# Patient Record
Sex: FEMALE | Race: WHITE | NOT HISPANIC OR LATINO | Employment: OTHER | URBAN - METROPOLITAN AREA
[De-identification: names, ages, dates, MRNs, and addresses within clinical notes are randomized per-mention and may not be internally consistent; named-entity substitution may affect disease eponyms.]

---

## 2017-07-16 ENCOUNTER — APPOINTMENT (EMERGENCY)
Dept: RADIOLOGY | Facility: HOSPITAL | Age: 77
DRG: 640 | End: 2017-07-16
Payer: MEDICARE

## 2017-07-16 ENCOUNTER — HOSPITAL ENCOUNTER (INPATIENT)
Facility: HOSPITAL | Age: 77
LOS: 4 days | Discharge: HOME | DRG: 640 | End: 2017-07-20
Attending: EMERGENCY MEDICINE | Admitting: INTERNAL MEDICINE
Payer: MEDICARE

## 2017-07-16 DIAGNOSIS — E87.1 HYPONATREMIA: Primary | ICD-10-CM

## 2017-07-16 DIAGNOSIS — R11.2 NAUSEA AND VOMITING: ICD-10-CM

## 2017-07-16 DIAGNOSIS — R41.0 DELIRIUM: ICD-10-CM

## 2017-07-16 DIAGNOSIS — N39.0 URINARY TRACT INFECTION: ICD-10-CM

## 2017-07-16 PROBLEM — E78.5 HLD (HYPERLIPIDEMIA): Chronic | Status: ACTIVE | Noted: 2017-07-16

## 2017-07-16 PROBLEM — I10 ESSENTIAL HYPERTENSION: Chronic | Status: ACTIVE | Noted: 2017-07-16

## 2017-07-16 PROBLEM — R82.90 ABNORMAL URINALYSIS: Status: ACTIVE | Noted: 2017-07-16

## 2017-07-16 PROBLEM — E87.6 HYPOKALEMIA: Status: ACTIVE | Noted: 2017-07-16

## 2017-07-16 PROBLEM — E11.9 DM (DIABETES MELLITUS) (HCC): Chronic | Status: ACTIVE | Noted: 2017-07-16

## 2017-07-16 PROBLEM — E11.10: Chronic | Status: ACTIVE | Noted: 2017-07-16

## 2017-07-16 LAB
ALBUMIN SERPL BCP-MCNC: 3.6 G/DL (ref 3.5–5)
ALP SERPL-CCNC: 73 U/L (ref 46–116)
ALT SERPL W P-5'-P-CCNC: 12 U/L (ref 12–78)
ANION GAP SERPL CALCULATED.3IONS-SCNC: 10 MMOL/L (ref 4–13)
AST SERPL W P-5'-P-CCNC: 14 U/L (ref 5–45)
BACTERIA UR QL AUTO: ABNORMAL /HPF
BASOPHILS # BLD AUTO: 0.02 THOUSANDS/ΜL (ref 0–0.1)
BASOPHILS NFR BLD AUTO: 0 % (ref 0–1)
BILIRUB SERPL-MCNC: 0.43 MG/DL (ref 0.2–1)
BILIRUB UR QL STRIP: NEGATIVE
BUN SERPL-MCNC: 13 MG/DL (ref 5–25)
CALCIUM SERPL-MCNC: 9 MG/DL (ref 8.3–10.1)
CHLORIDE SERPL-SCNC: 83 MMOL/L (ref 100–108)
CLARITY UR: CLEAR
CLARITY, POC: NORMAL
CO2 SERPL-SCNC: 26 MMOL/L (ref 21–32)
COLOR UR: YELLOW
COLOR, POC: YELLOW
CREAT SERPL-MCNC: 0.84 MG/DL (ref 0.6–1.3)
CREAT UR-MCNC: 93.9 MG/DL
EOSINOPHIL # BLD AUTO: 0.01 THOUSAND/ΜL (ref 0–0.61)
EOSINOPHIL NFR BLD AUTO: 0 % (ref 0–6)
ERYTHROCYTE [DISTWIDTH] IN BLOOD BY AUTOMATED COUNT: 14.5 % (ref 11.6–15.1)
GFR SERPL CREATININE-BSD FRML MDRD: >60 ML/MIN/1.73SQ M
GLUCOSE SERPL-MCNC: 143 MG/DL (ref 65–140)
GLUCOSE SERPL-MCNC: 146 MG/DL (ref 65–140)
GLUCOSE SERPL-MCNC: 158 MG/DL (ref 65–140)
GLUCOSE UR STRIP-MCNC: NEGATIVE MG/DL
HCT VFR BLD AUTO: 37.3 % (ref 34.8–46.1)
HGB BLD-MCNC: 13.2 G/DL (ref 11.5–15.4)
HGB UR QL STRIP.AUTO: ABNORMAL
HYALINE CASTS #/AREA URNS LPF: ABNORMAL /LPF
KETONES UR STRIP-MCNC: NEGATIVE MG/DL
LEUKOCYTE ESTERASE UR QL STRIP: ABNORMAL
LIPASE SERPL-CCNC: 101 U/L (ref 73–393)
LYMPHOCYTES # BLD AUTO: 1.98 THOUSANDS/ΜL (ref 0.6–4.47)
LYMPHOCYTES NFR BLD AUTO: 23 % (ref 14–44)
MAGNESIUM SERPL-MCNC: 1.8 MG/DL (ref 1.6–2.6)
MCH RBC QN AUTO: 26.3 PG (ref 26.8–34.3)
MCHC RBC AUTO-ENTMCNC: 35.4 G/DL (ref 31.4–37.4)
MCV RBC AUTO: 75 FL (ref 82–98)
MONOCYTES # BLD AUTO: 1.02 THOUSAND/ΜL (ref 0.17–1.22)
MONOCYTES NFR BLD AUTO: 12 % (ref 4–12)
NEUTROPHILS # BLD AUTO: 5.52 THOUSANDS/ΜL (ref 1.85–7.62)
NEUTS SEG NFR BLD AUTO: 65 % (ref 43–75)
NITRITE UR QL STRIP: NEGATIVE
NON-SQ EPI CELLS URNS QL MICRO: ABNORMAL /HPF
NRBC BLD AUTO-RTO: 0 /100 WBCS
OSMOLALITY UR/SERPL-RTO: 257 MMOL/KG (ref 282–298)
OSMOLALITY UR: 324 MMOL/KG
PH UR STRIP.AUTO: 5.5 [PH] (ref 4.5–8)
PHOSPHATE SERPL-MCNC: 2.5 MG/DL (ref 2.3–4.1)
PLATELET # BLD AUTO: 301 THOUSANDS/UL (ref 149–390)
PLATELET # BLD AUTO: 311 THOUSANDS/UL (ref 149–390)
PMV BLD AUTO: 9.7 FL (ref 8.9–12.7)
PMV BLD AUTO: 9.7 FL (ref 8.9–12.7)
POTASSIUM SERPL-SCNC: 3.4 MMOL/L (ref 3.5–5.3)
PROT SERPL-MCNC: 8.2 G/DL (ref 6.4–8.2)
PROT UR STRIP-MCNC: ABNORMAL MG/DL
RBC # BLD AUTO: 5.01 MILLION/UL (ref 3.81–5.12)
RBC #/AREA URNS AUTO: ABNORMAL /HPF
SODIUM 24H UR-SCNC: 33 MOL/L
SODIUM SERPL-SCNC: 119 MMOL/L (ref 136–145)
SP GR UR STRIP.AUTO: 1.02 (ref 1–1.03)
SPECIMEN SOURCE: NORMAL
TROPONIN I BLD-MCNC: 0 NG/ML (ref 0–0.08)
UROBILINOGEN UR QL STRIP.AUTO: 0.2 E.U./DL
WBC # BLD AUTO: 8.57 THOUSAND/UL (ref 4.31–10.16)
WBC #/AREA URNS AUTO: ABNORMAL /HPF

## 2017-07-16 PROCEDURE — 85025 COMPLETE CBC W/AUTO DIFF WBC: CPT | Performed by: EMERGENCY MEDICINE

## 2017-07-16 PROCEDURE — 36415 COLL VENOUS BLD VENIPUNCTURE: CPT | Performed by: EMERGENCY MEDICINE

## 2017-07-16 PROCEDURE — 82948 REAGENT STRIP/BLOOD GLUCOSE: CPT

## 2017-07-16 PROCEDURE — 87186 SC STD MICRODIL/AGAR DIL: CPT

## 2017-07-16 PROCEDURE — 85049 AUTOMATED PLATELET COUNT: CPT | Performed by: INTERNAL MEDICINE

## 2017-07-16 PROCEDURE — 93005 ELECTROCARDIOGRAM TRACING: CPT

## 2017-07-16 PROCEDURE — 93005 ELECTROCARDIOGRAM TRACING: CPT | Performed by: EMERGENCY MEDICINE

## 2017-07-16 PROCEDURE — 87086 URINE CULTURE/COLONY COUNT: CPT

## 2017-07-16 PROCEDURE — 84100 ASSAY OF PHOSPHORUS: CPT | Performed by: EMERGENCY MEDICINE

## 2017-07-16 PROCEDURE — 87077 CULTURE AEROBIC IDENTIFY: CPT

## 2017-07-16 PROCEDURE — 80053 COMPREHEN METABOLIC PANEL: CPT | Performed by: EMERGENCY MEDICINE

## 2017-07-16 PROCEDURE — 96360 HYDRATION IV INFUSION INIT: CPT

## 2017-07-16 PROCEDURE — 83935 ASSAY OF URINE OSMOLALITY: CPT | Performed by: EMERGENCY MEDICINE

## 2017-07-16 PROCEDURE — 71020 HB CHEST X-RAY 2VW FRONTAL&LATL: CPT

## 2017-07-16 PROCEDURE — 83690 ASSAY OF LIPASE: CPT | Performed by: EMERGENCY MEDICINE

## 2017-07-16 PROCEDURE — 84484 ASSAY OF TROPONIN QUANT: CPT

## 2017-07-16 PROCEDURE — 81001 URINALYSIS AUTO W/SCOPE: CPT

## 2017-07-16 PROCEDURE — 83930 ASSAY OF BLOOD OSMOLALITY: CPT | Performed by: EMERGENCY MEDICINE

## 2017-07-16 PROCEDURE — 84300 ASSAY OF URINE SODIUM: CPT | Performed by: EMERGENCY MEDICINE

## 2017-07-16 PROCEDURE — 81002 URINALYSIS NONAUTO W/O SCOPE: CPT | Performed by: EMERGENCY MEDICINE

## 2017-07-16 PROCEDURE — 83735 ASSAY OF MAGNESIUM: CPT | Performed by: EMERGENCY MEDICINE

## 2017-07-16 PROCEDURE — 82570 ASSAY OF URINE CREATININE: CPT | Performed by: EMERGENCY MEDICINE

## 2017-07-16 PROCEDURE — 99285 EMERGENCY DEPT VISIT HI MDM: CPT

## 2017-07-16 RX ORDER — AMLODIPINE BESYLATE 10 MG/1
10 TABLET ORAL DAILY
Status: DISCONTINUED | OUTPATIENT
Start: 2017-07-16 | End: 2017-07-20 | Stop reason: HOSPADM

## 2017-07-16 RX ORDER — POTASSIUM CHLORIDE 20MEQ/15ML
40 LIQUID (ML) ORAL ONCE
Status: COMPLETED | OUTPATIENT
Start: 2017-07-16 | End: 2017-07-16

## 2017-07-16 RX ORDER — PRAVASTATIN SODIUM 40 MG
40 TABLET ORAL
Status: DISCONTINUED | OUTPATIENT
Start: 2017-07-16 | End: 2017-07-20 | Stop reason: HOSPADM

## 2017-07-16 RX ORDER — ATENOLOL 50 MG/1
50 TABLET ORAL 2 TIMES DAILY
Status: DISCONTINUED | OUTPATIENT
Start: 2017-07-16 | End: 2017-07-17

## 2017-07-16 RX ORDER — ONDANSETRON 2 MG/ML
4 INJECTION INTRAMUSCULAR; INTRAVENOUS ONCE
Status: DISCONTINUED | OUTPATIENT
Start: 2017-07-16 | End: 2017-07-16

## 2017-07-16 RX ORDER — ACETAMINOPHEN 325 MG/1
650 TABLET ORAL EVERY 6 HOURS PRN
Status: DISCONTINUED | OUTPATIENT
Start: 2017-07-16 | End: 2017-07-20 | Stop reason: HOSPADM

## 2017-07-16 RX ORDER — SODIUM CHLORIDE 9 MG/ML
50 INJECTION, SOLUTION INTRAVENOUS CONTINUOUS
Status: DISCONTINUED | OUTPATIENT
Start: 2017-07-16 | End: 2017-07-17

## 2017-07-16 RX ORDER — PANTOPRAZOLE SODIUM 40 MG/1
40 TABLET, DELAYED RELEASE ORAL
Status: DISCONTINUED | OUTPATIENT
Start: 2017-07-17 | End: 2017-07-19

## 2017-07-16 RX ORDER — ENALAPRIL MALEATE 10 MG/1
20 TABLET ORAL 2 TIMES DAILY
Status: DISCONTINUED | OUTPATIENT
Start: 2017-07-16 | End: 2017-07-17

## 2017-07-16 RX ORDER — HYDROCHLOROTHIAZIDE 25 MG/1
25 TABLET ORAL DAILY
COMMUNITY
End: 2017-07-20 | Stop reason: HOSPADM

## 2017-07-16 RX ORDER — AMLODIPINE BESYLATE 10 MG/1
10 TABLET ORAL DAILY
COMMUNITY

## 2017-07-16 RX ORDER — ENALAPRIL MALEATE 20 MG/1
20 TABLET ORAL 2 TIMES DAILY
COMMUNITY
End: 2019-11-16 | Stop reason: HOSPADM

## 2017-07-16 RX ORDER — ATENOLOL 50 MG/1
50 TABLET ORAL 2 TIMES DAILY
COMMUNITY

## 2017-07-16 RX ORDER — SIMVASTATIN 20 MG
20 TABLET ORAL
COMMUNITY

## 2017-07-16 RX ORDER — GLIPIZIDE 10 MG/1
10 TABLET ORAL
COMMUNITY
End: 2019-11-16 | Stop reason: HOSPADM

## 2017-07-16 RX ORDER — OMEPRAZOLE 20 MG/1
20 CAPSULE, DELAYED RELEASE ORAL DAILY
COMMUNITY

## 2017-07-16 RX ADMIN — AMLODIPINE BESYLATE 10 MG: 10 TABLET ORAL at 17:48

## 2017-07-16 RX ADMIN — POTASSIUM CHLORIDE 40 MEQ: 20 SOLUTION ORAL at 14:09

## 2017-07-16 RX ADMIN — SODIUM CHLORIDE 1000 ML: 0.9 INJECTION, SOLUTION INTRAVENOUS at 13:33

## 2017-07-16 RX ADMIN — CEFTRIAXONE 1000 MG: 1 INJECTION, POWDER, FOR SOLUTION INTRAMUSCULAR; INTRAVENOUS at 14:35

## 2017-07-16 RX ADMIN — SODIUM CHLORIDE 50 ML/HR: 0.9 INJECTION, SOLUTION INTRAVENOUS at 16:12

## 2017-07-16 RX ADMIN — ATENOLOL 50 MG: 50 TABLET ORAL at 17:48

## 2017-07-16 RX ADMIN — ENALAPRIL MALEATE 20 MG: 10 TABLET ORAL at 17:49

## 2017-07-16 RX ADMIN — PRAVASTATIN SODIUM 40 MG: 40 TABLET ORAL at 17:48

## 2017-07-16 RX ADMIN — INSULIN LISPRO 1 UNITS: 100 INJECTION, SOLUTION INTRAVENOUS; SUBCUTANEOUS at 16:16

## 2017-07-17 LAB
ANION GAP SERPL CALCULATED.3IONS-SCNC: 7 MMOL/L (ref 4–13)
ANION GAP SERPL CALCULATED.3IONS-SCNC: 7 MMOL/L (ref 4–13)
ATRIAL RATE: 77 BPM
ATRIAL RATE: 80 BPM
BUN SERPL-MCNC: 11 MG/DL (ref 5–25)
BUN SERPL-MCNC: 11 MG/DL (ref 5–25)
CALCIUM SERPL-MCNC: 8.4 MG/DL (ref 8.3–10.1)
CALCIUM SERPL-MCNC: 8.7 MG/DL (ref 8.3–10.1)
CHLORIDE SERPL-SCNC: 102 MMOL/L (ref 100–108)
CHLORIDE SERPL-SCNC: 98 MMOL/L (ref 100–108)
CO2 SERPL-SCNC: 23 MMOL/L (ref 21–32)
CO2 SERPL-SCNC: 24 MMOL/L (ref 21–32)
CREAT SERPL-MCNC: 0.71 MG/DL (ref 0.6–1.3)
CREAT SERPL-MCNC: 0.77 MG/DL (ref 0.6–1.3)
ERYTHROCYTE [DISTWIDTH] IN BLOOD BY AUTOMATED COUNT: 14.9 % (ref 11.6–15.1)
EST. AVERAGE GLUCOSE BLD GHB EST-MCNC: 163 MG/DL
GFR SERPL CREATININE-BSD FRML MDRD: >60 ML/MIN/1.73SQ M
GFR SERPL CREATININE-BSD FRML MDRD: >60 ML/MIN/1.73SQ M
GLUCOSE SERPL-MCNC: 113 MG/DL (ref 65–140)
GLUCOSE SERPL-MCNC: 137 MG/DL (ref 65–140)
GLUCOSE SERPL-MCNC: 143 MG/DL (ref 65–140)
GLUCOSE SERPL-MCNC: 146 MG/DL (ref 65–140)
GLUCOSE SERPL-MCNC: 159 MG/DL (ref 65–140)
GLUCOSE SERPL-MCNC: 170 MG/DL (ref 65–140)
HBA1C MFR BLD: 7.3 % (ref 4.2–6.3)
HCT VFR BLD AUTO: 33 % (ref 34.8–46.1)
HGB BLD-MCNC: 11.2 G/DL (ref 11.5–15.4)
MAGNESIUM SERPL-MCNC: 1.9 MG/DL (ref 1.6–2.6)
MCH RBC QN AUTO: 25.4 PG (ref 26.8–34.3)
MCHC RBC AUTO-ENTMCNC: 33.9 G/DL (ref 31.4–37.4)
MCV RBC AUTO: 75 FL (ref 82–98)
P AXIS: 63 DEGREES
P AXIS: 80 DEGREES
PHOSPHATE SERPL-MCNC: 2.4 MG/DL (ref 2.3–4.1)
PLATELET # BLD AUTO: 287 THOUSANDS/UL (ref 149–390)
PMV BLD AUTO: 9.5 FL (ref 8.9–12.7)
POTASSIUM SERPL-SCNC: 3.9 MMOL/L (ref 3.5–5.3)
POTASSIUM SERPL-SCNC: 4 MMOL/L (ref 3.5–5.3)
PR INTERVAL: 152 MS
PR INTERVAL: 154 MS
QRS AXIS: 72 DEGREES
QRS AXIS: 72 DEGREES
QRSD INTERVAL: 92 MS
QRSD INTERVAL: 98 MS
QT INTERVAL: 390 MS
QT INTERVAL: 512 MS
QTC INTERVAL: 441 MS
QTC INTERVAL: 583 MS
RBC # BLD AUTO: 4.41 MILLION/UL (ref 3.81–5.12)
SODIUM SERPL-SCNC: 129 MMOL/L (ref 136–145)
SODIUM SERPL-SCNC: 131 MMOL/L (ref 136–145)
SODIUM SERPL-SCNC: 132 MMOL/L (ref 136–145)
SODIUM SERPL-SCNC: 134 MMOL/L (ref 136–145)
T WAVE AXIS: -50 DEGREES
T WAVE AXIS: 46 DEGREES
TSH SERPL DL<=0.05 MIU/L-ACNC: 2.1 UIU/ML (ref 0.36–3.74)
VENTRICULAR RATE: 77 BPM
VENTRICULAR RATE: 78 BPM
WBC # BLD AUTO: 7.39 THOUSAND/UL (ref 4.31–10.16)

## 2017-07-17 PROCEDURE — 84443 ASSAY THYROID STIM HORMONE: CPT | Performed by: INTERNAL MEDICINE

## 2017-07-17 PROCEDURE — 84295 ASSAY OF SERUM SODIUM: CPT | Performed by: NURSE PRACTITIONER

## 2017-07-17 PROCEDURE — 83735 ASSAY OF MAGNESIUM: CPT | Performed by: INTERNAL MEDICINE

## 2017-07-17 PROCEDURE — 84100 ASSAY OF PHOSPHORUS: CPT | Performed by: INTERNAL MEDICINE

## 2017-07-17 PROCEDURE — 82948 REAGENT STRIP/BLOOD GLUCOSE: CPT

## 2017-07-17 PROCEDURE — 85027 COMPLETE CBC AUTOMATED: CPT | Performed by: INTERNAL MEDICINE

## 2017-07-17 PROCEDURE — 83036 HEMOGLOBIN GLYCOSYLATED A1C: CPT | Performed by: INTERNAL MEDICINE

## 2017-07-17 PROCEDURE — 80048 BASIC METABOLIC PNL TOTAL CA: CPT | Performed by: INTERNAL MEDICINE

## 2017-07-17 PROCEDURE — 80048 BASIC METABOLIC PNL TOTAL CA: CPT | Performed by: NURSE PRACTITIONER

## 2017-07-17 RX ORDER — ATENOLOL 25 MG/1
25 TABLET ORAL 2 TIMES DAILY
Status: DISCONTINUED | OUTPATIENT
Start: 2017-07-17 | End: 2017-07-20 | Stop reason: HOSPADM

## 2017-07-17 RX ORDER — DEXTROSE MONOHYDRATE 50 MG/ML
200 INJECTION, SOLUTION INTRAVENOUS CONTINUOUS
Status: DISCONTINUED | OUTPATIENT
Start: 2017-07-17 | End: 2017-07-17 | Stop reason: SDUPTHER

## 2017-07-17 RX ORDER — DEXTROSE MONOHYDRATE 50 MG/ML
100 INJECTION, SOLUTION INTRAVENOUS CONTINUOUS
Status: DISCONTINUED | OUTPATIENT
Start: 2017-07-17 | End: 2017-07-19

## 2017-07-17 RX ADMIN — ENOXAPARIN SODIUM 40 MG: 40 INJECTION SUBCUTANEOUS at 09:24

## 2017-07-17 RX ADMIN — INSULIN LISPRO 1 UNITS: 100 INJECTION, SOLUTION INTRAVENOUS; SUBCUTANEOUS at 21:26

## 2017-07-17 RX ADMIN — PANTOPRAZOLE SODIUM 40 MG: 40 TABLET, DELAYED RELEASE ORAL at 06:38

## 2017-07-17 RX ADMIN — CEFTRIAXONE 1000 MG: 1 INJECTION, POWDER, FOR SOLUTION INTRAMUSCULAR; INTRAVENOUS at 14:26

## 2017-07-17 RX ADMIN — INSULIN LISPRO 1 UNITS: 100 INJECTION, SOLUTION INTRAVENOUS; SUBCUTANEOUS at 11:31

## 2017-07-17 RX ADMIN — DEXTROSE 50 ML/HR: 5 SOLUTION INTRAVENOUS at 17:05

## 2017-07-17 RX ADMIN — DEXTROSE 100 ML/HR: 5 SOLUTION INTRAVENOUS at 19:46

## 2017-07-17 RX ADMIN — ENALAPRIL MALEATE 20 MG: 10 TABLET ORAL at 09:24

## 2017-07-17 RX ADMIN — PRAVASTATIN SODIUM 40 MG: 40 TABLET ORAL at 17:19

## 2017-07-17 RX ADMIN — AMLODIPINE BESYLATE 10 MG: 10 TABLET ORAL at 09:24

## 2017-07-17 RX ADMIN — ATENOLOL 50 MG: 50 TABLET ORAL at 09:24

## 2017-07-18 LAB
ANION GAP SERPL CALCULATED.3IONS-SCNC: 8 MMOL/L (ref 4–13)
BACTERIA UR CULT: NORMAL
BUN SERPL-MCNC: 9 MG/DL (ref 5–25)
CALCIUM SERPL-MCNC: 7.8 MG/DL (ref 8.3–10.1)
CHLORIDE SERPL-SCNC: 94 MMOL/L (ref 100–108)
CO2 SERPL-SCNC: 22 MMOL/L (ref 21–32)
CREAT SERPL-MCNC: 0.72 MG/DL (ref 0.6–1.3)
GFR SERPL CREATININE-BSD FRML MDRD: >60 ML/MIN/1.73SQ M
GLUCOSE SERPL-MCNC: 134 MG/DL (ref 65–140)
GLUCOSE SERPL-MCNC: 170 MG/DL (ref 65–140)
GLUCOSE SERPL-MCNC: 183 MG/DL (ref 65–140)
GLUCOSE SERPL-MCNC: 344 MG/DL (ref 65–140)
MAGNESIUM SERPL-MCNC: 1.6 MG/DL (ref 1.6–2.6)
POTASSIUM SERPL-SCNC: 3.2 MMOL/L (ref 3.5–5.3)
SODIUM SERPL-SCNC: 124 MMOL/L (ref 136–145)
SODIUM SERPL-SCNC: 124 MMOL/L (ref 136–145)
SODIUM SERPL-SCNC: 125 MMOL/L (ref 136–145)
SODIUM SERPL-SCNC: 125 MMOL/L (ref 136–145)

## 2017-07-18 PROCEDURE — 82948 REAGENT STRIP/BLOOD GLUCOSE: CPT

## 2017-07-18 PROCEDURE — 84295 ASSAY OF SERUM SODIUM: CPT | Performed by: NURSE PRACTITIONER

## 2017-07-18 PROCEDURE — 80048 BASIC METABOLIC PNL TOTAL CA: CPT | Performed by: NURSE PRACTITIONER

## 2017-07-18 PROCEDURE — 84295 ASSAY OF SERUM SODIUM: CPT | Performed by: INTERNAL MEDICINE

## 2017-07-18 PROCEDURE — 83735 ASSAY OF MAGNESIUM: CPT | Performed by: PHYSICIAN ASSISTANT

## 2017-07-18 RX ORDER — DESMOPRESSIN ACETATE 4 UG/ML
2 INJECTION, SOLUTION INTRAVENOUS; SUBCUTANEOUS ONCE
Status: COMPLETED | OUTPATIENT
Start: 2017-07-18 | End: 2017-07-18

## 2017-07-18 RX ORDER — POTASSIUM CHLORIDE 20 MEQ/1
40 TABLET, EXTENDED RELEASE ORAL ONCE
Status: COMPLETED | OUTPATIENT
Start: 2017-07-18 | End: 2017-07-18

## 2017-07-18 RX ADMIN — ATENOLOL 25 MG: 25 TABLET ORAL at 08:44

## 2017-07-18 RX ADMIN — DESMOPRESSIN ACETATE 2 MCG: 4 SOLUTION INTRAVENOUS at 00:55

## 2017-07-18 RX ADMIN — INSULIN LISPRO 1 UNITS: 100 INJECTION, SOLUTION INTRAVENOUS; SUBCUTANEOUS at 08:00

## 2017-07-18 RX ADMIN — INSULIN LISPRO 1 UNITS: 100 INJECTION, SOLUTION INTRAVENOUS; SUBCUTANEOUS at 21:48

## 2017-07-18 RX ADMIN — CEFTRIAXONE 1000 MG: 1 INJECTION, POWDER, FOR SOLUTION INTRAMUSCULAR; INTRAVENOUS at 15:53

## 2017-07-18 RX ADMIN — PRAVASTATIN SODIUM 40 MG: 40 TABLET ORAL at 15:58

## 2017-07-18 RX ADMIN — PANTOPRAZOLE SODIUM 40 MG: 40 TABLET, DELAYED RELEASE ORAL at 06:29

## 2017-07-18 RX ADMIN — ENOXAPARIN SODIUM 40 MG: 40 INJECTION SUBCUTANEOUS at 08:41

## 2017-07-18 RX ADMIN — CEFAZOLIN SODIUM 1000 MG: 1 SOLUTION INTRAVENOUS at 23:41

## 2017-07-18 RX ADMIN — INSULIN LISPRO 1 UNITS: 100 INJECTION, SOLUTION INTRAVENOUS; SUBCUTANEOUS at 16:07

## 2017-07-18 RX ADMIN — ATENOLOL 25 MG: 25 TABLET ORAL at 18:24

## 2017-07-18 RX ADMIN — DEXTROSE 200 ML/HR: 5 SOLUTION INTRAVENOUS at 02:16

## 2017-07-18 RX ADMIN — POTASSIUM CHLORIDE 40 MEQ: 1500 TABLET, EXTENDED RELEASE ORAL at 06:29

## 2017-07-18 RX ADMIN — AMLODIPINE BESYLATE 10 MG: 10 TABLET ORAL at 08:42

## 2017-07-19 LAB
ALBUMIN SERPL BCP-MCNC: 3.2 G/DL (ref 3.5–5)
ALP SERPL-CCNC: 71 U/L (ref 46–116)
ALT SERPL W P-5'-P-CCNC: 13 U/L (ref 12–78)
ANION GAP SERPL CALCULATED.3IONS-SCNC: 7 MMOL/L (ref 4–13)
ANION GAP SERPL CALCULATED.3IONS-SCNC: 8 MMOL/L (ref 4–13)
ANION GAP SERPL CALCULATED.3IONS-SCNC: 9 MMOL/L (ref 4–13)
ANION GAP SERPL CALCULATED.3IONS-SCNC: 9 MMOL/L (ref 4–13)
AST SERPL W P-5'-P-CCNC: 8 U/L (ref 5–45)
BASOPHILS # BLD AUTO: 0.04 THOUSANDS/ΜL (ref 0–0.1)
BASOPHILS NFR BLD AUTO: 0 % (ref 0–1)
BILIRUB SERPL-MCNC: 0.25 MG/DL (ref 0.2–1)
BUN SERPL-MCNC: 5 MG/DL (ref 5–25)
BUN SERPL-MCNC: 6 MG/DL (ref 5–25)
CALCIUM SERPL-MCNC: 8.4 MG/DL (ref 8.3–10.1)
CALCIUM SERPL-MCNC: 8.8 MG/DL (ref 8.3–10.1)
CALCIUM SERPL-MCNC: 8.9 MG/DL (ref 8.3–10.1)
CALCIUM SERPL-MCNC: 9.1 MG/DL (ref 8.3–10.1)
CHLORIDE SERPL-SCNC: 87 MMOL/L (ref 100–108)
CHLORIDE SERPL-SCNC: 89 MMOL/L (ref 100–108)
CHLORIDE SERPL-SCNC: 92 MMOL/L (ref 100–108)
CHLORIDE SERPL-SCNC: 94 MMOL/L (ref 100–108)
CO2 SERPL-SCNC: 23 MMOL/L (ref 21–32)
CO2 SERPL-SCNC: 24 MMOL/L (ref 21–32)
CO2 SERPL-SCNC: 25 MMOL/L (ref 21–32)
CO2 SERPL-SCNC: 25 MMOL/L (ref 21–32)
CREAT SERPL-MCNC: 0.61 MG/DL (ref 0.6–1.3)
CREAT SERPL-MCNC: 0.64 MG/DL (ref 0.6–1.3)
CREAT SERPL-MCNC: 0.71 MG/DL (ref 0.6–1.3)
CREAT SERPL-MCNC: 0.72 MG/DL (ref 0.6–1.3)
CREAT UR-MCNC: 24.2 MG/DL
EOSINOPHIL # BLD AUTO: 0.12 THOUSAND/ΜL (ref 0–0.61)
EOSINOPHIL NFR BLD AUTO: 1 % (ref 0–6)
ERYTHROCYTE [DISTWIDTH] IN BLOOD BY AUTOMATED COUNT: 14.8 % (ref 11.6–15.1)
GFR SERPL CREATININE-BSD FRML MDRD: >60 ML/MIN/1.73SQ M
GLUCOSE SERPL-MCNC: 139 MG/DL (ref 65–140)
GLUCOSE SERPL-MCNC: 146 MG/DL (ref 65–140)
GLUCOSE SERPL-MCNC: 156 MG/DL (ref 65–140)
GLUCOSE SERPL-MCNC: 157 MG/DL (ref 65–140)
GLUCOSE SERPL-MCNC: 158 MG/DL (ref 65–140)
GLUCOSE SERPL-MCNC: 160 MG/DL (ref 65–140)
GLUCOSE SERPL-MCNC: 172 MG/DL (ref 65–140)
GLUCOSE SERPL-MCNC: 176 MG/DL (ref 65–140)
GLUCOSE SERPL-MCNC: 205 MG/DL (ref 65–140)
GLUCOSE SERPL-MCNC: 213 MG/DL (ref 65–140)
GLUCOSE SERPL-MCNC: 217 MG/DL (ref 65–140)
HCT VFR BLD AUTO: 36.1 % (ref 34.8–46.1)
HGB BLD-MCNC: 13 G/DL (ref 11.5–15.4)
LYMPHOCYTES # BLD AUTO: 2.68 THOUSANDS/ΜL (ref 0.6–4.47)
LYMPHOCYTES NFR BLD AUTO: 29 % (ref 14–44)
MAGNESIUM SERPL-MCNC: 1.6 MG/DL (ref 1.6–2.6)
MCH RBC QN AUTO: 26.5 PG (ref 26.8–34.3)
MCHC RBC AUTO-ENTMCNC: 36 G/DL (ref 31.4–37.4)
MCV RBC AUTO: 74 FL (ref 82–98)
MONOCYTES # BLD AUTO: 0.94 THOUSAND/ΜL (ref 0.17–1.22)
MONOCYTES NFR BLD AUTO: 10 % (ref 4–12)
NEUTROPHILS # BLD AUTO: 5.62 THOUSANDS/ΜL (ref 1.85–7.62)
NEUTS SEG NFR BLD AUTO: 60 % (ref 43–75)
NRBC BLD AUTO-RTO: 0 /100 WBCS
OSMOLALITY UR/SERPL-RTO: 256 MMOL/KG (ref 282–298)
OSMOLALITY UR: 191 MMOL/KG
PLATELET # BLD AUTO: 335 THOUSANDS/UL (ref 149–390)
PMV BLD AUTO: 9.4 FL (ref 8.9–12.7)
POTASSIUM SERPL-SCNC: 3.8 MMOL/L (ref 3.5–5.3)
POTASSIUM SERPL-SCNC: 3.8 MMOL/L (ref 3.5–5.3)
POTASSIUM SERPL-SCNC: 3.9 MMOL/L (ref 3.5–5.3)
POTASSIUM SERPL-SCNC: 3.9 MMOL/L (ref 3.5–5.3)
PROT SERPL-MCNC: 7.5 G/DL (ref 6.4–8.2)
RBC # BLD AUTO: 4.9 MILLION/UL (ref 3.81–5.12)
SODIUM 24H UR-SCNC: 46 MOL/L
SODIUM SERPL-SCNC: 119 MMOL/L (ref 136–145)
SODIUM SERPL-SCNC: 120 MMOL/L (ref 136–145)
SODIUM SERPL-SCNC: 120 MMOL/L (ref 136–145)
SODIUM SERPL-SCNC: 126 MMOL/L (ref 136–145)
SODIUM SERPL-SCNC: 127 MMOL/L (ref 136–145)
URATE SERPL-MCNC: 2.2 MG/DL (ref 2–6.8)
URATE UR-MCNC: 11.4 MG/DL
WBC # BLD AUTO: 9.42 THOUSAND/UL (ref 4.31–10.16)

## 2017-07-19 PROCEDURE — 80048 BASIC METABOLIC PNL TOTAL CA: CPT | Performed by: INTERNAL MEDICINE

## 2017-07-19 PROCEDURE — 84560 ASSAY OF URINE/URIC ACID: CPT | Performed by: INTERNAL MEDICINE

## 2017-07-19 PROCEDURE — 85025 COMPLETE CBC W/AUTO DIFF WBC: CPT | Performed by: NURSE PRACTITIONER

## 2017-07-19 PROCEDURE — 83935 ASSAY OF URINE OSMOLALITY: CPT | Performed by: INTERNAL MEDICINE

## 2017-07-19 PROCEDURE — 83930 ASSAY OF BLOOD OSMOLALITY: CPT | Performed by: INTERNAL MEDICINE

## 2017-07-19 PROCEDURE — 84295 ASSAY OF SERUM SODIUM: CPT | Performed by: INTERNAL MEDICINE

## 2017-07-19 PROCEDURE — 83735 ASSAY OF MAGNESIUM: CPT | Performed by: PHYSICIAN ASSISTANT

## 2017-07-19 PROCEDURE — 84550 ASSAY OF BLOOD/URIC ACID: CPT | Performed by: INTERNAL MEDICINE

## 2017-07-19 PROCEDURE — 84300 ASSAY OF URINE SODIUM: CPT | Performed by: INTERNAL MEDICINE

## 2017-07-19 PROCEDURE — 80053 COMPREHEN METABOLIC PANEL: CPT | Performed by: NURSE PRACTITIONER

## 2017-07-19 PROCEDURE — 82948 REAGENT STRIP/BLOOD GLUCOSE: CPT

## 2017-07-19 PROCEDURE — 80048 BASIC METABOLIC PNL TOTAL CA: CPT | Performed by: PHYSICIAN ASSISTANT

## 2017-07-19 PROCEDURE — 82570 ASSAY OF URINE CREATININE: CPT | Performed by: INTERNAL MEDICINE

## 2017-07-19 RX ORDER — SODIUM CHLORIDE 1000 MG
2 TABLET, SOLUBLE MISCELLANEOUS 2 TIMES DAILY WITH MEALS
Status: DISCONTINUED | OUTPATIENT
Start: 2017-07-19 | End: 2017-07-20 | Stop reason: HOSPADM

## 2017-07-19 RX ORDER — FUROSEMIDE 10 MG/ML
20 INJECTION INTRAMUSCULAR; INTRAVENOUS ONCE
Status: COMPLETED | OUTPATIENT
Start: 2017-07-19 | End: 2017-07-19

## 2017-07-19 RX ADMIN — PRAVASTATIN SODIUM 40 MG: 40 TABLET ORAL at 17:28

## 2017-07-19 RX ADMIN — AMLODIPINE BESYLATE 10 MG: 10 TABLET ORAL at 08:07

## 2017-07-19 RX ADMIN — CEFAZOLIN SODIUM 1000 MG: 1 SOLUTION INTRAVENOUS at 15:39

## 2017-07-19 RX ADMIN — ATENOLOL 25 MG: 25 TABLET ORAL at 17:28

## 2017-07-19 RX ADMIN — CEFAZOLIN SODIUM 1000 MG: 1 SOLUTION INTRAVENOUS at 22:35

## 2017-07-19 RX ADMIN — ATENOLOL 25 MG: 25 TABLET ORAL at 08:05

## 2017-07-19 RX ADMIN — FUROSEMIDE 20 MG: 10 INJECTION, SOLUTION INTRAMUSCULAR; INTRAVENOUS at 03:04

## 2017-07-19 RX ADMIN — NICOTINE 1 PATCH: 7 PATCH, EXTENDED RELEASE TRANSDERMAL at 08:04

## 2017-07-19 RX ADMIN — INSULIN LISPRO 2 UNITS: 100 INJECTION, SOLUTION INTRAVENOUS; SUBCUTANEOUS at 07:59

## 2017-07-19 RX ADMIN — PANTOPRAZOLE SODIUM 40 MG: 40 TABLET, DELAYED RELEASE ORAL at 05:04

## 2017-07-19 RX ADMIN — INSULIN LISPRO 1 UNITS: 100 INJECTION, SOLUTION INTRAVENOUS; SUBCUTANEOUS at 11:39

## 2017-07-19 RX ADMIN — INSULIN LISPRO 1 UNITS: 100 INJECTION, SOLUTION INTRAVENOUS; SUBCUTANEOUS at 21:28

## 2017-07-19 RX ADMIN — CEFAZOLIN SODIUM 1000 MG: 1 SOLUTION INTRAVENOUS at 06:17

## 2017-07-19 RX ADMIN — ENOXAPARIN SODIUM 40 MG: 40 INJECTION SUBCUTANEOUS at 08:07

## 2017-07-19 RX ADMIN — SODIUM CHLORIDE TAB 1 GM 2 G: 1 TAB at 15:39

## 2017-07-20 VITALS
WEIGHT: 143.08 LBS | RESPIRATION RATE: 18 BRPM | OXYGEN SATURATION: 97 % | BODY MASS INDEX: 26.33 KG/M2 | DIASTOLIC BLOOD PRESSURE: 61 MMHG | HEART RATE: 72 BPM | TEMPERATURE: 97.7 F | SYSTOLIC BLOOD PRESSURE: 134 MMHG | HEIGHT: 62 IN

## 2017-07-20 LAB
ANION GAP SERPL CALCULATED.3IONS-SCNC: 7 MMOL/L (ref 4–13)
ANION GAP SERPL CALCULATED.3IONS-SCNC: 8 MMOL/L (ref 4–13)
BUN SERPL-MCNC: 6 MG/DL (ref 5–25)
BUN SERPL-MCNC: 9 MG/DL (ref 5–25)
CALCIUM SERPL-MCNC: 8.9 MG/DL (ref 8.3–10.1)
CALCIUM SERPL-MCNC: 8.9 MG/DL (ref 8.3–10.1)
CHLORIDE SERPL-SCNC: 96 MMOL/L (ref 100–108)
CHLORIDE SERPL-SCNC: 97 MMOL/L (ref 100–108)
CO2 SERPL-SCNC: 25 MMOL/L (ref 21–32)
CO2 SERPL-SCNC: 25 MMOL/L (ref 21–32)
CREAT SERPL-MCNC: 0.69 MG/DL (ref 0.6–1.3)
CREAT SERPL-MCNC: 0.73 MG/DL (ref 0.6–1.3)
ERYTHROCYTE [DISTWIDTH] IN BLOOD BY AUTOMATED COUNT: 14.9 % (ref 11.6–15.1)
GFR SERPL CREATININE-BSD FRML MDRD: >60 ML/MIN/1.73SQ M
GFR SERPL CREATININE-BSD FRML MDRD: >60 ML/MIN/1.73SQ M
GLUCOSE SERPL-MCNC: 143 MG/DL (ref 65–140)
GLUCOSE SERPL-MCNC: 144 MG/DL (ref 65–140)
GLUCOSE SERPL-MCNC: 149 MG/DL (ref 65–140)
GLUCOSE SERPL-MCNC: 230 MG/DL (ref 65–140)
HCT VFR BLD AUTO: 36.1 % (ref 34.8–46.1)
HGB BLD-MCNC: 12.2 G/DL (ref 11.5–15.4)
MCH RBC QN AUTO: 25.4 PG (ref 26.8–34.3)
MCHC RBC AUTO-ENTMCNC: 33.8 G/DL (ref 31.4–37.4)
MCV RBC AUTO: 75 FL (ref 82–98)
PLATELET # BLD AUTO: 311 THOUSANDS/UL (ref 149–390)
PMV BLD AUTO: 9.8 FL (ref 8.9–12.7)
POTASSIUM SERPL-SCNC: 3.7 MMOL/L (ref 3.5–5.3)
POTASSIUM SERPL-SCNC: 4 MMOL/L (ref 3.5–5.3)
RBC # BLD AUTO: 4.81 MILLION/UL (ref 3.81–5.12)
SODIUM SERPL-SCNC: 128 MMOL/L (ref 136–145)
SODIUM SERPL-SCNC: 130 MMOL/L (ref 136–145)
WBC # BLD AUTO: 5.55 THOUSAND/UL (ref 4.31–10.16)

## 2017-07-20 PROCEDURE — 85027 COMPLETE CBC AUTOMATED: CPT | Performed by: PHYSICIAN ASSISTANT

## 2017-07-20 PROCEDURE — 82948 REAGENT STRIP/BLOOD GLUCOSE: CPT

## 2017-07-20 PROCEDURE — 80048 BASIC METABOLIC PNL TOTAL CA: CPT | Performed by: INTERNAL MEDICINE

## 2017-07-20 PROCEDURE — 80048 BASIC METABOLIC PNL TOTAL CA: CPT | Performed by: PHYSICIAN ASSISTANT

## 2017-07-20 RX ORDER — SODIUM CHLORIDE 1000 MG
2 TABLET, SOLUBLE MISCELLANEOUS 2 TIMES DAILY WITH MEALS
Qty: 120 TABLET | Refills: 0 | Status: SHIPPED | OUTPATIENT
Start: 2017-07-20 | End: 2017-08-19

## 2017-07-20 RX ORDER — CEPHALEXIN 500 MG/1
500 CAPSULE ORAL EVERY 12 HOURS SCHEDULED
Qty: 4 CAPSULE | Refills: 0 | Status: SHIPPED | OUTPATIENT
Start: 2017-07-21 | End: 2017-07-23

## 2017-07-20 RX ADMIN — SODIUM CHLORIDE TAB 1 GM 2 G: 1 TAB at 09:16

## 2017-07-20 RX ADMIN — CEFAZOLIN SODIUM 1000 MG: 1 SOLUTION INTRAVENOUS at 14:08

## 2017-07-20 RX ADMIN — CEFAZOLIN SODIUM 1000 MG: 1 SOLUTION INTRAVENOUS at 06:26

## 2017-07-20 RX ADMIN — ATENOLOL 25 MG: 25 TABLET ORAL at 09:17

## 2017-07-20 RX ADMIN — AMLODIPINE BESYLATE 10 MG: 10 TABLET ORAL at 09:16

## 2017-07-20 RX ADMIN — INSULIN LISPRO 2 UNITS: 100 INJECTION, SOLUTION INTRAVENOUS; SUBCUTANEOUS at 11:06

## 2017-07-20 RX ADMIN — ENOXAPARIN SODIUM 40 MG: 40 INJECTION SUBCUTANEOUS at 09:15

## 2017-07-24 DIAGNOSIS — E87.1 HYPO-OSMOLALITY AND HYPONATREMIA: ICD-10-CM

## 2019-10-14 ENCOUNTER — HOSPITAL ENCOUNTER (INPATIENT)
Facility: HOSPITAL | Age: 79
LOS: 33 days | Discharge: NURSING, REHAB OR LONG-TERM CARE | DRG: 545 | End: 2019-11-16
Attending: EMERGENCY MEDICINE | Admitting: INTERNAL MEDICINE
Payer: MEDICARE

## 2019-10-14 DIAGNOSIS — R10.12 LEFT UPPER QUADRANT PAIN: ICD-10-CM

## 2019-10-14 DIAGNOSIS — R82.90 ABNORMAL URINALYSIS: Primary | ICD-10-CM

## 2019-10-14 DIAGNOSIS — I65.23 INTERNAL CAROTID ARTERY STENOSIS, BILATERAL: ICD-10-CM

## 2019-10-14 DIAGNOSIS — B37.0 ORAL THRUSH: ICD-10-CM

## 2019-10-14 DIAGNOSIS — D73.5 SPLENIC INFARCT: ICD-10-CM

## 2019-10-14 DIAGNOSIS — R76.8 ANA POSITIVE: ICD-10-CM

## 2019-10-14 DIAGNOSIS — I25.10 CAD (CORONARY ARTERY DISEASE): ICD-10-CM

## 2019-10-14 DIAGNOSIS — I77.6 VASCULITIS (HCC): ICD-10-CM

## 2019-10-14 DIAGNOSIS — I10 ESSENTIAL HYPERTENSION: Chronic | ICD-10-CM

## 2019-10-14 DIAGNOSIS — K21.9 GERD (GASTROESOPHAGEAL REFLUX DISEASE): ICD-10-CM

## 2019-10-14 DIAGNOSIS — G93.41 METABOLIC ENCEPHALOPATHY: ICD-10-CM

## 2019-10-14 DIAGNOSIS — R62.7 FAILURE TO THRIVE IN ADULT: ICD-10-CM

## 2019-10-14 DIAGNOSIS — D50.9 MICROCYTIC ANEMIA: ICD-10-CM

## 2019-10-14 DIAGNOSIS — R50.9 FEVER: ICD-10-CM

## 2019-10-14 DIAGNOSIS — R50.9 FEVER OF UNKNOWN ORIGIN: ICD-10-CM

## 2019-10-14 DIAGNOSIS — E87.1 HYPONATREMIA: ICD-10-CM

## 2019-10-14 DIAGNOSIS — E87.6 HYPOKALEMIA: ICD-10-CM

## 2019-10-14 DIAGNOSIS — E11.40 TYPE 2 DIABETES MELLITUS WITH DIABETIC NEUROPATHY, WITHOUT LONG-TERM CURRENT USE OF INSULIN (HCC): ICD-10-CM

## 2019-10-14 DIAGNOSIS — R50.9 LOW GRADE FEVER: ICD-10-CM

## 2019-10-14 DIAGNOSIS — E78.5 HYPERLIPIDEMIA, UNSPECIFIED HYPERLIPIDEMIA TYPE: Chronic | ICD-10-CM

## 2019-10-14 DIAGNOSIS — E43 SEVERE PROTEIN-CALORIE MALNUTRITION (HCC): ICD-10-CM

## 2019-10-14 DIAGNOSIS — J34.89 DRY NOSE: ICD-10-CM

## 2019-10-14 PROBLEM — N39.0 ACUTE LOWER URINARY TRACT INFECTION: Status: ACTIVE | Noted: 2019-10-14

## 2019-10-14 PROBLEM — R53.83 LETHARGY: Status: ACTIVE | Noted: 2019-10-14

## 2019-10-14 LAB
ANION GAP SERPL CALCULATED.3IONS-SCNC: 8 MMOL/L (ref 4–13)
BACTERIA UR QL AUTO: ABNORMAL /HPF
BASOPHILS # BLD MANUAL: 0 THOUSAND/UL (ref 0–0.1)
BASOPHILS NFR MAR MANUAL: 0 % (ref 0–1)
BILIRUB UR QL STRIP: NEGATIVE
BUN SERPL-MCNC: 21 MG/DL (ref 5–25)
CALCIUM SERPL-MCNC: 8.8 MG/DL (ref 8.3–10.1)
CHLORIDE SERPL-SCNC: 97 MMOL/L (ref 100–108)
CLARITY UR: ABNORMAL
CO2 SERPL-SCNC: 21 MMOL/L (ref 21–32)
COLOR UR: YELLOW
CREAT SERPL-MCNC: 0.73 MG/DL (ref 0.6–1.3)
EOSINOPHIL # BLD MANUAL: 0.07 THOUSAND/UL (ref 0–0.4)
EOSINOPHIL NFR BLD MANUAL: 1 % (ref 0–6)
ERYTHROCYTE [DISTWIDTH] IN BLOOD BY AUTOMATED COUNT: 17.3 % (ref 11.6–15.1)
GFR SERPL CREATININE-BSD FRML MDRD: 78 ML/MIN/1.73SQ M
GLUCOSE SERPL-MCNC: 104 MG/DL (ref 65–140)
GLUCOSE UR STRIP-MCNC: NEGATIVE MG/DL
HCT VFR BLD AUTO: 32.4 % (ref 34.8–46.1)
HGB BLD-MCNC: 10.1 G/DL (ref 11.5–15.4)
HGB UR QL STRIP.AUTO: ABNORMAL
KETONES UR STRIP-MCNC: NEGATIVE MG/DL
LEUKOCYTE ESTERASE UR QL STRIP: ABNORMAL
LYMPHOCYTES # BLD AUTO: 0.41 THOUSAND/UL (ref 0.6–4.47)
LYMPHOCYTES # BLD AUTO: 6 % (ref 14–44)
MCH RBC QN AUTO: 23.5 PG (ref 26.8–34.3)
MCHC RBC AUTO-ENTMCNC: 31.2 G/DL (ref 31.4–37.4)
MCV RBC AUTO: 75 FL (ref 82–98)
MONOCYTES # BLD AUTO: 0.14 THOUSAND/UL (ref 0–1.22)
MONOCYTES NFR BLD: 2 % (ref 4–12)
MUCOUS THREADS UR QL AUTO: ABNORMAL
NEUTROPHILS # BLD MANUAL: 6.25 THOUSAND/UL (ref 1.85–7.62)
NEUTS SEG NFR BLD AUTO: 91 % (ref 43–75)
NITRITE UR QL STRIP: POSITIVE
NON-SQ EPI CELLS URNS QL MICRO: ABNORMAL /HPF
NRBC BLD AUTO-RTO: 0 /100 WBCS
PH UR STRIP.AUTO: 5.5 [PH]
PLATELET # BLD AUTO: 407 THOUSANDS/UL (ref 149–390)
PLATELET BLD QL SMEAR: ADEQUATE
PMV BLD AUTO: 10 FL (ref 8.9–12.7)
POTASSIUM SERPL-SCNC: 4.9 MMOL/L (ref 3.5–5.3)
PROT UR STRIP-MCNC: ABNORMAL MG/DL
RBC # BLD AUTO: 4.3 MILLION/UL (ref 3.81–5.12)
RBC #/AREA URNS AUTO: ABNORMAL /HPF
RBC MORPH BLD: NORMAL
SODIUM SERPL-SCNC: 126 MMOL/L (ref 136–145)
SP GR UR STRIP.AUTO: 1.02 (ref 1–1.03)
UROBILINOGEN UR QL STRIP.AUTO: 0.2 E.U./DL
WBC # BLD AUTO: 6.87 THOUSAND/UL (ref 4.31–10.16)
WBC #/AREA URNS AUTO: ABNORMAL /HPF

## 2019-10-14 PROCEDURE — 99285 EMERGENCY DEPT VISIT HI MDM: CPT | Performed by: EMERGENCY MEDICINE

## 2019-10-14 PROCEDURE — 80048 BASIC METABOLIC PNL TOTAL CA: CPT | Performed by: INTERNAL MEDICINE

## 2019-10-14 PROCEDURE — 85027 COMPLETE CBC AUTOMATED: CPT | Performed by: INTERNAL MEDICINE

## 2019-10-14 PROCEDURE — 36415 COLL VENOUS BLD VENIPUNCTURE: CPT | Performed by: INTERNAL MEDICINE

## 2019-10-14 PROCEDURE — 87040 BLOOD CULTURE FOR BACTERIA: CPT | Performed by: INTERNAL MEDICINE

## 2019-10-14 PROCEDURE — 96365 THER/PROPH/DIAG IV INF INIT: CPT

## 2019-10-14 PROCEDURE — 99223 1ST HOSP IP/OBS HIGH 75: CPT | Performed by: INTERNAL MEDICINE

## 2019-10-14 PROCEDURE — 81001 URINALYSIS AUTO W/SCOPE: CPT | Performed by: INTERNAL MEDICINE

## 2019-10-14 PROCEDURE — 85007 BL SMEAR W/DIFF WBC COUNT: CPT | Performed by: INTERNAL MEDICINE

## 2019-10-14 PROCEDURE — 96361 HYDRATE IV INFUSION ADD-ON: CPT

## 2019-10-14 PROCEDURE — 99284 EMERGENCY DEPT VISIT MOD MDM: CPT

## 2019-10-14 RX ADMIN — SODIUM CHLORIDE 500 ML: 0.9 INJECTION, SOLUTION INTRAVENOUS at 19:01

## 2019-10-14 RX ADMIN — CEFTRIAXONE SODIUM 1000 MG: 10 INJECTION, POWDER, FOR SOLUTION INTRAVENOUS at 20:31

## 2019-10-14 NOTE — ED PROVIDER NOTES
History  Chief Complaint   Patient presents with    Abnormal Lab     abnormal lab work from [facility]     HPI  [de-identified] y o  women presents to ED with abnormal labs  Pt was sent over from [facility], she is a poor historian  She denies any pain, she lacks insight to why she is in the hospital      Her son-in-law was called, he reports she has been more confused for over a month now but she was driving herself two weeks  The labs here for no recent accidents or traumas  She has not complained of any pain  She does seem mentally altered but this is been her baseline for several weeks  Her daughter, Rayshawn Christensen, reports her mother was recently hospitalized for confusion and was found to be hyponatremic and had a UTI  She reports her mother's mental status improved after the hyponatremia was corrected and UTI was treated, but now she is back to acting confused  She reports she resides in a nursing home because she cannot take care of herself  She reports concern because her mother has not been eating well for the last month or so  Prior to Admission Medications   Prescriptions Last Dose Informant Patient Reported? Taking?    amLODIPine (NORVASC) 10 mg tablet   Yes No   Sig: Take 10 mg by mouth daily   atenolol (TENORMIN) 50 mg tablet   Yes No   Sig: Take 50 mg by mouth 2 (two) times a day   enalapril (VASOTEC) 20 mg tablet   Yes No   Sig: Take 20 mg by mouth 2 (two) times a day   glipiZIDE (GLUCOTROL) 10 mg tablet   Yes No   Sig: Take 10 mg by mouth 2 (two) times a day before meals   metFORMIN (GLUCOPHAGE) 1000 MG tablet   Yes No   Sig: Take 1,000 mg by mouth 2 (two) times a day with meals   omeprazole (PriLOSEC) 20 mg delayed release capsule   Yes No   Sig: Take 20 mg by mouth daily   saxagliptin (ONGLYZA) 5 MG tablet   Yes No   Sig: Take 5 mg by mouth daily   simvastatin (ZOCOR) 20 mg tablet   Yes No   Sig: Take 20 mg by mouth daily at bedtime   sodium chloride 1 g tablet   No No   Sig: Take 2 tablets by mouth 2 (two) times a day with meals for 30 days      Facility-Administered Medications: None       Past Medical History:   Diagnosis Date    Cardiac disease     Diabetes mellitus ([address] )     Hyperlipidemia     Hypertension        Past Surgical History:   Procedure Laterality Date    CORONARY ANGIOPLASTY WITH STENT PLACEMENT         No family history on file  I have reviewed and agree with the history as documented  Social History     Tobacco Use    Smoking status: Current Every Day Smoker     Packs/day: 0 20   Substance Use Topics    Alcohol use: No    Drug use: No        Review of Systems   Unable to perform ROS: Other   Age, bilateral hearing loss, mental status    Physical Exam  ED Triage Vitals   Temperature Pulse Respirations Blood Pressure SpO2   10/14/19 1626 10/14/19 1626 10/14/19 1626 10/14/19 1626 10/14/19 1626   98 5 °F (36 9 °C) 65 18 107/56 96 %      Temp Source Heart Rate Source Patient Position - Orthostatic VS BP Location FiO2 (%)   10/14/19 1626 10/14/19 1626 10/14/19 1846 10/14/19 1626 --   Oral Monitor Lying Right arm       Pain Score       10/14/19 1626       No Pain             Orthostatic Vital Signs  Vitals:    10/14/19 1626 10/14/19 1645 10/14/19 1846   BP: 107/56 113/62 111/56   Pulse: 65 84 82   Patient Position - Orthostatic VS:   Lying       Physical Exam   PHYSICAL EXAM    Constitutional:  Poorly nourished, no acute distress  HEENT:  Atraumatic, PERRL, conjunctiva normal  Oropharynx moist, no pharyngeal exudates  Neck- normal range of motion, no tenderness, supple   Respiratory:  No respiratory distress, normal breath sounds, no rales, no wheezing   Cardiovascular:  Normal rate, normal rhythm, no murmurs, no gallops, no rubs   GI:  Soft, nondistended, normal bowel sounds, nontender, no organomegaly, no mass, no rebound, no guarding   :  No costovertebral angle tenderness   Musculoskeletal:  No edema, no tenderness, no deformities   Back- no tenderness  Integument:  Well hydrated, no rash   Lymphatic:  No lymphadenopathy noted   Neurologic:  Alert & oriented x 2, normal motor function, normal sensory function, no focal deficits noted   Psychiatric:  Speech slow but appropriate    ED Medications  Medications   sodium chloride 0 9 % bolus 500 mL (0 mL Intravenous Stopped 10/14/19 2101)   ceftriaxone (ROCEPHIN) 1 g/50 mL in dextrose IVPB (0 mg Intravenous Stopped 10/14/19 2137)       Diagnostic Studies  Results Reviewed     Procedure Component Value Units Date/Time    Urine Microscopic [[id number]]  (Abnormal) Collected:  10/14/19 1804    Lab Status:  Final result Specimen:  Urine, Indwelling Rapp Catheter Updated:  10/14/19 2019     RBC, UA 2-4 /hpf      WBC, UA 4-10 /hpf      Epithelial Cells Occasional /hpf      Bacteria, UA Moderate /hpf      MUCUS THREADS None Seen    Blood culture #1 [[id number]] Collected:  10/14/19 2009    Lab Status: In process Specimen:  Blood from Arm, Right Updated:  10/14/19 2017    Blood culture #2 [[id number]] Collected:  10/14/19 2009    Lab Status:   In process Specimen:  Blood from Hand, Left Updated:  10/14/19 2017    UA w Reflex to Microscopic w Reflex to Culture [[id number]]  (Abnormal) Collected:  10/14/19 1804    Lab Status:  Final result Specimen:  Urine, Indwelling Rapp Catheter Updated:  10/14/19 1916     Color, UA Yellow     Clarity, UA Cloudy     Specific Gravity, UA 1 018     pH, UA 5 5     Leukocytes, UA Large     Nitrite, UA Positive     Protein, UA 30 (1+) mg/dl      Glucose, UA Negative mg/dl      Ketones, UA Negative mg/dl      Urobilinogen, UA 0 2 E U /dl      Bilirubin, UA Negative     Blood, UA Moderate    CBC and differential [[id number]]  (Abnormal) Collected:  10/14/19 1804    Lab Status:  Final result Specimen:  Blood from Arm, Left Updated:  10/14/19 1858     WBC 6 87 Thousand/uL      RBC 4 30 Million/uL      Hemoglobin 10 1 g/dL      Hematocrit 32 4 %      MCV 75 fL      MCH 23 5 pg      MCHC 31 2 g/dL      RDW 17 3 %      MPV 10 0 fL      Platelets 614 Thousands/uL      nRBC 0 /100 WBCs     Narrative: This is an appended report  These results have been appended to a previously verified report  Basic metabolic panel [[id number]]  (Abnormal) Collected:  10/14/19 1804    Lab Status:  Final result Specimen:  Blood from Arm, Left Updated:  10/14/19 1834     Sodium 126 mmol/L      Potassium 4 9 mmol/L      Chloride 97 mmol/L      CO2 21 mmol/L      ANION GAP 8 mmol/L      BUN 21 mg/dL      Creatinine 0 73 mg/dL      Glucose 104 mg/dL      Calcium 8 8 mg/dL      eGFR 78 ml/min/1 73sq m     Narrative:       [place] guidelines for Chronic Kidney Disease (CKD):     Stage 1 with normal or high GFR (GFR > 90 mL/min/1 73 square meters)    Stage 2 Mild CKD (GFR = 60-89 mL/min/1 73 square meters)    Stage 3A Moderate CKD (GFR = 45-59 mL/min/1 73 square meters)    Stage 3B Moderate CKD (GFR = 30-44 mL/min/1 73 square meters)    Stage 4 Severe CKD (GFR = 15-29 mL/min/1 73 square meters)    Stage 5 End Stage CKD (GFR <15 mL/min/1 73 square meters)  Note: GFR calculation is accurate only with a steady state creatinine                 No orders to display         Procedures  Procedures        ED Course       MDM  Number of Diagnoses or Management Options  Diagnosis management comments: [de-identified] y o  women presents to ED with abnormal labs - found to be hyponatremic and anemic - these are unchanged from previous labs   VSS, frail but nontoxic, following commands     -Rectal temp shows 100 1   -Repeat CBC and BMP shows continue anemia and worsening hyponatremia - 500 cc of NS bolus  -UA shows likely UTI - Rocephin started   -Admit to medicine for hyponatremia and UTI   -Family's questions and concerns addressed       Amount and/or Complexity of Data Reviewed  Clinical lab tests: ordered and reviewed  Decide to obtain previous medical records or to obtain history from someone other than the patient: yes  Obtain history from someone other than the patient: yes  Review and summarize past medical records: yes  Discuss the patient with other providers: yes    Risk of Complications, Morbidity, and/or Mortality  Presenting problems: low  Diagnostic procedures: low  Management options: moderate    Patient Progress  Patient progress: stable      Disposition  Final diagnoses:   Abnormal urinalysis     Time reflects when diagnosis was documented in both MDM as applicable and the Disposition within this note     Time User Action Codes Description Comment    10/14/2019  8:58 PM Torres Handsome Add [R82 90] Abnormal urinalysis     10/14/2019  8:58 PM Torres Handsome Modify [R82 90] Abnormal urinalysis       ED Disposition     None      Follow-up Information    None         Patient's Medications   Discharge Prescriptions    No medications on file     No discharge procedures on file  ED Provider  Attending physically available and evaluated [id number] I managed the patient along with the ED Attending      Electronically Signed by         Arnoldo Mares MD  10/14/19 5914

## 2019-10-15 PROBLEM — G93.41 METABOLIC ENCEPHALOPATHY: Status: ACTIVE | Noted: 2017-07-16

## 2019-10-15 PROBLEM — R62.7 FAILURE TO THRIVE IN ADULT: Status: ACTIVE | Noted: 2019-10-15

## 2019-10-15 LAB
ALBUMIN SERPL BCP-MCNC: 2.2 G/DL (ref 3.5–5)
ALP SERPL-CCNC: 58 U/L (ref 46–116)
ALT SERPL W P-5'-P-CCNC: 16 U/L (ref 12–78)
ANION GAP SERPL CALCULATED.3IONS-SCNC: 8 MMOL/L (ref 4–13)
ANION GAP SERPL CALCULATED.3IONS-SCNC: 8 MMOL/L (ref 4–13)
AST SERPL W P-5'-P-CCNC: 26 U/L (ref 5–45)
BASOPHILS # BLD AUTO: 0.02 THOUSANDS/ΜL (ref 0–0.1)
BASOPHILS NFR BLD AUTO: 0 % (ref 0–1)
BILIRUB SERPL-MCNC: 0.27 MG/DL (ref 0.2–1)
BUN SERPL-MCNC: 15 MG/DL (ref 5–25)
BUN SERPL-MCNC: 17 MG/DL (ref 5–25)
CALCIUM SERPL-MCNC: 8.6 MG/DL (ref 8.3–10.1)
CALCIUM SERPL-MCNC: 8.6 MG/DL (ref 8.3–10.1)
CHLORIDE SERPL-SCNC: 101 MMOL/L (ref 100–108)
CHLORIDE SERPL-SCNC: 99 MMOL/L (ref 100–108)
CO2 SERPL-SCNC: 21 MMOL/L (ref 21–32)
CO2 SERPL-SCNC: 21 MMOL/L (ref 21–32)
CORTIS SERPL-MCNC: 24.7 UG/DL
CORTIS SERPL-MCNC: 34.2 UG/DL
CORTIS SERPL-MCNC: 41.9 UG/DL
CORTIS SERPL-MCNC: 48.8 UG/DL
CREAT SERPL-MCNC: 0.59 MG/DL (ref 0.6–1.3)
CREAT SERPL-MCNC: 0.61 MG/DL (ref 0.6–1.3)
EOSINOPHIL # BLD AUTO: 0.02 THOUSAND/ΜL (ref 0–0.61)
EOSINOPHIL NFR BLD AUTO: 0 % (ref 0–6)
ERYTHROCYTE [DISTWIDTH] IN BLOOD BY AUTOMATED COUNT: 17.2 % (ref 11.6–15.1)
ERYTHROCYTE [DISTWIDTH] IN BLOOD BY AUTOMATED COUNT: 17.5 % (ref 11.6–15.1)
EST. AVERAGE GLUCOSE BLD GHB EST-MCNC: 137 MG/DL
FOLATE SERPL-MCNC: 4.9 NG/ML (ref 3.1–17.5)
GFR SERPL CREATININE-BSD FRML MDRD: 86 ML/MIN/1.73SQ M
GFR SERPL CREATININE-BSD FRML MDRD: 87 ML/MIN/1.73SQ M
GLUCOSE SERPL-MCNC: 111 MG/DL (ref 65–140)
GLUCOSE SERPL-MCNC: 114 MG/DL (ref 65–140)
GLUCOSE SERPL-MCNC: 138 MG/DL (ref 65–140)
GLUCOSE SERPL-MCNC: 181 MG/DL (ref 65–140)
GLUCOSE SERPL-MCNC: 41 MG/DL (ref 65–140)
GLUCOSE SERPL-MCNC: 56 MG/DL (ref 65–140)
GLUCOSE SERPL-MCNC: 57 MG/DL (ref 65–140)
GLUCOSE SERPL-MCNC: 67 MG/DL (ref 65–140)
GLUCOSE SERPL-MCNC: 67 MG/DL (ref 65–140)
GLUCOSE SERPL-MCNC: 93 MG/DL (ref 65–140)
HBA1C MFR BLD: 6.4 % (ref 4.2–6.3)
HCT VFR BLD AUTO: 28.2 % (ref 34.8–46.1)
HCT VFR BLD AUTO: 28.8 % (ref 34.8–46.1)
HGB BLD-MCNC: 8.7 G/DL (ref 11.5–15.4)
HGB BLD-MCNC: 8.9 G/DL (ref 11.5–15.4)
IMM GRANULOCYTES # BLD AUTO: 0.03 THOUSAND/UL (ref 0–0.2)
IMM GRANULOCYTES NFR BLD AUTO: 1 % (ref 0–2)
LYMPHOCYTES # BLD AUTO: 0.76 THOUSANDS/ΜL (ref 0.6–4.47)
LYMPHOCYTES NFR BLD AUTO: 12 % (ref 14–44)
MAGNESIUM SERPL-MCNC: 1.6 MG/DL (ref 1.6–2.6)
MCH RBC QN AUTO: 23.3 PG (ref 26.8–34.3)
MCH RBC QN AUTO: 23.4 PG (ref 26.8–34.3)
MCHC RBC AUTO-ENTMCNC: 30.9 G/DL (ref 31.4–37.4)
MCHC RBC AUTO-ENTMCNC: 30.9 G/DL (ref 31.4–37.4)
MCV RBC AUTO: 76 FL (ref 82–98)
MCV RBC AUTO: 76 FL (ref 82–98)
MONOCYTES # BLD AUTO: 0.37 THOUSAND/ΜL (ref 0.17–1.22)
MONOCYTES NFR BLD AUTO: 6 % (ref 4–12)
NEUTROPHILS # BLD AUTO: 4.98 THOUSANDS/ΜL (ref 1.85–7.62)
NEUTS SEG NFR BLD AUTO: 81 % (ref 43–75)
NRBC BLD AUTO-RTO: 0 /100 WBCS
OSMOLALITY UR/SERPL-RTO: 272 MMOL/KG (ref 282–298)
OSMOLALITY UR: 564 MMOL/KG
PHOSPHATE SERPL-MCNC: 3.2 MG/DL (ref 2.3–4.1)
PLATELET # BLD AUTO: 392 THOUSANDS/UL (ref 149–390)
PLATELET # BLD AUTO: 394 THOUSANDS/UL (ref 149–390)
PMV BLD AUTO: 10.3 FL (ref 8.9–12.7)
PMV BLD AUTO: 9.9 FL (ref 8.9–12.7)
POTASSIUM SERPL-SCNC: 4.4 MMOL/L (ref 3.5–5.3)
POTASSIUM SERPL-SCNC: 4.8 MMOL/L (ref 3.5–5.3)
PROT SERPL-MCNC: 7.4 G/DL (ref 6.4–8.2)
RBC # BLD AUTO: 3.73 MILLION/UL (ref 3.81–5.12)
RBC # BLD AUTO: 3.8 MILLION/UL (ref 3.81–5.12)
SODIUM 24H UR-SCNC: 26 MOL/L
SODIUM SERPL-SCNC: 128 MMOL/L (ref 136–145)
SODIUM SERPL-SCNC: 130 MMOL/L (ref 136–145)
TSH SERPL DL<=0.05 MIU/L-ACNC: 2.82 UIU/ML (ref 0.36–3.74)
VIT B12 SERPL-MCNC: 443 PG/ML (ref 100–900)
WBC # BLD AUTO: 6.18 THOUSAND/UL (ref 4.31–10.16)
WBC # BLD AUTO: 7.55 THOUSAND/UL (ref 4.31–10.16)

## 2019-10-15 PROCEDURE — G8988 SELF CARE GOAL STATUS: HCPCS

## 2019-10-15 PROCEDURE — 83735 ASSAY OF MAGNESIUM: CPT | Performed by: INTERNAL MEDICINE

## 2019-10-15 PROCEDURE — 87086 URINE CULTURE/COLONY COUNT: CPT | Performed by: PHYSICIAN ASSISTANT

## 2019-10-15 PROCEDURE — 83036 HEMOGLOBIN GLYCOSYLATED A1C: CPT | Performed by: INTERNAL MEDICINE

## 2019-10-15 PROCEDURE — 99232 SBSQ HOSP IP/OBS MODERATE 35: CPT | Performed by: PHYSICIAN ASSISTANT

## 2019-10-15 PROCEDURE — 83930 ASSAY OF BLOOD OSMOLALITY: CPT | Performed by: PHYSICIAN ASSISTANT

## 2019-10-15 PROCEDURE — 84100 ASSAY OF PHOSPHORUS: CPT | Performed by: INTERNAL MEDICINE

## 2019-10-15 PROCEDURE — G8987 SELF CARE CURRENT STATUS: HCPCS

## 2019-10-15 PROCEDURE — 82948 REAGENT STRIP/BLOOD GLUCOSE: CPT

## 2019-10-15 PROCEDURE — 85027 COMPLETE CBC AUTOMATED: CPT | Performed by: NURSE PRACTITIONER

## 2019-10-15 PROCEDURE — 80053 COMPREHEN METABOLIC PANEL: CPT | Performed by: INTERNAL MEDICINE

## 2019-10-15 PROCEDURE — 84443 ASSAY THYROID STIM HORMONE: CPT | Performed by: INTERNAL MEDICINE

## 2019-10-15 PROCEDURE — 82746 ASSAY OF FOLIC ACID SERUM: CPT | Performed by: INTERNAL MEDICINE

## 2019-10-15 PROCEDURE — 83935 ASSAY OF URINE OSMOLALITY: CPT | Performed by: PHYSICIAN ASSISTANT

## 2019-10-15 PROCEDURE — 84300 ASSAY OF URINE SODIUM: CPT | Performed by: PHYSICIAN ASSISTANT

## 2019-10-15 PROCEDURE — 97167 OT EVAL HIGH COMPLEX 60 MIN: CPT

## 2019-10-15 PROCEDURE — 82607 VITAMIN B-12: CPT | Performed by: INTERNAL MEDICINE

## 2019-10-15 PROCEDURE — 80048 BASIC METABOLIC PNL TOTAL CA: CPT | Performed by: NURSE PRACTITIONER

## 2019-10-15 PROCEDURE — 85025 COMPLETE CBC W/AUTO DIFF WBC: CPT | Performed by: INTERNAL MEDICINE

## 2019-10-15 PROCEDURE — 82533 TOTAL CORTISOL: CPT | Performed by: PHYSICIAN ASSISTANT

## 2019-10-15 RX ORDER — SODIUM CHLORIDE 1000 MG
2 TABLET, SOLUBLE MISCELLANEOUS 2 TIMES DAILY WITH MEALS
Status: DISCONTINUED | OUTPATIENT
Start: 2019-10-15 | End: 2019-10-21

## 2019-10-15 RX ORDER — PRAVASTATIN SODIUM 20 MG
40 TABLET ORAL
Status: DISCONTINUED | OUTPATIENT
Start: 2019-10-15 | End: 2019-11-04

## 2019-10-15 RX ORDER — LISINOPRIL 20 MG/1
40 TABLET ORAL DAILY
Status: DISCONTINUED | OUTPATIENT
Start: 2019-10-15 | End: 2019-10-19

## 2019-10-15 RX ORDER — DOCUSATE SODIUM 100 MG/1
100 CAPSULE, LIQUID FILLED ORAL 2 TIMES DAILY PRN
Status: DISCONTINUED | OUTPATIENT
Start: 2019-10-15 | End: 2019-11-16 | Stop reason: HOSPADM

## 2019-10-15 RX ORDER — DEXTROSE MONOHYDRATE 25 G/50ML
INJECTION, SOLUTION INTRAVENOUS
Status: COMPLETED
Start: 2019-10-15 | End: 2019-10-15

## 2019-10-15 RX ORDER — ATENOLOL 50 MG/1
50 TABLET ORAL 2 TIMES DAILY
Status: DISCONTINUED | OUTPATIENT
Start: 2019-10-15 | End: 2019-10-19

## 2019-10-15 RX ORDER — COSYNTROPIN 0.25 MG/ML
0.25 INJECTION, POWDER, FOR SOLUTION INTRAMUSCULAR; INTRAVENOUS ONCE
Status: COMPLETED | OUTPATIENT
Start: 2019-10-15 | End: 2019-10-15

## 2019-10-15 RX ORDER — AMLODIPINE BESYLATE 10 MG/1
10 TABLET ORAL DAILY
Status: DISCONTINUED | OUTPATIENT
Start: 2019-10-15 | End: 2019-10-19

## 2019-10-15 RX ORDER — SODIUM CHLORIDE 9 MG/ML
60 INJECTION, SOLUTION INTRAVENOUS CONTINUOUS
Status: DISCONTINUED | OUTPATIENT
Start: 2019-10-15 | End: 2019-10-15

## 2019-10-15 RX ORDER — ONDANSETRON 2 MG/ML
4 INJECTION INTRAMUSCULAR; INTRAVENOUS EVERY 6 HOURS PRN
Status: DISCONTINUED | OUTPATIENT
Start: 2019-10-15 | End: 2019-11-16 | Stop reason: HOSPADM

## 2019-10-15 RX ORDER — DEXTROSE MONOHYDRATE 25 G/50ML
25 INJECTION, SOLUTION INTRAVENOUS ONCE
Status: COMPLETED | OUTPATIENT
Start: 2019-10-15 | End: 2019-10-15

## 2019-10-15 RX ORDER — DEXTROSE MONOHYDRATE 25 G/50ML
INJECTION, SOLUTION INTRAVENOUS
Status: DISPENSED
Start: 2019-10-15 | End: 2019-10-16

## 2019-10-15 RX ORDER — GLIPIZIDE 10 MG/1
10 TABLET ORAL
Status: DISCONTINUED | OUTPATIENT
Start: 2019-10-15 | End: 2019-10-15

## 2019-10-15 RX ORDER — ACETAMINOPHEN 325 MG/1
650 TABLET ORAL EVERY 6 HOURS PRN
Status: DISCONTINUED | OUTPATIENT
Start: 2019-10-15 | End: 2019-10-24

## 2019-10-15 RX ORDER — PANTOPRAZOLE SODIUM 40 MG/1
40 TABLET, DELAYED RELEASE ORAL
Status: DISCONTINUED | OUTPATIENT
Start: 2019-10-15 | End: 2019-10-22

## 2019-10-15 RX ORDER — MAGNESIUM HYDROXIDE/ALUMINUM HYDROXICE/SIMETHICONE 120; 1200; 1200 MG/30ML; MG/30ML; MG/30ML
30 SUSPENSION ORAL EVERY 6 HOURS PRN
Status: DISCONTINUED | OUTPATIENT
Start: 2019-10-15 | End: 2019-11-16 | Stop reason: HOSPADM

## 2019-10-15 RX ADMIN — PANTOPRAZOLE SODIUM 40 MG: 40 TABLET, DELAYED RELEASE ORAL at 09:37

## 2019-10-15 RX ADMIN — DEXTROSE 50 % IN WATER (D50W) INTRAVENOUS SYRINGE 25 ML: at 21:11

## 2019-10-15 RX ADMIN — SODIUM CHLORIDE TAB 1 GM 2 G: 1 TAB at 09:36

## 2019-10-15 RX ADMIN — LISINOPRIL 40 MG: 20 TABLET ORAL at 09:37

## 2019-10-15 RX ADMIN — ENOXAPARIN SODIUM 40 MG: 40 INJECTION SUBCUTANEOUS at 09:37

## 2019-10-15 RX ADMIN — AMLODIPINE BESYLATE 10 MG: 10 TABLET ORAL at 09:37

## 2019-10-15 RX ADMIN — SITAGLIPTIN 25 MG: 25 TABLET, FILM COATED ORAL at 09:36

## 2019-10-15 RX ADMIN — GLIPIZIDE 10 MG: 10 TABLET ORAL at 09:37

## 2019-10-15 RX ADMIN — DEXTROSE 50 % IN WATER (D50W) INTRAVENOUS SYRINGE 25 ML: at 13:42

## 2019-10-15 RX ADMIN — COSYNTROPIN 0.25 MG: 0.25 INJECTION, POWDER, LYOPHILIZED, FOR SOLUTION INTRAMUSCULAR; INTRAVENOUS at 17:06

## 2019-10-15 RX ADMIN — DEXTROSE MONOHYDRATE 50 ML: 25 INJECTION, SOLUTION INTRAVENOUS at 16:17

## 2019-10-15 RX ADMIN — ATENOLOL 50 MG: 50 TABLET ORAL at 09:37

## 2019-10-15 RX ADMIN — SODIUM CHLORIDE 60 ML/HR: 0.9 INJECTION, SOLUTION INTRAVENOUS at 09:00

## 2019-10-15 RX ADMIN — HYDROCORTISONE SODIUM SUCCINATE 50 MG: 100 INJECTION, POWDER, FOR SOLUTION INTRAMUSCULAR; INTRAVENOUS at 21:12

## 2019-10-15 RX ADMIN — METFORMIN HYDROCHLORIDE 1000 MG: 500 TABLET ORAL at 09:36

## 2019-10-15 NOTE — ASSESSMENT & PLAN NOTE
· UA indicative of UTI  · Currently on IV Rocephin, continue  · She has chronic herrera, this was exchanged in ED, likely contributing to UTI   · Follow up on urine culture    Unfortunately this was not sent on admission, will attempt to send urine culture based on urine obtained on arrival to ED

## 2019-10-15 NOTE — NUTRITION
10/15/19 1342   Recommendations/Interventions   Interventions Diet: order adjustment;Supplement initiate  (Diet adjusted to Level 3 dysphagia, thin liquid, CCD1 with ensure compact (chocolate) BID)   Nutrition Recommendations Swallow evaluation;Lab - consider order (specify)  (Suggest adjust diet texture per speech recommendation   Monitor electrolytes and weight  )

## 2019-10-15 NOTE — ASSESSMENT & PLAN NOTE
· Since September she has had a progressive decline - she has had no appetite, not really eating/drinking with weakness    She was previously reportedly very independent prior to her admission at [facility] in September with UTI  · Geriatrics evaluation will be appreciated

## 2019-10-15 NOTE — ASSESSMENT & PLAN NOTE
· POA  Likely in setting of hyponatremia and possible urinary tract infection  · Has had admissions in the past related to confusion and hyponatremia  · Correct sodium with fluid restriction and salt tabs, see related plans  · Continue IV antibiotics at this time  Follow up urine culture  · Neuro checks  · Today, patient is following simple commands    She will not answer orientation questions at this time

## 2019-10-15 NOTE — PROGRESS NOTES
Patient should discontinue methotrexate while taking the Bactrim    Matushin   Unable to complete admissions  Patient was lathargic, confused, not oriented to self or location  Her medications could not be verified  No family or caregiver came with her to our unit  Contacting [facility], said it was ok to reconcile the medication list from the list provided by the nursing facility  The list does not match the PTA meds  [facility] said day shift will need to contact the nursing facility to reconcile meds

## 2019-10-15 NOTE — PROGRESS NOTES
[facility]  Progress Note - [place] 1940, [de-identified] y o  female MRN: [id number]    Unit/Bed#: -Phil Encounter: [id number]    Primary Care Provider: Melany Edgar MD   Date and time admitted to hospital: 10/14/2019  4:20 PM      * Metabolic encephalopathy  Assessment & Plan  · POA  Likely in setting of hyponatremia and possible urinary tract infection  · Has had admissions in the past related to confusion and hyponatremia  · Correct sodium with fluid restriction and salt tabs, see related plans  · Continue IV antibiotics at this time  Follow up urine culture  · Neuro checks  · Today, patient is following simple commands  She will not answer orientation questions at this time     Acute lower urinary tract infection  Assessment & Plan  · UA indicative of UTI  · Currently on IV Rocephin, continue  · She has chronic herrera, this was exchanged in ED, likely contributing to UTI   · Follow up on urine culture  Unfortunately this was not sent on admission, will attempt to send urine culture based on urine obtained on arrival to ED    HLD (hyperlipidemia)  Assessment & Plan  · Continue statin    Essential hypertension  Assessment & Plan  · Will continue with enalapril/lisinopril, atenolol and amlodipine   · Monitor blood pressure    Type 2 diabetes mellitus with diabetic neuropathy, without long-term current use of insulin [facility])  Assessment & Plan  Lab Results   Component Value Date    HGBA1C 6 4 (H) 10/15/2019     Recent Labs     10/15/19  0903 10/15/19  1138 10/15/19  1308   POCGLU 111 67 67       Blood Sugar Average: Last 72 hrs:  (P) 40 [id number]     · Will hold oral agents  · Sliding scale insulin, Accu-Cheks  · Patient has been borderline hypoglycemic    Hyponatremia  Assessment & Plan  · Patient has longstanding history of hyponatremia, 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  · Continue with salt tabs, fluid restriction 1500 mL  · BMP in a m    · Likely contributing to encephalopathy, see related plans  Has had admissions in the past related to confusion and hyponatremia      VTE Pharmacologic Prophylaxis:   Pharmacologic: Enoxaparin (Lovenox)  Mechanical VTE Prophylaxis in Place: Yes    Patient Centered Rounds: I have performed bedside rounds with nursing staff today  Discussions with Specialists or Other Care Team Provider: RN    Education and Discussions with Family / Patient: patient    Time Spent for Care: 30 minutes  More than 50% of total time spent on counseling and coordination of care as described above  Current Length of Stay: 1 day(s)    Current Patient Status: Inpatient   Certification Statement: The patient will continue to require additional inpatient hospital stay due to acute encephalopathy, hyponatremia requring monitoring of labs and mental status  Discharge Plan: none yet  Possible later this week with correction of Na and improved mental status     Code Status: Level 1 - Full Code      Subjective:   Patient is sleeping when entering room but wakes to voice  She really does not answer many questions  Eventually says no when asked if she has pain but this took a lot of prodding  She will not state her name or location  Objective:     Vitals:   Temp (24hrs), Av 9 °F (37 2 °C), Min:97 9 °F (36 6 °C), Max:100 5 °F (38 1 °C)    Temp:  [97 9 °F (36 6 °C)-100 5 °F (38 1 °C)] 100 5 °F (38 1 °C)  HR:  [65-93] 93  Resp:  [16-20] 18  BP: (107-130)/(56-62) 130/57  SpO2:  [89 %-97 %] 96 %  Body mass index is 22 86 kg/m²  Input and Output Summary (last 24 hours): Intake/Output Summary (Last 24 hours) at 10/15/2019 1429  Last data filed at 10/14/2019 2152  Gross per 24 hour   Intake 550 ml   Output 20 ml   Net 530 ml       Physical Exam:     Physical Exam   Constitutional: She appears well-nourished  No distress  Somnolent, easily arousable  [place]  Hears best out of right ear   HENT:   Head: Normocephalic and atraumatic     Eyes: EOM are normal  No scleral icterus  Neck: Normal range of motion  Neck supple  Cardiovascular: Normal rate and regular rhythm  Pulmonary/Chest: Effort normal and breath sounds normal  No respiratory distress  Abdominal: Soft  Bowel sounds are normal  She exhibits no distension  Musculoskeletal: Normal range of motion  She exhibits no edema  Neurological: She is alert  Unable to assess orientation as patient will not state her name, location, date, etc   Follows simple commands I e  Smiles, wiggles toes, sticks out tongue with no significant abnormalities appreciated    Skin: Skin is warm and dry  She is not diaphoretic  Psychiatric:   Flat, withdrawn    Vitals reviewed  Additional Data:     Labs:    Results from last 7 days   Lab Units 10/15/19  0923   WBC Thousand/uL 6 18   HEMOGLOBIN g/dL 8 9*   HEMATOCRIT % 28 8*   PLATELETS Thousands/uL 394*   NEUTROS PCT % 81*   LYMPHS PCT % 12*   MONOS PCT % 6   EOS PCT % 0     Results from last 7 days   Lab Units 10/15/19  0923   SODIUM mmol/L 128*   POTASSIUM mmol/L 4 8   CHLORIDE mmol/L 99*   CO2 mmol/L 21   BUN mg/dL 15   CREATININE mg/dL 0 59*   ANION GAP mmol/L 8   CALCIUM mg/dL 8 6   ALBUMIN g/dL 2 2*   TOTAL BILIRUBIN mg/dL 0 27   ALK PHOS U/L 58   ALT U/L 16   AST U/L 26   GLUCOSE RANDOM mg/dL 93         Results from last 7 days   Lab Units 10/15/19  1423 10/15/19  1308 10/15/19  1138 10/15/19  0903   POC GLUCOSE mg/dl 114 67 67 111     Results from last 7 days   Lab Units 10/15/19  0923   HEMOGLOBIN A1C % 6 4*               * I Have Reviewed All Lab Data Listed Above  * Additional Pertinent Lab Tests Reviewed:  All Labs Within Last 24 Hours Reviewed    Imaging:    Imaging Reports Reviewed Today Include: none  Imaging Personally Reviewed by Myself Includes:  none    Recent Cultures (last 7 days):           Last 24 Hours Medication List:     Current Facility-Administered Medications:  acetaminophen 650 mg Oral Q6H PRN Haven Pena MD   aluminum-magnesium hydroxide-simethicone 30 mL Oral Q6H PRN Glenn Brown MD   amLODIPine 10 mg Oral Daily Glenn Brown MD   atenolol 50 mg Oral BID Glenn Brown MD   dextrose       docusate sodium 100 mg Oral BID PRN Glenn Brown MD   enoxaparin 40 mg Subcutaneous Daily Glenn Brown MD   insulin lispro 1-5 Units Subcutaneous HS Glenn Brown MD   insulin lispro 1-6 Units Subcutaneous TID AC Glenn Brown MD   lisinopril 40 mg Oral Daily Glenn Brown MD   ondansetron 4 mg Intravenous Q6H PRN Glenn Brown MD   pantoprazole 40 mg Oral Early Morning Glenn Brown MD   pravastatin 40 mg Oral Daily With Yasir Huber MD   sodium chloride 2 g Oral BID With Meals Glenn Brown MD        Today, Patient Was Seen By: Karey Beltran PA-C    ** Please Note: Dictation voice to text software may have been used in the creation of this document   **

## 2019-10-15 NOTE — PLAN OF CARE
Problem: OCCUPATIONAL THERAPY ADULT  Goal: Performs self-care activities at highest level of function for planned discharge setting  See evaluation for individualized goals  Description  Treatment Interventions: ADL retraining, Functional transfer training, UE strengthening/ROM, Endurance training, Cognitive reorientation, Patient/family training, Compensatory technique education, Energy conservation, Activityengagement          See flowsheet documentation for full assessment, interventions and recommendations  Note:   Limitation: Decreased ADL status, Decreased UE strength, Decreased Safe judgement during ADL, Decreased cognition, Decreased endurance, Decreased self-care trans, Decreased high-level ADLs     Assessment: Pt is a [de-identified] y o  female seen for OT evaluation s/p admit to [facility] on 10/14/2019 w/ Confused  Pt also lethargic  She was hostpitalized from 9/11-9/14 and 9/24-10/3 for UTI, change in mental status etc  She was dc'd to [facility] rehab following last hospitalization  She now returns to acute care  Comorbidities affecting pt's functional performance at time of assessment include: DM, HTN and UTI, hypokalemis, ESBL urine  Personal factors affecting pt at time of IE include:difficulty performing ADLS, difficulty performing IADLS , limited insight into deficits and decreased initiation and engagement   Prior to admission (prior to sept 11), pt was living at home w family  She was living on the first floor with her daughter upstairs  She was independent w self care and mobiltiy as well as driving herself  Upon evaluation: Pt requires mod assist for bedmobiltiy and sit to stand, max assist for self care  She presents with the following deficits impacting occupational performance: weakness, decreased strength, decreased balance, decreased tolerance, impaired arousal, impaired attention, impaired initiation, impaired memory, impaired problem solving and decreased safety awareness   Pt to benefit from continued skilled OT tx while in the hospital to address deficits as defined above and maximize level of functional independence w ADL's and functional mobility  Occupational Performance areas to address include: grooming, bathing/shower, toilet hygiene, dressing, functional mobility and clothing management  Pt scored  20 /100 on the barthel index  Based on findings from OT evaluation and functional performance deficits, pt has been identified as a  complexity evaluation  From OT standpoint, recommendation at time of d/c would be short term rehab as patient is currently functioning below baseline        OT Discharge Recommendation: Short Term Rehab

## 2019-10-15 NOTE — OCCUPATIONAL THERAPY NOTE
Occupational Therapy Evaluation      Pam Oliveira    10/15/2019    Principal Problem:    Confused  Active Problems:    Hyponatremia    Type 2 diabetes mellitus with diabetic neuropathy, without long-term current use of insulin (HCC)    Essential hypertension    HLD (hyperlipidemia)    Lethargy    Acute lower urinary tract infection      Past Medical History:   Diagnosis Date    Cardiac disease     Diabetes mellitus ([address] )     Hyperlipidemia     Hypertension        Past Surgical History:   Procedure Laterality Date    CORONARY ANGIOPLASTY WITH STENT PLACEMENT          10/15/19 1105   Note Type   Note type Eval only   Restrictions/Precautions   Weight Bearing Precautions Per Order No   Other Precautions Contact/isolation;Cognitive; Chair Alarm; Bed Alarm;Multiple lines;Telemetry; Fall Risk;Hard of hearing   Pain Assessment   Pain Assessment No/denies pain   Pain Score No Pain   Home Living   Type of [address] [facility] Two level; Able to live on main level with bedroom/bathroom  (0 KEENA)   Additional Comments unknown DME at home   Prior Function   Level of [place] Independent with ADLs and functional mobility   Lives With [facility] Help From Family   ADL Assistance Independent   IADLs Needs assistance   Comments up until Sept 11, she was independent w self care,mobility as well as driving! Lifestyle   Autonomy Has needed assist w self care/mobility for the past one month  was at [facility] rehab for rehab following 2 hospitalizations   Reciprocal Relationships supportive family   Intrinsic Gratification unknown   Psychosocial   Patient Behaviors/Mood Cooperative;Flat affect; Not interactive   Subjective   Subjective "I am at [facility], but I dont' know why"   ADL   Eating Assistance Other (Comment)  (currently needs to be fed, decreased initiative)   Grooming Assistance 2  Maximal Assistance   Grooming Deficit Increased time to complete   UB Bathing Assistance 2  Maximal Assistance   LB Bathing Assistance 2  Maximal Assistance   UB Dressing Assistance 2  Maximal Assistance   LB Dressing Assistance 2  Maximal Assistance   Toileting Assistance  2  Maximal Assistance   Bed Mobility   Rolling R 3  Moderate assistance   Additional items Assist x 1; Increased time required;Verbal cues   Supine to Sit 3  Moderate assistance   Additional items Assist x 1; Increased time required;HOB elevated   Sit to Supine 3  Moderate assistance   Additional items Assist x 1; Increased time required   Transfers   Sit to Stand 3  Moderate assistance   Additional items Assist x 1   Stand to Sit 3  Moderate assistance   Additional items Assist x 1   Stand pivot 3  Moderate assistance   Additional items Assist x 1   Functional Mobility   Functional Mobility 3  Moderate assistance   Additional items Rolling walker   Balance   Static Sitting Poor  (keeps falling over to her Left side)   Dynamic Sitting Poor -   Static Standing Poor -   Activity Tolerance   Activity Tolerance Patient limited by fatigue   Nurse Made Aware appropriate to see per YVONNE Fuller   RUE Assessment   RUE Assessment WFL   LUE Assessment   LUE Assessment WFL   Hand Function   Gross Motor Coordination Functional   Fine Motor Coordination Functional   Cognition   Overall Cognitive Status Impaired   Arousal/Participation Lethargic;Cooperative   Attention Attends with cues to redirect   Orientation Level Oriented to person;Oriented to place; Disoriented to time;Disoriented to situation   Memory Decreased short term memory;Decreased recall of recent events;Decreased recall of precautions   Following Commands Follows one step commands with increased time or repetition   Assessment   Limitation Decreased ADL status; Decreased UE strength;Decreased Safe judgement during ADL;Decreased cognition;Decreased endurance;Decreased self-care trans;Decreased high-level ADLs   Assessment Pt is a [de-identified] y o  female seen for OT evaluation s/p admit to [facility] on 10/14/2019 w/ Confused  Pt also lethargic  She was hostpitalized from 9/11-9/14 and 9/24-10/3 for UTI, change in mental status etc  She was dc'd to [facility] rehab following last hospitalization  She now returns to acute care  Comorbidities affecting pt's functional performance at time of assessment include: DM, HTN and UTI, hypokalemis, ESBL urine  Personal factors affecting pt at time of IE include:difficulty performing ADLS, difficulty performing IADLS , limited insight into deficits and decreased initiation and engagement   Prior to admission (prior to sept 11), pt was living at home w family  She was living on the first floor with her daughter upstairs  She was independent w self care and mobiltiy as well as driving herself  Upon evaluation: Pt requires mod assist for bedmobiltiy and sit to stand, max assist for self care  She presents with the following deficits impacting occupational performance: weakness, decreased strength, decreased balance, decreased tolerance, impaired arousal, impaired attention, impaired initiation, impaired memory, impaired problem solving and decreased safety awareness  Pt to benefit from continued skilled OT tx while in the hospital to address deficits as defined above and maximize level of functional independence w ADL's and functional mobility  Occupational Performance areas to address include: grooming, bathing/shower, toilet hygiene, dressing, functional mobility and clothing management  Pt scored  20 /100 on the barthel index  Based on findings from OT evaluation and functional performance deficits, pt has been identified as a  complexity evaluation  From OT standpoint, recommendation at time of d/c would be short term rehab as patient is currently functioning below baseline      Goals   STG Time Frame   (7 days)   Short Term Goal #1 pt will complete bedmobility w min assist    Short Term Goal #2 good balance sitting at EOB   Short Term Goal  pt will participate in ongoing cog assessment and training as appropriate   LTG Time Frame 10-14   Long Term Goal #1 tolerate standing x 5 min w RW, fair balance for completion of hygiene   Long Term Goal #2 demonstrate good ECT/self pacing skills w self care/mobility   Long Term Goal assess for DME needs to maximize functional level of independence   Functional Transfer Goals   Pt Will Perform All Functional Transfers With min assist;With good judgment/safety; With assistive devices   ADL Goals   Pt Will Perform Grooming Independently   Pt Will Perform Bathing With min assist   Pt Will Perform UE Dressing With min assist   Pt Will Perform LE Dressing With min assist   Pt Will Perform Toileting With min assist   Plan   Treatment Interventions ADL retraining;Functional transfer training;UE strengthening/ROM; Endurance training;Cognitive reorientation;Patient/family training; Compensatory technique education; Energy conservation; Activityengagement   Goal Expiration Date 10/29/19   OT Frequency 3-5x/wk   Recommendation   OT Discharge Recommendation Short Term Rehab   Barthel Index   Feeding 5   Bathing 0   Grooming Score 0   Dressing Score 0   Bladder Score 0   Bowels Score 5   Toilet Use Score 5   Transfers (Bed/Chair) Score 5   Mobility (Level Surface) Score 0   Stairs Score 0   Barthel Index Score 20

## 2019-10-15 NOTE — ED ATTENDING ATTESTATION
10/14/2019  I, Mame Pham MD, saw and evaluated the patient  I have discussed the patient with the resident/non-physician practitioner and agree with the resident's/non-physician practitioner's findings, Plan of Care, and MDM as documented in the resident's/non-physician practitioner's note, except where noted  All available labs and Radiology studies were reviewed  I was present for key portions of any procedure(s) performed by the resident/non-physician practitioner and I was immediately available to provide assistance  At this point I agree with the current assessment done in the Emergency Department  I have conducted an independent evaluation of this patient a history and physical is as follows:    [de-identified] y/o female presenting to ED due to decreased PO intake, ams  Had recent blood work and was reffered to ER  Pt is confused in the ER  No sign of trauma  Limited h/o due to confusion  Has a chronic herrera  Feels warm to touch  abd soft nt/nd   Rrr  Lungs are clear     mdm check cbc, bmp, ua, re-eval      ED Course         Critical Care Time  Procedures

## 2019-10-15 NOTE — PLAN OF CARE
Problem: Prexisting or High Potential for Compromised Skin Integrity  Goal: Skin integrity is maintained or improved  Description  INTERVENTIONS:  - Identify patients at risk for skin breakdown  - Assess and monitor skin integrity  - Assess and monitor nutrition and hydration status  - Monitor labs   - Assess for incontinence   - Turn and reposition patient  - Assist with mobility/ambulation  - Relieve pressure over bony prominences  - Avoid friction and shearing  - Provide appropriate hygiene as needed including keeping skin clean and dry  - Evaluate need for skin moisturizer/barrier cream  - Collaborate with interdisciplinary team   - Patient/family teaching  - Consider wound care consult   Outcome: Progressing     Problem: Nutrition/Hydration-ADULT  Goal: Nutrient/Hydration intake appropriate for improving, restoring or maintaining nutritional needs  Description  Monitor and assess patient's nutrition/hydration status for malnutrition  Collaborate with interdisciplinary team and initiate plan and interventions as ordered  Monitor patient's weight and dietary intake as ordered or per policy  Utilize nutrition screening tool and intervene as necessary  Determine patient's food preferences and provide high-protein, high-caloric foods as appropriate       INTERVENTIONS:  - Monitor oral intake, urinary output, labs, and treatment plans  - Assess nutrition and hydration status and recommend course of action  - Evaluate amount of meals eaten  - Assist patient with eating if necessary   - Allow adequate time for meals  - Recommend/ encourage appropriate diets, oral nutritional supplements, and vitamin/mineral supplements  - Order, calculate, and assess calorie counts as needed  - Recommend, monitor, and adjust tube feedings and TPN/PPN based on assessed needs  - Assess need for intravenous fluids  - Provide specific nutrition/hydration education as appropriate  - Include patient/family/caregiver in decisions related to nutrition  Outcome: Progressing

## 2019-10-15 NOTE — ASSESSMENT & PLAN NOTE
Hyponatremia most likely secondary to poor p o  Intake along with the possibility of medication noncompliance (sodium chloride tablets) due to patient's poor p o  Intake along with increasing cognitive impairment

## 2019-10-15 NOTE — H&P
H&P- Pam Oliveira 1940, [de-identified] y o  female MRN: [id number]    Unit/Bed#: ED 13 Encounter: [id number]    Primary Care Provider: Clovis Dickerson MD   Date and time admitted to hospital: 10/14/2019  4:20 PM        Lethargy  Assessment & Plan  Lethargy is most likely secondary to the dehydration along with infection; will continue to hydrate checking metabolic profile  In the event the patient's lethargy is resolved, would need to do cognitive evaluation and occupational therapy with case management referral regarding needs as well as evaluation for activities of daily living  * Confused  Assessment & Plan  Confusion may be secondary to dehydration along with urinary tract infection in the setting of electrolyte abnormalities  Please see more detail plans in following diagnostic points  Acute lower urinary tract infection  Assessment & Plan  Currently on Rocephin  HLD (hyperlipidemia)  Assessment & Plan  Will continue with Zocor  Essential hypertension  Assessment & Plan  Will continue with enalapril/lisinopril, atenolol and amlodipine  Bear in mind to that the patient's hyponatremia with hyperkalemia may be secondary to the effects of Ace inhibitor  Will recheck metabolic profile along with the hydration of patient  Type 2 diabetes mellitus with diabetic neuropathy, without long-term current use of insulin [facility])  Assessment & Plan  Lab Results   Component Value Date    HGBA1C 7 3 (H) 07/17/2017     Will continue with sitagliptin, Glucophage, check hemoglobin A1c with blood sugar checks before meals and bedtime and insulin sliding scale  No results for input(s): POCGLU in the last 72 hours  Blood Sugar Average: Last 72 hrs:      Hyponatremia  Assessment & Plan  Hyponatremia most likely secondary to poor p o  Intake along with the possibility of medication noncompliance (sodium chloride tablets) due to patient's poor p o  Intake along with increasing cognitive impairment            VTE Prophylaxis: Enoxaparin (Lovenox)  / sequential compression device   Code Status: Prior full Code as discussed with daughter  POLST: There is no POLST form on file for this patient (pre-hospital)    Anticipated Length of Stay:  Patient will be admitted on an Inpatient basis with an anticipated length of stay of  greater than 2 midnights  Justification for Hospital Stay: Please see detailed plans noted above  Chief Complaint:     Increasing lethargy with confusion  History of Present Illness:  [place] is a [de-identified] y o  female who has a previous history of hyponatremia and urinary tract infection  Likewise, the patient has a history of essential hypertension, hyperlipidemia, and diabetes mellitus type 2  According to the patient's daughter who is currently the historian, she used to be a very functional [de-identified] who drove herself going to places and doing things on her own with good level of activities of daily living  However approximately September, she had a bout of urinary tract infection with hyponatremia and since then it seems that her functioning has been very limited  She is supposed to be on sodium tablets 1 gram twice daily along with her usual medications for blood pressure and diabetes  This also included Ace inhibitor (enalapril)  Ever since the patient was sent home, it seems that she has been not good about eating proper amounts of food and sometimes for goes eating saying that she is not hungry  With that, it seems that the patient has been becoming more lethargic and recently has become more confused  The patient came to the emergency room and was seen to be more lethargic as well as confused  Noted that the patient was given a bolus of 500 milliliters of fluids with improvement of mental status but remains to be on mindful of surroundings although much more awake and alert  Noted is that the patient's urinalysis shows presence of bacteria    She does have a indwelling Rapp catheter  Patient also was noted to be hyponatremic 126  That said, the patient would be treated for urinary tract infection and therefore Rocephin was started  She is also to receive fluids with checks of metabolic profile tomorrow  Meanwhile the patient needs to have an assessment of ADLs in the form of cognitive occupational therapy and usual occupational therapy with case management evaluation  She also needs nutrition consult to monitor food intake and caloric needs  Currently, patient is resting well  Although she is awake  She is not mindful of surroundings however can have short responses and brief attention span  She is hard of hearing and therefore can't hear more so on the right ear  Review of Systems:    Constitutional:  Denies fever or chills   Eyes:  Denies change in visual acuity ; however patient has poor appetite with poor p o   Intake  HENT:  Denies nasal congestion or sore throat   Respiratory:  Denies cough or shortness of breath   Cardiovascular:  Denies chest pain or edema   GI:  Denies abdominal pain, nausea, vomiting, bloody stools or diarrhea   :  Denies dysuria   Musculoskeletal:  Denies back pain or joint pain   Integument:  Denies rash   Neurologic:  Denies headache, focal weakness or sensory changes   Endocrine:  Denies polyuria or polydipsia   Lymphatic:  Denies swollen glands   Psychiatric:  Denies depression or anxiety     Past Medical and Surgical History:   Past Medical History:   Diagnosis Date    Cardiac disease     Diabetes mellitus [address] [address] )     Hyperlipidemia     Hypertension      Past Surgical History:   Procedure Laterality Date    CORONARY ANGIOPLASTY WITH STENT PLACEMENT         Meds/Allergies:    (Not in a hospital admission)  amLODIPine (NORVASC) 10 mg tablet Take 10 mg by mouth daily Rossana Ahumada MD Reordered   Ordered as: amLODIPine (NORVASC) tablet 10 mg - 10 mg, Oral, Daily, First dose on Tue 10/15/19 at [address] [place] [address] systolic blood pressure less than (mmHg): 110   atenolol (TENORMIN) 50 mg tablet Take 50 mg by mouth 2 (two) times a day Harish Huang MD Reordered   Ordered as: atenolol (TENORMIN) tablet 50 mg - 50 mg, Oral, 2 times daily, First dose on Mon 10/14/19 at 2100 Hold for heart rate less than 50 beats per minute  Hold for systolic blood pressure less than (mmHg): 110   enalapril (VASOTEC) 20 mg tablet Take 20 mg by mouth 2 (two) times a day Harish Huang MD Reordered   Ordered as: lisinopril (ZESTRIL) tablet 40 mg - 40 mg, Oral, Daily, First dose on Tue 10/15/19 at [facility] for systolic blood pressure less than (mmHg): 110   glipiZIDE (GLUCOTROL) 10 mg tablet Take 10 mg by mouth 2 (two) times a day before meals Harish Huang MD Reordered   Ordered as: glipiZIDE (GLUCOTROL) tablet 10 mg - 10 mg, Oral, 2 times daily before meals, First dose on Tue 10/15/19 at [id number] [address] MED    metFORMIN (GLUCOPHAGE) 1000 MG tablet Take 1,000 mg by mouth 2 (two) times a day with meals Harish Huang MD Reordered   Ordered as: metFORMIN (GLUCOPHAGE) tablet 1,000 mg - 1,000 mg, Oral, 2 times daily with meals, First dose on Tue 10/15/19 at 0730 High Alert Medication  LOOK ALIKE SOUND ALIKE MED    omeprazole (PriLOSEC) 20 mg delayed release capsule Take 20 mg by mouth daily Harish Huang MD Reordered   Ordered as: pantoprazole (PROTONIX) EC tablet 40 mg - 40 mg, Oral, Daily (early morning), First dose on Tue 10/15/19 at 0600 Swallow whole; do not crush, chew or split   LOOK ALIKE SOUND ALIKE MED    saxagliptin (ONGLYZA) 5 MG tablet Take 5 mg by mouth daily Harish Huang MD Reordered   Ordered as: sitaGLIPtin (JANUVIA) tablet 25 mg - 25 mg, Oral, Daily, First dose on Tue 10/15/19 at 0900 LOOK ALIKE SOUND ALIKE MED    simvastatin (ZOCOR) 20 mg tablet Take 20 mg by mouth daily at bedtime Harish Huagn MD Reordered   Ordered as: pravastatin (PRAVACHOL) tablet 40 mg - 40 mg, Oral, Daily with dinner, First dose on Tue 10/15/19 at 1630   sodium chloride 1 g tablet Take 2 tablets by mouth 2 (two) times a day with meals for 30 days Mirna Albrecht MD Reordered   Ordered as: sodium chloride tablet 2 g - 2 g, Oral, 2 times daily with meals, First dose on Tue 10/15/19 at 0730       Allergies: No Known Allergies  History:  Marital Status: Single   Occupation:  Currently retired  Patient Pre-hospital Living Situation:  Lives at home with daughter  Patient Pre-hospital Level of Mobility:  Ambulatory, recently was not allowed to drive due to the new level of cognition and activity  Patient Pre-hospital Diet Restrictions:  Diabetic and low fat/cardiac  Substance Use History:   Social History     Substance and Sexual Activity   Alcohol Use No     Social History     Tobacco Use   Smoking Status Current Every Day Smoker    Packs/day: 0 20     Social History     Substance and Sexual Activity   Drug Use No       Family History:  No family history on file  Physical Exam:     Vitals:   Blood Pressure: 130/62 (10/14/19 2100)  Pulse: 90 (10/14/19 2100)  Temperature: 100 °F (37 8 °C) (10/14/19 1756)  Temp Source: Rectal (10/14/19 1756)  Respirations: 16 (10/14/19 2100)  SpO2: 92 % (10/14/19 2100)    Constitutional:  Well developed, well nourished, no acute distress, non-toxic appearance with hard of hearing  Although the patient will respond to examiner, she requires to be spoken to in loud voice  Frequent orientation needed  Eyes:  PERRL, conjunctiva normal   HENT:  Atraumatic, external ears normal, nose normal, oropharynx dry, no pharyngeal exudates   Neck- normal range of motion, no tenderness, supple   Respiratory:  No respiratory distress, normal breath sounds, no rales, no wheezing   Cardiovascular:  Normal rate, normal rhythm, no murmurs, no gallops, no rubs   GI:  Soft, nondistended, normal bowel sounds, nontender, no organomegaly, no mass, no rebound, no guarding   :  No costovertebral angle tenderness but claims to have hypogastric discomfort  Musculoskeletal:  No edema, no tenderness, no deformities  Back- no tenderness  Integument:  Well hydrated, no rash   Lymphatic:  No lymphadenopathy noted   Neurologic:  Alert &awake, communicative, generalized weakness, patient does not have any dysphonia or dysphagia  Facial symmetry intact  Tongue is midline  Patient does not have any pronator drifting  Psychiatric:  Speech and behavior somewhat scant      Lab Results: I have personally reviewed pertinent reports  Results from last 7 days   Lab Units 10/14/19  1804   WBC Thousand/uL 6 87   HEMOGLOBIN g/dL 10 1*   HEMATOCRIT % 32 4*   PLATELETS Thousands/uL 407*   LYMPHO PCT % 6*   MONO PCT % 2*   EOS PCT % 1     Results from last 7 days   Lab Units 10/14/19  1804   POTASSIUM mmol/L 4 9   CHLORIDE mmol/L 97*   CO2 mmol/L 21   BUN mg/dL 21   CREATININE mg/dL 0 73   CALCIUM mg/dL 8 8           EKG:  Regular rate    Imaging: I have personally reviewed pertinent reports  No results found  ** Please Note: Dragon 360 Dictation voice to text software was used in the creation of this document   **

## 2019-10-15 NOTE — ASSESSMENT & PLAN NOTE
Will continue with enalapril/lisinopril, atenolol and amlodipine  Bear in mind to that the patient's hyponatremia with hyperkalemia may be secondary to the effects of Ace inhibitor  Will recheck metabolic profile along with the hydration of patient

## 2019-10-15 NOTE — ASSESSMENT & PLAN NOTE
Lab Results   Component Value Date    HGBA1C 6 4 (H) 10/15/2019     Recent Labs     10/15/19  0903 10/15/19  1138 10/15/19  1308   POCGLU 111 67 67       Blood Sugar Average: Last 72 hrs:  (P) 48 [id number]     · Will hold oral agents  · Sliding scale insulin, Accu-Cheks  · Patient has been borderline hypoglycemic

## 2019-10-15 NOTE — ED NOTES
Previous herrera catheter removed, sterile 16Fr   Herrera placed in ER with 2 RN supervision     Luz Marina Bradley RN  10/14/19 0207

## 2019-10-15 NOTE — ASSESSMENT & PLAN NOTE
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

## 2019-10-15 NOTE — ASSESSMENT & PLAN NOTE
Confusion may be secondary to dehydration along with urinary tract infection in the setting of electrolyte abnormalities  Please see more detail plans in following diagnostic points

## 2019-10-15 NOTE — ASSESSMENT & PLAN NOTE
Lethargy is most likely secondary to the dehydration along with infection; will continue to hydrate checking metabolic profile  In the event the patient's lethargy is resolved, would need to do cognitive evaluation and occupational therapy with case management referral regarding needs as well as evaluation for activities of daily living

## 2019-10-15 NOTE — ASSESSMENT & PLAN NOTE
Lab Results   Component Value Date    HGBA1C 7 3 (H) 07/17/2017     Will continue with sitagliptin, Glucophage, check hemoglobin A1c with blood sugar checks before meals and bedtime and insulin sliding scale  No results for input(s): POCGLU in the last 72 hours      Blood Sugar Average: Last 72 hrs:

## 2019-10-15 NOTE — NURSING NOTE
Upon review of the patient's notes from the nursing home where patient resides, pt is on contact for ESBL in urine  PACS contacted at 0308hrs to advise the need to change patient's room due to contact precautions  Will continue to monitor throughout shift

## 2019-10-15 NOTE — SOCIAL WORK
CM discussed pt in care coordination rounds  CM called the patient's daughter/caretaker, Karlie Rosales [phone] to discuss DC plans  Per Karishma,the pt was at [facility] in [address] [place] [address] does not want to retuirn there at d/c  Mirjuan r [place] requested a Preferred provider STR list   Same provided at bedside as requested  Prior to that she was driving and independent  Patient lives on the downstairs floor of Karishma's home  There are 0STE  Preferred pharmacy is [facility]    No living will or POA  No hx MH or D+A treatment  CM reviewed d/c planning process including the following: identifying help at home, patient preference for d/c planning needs, Discharge Lounge, [facility] Meds to Bed program, availability of treatment team to discuss questions or concerns patient and/or family may have regarding understanding medications and recognizing signs and symptoms once discharged  CM also encouraged patient to follow up with all recommended appointments after discharge  Patient advised of importance for patient and family to participate in managing patients medical well being  Discharge checklist discussed with family

## 2019-10-16 PROBLEM — R33.9 URINARY RETENTION: Status: ACTIVE | Noted: 2019-10-16

## 2019-10-16 LAB
ANION GAP SERPL CALCULATED.3IONS-SCNC: 9 MMOL/L (ref 4–13)
BACTERIA UR CULT: ABNORMAL
BUN SERPL-MCNC: 20 MG/DL (ref 5–25)
CALCIUM SERPL-MCNC: 8.7 MG/DL (ref 8.3–10.1)
CHLORIDE SERPL-SCNC: 102 MMOL/L (ref 100–108)
CO2 SERPL-SCNC: 20 MMOL/L (ref 21–32)
CREAT SERPL-MCNC: 0.65 MG/DL (ref 0.6–1.3)
ERYTHROCYTE [DISTWIDTH] IN BLOOD BY AUTOMATED COUNT: 17.5 % (ref 11.6–15.1)
GFR SERPL CREATININE-BSD FRML MDRD: 84 ML/MIN/1.73SQ M
GLUCOSE SERPL-MCNC: 103 MG/DL (ref 65–140)
GLUCOSE SERPL-MCNC: 130 MG/DL (ref 65–140)
GLUCOSE SERPL-MCNC: 172 MG/DL (ref 65–140)
GLUCOSE SERPL-MCNC: 179 MG/DL (ref 65–140)
GLUCOSE SERPL-MCNC: 211 MG/DL (ref 65–140)
HCT VFR BLD AUTO: 29.4 % (ref 34.8–46.1)
HGB BLD-MCNC: 9.1 G/DL (ref 11.5–15.4)
MCH RBC QN AUTO: 23.3 PG (ref 26.8–34.3)
MCHC RBC AUTO-ENTMCNC: 31 G/DL (ref 31.4–37.4)
MCV RBC AUTO: 75 FL (ref 82–98)
NRBC BLD AUTO-RTO: 0 /100 WBCS
PLATELET # BLD AUTO: 383 THOUSANDS/UL (ref 149–390)
PMV BLD AUTO: 10.1 FL (ref 8.9–12.7)
POTASSIUM SERPL-SCNC: 4.7 MMOL/L (ref 3.5–5.3)
PROCALCITONIN SERPL-MCNC: 0.07 NG/ML
RBC # BLD AUTO: 3.9 MILLION/UL (ref 3.81–5.12)
SODIUM SERPL-SCNC: 131 MMOL/L (ref 136–145)
WBC # BLD AUTO: 5.07 THOUSAND/UL (ref 4.31–10.16)

## 2019-10-16 PROCEDURE — 84145 PROCALCITONIN (PCT): CPT | Performed by: PHYSICIAN ASSISTANT

## 2019-10-16 PROCEDURE — 80048 BASIC METABOLIC PNL TOTAL CA: CPT | Performed by: PHYSICIAN ASSISTANT

## 2019-10-16 PROCEDURE — 99223 1ST HOSP IP/OBS HIGH 75: CPT | Performed by: INTERNAL MEDICINE

## 2019-10-16 PROCEDURE — G8979 MOBILITY GOAL STATUS: HCPCS

## 2019-10-16 PROCEDURE — 99232 SBSQ HOSP IP/OBS MODERATE 35: CPT | Performed by: PHYSICIAN ASSISTANT

## 2019-10-16 PROCEDURE — 97163 PT EVAL HIGH COMPLEX 45 MIN: CPT

## 2019-10-16 PROCEDURE — 82948 REAGENT STRIP/BLOOD GLUCOSE: CPT

## 2019-10-16 PROCEDURE — G8978 MOBILITY CURRENT STATUS: HCPCS

## 2019-10-16 PROCEDURE — G8997 SWALLOW GOAL STATUS: HCPCS

## 2019-10-16 PROCEDURE — 99222 1ST HOSP IP/OBS MODERATE 55: CPT | Performed by: INTERNAL MEDICINE

## 2019-10-16 PROCEDURE — 85007 BL SMEAR W/DIFF WBC COUNT: CPT | Performed by: PHYSICIAN ASSISTANT

## 2019-10-16 PROCEDURE — 85027 COMPLETE CBC AUTOMATED: CPT | Performed by: PHYSICIAN ASSISTANT

## 2019-10-16 PROCEDURE — 92610 EVALUATE SWALLOWING FUNCTION: CPT

## 2019-10-16 PROCEDURE — G8996 SWALLOW CURRENT STATUS: HCPCS

## 2019-10-16 RX ADMIN — SODIUM CHLORIDE TAB 1 GM 2 G: 1 TAB at 17:17

## 2019-10-16 RX ADMIN — ENOXAPARIN SODIUM 40 MG: 40 INJECTION SUBCUTANEOUS at 08:13

## 2019-10-16 RX ADMIN — INSULIN LISPRO 1 UNITS: 100 INJECTION, SOLUTION INTRAVENOUS; SUBCUTANEOUS at 22:31

## 2019-10-16 RX ADMIN — PRAVASTATIN SODIUM 40 MG: 20 TABLET ORAL at 17:04

## 2019-10-16 RX ADMIN — INSULIN LISPRO 2 UNITS: 100 INJECTION, SOLUTION INTRAVENOUS; SUBCUTANEOUS at 17:17

## 2019-10-16 RX ADMIN — HYDROCORTISONE SODIUM SUCCINATE 50 MG: 100 INJECTION, POWDER, FOR SOLUTION INTRAMUSCULAR; INTRAVENOUS at 06:21

## 2019-10-16 RX ADMIN — INSULIN LISPRO 1 UNITS: 100 INJECTION, SOLUTION INTRAVENOUS; SUBCUTANEOUS at 11:07

## 2019-10-16 RX ADMIN — HYDROCORTISONE SODIUM SUCCINATE 50 MG: 100 INJECTION, POWDER, FOR SOLUTION INTRAMUSCULAR; INTRAVENOUS at 15:23

## 2019-10-16 NOTE — CONSULTS
[address] Internal Medicine-Endocrinology   [place] [de-identified] y o  female MRN: [id number]  Unit/Bed#: -01 Encounter: [id number]    Code Status: Level 1 - Full Code  POA:      Reason for Admission / Principal Problem: Hyponatremia  Reason for Consult: Adrenal Insufficiency    Impression:  Patient Active Problem List   Diagnosis    Metabolic encephalopathy    Hyponatremia    Hypokalemia    Type 2 diabetes mellitus with diabetic neuropathy, without long-term current use of insulin (HCC)    Abnormal urinalysis    Essential hypertension    HLD (hyperlipidemia)    Lethargy    Acute lower urinary tract infection    Failure to thrive in adult    Urinary retention       A/P:    Metabolic Encephalopathy  - AM Na 131(126 on admission)  - Continue to follow recommendations per primary team    Adrenal Insufficiency  - Follows with endocrinologist Dr Valladares from [place] outpatient  - History of bilateral adrenal hemorrage  - Cortisol stimulation test within normal limits, no concern regarding adrenal insufficiency as cause of her hyponatremia  - Discontinue hydrocortisone  - Continue to monitor    Diabetes Mellitus type 2  - Last A1C 6 4(10/15)  - On current regimen of glipizide, metformin, and siltagliptin  - Continue to follow recommendations per primary team      HPI: [place] is a [de-identified] y o  female with a past medical history of HTN, hyperlipidemia, diabetes mellitus type 2, history of encephalopathy secondary to hyponatremia, and adrenal insufficiency presented to the ED on 10/14 for confusion and hyponatremia(126)  Her urinalysis which was significant for a UTI, and was started on Rocephin, given NS bolus and admitted  According to her daughter she used to be independent, but since September she had a UTI with hyponatremia, and her functioning has been limited   Daughter states she has concern for patient having an adequate apetite  Patient has seen endocrine as outpatient(Dr Jimenez), and has a history of bilateral adrenal hemorrhage  She was tapered off steroids in September 2019  Unable to perform ROS due to altered mental status    History obtained from nursing staff, daughter, and other physicians  PMH:  Past Medical History:   Diagnosis Date    Cardiac disease     Diabetes mellitus ([address] )     Hyperlipidemia     Hypertension         PSH:  Past Surgical History:   Procedure Laterality Date    CORONARY ANGIOPLASTY WITH STENT PLACEMENT         Allergies: No Known Allergies    Family History: No family history on file  Social History:   Social History     Tobacco Use   Smoking Status Current Every Day Smoker    Packs/day: 0 20      Social History     Substance and Sexual Activity   Alcohol Use No      Social History     Substance and Sexual Activity   Drug Use No        Home Medications:   Prior to Admission medications    Medication Sig Start Date End Date Taking?  Authorizing Provider   amLODIPine (NORVASC) 10 mg tablet Take 10 mg by mouth daily    Historical Provider, MD   atenolol (TENORMIN) 50 mg tablet Take 50 mg by mouth 2 (two) times a day    Historical Provider, MD   enalapril (VASOTEC) 20 mg tablet Take 20 mg by mouth 2 (two) times a day    Historical Provider, MD   glipiZIDE (GLUCOTROL) 10 mg tablet Take 10 mg by mouth 2 (two) times a day before meals    Historical Provider, MD   metFORMIN (GLUCOPHAGE) 1000 MG tablet Take 1,000 mg by mouth 2 (two) times a day with meals    Historical Provider, MD   omeprazole (PriLOSEC) 20 mg delayed release capsule Take 20 mg by mouth daily    Historical Provider, MD   saxagliptin (ONGLYZA) 5 MG tablet Take 5 mg by mouth daily    Historical Provider, MD   simvastatin (ZOCOR) 20 mg tablet Take 20 mg by mouth daily at bedtime    Historical Provider, MD   sodium chloride 1 g tablet Take 2 tablets by mouth 2 (two) times a day with meals for 30 days 7/20/17 8/19/17  Alicia Summers MD       ROS:   Review of Systems   Unable to perform ROS: Mental status change       Vitals:   Vitals:    10/15/19 1522 10/15/19 2255 10/16/19 0600 10/16/19 0741   BP: 106/52 109/53  106/54   Pulse: 91 80  84   Resp: 20 20  16   Temp: 98 4 °F (36 9 °C)   (!) 97 4 °F (36 3 °C)   TempSrc:       SpO2: 96% 99%  99%   Weight:   51 9 kg (114 lb 8 oz)    Height:         Temp  Min: 97 4 °F (36 3 °C)  Max: 100 5 °F (38 1 °C)  IBW: 50 1 kg  Body mass index is 20 94 kg/m²  PHYSICAL EXAM:  Physical Exam   Constitutional: She appears well-developed and well-nourished  No distress  HENT:   Head: Normocephalic and atraumatic  Eyes: Conjunctivae are normal  No scleral icterus  Cardiovascular: Normal rate and regular rhythm  Exam reveals no gallop and no friction rub  No murmur heard  Pulmonary/Chest: Effort normal and breath sounds normal  No stridor  No respiratory distress  She has no wheezes  Abdominal: Soft  She exhibits no distension  There is no guarding  Lymphadenopathy:     She has no cervical adenopathy  Skin: Capillary refill takes less than 2 seconds  She is not diaphoretic         Labs:   Results from last 7 days   Lab Units 10/16/19  0440 10/15/19  2108 10/15/19  0923 10/14/19  1804   WBC Thousand/uL 5 07 7 55 6 18 6 87   HEMOGLOBIN g/dL 9 1* 8 7* 8 9* 10 1*   HEMATOCRIT % 29 4* 28 2* 28 8* 32 4*   PLATELETS Thousands/uL 383 392* 394* 407*   NEUTROS PCT %  --   --  81*  --    MONOS PCT %  --   --  6  --    MONO PCT %  --   --   --  2*     Results from last 7 days   Lab Units 10/16/19  0440 10/15/19  2108 10/15/19  0923   POTASSIUM mmol/L 4 7 4 4 4 8   CHLORIDE mmol/L 102 101 99*   CO2 mmol/L 20* 21 21   BUN mg/dL 20 17 15   CREATININE mg/dL 0 65 0 61 0 59*   CALCIUM mg/dL 8 7 8 6 8 6   ALK PHOS U/L  --   --  58   ALT U/L  --   --  16   AST U/L  --   --  26     Results from last 7 days   Lab Units 10/15/19  0923   MAGNESIUM mg/dL 1 6     Lab Results   Component Value Date PHOS 3 2 10/15/2019    PHOS 2 4 07/17/2017    PHOS 2 5 07/16/2017          No results found for: TROPONINT  ABG:No results found for: PHART, MKM7HRF, PO2ART, ECA8RON, W7HURSDP, BEART, SOURCE    Imaging: I have personally reviewed pertinent reports  EKG: This was personally reviewed by myself  Micro:  Blood Culture:   Lab Results   Component Value Date    BLOODCX No Growth at 24 hrs  10/14/2019    BLOODCX No Growth at 24 hrs   10/14/2019     Urine Culture:   Lab Results   Component Value Date    URINECX 2997-7806 cfu/ml Gram Negative Nirav (A) 10/15/2019    URINECX >100,000 cfu/ml Escherichia coli 07/16/2017     Sputum Culture: No components found for: SPUTUMCX  Wound Culure: No results found for: [facility]         Grzegorz Jose Alejandro OMS IV  10/16/2019 3:26 PM

## 2019-10-16 NOTE — ASSESSMENT & PLAN NOTE
· POA  Likely in setting of hyponatremia and possible urinary tract infection  · Urine culture unremarkable - monitor off antibiotics at this time  · Has had admissions in the past related to confusion and hyponatremia  · Correct sodium with fluid restriction and salt tabs, see related plans  · Neuro checks   · She has hx of adrenal insufficiency, previously on steroids and then tapered off  She follows with Endocrinology as outpatient  Symptoms appear consistent with adrenal insufficiency, cosyntropin stim test and cortisol levels ordered  Endocrinology evaluation is pending  · Mental status is improved today following IV steroids and correction of sodium to 131 this morning  She is awake, alert, eating her meals  Answering questions appropriately and oriented

## 2019-10-16 NOTE — ASSESSMENT & PLAN NOTE
· Patient has Rapp, this was placed on recent admission at [facility] where she developed urinary retention    · Patient has follow-up with Urology as an outpatient in about 2 weeks  · Continue with Rapp for now, would recommend voiding trial once patient is more ambulatory

## 2019-10-16 NOTE — PHYSICAL THERAPY NOTE
Physical Therapy Evaluation:    2 forms of pt ID verified:name,birthdate and pt ID matty    Patient's Name: Kimani Chun    Admitting Diagnosis  Abnormal laboratory test result [R89 9]  Abnormal urinalysis [R82 90]    Problem List  Patient Active Problem List   Diagnosis    Metabolic encephalopathy    Hyponatremia    Hypokalemia    Type 2 diabetes mellitus with diabetic neuropathy, without long-term current use of insulin (HCC)    Abnormal urinalysis    Essential hypertension    HLD (hyperlipidemia)    Lethargy    Acute lower urinary tract infection    Failure to thrive in adult    Urinary retention       Past Medical History  Past Medical History:   Diagnosis Date    Cardiac disease     Diabetes mellitus ([address] )     Hyperlipidemia     Hypertension        Past Surgical History  Past Surgical History:   Procedure Laterality Date    CORONARY ANGIOPLASTY WITH STENT PLACEMENT        10/16/19 1210   Note Type   Note type Eval only   Pain Assessment   Pain Assessment No/denies pain   Pain Score No Pain   Home Living   Type of Home SNF  (receiving rehab services at [facility] rehab [facility])   Additional Comments pt is a poor historian  Per pt's medical documentation, pt was receiving rehab services at [facility]   Prior Function   Level of [place] Needs assistance with ADLs and functional mobility   Lives With Facility staff  (staff at rehab)   [facility] attendant  (staff at rehab)   ADL Assistance Needs assistance   IADLs Needs assistance   Falls in the last 6 months 0   Restrictions/Precautions   Other Precautions Contact/isolation;Cognitive; Chair Alarm;Multiple lines;Telemetry; Fall Risk   General   Additional Pertinent History abnormal lab results,metabolic encephalopathy   Family/Caregiver Present No   Cognition   Overall Cognitive Status Impaired   Arousal/Participation Responsive   Attention Attends with cues to redirect   Orientation Level Oriented to person;Oriented to place;Oriented to time;Disoriented to situation   Following Commands Follows one step commands with increased time or repetition  (2* slow mobility,dec cognition)   RLE Assessment   RLE Assessment   (3+/5 grossly throughout)   LLE Assessment   LLE Assessment   (3+/5 grossly throughout)   Coordination   Movements are Fluid and Coordinated 0   Coordination and Movement Description ataxic and unsteady,slow mobility,shuffling gaitn pattern,step to gait pattern,narrow ROSAS   Sensation WFL   Light Touch   RLE Light Touch Grossly intact   LLE Light Touch Grossly intact   Bed Mobility   Supine to Sit 3  Moderate assistance   Additional items Assist x 1;Bedrails;HOB elevated; Increased time required;Verbal cues;LE management   Transfers   Sit to Stand 3  Moderate assistance   Additional items Assist x 1;Bedrails; Increased time required;Verbal cues   Stand to Sit 3  Moderate assistance   Additional items Assist x 1; Armrests; Increased time required;Verbal cues  (for safety,education and control descent)   Stand pivot 3  Moderate assistance   Additional items Assist x 1; Increased time required;Verbal cues  (use of Rw in front for stability and support)   Ambulation/Elevation   Gait pattern Poor UE support;Narrow ROSAS; Forward Flexion; Shuffling; Inconsistent demetrius; Foward flexed; Short stride; Ataxia; Step to   Gait Assistance 4  Minimal assist   Additional items Assist x 1;Verbal cues; Tactile cues   Assistive Device Rolling walker   Distance 5 steps bed->chair with use of RW on tile surface;limited mobility at this time 2* fatigue and weakness    Balance   Static Sitting Fair +  (at EOB and in chair with chair alarm intact)   Dynamic Sitting Poor   Static Standing Poor   Dynamic Standing Poor +   Ambulatory Poor +   Endurance Deficit   Endurance Deficit Yes   Endurance Deficit Description weakness,fatigue,limited gait distance   Activity Tolerance   Activity Tolerance Patient limited by fatigue  (poor)   Nurse Made Aware yes Assessment   Prognosis Fair   Problem List Decreased strength;Decreased endurance; Impaired balance;Decreased mobility; Decreased cognition;Decreased safety awareness;Decreased skin integrity; Impaired hearing   Assessment pt is a [de-identified] y/o female admitted to [facility] 2* metabolic encephalopathy  and abnormal lab results  Pt was receiving rehab services at [facility] with use of RW  Pt currently is not at functional mobility baseline,needs Ax1 for mobility with use of RW,abnormal lab values,masimo monitoring,multiple lines,ongoing medical care,IV medicine management and chair alarm activation,dec cognition and slow mobility  Pt demonstrates moderate to minimal deficits during functional mobility and gait including dec endurance,dec balance,dec BLE strength,dec cognition (baseline?),slow mobility and demetrius and needs modAx1 for transfers and BM and minAx1 for gait with use of RW  Pt would cont to benefit from skilled inpt PT services to maximize functional independence     Goals   Patient Goals to eat lunch   STG Expiration Date 10/26/19   Short Term Goal #1 in 7-10 days:(1) Pt will be able to ambulate greater than 100 feet with use of RW on various surfaces needing minAx1->S level of A without rest breaks and no LOB in order to A pt to return to PLOF, (2) activity tolerance:45 mins/45mins, (3) pt will be able to perform sit to stand transfers needing minAx1->S level of A to and from various surfaces consistently in order to return to PLOF, (4) pt will be able to perform BM needing minAx1->S level of A to A pt to return to PLOF, (5) (I) with BLE therapeutic ex HEP in various positions to A pt to inc balance,strength,mobility,endurance (6) inc balance 1/2 grade in order to dec fall risk,  (8) cont to provide pt and pt family education for safe D/C planning, (9) inc BLE strength 1/2 to 1 full grade in order to A pt to inc balance,strength,mobility,endurance    PT Treatment Day 0   Plan Treatment/Interventions Functional transfer training;LE strengthening/ROM; Therapeutic exercise; Endurance training;Patient/family training;Equipment eval/education; Bed mobility;Gait training;Spoke to nursing   PT Frequency 2-3x/wk   Recommendation   Recommendation Short-term skilled PT  (return to rehab facility upon D/C)   Equipment Recommended Walker  (current use of RW for mobility)   Barthel Index   Feeding 10   Bathing 0   Grooming Score 5   Dressing Score 5   Bladder Score 0   Bowels Score 10   Toilet Use Score 5   Transfers (Bed/Chair) Score 5   Mobility (Level Surface) Score 0   Stairs Score 0   Barthel Index Score 40   Skilled PT recommended while in hospital and upon DC to progress pt toward treatment goals             Azucena Canela, PT, DPT@

## 2019-10-16 NOTE — CONSULTS
Consultation - [address] [place] [address] [de-identified] y o  female MRN: [id number]  Unit/Bed#: -01 Encounter: [id number]      Assessment/Plan  1  Encephalopathy  History of encephalopathy and hospitalizations at [facility], secondary to hyponatremia related to adrenal insufficiency  Alert and orient x3 at baseline  Multifactorial  UTI and hyponatremia contributing  Sodium 131  Patient also noted to have low blood sugars, 56 on 10/15/2019  Provide frequent redirection, reorientation, distraction techniques  Avoid deliriogenic medications such as tramadol, benzodiazepines, anticholinergics,  Benadryl  Treat pain, See geriatric pain protocol  Monitor for constipation and urinary retention  Encourage early and frequent moblization, OOB  Encourage Hydration/ Nutrition  Implement sleep hygiene, limit night time interuptions, group activities    2  Adrenal insufficiency  Patient seen by endocrine as outpatient  History of bilateral adrenal hemorrhage  Seen by endocrine as outpatient  Was previously on steroids, now off  since Sept 2019 after taper  History of hyponatremia  Recommend re-evaluation by endocrine      3  Hyponatremia  Chronic  Thought to be secondary to adrenal insufficiency, bilateral adrenal hemorrhage  Patient seen by endocrine as outpatient  Was tapered off of steroids September 2019  Recommended fluid restriction diet 1500 mL a day       4  Urinary retention  History of urinary retention with previous admissions  Was discharged with Rapp after previous hospitalization  Urinary retention protocol    5  Deconditioning  Patient was at rehab prior to admission  PT/OT  Nutrition consult  Albumin 2 2    6  Diabetes mellitus 2  With history of hypoglycemia  Patient was previously on glipizdine  Transitioned to metformin and sitagliptan  HB A1c 6 7   Pt sees endocrine as outpatient      7    Cognitive impairment cannot be ruled out  Unable to perform mini cog secondary to 1     Hyponatremia contributing  TSH within normal limits  Recommend vitamin B12 and folate levels  Patient takes supplemental vitamin B T12 as an outpatient  Recommend follow-up at [facility] for formal comprehensive assessment supportive resources    8  Hearing impairment  Speak loudly and clearly  Enunciate words      9  UTI  UA C&S pending  Started on Rocephin by primary team       History of Present Illness   Physician Requesting Consult: Lisbet Breen MD  Reason for Consult / Principal Problem:  Encephalopathy  Hx and PE limited by:  Encephalopathy  HPI: [facility] is a [de-identified]y o  year old female who presents with lethargy and confusion  Per patient's daughter patient used to be a very functional 50-year-old who was able to drive and independent with ADLs  In September she had a UTI with hyponatremia and since then she has had a decrease in function  Patient had history of previous admissions with metabolic encephalopathy and hyponatremia, 02/04/2019  Admission for UTI on 09/11/2019  Admission for disorientation on 09/24/2019    She has a past medical history of hypertension, hyperlipidemia, adrenal insufficiency, bilateral adrenal hemorrhage,, hyponatremia, GERD, encephalopathy, vitamin B12 deficiency, urinary retention diabetes mellitus, cardiac disease, tick bite in July 2019  Attempted to call daughter Christina Cleaning [phone], unable to contact  History provided by review of epic documentation and patient    Prior to arrival patient resides at home  Previous to admission patient was at [facility]  Prior to to September patient was driving  She was independent with IADLs and ADLs  She wears glasses  She is hard hearing  She has full set upper and lower dentures  Upon exam patient is in bed  She is awake and alert  She is oriented x3    She is slow to respond and needs to concentrate prior to answering questions such as where are you and what is the month and day   She is unaware of why she is in the hospital   She states she does not know where her glasses or dentures are  Inpatient consult to Gerontology  Consult performed by: JAQUELINE Pedersen  Consult ordered by: Margaret Trevizo PA-C          Review of Systems   Constitutional: Negative for unexpected weight change  HENT: Positive for hearing loss  Negative for dental problem  Eyes: Negative for visual disturbance  Respiratory: Negative for shortness of breath  Cardiovascular: Negative for chest pain  Gastrointestinal: Negative for constipation  C/o abdominal cramping   Genitourinary: Negative for difficulty urinating  Rapp catheter in place   Musculoskeletal: Negative for gait problem  Skin: Negative for color change  Neurological: Negative for dizziness  Psychiatric/Behavioral: Positive for decreased concentration         Historical Information   Past Medical History:   Diagnosis Date    Cardiac disease     Diabetes mellitus ([facility] [address] )     Hyperlipidemia     Hypertension      Past Surgical History:   Procedure Laterality Date    CORONARY ANGIOPLASTY WITH STENT PLACEMENT       Social History   Social History     Substance and Sexual Activity   Alcohol Use No     Social History     Substance and Sexual Activity   Drug Use No     Social History     Tobacco Use   Smoking Status Current Every Day Smoker    Packs/day: 0 20         Family History: non-contributory    Meds/Allergies   Current meds:   Current Facility-Administered Medications   Medication Dose Route Frequency    acetaminophen (TYLENOL) tablet 650 mg  650 mg Oral Q6H PRN    aluminum-magnesium hydroxide-simethicone (MYLANTA) 200-200-20 mg/5 mL oral suspension 30 mL  30 mL Oral Q6H PRN    amLODIPine (NORVASC) tablet 10 mg  10 mg Oral Daily    atenolol (TENORMIN) tablet 50 mg  50 mg Oral BID    docusate sodium (COLACE) capsule 100 mg  100 mg Oral BID PRN    enoxaparin (LOVENOX) subcutaneous injection 40 mg 40 mg Subcutaneous Daily    hydrocortisone sodium succinate (PF) (Solu-CORTEF) injection 50 mg  50 mg Intravenous Q8H [address]    insulin lispro (HumaLOG) 100 units/mL subcutaneous injection 1-5 Units  1-5 Units Subcutaneous HS    insulin lispro (HumaLOG) 100 units/mL subcutaneous injection 1-6 Units  1-6 Units Subcutaneous TID AC    lisinopril (ZESTRIL) tablet 40 mg  40 mg Oral Daily    ondansetron (ZOFRAN) injection 4 mg  4 mg Intravenous Q6H PRN    pantoprazole (PROTONIX) EC tablet 40 mg  40 mg Oral Early Morning    pravastatin (PRAVACHOL) tablet 40 mg  40 mg Oral Daily With Dinner    sodium chloride tablet 2 g  2 g Oral BID With Meals      Current PTA meds:  Medications Prior to Admission   Medication    amLODIPine (NORVASC) 10 mg tablet    atenolol (TENORMIN) 50 mg tablet    enalapril (VASOTEC) 20 mg tablet    glipiZIDE (GLUCOTROL) 10 mg tablet    metFORMIN (GLUCOPHAGE) 1000 MG tablet    omeprazole (PriLOSEC) 20 mg delayed release capsule    saxagliptin (ONGLYZA) 5 MG tablet    simvastatin (ZOCOR) 20 mg tablet    sodium chloride 1 g tablet        No Known Allergies    Objective   Vitals: Blood pressure 106/54, pulse 84, temperature (!) 97 4 °F (36 3 °C), resp  rate 16, height 5' 2" (1 575 m), weight 51 9 kg (114 lb 8 oz), SpO2 99 %  ,Body mass index is 20 94 kg/m²  Physical Exam   Constitutional: She is oriented to person, place, and time  She appears well-developed and well-nourished  No distress  HENT:   Head: Normocephalic  Eyes: Right eye exhibits no discharge  Left eye exhibits no discharge  Neck: Normal range of motion  Cardiovascular: Normal rate, regular rhythm and normal heart sounds  Exam reveals no gallop and no friction rub  No murmur heard  Pulmonary/Chest: Effort normal and breath sounds normal  No respiratory distress  Abdominal: Soft  Bowel sounds are normal  She exhibits no distension  There is no tenderness  There is no guarding     Musculoskeletal: Normal range of motion  She exhibits no edema or deformity  Neurological: She is alert and oriented to person, place, and time  Skin: Skin is warm and dry  She is not diaphoretic  Psychiatric:   Slow to answer   Nursing note and vitals reviewed  Lab Results:   Results from last 7 days   Lab Units 10/16/19  0440   WBC Thousand/uL 5 07   HEMOGLOBIN g/dL 9 1*   HEMATOCRIT % 29 4*   PLATELETS Thousands/uL 383        Results from last 7 days   Lab Units 10/16/19  0440  10/15/19  0923   POTASSIUM mmol/L 4 7   < > 4 8   CHLORIDE mmol/L 102   < > 99*   CO2 mmol/L 20*   < > 21   BUN mg/dL 20   < > 15   CREATININE mg/dL 0 65   < > 0 59*   CALCIUM mg/dL 8 7   < > 8 6   ALK PHOS U/L  --   --  58   ALT U/L  --   --  16   AST U/L  --   --  26    < > = values in this interval not displayed  Imaging Studies: I have personally reviewed pertinent reports  EKG, Pathology, and Other Studies: I have personally reviewed pertinent reports      VTE Prophylaxis: Sequential compression device (Venodyne)     Code Status: Level 1 - Full Code

## 2019-10-16 NOTE — PROGRESS NOTES
[facility]  Progress Note - [place] 1940, [de-identified] y o  female MRN: [id number]    Unit/Bed#: -01 Encounter: [id number]    Primary Care Provider: Robby Hopkins MD   Date and time admitted to hospital: 10/14/2019  4:20 PM      * Metabolic encephalopathy  Assessment & Plan  · POA  Likely in setting of hyponatremia and possible urinary tract infection  · Urine culture unremarkable - monitor off antibiotics at this time  · Has had admissions in the past related to confusion and hyponatremia  · Correct sodium with fluid restriction and salt tabs, see related plans  · Neuro checks   · She has hx of adrenal insufficiency, previously on steroids and then tapered off  She follows with Endocrinology as outpatient  Symptoms appear consistent with adrenal insufficiency, cosyntropin stim test and cortisol levels ordered  Endocrinology evaluation is pending  · Mental status is improved today following IV steroids and correction of sodium to 131 this morning  She is awake, alert, eating her meals  Answering questions appropriately and oriented  Urinary retention  Assessment & Plan  · Patient has Rapp, this was placed on recent admission at [facility] where she developed urinary retention  · Patient has follow-up with Urology as an outpatient in about 2 weeks  · Continue with Rapp for now, would recommend voiding trial once patient is more ambulatory    Failure to thrive in adult  Assessment & Plan  · Since September she has had a progressive decline - she has had no appetite, not really eating/drinking with weakness  She was previously reportedly very independent prior to her admission at [facility] in September with UTI  · Geriatrics evaluation will be appreciated   · Of note, patient does have history of adrenal insufficiency and adrenal hemorrhage  Symptoms of anorexia, hyponatremia, hypoglycemia, weakness, confusion appear consistent with this    Cosyntropin stim test cortisol levels have been ordered  Have started IV hydrocortisone  Endocrinology evaluation is pending  · Patient is improved today compared to yesterday, may be related to starting IV steroids  She is awake and alert eating lunch at this time    Acute lower urinary tract infection  Assessment & Plan  · UA indicative of UTI  · Currently on IV Rocephin, continue  · She had herrera placed previous admission at [facility] 2/2 urinary retention   · Urine culture reviewed, fairly unremarkable, would not treat this  Monitor off antibiotics    HLD (hyperlipidemia)  Assessment & Plan  · Continue statin    Essential hypertension  Assessment & Plan  · Will continue with enalapril/lisinopril, atenolol and amlodipine   · Monitor blood pressure    Type 2 diabetes mellitus with diabetic neuropathy, without long-term current use of insulin [facility])  Assessment & Plan  Lab Results   Component Value Date    HGBA1C 6 4 (H) 10/15/2019     Recent Labs     10/15/19  2053 10/15/19  2201 10/16/19  0546 10/16/19  1030   POCGLU 56* 138 130 179*       Blood Sugar Average: Last 72 hrs:  (P) 108 4     · Will hold oral agents  · Sliding scale insulin, Accu-Cheks  · Patient was not eating and hypoglycemic yesterday - oral agents were discontinued  Suspect adrenal insufficiency may be contributing to symptomatology on steroids and endocrinology evaluation is pending  · Blood sugars improved today with improved oral intake, will continue with SSI for now    Hyponatremia  Assessment & Plan  · Patient has longstanding history of hyponatremia, 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 and adrenal insufficiency   · Continue with salt tabs, fluid restriction 1500 mL  · Treating with IV hydrocortisone given concern for adrenal insufficiency   · BMP in a m  · Likely contributing to encephalopathy, see related plans    Has had admissions in the past related to confusion and hyponatremia      VTE Pharmacologic Prophylaxis:   Pharmacologic: Enoxaparin (Lovenox)  Mechanical VTE Prophylaxis in Place: Yes    Patient Centered Rounds: I have performed bedside rounds with nursing staff today  Discussions with Specialists or Other Care Team Provider: RN, geriatrics AP    Education and Discussions with Family / Patient: patient, daughter over phone    Time Spent for Care: 30 minutes  More than 50% of total time spent on counseling and coordination of care as described above  Current Length of Stay: 2 day(s)    Current Patient Status: Inpatient   Certification Statement: The patient will continue to require additional inpatient hospital stay due to monitor mental status, sodium levels, endocrinology evaluation    Discharge Plan: 24-48 hrs pending Na improvement and endocrinology evaluation     Code Status: Level 1 - Full Code      Subjective:   Patient reports she feels okay today  She is sitting in bed and eating her lunch  She denies any pain  States she slept well  She appears to be in much better spirits today and interactive  Objective:     Vitals:   Temp (24hrs), Av 9 °F (36 6 °C), Min:97 4 °F (36 3 °C), Max:98 4 °F (36 9 °C)    Temp:  [97 4 °F (36 3 °C)-98 4 °F (36 9 °C)] 97 4 °F (36 3 °C)  HR:  [80-91] 84  Resp:  [16-20] 16  BP: (106-109)/(52-54) 106/54  SpO2:  [96 %-99 %] 99 %  Body mass index is 20 94 kg/m²  Input and Output Summary (last 24 hours): Intake/Output Summary (Last 24 hours) at 10/16/2019 1136  Last data filed at 10/16/2019 0600  Gross per 24 hour   Intake 50 ml   Output 200 ml   Net -150 ml       Physical Exam:     Physical Exam   Constitutional: She is oriented to person, place, and time  No distress  [place]  More awake, alert, interactive compared to previous exam   Answering all questions appropriately    HENT:   Head: Normocephalic and atraumatic  Eyes: EOM are normal  No scleral icterus  Neck: Normal range of motion  Neck supple  Cardiovascular: Normal rate and regular rhythm     Pulmonary/Chest: Effort normal and breath sounds normal  No respiratory distress  Abdominal: Soft  Bowel sounds are normal  She exhibits no distension  There is no tenderness  Neurological: She is alert and oriented to person, place, and time  No cranial nerve deficit  Skin: Skin is warm and dry  She is not diaphoretic  Psychiatric: She has a normal mood and affect  Her behavior is normal    Vitals reviewed  Additional Data:     Labs:    Results from last 7 days   Lab Units 10/16/19  0440  10/15/19  0923   WBC Thousand/uL 5 07   < > 6 18   HEMOGLOBIN g/dL 9 1*   < > 8 9*   HEMATOCRIT % 29 4*   < > 28 8*   PLATELETS Thousands/uL 383   < > 394*   NEUTROS PCT %  --   --  81*   LYMPHS PCT %  --   --  12*   MONOS PCT %  --   --  6   EOS PCT %  --   --  0    < > = values in this interval not displayed  Results from last 7 days   Lab Units 10/16/19  0440  10/15/19  0923   SODIUM mmol/L 131*   < > 128*   POTASSIUM mmol/L 4 7   < > 4 8   CHLORIDE mmol/L 102   < > 99*   CO2 mmol/L 20*   < > 21   BUN mg/dL 20   < > 15   CREATININE mg/dL 0 65   < > 0 59*   ANION GAP mmol/L 9   < > 8   CALCIUM mg/dL 8 7   < > 8 6   ALBUMIN g/dL  --   --  2 2*   TOTAL BILIRUBIN mg/dL  --   --  0 27   ALK PHOS U/L  --   --  58   ALT U/L  --   --  16   AST U/L  --   --  26   GLUCOSE RANDOM mg/dL 103   < > 93    < > = values in this interval not displayed  Results from last 7 days   Lab Units 10/16/19  1030 10/16/19  0546 10/15/19  2201 10/15/19  2053 10/15/19  1637 10/15/19  1608 10/15/19  1423 10/15/19  1308 10/15/19  1138 10/15/19  0903   POC GLUCOSE mg/dl 179* 130 138 56* 181* 41* 114 67 67 111     Results from last 7 days   Lab Units 10/15/19  0923   HEMOGLOBIN A1C % 6 4*     Results from last 7 days   Lab Units 10/16/19  0440   PROCALCITONIN ng/ml 0 07           * I Have Reviewed All Lab Data Listed Above  * Additional Pertinent Lab Tests Reviewed:  All Labs Within Last 24 Hours Reviewed    Imaging:    Imaging Reports Reviewed Today Include: none  Imaging Personally Reviewed by Myself Includes:  none    Recent Cultures (last 7 days):     Results from last 7 days   Lab Units 10/15/19  1435 10/14/19  2009   BLOOD CULTURE   --  No Growth at 24 hrs  No Growth at 24 hrs  URINE CULTURE  9973-3395 cfu/ml Gram Negative Nirav*  --        Last 24 Hours Medication List:     Current Facility-Administered Medications:  acetaminophen 650 mg Oral Q6H PRN Ivan Avalos MD   aluminum-magnesium hydroxide-simethicone 30 mL Oral Q6H PRN Ivan Avalos MD   amLODIPine 10 mg Oral Daily Ivan Avalos MD   atenolol 50 mg Oral BID Ivan Avalos MD   docusate sodium 100 mg Oral BID PRN Ivan Avalos MD   enoxaparin 40 mg Subcutaneous Daily Ivan Avalos MD   hydrocortisone sodium succinate 50 mg Intravenous Q8H [address] Trina Reece PA-C   insulin lispro 1-5 Units Subcutaneous HS Ivan Avalos MD   insulin lispro 1-6 Units Subcutaneous TID AC Ivan Avalos MD   lisinopril 40 mg Oral Daily Ivan Avalos MD   ondansetron 4 mg Intravenous Q6H PRN Ivan Avalos MD   pantoprazole 40 mg Oral Early Morning Ivan Avalos MD   pravastatin 40 mg Oral Daily With Kerwin Ambrose MD   sodium chloride 2 g Oral BID With Meals Ivan Avalos MD        Today, Patient Was Seen By: Cash Ríos PA-C    ** Please Note: Dictation voice to text software may have been used in the creation of this document   **

## 2019-10-16 NOTE — PLAN OF CARE
Problem: Prexisting or High Potential for Compromised Skin Integrity  Goal: Skin integrity is maintained or improved  Description  INTERVENTIONS:  - Identify patients at risk for skin breakdown  - Assess and monitor skin integrity  - Assess and monitor nutrition and hydration status  - Monitor labs   - Assess for incontinence   - Turn and reposition patient  - Assist with mobility/ambulation  - Relieve pressure over bony prominences  - Avoid friction and shearing  - Provide appropriate hygiene as needed including keeping skin clean and dry  - Evaluate need for skin moisturizer/barrier cream  - Collaborate with interdisciplinary team   - Patient/family teaching  - Consider wound care consult   Outcome: Progressing     Problem: Nutrition/Hydration-ADULT  Goal: Nutrient/Hydration intake appropriate for improving, restoring or maintaining nutritional needs  Description  Monitor and assess patient's nutrition/hydration status for malnutrition  Collaborate with interdisciplinary team and initiate plan and interventions as ordered  Monitor patient's weight and dietary intake as ordered or per policy  Utilize nutrition screening tool and intervene as necessary  Determine patient's food preferences and provide high-protein, high-caloric foods as appropriate       INTERVENTIONS:  - Monitor oral intake, urinary output, labs, and treatment plans  - Assess nutrition and hydration status and recommend course of action  - Evaluate amount of meals eaten  - Assist patient with eating if necessary   - Allow adequate time for meals  - Recommend/ encourage appropriate diets, oral nutritional supplements, and vitamin/mineral supplements  - Order, calculate, and assess calorie counts as needed  - Recommend, monitor, and adjust tube feedings and TPN/PPN based on assessed needs  - Assess need for intravenous fluids  - Provide specific nutrition/hydration education as appropriate  - Include patient/family/caregiver in decisions related to nutrition  Outcome: Not Progressing

## 2019-10-16 NOTE — QUICK NOTE
Discussed with attending Dr Martin Barbosa about hypoglycemia, hyponatremia, failure to thrive, anorexia  Concern for possible adrenal insufficiency, daughter states she has had this in the past and was previously on steroids  D/w endocrinology Dr Gonzalo Monk  Will obtain cortisol level now and proceed with cosyntropin stim test with repeat cortisol levels 30, 60, 90 minutes after admin  Following lab draws with order hydrocortisone 50 mg q8hr IV  Endocrinology consult placed

## 2019-10-16 NOTE — SOCIAL WORK
CM discussed pt in care coordination rounds  CM called the patient's daughter/caretaker, Zee Check [phone] to discuss DC plans  CM left voicemail message for callback

## 2019-10-16 NOTE — ASSESSMENT & PLAN NOTE
· Since September she has had a progressive decline - she has had no appetite, not really eating/drinking with weakness  She was previously reportedly very independent prior to her admission at [facility] in September with UTI  · Geriatrics evaluation will be appreciated   · Of note, patient does have history of adrenal insufficiency and adrenal hemorrhage  Symptoms of anorexia, hyponatremia, hypoglycemia, weakness, confusion appear consistent with this  Cosyntropin stim test cortisol levels have been ordered  Have started IV hydrocortisone  Endocrinology evaluation is pending  · Patient is improved today compared to yesterday, may be related to starting IV steroids    She is awake and alert eating lunch at this time

## 2019-10-16 NOTE — ASSESSMENT & PLAN NOTE
· UA indicative of UTI  · Currently on IV Rocephin, continue  · She had herrera placed previous admission at [facility] 2/2 urinary retention   · Urine culture reviewed, fairly unremarkable, would not treat this    Monitor off antibiotics

## 2019-10-16 NOTE — SPEECH THERAPY NOTE
Speech Language/Pathology  Speech/Language Pathology  Assessment    Patient Name: [place]  Today's Date: 10/16/2019     Problem List  Principal Problem:    Metabolic encephalopathy  Active Problems:    Hyponatremia    Type 2 diabetes mellitus with diabetic neuropathy, without long-term current use of insulin (HCC)    Essential hypertension    HLD (hyperlipidemia)    Acute lower urinary tract infection    Failure to thrive in adult    Past Medical History  Past Medical History:   Diagnosis Date    Cardiac disease     Diabetes mellitus ([address] )     Hyperlipidemia     Hypertension      Past Surgical History  Past Surgical History:   Procedure Laterality Date    CORONARY ANGIOPLASTY WITH STENT PLACEMENT     [de-identified] y o  female who has a previous history of hyponatremia and urinary tract infection  Likewise, the patient has a history of essential hypertension, hyperlipidemia, and diabetes mellitus type 2      According to the patient's daughter who is currently the historian, she used to be a very functional 41-year-old who drove herself going to places and doing things on her own with good level of activities of daily living  However approximately September, she had a bout of urinary tract infection with hyponatremia and since then it seems that her functioning has been very limited  She is supposed to be on sodium tablets 1 gram twice daily along with her usual medications for blood pressure and diabetes  This also included Ace inhibitor (enalapril)  Ever since the patient was sent home, it seems that she has been not good about eating proper amounts of food and sometimes for goes eating saying that she is not hungry  With that, it seems that the patient has been becoming more lethargic and recently has become more confused  The patient came to the emergency room and was seen to be more lethargic as well as confused    Noted that the patient was given a bolus of 500 milliliters of fluids with improvement of mental status but remains to be on mindful of surroundings although much more awake and alert  Noted is that the patient's urinalysis shows presence of bacteria  She does have a indwelling Rapp catheter  Patient also was noted to be hyponatremic 126      That said, the patient would be treated for urinary tract infection and therefore Rocephin was started  She is also to receive fluids with checks of metabolic profile tomorrow  Meanwhile the patient needs to have an assessment of ADLs in the form of cognitive occupational therapy and usual occupational therapy with case management evaluation  She also needs nutrition consult to monitor food intake and caloric needs       Currently, patient is resting well  Although she is awake  She is not mindful of surroundings however can have short responses and brief attention span  She is hard of hearing and therefore can't hear more so on the right ear  Bedside Swallow Evaluation:    Summary:  Pt presents w/ mild oral dysphagia anay by reduced manipulation of solids  Attempted call to nsg home, they were able to offer the pt's diet but were unable to offer insight into how much or how well the pt was eating  She is currently able to tolerate soft solids & will be able to tolerate the level 3, will f/u w/ full tray & adjust as needed  Recommendations:  Diet: continue level 3 dysph adv  Liquid: thin  Meds: as tolerated  Supervision: please assist   Positioning:Upright  Strategies: Pt to take PO/Meds only when fully alert and upright  Oral care: frequently   Aspiration precautions    Therapy Prognosis: guarded  Prognosis considerations: h/o cogn decline  Frequency: 3-5 as able  Patient's goal: can I get a drink?     Consider consult w/:  Nutrition      Reason for consult:  R/o aspiration  Determine safest and least restrictive diet  Failed nursing dysphagia assessment- but placed on a diet    Precautions:  Contact    Current diet:  Level 3 w/ thin  Premorbid diet[de-identified]  Regular w/ thin-fluid restriction,   Previous VBS:  -  O2 requirement:  nc  Voice/Speech:  Min hoarseness, intelligible  [place]   Social:  Was at [facility], request to move elsewhere  Follows commands:                  Yes w/ cues        Cognitive Status:  Alert, cooperative  Oral [facility] exam:  Dentition: edentulous, states has dentures but does not wear  Labial strength and ROM: reduced ? on right  Lingual strength and ROM: wfl  Mandibular strength and ROM: noted some tremors with eating/drinking  Velum: -  Secretion management: wfl  Volitional cough: fair  Volitional swallow: unable to complete w/ command      Items administered:  Puree, soft solid,  nectar thick liquid, thin liquids  Liquids were taken by straw/cup  Oral stage:  Lip closure: reduced  Mastication: reduced, prolonged but able to manipulate the soft solids  Bolus formation: fair  Bolus control: fair  Transfer: mildly reduced  Oral residue: none noted today    Pharyngeal stage:  Swallow promptness: fairly prompt  Laryngeal rise: wfl  Wet voice: none noted  Throat clear: none   Cough: no coughing w cup or straw    Esophageal stage:  No s/s reported    Aspiration precautions posted    Results d/w:  Pt, nursing  Goal(s):  Pt will tolerate least restrictive diet w/out s/s aspiration or oral/pharyngeal difficulties

## 2019-10-16 NOTE — PLAN OF CARE
Problem: PHYSICAL THERAPY ADULT  Goal: Performs mobility at highest level of function for planned discharge setting  See evaluation for individualized goals  Description  Treatment/Interventions: Functional transfer training, LE strengthening/ROM, Therapeutic exercise, Endurance training, Patient/family training, Equipment eval/education, Bed mobility, Gait training, Spoke to nursing  Equipment Recommended: Walker(current use of RW for mobility)       See flowsheet documentation for full assessment, interventions and recommendations  Note:   Prognosis: Fair  Problem List: Decreased strength, Decreased endurance, Impaired balance, Decreased mobility, Decreased cognition, Decreased safety awareness, Decreased skin integrity, Impaired hearing  Assessment: pt is a [de-identified] y/o female admitted to [facility] 2* metabolic encephalopathy  and abnormal lab results  Pt was receiving rehab services at [facility] with use of RW  Pt currently is not at functional mobility baseline,needs Ax1 for mobility with use of RW,abnormal lab values,masimo monitoring,multiple lines,ongoing medical care,IV medicine management and chair alarm activation,dec cognition and slow mobility  Pt demonstrates moderate to minimal deficits during functional mobility and gait including dec endurance,dec balance,dec BLE strength,dec cognition (baseline?),slow mobility and demetrius and needs modAx1 for transfers and BM and minAx1 for gait with use of RW  Pt would cont to benefit from skilled inpt PT services to maximize functional independence  Recommendation: Short-term skilled PT(return to rehab facility upon D/C)          See flowsheet documentation for full assessment

## 2019-10-16 NOTE — ASSESSMENT & PLAN NOTE
Lab Results   Component Value Date    HGBA1C 6 4 (H) 10/15/2019     Recent Labs     10/15/19  2053 10/15/19  2201 10/16/19  0546 10/16/19  1030   POCGLU 56* 138 130 179*       Blood Sugar Average: Last 72 hrs:  (P) 108 4     · Will hold oral agents  · Sliding scale insulin, Accu-Cheks  · Patient was not eating and hypoglycemic yesterday - oral agents were discontinued    Suspect adrenal insufficiency may be contributing to symptomatology on steroids and endocrinology evaluation is pending  · Blood sugars improved today with improved oral intake, will continue with SSI for now

## 2019-10-16 NOTE — ASSESSMENT & PLAN NOTE
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

## 2019-10-17 PROBLEM — N39.0 ACUTE LOWER URINARY TRACT INFECTION: Status: RESOLVED | Noted: 2019-10-14 | Resolved: 2019-10-17

## 2019-10-17 LAB
ANION GAP SERPL CALCULATED.3IONS-SCNC: 9 MMOL/L (ref 4–13)
BUN SERPL-MCNC: 20 MG/DL (ref 5–25)
CALCIUM SERPL-MCNC: 8.7 MG/DL (ref 8.3–10.1)
CHLORIDE SERPL-SCNC: 105 MMOL/L (ref 100–108)
CO2 SERPL-SCNC: 20 MMOL/L (ref 21–32)
CREAT SERPL-MCNC: 0.56 MG/DL (ref 0.6–1.3)
ERYTHROCYTE [DISTWIDTH] IN BLOOD BY AUTOMATED COUNT: 17.3 % (ref 11.6–15.1)
GFR SERPL CREATININE-BSD FRML MDRD: 88 ML/MIN/1.73SQ M
GLUCOSE SERPL-MCNC: 104 MG/DL (ref 65–140)
GLUCOSE SERPL-MCNC: 117 MG/DL (ref 65–140)
GLUCOSE SERPL-MCNC: 121 MG/DL (ref 65–140)
GLUCOSE SERPL-MCNC: 145 MG/DL (ref 65–140)
GLUCOSE SERPL-MCNC: 97 MG/DL (ref 65–140)
HCT VFR BLD AUTO: 26.7 % (ref 34.8–46.1)
HGB BLD-MCNC: 8.4 G/DL (ref 11.5–15.4)
MCH RBC QN AUTO: 23.3 PG (ref 26.8–34.3)
MCHC RBC AUTO-ENTMCNC: 31.5 G/DL (ref 31.4–37.4)
MCV RBC AUTO: 74 FL (ref 82–98)
PLATELET # BLD AUTO: 424 THOUSANDS/UL (ref 149–390)
PMV BLD AUTO: 9.8 FL (ref 8.9–12.7)
POTASSIUM SERPL-SCNC: 4.1 MMOL/L (ref 3.5–5.3)
RBC # BLD AUTO: 3.61 MILLION/UL (ref 3.81–5.12)
SODIUM SERPL-SCNC: 134 MMOL/L (ref 136–145)
WBC # BLD AUTO: 4.36 THOUSAND/UL (ref 4.31–10.16)

## 2019-10-17 PROCEDURE — 99232 SBSQ HOSP IP/OBS MODERATE 35: CPT | Performed by: PHYSICIAN ASSISTANT

## 2019-10-17 PROCEDURE — 99232 SBSQ HOSP IP/OBS MODERATE 35: CPT | Performed by: NURSE PRACTITIONER

## 2019-10-17 PROCEDURE — 82948 REAGENT STRIP/BLOOD GLUCOSE: CPT

## 2019-10-17 PROCEDURE — 85027 COMPLETE CBC AUTOMATED: CPT | Performed by: PHYSICIAN ASSISTANT

## 2019-10-17 PROCEDURE — 97116 GAIT TRAINING THERAPY: CPT

## 2019-10-17 PROCEDURE — 80048 BASIC METABOLIC PNL TOTAL CA: CPT | Performed by: PHYSICIAN ASSISTANT

## 2019-10-17 RX ADMIN — AMLODIPINE BESYLATE 10 MG: 10 TABLET ORAL at 09:32

## 2019-10-17 RX ADMIN — ENOXAPARIN SODIUM 40 MG: 40 INJECTION SUBCUTANEOUS at 09:29

## 2019-10-17 RX ADMIN — PANTOPRAZOLE SODIUM 40 MG: 40 TABLET, DELAYED RELEASE ORAL at 06:26

## 2019-10-17 RX ADMIN — SODIUM CHLORIDE TAB 1 GM 2 G: 1 TAB at 09:33

## 2019-10-17 RX ADMIN — ATENOLOL 50 MG: 50 TABLET ORAL at 09:33

## 2019-10-17 RX ADMIN — LISINOPRIL 40 MG: 20 TABLET ORAL at 09:28

## 2019-10-17 RX ADMIN — SODIUM CHLORIDE TAB 1 GM 2 G: 1 TAB at 17:22

## 2019-10-17 RX ADMIN — PRAVASTATIN SODIUM 40 MG: 20 TABLET ORAL at 17:22

## 2019-10-17 RX ADMIN — ATENOLOL 50 MG: 50 TABLET ORAL at 17:22

## 2019-10-17 NOTE — ASSESSMENT & PLAN NOTE
· UA indicative of UTI  · Currently on IV Rocephin, continue  · She had herrera placed previous admission at [facility] 2/2 urinary retention   · Urine culture reviewed, fairly unremarkable, would not treat this    Monitor off antibiotics  · resolved

## 2019-10-17 NOTE — ASSESSMENT & PLAN NOTE
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

## 2019-10-17 NOTE — ASSESSMENT & PLAN NOTE
· Since September she has had a progressive decline - she has had no appetite, not really eating/drinking with weakness    She was previously reportedly very independent prior to her admission at [facility] in September with UTI  · Geriatrics evaluation appreciated   · Patient is much improved today compared to on admission with correction of hyponatremia and hypoglycemia

## 2019-10-17 NOTE — PROGRESS NOTES
[facility]  Progress Note - [place] 1940, [de-identified] y o  female MRN: [id number]    Unit/Bed#: -01 Encounter: [id number]    Primary Care Provider: Lindy Lilly MD   Date and time admitted to hospital: 10/14/2019  4:20 PM      * Metabolic encephalopathy  Assessment & Plan  · POA  Likely in setting of hyponatremia and possible urinary tract infection  · Urine culture unremarkable - monitor off antibiotics at this time  · Has had admissions in the past related to confusion and hyponatremia  · Correct sodium with fluid restriction and salt tabs, see related plans  · Neuro checks   · She has hx of adrenal insufficiency, previously on steroids and then tapered off  She follows with Endocrinology as outpatient  · Cosyntropin stim test with adequate response in cortisol, hydrocortisone discontinued, she does not have adrenal insufficiency   · Mental status improved with correction of Na, 134 today     Urinary retention  Assessment & Plan  · Patient has Rapp, this was placed on recent admission at [facility] where she developed urinary retention  · Patient has follow-up with Urology as an outpatient in about 1 week  · Continue with Rapp for now, would recommend voiding trial once patient is more ambulatory    Failure to thrive in adult  Assessment & Plan  · Since September she has had a progressive decline - she has had no appetite, not really eating/drinking with weakness    She was previously reportedly very independent prior to her admission at [facility] in September with UTI  · Geriatrics evaluation appreciated   · Patient is much improved today compared to on admission with correction of hyponatremia and hypoglycemia    HLD (hyperlipidemia)  Assessment & Plan  · Continue statin    Essential hypertension  Assessment & Plan  · Will continue with enalapril/lisinopril, atenolol and amlodipine   · Monitor blood pressure    Type 2 diabetes mellitus with diabetic neuropathy, without long-term current use of insulin [facility])  Assessment & Plan  Lab Results   Component Value Date    HGBA1C 6 4 (H) 10/15/2019     Recent Labs     10/16/19  1627 10/16/19  2101 10/17/19  0605 10/17/19  1033   POCGLU 211* 172* 121 104       Blood Sugar Average: Last 72 hrs:  (P) 120 [id number]     · Will hold oral agents  Would not resume glipizide on discharge   · Sliding scale insulin, Accu-Cheks  · Patient was not eating and hypoglycemic yesterday - oral agents were discontinued  ? 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 and adrenal insufficiency   · Cortisol levels after cosyntropin stim test wnl  · Continue with salt tabs, fluid restriction 1500 mL - Na improving with this, Na 134 this AM  · BMP in a m  · Likely contributing to encephalopathy, see related plans  Has had admissions in the past related to confusion and hyponatremia    Acute lower urinary tract infectionresolved as of 10/17/2019  Assessment & Plan  · UA indicative of UTI  · Currently on IV Rocephin, continue  · She had herrera placed previous admission at [facility] 2/2 urinary retention   · Urine culture reviewed, fairly unremarkable, would not treat this  Monitor off antibiotics  · resolved      VTE Pharmacologic Prophylaxis:   Pharmacologic: Enoxaparin (Lovenox)  Mechanical VTE Prophylaxis in Place: Yes    Patient Centered Rounds: I have performed bedside rounds with nursing staff today      Discussions with Specialists or Other Care Team Provider: RNGAYATRI     Education and Discussions with Family / Patient: patient and daughter over phone    Time Spent for Care: 20 minutes  More than 50% of total time spent on counseling and coordination of care as described above  Current Length of Stay: 3 day(s)    Current Patient Status: Inpatient   Certification Statement: The patient will continue to require additional inpatient hospital stay due to pending placement    Discharge Plan: medically clear, pending placement    Code Status: Level 1 - Full Code      Subjective:   Patient reports to intermittent abdominal pain, lower  No specific a/a factors  She denies n/v  Appetite is okay states "I eat because I have to "  She reports she has not had a BM today     Objective:     Vitals:   Temp (24hrs), Av 7 °F (36 5 °C), Min:97 7 °F (36 5 °C), Max:97 7 °F (36 5 °C)    Temp:  [97 7 °F (36 5 °C)] 97 7 °F (36 5 °C)  HR:  [69-79] 69  Resp:  [17-18] 17  BP: (103-125)/(51-54) 125/54  SpO2:  [96 %-100 %] 98 %  Body mass index is 20 94 kg/m²  Input and Output Summary (last 24 hours): Intake/Output Summary (Last 24 hours) at 10/17/2019 1507  Last data filed at 10/17/2019 1434  Gross per 24 hour   Intake 220 ml   Output 850 ml   Net -630 ml       Physical Exam:     Physical Exam   Constitutional: She is oriented to person, place, and time  No distress  [place]   HENT:   Head: Normocephalic and atraumatic  Eyes: EOM are normal  No scleral icterus  Neck: Normal range of motion  Neck supple  Cardiovascular: Normal rate and regular rhythm  Pulmonary/Chest: Effort normal and breath sounds normal  No respiratory distress  Abdominal: Soft  Bowel sounds are normal  She exhibits no distension  There is no tenderness  Musculoskeletal: Normal range of motion  She exhibits no edema  Neurological: She is alert and oriented to person, place, and time  No cranial nerve deficit  Skin: Skin is warm and dry  She is not diaphoretic  Psychiatric: She has a normal mood and affect  Her behavior is normal    Vitals reviewed        Additional Data:     Labs:    Results from last 7 days Lab Units 10/17/19  0458  10/15/19  0923   WBC Thousand/uL 4 36   < > 6 18   HEMOGLOBIN g/dL 8 4*   < > 8 9*   HEMATOCRIT % 26 7*   < > 28 8*   PLATELETS Thousands/uL 424*   < > 394*   NEUTROS PCT %  --   --  81*   LYMPHS PCT %  --   --  12*   MONOS PCT %  --   --  6   EOS PCT %  --   --  0    < > = values in this interval not displayed  Results from last 7 days   Lab Units 10/17/19  0458  10/15/19  0923   SODIUM mmol/L 134*   < > 128*   POTASSIUM mmol/L 4 1   < > 4 8   CHLORIDE mmol/L 105   < > 99*   CO2 mmol/L 20*   < > 21   BUN mg/dL 20   < > 15   CREATININE mg/dL 0 56*   < > 0 59*   ANION GAP mmol/L 9   < > 8   CALCIUM mg/dL 8 7   < > 8 6   ALBUMIN g/dL  --   --  2 2*   TOTAL BILIRUBIN mg/dL  --   --  0 27   ALK PHOS U/L  --   --  58   ALT U/L  --   --  16   AST U/L  --   --  26   GLUCOSE RANDOM mg/dL 117   < > 93    < > = values in this interval not displayed  Results from last 7 days   Lab Units 10/17/19  1033 10/17/19  0605 10/16/19  2101 10/16/19  1627 10/16/19  1030 10/16/19  0546 10/15/19  2201 10/15/19  2053 10/15/19  1637 10/15/19  1608 10/15/19  1423 10/15/19  1308   POC GLUCOSE mg/dl 104 121 172* 211* 179* 130 138 56* 181* 41* 114 67     Results from last 7 days   Lab Units 10/15/19  0923   HEMOGLOBIN A1C % 6 4*     Results from last 7 days   Lab Units 10/16/19  0440   PROCALCITONIN ng/ml 0 07           * I Have Reviewed All Lab Data Listed Above  * Additional Pertinent Lab Tests Reviewed: All Labs Within Last 24 Hours Reviewed    Imaging:    Imaging Reports Reviewed Today Include: none  Imaging Personally Reviewed by Myself Includes:  none    Recent Cultures (last 7 days):     Results from last 7 days   Lab Units 10/15/19  1435 10/14/19  2009   BLOOD CULTURE   --  No Growth at 48 hrs  No Growth at 48 hrs     URINE CULTURE  1101-5040 cfu/ml Gram Negative Nirav*  --        Last 24 Hours Medication List:     Current Facility-Administered Medications:  acetaminophen 650 mg Oral Q6H PRN Matty Finney MD   aluminum-magnesium hydroxide-simethicone 30 mL Oral Q6H PRN Matty Finney MD   amLODIPine 10 mg Oral Daily Matty Finney MD   atenolol 50 mg Oral BID Matty Finney MD   docusate sodium 100 mg Oral BID PRN Matty Finney MD   enoxaparin 40 mg Subcutaneous Daily Matty Finney MD   insulin lispro 1-5 Units Subcutaneous HS Matty Finney MD   insulin lispro 1-6 Units Subcutaneous TID AC Matty Finney MD   lisinopril 40 mg Oral Daily Matty Finney MD   ondansetron 4 mg Intravenous Q6H PRN Matty Finney MD   pantoprazole 40 mg Oral Early Morning Matty Finney MD   pravastatin 40 mg Oral Daily With Claudette Darnel, MD   sodium chloride 2 g Oral BID With Meals Matty Finney MD        Today, Patient Was Seen By: Tere Portillo PA-C    ** Please Note: Dictation voice to text software may have been used in the creation of this document   **

## 2019-10-17 NOTE — ASSESSMENT & PLAN NOTE
· Patient has Rapp, this was placed on recent admission at [address] where she developed urinary retention    · Patient has follow-up with Urology as an outpatient in about 1 week  · Continue with Rapp for now, would recommend voiding trial once patient is more ambulatory

## 2019-10-17 NOTE — SPEECH THERAPY NOTE
Speech Language/Pathology    Speech/Language Pathology Progress Note    Patient Name: [place]  Today's Date: 10/17/2019     Attempted to see pt for dysphagia tx  Pt just finished lunch reporting she is receiving all foods/items she desires and does not currently have her dentures here therefore does not want to try hard solids with SLP for possible upgrade  Pt reports she is comfortable on current diet thus no further skilled ST warranted at this time  Please re-consult if pt begins displaying increased s/s of dysphagia or aspiration             Natalia ALDRIDGE  CCC-SLP  Speech-Language Pathologist  Available via [employer]   PA #[id number]  [place] #[id number]

## 2019-10-17 NOTE — PLAN OF CARE
Problem: PHYSICAL THERAPY ADULT  Goal: Performs mobility at highest level of function for planned discharge setting  See evaluation for individualized goals  Description  Treatment/Interventions: Functional transfer training, LE strengthening/ROM, Therapeutic exercise, Endurance training, Patient/family training, Equipment eval/education, Bed mobility, Gait training, Spoke to nursing  Equipment Recommended: Walker(current use of RW for mobility)       See flowsheet documentation for full assessment, interventions and recommendations  Outcome: Progressing  Note:   Prognosis: Fair  Problem List: Decreased strength, Decreased range of motion, Decreased endurance, Impaired balance, Decreased mobility, Decreased coordination, Decreased safety awareness, Pain, Orthopedic restrictions, Decreased skin integrity  Assessment: Pt  is an [de-identified] yo F who presents with Metabolic encephalopathy  Pt  was identified with full name and birthdate  Pt  agreeable to PT session  Pt  Demonstrates increased functional independence throughout session as well as increased activity tolerance as evidenced by increase ambulation distances this session  Pt  Is progressing towards goals however progress is slowed secondary to limited endurance at this time  Pt  Will benefit from continued skilled therapy to increase functional independence and aid patient in return to PLOF with decreased burden of care  D/C recommendation is rehab  Recommendation: Short-term skilled PT(return to rehab facility upon D/C )     PT - OK to Discharge: Yes    See flowsheet documentation for full assessment

## 2019-10-17 NOTE — PHYSICAL THERAPY NOTE
PHYSICAL THERAPY Treatment NOTE    Patient Name: [place]  LTXCL'C Date: 10/17/2019        10/17/19 1119   Pain Assessment   Pain Assessment 0-10   Pain Score No Pain  (Simultaneous filing  User may not have seen previous data )   Restrictions/Precautions   Weight Bearing Precautions Per Order No   Other Precautions Contact/isolation   General   Chart Reviewed Yes   Additional Pertinent History Pt  is an [de-identified] yo F who presents with Metabolic encephalopathy  Family/Caregiver Present No   Cognition   Overall Cognitive Status Impaired   Arousal/Participation Cooperative;Lethargic   Attention Attends with cues to redirect   Orientation Level Oriented to person;Oriented to place; Disoriented to time;Disoriented to situation   Memory Decreased short term memory;Decreased recall of recent events   Following Commands Follows one step commands with increased time or repetition   Comments Pt  was identified with full name and birthdate   Subjective   Subjective Pt  agreeable to PT session   Bed Mobility   Supine to Sit 4  Minimal assistance   Additional items Assist x 1; Increased time required;LE management;Verbal cues  (for sequencing)   Sit to Supine Unable to assess   Additional Comments Pt  performs bed mobility with minAx1 requiring increased time to perform and HOB elevated with VCs for sequencing, and LE managment for ease of transition   Transfers   Sit to Stand 4  Minimal assistance   Additional items Assist x 1; Increased time required;Verbal cues  (for hand placement and sequencing)   Stand to Sit 4  Minimal assistance   Additional items Assist x 1; Impulsive;Verbal cues  (to reach back to control descent)   Additional Comments Pt  performed sit<>stand transfers with increased time to stand and impulsive to return to sitting with VCs for hand placement and sequencing, and to reach back to control descent   Ambulation/Elevation Gait pattern Improper Weight shift;Narrow ROSAS; Forward Flexion;Decreased foot clearance;Shuffling; Inconsistent demetrius; Short stride; Step to;Excessively slow   Gait Assistance 4  Minimal assist   Additional items Assist x 1;Verbal cues  (for hand placement and sequecning)   Assistive Device Rolling walker   Distance 75'x1   Balance   Static Sitting Fair +   Dynamic Sitting Fair   Static Standing Fair   Dynamic Standing Fair -   Ambulatory Poor +   Endurance Deficit   Endurance Deficit Yes   Endurance Deficit Description postural and gait degradation noted with fatigue   Activity Tolerance   Activity Tolerance Patient limited by fatigue   Nurse Made Aware Spoke to RN   Assessment   Prognosis Fair   Problem List Decreased strength;Decreased range of motion;Decreased endurance; Impaired balance;Decreased mobility; Decreased coordination;Decreased safety awareness;Pain;Orthopedic restrictions;Decreased skin integrity   Assessment Pt  is an [de-identified] yo F who presents with Metabolic encephalopathy  Pt  was identified with full name and birthdate  Pt  agreeable to PT session  Pt  Demonstrates increased functional independence throughout session as well as increased activity tolerance as evidenced by increase ambulation distances this session  Pt  Is progressing towards goals however progress is slowed secondary to limited endurance at this time  Pt  Will benefit from continued skilled therapy to increase functional independence and aid patient in return to OF with decreased burden of care  D/C recommendation is rehab  Goals   Patient Goals to get moving   [facility] Expiration Date 10/26/19   PT Treatment Day 1   Plan   Treatment/Interventions Functional transfer training;LE strengthening/ROM; Therapeutic exercise; Endurance training;Cognitive reorientation;Patient/family training;Equipment eval/education; Bed mobility;Gait training;Spoke to nursing;Spoke to case management   Progress Slow progress, decreased activity tolerance   PT Frequency 2-3x/wk   Recommendation   Recommendation Short-term skilled PT  (return to rehab facility upon D/C )   Equipment Recommended Walker   PT - OK to Discharge Yes   Additional Comments to rehab when medically appropriate       Erum Jimenez, PT, DPT 10/17/2019

## 2019-10-17 NOTE — ASSESSMENT & PLAN NOTE
Lab Results   Component Value Date    HGBA1C 6 4 (H) 10/15/2019     Recent Labs     10/16/19  1627 10/16/19  2101 10/17/19  0605 10/17/19  1033   POCGLU 211* 172* 121 104       Blood Sugar Average: Last 72 hrs:  (P) 120 [id number]     · Will hold oral agents  Would not resume glipizide on discharge   · Sliding scale insulin, Accu-Cheks  · Patient was not eating and hypoglycemic yesterday - oral agents were discontinued  ? adrenal insufficiency may be contributing to symptomatology as she has history of this  · Cosyntropin stim test with adequate response in cortisol levels, endocrinology input appreciated, hydrocortisone stopped  · Blood sugars improved today with improved oral intake, will continue with SSI for now

## 2019-10-17 NOTE — SOCIAL WORK
CM left message for daughter, Noah Bray [phone] to discuss snf choices for STR  She returned call to provide 3 choices  First choice is [facility], second choice is [facility] and third choice is [facility]  Please advise when pt will be stable for d/c  Referrals placed in [facility] for same  Pt is a bundle

## 2019-10-17 NOTE — PROGRESS NOTES
Progress Note - Pam Oliveira [de-identified] y o  female MRN: [id number]    Unit/Bed#: -01 Encounter: [id number]      Assessment/Plan:  1  Encephalopathy  History of encephalopathy with previous hospitalizations  Alert and orient x3 at baseline  Multifactorial  UTI and hyponatremia contributing  Sodium 134, improved  Blood sugar 117  Provide frequent redirection, reorientation, distraction techniques  Avoid deliriogenic medications such as tramadol, benzodiazepines, anticholinergics,  Benadryl  Treat pain, See geriatric pain protocol  Monitor for constipation and urinary retention  Encourage early and frequent moblization, OOB  Encourage Hydration/ Nutrition  Implement sleep hygiene, limit night time interuptions, group activities    2  Adrenal insufficiency  Patient evaluated by endocrine  Per endocrine patient does not have adrenal insufficiency  No need for hydrocortisone    3  Hyponatremia  Chronic  Patient seen by endocrine  Patient previously seen by Nephrology in 2017  Continue salt tablets and fluid restriction    4  Urinary retention  History of urinary retention previous admissions, was discharged with Rapp  Urinary retention protocol    5  Deconditioning  Patient was at rehab prior to admission  PT/OT  Nutrition consult  Albumin 2 2    6  Diabetes mellitus 2  With history of hypoglycemia  Endocrine recommend d/c of sulfonylurea as inpatient    7  Cognitive impairment or may ruled out  Unable to perform mini cog secondary to 1  Hyponatremia improved  TSH within normal limits  Recommend check vitamin B12 and folate levels  Patient takes supplemental vitamin B12 as an outpatient  Recommend follow-up at [facility] for formal comprehensive assessment and supportive resources    8  Hearing impairment  Speak loudly clearly  Enunciate words     9  UTI  Primary team managing  Monitor off antibiotics                Subjective:   Upon exam patient is in bed  She is alert and oriented x3  She is uncertain why she is in the hospital   She states she did not sleep well last night  She states that she ate her lunch  Objective:     Vitals: Blood pressure 125/54, pulse 69, temperature 97 7 °F (36 5 °C), resp  rate 17, height 5' 2" (1 575 m), weight 51 9 kg (114 lb 8 oz), SpO2 98 %  ,Body mass index is 20 94 kg/m²  Intake/Output Summary (Last 24 hours) at 10/17/2019 1232  Last data filed at 10/17/2019 0836  Gross per 24 hour   Intake 220 ml   Output 600 ml   Net -380 ml       Physical Exam:   General : NAD  HEENT : MMM   Heart : Normal rate, no murmur rub or gallop  Lungs : CTA no wheezes, rales or rhonchi  Abdomen : Soft, NT/ND, BS auscultated in all 4 quads  Ext :  no edema  Skin : Pink, warm, dry, age appropriate turgor and mobility  Neuro : Nonfocal  Psych : Alert and O x 3      Invasive Devices     Peripheral Intravenous Line            Peripheral IV 10/14/19 Left Antecubital 2 days          Drain            Urethral Catheter Straight-tip 16 Fr  2 days                Lab, Imaging and other studies: I have personally reviewed pertinent reports      VTE Pharmacologic Prophylaxis: Sequential compression device (Venodyne)   VTE Mechanical Prophylaxis: sequential compression device

## 2019-10-17 NOTE — ASSESSMENT & PLAN NOTE
· POA  Likely in setting of hyponatremia and possible urinary tract infection  · Urine culture unremarkable - monitor off antibiotics at this time  · Has had admissions in the past related to confusion and hyponatremia  · Correct sodium with fluid restriction and salt tabs, see related plans  · Neuro checks   · She has hx of adrenal insufficiency, previously on steroids and then tapered off  She follows with Endocrinology as outpatient      · Cosyntropin stim test with adequate response in cortisol, hydrocortisone discontinued, she does not have adrenal insufficiency   · Mental status improved with correction of Na, 134 today

## 2019-10-18 ENCOUNTER — APPOINTMENT (INPATIENT)
Dept: RADIOLOGY | Facility: HOSPITAL | Age: 79
DRG: 545 | End: 2019-10-18
Payer: MEDICARE

## 2019-10-18 PROBLEM — R06.82 TACHYPNEA: Status: ACTIVE | Noted: 2019-10-18

## 2019-10-18 PROBLEM — E43 SEVERE PROTEIN-CALORIE MALNUTRITION (HCC): Status: ACTIVE | Noted: 2019-10-18

## 2019-10-18 LAB
ANION GAP SERPL CALCULATED.3IONS-SCNC: 7 MMOL/L (ref 4–13)
BUN SERPL-MCNC: 15 MG/DL (ref 5–25)
CALCIUM SERPL-MCNC: 8.8 MG/DL (ref 8.3–10.1)
CHLORIDE SERPL-SCNC: 105 MMOL/L (ref 100–108)
CO2 SERPL-SCNC: 17 MMOL/L (ref 21–32)
CREAT SERPL-MCNC: 0.61 MG/DL (ref 0.6–1.3)
ERYTHROCYTE [DISTWIDTH] IN BLOOD BY AUTOMATED COUNT: 17.9 % (ref 11.6–15.1)
GFR SERPL CREATININE-BSD FRML MDRD: 86 ML/MIN/1.73SQ M
GLUCOSE SERPL-MCNC: 108 MG/DL (ref 65–140)
GLUCOSE SERPL-MCNC: 109 MG/DL (ref 65–140)
GLUCOSE SERPL-MCNC: 122 MG/DL (ref 65–140)
GLUCOSE SERPL-MCNC: 130 MG/DL (ref 65–140)
GLUCOSE SERPL-MCNC: 96 MG/DL (ref 65–140)
HCT VFR BLD AUTO: 34.5 % (ref 34.8–46.1)
HGB BLD-MCNC: 10.5 G/DL (ref 11.5–15.4)
MCH RBC QN AUTO: 23.1 PG (ref 26.8–34.3)
MCHC RBC AUTO-ENTMCNC: 30.4 G/DL (ref 31.4–37.4)
MCV RBC AUTO: 76 FL (ref 82–98)
PLATELET # BLD AUTO: 411 THOUSANDS/UL (ref 149–390)
PMV BLD AUTO: 9.7 FL (ref 8.9–12.7)
POTASSIUM SERPL-SCNC: 5 MMOL/L (ref 3.5–5.3)
RBC # BLD AUTO: 4.55 MILLION/UL (ref 3.81–5.12)
SODIUM SERPL-SCNC: 129 MMOL/L (ref 136–145)
WBC # BLD AUTO: 4.39 THOUSAND/UL (ref 4.31–10.16)

## 2019-10-18 PROCEDURE — 99232 SBSQ HOSP IP/OBS MODERATE 35: CPT | Performed by: PHYSICIAN ASSISTANT

## 2019-10-18 PROCEDURE — 85027 COMPLETE CBC AUTOMATED: CPT | Performed by: PHYSICIAN ASSISTANT

## 2019-10-18 PROCEDURE — 82948 REAGENT STRIP/BLOOD GLUCOSE: CPT

## 2019-10-18 PROCEDURE — 80048 BASIC METABOLIC PNL TOTAL CA: CPT | Performed by: PHYSICIAN ASSISTANT

## 2019-10-18 PROCEDURE — 97530 THERAPEUTIC ACTIVITIES: CPT

## 2019-10-18 PROCEDURE — 71046 X-RAY EXAM CHEST 2 VIEWS: CPT

## 2019-10-18 RX ADMIN — ENOXAPARIN SODIUM 40 MG: 40 INJECTION SUBCUTANEOUS at 10:50

## 2019-10-18 RX ADMIN — LISINOPRIL 40 MG: 20 TABLET ORAL at 10:48

## 2019-10-18 RX ADMIN — SODIUM CHLORIDE TAB 1 GM 2 G: 1 TAB at 17:30

## 2019-10-18 RX ADMIN — ATENOLOL 50 MG: 50 TABLET ORAL at 10:52

## 2019-10-18 RX ADMIN — SODIUM CHLORIDE TAB 1 GM 2 G: 1 TAB at 07:01

## 2019-10-18 RX ADMIN — PANTOPRAZOLE SODIUM 40 MG: 40 TABLET, DELAYED RELEASE ORAL at 07:00

## 2019-10-18 RX ADMIN — AMLODIPINE BESYLATE 10 MG: 10 TABLET ORAL at 10:52

## 2019-10-18 RX ADMIN — ATENOLOL 50 MG: 50 TABLET ORAL at 17:30

## 2019-10-18 RX ADMIN — PRAVASTATIN SODIUM 40 MG: 20 TABLET ORAL at 17:30

## 2019-10-18 NOTE — ASSESSMENT & PLAN NOTE
· Patient has Rapp, this was placed on recent admission at [facility] where she developed urinary retention    · Patient has follow-up with Urology as an outpatient in about 1 week  · Continue with Rapp for now, would recommend voiding trial once patient is more ambulatory

## 2019-10-18 NOTE — PROGRESS NOTES
[facility]  Progress Note - [place] 1940, [de-identified] y o  female MRN: [id number]    Unit/Bed#: -01 Encounter: [id number]    Primary Care Provider: Chelsey Gordon MD   Date and time admitted to hospital: 10/14/2019  4:20 PM      * Metabolic encephalopathy  Assessment & Plan  · POA  Likely in setting of hyponatremia and possible urinary tract infection  · Urine culture unremarkable - monitor off antibiotics at this time  · Has had admissions in the past related to confusion and hyponatremia  · Correct sodium with fluid restriction and salt tabs, see related plans  · Neuro checks   · She has hx of adrenal insufficiency, previously on steroids and then tapered off  She follows with Endocrinology as outpatient  · Cosyntropin stim test with adequate response in cortisol, hydrocortisone discontinued, she does not have adrenal insufficiency   · Mental status improved with correction of Na - however now today at 129, and mental status declined again  Tachypnea  Assessment & Plan  · Mild tachypnea noted, pt denies any complaints  · SpO2 wnl on room air  · Will check CXR    Severe protein-calorie malnutrition (HCC)  Assessment & Plan  Malnutrition Findings:   Malnutrition type: Chronic illness(Related to medical condition as evidenced by 10% weight loss over the past month and <75% energy intake needs met >1 month treated with ensure compact supplements)  Degree of Malnutrition: Other severe protein calorie malnutrition    BMI Findings: Body mass index is 21 13 kg/m²  Urinary retention  Assessment & Plan  · Patient has Rapp, this was placed on recent admission at [facility] where she developed urinary retention    · Patient has follow-up with Urology as an outpatient in about 1 week  · Continue with Rapp for now, would recommend voiding trial once patient is more ambulatory    Failure to thrive in adult  Assessment & Plan  · Since September she has had a progressive decline - she has had no appetite, not really eating/drinking with weakness  She was previously reportedly very independent prior to her admission at [facility] in September with UTI  · Geriatrics evaluation appreciated   · Of note, she improved significantly with correction of Na however this dropped again today and today she is less alert and not coordinated     HLD (hyperlipidemia)  Assessment & Plan  · Continue statin    Essential hypertension  Assessment & Plan  · Will continue with enalapril/lisinopril, atenolol and amlodipine   · Monitor blood pressure    Type 2 diabetes mellitus with diabetic neuropathy, without long-term current use of insulin [facility])  Assessment & Plan  Lab Results   Component Value Date    HGBA1C 6 4 (H) 10/15/2019     Recent Labs     10/17/19  2142 10/18/19  0625 10/18/19  1028 10/18/19  1548   POCGLU 145* 108 122 109       Blood Sugar Average: Last 72 hrs:  (P) 119 [id number]     · Will hold oral agents  Would not resume glipizide on discharge   · Sliding scale insulin, Accu-Cheks  · ?adrenal insufficiency may be contributing to symptomatology as she has history of this  · Cosyntropin stim test with adequate response in cortisol levels, endocrinology input appreciated, hydrocortisone stopped  · Blood sugars improved today with improved oral intake, will continue with SSI for now - resume metformin only on discharge    Hyponatremia  Assessment & Plan  · Patient has longstanding history of hyponatremia, 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 and adrenal insufficiency   · Cortisol levels after cosyntropin stim test wnl  · Continue with salt tabs, fluid restriction 1500 mL - Na improving with this 126-->134, however Na today 129  · BMP in a m      · Likely contributing to encephalopathy as her mental status improved with correction of Na and today again with trending down she appears to be not as alert   · Will consult nephrology for assistance     VTE Pharmacologic Prophylaxis: Pharmacologic: Enoxaparin (Lovenox)  Mechanical VTE Prophylaxis in Place: Yes    Patient Centered Rounds: I have performed bedside rounds with nursing staff today  Discussions with Specialists or Other Care Team Provider: RNGAYATRI     Education and Discussions with Family / Patient: patient, daughter     Time Spent for Care: 30 minutes  More than 50% of total time spent on counseling and coordination of care as described above  Current Length of Stay: 4 day(s)    Current Patient Status: Inpatient   Certification Statement: The patient will continue to require additional inpatient hospital stay due to hyponatremia needs monitoring and correction    Discharge Plan: when Na improved and stable     Code Status: Level 1 - Full Code      Subjective:   Patient has no acute complaints she denies pain  She has no appetite and does not want to eat  She appears clumsy with the utensils  She is alert and oriented to person, place, president  Can tell me her daughters name  Objective:     Vitals:   Temp (24hrs), Av 6 °F (37 6 °C), Min:99 3 °F (37 4 °C), Max:99 8 °F (37 7 °C)    Temp:  [99 3 °F (37 4 °C)-99 8 °F (37 7 °C)] 99 8 °F (37 7 °C)  HR:  [42-80] 75  Resp:  [18-25] 25  BP: (110-131)/(51-61) 130/61  SpO2:  [94 %-100 %] 96 %  Body mass index is 21 13 kg/m²  Input and Output Summary (last 24 hours): Intake/Output Summary (Last 24 hours) at 10/18/2019 1824  Last data filed at 10/18/2019 0915  Gross per 24 hour   Intake 120 ml   Output 500 ml   Net -380 ml       Physical Exam:     Physical Exam   Constitutional: She is oriented to person, place, and time  No distress  Alert, slow to answer questions, appears clumsy with utensils   HENT:   Head: Normocephalic and atraumatic  Eyes: EOM are normal    Neck: Neck supple  No thyromegaly present  Cardiovascular: Normal rate and regular rhythm  Pulmonary/Chest: Effort normal and breath sounds normal    Mild tachypnea   Abdominal: Soft   Bowel sounds are normal  She exhibits no distension  There is no tenderness  Musculoskeletal: Normal range of motion  Neurological: She is alert and oriented to person, place, and time  No cranial nerve deficit  Skin: She is not diaphoretic  Vitals reviewed  Additional Data:     Labs:    Results from last 7 days   Lab Units 10/18/19  0532  10/15/19  0923   WBC Thousand/uL 4 39   < > 6 18   HEMOGLOBIN g/dL 10 5*   < > 8 9*   HEMATOCRIT % 34 5*   < > 28 8*   PLATELETS Thousands/uL 411*   < > 394*   NEUTROS PCT %  --   --  81*   LYMPHS PCT %  --   --  12*   MONOS PCT %  --   --  6   EOS PCT %  --   --  0    < > = values in this interval not displayed  Results from last 7 days   Lab Units 10/18/19  0532  10/15/19  0923   SODIUM mmol/L 129*   < > 128*   POTASSIUM mmol/L 5 0   < > 4 8   CHLORIDE mmol/L 105   < > 99*   CO2 mmol/L 17*   < > 21   BUN mg/dL 15   < > 15   CREATININE mg/dL 0 61   < > 0 59*   ANION GAP mmol/L 7   < > 8   CALCIUM mg/dL 8 8   < > 8 6   ALBUMIN g/dL  --   --  2 2*   TOTAL BILIRUBIN mg/dL  --   --  0 27   ALK PHOS U/L  --   --  58   ALT U/L  --   --  16   AST U/L  --   --  26   GLUCOSE RANDOM mg/dL 96   < > 93    < > = values in this interval not displayed  Results from last 7 days   Lab Units 10/18/19  1548 10/18/19  1028 10/18/19  0625 10/17/19  2142 10/17/19  1628 10/17/19  1033 10/17/19  0605 10/16/19  2101 10/16/19  1627 10/16/19  1030 10/16/19  0546 10/15/19  2201   POC GLUCOSE mg/dl 109 122 108 145* 97 104 121 172* 211* 179* 130 138     Results from last 7 days   Lab Units 10/15/19  0923   HEMOGLOBIN A1C % 6 4*     Results from last 7 days   Lab Units 10/16/19  0440   PROCALCITONIN ng/ml 0 07           * I Have Reviewed All Lab Data Listed Above  * Additional Pertinent Lab Tests Reviewed:  All Labs Within Last 24 Hours Reviewed    Imaging:    Imaging Reports Reviewed Today Include: none  Imaging Personally Reviewed by Myself Includes:  none    Recent Cultures (last 7 days): Results from last 7 days   Lab Units 10/15/19  1435 10/14/19  2009   BLOOD CULTURE   --  No Growth at 72 hrs  No Growth at 72 hrs  URINE CULTURE  0729-1927 cfu/ml Gram Negative Nirav*  --        Last 24 Hours Medication List:     Current Facility-Administered Medications:  acetaminophen 650 mg Oral Q6H PRN Drea Mcguire, MD   aluminum-magnesium hydroxide-simethicone 30 mL Oral Q6H PRN Drea Mcguire, MD   amLODIPine 10 mg Oral Daily Thirza Ground, MD   atenolol 50 mg Oral BID Thirza Ground, MD   docusate sodium 100 mg Oral BID PRN Thirza Ground, MD   enoxaparin 40 mg Subcutaneous Daily Normza Maynor, MD   insulin lispro 1-5 Units Subcutaneous HS Drea Mcguire, MD   insulin lispro 1-6 Units Subcutaneous TID AC Drea Mcguire, MD   lisinopril 40 mg Oral Daily Thirza Maynor, MD   ondansetron 4 mg Intravenous Q6H PRN Drea Mcguire, MD   pantoprazole 40 mg Oral Early Morning Drea Mcguire, MD   pravastatin 40 mg Oral Daily With Karen Drew MD   sodium chloride 2 g Oral BID With Meals Drea Mcguire MD        Today, Patient Was Seen By: Sara Mendoza PA-C    ** Please Note: Dictation voice to text software may have been used in the creation of this document   **

## 2019-10-18 NOTE — PLAN OF CARE
Problem: PHYSICAL THERAPY ADULT  Goal: Performs mobility at highest level of function for planned discharge setting  See evaluation for individualized goals  Description  Treatment/Interventions: Functional transfer training, LE strengthening/ROM, Therapeutic exercise, Endurance training, Patient/family training, Equipment eval/education, Bed mobility, Gait training, Spoke to nursing  Equipment Recommended: Walker(current use of RW for mobility)       See flowsheet documentation for full assessment, interventions and recommendations  Outcome: Progressing  Note:   Prognosis: Fair  Problem List: Decreased strength, Decreased range of motion, Decreased endurance, Impaired balance, Decreased mobility, Decreased coordination, Decreased safety awareness, Pain, Decreased cognition, Impaired judgement  Assessment: Pt  is an [de-identified] yo F Who presents with metabolic encephalopathy  Pt  was identified with full name and birthdate  Pt  agreeable to Pt session  Pt  demonstrated worsening need for assistance with functional mobility today as compared to yesterday  Pt  Command following variable due to cognition  Pt  Is progressing towards goals however progress is slow 2/2 to cognitive deficits  Pt  Will benefit from continued skilled therapy to increase functional independence and aid patient in return to PLOF with decreased burden of care  Recommendation: Short-term skilled PT(return to rehab facility)     PT - OK to Discharge: Yes    See flowsheet documentation for full assessment

## 2019-10-18 NOTE — MALNUTRITION/BMI
This medical record reflects one or more clinical indicators suggestive of malnutrition     Malnutrition Findings:   Malnutrition type: Chronic illness(Related to medical condition as evidenced by 10% weight loss over the past month and <75% energy intake needs met >1 month treated with ensure compact supplements)  Degree of Malnutrition: Other severe protein calorie malnutrition  Malnutrition Characteristics: Inadequate energy, Weight loss        See Nutrition note dated 10/18/19 for additional details  Completed nutrition assessment is viewable in the nutrition documentation

## 2019-10-18 NOTE — SOCIAL WORK
CM unable to reach daughter, Gwendolyn Guzmán all day to day regarding dcp  2 messages were left by this  and messages were left by admissions at [facility]  I s/w Yanet Holder again at [facility] at 2:45pm and she informed me she cannot accept pt today due to late time  Daughter returned call at 3:05pm stating that she was in a meeting in [place] and had noticed missed calls but had not received any messages  CM informed her that [address] [place] [address] would accept but did not have any beds until next week, [facility] which was 3rd choice, would accept patient today  Daughter states that [facility] is too far away  A post acute care recommendation was made by your care team for STR  Discussed Freedom of Choice with caregiver  List of facilities given to caregiver via phone  Daughter aware the list is custom filtered for them by zip code location and that [facility] post acute providers are designated  She chose [facility] and [facility] accepted for d/c today  Daughter will transport  IMM discussed over phone

## 2019-10-18 NOTE — SOCIAL WORK
Rishi Truong from [facility] requested PASSR be completed  This was done and sent via [facility]  She is looking into how many rehab days patient has as she was at [facility]  They need to s/w daughter, Dia Mehta and have left message for her regarding same  I left message for dtr to call Rishi Truong [phone]  Medically stable for d/c today  Must determine how pt can travel  I informed pt of dcp  She is agreeable

## 2019-10-18 NOTE — ASSESSMENT & PLAN NOTE
Malnutrition Findings:   Malnutrition type: Chronic illness(Related to medical condition as evidenced by 10% weight loss over the past month and <75% energy intake needs met >1 month treated with ensure compact supplements)  Degree of Malnutrition: Other severe protein calorie malnutrition    BMI Findings: Body mass index is 21 13 kg/m²

## 2019-10-18 NOTE — PHYSICAL THERAPY NOTE
PHYSICAL THERAPY Treatment NOTE    Patient Name: [place]  BSMWG'D Date: 10/18/2019        10/18/19 1716   Pain Assessment   Pain Assessment 0-10   Pain Score No Pain   Restrictions/Precautions   Weight Bearing Precautions Per Order No   Other Precautions Contact/isolation   General   Chart Reviewed Yes   Additional Pertinent History Pt  is an [de-identified] yo F Who presents with metabolic encephalopathy  Family/Caregiver Present No   Cognition   Overall Cognitive Status Impaired   Arousal/Participation Persistent stimuli required;Poorly responsive   Attention Difficulty attending to directions   Orientation Level Oriented to person;Disoriented to place; Disoriented to time;Disoriented to situation   Memory Decreased recall of recent events;Decreased recall of precautions   Following Commands Follows multistep commands with increased time or repetition   Comments Pt  was identified with full name and birthdate   Subjective   Subjective Pt  agreeable to Pt session   Bed Mobility   Supine to Sit 3  Moderate assistance   Additional items Assist x 1;HOB elevated; Bedrails; Increased time required;LE management;Verbal cues  (for sequencing)   Sit to Supine Unable to assess   Additional Comments Pt  performs bed mobility with MOdAx1 requiring HOB elevated with use of bedrails and increased time/LE management for ease of transition  Pt  tolerated static sitting at EOB x 5 mins with minAx1 to maintain appropriate position  Transfers   Sit to Stand 3  Moderate assistance   Additional items Assist x 1; Increased time required;Verbal cues  (for hand placement and sequencing)   Stand to Sit 3  Moderate assistance   Additional items Assist x 1; Impulsive;Verbal cues  (to reach back to control descent)   Stand pivot 3  Moderate assistance   Additional items Assist x 1; Increased time required;Verbal cues  (for sequencing)   Additional Comments Pt  performed sit<>stands with VCs for hand placement and sequencing, and to reach back to control descent, limited carryover of VCs noted   Balance   Static Sitting Fair +   Dynamic Sitting Fair   Static Standing Fair   Dynamic Standing Fair -   Ambulatory Poor +   Endurance Deficit   Endurance Deficit Yes   Endurance Deficit Description postural degradation noted with famamigue in static standing   Activity Tolerance   Activity Tolerance Patient limited by fatigue   Nurse Made Aware Spoke to RN   Assessment   Prognosis Fair   Problem List Decreased strength;Decreased range of motion;Decreased endurance; Impaired balance;Decreased mobility; Decreased coordination;Decreased safety awareness;Pain;Decreased cognition; Impaired judgement   Assessment Pt  is an [de-identified] yo F Who presents with metabolic encephalopathy  Pt  was identified with full name and birthdate  Pt  agreeable to Pt session  Pt  demonstrated worsening need for assistance with functional mobility today as compared to yesterday  Pt  Command following variable due to cognition  Pt  Is progressing towards goals however progress is slow 2/2 to cognitive deficits  Pt  Will benefit from continued skilled therapy to increase functional independence and aid patient in return to PLOF with decreased burden of care  Goals   Patient Goals to get out   STG Expiration Date 10/26/19   PT Treatment Day 2   Plan   Treatment/Interventions Functional transfer training;LE strengthening/ROM; Therapeutic exercise; Endurance training;Cognitive reorientation;Patient/family training;Equipment eval/education; Bed mobility;Gait training;Spoke to nursing   Progress Slow progress, cognitive deficits   PT Frequency 2-3x/wk   Recommendation   Recommendation Short-term skilled PT  (return to rehab facility)   Equipment Recommended Caterina Mccarthy   PT - OK to Discharge Yes   Additional Comments to rehab when medically appropriate     Carlos Manuel Romeo, PT, DPT 10/18/2019

## 2019-10-18 NOTE — ASSESSMENT & PLAN NOTE
· Since September she has had a progressive decline - she has had no appetite, not really eating/drinking with weakness    She was previously reportedly very independent prior to her admission at [facility] in September with UTI  · Geriatrics evaluation appreciated   · Of note, she improved significantly with correction of Na however this dropped again today and today she is less alert and not coordinated

## 2019-10-18 NOTE — ASSESSMENT & PLAN NOTE
Lab Results   Component Value Date    HGBA1C 6 4 (H) 10/15/2019     Recent Labs     10/17/19  2142 10/18/19  0625 10/18/19  1028 10/18/19  1548   POCGLU 145* 108 122 109       Blood Sugar Average: Last 72 hrs:  (P) 095 [id number]     · Will hold oral agents  Would not resume glipizide on discharge   · Sliding scale insulin, Accu-Cheks  · ?adrenal insufficiency may be contributing to symptomatology as she has history of this      · Cosyntropin stim test with adequate response in cortisol levels, endocrinology input appreciated, hydrocortisone stopped  · Blood sugars improved today with improved oral intake, will continue with SSI for now - resume metformin only on discharge

## 2019-10-18 NOTE — DISCHARGE INSTRUCTIONS
Continue fluid restriction as directed  Continue to take salt tabs  Stop taking glipizide  Please follow-up with your primary care provider on discharge  Please have repeat blood work (BMP) within one week of discharge to make sure electrolytes/kidney function is stable  This can be ordered by your primary care provider  Hyponatremia   WHAT YOU NEED TO KNOW:   Hyponatremia occurs when the amount of sodium (salt) in your blood is lower than normal  Sodium is an electrolyte (mineral) that helps your muscles, heart, and digestive system work properly  It helps control blood pressure and fluid balance  DISCHARGE INSTRUCTIONS:   Follow up with your healthcare provider as directed: You may need to return for more tests  Write down your questions so you remember to ask them during your visits  Nutrition:  You may need to increase your intake of sodium  Foods that are high in sodium include milk, packaged snacks such as pretzels, or processed meats (malone, sausage, and ham)  Ask your dietitian to help you create a meal plan that is right for you  Liquids: Follow your healthcare provider's advice if you need to limit the amount of liquid you drink  Ask how much liquid to drink each day and which liquids are best for you  You may be asked to drink liquids that have water, sugar, and salt, such as juices, milk, or sports drinks  These liquids help your body hold in fluid and prevent dehydration  Contact your healthcare provider if:   · You have muscle cramps or twitching  · You feel very weak or tired  · You have nausea or are vomiting  · You have questions or concerns about your condition or care  Seek care immediately or call 911 if:   · You have a seizure  · You have an irregular heartbeat  · You have trouble breathing  · You cannot move your arms and legs  · You are confused or cannot think clearly    © 2017 [employer] Information is for End User's use only and may not be sold, redistributed or otherwise used for commercial purposes  All illustrations and images included in CareNotes® are the copyrighted property of A D [employer] M [employer]  or [employer]el Martínez  The above information is an  only  It is not intended as medical advice for individual conditions or treatments  Talk to your doctor, nurse or pharmacist before following any medical regimen to see if it is safe and effective for you  Fluid Restriction   WHAT YOU NEED TO KNOW:   Fluid restriction means that you need to limit the amount of liquids you have each day  Fluid restriction is needed if your body is holding water  This is called fluid retention  Fluid retention can cause health problems, such as tissue and blood vessel damage, long-term swelling, and stress on the heart  You may need to limit the amount of liquids you have each day to less than 1,000 milliliters (mL)  Ask your healthcare provider how much liquid you can have each day  DISCHARGE INSTRUCTIONS:   Follow up with your healthcare provider as directed: Your healthcare provider may recommend that you see a dietitian on a regular basis  A dietitian can help you create a plan to get the right amount of liquid each day  The dietitian can also help you calculate the amount of liquid in the foods you eat  Write down your questions so you remember to ask them during your visits     Amount of liquid in common foods and drinks:  Liquid from both foods and drinks should be counted toward your daily liquid limit:  · 12 ounces (1 can) of soda (332 mL)    · 1 cup of juice (215 mL) or 2% milk (217 mL)    · 6 ounces of coffee (175 mL) or 6 ounces of tea (168 mL)    · 1 cup of gelatin (200 mL)    · 1 single popsicle (45 mL)    · 1 cup of ice cream (100 mL) or sherbet (127 mL)    · 1 cup of yogurt (182 mL) or cottage cheese (185 mL)    · 1 cup of raw peaches, canned in juice (218 mL)    · 1 cup of grapes (120 mL) or berries (130 mL)    · 1 cup of watermelon (140 mL)    · 1 cup of cooked broccoli (170 mL) or creamed corn (200 mL)  Track your liquid intake:  Keep a record of the amount of liquid you get each day  Record the exact amount of liquid in mL  To do this, measure the amount of ounces that your glass holds  Multiply the amount in ounces by 30 to get the amount in mL  For example, a glass may hold 16 ounces of liquid  To get the amount in mL, multiply 16 by 30  The total amount of liquid in this glass is 480 mL  Monitor your weight:  Weigh yourself at the same time every day, with the same scale  Record your weight so you can compare it to your other daily weights  You may be retaining fluid if your weight goes up by more than 2 pounds in 24 hours  If you have a sudden weight loss, you may be dehydrated  Other things to remember about fluid balance:   · Large amounts of sodium from foods can cause fluid retention  Sodium is found in table salt, salted snacks, malone, cheddar cheese, soy sauce, lunch meat, and canned vegetables  Ask your healthcare provider how much sodium you can have each day  · Diuretics are medicines that can help your body get rid of extra fluid  If you take diuretics, follow your healthcare provider's directions  You may become dehydrated if you take too much of this medicine  Contact your healthcare provider if:   · You gain 2 pounds in 1 day  · Your skin is tight and shiny  · You are urinating very little, even though you are regularly drinking liquids  · You have signs of dehydration, such as a headache, dark yellow urine, dry eyes or mouth, or a fast heartbeat  · You have questions or concerns about your condition or care  Seek immediate care or call 911 if:   · You have a severe headache that cannot be relieved with medicine  · You have shortness of breath  · You have chest pain    © 2017 [employer] Information is for End User's use only and may not be sold, redistributed or otherwise used for commercial purposes  All illustrations and images included in CareNotes® are the copyrighted property of [employer]  or Markel Martínez  The above information is an  only  It is not intended as medical advice for individual conditions or treatments  Talk to your doctor, nurse or pharmacist before following any medical regimen to see if it is safe and effective for you

## 2019-10-18 NOTE — ASSESSMENT & PLAN NOTE
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

## 2019-10-18 NOTE — ASSESSMENT & PLAN NOTE
· POA  Likely in setting of hyponatremia and possible urinary tract infection  · Urine culture unremarkable - monitor off antibiotics at this time  · Has had admissions in the past related to confusion and hyponatremia  · Correct sodium with fluid restriction and salt tabs, see related plans  · Neuro checks   · She has hx of adrenal insufficiency, previously on steroids and then tapered off  She follows with Endocrinology as outpatient  · Cosyntropin stim test with adequate response in cortisol, hydrocortisone discontinued, she does not have adrenal insufficiency   · Mental status improved with correction of Na - however now today at 129, and mental status declined again

## 2019-10-19 LAB
ANION GAP SERPL CALCULATED.3IONS-SCNC: 9 MMOL/L (ref 4–13)
BASOPHILS # BLD AUTO: 0.02 THOUSANDS/ΜL (ref 0–0.1)
BASOPHILS NFR BLD AUTO: 0 % (ref 0–1)
BUN SERPL-MCNC: 11 MG/DL (ref 5–25)
CALCIUM SERPL-MCNC: 8.5 MG/DL (ref 8.3–10.1)
CHLORIDE SERPL-SCNC: 102 MMOL/L (ref 100–108)
CO2 SERPL-SCNC: 21 MMOL/L (ref 21–32)
CREAT SERPL-MCNC: 0.53 MG/DL (ref 0.6–1.3)
EOSINOPHIL # BLD AUTO: 0.02 THOUSAND/ΜL (ref 0–0.61)
EOSINOPHIL NFR BLD AUTO: 0 % (ref 0–6)
ERYTHROCYTE [DISTWIDTH] IN BLOOD BY AUTOMATED COUNT: 17.5 % (ref 11.6–15.1)
GFR SERPL CREATININE-BSD FRML MDRD: 90 ML/MIN/1.73SQ M
GLUCOSE SERPL-MCNC: 124 MG/DL (ref 65–140)
GLUCOSE SERPL-MCNC: 130 MG/DL (ref 65–140)
GLUCOSE SERPL-MCNC: 138 MG/DL (ref 65–140)
GLUCOSE SERPL-MCNC: 142 MG/DL (ref 65–140)
GLUCOSE SERPL-MCNC: 159 MG/DL (ref 65–140)
HCT VFR BLD AUTO: 29.4 % (ref 34.8–46.1)
HGB BLD-MCNC: 9.2 G/DL (ref 11.5–15.4)
IMM GRANULOCYTES # BLD AUTO: 0.04 THOUSAND/UL (ref 0–0.2)
IMM GRANULOCYTES NFR BLD AUTO: 1 % (ref 0–2)
LYMPHOCYTES # BLD AUTO: 0.89 THOUSANDS/ΜL (ref 0.6–4.47)
LYMPHOCYTES NFR BLD AUTO: 14 % (ref 14–44)
MCH RBC QN AUTO: 23.3 PG (ref 26.8–34.3)
MCHC RBC AUTO-ENTMCNC: 31.3 G/DL (ref 31.4–37.4)
MCV RBC AUTO: 74 FL (ref 82–98)
MONOCYTES # BLD AUTO: 0.39 THOUSAND/ΜL (ref 0.17–1.22)
MONOCYTES NFR BLD AUTO: 6 % (ref 4–12)
NEUTROPHILS # BLD AUTO: 5.08 THOUSANDS/ΜL (ref 1.85–7.62)
NEUTS SEG NFR BLD AUTO: 79 % (ref 43–75)
NRBC BLD AUTO-RTO: 0 /100 WBCS
NT-PROBNP SERPL-MCNC: 4480 PG/ML
OSMOLALITY UR/SERPL-RTO: 276 MMOL/KG (ref 282–298)
OSMOLALITY UR: 584 MMOL/KG
PLATELET # BLD AUTO: 418 THOUSANDS/UL (ref 149–390)
PMV BLD AUTO: 9.8 FL (ref 8.9–12.7)
POTASSIUM SERPL-SCNC: 4.2 MMOL/L (ref 3.5–5.3)
RBC # BLD AUTO: 3.95 MILLION/UL (ref 3.81–5.12)
SODIUM 24H UR-SCNC: 90 MOL/L
SODIUM SERPL-SCNC: 132 MMOL/L (ref 136–145)
URATE SERPL-MCNC: 4.1 MG/DL (ref 2–6.8)
WBC # BLD AUTO: 6.44 THOUSAND/UL (ref 4.31–10.16)

## 2019-10-19 PROCEDURE — 86334 IMMUNOFIX E-PHORESIS SERUM: CPT | Performed by: INTERNAL MEDICINE

## 2019-10-19 PROCEDURE — 84550 ASSAY OF BLOOD/URIC ACID: CPT | Performed by: INTERNAL MEDICINE

## 2019-10-19 PROCEDURE — 83935 ASSAY OF URINE OSMOLALITY: CPT | Performed by: INTERNAL MEDICINE

## 2019-10-19 PROCEDURE — 99223 1ST HOSP IP/OBS HIGH 75: CPT | Performed by: INTERNAL MEDICINE

## 2019-10-19 PROCEDURE — 83880 ASSAY OF NATRIURETIC PEPTIDE: CPT | Performed by: INTERNAL MEDICINE

## 2019-10-19 PROCEDURE — 83930 ASSAY OF BLOOD OSMOLALITY: CPT | Performed by: INTERNAL MEDICINE

## 2019-10-19 PROCEDURE — 99232 SBSQ HOSP IP/OBS MODERATE 35: CPT | Performed by: INTERNAL MEDICINE

## 2019-10-19 PROCEDURE — 84165 PROTEIN E-PHORESIS SERUM: CPT | Performed by: INTERNAL MEDICINE

## 2019-10-19 PROCEDURE — 85025 COMPLETE CBC W/AUTO DIFF WBC: CPT | Performed by: PHYSICIAN ASSISTANT

## 2019-10-19 PROCEDURE — 84165 PROTEIN E-PHORESIS SERUM: CPT | Performed by: PATHOLOGY

## 2019-10-19 PROCEDURE — 84300 ASSAY OF URINE SODIUM: CPT | Performed by: INTERNAL MEDICINE

## 2019-10-19 PROCEDURE — 80048 BASIC METABOLIC PNL TOTAL CA: CPT | Performed by: PHYSICIAN ASSISTANT

## 2019-10-19 PROCEDURE — 86334 IMMUNOFIX E-PHORESIS SERUM: CPT | Performed by: PATHOLOGY

## 2019-10-19 PROCEDURE — 82948 REAGENT STRIP/BLOOD GLUCOSE: CPT

## 2019-10-19 RX ORDER — AMLODIPINE BESYLATE 10 MG/1
10 TABLET ORAL DAILY
Status: DISCONTINUED | OUTPATIENT
Start: 2019-10-20 | End: 2019-10-21

## 2019-10-19 RX ORDER — LISINOPRIL 20 MG/1
40 TABLET ORAL DAILY
Status: DISCONTINUED | OUTPATIENT
Start: 2019-10-20 | End: 2019-10-22

## 2019-10-19 RX ORDER — AMLODIPINE BESYLATE 5 MG/1
5 TABLET ORAL DAILY
Status: DISCONTINUED | OUTPATIENT
Start: 2019-10-20 | End: 2019-10-19

## 2019-10-19 RX ORDER — ATENOLOL 50 MG/1
50 TABLET ORAL 2 TIMES DAILY
Status: DISCONTINUED | OUTPATIENT
Start: 2019-10-19 | End: 2019-10-20

## 2019-10-19 RX ORDER — AMLODIPINE BESYLATE 10 MG/1
10 TABLET ORAL DAILY
Status: DISCONTINUED | OUTPATIENT
Start: 2019-10-20 | End: 2019-10-19

## 2019-10-19 RX ADMIN — ATENOLOL 50 MG: 50 TABLET ORAL at 08:35

## 2019-10-19 RX ADMIN — PANTOPRAZOLE SODIUM 40 MG: 40 TABLET, DELAYED RELEASE ORAL at 06:17

## 2019-10-19 RX ADMIN — ENOXAPARIN SODIUM 40 MG: 40 INJECTION SUBCUTANEOUS at 08:37

## 2019-10-19 RX ADMIN — AMLODIPINE BESYLATE 10 MG: 10 TABLET ORAL at 08:36

## 2019-10-19 RX ADMIN — SODIUM CHLORIDE TAB 1 GM 2 G: 1 TAB at 17:14

## 2019-10-19 RX ADMIN — SODIUM CHLORIDE TAB 1 GM 2 G: 1 TAB at 08:36

## 2019-10-19 RX ADMIN — LISINOPRIL 40 MG: 20 TABLET ORAL at 08:36

## 2019-10-19 RX ADMIN — PRAVASTATIN SODIUM 40 MG: 20 TABLET ORAL at 17:13

## 2019-10-19 NOTE — ASSESSMENT & PLAN NOTE
· Patient has Rapp, this was placed on recent admission at [facility] where she developed urinary retention    · Patient has follow-up with Urology as an outpatient in about 1 week  · Continue with Rapp for now, would recommend voiding trial once patient is more ambulatory

## 2019-10-19 NOTE — PROGRESS NOTES
Cosmo Jonas RN was notified by the patient's daughter that her mom was complained of upper gastric pain  Phil notified Jules Precise [facility] at 1900

## 2019-10-19 NOTE — PROGRESS NOTES
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 and adrenal insufficiency   · Cortisol levels after cosyntropin stim test wnl  · Presented with sodium of 126, improved to 134, down to 129 and now 130 to today  · Urine sodium on admission 26, urine osmolarity on admission around 500  · TSH normal this admission  · Suspect  related to SIADH  · Continue with salt tabs, fluid restriction 1500 mL  · Await Nephrology input given fluctuation in Na and recurrent admission with hyponatremia  * Metabolic encephalopathy  Assessment & Plan  · POA  Likely in setting of hyponatremia and possible urinary tract infection, could also be related to Delirium with underlying cognitive impairment  · Urine culture unremarkable - monitor off antibiotics at this time  · Has had admissions in the past related to confusion and hyponatremia  · Correct sodium with fluid restriction and salt tabs, see related plans  · Neuro checks   · She has hx of adrenal insufficiency, previously on steroids and then tapered off  She follows with Endocrinology as outpatient  · Cosyntropin stim test with adequate response in cortisol, hydrocortisone discontinued, she does not have adrenal insufficiency   · Mental status waxing and waning  · Vitamin B12 and folate normal   · Noted and appreciate Geriatrics input  Tachypnea  Assessment & Plan  · Now resolved and patient seems comfortable  · SpO2 wnl on room air  · Chest x-ray shows small pleural effusion, compression atelectasis  Consider pneumonia clinically    · Recent echocardiogram in September 2019 at [facility] showed EF of 50-55% with intermediate diastolic dysfunction and mild pulmonary hypertension  · No overt signs of pneumonia and patient denies any cough  · No fever or chills  · Incentive spirometry  · Check procalcitonin in a emili Payne · Encourage ambulation  · Respiratory protocol       Severe protein-calorie malnutrition ([address] )  Assessment & Plan  Malnutrition Findings:   Malnutrition type: Chronic illness(Related to medical condition as evidenced by 10% weight loss over the past month and <75% energy intake needs met >1 month treated with ensure compact supplements)  Degree of Malnutrition: Other severe protein calorie malnutrition    BMI Findings: Body mass index is 20 56 kg/m²  Nutrition consult and supplements  Urinary retention  Assessment & Plan  · Patient has Rapp, this was placed on recent admission at [facility] where she developed urinary retention  · Patient has follow-up with Urology as an outpatient in about 1 week  · Continue with Rapp for now, would recommend voiding trial once patient is more ambulatory    Failure to thrive in adult  Assessment & Plan  · Since September she has had a progressive decline - she has had no appetite, not really eating/drinking with weakness  She was previously reportedly very independent prior to her admission at [facility] in September with UTI  · Geriatrics evaluation appreciated       HLD (hyperlipidemia)  Assessment & Plan  · Continue statin    Essential hypertension  Assessment & Plan  · Will continue with enalapril/lisinopril, atenolol and amlodipine with holding parameters  Type 2 diabetes mellitus with diabetic neuropathy, without long-term current use of insulin [facility])  Assessment & Plan  Lab Results   Component Value Date    HGBA1C 6 4 (H) 10/15/2019     Recent Labs     10/18/19  1548 10/18/19  2109 10/19/19  0553 10/19/19  1041   POCGLU 109 130 124 142*       Blood Sugar Average: Last 72 hrs:  (P) 135 [id number]     · Will hold oral agents    Would not resume glipizide on discharge   · Sliding scale insulin, Accu-Cheks  · ?adrenal insufficiency may be contributing to symptomatology as she has history of this  · Cosyntropin stim test with adequate response in cortisol levels, endocrinology input appreciated, hydrocortisone stopped  · Insulin sliding scale and Accu-Cheks for now - resume metformin only on discharge      VTE Pharmacologic Prophylaxis:   Pharmacologic: Heparin  Mechanical VTE Prophylaxis in Place: Yes    Patient Centered Rounds: I have performed bedside rounds with nursing staff today  Discussions with Specialists or Other Care Team Provider:  Nephrology,     Education and Discussions with Family / Patient:  Discussed plan of care with patient and also called and updated her daughter Moises Mares    Time Spent for Care: 30 minutes  More than 50% of total time spent on counseling and coordination of care as described above  Current Length of Stay: 5 day(s)    Current Patient Status: Inpatient   Certification Statement: The patient will continue to require additional inpatient hospital stay due to Not medically stable, t pending Nephrology input and sodium improvement    Discharge Plan:  Rehab on discharge, hopefully discharge in next 24 to 48 hours if sodium stable    Code Status: Level 1 - Full Code      Subjective:   Noted overnight events  Patient is comfortable this morning  Wakes up on calling her name  She is very hard of hearing and poor historian  She tells me that she thought that she was going home today  She asks me where her daughter is  When I asked her if she has any pain, she nodded no  She denied having cough  Unable to answer orientation questions for me  When I asked her if she knows that she has she nodded yes but not answer where she is      Objective:     Vitals:   Temp (24hrs), Av 4 °F (37 4 °C), Min:98 5 °F (36 9 °C), Max:99 8 °F (37 7 °C)    Temp:  [98 5 °F (36 9 °C)-99 8 °F (37 7 °C)] 98 5 °F (36 9 °C)  HR:  [54-77] 76  Resp:  [18] 18  BP: (131-134)/(61-86) 134/86  SpO2:  [96 %-97 %] 96 %  Body mass index is 20 56 kg/m²  Input and Output Summary (last 24 hours): Intake/Output Summary (Last 24 hours) at 10/19/2019 1528  Last data filed at 10/19/2019 0900  Gross per 24 hour   Intake 0 ml   Output 1000 ml   Net -1000 ml       Physical Exam:     Physical Exam   Constitutional: No distress  Eyes: Pupils are equal, round, and reactive to light  Cardiovascular: Normal rate, regular rhythm and normal heart sounds  No murmur heard  Pulmonary/Chest: Effort normal and breath sounds normal  No respiratory distress  She has no wheezes  She has no rales  Abdominal: Soft  Bowel sounds are normal  She exhibits no distension  There is no tenderness  Musculoskeletal: She exhibits no edema  Neurological: She is alert  Wakes up on calling her name  Does not answer orientation questions  Squeezes fingers and wiggles toes bilaterally  No slurred speech   Skin: Skin is warm  Additional Data:     Labs:    Results from last 7 days   Lab Units 10/19/19  0456   WBC Thousand/uL 6 44   HEMOGLOBIN g/dL 9 2*   HEMATOCRIT % 29 4*   PLATELETS Thousands/uL 418*   NEUTROS PCT % 79*   LYMPHS PCT % 14   MONOS PCT % 6   EOS PCT % 0     Results from last 7 days   Lab Units 10/19/19  0456  10/15/19  0923   SODIUM mmol/L 132*   < > 128*   POTASSIUM mmol/L 4 2   < > 4 8   CHLORIDE mmol/L 102   < > 99*   CO2 mmol/L 21   < > 21   BUN mg/dL 11   < > 15   CREATININE mg/dL 0 53*   < > 0 59*   ANION GAP mmol/L 9   < > 8   CALCIUM mg/dL 8 5   < > 8 6   ALBUMIN g/dL  --   --  2 2*   TOTAL BILIRUBIN mg/dL  --   --  0 27   ALK PHOS U/L  --   --  58   ALT U/L  --   --  16   AST U/L  --   --  26   GLUCOSE RANDOM mg/dL 130   < > 93    < > = values in this interval not displayed           Results from last 7 days   Lab Units 10/19/19  1041 10/19/19  0553 10/18/19  2109 10/18/19  1548 10/18/19  1028 10/18/19  7261 10/17/19  2142 10/17/19  1628 10/17/19  1033 10/17/19  3833 10/16/19  2101 10/16/19  1627   POC GLUCOSE mg/dl 142* 124 130 109 122 108 145* 97 104 121 172* 211*     Results from last 7 days   Lab Units 10/15/19  0923   HEMOGLOBIN A1C % 6 4*     Results from last 7 days   Lab Units 10/16/19  0440   PROCALCITONIN ng/ml 0 07           * I Have Reviewed All Lab Data Listed Above  * Additional Pertinent Lab Tests Reviewed: All Labs Within Last 24 Hours Reviewed    Imaging:    XR chest pa & lateral   Final Result by Darin Deras MD (10/19 4101)      Minimal left basilar airspace disease attributed to compressive atelectasis secondary small left pleural effusion  Correlate with clinical findings to exclude pneumonia and/or aspiration  Workstation performed: [id number]             Recent Cultures (last 7 days):     Results from last 7 days   Lab Units 10/15/19  1435 10/14/19  2009   BLOOD CULTURE   --  No Growth After 4 Days  No Growth After 4 Days     URINE CULTURE  8762-9972 cfu/ml Gram Negative Nirav*  --        Last 24 Hours Medication List:     Current Facility-Administered Medications:  acetaminophen 650 mg Oral Q6H PRN Diandra Light MD   aluminum-magnesium hydroxide-simethicone 30 mL Oral Q6H PRN Diandra Light MD   [START ON 10/20/2019] amLODIPine 10 mg Oral Daily Cassius Dial MD   atenolol 50 mg Oral BID Joel Roberts MD   docusate sodium 100 mg Oral BID PRN Diandra Light MD   enoxaparin 40 mg Subcutaneous Daily Diandra Light MD   insulin lispro 1-5 Units Subcutaneous HS Diandra Light MD   insulin lispro 1-6 Units Subcutaneous TID [facility] Diandra Light MD   [START ON 10/20/2019] lisinopril 40 mg Oral Daily Joel Roberts MD   ondansetron 4 mg Intravenous Q6H PRN Diandra Light MD   pantoprazole 40 mg Oral Early Morning Diandra Light MD   pravastatin 40 mg Oral Daily With Meghana Denney MD   sodium chloride 2 g Oral BID With Meals Diandra Light MD        Today, Patient Was Seen By: Cassius Dial MD    ** Please Note: Dictation voice to text software may have been used in the creation of this document   **

## 2019-10-19 NOTE — ASSESSMENT & PLAN NOTE
· POA  Likely in setting of hyponatremia and possible urinary tract infection, could also be related to Delirium with underlying cognitive impairment  · Urine culture unremarkable - monitor off antibiotics at this time  · Has had admissions in the past related to confusion and hyponatremia  · Correct sodium with fluid restriction and salt tabs, see related plans  · Neuro checks   · She has hx of adrenal insufficiency, previously on steroids and then tapered off  She follows with Endocrinology as outpatient  · Cosyntropin stim test with adequate response in cortisol, hydrocortisone discontinued, she does not have adrenal insufficiency   · Mental status waxing and waning  · Vitamin B12 and folate normal   · Noted and appreciate Geriatrics input

## 2019-10-19 NOTE — PLAN OF CARE
Problem: Prexisting or High Potential for Compromised Skin Integrity  Goal: Skin integrity is maintained or improved  Description  INTERVENTIONS:  - Identify patients at risk for skin breakdown  - Assess and monitor skin integrity  - Assess and monitor nutrition and hydration status  - Monitor labs   - Assess for incontinence   - Turn and reposition patient  - Assist with mobility/ambulation  - Relieve pressure over bony prominences  - Avoid friction and shearing  - Provide appropriate hygiene as needed including keeping skin clean and dry  - Evaluate need for skin moisturizer/barrier cream  - Collaborate with interdisciplinary team   - Patient/family teaching  - Consider wound care consult   Outcome: Progressing     Problem: Nutrition/Hydration-ADULT  Goal: Nutrient/Hydration intake appropriate for improving, restoring or maintaining nutritional needs  Description  Monitor and assess patient's nutrition/hydration status for malnutrition  Collaborate with interdisciplinary team and initiate plan and interventions as ordered  Monitor patient's weight and dietary intake as ordered or per policy  Utilize nutrition screening tool and intervene as necessary  Determine patient's food preferences and provide high-protein, high-caloric foods as appropriate       INTERVENTIONS:  - Monitor oral intake, urinary output, labs, and treatment plans  - Assess nutrition and hydration status and recommend course of action  - Evaluate amount of meals eaten  - Assist patient with eating if necessary   - Allow adequate time for meals  - Recommend/ encourage appropriate diets, oral nutritional supplements, and vitamin/mineral supplements  - Order, calculate, and assess calorie counts as needed  - Recommend, monitor, and adjust tube feedings and TPN/PPN based on assessed needs  - Assess need for intravenous fluids  - Provide specific nutrition/hydration education as appropriate  - Include patient/family/caregiver in decisions related to nutrition  Outcome: Progressing     Problem: Potential for Falls  Goal: Patient will remain free of falls  Description  INTERVENTIONS:  - Assess patient frequently for physical needs  -  Identify cognitive and physical deficits and behaviors that affect risk of falls    -  [place] fall precautions as indicated by assessment   - Educate patient/family on patient safety including physical limitations  - Instruct patient to call for assistance with activity based on assessment  - Modify environment to reduce risk of injury  - Consider OT/PT consult to assist with strengthening/mobility  Outcome: Progressing     Problem: NEUROSENSORY - ADULT  Goal: Achieves stable or improved neurological status  Description  INTERVENTIONS  - Monitor and report changes in neurological status  - Monitor vital signs such as temperature, blood pressure, glucose, and any other labs ordered   - Initiate measures to prevent increased intracranial pressure  - Monitor for seizure activity and implement precautions if appropriate      Outcome: Progressing     Problem: RESPIRATORY - ADULT  Goal: Achieves optimal ventilation and oxygenation  Description  INTERVENTIONS:  - Assess for changes in respiratory status  - Assess for changes in mentation and behavior  - Position to facilitate oxygenation and minimize respiratory effort  - Encourage broncho-pulmonary hygiene including cough, deep breathe, Incentive Spirometry  - Assess and instruct to report SOB or any respiratory difficulty  - Respiratory Therapy support as indicated   Outcome: Progressing     Problem: GASTROINTESTINAL - ADULT  Goal: Maintains adequate nutritional intake  Description  INTERVENTIONS:  - Monitor percentage of each meal consumed  - Identify factors contributing to decreased intake, treat as appropriate  - Assist with meals as needed  - Monitor I&O, weight, and lab values if indicated  - Obtain nutrition services referral as needed  Outcome: Progressing     Problem: GENITOURINARY - ADULT  Goal: Urinary catheter remains patent  Description  INTERVENTIONS:  - Assess patency of urinary catheter  - If patient has a chronic herrera, consider changing catheter if non-functioning  - Follow guidelines for intermittent irrigation of non-functioning urinary catheter  Outcome: Progressing     Problem: METABOLIC, FLUID AND ELECTROLYTES - ADULT  Goal: Electrolytes maintained within normal limits  Description  INTERVENTIONS:  - Monitor labs and assess patient for signs and symptoms of electrolyte imbalances  - Administer electrolyte replacement as ordered  - Monitor response to electrolyte replacements, including repeat lab results as appropriate  - Instruct patient on fluid and nutrition as appropriate  Outcome: Progressing  Goal: Fluid balance maintained  Description  INTERVENTIONS:  - Monitor labs   - Monitor I/O and WT  - Instruct patient on fluid and nutrition as appropriate  - Assess for signs & symptoms of volume excess or deficit  Outcome: Progressing  Goal: Glucose maintained within target range  Description  INTERVENTIONS:  - Monitor Blood Glucose as ordered  - Assess for signs and symptoms of hyperglycemia and hypoglycemia  - Administer ordered medications to maintain glucose within target range  - Assess nutritional intake and initiate nutrition service referral as needed  Outcome: Progressing     Problem: SKIN/TISSUE INTEGRITY - ADULT  Goal: Skin integrity remains intact  Description  INTERVENTIONS  - Identify patients at risk for skin breakdown  - Assess and monitor skin integrity  - Assess and monitor nutrition and hydration status  - Monitor labs (i e  albumin)  - Assess for incontinence   - Turn and reposition patient  - Assist with mobility/ambulation  - Relieve pressure over bony prominences  - Avoid friction and shearing  - Provide appropriate hygiene as needed including keeping skin clean and dry  - Evaluate need for skin moisturizer/barrier cream  - Collaborate with interdisciplinary team (i e  Nutrition, Rehabilitation, etc )   - Patient/family teaching  Outcome: Progressing  Goal: Oral mucous membranes remain intact  Description  INTERVENTIONS  - Assess oral mucosa and hygiene practices  - Implement preventative oral hygiene regimen  - Implement oral medicated treatments as ordered  - Initiate Nutrition services referral as needed  Outcome: Progressing     Problem: HEMATOLOGIC - ADULT  Goal: Maintains hematologic stability  Description  INTERVENTIONS  - Monitor labs      Outcome: Progressing

## 2019-10-19 NOTE — ASSESSMENT & PLAN NOTE
· Now resolved and patient seems comfortable  · SpO2 wnl on room air  · Chest x-ray shows small pleural effusion, compression atelectasis  Consider pneumonia clinically  · Recent echocardiogram in September 2019 at [facility] showed EF of 50-55% with intermediate diastolic dysfunction and mild pulmonary hypertension  · No overt signs of pneumonia and patient denies any cough  · No fever or chills  · Incentive spirometry  · Check procalcitonin in a m  [place] · Encourage ambulation  · Respiratory protocol  [place]

## 2019-10-19 NOTE — ASSESSMENT & PLAN NOTE
Malnutrition Findings:   Malnutrition type: Chronic illness(Related to medical condition as evidenced by 10% weight loss over the past month and <75% energy intake needs met >1 month treated with ensure compact supplements)  Degree of Malnutrition: Other severe protein calorie malnutrition    BMI Findings: Body mass index is 20 56 kg/m²  Nutrition consult and supplements

## 2019-10-19 NOTE — ASSESSMENT & PLAN NOTE
· Since September she has had a progressive decline - she has had no appetite, not really eating/drinking with weakness    She was previously reportedly very independent prior to her admission at [facility] in September with UTI  · Geriatrics evaluation appreciated

## 2019-10-19 NOTE — RESPIRATORY THERAPY NOTE
RT Protocol Note  Pam Oliveira [de-identified] y o  female MRN: [id number]  Unit/Bed#: -01 Encounter: [id number]    Assessment    Principal Problem:    Metabolic encephalopathy  Active Problems:    Hyponatremia    Type 2 diabetes mellitus with diabetic neuropathy, without long-term current use of insulin (HCC)    Essential hypertension    HLD (hyperlipidemia)    Failure to thrive in adult    Urinary retention    Severe protein-calorie malnutrition (HCC)    Tachypnea      Home Pulmonary Medications:  none       Past Medical History:   Diagnosis Date    Cardiac disease     Diabetes mellitus ([facility] [address] )     Hyperlipidemia     Hypertension      Social History     Socioeconomic History    Marital status: Single     Spouse name: Not on file    Number of children: Not on file    Years of education: Not on file    Highest education level: Not on file   Occupational History    Not on file   Social Needs    Financial resource strain: Not on file    Food insecurity:     Worry: Not on file     Inability: Not on file    Transportation needs:     Medical: Not on file     Non-medical: Not on file   Tobacco Use    Smoking status: Current Every Day Smoker     Packs/day: 0 20   Substance and Sexual Activity    Alcohol use: No    Drug use: No    Sexual activity: Not on file   Lifestyle    Physical activity:     Days per week: Not on file     Minutes per session: Not on file    Stress: Not on file   Relationships    Social connections:     Talks on phone: Not on file     Gets together: Not on file     Attends Sabianist service: Not on file     Active member of club or organization: Not on file     Attends meetings of clubs or organizations: Not on file     Relationship status: Not on file    Intimate partner violence:     Fear of current or ex partner: Not on file     Emotionally abused: Not on file     Physically abused: Not on file     Forced sexual activity: Not on file   Other Topics Concern    Not on file   Social History Narrative    Not on file       Subjective         Objective    Physical Exam:   Assessment Type: (P) Assess only  General Appearance: (P) Drowsy, Lethargic  Respiratory Pattern: (P) Normal  Chest Assessment: (P) Chest expansion symmetrical  Bilateral Breath Sounds: (P) Clear, Diminished    Vitals:  Blood pressure 134/86, pulse 76, temperature 98 5 °F (36 9 °C), resp  rate 18, height 5' 2" (1 575 m), weight 51 kg (112 lb 6 4 oz), SpO2 96 %  Imaging and other studies: I have personally reviewed pertinent reports  Plan    Respiratory Plan: (P) Discontinue Protocol        Resp Comments: (P) Pt assessed per respiratory protocol  Pt admitted for metabolic encephalopathy  Upon assessment pt very confused/lethargic  Pt unable to answer questions  Bs clear diminished, no wheezes heard  No pulmonary hx listed in chart nor any pulmonary medications  No resp interventions needed at this time will dc protocol

## 2019-10-19 NOTE — ASSESSMENT & PLAN NOTE
Lab Results   Component Value Date    HGBA1C 6 4 (H) 10/15/2019     Recent Labs     10/18/19  1548 10/18/19  2109 10/19/19  0553 10/19/19  1041   POCGLU 109 130 124 142*       Blood Sugar Average: Last 72 hrs:  (P) 135 [id number]     · Will hold oral agents  Would not resume glipizide on discharge   · Sliding scale insulin, Accu-Cheks  · ?adrenal insufficiency may be contributing to symptomatology as she has history of this      · Cosyntropin stim test with adequate response in cortisol levels, endocrinology input appreciated, hydrocortisone stopped  · Insulin sliding scale and Accu-Cheks for now - resume metformin only on discharge

## 2019-10-19 NOTE — ASSESSMENT & PLAN NOTE
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

## 2019-10-19 NOTE — CONSULTS
Consultation    Nephrology   Jose C Osorio [de-identified] y o  female MRN: [id number]  Unit/Bed#: -01 Encounter: [id number]    History of Present Illness   Physician Requesting Consult: Teodora Mir MD  Reason for Consult : -  hyponatremia     ASSESSMENT/PLAN:   [de-identified] y o   female with pmh of chronic hyponatremia, hyperlipidemia, hypertension and diabetes who was admitted for lethargy and confusion  During the course of the hospital stay patient was being treated for UTI and metabolic encephalopathy thought to be secondary to her hyponatremia  She was admitted with sodium 126 mEq  Nephrology consulted for evaluation and management of hyponatremia on 10/19/2019    1) acute on chronic hyponatremia:  - Baseline sodium appears to be 130-135 meq, in the past was diagnosed with adrenal insufficiency however at this time does not carry that diagnosis and is thought to have hyponatremia secondary to SIADH  - records show that she has had issues of hyponatremia since September of 2019  - Most recent sodium level was 132 meq  - patient was admitted with a sodium level of 126 meq  - Etiology of hyponatremia likely secondary to SIADH plus poor solute intake  - plasma osmolality = ordered  - urine osmolality = ordered  - urine sodium = ordered  - uric acid = ordered  - cortisol = 48 8  - TSH = 2 8  - check SPEP  - No acute indication for Hypertonic saline (HTS) at this time  - continue on fluid restriction of 1 5L/day  - Strict I/O   - Check BMP Q 24  - Goal to raise sodium by 8 meq in the next  24 hours  - continue on salt tablets 2 g p o  B i d   - Patient has a baseline Creatinine of 0 5-0 8 mg/dL  - Most recent creatinine is 0 53 Mg/dL  - had issues with urinary retention currently Rapp in place will need voiding trial when patient is more alert    2)Blood pressure/hypertension:  - Optimize hemodynamics   - Maintain MAP > 65mmHg  - Avoid BP fluctuations  - on Norvasc 10 mg p o   Q day, atenolol 50 mg p o  B i d lisinopril 40 mg p o  Q day   - holding parameters adjusted to hold for SBP less than 130    3)H/H/anemia:  - most recent hemoglobin at 9 2 grams/deciliter  - maintain hemoglobin greater than 8 grams/deciliter    4)Volume status:  - Clinically patient appears to be euvolemic    5)DM:  - management as per Primary team  - on insulin    6) metabolic encephalopathy:  - unclear etiology could be secondary to UTI with some contribution to hyponatremia however unlikely due to hyponatremia at this time since sodium is close to her baseline  Thanks for the consult  Will continue to follow  Please call with questions/ concerns  Above-mentioned orders and Plan was discussed with the team in depth  Michael Zaidi MD, FASN, 10/19/2019, 1:16 PM          HISTORY OF PRESENT ILLNESS:   Lakia Duncan is a [de-identified] y o   female with pmh of chronic hyponatremia, hyperlipidemia, hypertension and diabetes who was admitted for lethargy and confusion  During the course of the hospital stay patient was being treated for UTI and metabolic encephalopathy thought to be secondary to her hyponatremia  She was admitted with a sodium of 126 mEq during the course of the hospital stay she was continued on her salt tablets and sodium improved to 134 mEq and it is noted per documentation that patient's mental status improved unsure whether that is due to sodium treatment versus treatment of her UTI  Records shows that patient had a history of adrenal insufficiency and was on steroids in the past however currently has been evaluated for that and there is no signs of adrenal insufficiency  The last 24 hours sodium had dropped down back to 129 mEq yesterday and hence Nephrology consult was placed  Patient seen and examined in her room patient is a poor historian at this time unable to give much history  Most recent sodium level at 132 mEq    Nephrology has been consulted for evaluation and management of hyponatremia    History obtained from chart review    Consults    Review of Systems   Unable to perform ROS: Mental status change        Historical Information   Patient Active Problem List   Diagnosis    Metabolic encephalopathy    Hyponatremia    Hypokalemia    Type 2 diabetes mellitus with diabetic neuropathy, without long-term current use of insulin (HCC)    Abnormal urinalysis    Essential hypertension    HLD (hyperlipidemia)    Lethargy    Failure to thrive in adult    Urinary retention    Severe protein-calorie malnutrition (HCC)    Tachypnea     Past Medical History:   Diagnosis Date    Cardiac disease     Diabetes mellitus ([address] )     Hyperlipidemia     Hypertension      Past Surgical History:   Procedure Laterality Date    CORONARY ANGIOPLASTY WITH STENT PLACEMENT       Social History   Social History     Substance and Sexual Activity   Alcohol Use No     Social History     Substance and Sexual Activity   Drug Use No     Social History     Tobacco Use   Smoking Status Current Every Day Smoker    Packs/day: 0 20     No family history on file      Meds/Allergies   current meds:   Current Facility-Administered Medications   Medication Dose Route Frequency    acetaminophen (TYLENOL) tablet 650 mg  650 mg Oral Q6H PRN    aluminum-magnesium hydroxide-simethicone (MYLANTA) 200-200-20 mg/5 mL oral suspension 30 mL  30 mL Oral Q6H PRN    amLODIPine (NORVASC) tablet 10 mg  10 mg Oral Daily    atenolol (TENORMIN) tablet 50 mg  50 mg Oral BID    docusate sodium (COLACE) capsule 100 mg  100 mg Oral BID PRN    enoxaparin (LOVENOX) subcutaneous injection 40 mg  40 mg Subcutaneous Daily    insulin lispro (HumaLOG) 100 units/mL subcutaneous injection 1-5 Units  1-5 Units Subcutaneous HS    insulin lispro (HumaLOG) 100 units/mL subcutaneous injection 1-6 Units  1-6 Units Subcutaneous TID AC    lisinopril (ZESTRIL) tablet 40 mg  40 mg Oral Daily    ondansetron (ZOFRAN) injection 4 mg  4 mg Intravenous Q6H PRN    pantoprazole (PROTONIX) EC tablet 40 mg  40 mg Oral Early Morning    pravastatin (PRAVACHOL) tablet 40 mg  40 mg Oral Daily With Dinner    sodium chloride tablet 2 g  2 g Oral BID With Meals    and PTA meds:   Prior to Admission Medications   Prescriptions Last Dose Informant Patient Reported? Taking?    amLODIPine (NORVASC) 10 mg tablet   Yes No   Sig: Take 10 mg by mouth daily   atenolol (TENORMIN) 50 mg tablet   Yes No   Sig: Take 50 mg by mouth 2 (two) times a day   enalapril (VASOTEC) 20 mg tablet   Yes No   Sig: Take 20 mg by mouth 2 (two) times a day   glipiZIDE (GLUCOTROL) 10 mg tablet   Yes No   Sig: Take 10 mg by mouth 2 (two) times a day before meals   metFORMIN (GLUCOPHAGE) 1000 MG tablet   Yes No   Sig: Take 1,000 mg by mouth 2 (two) times a day with meals   omeprazole (PriLOSEC) 20 mg delayed release capsule   Yes No   Sig: Take 20 mg by mouth daily   saxagliptin (ONGLYZA) 5 MG tablet   Yes No   Sig: Take 5 mg by mouth daily   simvastatin (ZOCOR) 20 mg tablet   Yes No   Sig: Take 20 mg by mouth daily at bedtime   sodium chloride 1 g tablet   No No   Sig: Take 2 tablets by mouth 2 (two) times a day with meals for 30 days      Facility-Administered Medications: None       Scheduled Meds:  Current Facility-Administered Medications:  acetaminophen 650 mg Oral Q6H PRN Satimanuel Press, MD   aluminum-magnesium hydroxide-simethicone 30 mL Oral Q6H PRN Satira Press, MD   amLODIPine 10 mg Oral Daily Satimanuel Press, MD   atenolol 50 mg Oral BID Satimanuel Press, MD   docusate sodium 100 mg Oral BID PRN Satimanuel Press, MD   enoxaparin 40 mg Subcutaneous Daily Satira Press, MD   insulin lispro 1-5 Units Subcutaneous HS Satira Press, MD   insulin lispro 1-6 Units Subcutaneous TID AC Massiel Grayson MD   lisinopril 40 mg Oral Daily Satira Press, MD   ondansetron 4 mg Intravenous Q6H PRN Massiel Grayson MD   pantoprazole 40 mg Oral Early Morning Satira Press, MD   pravastatin 40 mg Oral Daily With Kiki Hoyos MD sodium chloride 2 g Oral BID With Meals Julissa Bonner MD       PRN Meds:   acetaminophen    aluminum-magnesium hydroxide-simethicone    docusate sodium    ondansetron    Continuous Infusions:     No Known Allergies      Invasive Devices: Invasive Devices     Drain            Urethral Catheter Straight-tip 16 Fr  4 days                  PHYSICAL EXAM  /86   Pulse 76   Temp 98 5 °F (36 9 °C)   Resp 18   Ht 5' 2" (1 575 m)   Wt 51 kg (112 lb 6 4 oz) Comment: we tried to stand the pt but the pt could not stand  SpO2 96%   BMI 20 56 kg/m²   Temp (24hrs), Av 5 °F (37 5 °C), Min:98 5 °F (36 9 °C), Max:99 8 °F (37 7 °C)        Intake/Output Summary (Last 24 hours) at 10/19/2019 1316  Last data filed at 10/19/2019 0835  Gross per 24 hour   Intake 0 ml   Output 800 ml   Net -800 ml       I/O last 24 hours: In: 120 [P O :120]  Out: 1050 [Urine:1050]          Current Weight: Weight - Scale: 51 kg (112 lb 6 4 oz)(we tried to stand the pt but the pt could not stand)  First Weight: Weight - Scale: 56 7 kg (125 lb)(RD obtained bed scale weight)  Physical Exam   Constitutional: She appears well-developed and well-nourished  No distress  HENT:   Head: Normocephalic and atraumatic  Mouth/Throat: No oropharyngeal exudate  Eyes: Conjunctivae are normal  No scleral icterus  Neck: Neck supple  No JVD present  Cardiovascular: Normal heart sounds  Exam reveals no friction rub  Pulmonary/Chest: Effort normal  She has no wheezes  She has no rales  Abdominal: Soft  She exhibits no mass  There is no tenderness  Musculoskeletal: She exhibits no edema  Neurological: She is alert  Oriented to person and place   Skin: Skin is warm  No rash noted  She is not diaphoretic  Psychiatric: She has a normal mood and affect  Nursing note and vitals reviewed          LABORATORY:    Results from last 7 days   Lab Units 10/19/19  0456 10/18/19  0532 10/17/19  8211 10/16/19  0440 10/15/19  4974 10/15/19  0923 10/14/19  1804   WBC Thousand/uL 6 44 4 39 4 36 5 07 7 55 6 18 6 87   HEMOGLOBIN g/dL 9 2* 10 5* 8 4* 9 1* 8 7* 8 9* 10 1*   HEMATOCRIT % 29 4* 34 5* 26 7* 29 4* 28 2* 28 8* 32 4*   PLATELETS Thousands/uL 418* 411* 424* 383 392* 394* 407*   POTASSIUM mmol/L 4 2 5 0 4 1 4 7 4 4 4 8 4 9   CHLORIDE mmol/L 102 105 105 102 101 99* 97*   CO2 mmol/L 21 17* 20* 20* 21 21 21   BUN mg/dL 11 15 20 20 17 15 21   CREATININE mg/dL 0 53* 0 61 0 56* 0 65 0 61 0 59* 0 73   CALCIUM mg/dL 8 5 8 8 8 7 8 7 8 6 8 6 8 8   MAGNESIUM mg/dL  --   --   --   --   --  1 6  --    PHOSPHORUS mg/dL  --   --   --   --   --  3 2  --       rest all reviewed    RADIOLOGY:  XR chest pa & lateral   Final Result by Emile Price MD (10/19 2593)      Minimal left basilar airspace disease attributed to compressive atelectasis secondary small left pleural effusion  Correlate with clinical findings to exclude pneumonia and/or aspiration  Workstation performed: [id number]           Rest all reviewed    Portions of the record may have been created with voice recognition software  Occasional wrong word or "sound a like" substitutions may have occurred due to the inherent limitations of voice recognition software  Read the chart carefully and recognize, using context, where substitutions have occurred  If you have any questions, please contact the dictating provider

## 2019-10-19 NOTE — SOCIAL WORK
Pt remains recommended for short-term skilled rehab placement for her aftercare plan  Pt has been referred to and accepted for placement by [facility] located in [address] [place] [address]  Pt will be placed there once medically cleared for d/c by [facility]  Pt's daughter Simon Casas ([phone]) will transport pt to SNF at time of d/c  CM to follow

## 2019-10-20 ENCOUNTER — APPOINTMENT (INPATIENT)
Dept: RADIOLOGY | Facility: HOSPITAL | Age: 79
DRG: 545 | End: 2019-10-20
Payer: MEDICARE

## 2019-10-20 PROBLEM — R50.9 LOW GRADE FEVER: Status: ACTIVE | Noted: 2019-10-20

## 2019-10-20 PROBLEM — R10.12 LEFT UPPER QUADRANT PAIN: Status: ACTIVE | Noted: 2019-10-20

## 2019-10-20 PROBLEM — D50.9 MICROCYTIC ANEMIA: Status: ACTIVE | Noted: 2019-10-20

## 2019-10-20 PROBLEM — R00.1 BRADYCARDIA: Status: ACTIVE | Noted: 2019-10-20

## 2019-10-20 LAB
ANION GAP SERPL CALCULATED.3IONS-SCNC: 9 MMOL/L (ref 4–13)
ATRIAL RATE: 82 BPM
BACTERIA BLD CULT: NORMAL
BACTERIA BLD CULT: NORMAL
BASOPHILS # BLD AUTO: 0.02 THOUSANDS/ΜL (ref 0–0.1)
BASOPHILS NFR BLD AUTO: 0 % (ref 0–1)
BUN SERPL-MCNC: 14 MG/DL (ref 5–25)
CALCIUM SERPL-MCNC: 8.6 MG/DL (ref 8.3–10.1)
CHLORIDE SERPL-SCNC: 104 MMOL/L (ref 100–108)
CO2 SERPL-SCNC: 22 MMOL/L (ref 21–32)
CREAT SERPL-MCNC: 0.6 MG/DL (ref 0.6–1.3)
EOSINOPHIL # BLD AUTO: 0.02 THOUSAND/ΜL (ref 0–0.61)
EOSINOPHIL NFR BLD AUTO: 0 % (ref 0–6)
ERYTHROCYTE [DISTWIDTH] IN BLOOD BY AUTOMATED COUNT: 17.7 % (ref 11.6–15.1)
FERRITIN SERPL-MCNC: 608 NG/ML (ref 8–388)
GFR SERPL CREATININE-BSD FRML MDRD: 86 ML/MIN/1.73SQ M
GLUCOSE SERPL-MCNC: 116 MG/DL (ref 65–140)
GLUCOSE SERPL-MCNC: 121 MG/DL (ref 65–140)
GLUCOSE SERPL-MCNC: 128 MG/DL (ref 65–140)
GLUCOSE SERPL-MCNC: 139 MG/DL (ref 65–140)
GLUCOSE SERPL-MCNC: 155 MG/DL (ref 65–140)
HCT VFR BLD AUTO: 29.4 % (ref 34.8–46.1)
HGB BLD-MCNC: 9 G/DL (ref 11.5–15.4)
IMM GRANULOCYTES # BLD AUTO: 0.06 THOUSAND/UL (ref 0–0.2)
IMM GRANULOCYTES NFR BLD AUTO: 1 % (ref 0–2)
IRON SATN MFR SERPL: 7 %
IRON SERPL-MCNC: 13 UG/DL (ref 50–170)
LYMPHOCYTES # BLD AUTO: 0.97 THOUSANDS/ΜL (ref 0.6–4.47)
LYMPHOCYTES NFR BLD AUTO: 12 % (ref 14–44)
MAGNESIUM SERPL-MCNC: 1.5 MG/DL (ref 1.6–2.6)
MCH RBC QN AUTO: 23.1 PG (ref 26.8–34.3)
MCHC RBC AUTO-ENTMCNC: 30.6 G/DL (ref 31.4–37.4)
MCV RBC AUTO: 75 FL (ref 82–98)
MONOCYTES # BLD AUTO: 0.41 THOUSAND/ΜL (ref 0.17–1.22)
MONOCYTES NFR BLD AUTO: 5 % (ref 4–12)
NEUTROPHILS # BLD AUTO: 6.67 THOUSANDS/ΜL (ref 1.85–7.62)
NEUTS SEG NFR BLD AUTO: 82 % (ref 43–75)
NRBC BLD AUTO-RTO: 0 /100 WBCS
OSMOLALITY UR: 624 MMOL/KG
P AXIS: 64 DEGREES
PHOSPHATE SERPL-MCNC: 3.3 MG/DL (ref 2.3–4.1)
PLATELET # BLD AUTO: 399 THOUSANDS/UL (ref 149–390)
PMV BLD AUTO: 9.8 FL (ref 8.9–12.7)
POTASSIUM SERPL-SCNC: 4.1 MMOL/L (ref 3.5–5.3)
PR INTERVAL: 142 MS
PROCALCITONIN SERPL-MCNC: 0.07 NG/ML
QRS AXIS: 16 DEGREES
QRSD INTERVAL: 92 MS
QT INTERVAL: 358 MS
QTC INTERVAL: 418 MS
RBC # BLD AUTO: 3.9 MILLION/UL (ref 3.81–5.12)
SODIUM SERPL-SCNC: 135 MMOL/L (ref 136–145)
T WAVE AXIS: 1 DEGREES
TIBC SERPL-MCNC: 175 UG/DL (ref 250–450)
VENTRICULAR RATE: 82 BPM
WBC # BLD AUTO: 8.15 THOUSAND/UL (ref 4.31–10.16)

## 2019-10-20 PROCEDURE — 83540 ASSAY OF IRON: CPT | Performed by: INTERNAL MEDICINE

## 2019-10-20 PROCEDURE — 82948 REAGENT STRIP/BLOOD GLUCOSE: CPT

## 2019-10-20 PROCEDURE — 84100 ASSAY OF PHOSPHORUS: CPT | Performed by: INTERNAL MEDICINE

## 2019-10-20 PROCEDURE — 93010 ELECTROCARDIOGRAM REPORT: CPT | Performed by: INTERNAL MEDICINE

## 2019-10-20 PROCEDURE — 80048 BASIC METABOLIC PNL TOTAL CA: CPT | Performed by: INTERNAL MEDICINE

## 2019-10-20 PROCEDURE — 83735 ASSAY OF MAGNESIUM: CPT | Performed by: INTERNAL MEDICINE

## 2019-10-20 PROCEDURE — 84145 PROCALCITONIN (PCT): CPT | Performed by: INTERNAL MEDICINE

## 2019-10-20 PROCEDURE — 93005 ELECTROCARDIOGRAM TRACING: CPT

## 2019-10-20 PROCEDURE — 83550 IRON BINDING TEST: CPT | Performed by: INTERNAL MEDICINE

## 2019-10-20 PROCEDURE — 83935 ASSAY OF URINE OSMOLALITY: CPT | Performed by: INTERNAL MEDICINE

## 2019-10-20 PROCEDURE — 99232 SBSQ HOSP IP/OBS MODERATE 35: CPT | Performed by: INTERNAL MEDICINE

## 2019-10-20 PROCEDURE — 74018 RADEX ABDOMEN 1 VIEW: CPT

## 2019-10-20 PROCEDURE — 82728 ASSAY OF FERRITIN: CPT | Performed by: INTERNAL MEDICINE

## 2019-10-20 PROCEDURE — 85025 COMPLETE CBC W/AUTO DIFF WBC: CPT | Performed by: INTERNAL MEDICINE

## 2019-10-20 RX ORDER — ATENOLOL 50 MG/1
50 TABLET ORAL DAILY
Status: DISCONTINUED | OUTPATIENT
Start: 2019-10-20 | End: 2019-10-29

## 2019-10-20 RX ORDER — FERROUS SULFATE 325(65) MG
325 TABLET ORAL
Status: DISCONTINUED | OUTPATIENT
Start: 2019-10-20 | End: 2019-11-16 | Stop reason: HOSPADM

## 2019-10-20 RX ADMIN — MAGNESIUM OXIDE TAB 400 MG (240 MG ELEMENTAL MG) 800 MG: 400 (240 MG) TAB at 09:38

## 2019-10-20 RX ADMIN — SODIUM CHLORIDE TAB 1 GM 2 G: 1 TAB at 17:21

## 2019-10-20 RX ADMIN — ACETAMINOPHEN 650 MG: 325 TABLET ORAL at 17:21

## 2019-10-20 RX ADMIN — PRAVASTATIN SODIUM 40 MG: 20 TABLET ORAL at 17:21

## 2019-10-20 RX ADMIN — FERROUS SULFATE TAB 325 MG (65 MG ELEMENTAL FE) 325 MG: 325 (65 FE) TAB at 09:38

## 2019-10-20 RX ADMIN — ENOXAPARIN SODIUM 40 MG: 40 INJECTION SUBCUTANEOUS at 09:42

## 2019-10-20 NOTE — ASSESSMENT & PLAN NOTE
· Resolved with lower dose of atenolol  · EKG shows NSR  · Monitor on tele for 24 hours to evaluate for Afib>> no afib so far  · TSH normal this admission  · Decreased atenolol dose from b i d  To once a day

## 2019-10-20 NOTE — ASSESSMENT & PLAN NOTE
· Overnight bradycardia with heart rate in 40s to 50s  · Obtain EKG  · Monitor on tele for 24 hours to evaluate for AFib  · TSH normal this admission  · Decreased atenolol dose from b i d  To once a day

## 2019-10-20 NOTE — ASSESSMENT & PLAN NOTE
· Now resolved and patient seems comfortable  · SpO2 wnl on room air  · Chest x-ray shows small pleural effusion, compression atelectasis  Consider pneumonia clinically  · Recent echocardiogram in September 2019 at [facility] showed EF of 50-55% with intermediate diastolic dysfunction and mild pulmonary hypertension  · No overt signs of pneumonia and patient denies any cough  · procalcitonin normal   · Incentive spirometry  · Encourage ambulation  · Respiratory protocol     · Swallow eval

## 2019-10-20 NOTE — ASSESSMENT & PLAN NOTE
· Now resolved and patient seems comfortable  · SpO2 wnl on room air  · Chest x-ray shows small pleural effusion, compression atelectasis  Consider pneumonia clinically  · Recent echocardiogram in September 2019 at [facility] showed EF of 50-55% with intermediate diastolic dysfunction and mild pulmonary hypertension  · No overt signs of pneumonia and patient denies any cough  · procalcitonin normal   · Incentive spirometry  · Encourage ambulation  · Respiratory protocol     · Swallow eval

## 2019-10-20 NOTE — ASSESSMENT & PLAN NOTE
· Patient has Rapp, this was placed on recent admission at [facility] where she developed urinary retention    · Patient has follow-up with Urology as an outpatient in about 1 week  · Continue with Rapp for now, would recommend voiding trial once patient is more ambulatory

## 2019-10-20 NOTE — PROGRESS NOTES
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  · Presented with sodium of 126  Sodium 135 today  · TSH normal this admission  · Cosyntropin stim test negative for adrenal insufficiency  · CT chest abdomen pelvis was done at [facility] to rule out any occult malignancy as a cause of SIADH which was negative for any malignancy  · Urine sodium 90 on 10/19, urine osmolarity 600  · Continue with b i d  Salt tabs and fluid restriction to 1500 cc  · Appreciate Nephrology input  Left upper quadrant pain  Assessment & Plan  · New intermittent left upper quadrant pain and tenderness  · Suspect related to splenic infarct  · Now with low-grade fever  · No leukocytosis  · CT scan abdomen pelvis with contrast in September 2019 at [facility] showed new moderate sized splenic infarcts  Patient does not have any known history of AFib and she is not on any anticoagulation  · Unclear etiology of splenic infarcts  · Will monitor on telemetry to evaluate for occult AFib  · Obtain surgical consultation  , consider repeat CT abdomen pelvis with contrast or CTA to evaluate further  Will defer to surgical team regarding need for additional imaging  * Metabolic encephalopathy  Assessment & Plan  · POA  Likely in setting of hyponatremia and possible urinary tract infection, could also be related to Delirium with underlying cognitive impairment    · Urine culture unremarkable - monitor off antibiotics at this time  · Has had admissions in the past related to confusion and hyponatremia  · Correct sodium with fluid restriction and salt tabs, see related plans  · Neuro checks   · She has hx of adrenal insufficiency, previously on steroids and then tapered off  She follows with Endocrinology as outpatient  · Cosyntropin stim test with adequate response in cortisol, hydrocortisone discontinued, she does not have adrenal insufficiency   · Mental status waxing and waning  · Vitamin B12 and folate normal   · MRI brain was done at [facility] last month which was negative for any acute infarct but possible small questionable hemorrhage versus area of mineralization  CT head without contrast in [facility] negative for bleed  · Noted and appreciate Geriatrics input  · Patient is nonfocal here without any focal motor weakness, will hold off on any intracranial imaging here for now  Microcytic anemia  Assessment & Plan  · Baseline seems between 8-11  · At baseline  · Iron studies suggestive of low iron  · Will start p o  iron supplementation  Low grade fever  Assessment & Plan  · Suspect 2/2 splenic infarct  · Urine culture not significant this admission, 9157-3838 GNR  At [facility], pt was noted to have ESBL in urine which was suspected to be colonizer  · procalcitonin normal   · No leukocytosis  · Trend temp curve for now  · monitor off abx,  · Swallow eval and aspiration precautions  · If febrile persistently, will obtain blood cultures and pan CT  Bradycardia  Assessment & Plan  · Overnight bradycardia with heart rate in 40s to 50s  · Obtain EKG  · Monitor on tele for 24 hours to evaluate for AFib  · TSH normal this admission  · Decreased atenolol dose from b i d  To once a day  Tachypnea  Assessment & Plan  · Now resolved and patient seems comfortable  · SpO2 wnl on room air  · Chest x-ray shows small pleural effusion, compression atelectasis  Consider pneumonia clinically  · Recent echocardiogram in September 2019 at [facility] showed EF of 50-55% with intermediate diastolic dysfunction and mild pulmonary hypertension  · No overt signs of pneumonia and patient denies any cough    · procalcitonin normal   · Incentive spirometry  · Encourage ambulation  · Respiratory protocol     · Swallow eval     Severe protein-calorie malnutrition ([address] )  Assessment & Plan  Malnutrition Findings:   Malnutrition type: Chronic illness(Related to medical condition as evidenced by 10% weight loss over the past month and <75% energy intake needs met >1 month treated with ensure compact supplements)  Degree of Malnutrition: Other severe protein calorie malnutrition    BMI Findings: Body mass index is 20 53 kg/m²  Nutrition consult and supplements  Urinary retention  Assessment & Plan  · Patient has Rapp, this was placed on recent admission at [facility] where she developed urinary retention  · Patient has follow-up with Urology as an outpatient in about 1 week  · Continue with Rapp for now, would recommend voiding trial once patient is more ambulatory    Failure to thrive in adult  Assessment & Plan  · Since September she has had a progressive decline - she has had no appetite, not really eating/drinking with weakness  She was previously reportedly very independent prior to her admission at [facility] in September with UTI  · Geriatrics evaluation appreciated       HLD (hyperlipidemia)  Assessment & Plan  · Continue statin    Essential hypertension  Assessment & Plan  · Will continue with enalapril/lisinopril, atenolol and amlodipine with holding parameters  Type 2 diabetes mellitus with diabetic neuropathy, without long-term current use of insulin [facility])  Assessment & Plan  Lab Results   Component Value Date    HGBA1C 6 4 (H) 10/15/2019     Recent Labs     10/19/19  1628 10/19/19  2029 10/20/19  0557 10/20/19  1034   POCGLU 138 159* 128 139       Blood Sugar Average: Last 72 hrs:  (P) 126 [id number]     · Will hold oral agents  Would not resume glipizide on discharge   · Sliding scale insulin, Accu-Cheks  · ?adrenal insufficiency may be contributing to symptomatology as she has history of this  · Cosyntropin stim test with adequate response in cortisol levels, endocrinology input appreciated, hydrocortisone stopped  · Insulin sliding scale and Accu-Cheks for now - resume metformin only on discharge      VTE Pharmacologic Prophylaxis:   Pharmacologic: Enoxaparin (Lovenox)  Mechanical VTE Prophylaxis in Place: Yes    Patient Centered Rounds: I have performed bedside rounds with nursing staff today  Discussions with Specialists or Other Care Team Provider: CM    Education and Discussions with Family / Patient: plan of care, patient    Time Spent for Care: 30 minutes  More than 50% of total time spent on counseling and coordination of care as described above  Current Length of Stay: 6 day(s)    Current Patient Status: Inpatient   Certification Statement: The patient will continue to require additional inpatient hospital stay due to not medically stable    Discharge Plan: when medically stable    Code Status: Level 1 - Full Code      Subjective:   No overnight events reported  Per nursing staff, patient had coughing and concern for aspiration while giving her meds today  Low-grade fever noted  Patient denies any cough or chest pain  Mild left upper quadrant pain mentioned and tenderness noted  Patient otherwise appears comfortable on room air  Objective:     Vitals:   Temp (24hrs), Av 2 °F (37 9 °C), Min:100 1 °F (37 8 °C), Max:100 3 °F (37 9 °C)    Temp:  [100 1 °F (37 8 °C)-100 3 °F (37 9 °C)] 100 3 °F (37 9 °C)  HR:  [42-78] 78  Resp:  [20] 20  BP: (121-128)/(57-82) 121/57  SpO2:  [94 %-96 %] 96 %  Body mass index is 20 53 kg/m²  Input and Output Summary (last 24 hours): Intake/Output Summary (Last 24 hours) at 10/20/2019 1327  Last data filed at 10/20/2019 9211  Gross per 24 hour   Intake 0 ml   Output 275 ml   Net -275 ml       Physical Exam:     Physical Exam  Constitutional: No distress  Eyes: Pupils are equal, round, and reactive to light     Cardiovascular: Normal rate, regular rhythm and normal heart sounds  No murmur heard  Pulmonary/Chest: Effort normal and breath sounds normal  No respiratory distress  She has no wheezes  She has no rales  Abdominal: Soft  Bowel sounds are normal  She exhibits no distension  LUQ tenderness (+)  Musculoskeletal: She exhibits no edema  Neurological: She is alert  Awake and answers questions intermittently  Squeezes fingers and wiggles toes bilaterally  No slurred speech   Skin: Skin is warm    Additional Data:     Labs:    Results from last 7 days   Lab Units 10/20/19  0449   WBC Thousand/uL 8 15   HEMOGLOBIN g/dL 9 0*   HEMATOCRIT % 29 4*   PLATELETS Thousands/uL 399*   NEUTROS PCT % 82*   LYMPHS PCT % 12*   MONOS PCT % 5   EOS PCT % 0     Results from last 7 days   Lab Units 10/20/19  0449  10/15/19  0923   SODIUM mmol/L 135*   < > 128*   POTASSIUM mmol/L 4 1   < > 4 8   CHLORIDE mmol/L 104   < > 99*   CO2 mmol/L 22   < > 21   BUN mg/dL 14   < > 15   CREATININE mg/dL 0 60   < > 0 59*   ANION GAP mmol/L 9   < > 8   CALCIUM mg/dL 8 6   < > 8 6   ALBUMIN g/dL  --   --  2 2*   TOTAL BILIRUBIN mg/dL  --   --  0 27   ALK PHOS U/L  --   --  58   ALT U/L  --   --  16   AST U/L  --   --  26   GLUCOSE RANDOM mg/dL 121   < > 93    < > = values in this interval not displayed  Results from last 7 days   Lab Units 10/20/19  1034 10/20/19  0557 10/19/19  2029 10/19/19  1628 10/19/19  1041 10/19/19  0553 10/18/19  2109 10/18/19  1548 10/18/19  1028 10/18/19  0625 10/17/19  2142 10/17/19  1628   POC GLUCOSE mg/dl 139 128 159* 138 142* 124 130 109 122 108 145* 97     Results from last 7 days   Lab Units 10/15/19  0923   HEMOGLOBIN A1C % 6 4*     Results from last 7 days   Lab Units 10/20/19  0449 10/16/19  0440   PROCALCITONIN ng/ml 0 07 0 07           * I Have Reviewed All Lab Data Listed Above  * Additional Pertinent Lab Tests Reviewed:  All Labs Within Last 24 Hours Reviewed    Imaging:    XR chest pa & lateral   Final Result by Marycarmen Pardo Pato Baxter MD (10/19 5409)      Minimal left basilar airspace disease attributed to compressive atelectasis secondary small left pleural effusion  Correlate with clinical findings to exclude pneumonia and/or aspiration  Workstation performed: [id number]         XR abdomen 1 vw portable    (Results Pending)     Recent Cultures (last 7 days):     Results from last 7 days   Lab Units 10/15/19  1435 10/14/19  2009   BLOOD CULTURE   --  No Growth After 5 Days  No Growth After 5 Days  URINE CULTURE  4000-6462 cfu/ml Gram Negative Nirav*  --        Last 24 Hours Medication List:     Current Facility-Administered Medications:  acetaminophen 650 mg Oral Q6H PRN Camila Kan MD   aluminum-magnesium hydroxide-simethicone 30 mL Oral Q6H PRN Camila Kan MD   amLODIPine 10 mg Oral Daily Teodora Mir MD   atenolol 50 mg Oral Daily Teodora Mir MD   docusate sodium 100 mg Oral BID PRN Camila Kan MD   enoxaparin 40 mg Subcutaneous Daily Camila Kan MD   ferrous sulfate 325 mg Oral Daily With Breakfast Teodora Mir MD   insulin lispro 1-5 Units Subcutaneous HS Camila Kan MD   insulin lispro 1-6 Units Subcutaneous TID AC Camila Kan MD   lisinopril 40 mg Oral Daily Caitlyn Davis MD   ondansetron 4 mg Intravenous Q6H PRN Camila Kan MD   pantoprazole 40 mg Oral Early Morning Camila Kan MD   pravastatin 40 mg Oral Daily With Arthur MD Misbah   sodium chloride 2 g Oral BID With Meals Camila Kan MD        Today, Patient Was Seen By: Teodora Mir MD    ** Please Note: Dictation voice to text software may have been used in the creation of this document   **

## 2019-10-20 NOTE — ASSESSMENT & PLAN NOTE
· Since September she has had a progressive decline - she has had no appetite, not really eating/drinking with weakness    She was previously reportedly very independent prior to her admission at [facility] in September with UTI  · Geriatrics evaluation appreciated

## 2019-10-20 NOTE — ASSESSMENT & PLAN NOTE
· New intermittent left upper quadrant pain and tenderness  · Suspect related to splenic infarct  · Now with low-grade fever  · No leukocytosis  · CT scan abdomen pelvis with contrast in September 2019 at [facility] showed new moderate sized splenic infarcts  Patient does not have any known history of AFib and she is not on any anticoagulation  · Unclear etiology of splenic infarcts  · Will monitor on telemetry to evaluate for occult AFib >> no events of afib on tele so far  · Await surgery team input  , consider repeat CT abdomen pelvis with contrast or CTA to evaluate further  Will defer to surgical team regarding need for additional imaging

## 2019-10-20 NOTE — ASSESSMENT & PLAN NOTE
· Since September she has had a progressive decline - she has had no appetite, not really eating/drinking with weakness    She was previously reportedly very independent prior to her admission at [facility] in September with UTI  · Geriatrics evaluation appreciated

## 2019-10-20 NOTE — ASSESSMENT & PLAN NOTE
Malnutrition Findings:   Malnutrition type: Chronic illness(Related to medical condition as evidenced by 10% weight loss over the past month and <75% energy intake needs met >1 month treated with ensure compact supplements)  Degree of Malnutrition: Other severe protein calorie malnutrition    BMI Findings: Body mass index is 20 53 kg/m²  Nutrition consult and supplements

## 2019-10-20 NOTE — ASSESSMENT & PLAN NOTE
· Patient has Rapp, this was placed on recent admission at [facility] where she developed urinary retention    · Patient has follow-up with Urology as an outpatient in about 1 week  · Continue with Rapp for now, would recommend voiding trial once patient is more ambulatory

## 2019-10-20 NOTE — ASSESSMENT & PLAN NOTE
· Baseline seems between 8-11  · At baseline  · Iron studies suggestive of low iron  · Will start p o  iron supplementation

## 2019-10-20 NOTE — ASSESSMENT & PLAN NOTE
· Suspect 2/2 splenic infarct  · Urine culture not significant this admission, 5153-3183 GNR  At [facility], pt was noted to have ESBL in urine which was suspected to be colonizer  · Admission blood cultrues are neg  · procalcitonin normal   · No leukocytosis  · Trend temp curve for now  · monitor off abx,  · Swallow eval and aspiration precautions>> modified diet  · If febrile persistently, will obtain blood cultures and pan CT

## 2019-10-20 NOTE — ASSESSMENT & PLAN NOTE
Lab Results   Component Value Date    HGBA1C 6 4 (H) 10/15/2019     Recent Labs     10/19/19  1628 10/19/19  2029 10/20/19  0557 10/20/19  1034   POCGLU 138 159* 128 139       Blood Sugar Average: Last 72 hrs:  (P) 126 [id number]     · Will hold oral agents  Would not resume glipizide on discharge   · Sliding scale insulin, Accu-Cheks  · ?adrenal insufficiency may be contributing to symptomatology as she has history of this      · Cosyntropin stim test with adequate response in cortisol levels, endocrinology input appreciated, hydrocortisone stopped  · Insulin sliding scale and Accu-Cheks for now - resume metformin only on discharge

## 2019-10-20 NOTE — ASSESSMENT & PLAN NOTE
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

## 2019-10-20 NOTE — ASSESSMENT & PLAN NOTE
· POA  Likely in setting of hyponatremia and possible urinary tract infection, could also be related to Delirium with underlying cognitive impairment  · Urine culture unremarkable - monitor off antibiotics at this time  · Has had admissions in the past related to confusion and hyponatremia  · Correct sodium with fluid restriction and salt tabs, see related plans  · Neuro checks   · She has hx of adrenal insufficiency, previously on steroids and then tapered off  She follows with Endocrinology as outpatient  · Cosyntropin stim test with adequate response in cortisol, hydrocortisone discontinued, she does not have adrenal insufficiency   · Mental status waxing and waning  · Vitamin B12 and folate normal   · MRI brain was done at [facility] last month which was negative for any acute infarct but possible small questionable hemorrhage versus area of mineralization  CT head without contrast in [facility] negative for bleed  · Noted and appreciate Geriatrics input  · Patient is nonfocal here without any focal motor weakness, will hold off on any intracranial imaging here for now

## 2019-10-20 NOTE — PROGRESS NOTES
PT resting comfortably at this time  Denies pain or SOB  Pt has no other issues or complaints  Will continue to monitor

## 2019-10-20 NOTE — ASSESSMENT & PLAN NOTE
· New intermittent left upper quadrant pain and tenderness  · Suspect related to splenic infarct  · Now with low-grade fever  · No leukocytosis  · CT scan abdomen pelvis with contrast in September 2019 at [facility] showed new moderate sized splenic infarcts  Patient does not have any known history of AFib and she is not on any anticoagulation  · Unclear etiology of splenic infarcts  · Will monitor on telemetry to evaluate for occult AFib  · Obtain surgical consultation  , consider repeat CT abdomen pelvis with contrast or CTA to evaluate further  Will defer to surgical team regarding need for additional imaging

## 2019-10-20 NOTE — ASSESSMENT & PLAN NOTE
· POA  Likely in setting of hyponatremia and possible urinary tract infection, could also be related to Delirium with underlying cognitive impairment  · Urine culture unremarkable - monitor off antibiotics at this time  · Has had admissions in the past related to confusion and hyponatremia  · Correct sodium with fluid restriction and salt tabs, see related plans  · Neuro checks   · She has hx of adrenal insufficiency, previously on steroids and then tapered off  She follows with Endocrinology as outpatient  · Cosyntropin stim test with adequate response in cortisol, hydrocortisone discontinued, she does not have adrenal insufficiency   · Mental status waxing and waning  · Vitamin B12 and folate normal   · MRI brain was done at [facility] last month which was negative for any acute infarct but possible small questionable hemorrhage versus area of mineralization  CT head without contrast in [facility] negative for bleed  · Noted and appreciate Geriatrics input  · Patient is nonfocal here without any focal motor weakness, will hold off on any intracranial imaging here for now

## 2019-10-20 NOTE — ASSESSMENT & PLAN NOTE
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

## 2019-10-20 NOTE — ASSESSMENT & PLAN NOTE
· Suspect 2/2 splenic infarct  · Urine culture not significant this admission, [id number] GNR  At [facility], pt was noted to have ESBL in urine which was suspected to be colonizer  · procalcitonin normal   · No leukocytosis  · Trend temp curve for now  · monitor off abx,  · Swallow eval and aspiration precautions  · If febrile persistently, will obtain blood cultures and pan CT

## 2019-10-20 NOTE — PROGRESS NOTES
Follow up Consultation    Nephrology   Benja Lee [de-identified] y o  female MRN: [id number]  Unit/Bed#: -01 Encounter: [id number]      Physician Requesting Consult: Bong Barrera MD  Reason for Consult:  Hyponatremia       ASSESSMENT/PLAN:  [de-identified] y o   female with pmh of chronic hyponatremia, hyperlipidemia, hypertension and diabetes who was admitted for lethargy and confusion  During the course of the hospital stay patient was being treated for UTI and metabolic encephalopathy thought to be secondary to her hyponatremia  She was admitted with sodium 126 mEq  Nephrology consulted for evaluation and management of hyponatremia on 10/19/2019    1) acute on chronic hypotonic hyponatremia:  - Baseline sodium appears to be 130-135 meq, in the past was diagnosed with adrenal insufficiency however at this time does not carry that diagnosis and 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  - records show that she has had issues of hyponatremia since September of 2019  - Most recent sodium level was 135 meq, stable and improved from yesterday  - patient was admitted with a sodium level of 126 meq  - Etiology of hyponatremia likely secondary to SIADH plus poor solute intake  - plasma osmolality = 276  - urine osmolality =  624  - urine sodium = 90  - uric acid = 4 1  - cortisol = 48 8  - TSH = 2 8  -  SPEP pending  - No acute indication for Hypertonic saline (HTS) at this time  - continue on fluid restriction of 1 5L/day  - Strict I/O   - Check BMP Q 24  - continue on salt tablets 2 g p o  B i d   - Patient has a baseline Creatinine of 0 5-0 8 mg/dL  - Most recent creatinine is 0 60 Mg/dL  - had issues with urinary retention currently Rapp in place will need voiding trial when patient is more alert     2)Blood pressure/hypertension:  - Optimize hemodynamics   - Maintain MAP > 65mmHg  - Avoid BP fluctuations  - on Norvasc 10 mg p o  Q day, atenolol 50 mg p o  B i d lisinopril 40 mg p o   Q day   - holding parameters adjusted to hold for SBP less than 130     3)H/H/anemia:  - most recent hemoglobin at 9 0grams/deciliter  - maintain hemoglobin greater than 8 grams/deciliter  - most recent iron studies from 10/20/2019 suggest iron deficiency with T sat of 7% and iron of 13  However in light of fevers would recommend holding off on IV iron  - start p o  ferrous sulfate     4)Volume status:  - Clinically patient appears to be euvolemic     5)DM:  - management as per Primary team  - on insulin     6) metabolic encephalopathy:  - unclear etiology could be secondary to UTI with some contribution to hyponatremia however unlikely due to hyponatremia at this time since sodium is close to her baseline        Thanks for the consult  Will continue to follow  Please call with questions/ concerns  Above-mentioned orders and Plan was discussed with with regards to hyponatremia the team in [address] MD, [facility], 10/20/2019, 10:02 AM              Objective :   Patient seen and examined in her room no overnight events T-max of a 100 3° hemodynamically stable  Urine output documented close to 475 cc in last 24 hours  Patient did not want to participate in exam or review of systems this a m  PHYSICAL EXAM  /57   Pulse 78   Temp 100 3 °F (37 9 °C)   Resp 20   Ht 5' 2" (1 575 m)   Wt 50 9 kg (112 lb 4 oz)   SpO2 96%   BMI 20 53 kg/m²   Temp (24hrs), Av 2 °F (37 9 °C), Min:100 1 °F (37 8 °C), Max:100 3 °F (37 9 °C)        Intake/Output Summary (Last 24 hours) at 10/20/2019 1002  Last data filed at 10/20/2019 0621  Gross per 24 hour   Intake 0 ml   Output 275 ml   Net -275 ml       I/O last 24 hours: In: 0   Out: 475 [Urine:475]      Current Weight: Weight - Scale: 50 9 kg (112 lb 4 oz)  First Weight: Weight - Scale: 56 7 kg (125 lb)(RD obtained bed scale weight)  Physical Exam   Constitutional: She appears well-developed  No distress  HENT:   Head: Normocephalic and atraumatic     Eyes: No scleral icterus  Neck: Neck supple  No JVD present  Cardiovascular: Normal heart sounds  Exam reveals no friction rub  Pulmonary/Chest: Effort normal    Abdominal: Soft  She exhibits no mass  There is no tenderness  Musculoskeletal: She exhibits no edema  Neurological: She is alert  Skin: Skin is warm  She is not diaphoretic  Nursing note and vitals reviewed  Review of Systems   Unable to perform ROS: Patient nonverbal       Scheduled Meds:  Current Facility-Administered Medications:  acetaminophen 650 mg Oral Q6H PRN Wilbert Lewis MD   aluminum-magnesium hydroxide-simethicone 30 mL Oral Q6H PRN Wilbert Lewis MD   amLODIPine 10 mg Oral Daily Ruben Mehta MD   atenolol 50 mg Oral Daily Ruben Mehta MD   docusate sodium 100 mg Oral BID PRN Wilbert Lewis MD   enoxaparin 40 mg Subcutaneous Daily Wilbert Lewis MD   ferrous sulfate 325 mg Oral Daily With Breakfast Ruben Mehta MD   insulin lispro 1-5 Units Subcutaneous HS Wilbert Lewis MD   insulin lispro 1-6 Units Subcutaneous TID AC Wilbert Lewis MD   lisinopril 40 mg Oral Daily Hussein Max MD   ondansetron 4 mg Intravenous Q6H PRN Wilbert Leiws MD   pantoprazole 40 mg Oral Early Morning Wilbert Lewis MD   pravastatin 40 mg Oral Daily With Lia Monet MD   sodium chloride 2 g Oral BID With Meals Wilbert Lewis MD       PRN Meds:   acetaminophen    aluminum-magnesium hydroxide-simethicone    docusate sodium    ondansetron    Continuous Infusions:       Invasive Devices:      Invasive Devices     Drain            Urethral Catheter Straight-tip 16 Fr  5 days                  LABORATORY:    Results from last 7 days   Lab Units 10/20/19  0449 10/19/19  0456 10/18/19  0532 10/17/19  0458 10/16/19  0440 10/15/19  2108 10/15/19  0923   WBC Thousand/uL 8 15 6 44 4 39 4 36 5 07 7 55 6 18   HEMOGLOBIN g/dL 9 0* 9 2* 10 5* 8 4* 9 1* 8 7* 8 9*   HEMATOCRIT % 29 4* 29 4* 34 5* 26 7* 29 4* 28 2* 28 8*   PLATELETS Thousands/uL 399* 418* 411* 424* 383 392* 394*   POTASSIUM mmol/L 4 1 4 2 5 0 4 1 4 7 4 4 4 8   CHLORIDE mmol/L 104 102 105 105 102 101 99*   CO2 mmol/L 22 21 17* 20* 20* 21 21   BUN mg/dL 14 11 15 20 20 17 15   CREATININE mg/dL 0 60 0 53* 0 61 0 56* 0 65 0 61 0 59*   CALCIUM mg/dL 8 6 8 5 8 8 8 7 8 7 8 6 8 6   MAGNESIUM mg/dL 1 5*  --   --   --   --   --  1 6   PHOSPHORUS mg/dL 3 3  --   --   --   --   --  3 2      rest all reviewed    RADIOLOGY:  XR chest pa & lateral   Final Result by Gela Asencio MD (10/19 2165)      Minimal left basilar airspace disease attributed to compressive atelectasis secondary small left pleural effusion  Correlate with clinical findings to exclude pneumonia and/or aspiration  Workstation performed: [id number]           Rest all reviewed    Portions of the record may have been created with voice recognition software  Occasional wrong word or "sound a like" substitutions may have occurred due to the inherent limitations of voice recognition software  Read the chart carefully and recognize, using context, where substitutions have occurred  If you have any questions, please contact the dictating provider

## 2019-10-20 NOTE — PLAN OF CARE
Problem: Prexisting or High Potential for Compromised Skin Integrity  Goal: Skin integrity is maintained or improved  Description  INTERVENTIONS:  - Identify patients at risk for skin breakdown  - Assess and monitor skin integrity  - Assess and monitor nutrition and hydration status  - Monitor labs   - Assess for incontinence   - Turn and reposition patient  - Assist with mobility/ambulation  - Relieve pressure over bony prominences  - Avoid friction and shearing  - Provide appropriate hygiene as needed including keeping skin clean and dry  - Evaluate need for skin moisturizer/barrier cream  - Collaborate with interdisciplinary team   - Patient/family teaching  - Consider wound care consult   Outcome: Progressing     Problem: Nutrition/Hydration-ADULT  Goal: Nutrient/Hydration intake appropriate for improving, restoring or maintaining nutritional needs  Description  Monitor and assess patient's nutrition/hydration status for malnutrition  Collaborate with interdisciplinary team and initiate plan and interventions as ordered  Monitor patient's weight and dietary intake as ordered or per policy  Utilize nutrition screening tool and intervene as necessary  Determine patient's food preferences and provide high-protein, high-caloric foods as appropriate       INTERVENTIONS:  - Monitor oral intake, urinary output, labs, and treatment plans  - Assess nutrition and hydration status and recommend course of action  - Evaluate amount of meals eaten  - Assist patient with eating if necessary   - Allow adequate time for meals  - Recommend/ encourage appropriate diets, oral nutritional supplements, and vitamin/mineral supplements  - Order, calculate, and assess calorie counts as needed  - Recommend, monitor, and adjust tube feedings and TPN/PPN based on assessed needs  - Assess need for intravenous fluids  - Provide specific nutrition/hydration education as appropriate  - Include patient/family/caregiver in decisions related to nutrition  Outcome: Progressing     Problem: Potential for Falls  Goal: Patient will remain free of falls  Description  INTERVENTIONS:  - Assess patient frequently for physical needs  -  Identify cognitive and physical deficits and behaviors that affect risk of falls    -  [place] fall precautions as indicated by assessment   - Educate patient/family on patient safety including physical limitations  - Instruct patient to call for assistance with activity based on assessment  - Modify environment to reduce risk of injury  - Consider OT/PT consult to assist with strengthening/mobility  Outcome: Progressing     Problem: NEUROSENSORY - ADULT  Goal: Achieves stable or improved neurological status  Description  INTERVENTIONS  - Monitor and report changes in neurological status  - Monitor vital signs such as temperature, blood pressure, glucose, and any other labs ordered   - Initiate measures to prevent increased intracranial pressure  - Monitor for seizure activity and implement precautions if appropriate      Outcome: Progressing     Problem: RESPIRATORY - ADULT  Goal: Achieves optimal ventilation and oxygenation  Description  INTERVENTIONS:  - Assess for changes in respiratory status  - Assess for changes in mentation and behavior  - Position to facilitate oxygenation and minimize respiratory effort  - Encourage broncho-pulmonary hygiene including cough, deep breathe, Incentive Spirometry  - Assess and instruct to report SOB or any respiratory difficulty  - Respiratory Therapy support as indicated   Outcome: Progressing     Problem: GASTROINTESTINAL - ADULT  Goal: Maintains adequate nutritional intake  Description  INTERVENTIONS:  - Monitor percentage of each meal consumed  - Identify factors contributing to decreased intake, treat as appropriate  - Assist with meals as needed  - Monitor I&O, weight, and lab values if indicated  - Obtain nutrition services referral as needed  Outcome: Progressing     Problem: GENITOURINARY - ADULT  Goal: Urinary catheter remains patent  Description  INTERVENTIONS:  - Assess patency of urinary catheter  - If patient has a chronic herrera, consider changing catheter if non-functioning  - Follow guidelines for intermittent irrigation of non-functioning urinary catheter  Outcome: Progressing     Problem: METABOLIC, FLUID AND ELECTROLYTES - ADULT  Goal: Electrolytes maintained within normal limits  Description  INTERVENTIONS:  - Monitor labs and assess patient for signs and symptoms of electrolyte imbalances  - Administer electrolyte replacement as ordered  - Monitor response to electrolyte replacements, including repeat lab results as appropriate  - Instruct patient on fluid and nutrition as appropriate  Outcome: Progressing  Goal: Fluid balance maintained  Description  INTERVENTIONS:  - Monitor labs   - Monitor I/O and WT  - Instruct patient on fluid and nutrition as appropriate  - Assess for signs & symptoms of volume excess or deficit  Outcome: Progressing  Goal: Glucose maintained within target range  Description  INTERVENTIONS:  - Monitor Blood Glucose as ordered  - Assess for signs and symptoms of hyperglycemia and hypoglycemia  - Administer ordered medications to maintain glucose within target range  - Assess nutritional intake and initiate nutrition service referral as needed  Outcome: Progressing     Problem: SKIN/TISSUE INTEGRITY - ADULT  Goal: Skin integrity remains intact  Description  INTERVENTIONS  - Identify patients at risk for skin breakdown  - Assess and monitor skin integrity  - Assess and monitor nutrition and hydration status  - Monitor labs (i e  albumin)  - Assess for incontinence   - Turn and reposition patient  - Assist with mobility/ambulation  - Relieve pressure over bony prominences  - Avoid friction and shearing  - Provide appropriate hygiene as needed including keeping skin clean and dry  - Evaluate need for skin moisturizer/barrier cream  - Collaborate with interdisciplinary team (i e  Nutrition, Rehabilitation, etc )   - Patient/family teaching  Outcome: Progressing  Goal: Oral mucous membranes remain intact  Description  INTERVENTIONS  - Assess oral mucosa and hygiene practices  - Implement preventative oral hygiene regimen  - Implement oral medicated treatments as ordered  - Initiate Nutrition services referral as needed  Outcome: Progressing     Problem: HEMATOLOGIC - ADULT  Goal: Maintains hematologic stability  Description  INTERVENTIONS  - Monitor labs      Outcome: Progressing

## 2019-10-20 NOTE — ASSESSMENT & PLAN NOTE
Lab Results   Component Value Date    HGBA1C 6 4 (H) 10/15/2019     Recent Labs     10/19/19  1628 10/19/19  2029 10/20/19  0557 10/20/19  1034   POCGLU 138 159* 128 139       Blood Sugar Average: Last 72 hrs:  (P) 126 [id number]     · Will hold oral agents  Would not resume glipizide on discharge   · Sliding scale insulin, Accu-Cheks  · ?adrenal insufficiency may be contributing to symptomatology as she has history of this      · Cosyntropin stim test with adequate response in cortisol levels, endocrinology input appreciated, hydrocortisone stopped  · Insulin sliding scale and Accu-Cheks for now - resume metformin only on discharge

## 2019-10-21 PROBLEM — R06.82 TACHYPNEA: Status: RESOLVED | Noted: 2019-10-18 | Resolved: 2019-10-21

## 2019-10-21 LAB
ANION GAP SERPL CALCULATED.3IONS-SCNC: 8 MMOL/L (ref 4–13)
BASOPHILS # BLD AUTO: 0.02 THOUSANDS/ΜL (ref 0–0.1)
BASOPHILS NFR BLD AUTO: 0 % (ref 0–1)
BUN SERPL-MCNC: 16 MG/DL (ref 5–25)
CALCIUM SERPL-MCNC: 8.7 MG/DL (ref 8.3–10.1)
CHLORIDE SERPL-SCNC: 105 MMOL/L (ref 100–108)
CO2 SERPL-SCNC: 23 MMOL/L (ref 21–32)
CREAT SERPL-MCNC: 0.52 MG/DL (ref 0.6–1.3)
EOSINOPHIL # BLD AUTO: 0.07 THOUSAND/ΜL (ref 0–0.61)
EOSINOPHIL NFR BLD AUTO: 1 % (ref 0–6)
ERYTHROCYTE [DISTWIDTH] IN BLOOD BY AUTOMATED COUNT: 18.1 % (ref 11.6–15.1)
GFR SERPL CREATININE-BSD FRML MDRD: 91 ML/MIN/1.73SQ M
GLUCOSE SERPL-MCNC: 101 MG/DL (ref 65–140)
GLUCOSE SERPL-MCNC: 109 MG/DL (ref 65–140)
GLUCOSE SERPL-MCNC: 133 MG/DL (ref 65–140)
GLUCOSE SERPL-MCNC: 139 MG/DL (ref 65–140)
GLUCOSE SERPL-MCNC: 197 MG/DL (ref 65–140)
HCT VFR BLD AUTO: 32.2 % (ref 34.8–46.1)
HGB BLD-MCNC: 9.8 G/DL (ref 11.5–15.4)
IMM GRANULOCYTES # BLD AUTO: 0.04 THOUSAND/UL (ref 0–0.2)
IMM GRANULOCYTES NFR BLD AUTO: 1 % (ref 0–2)
LYMPHOCYTES # BLD AUTO: 0.76 THOUSANDS/ΜL (ref 0.6–4.47)
LYMPHOCYTES NFR BLD AUTO: 12 % (ref 14–44)
MCH RBC QN AUTO: 23 PG (ref 26.8–34.3)
MCHC RBC AUTO-ENTMCNC: 30.4 G/DL (ref 31.4–37.4)
MCV RBC AUTO: 75 FL (ref 82–98)
MONOCYTES # BLD AUTO: 0.22 THOUSAND/ΜL (ref 0.17–1.22)
MONOCYTES NFR BLD AUTO: 4 % (ref 4–12)
NEUTROPHILS # BLD AUTO: 5.22 THOUSANDS/ΜL (ref 1.85–7.62)
NEUTS SEG NFR BLD AUTO: 82 % (ref 43–75)
NRBC BLD AUTO-RTO: 0 /100 WBCS
PLATELET # BLD AUTO: 432 THOUSANDS/UL (ref 149–390)
PMV BLD AUTO: 9.8 FL (ref 8.9–12.7)
POTASSIUM SERPL-SCNC: 3.9 MMOL/L (ref 3.5–5.3)
RBC # BLD AUTO: 4.27 MILLION/UL (ref 3.81–5.12)
SODIUM SERPL-SCNC: 136 MMOL/L (ref 136–145)
WBC # BLD AUTO: 6.33 THOUSAND/UL (ref 4.31–10.16)

## 2019-10-21 PROCEDURE — 85025 COMPLETE CBC W/AUTO DIFF WBC: CPT | Performed by: INTERNAL MEDICINE

## 2019-10-21 PROCEDURE — 99232 SBSQ HOSP IP/OBS MODERATE 35: CPT | Performed by: INTERNAL MEDICINE

## 2019-10-21 PROCEDURE — 92610 EVALUATE SWALLOWING FUNCTION: CPT

## 2019-10-21 PROCEDURE — 80048 BASIC METABOLIC PNL TOTAL CA: CPT | Performed by: INTERNAL MEDICINE

## 2019-10-21 PROCEDURE — 82948 REAGENT STRIP/BLOOD GLUCOSE: CPT

## 2019-10-21 RX ORDER — SODIUM CHLORIDE 1000 MG
1 TABLET, SOLUBLE MISCELLANEOUS 2 TIMES DAILY WITH MEALS
Status: DISCONTINUED | OUTPATIENT
Start: 2019-10-21 | End: 2019-10-30

## 2019-10-21 RX ORDER — AMLODIPINE BESYLATE 5 MG/1
5 TABLET ORAL DAILY
Status: DISCONTINUED | OUTPATIENT
Start: 2019-10-22 | End: 2019-10-23

## 2019-10-21 RX ADMIN — SODIUM CHLORIDE TAB 1 GM 1 G: 1 TAB at 17:25

## 2019-10-21 RX ADMIN — SODIUM CHLORIDE TAB 1 GM 2 G: 1 TAB at 10:53

## 2019-10-21 RX ADMIN — PANTOPRAZOLE SODIUM 40 MG: 40 TABLET, DELAYED RELEASE ORAL at 05:56

## 2019-10-21 RX ADMIN — ENOXAPARIN SODIUM 40 MG: 40 INJECTION SUBCUTANEOUS at 09:52

## 2019-10-21 RX ADMIN — PRAVASTATIN SODIUM 40 MG: 20 TABLET ORAL at 17:25

## 2019-10-21 RX ADMIN — FERROUS SULFATE TAB 325 MG (65 MG ELEMENTAL FE) 325 MG: 325 (65 FE) TAB at 09:51

## 2019-10-21 RX ADMIN — INSULIN LISPRO 2 UNITS: 100 INJECTION, SOLUTION INTRAVENOUS; SUBCUTANEOUS at 10:57

## 2019-10-21 NOTE — PLAN OF CARE
Problem: Prexisting or High Potential for Compromised Skin Integrity  Goal: Skin integrity is maintained or improved  Description  INTERVENTIONS:  - Identify patients at risk for skin breakdown  - Assess and monitor skin integrity  - Assess and monitor nutrition and hydration status  - Monitor labs   - Assess for incontinence   - Turn and reposition patient  - Assist with mobility/ambulation  - Relieve pressure over bony prominences  - Avoid friction and shearing  - Provide appropriate hygiene as needed including keeping skin clean and dry  - Evaluate need for skin moisturizer/barrier cream  - Collaborate with interdisciplinary team   - Patient/family teaching  - Consider wound care consult   Outcome: Progressing     Problem: Nutrition/Hydration-ADULT  Goal: Nutrient/Hydration intake appropriate for improving, restoring or maintaining nutritional needs  Description  Monitor and assess patient's nutrition/hydration status for malnutrition  Collaborate with interdisciplinary team and initiate plan and interventions as ordered  Monitor patient's weight and dietary intake as ordered or per policy  Utilize nutrition screening tool and intervene as necessary  Determine patient's food preferences and provide high-protein, high-caloric foods as appropriate       INTERVENTIONS:  - Monitor oral intake, urinary output, labs, and treatment plans  - Assess nutrition and hydration status and recommend course of action  - Evaluate amount of meals eaten  - Assist patient with eating if necessary   - Allow adequate time for meals  - Recommend/ encourage appropriate diets, oral nutritional supplements, and vitamin/mineral supplements  - Order, calculate, and assess calorie counts as needed  - Recommend, monitor, and adjust tube feedings and TPN/PPN based on assessed needs  - Assess need for intravenous fluids  - Provide specific nutrition/hydration education as appropriate  - Include patient/family/caregiver in decisions related to nutrition  Outcome: Progressing     Problem: Potential for Falls  Goal: Patient will remain free of falls  Description  INTERVENTIONS:  - Assess patient frequently for physical needs  -  Identify cognitive and physical deficits and behaviors that affect risk of falls    -  [place] fall precautions as indicated by assessment   - Educate patient/family on patient safety including physical limitations  - Instruct patient to call for assistance with activity based on assessment  - Modify environment to reduce risk of injury  - Consider OT/PT consult to assist with strengthening/mobility  Outcome: Progressing     Problem: NEUROSENSORY - ADULT  Goal: Achieves stable or improved neurological status  Description  INTERVENTIONS  - Monitor and report changes in neurological status  - Monitor vital signs such as temperature, blood pressure, glucose, and any other labs ordered   - Initiate measures to prevent increased intracranial pressure  - Monitor for seizure activity and implement precautions if appropriate      Outcome: Progressing     Problem: RESPIRATORY - ADULT  Goal: Achieves optimal ventilation and oxygenation  Description  INTERVENTIONS:  - Assess for changes in respiratory status  - Assess for changes in mentation and behavior  - Position to facilitate oxygenation and minimize respiratory effort  - Encourage broncho-pulmonary hygiene including cough, deep breathe, Incentive Spirometry  - Assess and instruct to report SOB or any respiratory difficulty  - Respiratory Therapy support as indicated   Outcome: Progressing     Problem: GASTROINTESTINAL - ADULT  Goal: Maintains adequate nutritional intake  Description  INTERVENTIONS:  - Monitor percentage of each meal consumed  - Identify factors contributing to decreased intake, treat as appropriate  - Assist with meals as needed  - Monitor I&O, weight, and lab values if indicated  - Obtain nutrition services referral as needed  Outcome: Progressing     Problem: GENITOURINARY - ADULT  Goal: Urinary catheter remains patent  Description  INTERVENTIONS:  - Assess patency of urinary catheter  - If patient has a chronic herrera, consider changing catheter if non-functioning  - Follow guidelines for intermittent irrigation of non-functioning urinary catheter  Outcome: Progressing     Problem: METABOLIC, FLUID AND ELECTROLYTES - ADULT  Goal: Electrolytes maintained within normal limits  Description  INTERVENTIONS:  - Monitor labs and assess patient for signs and symptoms of electrolyte imbalances  - Administer electrolyte replacement as ordered  - Monitor response to electrolyte replacements, including repeat lab results as appropriate  - Instruct patient on fluid and nutrition as appropriate  Outcome: Progressing  Goal: Fluid balance maintained  Description  INTERVENTIONS:  - Monitor labs   - Monitor I/O and WT  - Instruct patient on fluid and nutrition as appropriate  - Assess for signs & symptoms of volume excess or deficit  Outcome: Progressing  Goal: Glucose maintained within target range  Description  INTERVENTIONS:  - Monitor Blood Glucose as ordered  - Assess for signs and symptoms of hyperglycemia and hypoglycemia  - Administer ordered medications to maintain glucose within target range  - Assess nutritional intake and initiate nutrition service referral as needed  Outcome: Progressing     Problem: SKIN/TISSUE INTEGRITY - ADULT  Goal: Skin integrity remains intact  Description  INTERVENTIONS  - Identify patients at risk for skin breakdown  - Assess and monitor skin integrity  - Assess and monitor nutrition and hydration status  - Monitor labs (i e  albumin)  - Assess for incontinence   - Turn and reposition patient  - Assist with mobility/ambulation  - Relieve pressure over bony prominences  - Avoid friction and shearing  - Provide appropriate hygiene as needed including keeping skin clean and dry  - Evaluate need for skin moisturizer/barrier cream  - Collaborate with interdisciplinary team (i e  Nutrition, Rehabilitation, etc )   - Patient/family teaching  Outcome: Progressing  Goal: Oral mucous membranes remain intact  Description  INTERVENTIONS  - Assess oral mucosa and hygiene practices  - Implement preventative oral hygiene regimen  - Implement oral medicated treatments as ordered  - Initiate Nutrition services referral as needed  Outcome: Progressing     Problem: HEMATOLOGIC - ADULT  Goal: Maintains hematologic stability  Description  INTERVENTIONS  - Monitor labs      Outcome: Progressing

## 2019-10-21 NOTE — PROGRESS NOTES
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  · Presented with sodium of 126  Sodium 136 today  · TSH normal this admission  · Cosyntropin stim test negative for adrenal insufficiency  · CT chest abdomen pelvis was done at [facility] to rule out any occult malignancy as a cause of SIADH which was negative for any malignancy  · Urine sodium 90 on 10/19, urine osmolarity 600  · Continue with b i d  Salt tabs and fluid restriction to 1500 cc  · Appreciate Nephrology input  Left upper quadrant pain  Assessment & Plan  · New intermittent left upper quadrant pain and tenderness  · Suspect related to splenic infarct  · Now with low-grade fever  · No leukocytosis  · CT scan abdomen pelvis with contrast in September 2019 at [facility] showed new moderate sized splenic infarcts  Patient does not have any known history of AFib and she is not on any anticoagulation  · Unclear etiology of splenic infarcts  · Will monitor on telemetry to evaluate for occult AFib >> no events of afib on tele so far  · Await surgery team input  , consider repeat CT abdomen pelvis with contrast or CTA to evaluate further  Will defer to surgical team regarding need for additional imaging  * Metabolic encephalopathy  Assessment & Plan  · POA  Likely in setting of hyponatremia and possible urinary tract infection, could also be related to Delirium with underlying cognitive impairment    · Urine culture unremarkable - monitor off antibiotics at this time  · Has had admissions in the past related to confusion and hyponatremia  · Correct sodium with fluid restriction and salt tabs, see related plans  · Neuro checks   · She has hx of adrenal insufficiency, previously on steroids and then tapered off  She follows with Endocrinology as outpatient  · Cosyntropin stim test with adequate response in cortisol, hydrocortisone discontinued, she does not have adrenal insufficiency   · Mental status waxing and waning  · Vitamin B12 and folate normal   · MRI brain was done at [facility] last month which was negative for any acute infarct but possible small questionable hemorrhage versus area of mineralization  CT head without contrast in [facility] negative for bleed  · Noted and appreciate Geriatrics input  · Patient is nonfocal here without any focal motor weakness, will hold off on any intracranial imaging here for now  Microcytic anemia  Assessment & Plan  · Baseline seems between 8-11  · At baseline  · Iron studies suggestive of low iron  · Will start p o  iron supplementation  Low grade fever  Assessment & Plan  · Suspect 2/2 splenic infarct  · Urine culture not significant this admission, 1335-4729 GNR  At [facility], pt was noted to have ESBL in urine which was suspected to be colonizer  · Admission blood cultrues are neg  · procalcitonin normal   · No leukocytosis  · Trend temp curve for now  · monitor off abx,  · Swallow eval and aspiration precautions>> modified diet  · If febrile persistently, will obtain blood cultures and pan CT  Bradycardia  Assessment & Plan  · Resolved with lower dose of atenolol  · EKG shows NSR  · Monitor on tele for 24 hours to evaluate for Afib>> no afib so far  · TSH normal this admission  · Decreased atenolol dose from b i d  To once a day      Severe protein-calorie malnutrition ([address] )  Assessment & Plan  Malnutrition Findings:   Malnutrition type: Chronic illness(Related to medical condition as evidenced by 10% weight loss over the past month and <75% energy intake needs met >1 month treated with ensure compact supplements)  Degree of Malnutrition: Other severe protein calorie malnutrition    BMI Findings: Body mass index is 20 53 kg/m²  Nutrition consult and supplements  Urinary retention  Assessment & Plan  · Patient has Rapp, this was placed on recent admission at [facility] where she developed urinary retention  · Patient has follow-up with Urology as an outpatient in about 1 week  · Continue with Rapp for now, would recommend voiding trial once patient is more ambulatory    Failure to thrive in adult  Assessment & Plan  · Since September she has had a progressive decline - she has had no appetite, not really eating/drinking with weakness  She was previously reportedly very independent prior to her admission at [facility] in September with UTI  · Geriatrics evaluation appreciated       HLD (hyperlipidemia)  Assessment & Plan  · Continue statin    Essential hypertension  Assessment & Plan  · Will continue with enalapril/lisinopril, atenolol and amlodipine with holding parameters  Type 2 diabetes mellitus with diabetic neuropathy, without long-term current use of insulin [facility])  Assessment & Plan  Lab Results   Component Value Date    HGBA1C 6 4 (H) 10/15/2019     Recent Labs     10/19/19  1628 10/19/19  2029 10/20/19  0557 10/20/19  1034   POCGLU 138 159* 128 139       Blood Sugar Average: Last 72 hrs:  (P) 126 [id number]     · Will hold oral agents  Would not resume glipizide on discharge   · Sliding scale insulin, Accu-Cheks  · ?adrenal insufficiency may be contributing to symptomatology as she has history of this  · Cosyntropin stim test with adequate response in cortisol levels, endocrinology input appreciated, hydrocortisone stopped  · Insulin sliding scale and Accu-Cheks for now - resume metformin only on discharge    Tachypnearesolved as of 10/21/2019  Assessment & Plan  · Now resolved and patient seems comfortable  · SpO2 wnl on room air  · Chest x-ray shows small pleural effusion, compression atelectasis  Consider pneumonia clinically    · Recent echocardiogram in 2019 at [facility] showed EF of 50-55% with intermediate diastolic dysfunction and mild pulmonary hypertension  · No overt signs of pneumonia and patient denies any cough  · procalcitonin normal   · Incentive spirometry  · Encourage ambulation  · Respiratory protocol     · Swallow eval         VTE Pharmacologic Prophylaxis:   Pharmacologic: Heparin  Mechanical VTE Prophylaxis in Place: Yes    Patient Centered Rounds: I have performed bedside rounds with nursing staff today  Discussions with Specialists or Other Care Team Provider: GAYATRI    Education and Discussions with Family / Patient: plan of care, patient and her daughter on phone    Time Spent for Care: 30 minutes More than 50% of total time spent on counseling and coordination of care as described above  Current Length of Stay: 7 day(s)    Current Patient Status: Inpatient   Certification Statement: The patient will continue to require additional inpatient hospital stay due to not medically stable pending surgical eval    Discharge Plan: SNF when stable  Can be discharged in next 24 hours if no intevrention is planned per surgery team and if Na is stable    Code Status: Level 1 - Full Code      Subjective:   Temp spike of 100 9 last evening  No fever this morning  Pt is much more communicative today  Denies any cough or chest pain  Reports mild LUQ pain  No nausea, vomitng    Objective:     Vitals:   Temp (24hrs), Av 4 °F (37 4 °C), Min:97 °F (36 1 °C), Max:100 9 °F (38 3 °C)    Temp:  [97 °F (36 1 °C)-100 9 °F (38 3 °C)] 97 °F (36 1 °C)  HR:  [64-85] 73  Resp:  [19-20] 20  BP: (101-119)/(43-62) 111/47  SpO2:  [95 %-98 %] 97 %  Body mass index is 21 03 kg/m²  Input and Output Summary (last 24 hours):        Intake/Output Summary (Last 24 hours) at 10/21/2019 1207  Last data filed at 10/21/2019 0736  Gross per 24 hour   Intake 570 ml   Output 225 ml   Net 345 ml       Physical Exam:     Physical Exam  Constitutional: No distress  Eyes: Pupils are equal, round, and reactive to light  Cardiovascular: Normal rate, regular rhythm and normal heart sounds    No murmur heard  Pulmonary/Chest: Effort normal and breath sounds normal  No respiratory distress  She has no wheezes  She has no rales  Abdominal: Soft  Bowel sounds are normal  She exhibits no distension  LUQ tenderness (+)  Musculoskeletal: She exhibits no edema  Neurological: She is alert  Oriented * 3 today for me  Squeezes fingers and wiggles toes bilaterally  No slurred speech   Skin: Skin is warm       Additional Data:     Labs:    Results from last 7 days   Lab Units 10/21/19  0604   WBC Thousand/uL 6 33   HEMOGLOBIN g/dL 9 8*   HEMATOCRIT % 32 2*   PLATELETS Thousands/uL 432*   NEUTROS PCT % 82*   LYMPHS PCT % 12*   MONOS PCT % 4   EOS PCT % 1     Results from last 7 days   Lab Units 10/21/19  0604  10/15/19  0923   SODIUM mmol/L 136   < > 128*   POTASSIUM mmol/L 3 9   < > 4 8   CHLORIDE mmol/L 105   < > 99*   CO2 mmol/L 23   < > 21   BUN mg/dL 16   < > 15   CREATININE mg/dL 0 52*   < > 0 59*   ANION GAP mmol/L 8   < > 8   CALCIUM mg/dL 8 7   < > 8 6   ALBUMIN g/dL  --   --  2 2*   TOTAL BILIRUBIN mg/dL  --   --  0 27   ALK PHOS U/L  --   --  58   ALT U/L  --   --  16   AST U/L  --   --  26   GLUCOSE RANDOM mg/dL 133   < > 93    < > = values in this interval not displayed  Results from last 7 days   Lab Units 10/21/19  1044 10/21/19  0602 10/20/19  2030 10/20/19  1631 10/20/19  1034 10/20/19  0557 10/19/19  2029 10/19/19  1628 10/19/19  1041 10/19/19  0553 10/18/19  2109 10/18/19  1548   POC GLUCOSE mg/dl 197* 139 155* 116 139 128 159* 138 142* 124 130 109     Results from last 7 days   Lab Units 10/15/19  0923   HEMOGLOBIN A1C % 6 4*     Results from last 7 days   Lab Units 10/20/19  0449 10/16/19  0440   PROCALCITONIN ng/ml 0 07 0 07           * I Have Reviewed All Lab Data Listed Above    * Additional Pertinent Lab Tests Reviewed: All Labs Within Last 24 Hours Reviewed    Imaging:    XR abdomen 1 vw portable   Final Result by Forrest Prajapati MD (10/21 0616)      There is a nonobstructive bowel gas pattern  Workstation performed: [id number]         XR chest pa & lateral   Final Result by Joe Paige MD (10/19 0936)      Minimal left basilar airspace disease attributed to compressive atelectasis secondary small left pleural effusion  Correlate with clinical findings to exclude pneumonia and/or aspiration  Workstation performed: [id number]             Recent Cultures (last 7 days):     Results from last 7 days   Lab Units 10/15/19  1435 10/14/19  2009   BLOOD CULTURE   --  No Growth After 5 Days  No Growth After 5 Days  URINE CULTURE  [id number] cfu/ml Gram Negative Nirav*  --        Last 24 Hours Medication List:     Current Facility-Administered Medications:  acetaminophen 650 mg Oral Q6H PRN Malcolm Spivey MD   aluminum-magnesium hydroxide-simethicone 30 mL Oral Q6H PRN Malcolm Spivey MD   [START ON 10/22/2019] amLODIPine 5 mg Oral Daily Suellen Meckel, PA-C   atenolol 50 mg Oral Daily Nicki Vegas MD   docusate sodium 100 mg Oral BID PRN Malcolm Spivey MD   enoxaparin 40 mg Subcutaneous Daily Malcolm Spivey MD   ferrous sulfate 325 mg Oral Daily With Breakfast Nicki Vegas MD   insulin lispro 1-5 Units Subcutaneous HS Malcolm Spivey MD   insulin lispro 1-6 Units Subcutaneous TID AC Malcolm Spivey MD   lisinopril 40 mg Oral Daily Sabra Jones MD   ondansetron 4 mg Intravenous Q6H PRN Malcolm Spivey MD   pantoprazole 40 mg Oral Early Morning Malcolm Spivey MD   pravastatin 40 mg Oral Daily With Ala Lipoma, MD   sodium chloride 1 g Oral BID With Meals Suellen Meckel, PA-C        Today, Patient Was Seen By: Nicki Vegas MD    ** Please Note: Dictation voice to text software may have been used in the creation of this document   **

## 2019-10-21 NOTE — SPEECH THERAPY NOTE
Bedside Swallow Evaluation:    Summary:  Pt presents w/ a mild oral dysphagia characterized by min prolonged mastication and transfer time with level 3 meats  Despite this, oral clearance is adequate with no signs of aspiration observed with level 3 solids or thin liquids  RN reports some gagging with salt tab which is given in pudding  However, this is most likely due to taste  No other concerns for aspiration are present  However, PO intake has been poor  Patient did have low grade fever  Dysphagia consult placed to r/o aspiration  From H&P: [facility] and the [facility] is a [de-identified] y o  female who has a previous history of hyponatremia and urinary tract infection  Likewise, the patient has a history of essential hypertension, hyperlipidemia, and diabetes mellitus type 2    According to the patient's daughter who is currently the historian, she used to be a very functional 77-year-old who drove herself going to places and doing things on her own with good level of activities of daily living  However approximately September, she had a bout of urinary tract infection with hyponatremia and since then it seems that her functioning has been very limited  She is supposed to be on sodium tablets 1 gram twice daily along with her usual medications for blood pressure and diabetes  This also included Ace inhibitor (enalapril)  Ever since the patient was sent home, it seems that she has been not good about eating proper amounts of food and sometimes for goes eating saying that she is not hungry  With that, it seems that the patient has been becoming more lethargic and recently has become more confused  The patient came to the emergency room and was seen to be more lethargic as well as confused  Noted that the patient was given a bolus of 500 milliliters of fluids with improvement of mental status but remains to be on mindful of surroundings although much more awake and alert    Noted is that the patient's urinalysis shows presence of bacteria  She does have a indwelling Rapp catheter  Patient also was noted to be hyponatremic 126    That said, the patient would be treated for urinary tract infection and therefore Rocephin was started  She is also to receive fluids with checks of metabolic profile tomorrow  Meanwhile the patient needs to have an assessment of ADLs in the form of cognitive occupational therapy and usual occupational therapy with case management evaluation  She also needs nutrition consult to monitor food intake and caloric needs     Currently, patient is resting well  Although she is awake  She is not mindful of surroundings however can have short responses and brief attention span  She is hard of hearing and therefore can't hear more so on the right ear  Patient was seen by ST on 10/17/19 and kept on a level 3 diet as dentures are not currently with patient in hospital     Recommendations:  Diet: Level 3/soft   Liquid: Thin   Meds: Tolerating whole in puree and with liquids   Supervision: Encourage as needed   Positioning:Upright  Strategies: Pt to take PO/Meds only when fully alert and upright  Oral care: As needed     Therapy Prognosis: Fair  Prognosis considerations: Poor interest in PO intake  Reports poor appetite   Frequency: 2-3 f/u sessions    Consider consult w/:  Nutrition    Reason for consult:  R/o aspiration  poor intake    Precautions:  Contact    Current diet:  Level 3 with thin   Premorbid diet[de-identified]  Regular with thin   Previous VBS:  None   O2 requirement:  None  Voice/Speech:  WFL  Social:  Was at rehab   Follows commands:  WFL                        Cognitive Status:  Awake and alert   Oral [facility] exam:  Dentition: Edentulous     Items administered:  Puree, soft solid, thin liquids, meds whole in applesauce  Liquids were taken by straw/cup       Oral stage: Mild  Lip closure: WFL  Mastication: Min prolonged  Bolus formation: WFL  Bolus control: WFL  Transfer: Min prolonged  Oral residue: No  Pocketing: No    Pharyngeal stage: WFL  Swallow promptness: Appears adequate  Laryngeal rise: Not palpated  Wet voice: No  Throat clear: No  Cough: No  Secondary swallows: No  Audible swallows: No  No overt s/s aspiration    Esophageal stage:  No s/s reported    Results d/w:  Pt, nursing    Goal(s):  Pt will tolerate least restrictive diet w/out s/s aspiration or oral/pharyngeal difficulties

## 2019-10-21 NOTE — PROGRESS NOTES
NEPHROLOGY PROGRESS NOTE   Pam Oliveira [de-identified] y o  female MRN: [id number]  Unit/Bed#: -01 Encounter: [id number]      ASSESSMENT/PLAN:  1  Acute on chronic hyponatremia:  Admitted with a sodium of 126  Baseline sodium around 130-135  In the past was diagnosed with adrenal insufficiency but cortisone okay  Suspected to have SIADH this admission  · Workup:  Serum osmolality 276, urine osmolality 624, urine sodium 90, uric acid 4 1, cortisol 40 8, TSH 2 8  · SPEP pending   ·  currently on sodium chloride 2 g b i d  and 1500 cc per day oral fluid restriction  · Sodium up to 136 today so will decrease sodium chloride to 1 g b i d   · CT chest, abdomen and pelvis on 09/29 at [facility]:  No signs of malignancy  · Check a m  BMP  2  Anemia:  Hemoglobin 9 8 today  · Iron saturation 7%, ferritin 608  · Started on oral iron by primary team  3  Encephalopathy:  Originally Due to hyponatremia and UTI  Now sodium is much better and not contributing to her mental status  4  Hypertension:  Blood pressure on the low side  · Currently on amlodipine 10 mg daily, atenolol 50 mg daily, lisinopril 40 mg daily  · Decrease amlodipine to 5 mg daily and continue hold parameters on blood pressure meds        SUBJECTIVE:  Feeling okay  No chest pain or shortness of breath  No nausea, vomiting or diarrhea  She thinks she is eating okay but says that other people do not  She is a little constipated      OBJECTIVE:  Current Weight: Weight - Scale: 52 2 kg (115 lb)  Vitals:    10/21/19 0956   BP: (!) 111/47   Pulse: 73   Resp:    Temp:    SpO2: 97%       Intake/Output Summary (Last 24 hours) at 10/21/2019 1053  Last data filed at 10/20/2019 1700  Gross per 24 hour   Intake 510 ml   Output 225 ml   Net 285 ml       General: no acute distress   Skin: no rash   Eyes: sclera anicteric   ENT: moist mucous membranes   Neck: no JVD   Chest: clear to auscultation    CVS: regular rate and rhythm  Abdomen: soft, non-tender, non-distended  Extremities: no edema    Neuro: awake and alert  Psych: appropriate affect      Medications:  Scheduled Meds:  Current Facility-Administered Medications:  acetaminophen 650 mg Oral Q6H PRN Kat Parish MD   aluminum-magnesium hydroxide-simethicone 30 mL Oral Q6H PRN Kat Parish MD   amLODIPine 10 mg Oral Daily Mendez Gomez MD   atenolol 50 mg Oral Daily Mendez Gomez MD   docusate sodium 100 mg Oral BID PRN Kat Parish MD   enoxaparin 40 mg Subcutaneous Daily Kat Parish MD   ferrous sulfate 325 mg Oral Daily With Breakfast Mendez Gomez MD   insulin lispro 1-5 Units Subcutaneous HS Kat Parish MD   insulin lispro 1-6 Units Subcutaneous TID AC Kat Parish MD   lisinopril 40 mg Oral Daily Teddy Johnson MD   ondansetron 4 mg Intravenous Q6H PRN Kat Parish MD   pantoprazole 40 mg Oral Early Morning Kat Parish MD   pravastatin 40 mg Oral Daily With Nicolasasim Monroe MD   sodium chloride 2 g Oral BID With Meals Kat Parish MD       PRN Meds:   acetaminophen    aluminum-magnesium hydroxide-simethicone    docusate sodium    ondansetron    Continuous Infusions:     Laboratory Results:  Results from last 7 days   Lab Units 10/21/19  0604 10/20/19  0449 10/19/19  0456 10/18/19  0532 10/17/19  0458 10/16/19  0440 10/15/19  2108 10/15/19  0923   WBC Thousand/uL 6 33 8 15 6 44 4 39 4 36 5 07 7 55 6 18   HEMOGLOBIN g/dL 9 8* 9 0* 9 2* 10 5* 8 4* 9 1* 8 7* 8 9*   HEMATOCRIT % 32 2* 29 4* 29 4* 34 5* 26 7* 29 4* 28 2* 28 8*   PLATELETS Thousands/uL 432* 399* 418* 411* 424* 383 392* 394*   SODIUM mmol/L 136 135* 132* 129* 134* 131* 130* 128*   POTASSIUM mmol/L 3 9 4 1 4 2 5 0 4 1 4 7 4 4 4 8   CHLORIDE mmol/L 105 104 102 105 105 102 101 99*   CO2 mmol/L 23 22 21 17* 20* 20* 21 21   BUN mg/dL 16 14 11 15 20 20 17 15   CREATININE mg/dL 0 52* 0 60 0 53* 0 61 0 56* 0 65 0 61 0 59*   CALCIUM mg/dL 8 7 8 6 8 5 8 8 8 7 8 7 8 6 8 6   MAGNESIUM mg/dL  --  1 5*  -- --   --   --   --  1 6   PHOSPHORUS mg/dL  --  3 3  --   --   --   --   --  3 2          Radiology Results:   XR abdomen 1 vw portable   Final Result by Reina Londono MD (10/21 9098)      There is a nonobstructive bowel gas pattern  Workstation performed: [id number]         XR chest pa & lateral   Final Result by Onur Lao MD (10/19 0240)      Minimal left basilar airspace disease attributed to compressive atelectasis secondary small left pleural effusion  Correlate with clinical findings to exclude pneumonia and/or aspiration                    Workstation performed: [id number]

## 2019-10-22 PROBLEM — D73.5 SPLENIC INFARCT: Status: ACTIVE | Noted: 2019-10-22

## 2019-10-22 PROBLEM — R10.12 LEFT UPPER QUADRANT PAIN: Status: RESOLVED | Noted: 2019-10-20 | Resolved: 2019-10-22

## 2019-10-22 LAB
ALBUMIN SERPL ELPH-MCNC: 2.34 G/DL (ref 3.5–5)
ALBUMIN SERPL ELPH-MCNC: 36.6 % (ref 52–65)
ALPHA1 GLOB SERPL ELPH-MCNC: 0.45 G/DL (ref 0.1–0.4)
ALPHA1 GLOB SERPL ELPH-MCNC: 7 % (ref 2.5–5)
ALPHA2 GLOB SERPL ELPH-MCNC: 1.15 G/DL (ref 0.4–1.2)
ALPHA2 GLOB SERPL ELPH-MCNC: 18 % (ref 7–13)
ANION GAP SERPL CALCULATED.3IONS-SCNC: 8 MMOL/L (ref 4–13)
ANISOCYTOSIS BLD QL SMEAR: PRESENT
BASOPHILS # BLD MANUAL: 0 THOUSAND/UL (ref 0–0.1)
BASOPHILS NFR MAR MANUAL: 0 % (ref 0–1)
BETA GLOB ABNORMAL SERPL ELPH-MCNC: 0.34 G/DL (ref 0.4–0.8)
BETA1 GLOB SERPL ELPH-MCNC: 5.3 % (ref 5–13)
BETA2 GLOB SERPL ELPH-MCNC: 5.2 % (ref 2–8)
BETA2+GAMMA GLOB SERPL ELPH-MCNC: 0.33 G/DL (ref 0.2–0.5)
BUN SERPL-MCNC: 13 MG/DL (ref 5–25)
CALCIUM SERPL-MCNC: 8.6 MG/DL (ref 8.3–10.1)
CHLORIDE SERPL-SCNC: 106 MMOL/L (ref 100–108)
CO2 SERPL-SCNC: 21 MMOL/L (ref 21–32)
CREAT SERPL-MCNC: 0.47 MG/DL (ref 0.6–1.3)
EOSINOPHIL # BLD MANUAL: 0.06 THOUSAND/UL (ref 0–0.4)
EOSINOPHIL NFR BLD MANUAL: 1 % (ref 0–6)
ERYTHROCYTE [DISTWIDTH] IN BLOOD BY AUTOMATED COUNT: 18.1 % (ref 11.6–15.1)
GAMMA GLOB ABNORMAL SERPL ELPH-MCNC: 1.79 G/DL (ref 0.5–1.6)
GAMMA GLOB SERPL ELPH-MCNC: 27.9 % (ref 12–22)
GFR SERPL CREATININE-BSD FRML MDRD: 94 ML/MIN/1.73SQ M
GLUCOSE SERPL-MCNC: 112 MG/DL (ref 65–140)
GLUCOSE SERPL-MCNC: 113 MG/DL (ref 65–140)
GLUCOSE SERPL-MCNC: 143 MG/DL (ref 65–140)
GLUCOSE SERPL-MCNC: 145 MG/DL (ref 65–140)
GLUCOSE SERPL-MCNC: 185 MG/DL (ref 65–140)
HCT VFR BLD AUTO: 30 % (ref 34.8–46.1)
HGB BLD-MCNC: 9.1 G/DL (ref 11.5–15.4)
IGG/ALB SER: 0.58 {RATIO} (ref 1.1–1.8)
INTERPRETATION UR IFE-IMP: NORMAL
LYMPHOCYTES # BLD AUTO: 0.84 THOUSAND/UL (ref 0.6–4.47)
LYMPHOCYTES # BLD AUTO: 14 % (ref 14–44)
MCH RBC QN AUTO: 22.9 PG (ref 26.8–34.3)
MCHC RBC AUTO-ENTMCNC: 30.3 G/DL (ref 31.4–37.4)
MCV RBC AUTO: 75 FL (ref 82–98)
MONOCYTES # BLD AUTO: 0.3 THOUSAND/UL (ref 0–1.22)
MONOCYTES NFR BLD: 5 % (ref 4–12)
NEUTROPHILS # BLD MANUAL: 4.78 THOUSAND/UL (ref 1.85–7.62)
NEUTS SEG NFR BLD AUTO: 80 % (ref 43–75)
NRBC BLD AUTO-RTO: 0 /100 WBCS
OVALOCYTES BLD QL SMEAR: PRESENT
PLATELET # BLD AUTO: 418 THOUSANDS/UL (ref 149–390)
PLATELET BLD QL SMEAR: ABNORMAL
PMV BLD AUTO: 9.3 FL (ref 8.9–12.7)
POTASSIUM SERPL-SCNC: 4 MMOL/L (ref 3.5–5.3)
PROT PATTERN SERPL ELPH-IMP: ABNORMAL
PROT SERPL-MCNC: 6.4 G/DL (ref 6.4–8.2)
RBC # BLD AUTO: 3.98 MILLION/UL (ref 3.81–5.12)
SODIUM SERPL-SCNC: 135 MMOL/L (ref 136–145)
TOTAL CELLS COUNTED SPEC: 100
WBC # BLD AUTO: 5.97 THOUSAND/UL (ref 4.31–10.16)

## 2019-10-22 PROCEDURE — 99232 SBSQ HOSP IP/OBS MODERATE 35: CPT | Performed by: NURSE PRACTITIONER

## 2019-10-22 PROCEDURE — 82948 REAGENT STRIP/BLOOD GLUCOSE: CPT

## 2019-10-22 PROCEDURE — 99232 SBSQ HOSP IP/OBS MODERATE 35: CPT | Performed by: INTERNAL MEDICINE

## 2019-10-22 PROCEDURE — 85007 BL SMEAR W/DIFF WBC COUNT: CPT | Performed by: INTERNAL MEDICINE

## 2019-10-22 PROCEDURE — 99222 1ST HOSP IP/OBS MODERATE 55: CPT | Performed by: PHYSICIAN ASSISTANT

## 2019-10-22 PROCEDURE — 97530 THERAPEUTIC ACTIVITIES: CPT

## 2019-10-22 PROCEDURE — 85027 COMPLETE CBC AUTOMATED: CPT | Performed by: INTERNAL MEDICINE

## 2019-10-22 PROCEDURE — 99223 1ST HOSP IP/OBS HIGH 75: CPT | Performed by: INTERNAL MEDICINE

## 2019-10-22 PROCEDURE — 92526 ORAL FUNCTION THERAPY: CPT

## 2019-10-22 PROCEDURE — 80048 BASIC METABOLIC PNL TOTAL CA: CPT | Performed by: PHYSICIAN ASSISTANT

## 2019-10-22 RX ORDER — FAMOTIDINE 40 MG/5ML
20 POWDER, FOR SUSPENSION ORAL DAILY
Status: DISCONTINUED | OUTPATIENT
Start: 2019-10-23 | End: 2019-10-31

## 2019-10-22 RX ORDER — LISINOPRIL 20 MG/1
20 TABLET ORAL DAILY
Status: DISCONTINUED | OUTPATIENT
Start: 2019-10-23 | End: 2019-10-23

## 2019-10-22 RX ADMIN — PRAVASTATIN SODIUM 40 MG: 20 TABLET ORAL at 17:33

## 2019-10-22 RX ADMIN — SODIUM CHLORIDE TAB 1 GM 1 G: 1 TAB at 17:33

## 2019-10-22 RX ADMIN — ENOXAPARIN SODIUM 40 MG: 40 INJECTION SUBCUTANEOUS at 08:39

## 2019-10-22 RX ADMIN — INSULIN LISPRO 1 UNITS: 100 INJECTION, SOLUTION INTRAVENOUS; SUBCUTANEOUS at 21:38

## 2019-10-22 RX ADMIN — PANTOPRAZOLE SODIUM 40 MG: 40 TABLET, DELAYED RELEASE ORAL at 05:56

## 2019-10-22 RX ADMIN — SODIUM CHLORIDE TAB 1 GM 1 G: 1 TAB at 08:40

## 2019-10-22 RX ADMIN — LISINOPRIL 40 MG: 20 TABLET ORAL at 08:39

## 2019-10-22 RX ADMIN — FERROUS SULFATE TAB 325 MG (65 MG ELEMENTAL FE) 325 MG: 325 (65 FE) TAB at 08:39

## 2019-10-22 RX ADMIN — AMLODIPINE BESYLATE 5 MG: 5 TABLET ORAL at 08:41

## 2019-10-22 RX ADMIN — ATENOLOL 50 MG: 50 TABLET ORAL at 08:41

## 2019-10-22 NOTE — ASSESSMENT & PLAN NOTE
Malnutrition Findings:   Malnutrition type: Chronic illness(Related to medical condition as evidenced by 10% weight loss over the past month and <75% energy intake needs met >1 month treated with ensure compact supplements)  Degree of Malnutrition: Other severe protein calorie malnutrition    BMI Findings: Body mass index is 21 82 kg/m²  Nutrition consult and supplements

## 2019-10-22 NOTE — PROGRESS NOTES
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 ACE-inhibitor and PPI could cause SIADH  ? Will change Protonix to Pepcid  ? Will decrease lisinopril dose, especially since her blood pressure is running low  Would try and get her off of this and trend sodium levels  ? Workup:  Serum osmolality 276, urine osmolality 624, urine sodium 90, uric acid 4 1, cortisol 40 8, TSH 2 8, normal LFTs except for albumin of 2 2, chest x-ray with minimal left basilar airspace disease attributed to compressive atelectasis and small left pleural effusion, echocardiogram at [facility] revealed ejection fraction of 50-55% with mild pulmonary hypertension, mild mitral regurgitation and tricuspid regurgitation / ACT H was low at 1 6 at [facility] (with cortisol of 19 4)  ? SPEP faint possible band in the gamma region  Immunofixation pending  ? Currently on sodium chloride 1 g b i d  and 1500 cc per day oral fluid restriction  ? Sodium stable-135 today  ? CT chest, abdomen and pelvis on 09/29 at [facility]:  No signs of malignancy  There were new small bilateral pleural effusions with new infarcts involving the medial aspect of the spleen  ? Not sure what to make of the low ACTH level with normal cortisol levels  I doubt she has Cushing disease as her cortisol levels were 19 and 24     ? Check a m  BMP  2  Anemia:  Hemoglobin 9 1 today  ? Iron saturation 7%, ferritin 608  ? Started on oral iron by primary team  3  Encephalopathy:  Originally Due to hyponatremia and UTI  Now sodium is much better and not contributing to her mental status  The patient knew the place and year this morning    She knew her name as well  4  Hypertension:  Blood pressure running on the low side  · Currently on amlodipine 5 mg daily, atenolol 50 mg daily, lisinopril 40 mg daily  · Amlodipine to 5 mg daily on 10/21 and lisinopril decreased to 20 mg o starting 10/23     Addendum:  Reviewed low ACTH with Endocrine  This is felt to be a lab error  The patient will follow-up with her [facility] PCP and [facility] Nephrology  They can repeat the ACTH level if deemed necessary  Coordinate care with the SLIM team today regarding lisinopril/Pepcid/Protonix changes and follow-up with her PCP and [facility] Nephrology  Repeat BMP will be ordered in 1 weeks time with results to her PCP an [facility] [address] nephrology  Subjective: The patient was seen examined  She denied any shortness of breath, chest pain or pressure, abdominal pain, vomiting, diarrhea  Her appetite is diminished but unchanged  She denied any paroxysmal nocturnal dyspnea, orthopnea  Objective:     Vitals: Blood pressure 118/51, pulse 77, temperature (!) 96 4 °F (35 8 °C), resp  rate 17, height 5' 2" (1 575 m), weight 54 1 kg (119 lb 4 8 oz), SpO2 100 %  ,Body mass index is 21 82 kg/m²  Temp (24hrs), Av 2 °F (36 8 °C), Min:96 4 °F (35 8 °C), Max:99 9 °F (37 7 °C)      Weight (last 2 days)     Date/Time   Weight    10/22/19 0553   54 1 (119 3)    10/21/19 0600   52 2 (115)    10/20/19 0541   50 9 (112 25)                     Intake/Output Summary (Last 24 hours) at 10/22/2019 1104  Last data filed at 10/22/2019 0901  Gross per 24 hour   Intake 170 ml   Output 350 ml   Net -180 ml     I/O last 24 hours: In: 0 [P O :230]  Out: 350 [Urine:350]        Physical Exam    Physical Exam   Constitutional: No distress  Neck: No JVD present  Cardiovascular: Normal heart sounds  Exam reveals no gallop and no friction rub  Pulmonary/Chest: Effort normal  No respiratory distress  She has no wheezes  She has no rales     Diminished breath sounds due to decreased respiratory effort   Abdominal: Soft  Bowel sounds are normal  She exhibits no distension  There is no tenderness  There is no rebound and no guarding  Musculoskeletal: She exhibits no edema  Neurological: She is alert  Skin: She is not diaphoretic  Vitals reviewed                Invasive Devices     Drain            Urethral Catheter Straight-tip 16 Fr  7 days                Medications:    Scheduled Meds:  Current Facility-Administered Medications:  acetaminophen 650 mg Oral Q6H PRN Donya Shane MD   aluminum-magnesium hydroxide-simethicone 30 mL Oral Q6H PRN Donya Shane MD   amLODIPine 5 mg Oral Daily Mei Montgomery PA-C   atenolol 50 mg Oral Daily Beatriz Bravo MD   docusate sodium 100 mg Oral BID PRN Donya Shane MD   enoxaparin 40 mg Subcutaneous Daily Donya Shane MD   ferrous sulfate 325 mg Oral Daily With Breakfast Beatriz Bravo MD   insulin lispro 1-5 Units Subcutaneous HS Donya Shane MD   insulin lispro 1-6 Units Subcutaneous TID AC Donya Shane MD   lisinopril 40 mg Oral Daily Lorena Hahn MD   ondansetron 4 mg Intravenous Q6H PRN Donya Shane MD   pantoprazole 40 mg Oral Early Morning Donya Shane MD   pravastatin 40 mg Oral Daily With Marine Delgadillo MD   sodium chloride 1 g Oral BID With Meals Mei Montgomery PA-C       PRN Meds:   acetaminophen    aluminum-magnesium hydroxide-simethicone    docusate sodium    ondansetron    Continuous Infusions:         LAB RESULTS:      Results from last 7 days   Lab Units 10/22/19  0550 10/21/19  0604 10/20/19  0449 10/19/19  0456 10/18/19  0532 10/17/19  0458 10/16/19  0440   WBC Thousand/uL 5 97 6 33 8 15 6 44 4 39 4 36 5 07   HEMOGLOBIN g/dL 9 1* 9 8* 9 0* 9 2* 10 5* 8 4* 9 1*   HEMATOCRIT % 30 0* 32 2* 29 4* 29 4* 34 5* 26 7* 29 4*   PLATELETS Thousands/uL 418* 432* 399* 418* 411* 424* 383   NEUTROS PCT %  --  82* 82* 79*  --   --   --    LYMPHS PCT %  --  12* 12* 14  --   --   -- LYMPHO PCT % 14  --   --   --   --   --   --    MONOS PCT %  --  4 5 6  --   --   --    MONO PCT % 5  --   --   --   --   --   --    EOS PCT % 1 1 0 0  --   --   --    POTASSIUM mmol/L 4 0 3 9 4 1 4 2 5 0 4 1 4 7   CHLORIDE mmol/L 106 105 104 102 105 105 102   CO2 mmol/L 21 23 22 21 17* 20* 20*   BUN mg/dL 13 16 14 11 15 20 20   CREATININE mg/dL 0 47* 0 52* 0 60 0 53* 0 61 0 56* 0 65   CALCIUM mg/dL 8 6 8 7 8 6 8 5 8 8 8 7 8 7   MAGNESIUM mg/dL  --   --  1 5*  --   --   --   --    PHOSPHORUS mg/dL  --   --  3 3  --   --   --   --        CUTURES:  Lab Results   Component Value Date    BLOODCX No Growth After 5 Days  10/14/2019    BLOODCX No Growth After 5 Days  10/14/2019    URINECX 5004-3516 cfu/ml Gram Negative Nirav (A) 10/15/2019    URINECX >100,000 cfu/ml Escherichia coli 07/16/2017                 PLEASE NOTE:  This encounter was completed utilizing the [employer] Direct Speech Voice Recognition Software  Grammatical errors, random word insertions, pronoun errors and incomplete sentences are occasional consequences of the system due to software limitations, ambient noise and hardware issues  These may be missed by proof reading prior to affixing electronic signature  Any questions or concerns about the content, text or information contained within the body of this dictation should be directly addressed to the physician for clarification  Please do not hesitate to call me directly if you have any any questions or concerns

## 2019-10-22 NOTE — CONSULTS
Acute Care Surgery  Consultation    Assessment and Plan:    11-year-old female with history of splenic infarction noted on a CT scan of the chest, abdomen and pelvis performed at [facility] in September of 2019  The images of this CT scan are not available for review here at [facility], but the radiology interpretation identified acute appearing medial splenic infarction  Her splenic infarction is of unclear etiology  As she is asymptomatic, there does not appear to be a need for any acute surgical intervention or workup  May consider a CT scan of the abdomen and pelvis with IV contrast to further characterize her splenic infarction, but this does not need to be done urgently  Would also recommend a Hematology/Oncology consult for further evaluation and consider additional workup  It is possible that her infarction may be due to septic thrombi in light of her recent urinary tract infections  Would defer any decision regarding anticoagulation take Hematology/Oncology and/or that the primary service  The patient may follow up with the acute care surgery service as needed as an outpatient  If additional workup or intervention is deemed necessary by the hematology service that would require general surgical intervention, please call the acute care surgery service  The acute care surgery service will follow the patient as needed going forward  History of Present Illness   HPI:  Kirstie Oscar is a [de-identified] y o  female who presented due to decline in her health following recent admissions at an outside hospital for a urinary tract infection and hyponatremia per the patient's daughter  Since her admission, she has made some improvements with correction of her hyponatremia    During her current stay, her daughter had question the diagnosis of splenic infarction that was made at the outside hospital   The daughter stated to the medical service that she was made aware of the diagnosis, but was unsure what was being done about it and was asking for clarification regarding the next steps in the treatment of her splenic infarction  She has been receiving treatment for a recurrent UTI and hyponatremia by the medical nephrology services  The patient currently states that she is not having any abdominal pain, nausea, vomiting, fever or chills  She states that she is eating a regular diet and having normal bowel function  Review of Systems   Constitutional: Negative  Negative for activity change, appetite change, chills, diaphoresis, fatigue and fever  HENT: Negative  Negative for congestion, facial swelling and sore throat  Eyes: Negative  Respiratory: Negative  Negative for cough, chest tightness, shortness of breath and wheezing  Cardiovascular: Negative  Negative for chest pain and leg swelling  Gastrointestinal: Negative  Negative for abdominal distention, abdominal pain, constipation, diarrhea, nausea and vomiting  Endocrine: Negative  Genitourinary: Negative  Negative for difficulty urinating, dysuria, flank pain, frequency and hematuria  Musculoskeletal: Negative  Negative for arthralgias, back pain, myalgias and neck pain  Skin: Negative  Negative for color change, pallor, rash and wound  Allergic/Immunologic: Negative  Neurological: Negative  Negative for dizziness, syncope, weakness, light-headedness and headaches  Hematological: Negative  Psychiatric/Behavioral: Negative  Negative for agitation and confusion         Historical Information   Past Medical History:   Diagnosis Date    Cardiac disease     Diabetes mellitus ([facility] [address] )     Hyperlipidemia     Hypertension      Past Surgical History:   Procedure Laterality Date    CORONARY ANGIOPLASTY WITH STENT PLACEMENT       Social History   Social History     Substance and Sexual Activity   Alcohol Use No     Social History     Substance and Sexual Activity   Drug Use No     Social History     Tobacco Use Smoking Status Current Every Day Smoker    Packs/day: 0 20     No family history on file      Meds/Allergies   all current active meds have been reviewed and current meds:   Current Facility-Administered Medications   Medication Dose Route Frequency    acetaminophen (TYLENOL) tablet 650 mg  650 mg Oral Q6H PRN    aluminum-magnesium hydroxide-simethicone (MYLANTA) 200-200-20 mg/5 mL oral suspension 30 mL  30 mL Oral Q6H PRN    amLODIPine (NORVASC) tablet 5 mg  5 mg Oral Daily    atenolol (TENORMIN) tablet 50 mg  50 mg Oral Daily    docusate sodium (COLACE) capsule 100 mg  100 mg Oral BID PRN    enoxaparin (LOVENOX) subcutaneous injection 40 mg  40 mg Subcutaneous Daily    [START ON 10/23/2019] famotidine (PEPCID) oral suspension 20 mg  20 mg Oral Daily    ferrous sulfate tablet 325 mg  325 mg Oral Daily With Breakfast    insulin lispro (HumaLOG) 100 units/mL subcutaneous injection 1-5 Units  1-5 Units Subcutaneous HS    insulin lispro (HumaLOG) 100 units/mL subcutaneous injection 1-6 Units  1-6 Units Subcutaneous TID AC    [START ON 10/23/2019] lisinopril (ZESTRIL) tablet 20 mg  20 mg Oral Daily    ondansetron (ZOFRAN) injection 4 mg  4 mg Intravenous Q6H PRN    pravastatin (PRAVACHOL) tablet 40 mg  40 mg Oral Daily With Dinner    sodium chloride tablet 1 g  1 g Oral BID With Meals     No Known Allergies    Objective   First Vitals:   Blood Pressure: 107/56 (10/14/19 1626)  Pulse: 65 (10/14/19 1626)  Temperature: 98 5 °F (36 9 °C) (10/14/19 1626)  Temp Source: Oral (10/14/19 1626)  Respirations: 18 (10/14/19 1626)  Height: 5' 2" (157 5 cm) (10/15/19 1339)  Weight - Scale: 56 7 kg (125 lb)(RD obtained bed scale weight) (10/15/19 1339)  SpO2: 96 % (10/14/19 1626)    Current Vitals:   Blood Pressure: 118/51 (10/22/19 1059)  Pulse: 77 (10/22/19 1059)  Temperature: (!) 96 4 °F (35 8 °C) (10/22/19 1059)  Temp Source: Oral (10/22/19 0358)  Respirations: 17 (10/22/19 9289)  Height: 5' 2" (157 5 cm) (10/15/19 8599)  Weight - Scale: 54 1 kg (119 lb 4 8 oz) (10/22/19 6564)  SpO2: 100 % (10/22/19 1059)      Intake/Output Summary (Last 24 hours) at 10/22/2019 1755  Last data filed at 10/22/2019 1627  Gross per 24 hour   Intake 270 ml   Output 400 ml   Net -130 ml       Invasive Devices     Drain            Urethral Catheter Straight-tip 16 Fr  7 days                Physical Exam   Constitutional: She is oriented to person, place, and time  She appears well-developed and well-nourished  No distress  HENT:   Head: Normocephalic and atraumatic  Right Ear: External ear normal    Left Ear: External ear normal    Mouth/Throat: Oropharynx is clear and moist    Eyes: Pupils are equal, round, and reactive to light  EOM are normal    Neck: Normal range of motion  Neck supple  No tracheal deviation present  Cardiovascular: Normal rate, regular rhythm, normal heart sounds and intact distal pulses  Exam reveals no gallop and no friction rub  No murmur heard  Pulmonary/Chest: Effort normal and breath sounds normal  No respiratory distress  She has no wheezes  She has no rales  She exhibits no tenderness  Abdominal: Soft  Bowel sounds are normal  She exhibits no distension and no mass  There is no tenderness  There is no rebound and no guarding  Musculoskeletal: Normal range of motion  She exhibits no edema, tenderness or deformity  Neurological: She is alert and oriented to person, place, and time  Skin: Skin is warm and dry  No rash noted  She is not diaphoretic  No erythema  No pallor  Psychiatric: She has a normal mood and affect  Nursing note and vitals reviewed  Lab Results:   I have personally reviewed pertinent lab results    , CBC:   Lab Results   Component Value Date    WBC 5 97 10/22/2019    HGB 9 1 (L) 10/22/2019    HCT 30 0 (L) 10/22/2019    MCV 75 (L) 10/22/2019     (H) 10/22/2019    MCH 22 9 (L) 10/22/2019    MCHC 30 3 (L) 10/22/2019    RDW 18 1 (H) 10/22/2019    MPV 9 3 10/22/2019    NRBC 0 10/22/2019   , CMP:   Lab Results   Component Value Date    SODIUM 135 (L) 10/22/2019    K 4 0 10/22/2019     10/22/2019    CO2 21 10/22/2019    BUN 13 10/22/2019    CREATININE 0 47 (L) 10/22/2019    CALCIUM 8 6 10/22/2019    EGFR 94 10/22/2019   , Coagulation: No results found for: PT, INR, APTT, Lipase: No results found for: LIPASE  Imaging: I have personally reviewed pertinent reports  EKG, Pathology, and Other Studies: I have personally reviewed pertinent reports  and I have personally reviewed pertinent films in PACS    Counseling / Coordination of Care  Total floor / unit time spent today 34 minutes  Greater than 50% of total time was spent with the patient and / or family counseling and / or coordination of care      Jose Bronson PA-C  10/22/2019 02:47 PM

## 2019-10-22 NOTE — PLAN OF CARE
Problem: PHYSICAL THERAPY ADULT  Goal: Performs mobility at highest level of function for planned discharge setting  See evaluation for individualized goals  Description  Treatment/Interventions: Functional transfer training, LE strengthening/ROM, Therapeutic exercise, Endurance training, Patient/family training, Equipment eval/education, Bed mobility, Gait training, Spoke to nursing  Equipment Recommended: Walker(current use of RW for mobility)       See flowsheet documentation for full assessment, interventions and recommendations  Note:   Prognosis: Fair  Problem List: Decreased strength, Decreased range of motion, Decreased endurance, Impaired balance, Decreased mobility, Decreased coordination, Decreased safety awareness, Decreased cognition  Assessment: Pt  is an [de-identified] yo F who presents with Metabolic encephalopathy  Pt  was identified with full name and birthdate  Pt  agreeable to PT session  Pt  Demonstrates continued need for modAx1 with functional mobility and continued VCs for sequencing, body mechanics, and task performance, with little carryover noted throughout session  Pt  Is progressing towards goals however, progress is slowed 2/2 limited activity tolerance  Pt  Will benefit from continued skilled therapy to increase functional independence and aid patient in return to PLOF with decreased burden of care  Recommendation: Short-term skilled PT     PT - OK to Discharge: Yes    See flowsheet documentation for full assessment

## 2019-10-22 NOTE — ASSESSMENT & PLAN NOTE
· Noted on CT scan done at [facility] 9/2019, new moderate sized splenic infarcts  · Unclear etiology of splenic infarcts  Hematology and general surgery consults pending  · Continue to monitor on telemetry for completeness to rule out etiology for splenic infarct >>currently NSR with no evidence of A-Fib  · Echo without any evidence of thrombus  · Await surgery team input, consider repeat CT abdomen pelvis with contrast or CTA to evaluate further  Will defer to surgical team regarding need for additional imaging and hematology input regarding need for further hematological work up  · Consider lower extremity duplex

## 2019-10-22 NOTE — ASSESSMENT & PLAN NOTE
· Tmax in past 24 hours 99 9  97 9 this am   · ? Possibly related to splenic infarct  · Urine culture not significant this admission, [id number] GNR  At [facility], pt was noted to have ESBL in urine which was suspected to be colonization  · Admission blood cultrues are neg  · procalcitonin normal   · No leukocytosis

## 2019-10-22 NOTE — ASSESSMENT & PLAN NOTE
· Baseline seems between 8-11  · hgb 9 1 today  · Iron studies suggestive of low iron  · Continue  p o  iron supplementation which was started this admission    · CBC in AM

## 2019-10-22 NOTE — ASSESSMENT & PLAN NOTE
· Since September she has had a progressive decline - she has had no appetite, not really eating/drinking with weakness  She was previously reportedly very independent prior to her admission at [facility] in September with UTI  · Geriatrics following, recommending outpatient follow up  · Speech following, on soft diet  · No recent colonoscopy/EGD, recommend outpatient GI evaluation

## 2019-10-22 NOTE — ASSESSMENT & PLAN NOTE
Lab Results   Component Value Date    HGBA1C 6 4 (H) 10/15/2019     Recent Labs     10/21/19  1044 10/21/19  1611 10/21/19  2101 10/22/19  0614   POCGLU 197* 101 109 112       · Takes metformin and glipizide at baseline, will plan to resume metformin at discharge and hold glipizide    · Continue with SSI  · Diabetic diet  · Monitor accuchecks, avoid hypoglycemia

## 2019-10-22 NOTE — PHYSICAL THERAPY NOTE
PHYSICAL THERAPY Treatment NOTE      Patient Name: Artemio HODGSON Date: 10/22/2019        10/22/19 0847   Pain Assessment   Pain Assessment 0-10   Pain Score No Pain   Restrictions/Precautions   Weight Bearing Precautions Per Order No   Other Precautions Cognitive; Bed Alarm; Chair Alarm;Contact/isolation;Multiple lines; Fall Risk   General   Chart Reviewed Yes   Additional Pertinent History Pt  is an [de-identified] yo F who presents with Metabolic encephalopathy   Family/Caregiver Present No   Cognition   Overall Cognitive Status Impaired   Arousal/Participation Persistent stimuli required   Attention Attends with cues to redirect   Orientation Level Oriented to person;Oriented to place; Disoriented to time;Disoriented to situation   Memory Decreased short term memory;Decreased recall of recent events   Following Commands Follows multistep commands with increased time or repetition   Comments Pt  was identified with full name and birthdate   Subjective   Subjective Pt  agreeable to PT session   Bed Mobility   Supine to Sit 4  Minimal assistance   Additional items Assist x 1; Increased time required;LE management;Verbal cues;HOB elevated   Sit to Supine Unable to assess   Additional Comments Pt  performed bed mobility with MinAxq requiring increased time to sit  with use of HOB elevated and LE management for ease of transition  VCs for hand placement and sequencing, with little carryover during session  Pt  seated OOB following PT session  Transfers   Sit to Stand 3  Moderate assistance   Additional items Assist x 1; Increased time required;Verbal cues  (for hand placement and sequencing)   Stand to Sit 3  Moderate assistance   Additional items Assist x 1; Increased time required;Verbal cues  (to reach back to control descent)   Additional Comments Pt  performed sit<>stand transfers with ModAxq requiring increased time to stand and to return to sitting with VCs for hand placement and sequencing and to reach back to control descent  Limited carryover of VCs noted throughout session  Ambulation/Elevation   Gait pattern Improper Weight shift;Narrow ROSAS; Forward Flexion;Decreased foot clearance;Shuffling; Inconsistent demetrius; Redundant gait at times; Short stride   Gait Assistance 3  Moderate assist   Additional items Assist x 1;Verbal cues  (for hand placement and sequencing)   Assistive Device Rolling walker   Distance 5'x1 from bed to chair   Balance   Static Sitting Fair +   Dynamic Sitting Fair   Static Standing Poor +   Dynamic Standing Poor +   Ambulatory Poor   Endurance Deficit   Endurance Deficit Yes   Endurance Deficit Description postural and gait degradation noted with fatigue   Activity Tolerance   Activity Tolerance Patient limited by fatigue   Nurse Made Aware Spoke to RN   Assessment   Prognosis Fair   Problem List Decreased strength;Decreased range of motion;Decreased endurance; Impaired balance;Decreased mobility; Decreased coordination;Decreased safety awareness;Decreased cognition   Assessment Pt  is an [de-identified] yo F who presents with Metabolic encephalopathy  Pt  was identified with full name and birthdate  Pt  agreeable to PT session  Pt  Demonstrates continued need for modAx1 with functional mobility and continued VCs for sequencing, body mechanics, and task performance, with little carryover noted throughout session  Pt  Is progressing towards goals however, progress is slowed 2/2 limited activity tolerance  Pt  Will benefit from continued skilled therapy to increase functional independence and aid patient in return to PLOF with decreased burden of care  Goals   Patient Goals to get out    STG Expiration Date 10/26/19   PT Treatment Day 3   Plan   Treatment/Interventions Functional transfer training;LE strengthening/ROM; Therapeutic exercise;Cognitive reorientation; Endurance training;Elevations; Patient/family training;Equipment eval/education; Bed mobility;Gait training;Spoke to nursing   Progress Slow progress, cognitive deficits   PT Frequency 2-3x/wk   Recommendation   Recommendation Short-term skilled PT   Equipment Recommended Walker   PT - OK to Discharge Yes   Additional Comments to rehab when medically appropriate     Bhupendra Weiner PT, DPT 10/22/2019

## 2019-10-22 NOTE — PROGRESS NOTES
[facility]    Progress Note - [place] 1940, [de-identified] y o  female MRN: [id number]    Unit/Bed#: -01 Encounter: [id number]    Primary Care Provider: Lindy Lilly MD   Date and time admitted to hospital: 10/14/2019  4:20 PM        * Metabolic encephalopathy  Assessment & Plan  · POA  Likely in setting of hyponatremia with component of delirium contributing given recurrent hospitalizations/underlying cognitive impairment  · Urine culture unremarkable - monitor off antibiotics at this time  Pt remains asymptomatic  · Mental status seems to be at baseline although does wax and wane  On exam today pt is AO x3 and able to state she was brought to the hospital "for my sodium"  · She has hx of adrenal insufficiency, previously on steroids and then tapered off  She follows with Endocrinology as outpatient  · Cosyntropin stim test with adequate response in cortisol, hydrocortisone discontinued, she does not have adrenal insufficiency   · Vitamin B12 and folate normal   · MRI brain was done at [facility] last month which was negative for any acute infarct but possible small questionable hemorrhage versus area of mineralization  CT head without contrast in [facility] negative for bleed  · Geriatrics following, patient should follow up outpatient with them    Hyponatremia  Assessment & Plan  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  · Presented with sodium of 126  Sodium 135 today  · TSH normal this admission  · Cosyntropin stim test negative for adrenal insufficiency  · CT chest abdomen pelvis was done at [facility] to rule out any occult malignancy as a cause of SIADH which was negative for any malignancy  · Continue with 1g Salt tabs BID and fluid restriction to 1500 cc  · [id number] Cherrie Dumas for d/c from a nephrology standpoint, will need outpatient f/u with [facility] nephrology   Recommending BMP in 1 week with results to PCP for further management of NACL tabs  Protonix changed to Pepcid  ACEI decreased as well  Urinary retention  Assessment & Plan  · Patient has chronic Herrera present on admission- herrera was replaced in ED 10/14   · Placed on recent admission (10/3/19) at [facility] where she developed urinary retention  · Patient will follow up outpatient with urology for voiding trial  Has appt on 10/28 with [facility]    Left upper quadrant painresolved as of 10/22/2019  Assessment & Plan  · Had episode of LUQ pain which has resolved  No further complaints  Low grade fever  Assessment & Plan  · Tmax in past 24 hours 99 9  97 9 this am   · ? Possibly related to splenic infarct  · Urine culture not significant this admission, 5002-5515 GNR  At [facility], pt was noted to have ESBL in urine which was suspected to be colonization  · Admission blood cultrues are neg  · procalcitonin normal   · No leukocytosis  Failure to thrive in adult  Assessment & Plan  · Since September she has had a progressive decline - she has had no appetite, not really eating/drinking with weakness  She was previously reportedly very independent prior to her admission at [facility] in September with UTI  · Geriatrics following, recommending outpatient follow up  · Speech following, on soft diet  · No recent colonoscopy/EGD, recommend outpatient GI evaluation  Splenic infarct  Assessment & Plan  · Noted on CT scan done at [facility] 9/2019, new moderate sized splenic infarcts  · Unclear etiology of splenic infarcts  Hematology and general surgery consults pending  · Continue to monitor on telemetry for completeness to rule out etiology for splenic infarct >>currently NSR with no evidence of A-Fib  · Echo without any evidence of thrombus  · Await surgery team input, consider repeat CT abdomen pelvis with contrast or CTA to evaluate further    Will defer to surgical team regarding need for additional imaging and hematology input regarding need for further hematological work up  · Consider lower extremity duplex  Essential hypertension  Assessment & Plan  · Lisinopril decreased to 20mg daily, amlodipine 5mg daily per nephrology  Continue atenolol 50mg daily with hold parameters  · Routine vitals    Microcytic anemia  Assessment & Plan  · Baseline seems between 8-11  · hgb 9 1 today  · Iron studies suggestive of low iron  · Continue  p o  iron supplementation which was started this admission  · CBC in AM      Bradycardia  Assessment & Plan  · Resolved with lower dose of atenolol  · EKG shows NSR in 80's  · TSH normal this admission  Type 2 diabetes mellitus with diabetic neuropathy, without long-term current use of insulin [facility])  Assessment & Plan  Lab Results   Component Value Date    HGBA1C 6 4 (H) 10/15/2019     Recent Labs     10/21/19  1044 10/21/19  1611 10/21/19  2101 10/22/19  0614   POCGLU 197* 101 109 112       · Takes metformin and glipizide at baseline, will plan to resume metformin at discharge and hold glipizide  · Continue with SSI  · Diabetic diet  · Monitor accuchecks, avoid hypoglycemia    HLD (hyperlipidemia)  Assessment & Plan  · Continue statin    Severe protein-calorie malnutrition (HCC)  Assessment & Plan  Malnutrition Findings:   Malnutrition type: Chronic illness(Related to medical condition as evidenced by 10% weight loss over the past month and <75% energy intake needs met >1 month treated with ensure compact supplements)  Degree of Malnutrition: Other severe protein calorie malnutrition    BMI Findings: Body mass index is 21 82 kg/m²  Nutrition consult and supplements  Pharmacologic: Enoxaparin (Lovenox)  Mechanical VTE Prophylaxis in Place: Yes    Patient Centered Rounds: I have performed bedside rounds with nursing staff today      Discussions with Specialists or Other Care Team Provider:  Nursing, case management, general surgery    Education and Discussions with Family / Patient: Patient and daughter over the phone    Time Spent for Care: 30 minutes  More than 50% of total time spent on counseling and coordination of care as described above  Current Length of Stay: 8 day(s)    Current Patient Status: Inpatient   Certification Statement: The patient will continue to require additional inpatient hospital stay due to General surgery consult, hematology evaluation, further workup in regards to splenic infarcts    Discharge Plan / Estimated Discharge Date:  Not medically stable, await surgery and Hematology input in regards to splenic infarcts  Code Status: Level 1 - Full Code      Subjective:   Patient offers no complaints  She is quite drowsy  Denies any pain, shortness of breath, fevers, chills  No pain after eating  Objective:     Vitals:   Temp (24hrs), Av 1 °F (36 7 °C), Min:96 4 °F (35 8 °C), Max:99 9 °F (37 7 °C)    Temp:  [96 4 °F (35 8 °C)-99 9 °F (37 7 °C)] 96 4 °F (35 8 °C)  HR:  [75-99] 77  Resp:  [16-18] 17  BP: (106-124)/(51-86) 118/51  SpO2:  [95 %-100 %] 100 %  Body mass index is 21 82 kg/m²  Input and Output Summary (last 24 hours): Intake/Output Summary (Last 24 hours) at 10/22/2019 1423  Last data filed at 10/22/2019 1240  Gross per 24 hour   Intake 270 ml   Output 350 ml   Net -80 ml       Physical Exam:     Physical Exam   Constitutional: She is oriented to person, place, and time  Appears weak and frail   HENT:   Head: Normocephalic  Mouth/Throat: Oropharyngeal exudate (hairy tongue) present  Eyes: Conjunctivae are normal    Neck: Normal range of motion  Cardiovascular: Normal rate  No murmur heard  Pulmonary/Chest: Breath sounds normal    Abdominal: Bowel sounds are normal  She exhibits no distension  There is no tenderness  Musculoskeletal: She exhibits no edema  Neurological: She is alert and oriented to person, place, and time  Drowsy, slow to respond   Skin: Skin is warm  Psychiatric: She has a normal mood and affect  Nursing note and vitals reviewed  Additional Data:     Labs:    Results from last 7 days   Lab Units 10/22/19  0550 10/21/19  0604   WBC Thousand/uL 5 97 6 33   HEMOGLOBIN g/dL 9 1* 9 8*   HEMATOCRIT % 30 0* 32 2*   PLATELETS Thousands/uL 418* 432*   NEUTROS PCT %  --  82*   LYMPHS PCT %  --  12*   LYMPHO PCT % 14  --    MONOS PCT %  --  4   MONO PCT % 5  --    EOS PCT % 1 1     Results from last 7 days   Lab Units 10/22/19  0550   POTASSIUM mmol/L 4 0   CHLORIDE mmol/L 106   CO2 mmol/L 21   BUN mg/dL 13   CREATININE mg/dL 0 47*   CALCIUM mg/dL 8 6             Recent Cultures (last 7 days):     Results from last 7 days   Lab Units 10/15/19  1435   URINE CULTURE  2754-9049 cfu/ml Gram Negative Nirav*       Last 24 Hours Medication List:     Current Facility-Administered Medications:  acetaminophen 650 mg Oral Q6H PRN Haven Pena MD   aluminum-magnesium hydroxide-simethicone 30 mL Oral Q6H PRN Haven Pena MD   amLODIPine 5 mg Oral Daily Lisbet Berger PA-C   atenolol 50 mg Oral Daily Elvira Mayes MD   docusate sodium 100 mg Oral BID PRN Haven Pena MD   enoxaparin 40 mg Subcutaneous Daily Haven Pena MD   [START ON 10/23/2019] famotidine 20 mg Oral Daily Gila Rose MD   ferrous sulfate 325 mg Oral Daily With Breakfast Elvira Mayes MD   insulin lispro 1-5 Units Subcutaneous HS Haven Pena MD   insulin lispro 1-6 Units Subcutaneous TID [facility] Haven Pena MD   [START ON 10/23/2019] lisinopril 20 mg Oral Daily Gila Rose MD   ondansetron 4 mg Intravenous Q6H PRN Haven Pena MD   pravastatin 40 mg Oral Daily With Gina Padilla MD   sodium chloride 1 g Oral BID With Meals Lisbet Berger PA-C        Today, Patient Was Seen By: JAQUELINE Noland    ** Please Note: Dragon 360 Dictation voice to text software may have been used in the creation of this document   **

## 2019-10-22 NOTE — ASSESSMENT & PLAN NOTE
· Patient has chronic Herrera present on admission- herrera was replaced in ED 10/14   · Placed on recent admission (10/3/19) at [facility] where she developed urinary retention    · Patient will follow up outpatient with urology for voiding trial  Has appt on 10/28 with [facility]

## 2019-10-22 NOTE — ASSESSMENT & PLAN NOTE
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  · Presented with sodium of 126  Sodium 135 today  · TSH normal this admission  · Cosyntropin stim test negative for adrenal insufficiency  · CT chest abdomen pelvis was done at [facility] to rule out any occult malignancy as a cause of SIADH which was negative for any malignancy  · Continue with 1g Salt tabs BID and fluid restriction to 1500 cc  · [id number] Cherrie Dumas for d/c from a nephrology standpoint, will need outpatient f/u with [facility] nephrology  Recommending BMP in 1 week with results to PCP for further management of NACL tabs  Protonix changed to Pepcid  ACEI decreased as well

## 2019-10-22 NOTE — SOCIAL WORK
CM notified by YVONNE Spivey that pt will be cleared for d/c today  CM called [facility] [phone] & left voicemail message for [facility]/admissions re  bed availability  Await callback  Message in [place] for Admissions as well  8199  CM called [facility]/Admissions  Bed available for pt today  SLIM made aware  1010  CM called Pt's daughter Noah Bray ([phone]) to confirm mode of transport  Left voicemail message for callback  1110  CM spoke to daughter Marito Pinedo, Per Marito Pinedo, requested wheelchair Camara Fan & made aware & agrees to costs  Marito Khris confirmed that she wants [facility] in [place], [place]  CM spoke to [facility]  Pickup  Time 2:30PM  YVONNE Spivey aware, Marito Pinedo aware via [phone]  SLIM/Felicia aware  Pt aware  Sugar made aware via Ecin message  1230  D/C cancelled per Cody PARSONS  Notified Sugar via Ecin message   RN & Pt's daughter Marito Pinedo made aware by Kartik Berger

## 2019-10-22 NOTE — CONSULTS
Patient: Nikki Dc  Patient MRN: [id number]  Service date: 10/22/2019  Attending Physician:       CHIEF COMPLAIN  Chief Complaint   Patient presents with    Abnormal Lab     abnormal lab work from [address] / Oncology history:  Nikki Dc is a [de-identified] y o  female       1, anemia  2, splenic infarct    HISTORY OF PRESENT ILLNESS:  Nikki Dc is a [de-identified] y o  female who has been generally healthy, since September gradually developed fatigue, recently hospitalized in [facility], also has history of hypertension , diabetes , frequent urinary tract infections , SIADH/hyponatremia, presents with less itchy, admitted for infection and hyponatremia  She was recently diagnosed splenic infarct in last admission in September, hematology oncology was consulted for comanagement  Patient reported having no abdominal pain  I called patient's daughter, reported patient has no history of hematology disorder in the past   She is aware of the splenic infarct, she was told there is no further workup or treatment needed  ASSESSMENT/PLAN:  Nikki Dc is a [de-identified] y o  female with:    1) splenic infarct : This was 1st diagnosed in September/2019, not able to toe this is acute or chronic  Again patient has no history of thrombosis in the past   Patient has some intermittent abdominal pain however not able to clearly her related with this finding  Common causes for spleen infarct include cardiogenic thrombosis, septic thrombi, vasculitis, underlying thrombophilia, etc   Usually there is no need anticoagulation unless patient is having active thrombosis or due to underlying thrombophilia  2) anemia:  Microcytic  Prior workup showed consistent with iron deficiency and chronic disease  3) thrombocytosis:  Likely reactive due to underlying iron deficiency  4) MGUS :  SPEP showed a faint band  Will wait for final immunofixation result        Overall, given patient is in acute illness, mental status change, will re-evaluate patient tomorrow,  When her strength and mental status is better, will proceed with CT abdomen pelvic and echocardiogram for etiology of spleen infarct  Follow CBC for anemia                minutes were spent face to face with patient with greater than 50% of the time spent in counseling or coordination of care including discussions of treatment instructions  All of the patient's questions were answered to their satisfactory during this discussion  Willow Early MD PhD  Hematology / Oncology              PROBLEM LIST:  Patient Active Problem List   Diagnosis    Metabolic encephalopathy    Hyponatremia    Hypokalemia    Type 2 diabetes mellitus with diabetic neuropathy, without long-term current use of insulin (HCC)    Abnormal urinalysis    Essential hypertension    HLD (hyperlipidemia)    Lethargy    Failure to thrive in adult    Urinary retention    Severe protein-calorie malnutrition (HCC)    Bradycardia    Low grade fever    Microcytic anemia    Splenic infarct                     PAST MEDICAL HISTORY:   has a past medical history of Cardiac disease, Diabetes mellitus ([address] ), Hyperlipidemia, and Hypertension  PAST SURGICAL HISTORY:   has a past surgical history that includes Coronary angioplasty with stent      CURRENT MEDICATIONS  Scheduled Meds:  Current Facility-Administered Medications:  acetaminophen 650 mg Oral Q6H PRN Yancy Haynes MD   aluminum-magnesium hydroxide-simethicone 30 mL Oral Q6H PRN Yancy Haynes MD   amLODIPine 5 mg Oral Daily Cam Chiu PA-C   atenolol 50 mg Oral Daily Marisa Fontana MD   docusate sodium 100 mg Oral BID PRN Yancy Haynes MD   enoxaparin 40 mg Subcutaneous Daily MD Sunil Alston ON 10/23/2019] famotidine 20 mg Oral Daily Maddie Key MD   ferrous sulfate 325 mg Oral Daily With Breakfast Marisa Fontana MD   insulin lispro 1-5 Units Subcutaneous HS Yancy Haynes MD   insulin lispro 1-6 Units Subcutaneous TID AC Ivan Avalos MD   [START ON 10/23/2019] lisinopril 20 mg Oral Daily Yusuf Bains MD   ondansetron 4 mg Intravenous Q6H PRN Ivan Avaols MD   pravastatin 40 mg Oral Daily With Kerwin Ambrose MD   sodium chloride 1 g Oral BID With Meals Connor Mendez PA-C     Continuous Infusions:   PRN Meds:   acetaminophen    aluminum-magnesium hydroxide-simethicone    docusate sodium    ondansetron    SOCIAL HISTORY:   reports that she has been smoking  She has been smoking about 0 20 packs per day  She does not have any smokeless tobacco history on file  She reports that she does not drink alcohol or use drugs  FAMILY HISTORY:  family history is not on file  ALLERGIES:  has No Known Allergies  REVIEW OF SYSTEMS:  Please note that a 14-point review of systems was performed to include Constitutional, HEENT, Respiratory, CVS, GI, , Musculoskeletal, Integumentary, Neurologic, Rheumatologic, Endocrinologic, Psychiatric, Lymphatic, and Hematologic/Oncologic systems were reviewed and are negative unless otherwise stated in HPI  Positive and negative findings pertinent to this evaluation are incorporated into the history of present illness  PHYSICAL EXAMINATION:  Vital Signs: /51   Pulse 77   Temp (!) 96 4 °F (35 8 °C)   Resp 17   Ht 5' 2" (1 575 m)   Wt 54 1 kg (119 lb 4 8 oz)   SpO2 100%   BMI 21 82 kg/m²   Body surface area is 1 53 meters squared   Ht Readings from Last 3 Encounters:   10/15/19 5' 2" (1 575 m)   07/16/17 5' 2" (1 575 m)     Wt Readings from Last 3 Encounters:   10/22/19 54 1 kg (119 lb 4 8 oz)   07/20/17 64 9 kg (143 lb 1 3 oz)      Temp Readings from Last 3 Encounters:   10/22/19 (!) 96 4 °F (35 8 °C)   07/20/17 97 7 °F (36 5 °C) (Oral)      BP Readings from Last 3 Encounters:   10/22/19 118/51   07/20/17 134/61       Pulse Readings from Last 3 Encounters:   10/22/19 77   07/20/17 72         Constitutional: Alert and oriented    HEENT: Anicteric, PERRLA  Chest: Decreased breathing sound bilaterally, No wheezes/rales/rhonchi  CVS: Regular rhythm  Normal rate  Abdomen: Soft, nontender, nondistended  No palpable organomegaly  Extremities: No cyanosis/clubbing/edema  Integumentary: No obvious rashes or bruises  Musculoskeletal: No obvious bony or joint deformities  Psychiatric: Appropriate affect and mood  Lymph Node Survey: No palpable preauricular, submandibular, cervical, supraclavicular, axillary, epitrochlear or inguinal lymphadenopathy  LABS:      CBC with diff: Results from last 7 days   Lab Units 10/22/19  0550 10/21/19  0604 10/20/19  0449 10/19/19  0456 10/18/19  0532 10/17/19  0458 10/16/19  0440   WBC Thousand/uL 5 97 6 33 8 15 6 44 4 39 4 36 5 07   HEMOGLOBIN g/dL 9 1* 9 8* 9 0* 9 2* 10 5* 8 4* 9 1*   HEMATOCRIT % 30 0* 32 2* 29 4* 29 4* 34 5* 26 7* 29 4*   MCV fL 75* 75* 75* 74* 76* 74* 75*   PLATELETS Thousands/uL 418* 432* 399* 418* 411* 424* 383   MCH pg 22 9* 23 0* 23 1* 23 3* 23 1* 23 3* 23 3*   MCHC g/dL 30 3* 30 4* 30 6* 31 3* 30 4* 31 5 31 0*   RDW % 18 1* 18 1* 17 7* 17 5* 17 9* 17 3* 17 5*   MPV fL 9 3 9 8 9 8 9 8 9 7 9 8 10 1   NRBC AUTO /100 WBCs 0 0 0 0  --   --  0       CMP:  Results from last 7 days   Lab Units 10/22/19  0550 10/21/19  0604 10/20/19  0449 10/19/19  0456 10/18/19  0532 10/17/19  0458 10/16/19  0440   POTASSIUM mmol/L 4 0 3 9 4 1 4 2 5 0 4 1 4 7   CHLORIDE mmol/L 106 105 104 102 105 105 102   CO2 mmol/L 21 23 22 21 17* 20* 20*   BUN mg/dL 13 16 14 11 15 20 20   CREATININE mg/dL 0 47* 0 52* 0 60 0 53* 0 61 0 56* 0 65   CALCIUM mg/dL 8 6 8 7 8 6 8 5 8 8 8 7 8 7   EGFR ml/min/1 73sq m 94 91 86 90 86 88 84                 Invalid input(s): TNI,  PCT              IMAGING:  XR abdomen 1 vw portable   Final Result      There is a nonobstructive bowel gas pattern                  Workstation performed: [id number]         XR chest pa & lateral   Final Result      Minimal left basilar airspace disease attributed to compressive atelectasis secondary small left pleural effusion  Correlate with clinical findings to exclude pneumonia and/or aspiration                    Workstation performed: [id number]

## 2019-10-22 NOTE — SPEECH THERAPY NOTE
SLP Swallow Progress Note    Patient Name: [place]  Today's Date: 10/22/2019     Problem List  Principal Problem:    Metabolic encephalopathy  Active Problems:    Hyponatremia    Type 2 diabetes mellitus with diabetic neuropathy, without long-term current use of insulin (HCC)    Essential hypertension    HLD (hyperlipidemia)    Failure to thrive in adult    Urinary retention    Severe protein-calorie malnutrition (HCC)    Left upper quadrant pain    Bradycardia    Low grade fever    Microcytic anemia    Subjective:  "I don't eat meat" reported today, yet pt ate meat yesterday  Objective:  Pt was seen for dysphagia treatment bedside to ensure tolerance of current diet and assess continued need for continuation of modified diet  Pt was alert and upright in bed but slow to process and respond to simple questions  Pt was assessed with mashed potatoes, minced cooked broccoli, 1 bite of moist cut beef, small amt of canned pears and ensure plus/glucerna  No dentures in place  Mastication was prolonged  Bolus formation and transfer were effective with mashed potatoes alone and when minced broccoli mixed in, as well as cubed canned pears  Pt formed bolus with meat then spit it out  Mild oral residue noted c minced broccoli when not administered with whipped potatoes  Swallow initiation was GENERALLY prompt (pt held 12st bolus of ensure in mouth for ~8 secs and required instruction to "swa;;pw please "   Laryngeal rise was good by palpation  No coughing, throat clearing, c/o stasis, multiple swallows, change in vocal quality noted c po intake today  I spoke c staff s/p meal and discussed means of maximizing amt and safety of intake (continue modified diet, pills in puree, cue/instruct to swallow as needed  Assessment:  Pt presented as encephalopathic with slow processing and response time (even for simple questions/commands)   SHe spat out meat and evidenced mild oral residue with minced broccoli (s/s oral dysphagia)  No overt s/s pharyngeal dysphagia or aspiration c materials today  Plan/Recommendations:  Consider Level 2 dysphagia diet (mechanical soft), thin liquid, supplements  Continue supervision and encouragement to maximize task attention, persistence and amt of intake

## 2019-10-22 NOTE — ASSESSMENT & PLAN NOTE
· Lisinopril decreased to 20mg daily, amlodipine 5mg daily per nephrology  Continue atenolol 50mg daily with hold parameters    · Routine vitals

## 2019-10-22 NOTE — ASSESSMENT & PLAN NOTE
· POA  Likely in setting of hyponatremia with component of delirium contributing given recurrent hospitalizations/underlying cognitive impairment  · Urine culture unremarkable - monitor off antibiotics at this time  Pt remains asymptomatic  · Mental status seems to be at baseline although does wax and wane  On exam today pt is AO x3 and able to state she was brought to the hospital "for my sodium"  · She has hx of adrenal insufficiency, previously on steroids and then tapered off  She follows with Endocrinology as outpatient  · Cosyntropin stim test with adequate response in cortisol, hydrocortisone discontinued, she does not have adrenal insufficiency   · Vitamin B12 and folate normal   · MRI brain was done at [facility] last month which was negative for any acute infarct but possible small questionable hemorrhage versus area of mineralization  CT head without contrast in [facility] negative for bleed    · Geriatrics following, patient should follow up outpatient with them

## 2019-10-23 ENCOUNTER — APPOINTMENT (INPATIENT)
Dept: RADIOLOGY | Facility: HOSPITAL | Age: 79
DRG: 545 | End: 2019-10-23
Payer: MEDICARE

## 2019-10-23 LAB
ANION GAP SERPL CALCULATED.3IONS-SCNC: 8 MMOL/L (ref 4–13)
BASOPHILS # BLD AUTO: 0.03 THOUSANDS/ΜL (ref 0–0.1)
BASOPHILS NFR BLD AUTO: 1 % (ref 0–1)
BUN SERPL-MCNC: 13 MG/DL (ref 5–25)
CALCIUM SERPL-MCNC: 8.7 MG/DL (ref 8.3–10.1)
CHLORIDE SERPL-SCNC: 108 MMOL/L (ref 100–108)
CO2 SERPL-SCNC: 22 MMOL/L (ref 21–32)
CREAT SERPL-MCNC: 0.56 MG/DL (ref 0.6–1.3)
EOSINOPHIL # BLD AUTO: 0.08 THOUSAND/ΜL (ref 0–0.61)
EOSINOPHIL NFR BLD AUTO: 1 % (ref 0–6)
ERYTHROCYTE [DISTWIDTH] IN BLOOD BY AUTOMATED COUNT: 18.3 % (ref 11.6–15.1)
GFR SERPL CREATININE-BSD FRML MDRD: 88 ML/MIN/1.73SQ M
GLUCOSE SERPL-MCNC: 107 MG/DL (ref 65–140)
GLUCOSE SERPL-MCNC: 114 MG/DL (ref 65–140)
GLUCOSE SERPL-MCNC: 133 MG/DL (ref 65–140)
GLUCOSE SERPL-MCNC: 173 MG/DL (ref 65–140)
GLUCOSE SERPL-MCNC: 186 MG/DL (ref 65–140)
HCT VFR BLD AUTO: 29.1 % (ref 34.8–46.1)
HGB BLD-MCNC: 8.6 G/DL (ref 11.5–15.4)
IMM GRANULOCYTES # BLD AUTO: 0.06 THOUSAND/UL (ref 0–0.2)
IMM GRANULOCYTES NFR BLD AUTO: 1 % (ref 0–2)
LYMPHOCYTES # BLD AUTO: 1.36 THOUSANDS/ΜL (ref 0.6–4.47)
LYMPHOCYTES NFR BLD AUTO: 21 % (ref 14–44)
MCH RBC QN AUTO: 22.8 PG (ref 26.8–34.3)
MCHC RBC AUTO-ENTMCNC: 29.6 G/DL (ref 31.4–37.4)
MCV RBC AUTO: 77 FL (ref 82–98)
MONOCYTES # BLD AUTO: 0.37 THOUSAND/ΜL (ref 0.17–1.22)
MONOCYTES NFR BLD AUTO: 6 % (ref 4–12)
NEUTROPHILS # BLD AUTO: 4.55 THOUSANDS/ΜL (ref 1.85–7.62)
NEUTS SEG NFR BLD AUTO: 70 % (ref 43–75)
NRBC BLD AUTO-RTO: 0 /100 WBCS
PLATELET # BLD AUTO: 389 THOUSANDS/UL (ref 149–390)
PMV BLD AUTO: 10.1 FL (ref 8.9–12.7)
POTASSIUM SERPL-SCNC: 4.2 MMOL/L (ref 3.5–5.3)
RBC # BLD AUTO: 3.77 MILLION/UL (ref 3.81–5.12)
SODIUM SERPL-SCNC: 138 MMOL/L (ref 136–145)
WBC # BLD AUTO: 6.45 THOUSAND/UL (ref 4.31–10.16)

## 2019-10-23 PROCEDURE — 74174 CTA ABD&PLVS W/CONTRAST: CPT

## 2019-10-23 PROCEDURE — 82948 REAGENT STRIP/BLOOD GLUCOSE: CPT

## 2019-10-23 PROCEDURE — 85025 COMPLETE CBC W/AUTO DIFF WBC: CPT | Performed by: NURSE PRACTITIONER

## 2019-10-23 PROCEDURE — 99232 SBSQ HOSP IP/OBS MODERATE 35: CPT | Performed by: NURSE PRACTITIONER

## 2019-10-23 PROCEDURE — 99232 SBSQ HOSP IP/OBS MODERATE 35: CPT | Performed by: INTERNAL MEDICINE

## 2019-10-23 PROCEDURE — 80048 BASIC METABOLIC PNL TOTAL CA: CPT | Performed by: INTERNAL MEDICINE

## 2019-10-23 RX ORDER — AMLODIPINE BESYLATE 5 MG/1
5 TABLET ORAL EVERY 12 HOURS
Status: DISCONTINUED | OUTPATIENT
Start: 2019-10-23 | End: 2019-10-24

## 2019-10-23 RX ADMIN — INSULIN LISPRO 1 UNITS: 100 INJECTION, SOLUTION INTRAVENOUS; SUBCUTANEOUS at 17:37

## 2019-10-23 RX ADMIN — SODIUM CHLORIDE TAB 1 GM 1 G: 1 TAB at 17:37

## 2019-10-23 RX ADMIN — FERROUS SULFATE TAB 325 MG (65 MG ELEMENTAL FE) 325 MG: 325 (65 FE) TAB at 08:49

## 2019-10-23 RX ADMIN — INSULIN LISPRO 1 UNITS: 100 INJECTION, SOLUTION INTRAVENOUS; SUBCUTANEOUS at 11:28

## 2019-10-23 RX ADMIN — SODIUM CHLORIDE TAB 1 GM 1 G: 1 TAB at 08:50

## 2019-10-23 RX ADMIN — IOHEXOL 100 ML: 350 INJECTION, SOLUTION INTRAVENOUS at 14:39

## 2019-10-23 RX ADMIN — ENOXAPARIN SODIUM 40 MG: 40 INJECTION SUBCUTANEOUS at 08:51

## 2019-10-23 RX ADMIN — AMLODIPINE BESYLATE 5 MG: 5 TABLET ORAL at 08:49

## 2019-10-23 RX ADMIN — PRAVASTATIN SODIUM 40 MG: 20 TABLET ORAL at 17:37

## 2019-10-23 RX ADMIN — ATENOLOL 50 MG: 50 TABLET ORAL at 08:49

## 2019-10-23 RX ADMIN — FAMOTIDINE 20 MG: 40 POWDER, FOR SUSPENSION ORAL at 08:49

## 2019-10-23 NOTE — ASSESSMENT & PLAN NOTE
Malnutrition Findings:   Malnutrition type: Chronic illness(Related to medical condition as evidenced by 10% weight loss over the past month and <75% energy intake needs met >1 month treated with ensure compact supplements)  Degree of Malnutrition: Other severe protein calorie malnutrition    BMI Findings: Body mass index is 21 81 kg/m²  Nutrition consult and supplements

## 2019-10-23 NOTE — ASSESSMENT & PLAN NOTE
· Has been afebrile for 48 hours  · ? Possibly related to splenic infarct  · Urine culture not significant this admission, 4590-4014 GNR  At [facility], pt was noted to have ESBL in urine which was suspected to be colonization  · Admission blood cultrues are neg  · procalcitonin normal   · No leukocytosis

## 2019-10-23 NOTE — ASSESSMENT & PLAN NOTE
· POA  Likely in setting of hyponatremia with component of delirium contributing given recurrent hospitalizations/underlying cognitive impairment  · Urine culture unremarkable - monitor off antibiotics at this time  Pt remains asymptomatic  · Mental status seems to be at baseline although does wax and wane  On exam today pt is AO x3 and able to state she was brought to the hospital "for my sodium"  · She has hx of adrenal insufficiency, previously on steroids and then tapered off  She follows with Endocrinology as outpatient  · Cosyntropin stim test with adequate response in cortisol, hydrocortisone discontinued, she does not have adrenal insufficiency   · Vitamin B12 and folate normal   · MRI brain was done at [facility] last month which was negative for any acute infarct but possible small questionable hemorrhage versus area of mineralization  CT head without contrast in [facility] negative for bleed    · Geriatrics following, patient should follow up outpatient with them

## 2019-10-23 NOTE — ASSESSMENT & PLAN NOTE
· Since September she has had a progressive decline - she has had no appetite, not really eating/drinking with weakness  She was previously reportedly very independent prior to her admission at [facility] in September with UTI  · Geriatrics following, recommending outpatient follow up  · Speech following, on soft diet  · No recent colonoscopy/EGD, recommend outpatient GI evaluation

## 2019-10-23 NOTE — ASSESSMENT & PLAN NOTE
· Lisinopril stop  Amlodipine changed to 5 mg p o  B i d  Per Nephrology  Continue atenolol 50mg daily with hold parameters    · Routine vitals

## 2019-10-23 NOTE — ASSESSMENT & PLAN NOTE
· Baseline seems between 8-11  · hgb 8 6 today  · Iron studies suggestive of low iron  · Continue  p o  iron supplementation which was started this admission    · CBC in AM

## 2019-10-23 NOTE — PROGRESS NOTES
Pt  Refusing meals, attempt to feed pt  And encourage meal intake several times  Pt  Ate part of her breakfast but declined her lunch

## 2019-10-23 NOTE — ASSESSMENT & PLAN NOTE
Lab Results   Component Value Date    HGBA1C 6 4 (H) 10/15/2019     Recent Labs     10/22/19  1544 10/22/19  2047 10/23/19  0623 10/23/19  1108   POCGLU 143* 185* 114 173*       · Takes metformin and glipizide at baseline, will plan to resume metformin at discharge and hold glipizide    · Continue with SSI  · Diabetic diet  · Monitor accuchecks, avoid hypoglycemia

## 2019-10-23 NOTE — ASSESSMENT & PLAN NOTE
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  · Presented with sodium of 126  Sodium 138 today  · TSH normal this admission  · Cosyntropin stim test negative for adrenal insufficiency  · CT chest abdomen pelvis was done at [facility] to rule out any occult malignancy as a cause of SIADH which was negative for any malignancy  · Continue with 1g Salt tabs BID and fluid restriction to 1500 cc  Plan will be to d/c on daily dosing per Dr Caitlin Prince  · Anni Floyd for d/c from a nephrology standpoint, will need outpatient f/u with [facility] nephrology  Recommending BMP in 1 week with results to PCP for further management of NACL tabs  Protonix changed to Pepcid  ACEI stopped as well

## 2019-10-23 NOTE — ASSESSMENT & PLAN NOTE
· Noted on CT scan done at [facility] 9/2019, new moderate sized splenic infarcts  · Unclear etiology of splenic infarcts  Hematology consult appreciated, recommending repeat CTA abdomen and pelvis to evaluate for source of emboli  Recent echocardiogram done September 30th at [facility] was negative for thrombus  · Telemetry has been on since admission and is unremarkable for atrial fibrillation  Will discontinue  · General surgery consult appreciated, no further workup from their standpoint, they have deferred to Hematology/Oncology  · Consider lower extremity duplex

## 2019-10-23 NOTE — PLAN OF CARE
Problem: Prexisting or High Potential for Compromised Skin Integrity  Goal: Skin integrity is maintained or improved  Description  INTERVENTIONS:  - Identify patients at risk for skin breakdown  - Assess and monitor skin integrity  - Assess and monitor nutrition and hydration status  - Monitor labs   - Assess for incontinence   - Turn and reposition patient  - Assist with mobility/ambulation  - Relieve pressure over bony prominences  - Avoid friction and shearing  - Provide appropriate hygiene as needed including keeping skin clean and dry  - Evaluate need for skin moisturizer/barrier cream  - Collaborate with interdisciplinary team   - Patient/family teaching  - Consider wound care consult   Outcome: Progressing     Problem: Nutrition/Hydration-ADULT  Goal: Nutrient/Hydration intake appropriate for improving, restoring or maintaining nutritional needs  Description  Monitor and assess patient's nutrition/hydration status for malnutrition  Collaborate with interdisciplinary team and initiate plan and interventions as ordered  Monitor patient's weight and dietary intake as ordered or per policy  Utilize nutrition screening tool and intervene as necessary  Determine patient's food preferences and provide high-protein, high-caloric foods as appropriate       INTERVENTIONS:  - Monitor oral intake, urinary output, labs, and treatment plans  - Assess nutrition and hydration status and recommend course of action  - Evaluate amount of meals eaten  - Assist patient with eating if necessary   - Allow adequate time for meals  - Recommend/ encourage appropriate diets, oral nutritional supplements, and vitamin/mineral supplements  - Order, calculate, and assess calorie counts as needed  - Recommend, monitor, and adjust tube feedings and TPN/PPN based on assessed needs  - Assess need for intravenous fluids  - Provide specific nutrition/hydration education as appropriate  - Include patient/family/caregiver in decisions related to nutrition  Outcome: Progressing     Problem: Potential for Falls  Goal: Patient will remain free of falls  Description  INTERVENTIONS:  - Assess patient frequently for physical needs  -  Identify cognitive and physical deficits and behaviors that affect risk of falls    -  [place] fall precautions as indicated by assessment   - Educate patient/family on patient safety including physical limitations  - Instruct patient to call for assistance with activity based on assessment  - Modify environment to reduce risk of injury  - Consider OT/PT consult to assist with strengthening/mobility  Outcome: Progressing     Problem: NEUROSENSORY - ADULT  Goal: Achieves stable or improved neurological status  Description  INTERVENTIONS  - Monitor and report changes in neurological status  - Monitor vital signs such as temperature, blood pressure, glucose, and any other labs ordered   - Initiate measures to prevent increased intracranial pressure  - Monitor for seizure activity and implement precautions if appropriate      Outcome: Progressing     Problem: RESPIRATORY - ADULT  Goal: Achieves optimal ventilation and oxygenation  Description  INTERVENTIONS:  - Assess for changes in respiratory status  - Assess for changes in mentation and behavior  - Position to facilitate oxygenation and minimize respiratory effort  - Encourage broncho-pulmonary hygiene including cough, deep breathe, Incentive Spirometry  - Assess and instruct to report SOB or any respiratory difficulty  - Respiratory Therapy support as indicated   Outcome: Progressing     Problem: GASTROINTESTINAL - ADULT  Goal: Maintains adequate nutritional intake  Description  INTERVENTIONS:  - Monitor percentage of each meal consumed  - Identify factors contributing to decreased intake, treat as appropriate  - Assist with meals as needed  - Monitor I&O, weight, and lab values if indicated  - Obtain nutrition services referral as needed  Outcome: Progressing     Problem: GENITOURINARY - ADULT  Goal: Urinary catheter remains patent  Description  INTERVENTIONS:  - Assess patency of urinary catheter  - If patient has a chronic herrera, consider changing catheter if non-functioning  - Follow guidelines for intermittent irrigation of non-functioning urinary catheter  Outcome: Progressing     Problem: METABOLIC, FLUID AND ELECTROLYTES - ADULT  Goal: Electrolytes maintained within normal limits  Description  INTERVENTIONS:  - Monitor labs and assess patient for signs and symptoms of electrolyte imbalances  - Administer electrolyte replacement as ordered  - Monitor response to electrolyte replacements, including repeat lab results as appropriate  - Instruct patient on fluid and nutrition as appropriate  Outcome: Progressing  Goal: Fluid balance maintained  Description  INTERVENTIONS:  - Monitor labs   - Monitor I/O and WT  - Instruct patient on fluid and nutrition as appropriate  - Assess for signs & symptoms of volume excess or deficit  Outcome: Progressing  Goal: Glucose maintained within target range  Description  INTERVENTIONS:  - Monitor Blood Glucose as ordered  - Assess for signs and symptoms of hyperglycemia and hypoglycemia  - Administer ordered medications to maintain glucose within target range  - Assess nutritional intake and initiate nutrition service referral as needed  Outcome: Progressing     Problem: SKIN/TISSUE INTEGRITY - ADULT  Goal: Skin integrity remains intact  Description  INTERVENTIONS  - Identify patients at risk for skin breakdown  - Assess and monitor skin integrity  - Assess and monitor nutrition and hydration status  - Monitor labs (i e  albumin)  - Assess for incontinence   - Turn and reposition patient  - Assist with mobility/ambulation  - Relieve pressure over bony prominences  - Avoid friction and shearing  - Provide appropriate hygiene as needed including keeping skin clean and dry  - Evaluate need for skin moisturizer/barrier cream  - Collaborate with interdisciplinary team (i e  Nutrition, Rehabilitation, etc )   - Patient/family teaching  Outcome: Progressing  Goal: Oral mucous membranes remain intact  Description  INTERVENTIONS  - Assess oral mucosa and hygiene practices  - Implement preventative oral hygiene regimen  - Implement oral medicated treatments as ordered  - Initiate Nutrition services referral as needed  Outcome: Progressing     Problem: HEMATOLOGIC - ADULT  Goal: Maintains hematologic stability  Description  INTERVENTIONS  - Monitor labs      Outcome: Progressing

## 2019-10-23 NOTE — PROGRESS NOTES
[facility]    Progress Note - [place] 1940, [de-identified] y o  female MRN: [id number]    Unit/Bed#: -01 Encounter: [id number]    Primary Care Provider: Binh aGrcia MD   Date and time admitted to hospital: 10/14/2019  3:93 PM    * Metabolic encephalopathy  Assessment & Plan  · POA  Likely in setting of hyponatremia with component of delirium contributing given recurrent hospitalizations/underlying cognitive impairment  · Urine culture unremarkable - monitor off antibiotics at this time  Pt remains asymptomatic  · Mental status seems to be at baseline although does wax and wane  On exam today pt is AO x3 and able to state she was brought to the hospital "for my sodium"  · She has hx of adrenal insufficiency, previously on steroids and then tapered off  She follows with Endocrinology as outpatient  · Cosyntropin stim test with adequate response in cortisol, hydrocortisone discontinued, she does not have adrenal insufficiency   · Vitamin B12 and folate normal   · MRI brain was done at [facility] last month which was negative for any acute infarct but possible small questionable hemorrhage versus area of mineralization  CT head without contrast in [facility] negative for bleed  · Geriatrics following, patient should follow up outpatient with them    Hyponatremia  Assessment & Plan  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  · Presented with sodium of 126  Sodium 138 today  · TSH normal this admission  · Cosyntropin stim test negative for adrenal insufficiency  · CT chest abdomen pelvis was done at [facility] to rule out any occult malignancy as a cause of SIADH which was negative for any malignancy  · Continue with 1g Salt tabs BID and fluid restriction to 1500 cc  Plan will be to d/c on daily dosing per Dr Alecia Mullins     · Payal Gastelum for d/c from a nephrology standpoint, will need outpatient f/u with [facility] nephrology  Recommending BMP in 1 week with results to PCP for further management of NACL tabs  Protonix changed to Pepcid  ACEI stopped as well  Urinary retention  Assessment & Plan  · Patient has chronic Herrera present on admission- herrera was replaced in ED 10/14   · Placed on recent admission (10/3/19) at [facility] where she developed urinary retention  · Patient will follow up outpatient with urology for voiding trial  Has appt on 10/28 with [facility]    Low grade fever  Assessment & Plan  · Has been afebrile for 48 hours  · ? Possibly related to splenic infarct  · Urine culture not significant this admission, 8048-6602 GNR  At [facility], pt was noted to have ESBL in urine which was suspected to be colonization  · Admission blood cultrues are neg  · procalcitonin normal   · No leukocytosis  Failure to thrive in adult  Assessment & Plan  · Since September she has had a progressive decline - she has had no appetite, not really eating/drinking with weakness  She was previously reportedly very independent prior to her admission at [facility] in September with UTI  · Geriatrics following, recommending outpatient follow up  · Speech following, on soft diet  · No recent colonoscopy/EGD, recommend outpatient GI evaluation  Splenic infarct  Assessment & Plan  · Noted on CT scan done at [facility] 9/2019, new moderate sized splenic infarcts  · Unclear etiology of splenic infarcts  Hematology consult appreciated, recommending repeat CTA abdomen and pelvis to evaluate for source of emboli  Recent echocardiogram done September 30th at [facility] was negative for thrombus  · Telemetry has been on since admission and is unremarkable for atrial fibrillation  Will discontinue  · General surgery consult appreciated, no further workup from their standpoint, they have deferred to Hematology/Oncology  · Consider lower extremity duplex        Essential hypertension  Assessment & Plan  · Lisinopril stop   Amlodipine changed to 5 mg p o  B i d  Per Nephrology  Continue atenolol 50mg daily with hold parameters  · Routine vitals    Microcytic anemia  Assessment & Plan  · Baseline seems between 8-11  · hgb 8 6 today  · Iron studies suggestive of low iron  · Continue  p o  iron supplementation which was started this admission  · CBC in AM      Bradycardia  Assessment & Plan  · Resolved with lower dose of atenolol  · EKG shows NSR in 80's  · TSH normal this admission  Type 2 diabetes mellitus with diabetic neuropathy, without long-term current use of insulin [facility])  Assessment & Plan  Lab Results   Component Value Date    HGBA1C 6 4 (H) 10/15/2019     Recent Labs     10/22/19  1544 10/22/19  2047 10/23/19  0623 10/23/19  1108   POCGLU 143* 185* 114 173*       · Takes metformin and glipizide at baseline, will plan to resume metformin at discharge and hold glipizide  · Continue with SSI  · Diabetic diet  · Monitor accuchecks, avoid hypoglycemia    HLD (hyperlipidemia)  Assessment & Plan  · Continue statin    Severe protein-calorie malnutrition (HCC)  Assessment & Plan  Malnutrition Findings:   Malnutrition type: Chronic illness(Related to medical condition as evidenced by 10% weight loss over the past month and <75% energy intake needs met >1 month treated with ensure compact supplements)  Degree of Malnutrition: Other severe protein calorie malnutrition    BMI Findings: Body mass index is 21 81 kg/m²  Nutrition consult and supplements  Pharmacologic: Enoxaparin (Lovenox)  Mechanical VTE Prophylaxis in Place: Yes    Patient Centered Rounds: I have performed bedside rounds with nursing staff today  Discussions with Specialists or Other Care Team Provider: nursing, case management     Education and Discussions with Family / Patient: patient and daughter Kaceyta Backer over phone     Time Spent for Care: 30 minutes    More than 50% of total time spent on counseling and coordination of care as described above  Current Length of Stay: 9 day(s)    Current Patient Status: Inpatient     Certification Statement: The patient will continue to require additional inpatient hospital stay due to CTA A/P, additional work up as per hematology    Discharge Plan / Estimated Discharge Date: work up for splenic infarct pending, d/c to rehab after work up completed  Code Status: Level 1 - Full Code      Subjective:   Patient offers no complaints  She feels more awake today  No abdominal pain, nausea, vomiting  No cp, palpitations  Eating and drinking as best she can  Objective:     Vitals:   Temp (24hrs), Av 9 °F (37 2 °C), Min:98 6 °F (37 °C), Max:99 5 °F (37 5 °C)    Temp:  [98 6 °F (37 °C)-99 5 °F (37 5 °C)] 98 6 °F (37 °C)  HR:  [76-80] 76  Resp:  [18] 18  BP: (108-130)/(40-55) 130/55  SpO2:  [95 %-98 %] 95 %  Body mass index is 21 81 kg/m²  Input and Output Summary (last 24 hours): Intake/Output Summary (Last 24 hours) at 10/23/2019 1114  Last data filed at 10/23/2019 6653  Gross per 24 hour   Intake 220 ml   Output 550 ml   Net -330 ml       Physical Exam:     Physical Exam   Constitutional: She is oriented to person, place, and time  No distress  Appears weak and frail    HENT:   Head: Normocephalic  Mouth/Throat: Oropharyngeal exudate (dark exudate noted on tongue ) present  Eyes: Conjunctivae are normal    Neck: Normal range of motion  Cardiovascular: Normal rate  Pulmonary/Chest: Breath sounds normal    Abdominal: Bowel sounds are normal    Genitourinary:   Genitourinary Comments: Rapp cath with clear yellow urine   Musculoskeletal: Normal range of motion  She exhibits no edema  Neurological: She is alert and oriented to person, place, and time  Forgetful at times  More awake and alert today   Skin: Skin is warm and dry  There is pallor  Psychiatric: She has a normal mood and affect  Nursing note and vitals reviewed        Additional Data:     Labs:    Results from last 7 days   Lab Units 10/23/19  0534   WBC Thousand/uL 6 45   HEMOGLOBIN g/dL 8 6*   HEMATOCRIT % 29 1*   PLATELETS Thousands/uL 389   NEUTROS PCT % 70   LYMPHS PCT % 21   MONOS PCT % 6   EOS PCT % 1     Results from last 7 days   Lab Units 10/23/19  0534   POTASSIUM mmol/L 4 2   CHLORIDE mmol/L 108   CO2 mmol/L 22   BUN mg/dL 13   CREATININE mg/dL 0 56*   CALCIUM mg/dL 8 7             Recent Cultures (last 7 days):           Last 24 Hours Medication List:     Current Facility-Administered Medications:  acetaminophen 650 mg Oral Q6H PRN Artur Meeks MD   aluminum-magnesium hydroxide-simethicone 30 mL Oral Q6H PRN Artur Meeks MD   amLODIPine 5 mg Oral Q12H Vimal Renee MD   atenolol 50 mg Oral Daily Jermaine Phillips MD   docusate sodium 100 mg Oral BID PRN Artur Meeks MD   enoxaparin 40 mg Subcutaneous Daily Artur Meeks MD   famotidine 20 mg Oral Daily Vimal Renee MD   ferrous sulfate 325 mg Oral Daily With Breakfast Jermaine Phillips MD   insulin lispro 1-5 Units Subcutaneous HS Artur Meeks MD   insulin lispro 1-6 Units Subcutaneous TID AC Artur Meeks MD   ondansetron 4 mg Intravenous Q6H PRN Artur Meeks MD   pravastatin 40 mg Oral Daily With Matty Peña MD   sodium chloride 1 g Oral BID With Meals Ros Go PA-C        Today, Patient Was Seen By: JAQUELINE Michaud    ** Please Note: Dragon 360 Dictation voice to text software may have been used in the creation of this document   **

## 2019-10-23 NOTE — ASSESSMENT & PLAN NOTE
· Patient has chronic Herrera present on admission- herrera was replaced in ED 10/14   · Placed on recent admission (10/3/19) at [facility] where she developed urinary retention    · Patient will follow up outpatient with urology for voiding trial  Has appt on 10/28 with [facility]rology

## 2019-10-23 NOTE — PROGRESS NOTES
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 ACE-inhibitor and PPI could cause SIADH  ? Protonix changed to Pepcid  ? Will discontinue lisinopril as her blood pressure is running low and lisinopril can cause SIADH  ? Workup:  Serum osmolality 276, urine osmolality 624, urine sodium 90, uric acid 4 1, cortisol 40 8, TSH 2 8, normal LFTs except for albumin of 2 2, chest x-ray with minimal left basilar airspace disease attributed to compressive atelectasis and small left pleural effusion, echocardiogram at [facility] revealed ejection fraction of 50-55% with mild pulmonary hypertension, mild mitral regurgitation and tricuspid regurgitation / ACT H was low at 1 6 at [facility] (with cortisol of 19 4)  ? SPEP faint possible band in the gamma region  Immunofixation pending  ? Currently on sodium chloride 1 g b i d  and 1500 cc per day oral fluid restriction  With decreased sodium chloride to 1 g q day  ? Sodium  improved-138 today  ? CT chest, abdomen and pelvis on 09/29 at [facility]:  No signs of malignancy  There were new small bilateral pleural effusions with new infarcts involving the medial aspect of the spleen  ? Low ACTH level at [facility] was likely a lab error  Would have her PCP recheck as an outpatient  ? Check a m  BMP  2  Anemia:  Hemoglobin 9 1 today  ? Iron saturation 7%, ferritin 608  ? Started on oral iron by primary team  3  Encephalopathy:  Originally Due to hyponatremia and UTI   Now sodium is much better and not contributing to her mental status  The patient knew the place, year and her name this morning     4  Hypertension:  Blood pressure running on the low side  · Will discontinue lisinopril  Continue current hold parameter on Norvasc  · Trend blood pressure with these changes    Discussed with the SLIM team today    Subjective: The patient was seen examined  She was awake and in no apparent distress  She denied any shortness of breath, chest pain or pressure, abdominal pain, vomiting, diarrhea  Objective:     Vitals: Blood pressure 130/55, pulse 76, temperature 98 6 °F (37 °C), resp  rate 18, height 5' 2" (1 575 m), weight 54 1 kg (119 lb 4 3 oz), SpO2 95 %  ,Body mass index is 21 81 kg/m²  Temp (24hrs), Av 9 °F (37 2 °C), Min:98 6 °F (37 °C), Max:99 5 °F (37 5 °C)      Weight (last 2 days)     Date/Time   Weight    10/23/19 0537   54 1 (119 27)    10/22/19 0553   54 1 (119 3)    10/21/19 0600   52 2 (115)                     Intake/Output Summary (Last 24 hours) at 10/23/2019 1149  Last data filed at 10/23/2019 0642  Gross per 24 hour   Intake 220 ml   Output 550 ml   Net -330 ml     I/O last 24 hours: In: 340 [P O :340]  Out: 550 [Urine:550]        Physical Exam    Physical Exam   Constitutional: No distress  Neck: No JVD present  Cardiovascular: Normal heart sounds  Exam reveals no gallop and no friction rub  Pulmonary/Chest: Effort normal and breath sounds normal  No respiratory distress  She has no wheezes  She has no rales  Abdominal: Soft  Bowel sounds are normal  She exhibits no distension  There is no tenderness  There is no rebound and no guarding  Musculoskeletal: She exhibits no edema  Neurological: She is alert  Skin: She is not diaphoretic  Vitals reviewed                Invasive Devices     Drain            Urethral Catheter Straight-tip 16 Fr  8 days                Medications:    Scheduled Meds:  Current Facility-Administered Medications:  acetaminophen 650 mg Oral Q6H PRN Ion Dykes MD   aluminum-magnesium hydroxide-simethicone 30 mL Oral Q6H PRN Ion Dykes MD   amLODIPine 5 mg Oral Q12H [facility] Pam Stevens MD   atenolol 50 mg Oral Daily Melissa Griffiths MD   docusate sodium 100 mg Oral BID PRN Rome Watts MD   enoxaparin 40 mg Subcutaneous Daily Rome Watts MD   famotidine 20 mg Oral Daily Angelica Wang MD   ferrous sulfate 325 mg Oral Daily With Breakfast Melissa Griffiths MD   insulin lispro 1-5 Units Subcutaneous HS Rome Watts MD   insulin lispro 1-6 Units Subcutaneous TID AC Rome Watts MD   ondansetron 4 mg Intravenous Q6H PRN Rome Watts MD   pravastatin 40 mg Oral Daily With Grace Grove MD   sodium chloride 1 g Oral BID With Meals Nichole Gr PA-C       PRN Meds:   acetaminophen    aluminum-magnesium hydroxide-simethicone    docusate sodium    ondansetron    Continuous Infusions:         LAB RESULTS:      Results from last 7 days   Lab Units 10/23/19  0534 10/22/19  0550 10/21/19  0604 10/20/19  0449 10/19/19  0456 10/18/19  0532 10/17/19  0458   WBC Thousand/uL 6 45 5 97 6 33 8 15 6 44 4 39 4 36   HEMOGLOBIN g/dL 8 6* 9 1* 9 8* 9 0* 9 2* 10 5* 8 4*   HEMATOCRIT % 29 1* 30 0* 32 2* 29 4* 29 4* 34 5* 26 7*   PLATELETS Thousands/uL 389 418* 432* 399* 418* 411* 424*   NEUTROS PCT % 70  --  82* 82* 79*  --   --    LYMPHS PCT % 21  --  12* 12* 14  --   --    LYMPHO PCT %  --  14  --   --   --   --   --    MONOS PCT % 6  --  4 5 6  --   --    MONO PCT %  --  5  --   --   --   --   --    EOS PCT % 1 1 1 0 0  --   --    POTASSIUM mmol/L 4 2 4 0 3 9 4 1 4 2 5 0 4 1   CHLORIDE mmol/L 108 106 105 104 102 105 105   CO2 mmol/L 22 21 23 22 21 17* 20*   BUN mg/dL 13 13 16 14 11 15 20   CREATININE mg/dL 0 56* 0 47* 0 52* 0 60 0 53* 0 61 0 56*   CALCIUM mg/dL 8 7 8 6 8 7 8 6 8 5 8 8 8 7   MAGNESIUM mg/dL  --   --   --  1 5*  --   --   --    PHOSPHORUS mg/dL  --   --   --  3 3  --   --   --        CUTURES:  Lab Results   Component Value Date    BLOODCX No Growth After 5 Days  10/14/2019    BLOODCX No Growth After 5 Days   10/14/2019    URINECX 8091-0762 cfu/ml Gram Negative Nirav (A) 10/15/2019    URINECX >100,000 cfu/ml Escherichia coli 07/16/2017                 PLEASE NOTE:  This encounter was completed utilizing the [employer] Direct Speech Voice Recognition Software  Grammatical errors, random word insertions, pronoun errors and incomplete sentences are occasional consequences of the system due to software limitations, ambient noise and hardware issues  These may be missed by proof reading prior to affixing electronic signature  Any questions or concerns about the content, text or information contained within the body of this dictation should be directly addressed to the physician for clarification  Please do not hesitate to call me directly if you have any any questions or concerns

## 2019-10-24 LAB
ANION GAP SERPL CALCULATED.3IONS-SCNC: 6 MMOL/L (ref 4–13)
BASOPHILS # BLD AUTO: 0.02 THOUSANDS/ΜL (ref 0–0.1)
BASOPHILS NFR BLD AUTO: 0 % (ref 0–1)
BUN SERPL-MCNC: 12 MG/DL (ref 5–25)
CALCIUM SERPL-MCNC: 8.4 MG/DL (ref 8.3–10.1)
CHLORIDE SERPL-SCNC: 103 MMOL/L (ref 100–108)
CO2 SERPL-SCNC: 22 MMOL/L (ref 21–32)
CREAT SERPL-MCNC: 0.49 MG/DL (ref 0.6–1.3)
EOSINOPHIL # BLD AUTO: 0.06 THOUSAND/ΜL (ref 0–0.61)
EOSINOPHIL NFR BLD AUTO: 1 % (ref 0–6)
ERYTHROCYTE [DISTWIDTH] IN BLOOD BY AUTOMATED COUNT: 18.1 % (ref 11.6–15.1)
GFR SERPL CREATININE-BSD FRML MDRD: 92 ML/MIN/1.73SQ M
GLUCOSE SERPL-MCNC: 106 MG/DL (ref 65–140)
GLUCOSE SERPL-MCNC: 113 MG/DL (ref 65–140)
GLUCOSE SERPL-MCNC: 115 MG/DL (ref 65–140)
GLUCOSE SERPL-MCNC: 134 MG/DL (ref 65–140)
GLUCOSE SERPL-MCNC: 141 MG/DL (ref 65–140)
HCT VFR BLD AUTO: 30.8 % (ref 34.8–46.1)
HGB BLD-MCNC: 9.3 G/DL (ref 11.5–15.4)
IMM GRANULOCYTES # BLD AUTO: 0.04 THOUSAND/UL (ref 0–0.2)
IMM GRANULOCYTES NFR BLD AUTO: 1 % (ref 0–2)
LYMPHOCYTES # BLD AUTO: 0.99 THOUSANDS/ΜL (ref 0.6–4.47)
LYMPHOCYTES NFR BLD AUTO: 17 % (ref 14–44)
MCH RBC QN AUTO: 22.8 PG (ref 26.8–34.3)
MCHC RBC AUTO-ENTMCNC: 30.2 G/DL (ref 31.4–37.4)
MCV RBC AUTO: 76 FL (ref 82–98)
MONOCYTES # BLD AUTO: 0.25 THOUSAND/ΜL (ref 0.17–1.22)
MONOCYTES NFR BLD AUTO: 4 % (ref 4–12)
NEUTROPHILS # BLD AUTO: 4.43 THOUSANDS/ΜL (ref 1.85–7.62)
NEUTS SEG NFR BLD AUTO: 77 % (ref 43–75)
NRBC BLD AUTO-RTO: 0 /100 WBCS
PLATELET # BLD AUTO: 349 THOUSANDS/UL (ref 149–390)
PMV BLD AUTO: 9.7 FL (ref 8.9–12.7)
POTASSIUM SERPL-SCNC: 3.9 MMOL/L (ref 3.5–5.3)
RBC # BLD AUTO: 4.08 MILLION/UL (ref 3.81–5.12)
SODIUM SERPL-SCNC: 131 MMOL/L (ref 136–145)
WBC # BLD AUTO: 5.79 THOUSAND/UL (ref 4.31–10.16)

## 2019-10-24 PROCEDURE — 80048 BASIC METABOLIC PNL TOTAL CA: CPT | Performed by: NURSE PRACTITIONER

## 2019-10-24 PROCEDURE — 99232 SBSQ HOSP IP/OBS MODERATE 35: CPT | Performed by: PHYSICIAN ASSISTANT

## 2019-10-24 PROCEDURE — 85025 COMPLETE CBC W/AUTO DIFF WBC: CPT | Performed by: NURSE PRACTITIONER

## 2019-10-24 PROCEDURE — 82948 REAGENT STRIP/BLOOD GLUCOSE: CPT

## 2019-10-24 PROCEDURE — 99232 SBSQ HOSP IP/OBS MODERATE 35: CPT | Performed by: INTERNAL MEDICINE

## 2019-10-24 RX ORDER — ACETAMINOPHEN 325 MG/1
650 TABLET ORAL EVERY 6 HOURS PRN
Status: DISCONTINUED | OUTPATIENT
Start: 2019-10-24 | End: 2019-10-27

## 2019-10-24 RX ORDER — AMLODIPINE BESYLATE 5 MG/1
5 TABLET ORAL DAILY
Status: DISCONTINUED | OUTPATIENT
Start: 2019-10-25 | End: 2019-10-27

## 2019-10-24 RX ORDER — SODIUM CHLORIDE 1000 MG
2 TABLET, SOLUBLE MISCELLANEOUS ONCE
Status: COMPLETED | OUTPATIENT
Start: 2019-10-24 | End: 2019-10-24

## 2019-10-24 RX ORDER — TORSEMIDE 5 MG/1
5 TABLET ORAL ONCE
Status: COMPLETED | OUTPATIENT
Start: 2019-10-24 | End: 2019-10-24

## 2019-10-24 RX ADMIN — SODIUM CHLORIDE TAB 1 GM 2 G: 1 TAB at 10:16

## 2019-10-24 RX ADMIN — PRAVASTATIN SODIUM 40 MG: 20 TABLET ORAL at 16:07

## 2019-10-24 RX ADMIN — FAMOTIDINE 20 MG: 40 POWDER, FOR SUSPENSION ORAL at 08:13

## 2019-10-24 RX ADMIN — ENOXAPARIN SODIUM 40 MG: 40 INJECTION SUBCUTANEOUS at 08:11

## 2019-10-24 RX ADMIN — SODIUM CHLORIDE TAB 1 GM 1 G: 1 TAB at 08:13

## 2019-10-24 RX ADMIN — SODIUM CHLORIDE TAB 1 GM 1 G: 1 TAB at 16:07

## 2019-10-24 RX ADMIN — TORSEMIDE 5 MG: 5 TABLET ORAL at 11:32

## 2019-10-24 RX ADMIN — FERROUS SULFATE TAB 325 MG (65 MG ELEMENTAL FE) 325 MG: 325 (65 FE) TAB at 08:11

## 2019-10-24 RX ADMIN — ACETAMINOPHEN 650 MG: 325 TABLET ORAL at 12:13

## 2019-10-24 NOTE — ASSESSMENT & PLAN NOTE
· Noted on CT scan done at [facility] 9/2019, new moderate sized splenic infarcts  · Unclear etiology of splenic infarcts  Hematology consult appreciated, recommending repeat CTA abdomen and pelvis to evaluate for source of emboli  Recent echocardiogram done September 30th at [facility] was negative for thrombus  · CTA abdomen/pelvis result is pending   · Telemetry has been on since admission and is unremarkable for atrial fibrillation  Will discontinue  · General surgery consult appreciated, no further workup from their standpoint, they have deferred to Hematology/Oncology  · Consider lower extremity duplex

## 2019-10-24 NOTE — ASSESSMENT & PLAN NOTE
· Has been afebrile for 48 hours  · ? Possibly related to splenic infarct  · Urine culture not significant this admission, [id number] GNR  At [facility], pt was noted to have ESBL in urine which was suspected to be colonization  · Admission blood cultrues are neg  · procalcitonin normal   · No leukocytosis

## 2019-10-24 NOTE — ASSESSMENT & PLAN NOTE
Malnutrition Findings:   Malnutrition type: Chronic illness(Related to medical condition as evidenced by 10% weight loss over the past month and <75% energy intake needs met >1 month treated with ensure compact supplements)  Degree of Malnutrition: Other severe protein calorie malnutrition    BMI Findings: Body mass index is 22 26 kg/m²  Nutrition consult and supplements

## 2019-10-24 NOTE — ASSESSMENT & PLAN NOTE
· Baseline seems between 8-11  · hgb 9 3 today  · Iron studies suggestive of low iron  · Continue  p o  iron supplementation which was started this admission    · CBC in AM

## 2019-10-24 NOTE — ASSESSMENT & PLAN NOTE
· Patient has chronic Herrera present on admission- herrera was replaced in ED 10/14   · Placed on recent admission (10/3/19) at [facility] where she developed urinary retention    · Patient will follow up outpatient with urology for voiding trial  Has appt on 10/28 with [facility]

## 2019-10-24 NOTE — ASSESSMENT & PLAN NOTE
Lab Results   Component Value Date    HGBA1C 6 4 (H) 10/15/2019     Recent Labs     10/23/19  1632 10/23/19  2117 10/24/19  0534 10/24/19  1053   POCGLU 186* 133 115 134       · Takes metformin and glipizide at baseline, will plan to resume metformin at discharge and hold glipizide    · Continue with SSI  · Diabetic diet  · Monitor accuchecks, avoid hypoglycemia

## 2019-10-24 NOTE — ASSESSMENT & PLAN NOTE
· Since September she has had a progressive decline - she has had no appetite, not really eating/drinking with weakness  She was previously reportedly very independent prior to her admission at [facility] in September with UTI  · Geriatrics following, recommending outpatient follow up  · Speech following, on soft diet  · No recent colonoscopy/EGD, recommend outpatient GI evaluation

## 2019-10-24 NOTE — ASSESSMENT & PLAN NOTE
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  · Presented with sodium of 126  Sodium 138 today  · TSH normal this admission  · Cosyntropin stim test negative for adrenal insufficiency  · CT chest abdomen pelvis was done at [facility] to rule out any occult malignancy as a cause of SIADH which was negative for any malignancy  · Continue with 1g Salt tabs BID and fluid restriction - was 1500 and changed to 1200 today given drop in Na this AM  Plan will be to d/c on daily dosing per Dr Marina Vasques  · [id number] [address] for d/c from a nephrology standpoint, will need outpatient f/u with [facility] nephrology  Recommending BMP in 1 week with results to PCP for further management of NACL tabs  Protonix changed to Pepcid  ACEI stopped as well    · Given additional salt tab today and given torsemide PO x 1 dose  · BMP in AM

## 2019-10-24 NOTE — PROGRESS NOTES
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 ACE-inhibitor and PPI could cause SIADH  ? Protonix changed to Pepcid  ? Lisinopril was discontinued as her blood pressure is running low and lisinopril can cause SIADH  ? Workup:  Serum osmolality 276, urine osmolality 624, urine sodium 90, uric acid 4 1, cortisol 40 8, TSH 2 8, normal LFTs except for albumin of 2 2, chest x-ray with minimal left basilar airspace disease attributed to compressive atelectasis and small left pleural effusion, echocardiogram at [facility] revealed ejection fraction of 50-55% with mild pulmonary hypertension, mild mitral regurgitation and tricuspid regurgitation / ACT H was low at 1 6 [facility] cortisol of 19 4)  ? SPEP faint possible band in the gamma region   Immunofixation pending - [facility]  ? Sodium level decreased today-will given extra dose of sodium chloride and increase the dose to 1 g b i d  Additionally, fluid restriction has been changed to 1200 mL  Will give 1 dose of torsemide due to her increase in weight  ? CT chest, abdomen and pelvis on 09/29 at [facility]:  No signs of malignancy  Genene Nan were new small bilateral pleural effusions with new infarcts involving the medial aspect of the spleen  ? Low ACTH level at [facility] was likely a lab error  Would have her PCP recheck as an outpatient  ? Check a m  [facility]  2  Anemia:  Hemoglobin 9 3 today  ? Iron saturation 7%, ferritin 608  ? Started on oral iron by primary team  3  Encephalopathy:  Originally Due to hyponatremia and UTI   Overall, mental status seems to have improved   4   Hypertension: [address] pressure running on the low side but seems to have improved  · Continue to hold lisinopril and decrease Norvasc dose to 5 mg once daily  · Trend blood pressure with these changes    Disposition:  If the patient is discharged today, would continue current blood pressure and sodium chloride a day tablet regimen  Would obtain a BMP on Monday and have her PCP follow up with these results expeditiously to ensure that the sodium level is acceptable  Would strive for sodium level at 130 or higher        Subjective: The patient was seen examined this morning  She was awake and answering questions, sometimes slow it at times but overall unchanged  She denied any shortness of breath, chest pain or pressure, abdominal pain, vomiting, diarrhea  Objective:     Vitals: Blood pressure 117/56, pulse 80, temperature 98 7 °F (37 1 °C), resp  rate 17, height 5' 2" (1 575 m), weight 55 2 kg (121 lb 11 2 oz), SpO2 96 %  ,Body mass index is 22 26 kg/m²  Temp (24hrs), Av 1 °F (36 7 °C), Min:96 9 °F (36 1 °C), Max:98 7 °F (37 1 °C)      Weight (last 2 days)     Date/Time   Weight    10/24/19 0536   55 2 (121 7)    10/23/19 0537   54 1 (119 27)    10/22/19 0553   54 1 (119 3)                     Intake/Output Summary (Last 24 hours) at 10/24/2019 1016  Last data filed at 10/24/2019 0800  Gross per 24 hour   Intake 60 ml   Output 225 ml   Net -165 ml     I/O last 24 hours: In: 180 [P O :180]  Out: 225 [Urine:225]        Physical Exam    Physical Exam   Constitutional: No distress  Neck: No JVD present  Cardiovascular: Normal heart sounds  Exam reveals no gallop and no friction rub  Pulmonary/Chest: Effort normal and breath sounds normal  No respiratory distress  She has no wheezes  She has no rales  Abdominal: Soft  Bowel sounds are normal  She exhibits no distension  There is no tenderness  There is no rebound and no guarding  Musculoskeletal: She exhibits no edema  Neurological: She is alert     Skin: She is not diaphoretic  Vitals reviewed                Invasive Devices     Peripheral Intravenous Line            Peripheral IV 10/23/19 Right Antecubital less than 1 day          Drain            Urethral Catheter Straight-tip 16 Fr  9 days                Medications:    Scheduled Meds:  Current Facility-Administered Medications:  acetaminophen 650 mg Oral Q6H PRN Mirna Albrecht MD   aluminum-magnesium hydroxide-simethicone 30 mL Oral Q6H PRN Mirna Albrecht MD   amLODIPine 5 mg Oral Q12H Purnima Soni MD   atenolol 50 mg Oral Daily Caity Montoya MD   docusate sodium 100 mg Oral BID PRN Mirna Albrecht MD   enoxaparin 40 mg Subcutaneous Daily Mirna Albrecht MD   famotidine 20 mg Oral Daily Purnima Soni MD   ferrous sulfate 325 mg Oral Daily With Breakfast Caity Montoya MD   insulin lispro 1-5 Units Subcutaneous HS Mirna Albrecht MD   insulin lispro 1-6 Units Subcutaneous TID AC Mirna Albrecht MD   ondansetron 4 mg Intravenous Q6H PRN Mirna Albrecht MD   pravastatin 40 mg Oral Daily With Amy Ayon MD   sodium chloride 1 g Oral BID With Meals Purnima Soni MD   sodium chloride 2 g Oral Once Purnima Soni MD   torsemide 5 mg Oral Once Purnima Soni MD       PRN Meds:   acetaminophen    aluminum-magnesium hydroxide-simethicone    docusate sodium    ondansetron    Continuous Infusions:         LAB RESULTS:      Results from last 7 days   Lab Units 10/24/19  0537 10/23/19  0534 10/22/19  0550 10/21/19  0604 10/20/19  0449 10/19/19  0456 10/18/19  0532   WBC Thousand/uL 5 79 6 45 5 97 6 33 8 15 6 44 4 39   HEMOGLOBIN g/dL 9 3* 8 6* 9 1* 9 8* 9 0* 9 2* 10 5*   HEMATOCRIT % 30 8* 29 1* 30 0* 32 2* 29 4* 29 4* 34 5*   PLATELETS Thousands/uL 349 389 418* 432* 399* 418* 411*   NEUTROS PCT % 77* 70  --  82* 82* 79*  --    LYMPHS PCT % 17 21  --  12* 12* 14  --    LYMPHO PCT %  --   --  14  --   --   --   --    MONOS PCT % 4 6  --  4 5 6  --    MONO PCT %  --   --  5  --   --   --   --    EOS PCT % 1 1 1 1 0 0  --    POTASSIUM mmol/L 3 9 4 2 4 0 3 9 4 1 4 2 5 0   CHLORIDE mmol/L 103 108 106 105 104 102 105   CO2 mmol/L 22 22 21 23 22 21 17*   BUN mg/dL 12 13 13 16 14 11 15   CREATININE mg/dL 0 49* 0 56* 0 47* 0 52* 0 60 0 53* 0 61   CALCIUM mg/dL 8 4 8 7 8 6 8 7 8 6 8 5 8 8   MAGNESIUM mg/dL  --   --   --   --  1 5*  --   --    PHOSPHORUS mg/dL  --   --   --   --  3 3  --   --        CUTURES:  Lab Results   Component Value Date    BLOODCX No Growth After 5 Days  10/14/2019    BLOODCX No Growth After 5 Days  10/14/2019    URINECX 2866-0255 cfu/ml Gram Negative Nirav (A) 10/15/2019    URINECX >100,000 cfu/ml Escherichia coli 07/16/2017                 PLEASE NOTE:  This encounter was completed utilizing the [employer] Direct Speech Voice Recognition Software  Grammatical errors, random word insertions, pronoun errors and incomplete sentences are occasional consequences of the system due to software limitations, ambient noise and hardware issues  These may be missed by proof reading prior to affixing electronic signature  Any questions or concerns about the content, text or information contained within the body of this dictation should be directly addressed to the physician for clarification  Please do not hesitate to call me directly if you have any any questions or concerns

## 2019-10-24 NOTE — PROGRESS NOTES
[address] [facility]  Progress Note - [place] 1940, [de-identified] y o  female MRN: [id number]    Unit/Bed#: -01 Encounter: [id number]    Primary Care Provider: Esha Dc MD   Date and time admitted to hospital: 10/14/2019  4:20 PM      * Metabolic encephalopathy  Assessment & Plan  · POA  Likely in setting of hyponatremia with component of delirium contributing given recurrent hospitalizations/underlying cognitive impairment  · Urine culture unremarkable - monitor off antibiotics at this time  Pt remains asymptomatic  · Mental status seems to be at baseline although does wax and wane  On exam today pt is AO x3 and able to state she was brought to the hospital "for my sodium"  · She has hx of adrenal insufficiency, previously on steroids and then tapered off  She follows with Endocrinology as outpatient  · Cosyntropin stim test with adequate response in cortisol, hydrocortisone discontinued, she does not have adrenal insufficiency   · Vitamin B12 and folate normal   · MRI brain was done at [facility] last month which was negative for any acute infarct but possible small questionable hemorrhage versus area of mineralization  CT head without contrast in [facility] negative for bleed  · Geriatrics following, patient should follow up outpatient with them    Hyponatremia  Assessment & Plan  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  · Presented with sodium of 126  Sodium 138 today  · TSH normal this admission  · Cosyntropin stim test negative for adrenal insufficiency  · CT chest abdomen pelvis was done at [facility] to rule out any occult malignancy as a cause of SIADH which was negative for any malignancy  · Continue with 1g Salt tabs BID and fluid restriction - was 1500 and changed to 1200 today given drop in Na this AM  Plan will be to d/c on daily dosing per Dr Rafael Smith     · Ok for d/c from a nephrology standpoint, will need outpatient f/u with [facility] nephrology  Recommending BMP in 1 week with results to PCP for further management of NACL tabs  Protonix changed to Pepcid  ACEI stopped as well  · Given additional salt tab today and given torsemide PO x 1 dose  · BMP in AM    Splenic infarct  Assessment & Plan  · Noted on CT scan done at [facility] 9/2019, new moderate sized splenic infarcts  · Unclear etiology of splenic infarcts  Hematology consult appreciated, recommending repeat CTA abdomen and pelvis to evaluate for source of emboli  Recent echocardiogram done September 30th at [facility] was negative for thrombus  · CTA abdomen/pelvis result is pending   · Telemetry has been on since admission and is unremarkable for atrial fibrillation  Will discontinue  · General surgery consult appreciated, no further workup from their standpoint, they have deferred to Hematology/Oncology  · Consider lower extremity duplex  Microcytic anemia  Assessment & Plan  · Baseline seems between 8-11  · hgb 9 3 today  · Iron studies suggestive of low iron  · Continue  p o  iron supplementation which was started this admission  · CBC in AM    Low grade fever  Assessment & Plan  · Has been afebrile for 48 hours  · ? Possibly related to splenic infarct  · Urine culture not significant this admission, 0812-6190 GNR  At [facility], pt was noted to have ESBL in urine which was suspected to be colonization  · Admission blood cultrues are neg  · procalcitonin normal   · No leukocytosis  Bradycardia  Assessment & Plan  · Resolved with lower dose of atenolol  · EKG shows NSR in 80's  · TSH normal this admission      Severe protein-calorie malnutrition ([address] )  Assessment & Plan  Malnutrition Findings:   Malnutrition type: Chronic illness(Related to medical condition as evidenced by 10% weight loss over the past month and <75% energy intake needs met >1 month treated with ensure compact supplements)  Degree of Malnutrition: Other severe protein calorie malnutrition    BMI Findings: Body mass index is 22 26 kg/m²  Nutrition consult and supplements  Urinary retention  Assessment & Plan  · Patient has chronic Herrera present on admission- herrera was replaced in ED 10/14   · Placed on recent admission (10/3/19) at [facility] where she developed urinary retention  · Patient will follow up outpatient with urology for voiding trial  Has appt on 10/28 with [facility]    Failure to thrive in adult  Assessment & Plan  · Since September she has had a progressive decline - she has had no appetite, not really eating/drinking with weakness  She was previously reportedly very independent prior to her admission at [facility] in September with UTI  · Geriatrics following, recommending outpatient follow up  · Speech following, on soft diet  · No recent colonoscopy/EGD, recommend outpatient GI evaluation  HLD (hyperlipidemia)  Assessment & Plan  · Continue statin    Essential hypertension  Assessment & Plan  · Lisinopril stop  Amlodipine changed to 5 mg p o  B i d  Per Nephrology  Continue atenolol 50mg daily with hold parameters  · Routine vitals    Type 2 diabetes mellitus with diabetic neuropathy, without long-term current use of insulin [facility])  Assessment & Plan  Lab Results   Component Value Date    HGBA1C 6 4 (H) 10/15/2019     Recent Labs     10/23/19  1632 10/23/19  2117 10/24/19  0534 10/24/19  1053   POCGLU 186* 133 115 134       · Takes metformin and glipizide at baseline, will plan to resume metformin at discharge and hold glipizide  · Continue with SSI  · Diabetic diet  · Monitor accuchecks, avoid hypoglycemia    VTE Pharmacologic Prophylaxis:   Pharmacologic: Enoxaparin (Lovenox)  Mechanical VTE Prophylaxis in Place: Yes    Patient Centered Rounds: I have performed bedside rounds with nursing staff today      Discussions with Specialists or Other Care Team Provider: RN    Education and Discussions with Family / Patient: patient, daughter over phone    Time Spent for Care: 30 minutes  More than 50% of total time spent on counseling and coordination of care as described above  Current Length of Stay: 10 day(s)    Current Patient Status: Inpatient   Certification Statement: The patient will continue to require additional inpatient hospital stay due to management of hyponatremia, work up of splenic infarct, CT a/p result pending  Need hematology input prior to discharge    Discharge Plan: pending hematology evaluation following CT a/p    Code Status: Level 1 - Full Code      Subjective:   Patient has no acute complaints  She denies any pain or shortness of breath  She does not have an appetite  Cannot say why she does not want to eat  Denies n/v, diarrhea  Objective:     Vitals:   Temp (24hrs), Av 1 °F (36 7 °C), Min:96 9 °F (36 1 °C), Max:98 7 °F (37 1 °C)    Temp:  [96 9 °F (36 1 °C)-98 7 °F (37 1 °C)] 98 7 °F (37 1 °C)  HR:  [80-86] 80  Resp:  [16-18] 17  BP: (112-121)/(46-58) 117/56  SpO2:  [96 %-97 %] 96 %  Body mass index is 22 26 kg/m²  Input and Output Summary (last 24 hours): Intake/Output Summary (Last 24 hours) at 10/24/2019 1453  Last data filed at 10/24/2019 0800  Gross per 24 hour   Intake 60 ml   Output 225 ml   Net -165 ml       Physical Exam:     Physical Exam   Constitutional: She is oriented to person, place, and time  No distress  [place]  Slow to respond but answers questions appropriately    HENT:   Head: Normocephalic and atraumatic  Eyes: EOM are normal  No scleral icterus  Neck: Normal range of motion  Neck supple  Cardiovascular: Normal rate and regular rhythm  Pulmonary/Chest: Effort normal and breath sounds normal  No respiratory distress  Abdominal: Soft  Bowel sounds are normal  She exhibits no distension  There is no tenderness  Genitourinary:   Genitourinary Comments: Rapp draining clear yellow urine   Musculoskeletal: Normal range of motion   She exhibits no edema  Neurological: She is alert and oriented to person, place, and time  No cranial nerve deficit  Skin: Skin is warm and dry  She is not diaphoretic  Vitals reviewed  Additional Data:     Labs:    Results from last 7 days   Lab Units 10/24/19  0537   WBC Thousand/uL 5 79   HEMOGLOBIN g/dL 9 3*   HEMATOCRIT % 30 8*   PLATELETS Thousands/uL 349   NEUTROS PCT % 77*   LYMPHS PCT % 17   MONOS PCT % 4   EOS PCT % 1     Results from last 7 days   Lab Units 10/24/19  0537   SODIUM mmol/L 131*   POTASSIUM mmol/L 3 9   CHLORIDE mmol/L 103   CO2 mmol/L 22   BUN mg/dL 12   CREATININE mg/dL 0 49*   ANION GAP mmol/L 6   CALCIUM mg/dL 8 4   GLUCOSE RANDOM mg/dL 106         Results from last 7 days   Lab Units 10/24/19  1053 10/24/19  0534 10/23/19  2117 10/23/19  1632 10/23/19  1108 10/23/19  0623 10/22/19  2047 10/22/19  1544 10/22/19  1039 10/22/19  0614 10/21/19  2101 10/21/19  1611   POC GLUCOSE mg/dl 134 115 133 186* 173* 114 185* 143* 145* 112 109 101         Results from last 7 days   Lab Units 10/20/19  0449   PROCALCITONIN ng/ml 0 07           * I Have Reviewed All Lab Data Listed Above  * Additional Pertinent Lab Tests Reviewed:  All Labs Within Last 24 Hours Reviewed    Imaging:    Imaging Reports Reviewed Today Include: none  Imaging Personally Reviewed by Myself Includes:  none    Recent Cultures (last 7 days):           Last 24 Hours Medication List:     Current Facility-Administered Medications:  acetaminophen 650 mg Oral Q6H PRN Trina Reece PA-C   aluminum-magnesium hydroxide-simethicone 30 mL Oral Q6H PRN Clearance MD Seema   [START ON 10/25/2019] amLODIPine 5 mg Oral Daily Isela Radford MD   atenolol 50 mg Oral Daily Herbert Fischer MD   docusate sodium 100 mg Oral BID PRN Clearance MD Seema   enoxaparin 40 mg Subcutaneous Daily Clearance MD Seema   famotidine 20 mg Oral Daily Isela Radford MD   ferrous sulfate 325 mg Oral Daily With Breakfast Herbert Fischer MD   insulin lispro 1-5 Units Subcutaneous HS Mary Lou Mitchell MD   insulin lispro 1-6 Units Subcutaneous TID AC Mary Lou Mitchell MD   ondansetron 4 mg Intravenous Q6H PRN Mary Lou Mitchell MD   pravastatin 40 mg Oral Daily With Esvin Cook MD   sodium chloride 1 g Oral BID With Meals Keila Amin MD        Today, Patient Was Seen By: Heide Horton PA-C    ** Please Note: Dictation voice to text software may have been used in the creation of this document   **

## 2019-10-25 ENCOUNTER — APPOINTMENT (INPATIENT)
Dept: RADIOLOGY | Facility: HOSPITAL | Age: 79
DRG: 545 | End: 2019-10-25
Payer: MEDICARE

## 2019-10-25 LAB
ANION GAP SERPL CALCULATED.3IONS-SCNC: 6 MMOL/L (ref 4–13)
BASOPHILS # BLD AUTO: 0.02 THOUSANDS/ΜL (ref 0–0.1)
BASOPHILS NFR BLD AUTO: 0 % (ref 0–1)
BUN SERPL-MCNC: 13 MG/DL (ref 5–25)
CALCIUM SERPL-MCNC: 9 MG/DL (ref 8.3–10.1)
CHLORIDE SERPL-SCNC: 105 MMOL/L (ref 100–108)
CO2 SERPL-SCNC: 23 MMOL/L (ref 21–32)
CREAT SERPL-MCNC: 0.54 MG/DL (ref 0.6–1.3)
EOSINOPHIL # BLD AUTO: 0.02 THOUSAND/ΜL (ref 0–0.61)
EOSINOPHIL NFR BLD AUTO: 0 % (ref 0–6)
ERYTHROCYTE [DISTWIDTH] IN BLOOD BY AUTOMATED COUNT: 17.9 % (ref 11.6–15.1)
GFR SERPL CREATININE-BSD FRML MDRD: 89 ML/MIN/1.73SQ M
GLUCOSE SERPL-MCNC: 117 MG/DL (ref 65–140)
GLUCOSE SERPL-MCNC: 136 MG/DL (ref 65–140)
GLUCOSE SERPL-MCNC: 143 MG/DL (ref 65–140)
GLUCOSE SERPL-MCNC: 163 MG/DL (ref 65–140)
GLUCOSE SERPL-MCNC: 179 MG/DL (ref 65–140)
HCT VFR BLD AUTO: 29.5 % (ref 34.8–46.1)
HGB BLD-MCNC: 9 G/DL (ref 11.5–15.4)
IMM GRANULOCYTES # BLD AUTO: 0.05 THOUSAND/UL (ref 0–0.2)
IMM GRANULOCYTES NFR BLD AUTO: 1 % (ref 0–2)
LYMPHOCYTES # BLD AUTO: 0.68 THOUSANDS/ΜL (ref 0.6–4.47)
LYMPHOCYTES NFR BLD AUTO: 7 % (ref 14–44)
MCH RBC QN AUTO: 22.9 PG (ref 26.8–34.3)
MCHC RBC AUTO-ENTMCNC: 30.5 G/DL (ref 31.4–37.4)
MCV RBC AUTO: 75 FL (ref 82–98)
MISCELLANEOUS LAB TEST RESULT: NORMAL
MONOCYTES # BLD AUTO: 0.32 THOUSAND/ΜL (ref 0.17–1.22)
MONOCYTES NFR BLD AUTO: 3 % (ref 4–12)
NEUTROPHILS # BLD AUTO: 9.27 THOUSANDS/ΜL (ref 1.85–7.62)
NEUTS SEG NFR BLD AUTO: 89 % (ref 43–75)
NRBC BLD AUTO-RTO: 0 /100 WBCS
PLATELET # BLD AUTO: 397 THOUSANDS/UL (ref 149–390)
PMV BLD AUTO: 9.9 FL (ref 8.9–12.7)
POTASSIUM SERPL-SCNC: 4.1 MMOL/L (ref 3.5–5.3)
RBC # BLD AUTO: 3.93 MILLION/UL (ref 3.81–5.12)
SODIUM SERPL-SCNC: 134 MMOL/L (ref 136–145)
SODIUM SERPL-SCNC: 135 MMOL/L (ref 136–145)
WBC # BLD AUTO: 10.36 THOUSAND/UL (ref 4.31–10.16)

## 2019-10-25 PROCEDURE — 99232 SBSQ HOSP IP/OBS MODERATE 35: CPT | Performed by: PHYSICIAN ASSISTANT

## 2019-10-25 PROCEDURE — 99233 SBSQ HOSP IP/OBS HIGH 50: CPT | Performed by: INTERNAL MEDICINE

## 2019-10-25 PROCEDURE — 85025 COMPLETE CBC W/AUTO DIFF WBC: CPT | Performed by: PHYSICIAN ASSISTANT

## 2019-10-25 PROCEDURE — 82948 REAGENT STRIP/BLOOD GLUCOSE: CPT

## 2019-10-25 PROCEDURE — 87631 RESP VIRUS 3-5 TARGETS: CPT | Performed by: PHYSICIAN ASSISTANT

## 2019-10-25 PROCEDURE — 71046 X-RAY EXAM CHEST 2 VIEWS: CPT

## 2019-10-25 PROCEDURE — 84295 ASSAY OF SERUM SODIUM: CPT | Performed by: INTERNAL MEDICINE

## 2019-10-25 PROCEDURE — 99232 SBSQ HOSP IP/OBS MODERATE 35: CPT | Performed by: INTERNAL MEDICINE

## 2019-10-25 PROCEDURE — 87040 BLOOD CULTURE FOR BACTERIA: CPT | Performed by: PHYSICIAN ASSISTANT

## 2019-10-25 PROCEDURE — 80048 BASIC METABOLIC PNL TOTAL CA: CPT | Performed by: INTERNAL MEDICINE

## 2019-10-25 RX ORDER — ACETAMINOPHEN 325 MG/1
325 TABLET ORAL ONCE
Status: COMPLETED | OUTPATIENT
Start: 2019-10-25 | End: 2019-10-25

## 2019-10-25 RX ADMIN — SODIUM CHLORIDE TAB 1 GM 1 G: 1 TAB at 09:24

## 2019-10-25 RX ADMIN — ENOXAPARIN SODIUM 40 MG: 40 INJECTION SUBCUTANEOUS at 09:24

## 2019-10-25 RX ADMIN — FERROUS SULFATE TAB 325 MG (65 MG ELEMENTAL FE) 325 MG: 325 (65 FE) TAB at 09:23

## 2019-10-25 RX ADMIN — CEFEPIME HYDROCHLORIDE 2000 MG: 2 INJECTION, POWDER, FOR SOLUTION INTRAVENOUS at 16:58

## 2019-10-25 RX ADMIN — AMLODIPINE BESYLATE 5 MG: 5 TABLET ORAL at 09:25

## 2019-10-25 RX ADMIN — SODIUM CHLORIDE TAB 1 GM 1 G: 1 TAB at 17:03

## 2019-10-25 RX ADMIN — ACETAMINOPHEN 650 MG: 325 TABLET ORAL at 09:23

## 2019-10-25 RX ADMIN — INSULIN LISPRO 1 UNITS: 100 INJECTION, SOLUTION INTRAVENOUS; SUBCUTANEOUS at 23:02

## 2019-10-25 RX ADMIN — PRAVASTATIN SODIUM 40 MG: 20 TABLET ORAL at 17:03

## 2019-10-25 RX ADMIN — SODIUM CHLORIDE 500 ML: 0.9 INJECTION, SOLUTION INTRAVENOUS at 15:47

## 2019-10-25 RX ADMIN — FAMOTIDINE 20 MG: 40 POWDER, FOR SUSPENSION ORAL at 09:25

## 2019-10-25 RX ADMIN — INSULIN LISPRO 1 UNITS: 100 INJECTION, SOLUTION INTRAVENOUS; SUBCUTANEOUS at 10:55

## 2019-10-25 RX ADMIN — ACETAMINOPHEN 325 MG: 325 TABLET ORAL at 10:49

## 2019-10-25 RX ADMIN — ONDANSETRON 4 MG: 2 INJECTION INTRAMUSCULAR; INTRAVENOUS at 09:19

## 2019-10-25 RX ADMIN — ATENOLOL 50 MG: 50 TABLET ORAL at 09:28

## 2019-10-25 NOTE — PROGRESS NOTES
[address] [facility]  Progress Note - [place] 1940, [de-identified] y o  female MRN: [id number]    Unit/Bed#: -01 Encounter: [id number]    Primary Care Provider: Juancarlos Carvajal MD   Date and time admitted to hospital: 10/14/2019  4:20 PM      * Metabolic encephalopathy  Assessment & Plan  · POA  Likely in setting of hyponatremia with component of delirium contributing given recurrent hospitalizations/underlying cognitive impairment  · Urine culture unremarkable - monitor off antibiotics at this time  Pt remains asymptomatic  · Mental status seems to be at baseline although does wax and wane  On exam today pt is AO x3 and able to state she was brought to the hospital "for my sodium"  · She has hx of adrenal insufficiency, previously on steroids and then tapered off  She follows with Endocrinology as outpatient  · Cosyntropin stim test with adequate response in cortisol, hydrocortisone discontinued, she does not have adrenal insufficiency   · Vitamin B12 and folate normal   · MRI brain was done at [facility] last month which was negative for any acute infarct but possible small questionable hemorrhage versus area of mineralization  CT head without contrast in [facility] negative for bleed  · Geriatrics following, patient should follow up outpatient with them    Hyponatremia  Assessment & Plan  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  · Presented with sodium of 126  Sodium 138 today  · TSH normal this admission  · Cosyntropin stim test negative for adrenal insufficiency  · CT chest abdomen pelvis was done at [facility] to rule out any occult malignancy as a cause of SIADH which was negative for any malignancy  · Continue with 1g Salt tabs BID and fluid restriction - was 1500 and changed to 1200  Plan will be to d/c on daily dosing per Dr Paloma Valverde     · Kacie Khan for d/c from a nephrology standpoint, will need outpatient f/u with [facility] neph[facility]  Recommending BMP in 1 week with results to PCP for further management of NACL tabs  Protonix changed to Pepcid  ACEI stopped as well  · Given additional salt tab today and given torsemide PO x 1 dose on 10/24  · Na 134 today  · BMP in AM    Splenic infarct  Assessment & Plan  · Noted on CT scan done at [facility] 9/2019, new moderate sized splenic infarcts  · Unclear etiology of splenic infarcts  Hematology consult appreciated, recommending repeat CTA abdomen and pelvis to evaluate for source of emboli  Recent echocardiogram done September 30th at [facility] was negative for thrombus  · CTA abdomen/pelvis:  No acute aortic injury or aneurysm  Patent celiac and splenic arteries  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 Possibly related to splenic infarct  · Urine culture not significant this admission, [id number] GNR  At [facility], pt was noted to have ESBL in urine which was suspected to be colonization  · Admission blood cultrues are neg, will send repeat  · procalcitonin normal initially, will repeat  · Will repeat CXR given wet sounding cough  · CBCd pending  · Check flu/RSV    Microcytic anemia  Assessment & Plan  · Baseline seems between 8-11  · hgb 9 3 most recent check  · Iron studies suggestive of low iron  · Continue  p o  iron supplementation which was started this admission    · CBC in AM    Bradycardia  Assessment & Plan  · Resolved with lower dose of atenolol  · EKG shows NSR in 80's  · TSH normal this admission  Severe protein-calorie malnutrition ([address] )  Assessment & Plan  Malnutrition Findings:   Malnutrition type: Chronic illness(Related to medical condition as evidenced by 10% weight loss over the past month and <75% energy intake needs met >1 month treated with ensure compact supplements)  Degree of Malnutrition: Other severe protein calorie malnutrition    BMI Findings: Body mass index is 22 26 kg/m²  Nutrition consult and supplements  Urinary retention  Assessment & Plan  · Patient has chronic Herrera present on admission- herrera was replaced in ED 10/14   · Placed on recent admission (10/3/19) at [facility] where she developed urinary retention  · Patient will follow up outpatient with urology for voiding trial  Has appt on 10/28 with [facility]    Failure to thrive in adult  Assessment & Plan  · Since September she has had a progressive decline - she has had no appetite, not really eating/drinking with weakness  She was previously reportedly very independent prior to her admission at [facility] in September with UTI  · Geriatrics following, recommending outpatient follow up  · Speech following, on soft diet  · No recent colonoscopy/EGD, recommend outpatient GI evaluation  Essential hypertension  Assessment & Plan  · Lisinopril stopped  Amlodipine changed to 5 mg p o  B i d  Per Nephrology  Continue atenolol 50mg daily with hold parameters    · Routine vitals    Type 2 diabetes mellitus with diabetic neuropathy, without long-term current use of insulin [facility])  Assessment & Plan  Lab Results   Component Value Date    HGBA1C 6 4 (H) 10/15/2019     Recent Labs     10/24/19  1053 10/24/19  1608 10/24/19  2103 10/25/19  0712   POCGLU 134 113 141* 136       · Takes metformin and glipizide at baseline, will plan to resume metformin at discharge and hold glipizide  · Continue with SSI  · Diabetic diet  · Monitor accuchecks, avoid hypoglycemia      VTE Pharmacologic Prophylaxis:   Pharmacologic: Enoxaparin (Lovenox)  Mechanical VTE Prophylaxis in Place: Yes    Patient Centered Rounds: I have performed bedside rounds with nursing staff today  Discussions with Specialists or Other Care Team Provider: RN, CM    Education and Discussions with Family / Patient: patient, daughter Belle Martinez over phone    Time Spent for Care: 20 minutes  More than 50% of total time spent on counseling and coordination of care as described above  Current Length of Stay: 11 day(s)    Current Patient Status: Inpatient   Certification Statement: The patient will continue to require additional inpatient hospital stay due to fever, infectious work up    Discharge Plan: pending infectious work up to rehab  Code Status: Level 1 - Full Code      Subjective:   Patient seen at bedside  She is sitting up in bed  She has been having fever this morning  Nursing reports when given food she starts dry heaving  She has been tolerating fluids okay  She denies any abdominal pain, diarrhea, or pain in general   She denies SOB  Objective:     Vitals:   Temp (24hrs), Av 9 °F (38 3 °C), Min:98 3 °F (36 8 °C), Max:102 5 °F (39 2 °C)    Temp:  [98 3 °F (36 8 °C)-102 5 °F (39 2 °C)] 102 5 °F (39 2 °C)  HR:  [76-94] 93  Resp:  [20-22] 22  BP: (111-130)/(52-64) 130/64  SpO2:  [96 %-97 %] 97 %  Body mass index is 22 26 kg/m²  Input and Output Summary (last 24 hours): Intake/Output Summary (Last 24 hours) at 10/25/2019 1104  Last data filed at 10/25/2019 1029  Gross per 24 hour   Intake 400 ml   Output 1325 ml   Net -925 ml       Physical Exam:     Physical Exam   Constitutional: She is oriented to person, place, and time  Frail, chronically ill appearing  Slow to respond   HENT:   Head: Normocephalic and atraumatic  Eyes: EOM are normal  No scleral icterus     Neck: Normal range of motion  Neck supple  Cardiovascular: Normal rate and regular rhythm  Pulmonary/Chest: Effort normal    Diminished at base, right > left    Abdominal: Soft  Bowel sounds are normal  She exhibits no distension  There is no tenderness  Musculoskeletal: Normal range of motion  She exhibits no edema  Neurological: She is alert and oriented to person, place, and time  No cranial nerve deficit  Skin: Skin is warm and dry  Psychiatric: She has a normal mood and affect  Her behavior is normal    Vitals reviewed  Additional Data:     Labs:    Results from last 7 days   Lab Units 10/24/19  0537   WBC Thousand/uL 5 79   HEMOGLOBIN g/dL 9 3*   HEMATOCRIT % 30 8*   PLATELETS Thousands/uL 349   NEUTROS PCT % 77*   LYMPHS PCT % 17   MONOS PCT % 4   EOS PCT % 1     Results from last 7 days   Lab Units 10/25/19  0626   SODIUM mmol/L 134*   POTASSIUM mmol/L 4 1   CHLORIDE mmol/L 105   CO2 mmol/L 23   BUN mg/dL 13   CREATININE mg/dL 0 54*   ANION GAP mmol/L 6   CALCIUM mg/dL 9 0   GLUCOSE RANDOM mg/dL 117         Results from last 7 days   Lab Units 10/25/19  1053 10/25/19  0712 10/24/19  2103 10/24/19  1608 10/24/19  1053 10/24/19  0534 10/23/19  2117 10/23/19  1632 10/23/19  1108 10/23/19  0623 10/22/19  2047 10/22/19  1544   POC GLUCOSE mg/dl 163* 136 141* 113 134 115 133 186* 173* 114 185* 143*         Results from last 7 days   Lab Units 10/20/19  0449   PROCALCITONIN ng/ml 0 07           * I Have Reviewed All Lab Data Listed Above  * Additional Pertinent Lab Tests Reviewed:  All Labs Within Last 24 Hours Reviewed    Imaging:    Imaging Reports Reviewed Today Include: CTA abdomen/pelvis    Recent Cultures (last 7 days):           Last 24 Hours Medication List:     Current Facility-Administered Medications:  acetaminophen 650 mg Oral Q6H PRN Trina Reece PA-C   aluminum-magnesium hydroxide-simethicone 30 mL Oral Q6H PRN Massiel Grayson MD   amLODIPine 5 mg Oral Daily Alondra Rose MD   atenolol 50 mg Oral Daily Valeri Yan MD   docusate sodium 100 mg Oral BID PRN Deanna Ambriz MD   enoxaparin 40 mg Subcutaneous Daily Deanna Ambriz MD   famotidine 20 mg Oral Daily Jesu Enamorado MD   ferrous sulfate 325 mg Oral Daily With Breakfast Valeri Yan MD   insulin lispro 1-5 Units Subcutaneous HS Deanna Ambriz MD   insulin lispro 1-6 Units Subcutaneous TID AC Deanna Ambriz MD   ondansetron 4 mg Intravenous Q6H PRN Deanna Ambriz MD   pravastatin 40 mg Oral Daily With Alix Biggs MD   sodium chloride 1 g Oral BID With Meals Jesu Enamorado MD        Today, Patient Was Seen By: Alanna Jones PA-C    ** Please Note: Dictation voice to text software may have been used in the creation of this document   **

## 2019-10-25 NOTE — ASSESSMENT & PLAN NOTE
· POA  Likely in setting of hyponatremia with component of delirium contributing given recurrent hospitalizations/underlying cognitive impairment  · Urine culture unremarkable - monitor off antibiotics at this time  Pt remains asymptomatic  · Mental status seems to be at baseline although does wax and wane  On exam today pt is AO x3 and able to state she was brought to the hospital "for my sodium"  · She has hx of adrenal insufficiency, previously on steroids and then tapered off  She follows with Endocrinology as outpatient  · Cosyntropin stim test with adequate response in cortisol, hydrocortisone discontinued, she does not have adrenal insufficiency   · Vitamin B12 and folate normal   · MRI brain was done at [facility] last month which was negative for any acute infarct but possible small questionable hemorrhage versus area of mineralization  CT head without contrast in [facility] negative for bleed    · Geriatrics following, patient should follow up outpatient with them

## 2019-10-25 NOTE — ASSESSMENT & PLAN NOTE
· Since September she has had a progressive decline - she has had no appetite, not really eating/drinking with weakness  She was previously reportedly very independent prior to her admission at [facility] in September with UTI  · Geriatrics following, recommending outpatient follow up  · Speech following, on soft diet  · No recent colonoscopy/EGD, recommend outpatient GI evaluation

## 2019-10-25 NOTE — PROGRESS NOTES
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 ACE-inhibitor and PPI could cause SIADH  ? Protonix changed to Pepcid  ? Lisinopril was discontinued as her blood pressure is running low and lisinopril can cause SIADH  ? Workup:  Serum osmolality 276, urine osmolality 624, urine sodium 90, uric acid 4 1, cortisol 40 8, TSH 2 8, normal LFTs except for albumin of 2 2, chest x-ray with minimal left basilar airspace disease attributed to compressive atelectasis and small left pleural effusion, echocardiogram at [facility] ([place], [place]) revealed ejection fraction of 50-55% with mild pulmonary hypertension, mild mitral regurgitation and tricuspid regurgitation / ACT H was low at 1 6 at [facility] [place] cortisol of 19 4)  ? SPEP faint possible band in the gamma region   Immunofixation pending - sent to the [facility]  ? Sodium level improved today after she received 1 dose of torsemide as well as extra dose of sodium chloride on 10/24  ? Continue sodium chloride 1 g p o  B i d  As well as fluid restriction at 1200 mL per day  ? Monitor sodium level with fevers as insensible losses could cause a rise in sodium level  ? CT chest, abdomen and pelvis on 09/29 at [facility]:  No signs of malignancy  [place] were new small bilateral pleural effusions with new infarcts involving the medial aspect of the spleen  ? Low ACTH level at [facility] ([place], [place]) was likely a lab error   Would have her PCP recheck as an outpatient  ? Check a m  BMP and later tonight  2  Anemia:  Hemoglobin 9 3 today  ? Iron saturation 7%, ferritin 608  ? Started on oral iron by primary team  3   Encephalopathy:  Originally Due to hyponatremia and UTI   Overall, mental status seems to have improved   4  Hypertension:  Blood pressure running on the low side but seems to have improved  · Continue to hold lisinopril and decrease Norvasc dose to 2 5 mg once daily since she is having increased temperatures up to 102 5  · Trend blood pressure with these changes     Disposition:   okay to discharge the patient renal wise but she is having fevers which need to be resolved prior to discharge  She should follow up with her [facility] PCP as well as [facility] regarding her hyponatremia      Subjective: The patient was seen examined earlier  She is having some dry heaving with solid food  She is tolerating liquids without issue  Patient denies any shortness of breath, diarrhea, vomiting, abdominal pain  She has had a fever  Objective:     Vitals: Blood pressure 130/64, pulse 93, temperature (!) 102 5 °F (39 2 °C), resp  rate 22, height 5' 2" (1 575 m), weight 55 2 kg (121 lb 11 2 oz), SpO2 97 %  ,Body mass index is 22 26 kg/m²  Temp (24hrs), Av 9 °F (38 3 °C), Min:98 3 °F (36 8 °C), Max:102 5 °F (39 2 °C)      Weight (last 2 days)     Date/Time   Weight    10/24/19 0536   55 2 (121 7)    10/23/19 0537   54 1 (119 27)                     Intake/Output Summary (Last 24 hours) at 10/25/2019 1134  Last data filed at 10/25/2019 1029  Gross per 24 hour   Intake 400 ml   Output 1325 ml   Net -925 ml     I/O last 24 hours: In: 26 [P O :460]  Out: 1325 [Urine:1325]        Physical Exam    Physical Exam   Constitutional: No distress  Neck: No JVD present  Cardiovascular: Normal heart sounds  Exam reveals no gallop and no friction rub  Pulmonary/Chest: Breath sounds normal  No respiratory distress  She has no wheezes  She has no rales  Decreased breath sounds at bases, partly due to decreased effort   Abdominal: Soft  Bowel sounds are normal  She exhibits no distension  There is no tenderness   There is no rebound and no guarding  Musculoskeletal: She exhibits no edema  Skin: She is not diaphoretic  Vitals reviewed                Invasive Devices     Peripheral Intravenous Line            Peripheral IV 10/23/19 Right Antecubital 1 day          Drain            Urethral Catheter Straight-tip 16 Fr  10 days                Medications:    Scheduled Meds:  Current Facility-Administered Medications:  acetaminophen 650 mg Oral Q6H PRN Trina Reece PA-C   aluminum-magnesium hydroxide-simethicone 30 mL Oral Q6H PRN Matty Finney MD   amLODIPine 5 mg Oral Daily Eduardo Martell MD   atenolol 50 mg Oral Daily Bong Barrera MD   docusate sodium 100 mg Oral BID PRN Matty Finney MD   enoxaparin 40 mg Subcutaneous Daily Matty Finney MD   famotidine 20 mg Oral Daily Eduardo Martell MD   ferrous sulfate 325 mg Oral Daily With Breakfast Bong Barrera MD   insulin lispro 1-5 Units Subcutaneous HS Matty Finney MD   insulin lispro 1-6 Units Subcutaneous TID AC Matty Finney MD   ondansetron 4 mg Intravenous Q6H PRN Matty Finney MD   pravastatin 40 mg Oral Daily With Claudette Darnel, MD   sodium chloride 1 g Oral BID With Meals Eduardo Martell MD       PRN Meds:   acetaminophen    aluminum-magnesium hydroxide-simethicone    docusate sodium    ondansetron    Continuous Infusions:         LAB RESULTS:      Results from last 7 days   Lab Units 10/25/19  0626 10/24/19  0537 10/23/19  0534 10/22/19  0550 10/21/19  0604 10/20/19  0449 10/19/19  0456   WBC Thousand/uL  --  5 79 6 45 5 97 6 33 8 15 6 44   HEMOGLOBIN g/dL  --  9 3* 8 6* 9 1* 9 8* 9 0* 9 2*   HEMATOCRIT %  --  30 8* 29 1* 30 0* 32 2* 29 4* 29 4*   PLATELETS Thousands/uL  --  349 389 418* 432* 399* 418*   NEUTROS PCT %  --  77* 70  --  82* 82* 79*   LYMPHS PCT %  --  17 21  --  12* 12* 14   LYMPHO PCT %  --   --   --  14  --   --   --    MONOS PCT %  --  4 6  --  4 5 6   MONO PCT %  --   --   --  5  --   --   --    EOS PCT %  --  1 1 1 1 0 0 POTASSIUM mmol/L 4 1 3 9 4 2 4 0 3 9 4 1 4 2   CHLORIDE mmol/L 105 103 108 106 105 104 102   CO2 mmol/L 23 22 22 21 23 22 21   BUN mg/dL 13 12 13 13 16 14 11   CREATININE mg/dL 0 54* 0 49* 0 56* 0 47* 0 52* 0 60 0 53*   CALCIUM mg/dL 9 0 8 4 8 7 8 6 8 7 8 6 8 5   MAGNESIUM mg/dL  --   --   --   --   --  1 5*  --    PHOSPHORUS mg/dL  --   --   --   --   --  3 3  --        CUTURES:  Lab Results   Component Value Date    BLOODCX No Growth After 5 Days  10/14/2019    BLOODCX No Growth After 5 Days  10/14/2019    URINECX 5892-5943 cfu/ml Gram Negative Nirav (A) 10/15/2019    URINECX >100,000 cfu/ml Escherichia coli 07/16/2017                 PLEASE NOTE:  This encounter was completed utilizing the [employer] Direct Speech Voice Recognition Software  Grammatical errors, random word insertions, pronoun errors and incomplete sentences are occasional consequences of the system due to software limitations, ambient noise and hardware issues  These may be missed by proof reading prior to affixing electronic signature  Any questions or concerns about the content, text or information contained within the body of this dictation should be directly addressed to the physician for clarification  Please do not hesitate to call me directly if you have any any questions or concerns

## 2019-10-25 NOTE — ASSESSMENT & PLAN NOTE
· She had been afebrile x 72 hrs however this AM fever as high as 102 5  · ? Possibly related to splenic infarct  · Urine culture not significant this admission, 5861-4038 GNR   At [facility], pt was noted to have ESBL in urine which was suspected to be colonization  · Admission blood cultrues are neg, will send repeat  · procalcitonin normal initially, will repeat  · Will repeat CXR given wet sounding cough  · CBCd pending  · Check flu/RSV

## 2019-10-25 NOTE — ASSESSMENT & PLAN NOTE
· Baseline seems between 8-11  · hgb 9 3 most recent check  · Iron studies suggestive of low iron  · Continue  p o  iron supplementation which was started this admission    · CBC in AM

## 2019-10-25 NOTE — PROGRESS NOTES
Oncology Progress Note  Pam Oliveira [de-identified] y o  female MRN: [id number]  Unit/Bed#: -01 Encounter: [id number]      /64   Pulse 93   Temp (!) 102 5 °F (39 2 °C)   Resp 22   Ht 5' 2" (1 575 m)   Wt 55 2 kg (121 lb 11 2 oz)   SpO2 97%   BMI 22 26 kg/m²     Subjective:  Splenic infarct  Objective: The patient is a 57-year-old  female who was in [facility] where she was found to have splenic infarct in September 2019  She was discharged to nursing home without anticoagulation or aspirin  Reason for this is not totally clear  She was hospitalized with hyponatremia as well as encephalopathy  Repeated CT scan of chest abdomen pelvis showed the sign of splenic infarct  There is no suspicion of metastatic disease  JAK2 mutation in [facility] was negative  She has mild anemia and mild thrombocytosis  She has no atrial fibrillation  Echocardiogram was negative  On evaluation, she speak very minimally  She appeared to be somewhat confused  She has no complaint of pain  She appeared to have malnutrition  General Appearance:    Alert, oriented        Eyes:    PERRL   Ears:    Normal external ear canals, both ears   Nose:   Nares normal, septum midline   Throat:   Mucosa moist  Pharynx without injection  Neck:   Supple       Lungs:     Clear to auscultation bilaterally   Chest Wall:    No tenderness or deformity    Heart:    Regular rate and rhythm       Abdomen:     Soft, non-tender, bowel sounds +, no organomegaly           Extremities:   Extremities no cyanosis or edema       Skin:   no rash or icterus      Lymph nodes:   Cervical, supraclavicular, and axillary nodes normal   Neurologic:   CNII-XII intact, normal strength, sensation and reflexes     throughout        Recent Results (from the past 48 hour(s))   Fingerstick Glucose (POCT)    Collection Time: 10/23/19  4:32 PM   Result Value Ref Range    POC Glucose 186 (H) 65 - 140 mg/dl   Fingerstick Glucose (POCT)    Collection Time: 10/23/19  9:17 PM   Result Value Ref Range    POC Glucose 133 65 - 140 mg/dl   Fingerstick Glucose (POCT)    Collection Time: 10/24/19  5:34 AM   Result Value Ref Range    POC Glucose 115 65 - 140 mg/dl   CBC and differential    Collection Time: 10/24/19  5:37 AM   Result Value Ref Range    WBC 5 79 4 31 - 10 16 Thousand/uL    RBC 4 08 3 81 - 5 12 Million/uL    Hemoglobin 9 3 (L) 11 5 - 15 4 g/dL    Hematocrit 30 8 (L) 34 8 - 46 1 %    MCV 76 (L) 82 - 98 fL    MCH 22 8 (L) 26 8 - 34 3 pg    MCHC 30 2 (L) 31 4 - 37 4 g/dL    RDW 18 1 (H) 11 6 - 15 1 %    MPV 9 7 8 9 - 12 7 fL    Platelets 276 776 - 691 Thousands/uL    nRBC 0 /100 WBCs    Neutrophils Relative 77 (H) 43 - 75 %    Immat GRANS % 1 0 - 2 %    Lymphocytes Relative 17 14 - 44 %    Monocytes Relative 4 4 - 12 %    Eosinophils Relative 1 0 - 6 %    Basophils Relative 0 0 - 1 %    Neutrophils Absolute 4 43 1 85 - 7 62 Thousands/µL    Immature Grans Absolute 0 04 0 00 - 0 20 Thousand/uL    Lymphocytes Absolute 0 99 0 60 - 4 47 Thousands/µL    Monocytes Absolute 0 25 0 17 - 1 22 Thousand/µL    Eosinophils Absolute 0 06 0 00 - 0 61 Thousand/µL    Basophils Absolute 0 02 0 00 - 0 10 Thousands/µL   Basic metabolic panel    Collection Time: 10/24/19  5:37 AM   Result Value Ref Range    Sodium 131 (L) 136 - 145 mmol/L    Potassium 3 9 3 5 - 5 3 mmol/L    Chloride 103 100 - 108 mmol/L    CO2 22 21 - 32 mmol/L    ANION GAP 6 4 - 13 mmol/L    BUN 12 5 - 25 mg/dL    Creatinine 0 49 (L) 0 60 - 1 30 mg/dL    Glucose 106 65 - 140 mg/dL    Calcium 8 4 8 3 - 10 1 mg/dL    eGFR 92 ml/min/1 73sq m   Fingerstick Glucose (POCT)    Collection Time: 10/24/19 10:53 AM   Result Value Ref Range    POC Glucose 134 65 - 140 mg/dl   Fingerstick Glucose (POCT)    Collection Time: 10/24/19  4:08 PM   Result Value Ref Range    POC Glucose 113 65 - 140 mg/dl   Fingerstick Glucose (POCT)    Collection Time: 10/24/19  9:03 PM   Result Value Ref Range    POC Glucose 141 (H) 65 - 140 mg/dl   Basic metabolic panel    Collection Time: 10/25/19  6:26 AM   Result Value Ref Range    Sodium 134 (L) 136 - 145 mmol/L    Potassium 4 1 3 5 - 5 3 mmol/L    Chloride 105 100 - 108 mmol/L    CO2 23 21 - 32 mmol/L    ANION GAP 6 4 - 13 mmol/L    BUN 13 5 - 25 mg/dL    Creatinine 0 54 (L) 0 60 - 1 30 mg/dL    Glucose 117 65 - 140 mg/dL    Calcium 9 0 8 3 - 10 1 mg/dL    eGFR 89 ml/min/1 73sq m   Fingerstick Glucose (POCT)    Collection Time: 10/25/19  7:12 AM   Result Value Ref Range    POC Glucose 136 65 - 140 mg/dl   Fingerstick Glucose (POCT)    Collection Time: 10/25/19 10:53 AM   Result Value Ref Range    POC Glucose 163 (H) 65 - 140 mg/dl         Xr Chest Pa & Lateral    Result Date: 10/19/2019  Narrative: CHEST INDICATION:   tachypnea  COMPARISON:  Two-view chest 7/60/17 EXAM PERFORMED/VIEWS:  XR CHEST PA & LATERAL FINDINGS: Prominent cardiac silhouette likely accentuated by portable semierect technique and kyphotic positioning  Aortic calcification is present  Minimal left basilar airspace disease  Small left pleural effusion  No pneumothorax or pulmonary edema  Mild degenerative changes of the spine  Impression: Minimal left basilar airspace disease attributed to compressive atelectasis secondary small left pleural effusion  Correlate with clinical findings to exclude pneumonia and/or aspiration  Workstation performed: [id number]     Cta Abdomen Pelvis W Wo Contrast    Result Date: 10/24/2019  Narrative: CT ANGIOGRAM OF THE ABDOMEN AND PELVIS WITH AND WITHOUT IV CONTRAST INDICATION: Splenic infarct  COMPARISON: CT abdomen/pelvis report dated 9/29/2019 from [facility]  Images are not available for direct comparison  TECHNIQUE:  CT angiogram examination of the abdomen and pelvis was performed according to standard protocol    This examination, like all CT scans performed in the [facility], was performed utilizing techniques to minimize radiation dose exposure, including the use of iterative reconstruction and automated exposure control  Contrast as well as noncontrast images were obtained  Rad dose 1050 83 mGy-cm IV Contrast:  100 mL of iohexol (OMNIPAQUE) Enteric Contrast:  Not administered  FINDINGS: VASCULAR STRUCTURES:  ABDOMINAL AORTA: Normal in caliber  No aortic aneurysm, dissection, penetrating ulcer, or intramural hematoma  Moderate to severe calcific atheromatous plaque formation, predominantly of the infrarenal aorta  CELIAC ARTERY: Patent  Mild ostial stenosis  The splenic artery is patent, noting moderate atherosclerotic calcification  SUPERIOR MESENTERIC ARTERY: Patent  INFERIOR MESENTERIC ARTERY: Patent COMMON ILIAC ARTERIES: Patent  Heavy atherosclerosis  INTERNAL ILIAC ARTERIES: Patent  EXTERNAL ILIAC ARTERIES: Occluded on the right with distal reconstitution distally through the inferior epigastric artery  Patent on the left  OTHER FINDINGS ABDOMEN LOWER CHEST:  Cardiomegaly, without pericardial effusion  Coronary artery calcification  Small right and trace left pleural effusions with associated passive atelectasis  LIVER/BILIARY TREE:  Unremarkable  GALLBLADDER:  No calcified gallstones  No pericholecystic inflammatory change  Gallbladder mural calcification  SPLEEN: Evolving posteromedial splenic infarct  PANCREAS:  Unremarkable  ADRENAL GLANDS:  Unremarkable  KIDNEYS/URETERS:  No urinary calculi  No hydronephrosis  No suspicious renal lesion  Right renal lower pole cyst   Bilateral subcentimeter hypoattenuating renal lesions, too small to further characterize by CT  PELVIS REPRODUCTIVE ORGANS:  Unremarkable for patient's age  URINARY BLADDER: Underdistended in the presence of a Rapp catheter with moderate volume of intraluminal air  ADDITIONAL ABDOMINAL AND PELVIC STRUCTURES STOMACH AND BOWEL: Extensive sigmoid diverticulosis without findings of diverticulitis  No findings of bowel obstruction   ABDOMINOPELVIC CAVITY: No pathologically enlarged mesenteric or retroperitoneal lymph nodes  No ascites or free intraperitoneal air  ABDOMINAL WALL/INGUINAL REGIONS:  Unremarkable  OSSEOUS STRUCTURES:  No acute fracture or destructive osseous lesion  Impression: 1  No acute aortic injury or aneurysm  2   Patent celiac and splenic arteries  3   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  4   Evolving splenic infarct  5   Additional chronic/incidental findings as described  Workstation performed: [id number]     Xr Abdomen 1 Vw Portable    Result Date: 10/21/2019  Narrative: ABDOMEN INDICATION:   abdominal pain  COMPARISON:  None VIEWS:  AP supine FINDINGS: There is a nonobstructive bowel gas pattern  No discernible free air on this supine study  Upright or left lateral decubitus imaging is more sensitive to detect subtle free air in the appropriate setting  No pathologic calcifications or soft tissue masses  Visualized lung bases are clear  Visualized osseous structures are unremarkable for the patient's age  Impression: There is a nonobstructive bowel gas pattern  Workstation performed: [id number]         Assessment :  Splenic infarct found in September 2019  Plan:  A 79-year-old female as described above  At this moment, it is not clear the etiology to explain splenic infarct  Approximately 60 to 70% a splenic infarct come from the heart condition  Therefore, it is reasonable to ask cardiologist to evaluate her  Although, we do not have good data regarding the treatment for splenic infarct, it is reasonable to treat her with anticoagulation  Apixaban 5 mg b i d  to be switched from prophylactic dose of Lovenox is reasonable if she has no consultation for anticoagulation  I discussed the case with [de-identified] assistant of dinorah [facility]'s [facility] Medicine

## 2019-10-25 NOTE — ASSESSMENT & PLAN NOTE
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  · Presented with sodium of 126  Sodium 138 today  · TSH normal this admission  · Cosyntropin stim test negative for adrenal insufficiency  · CT chest abdomen pelvis was done at [facility] to rule out any occult malignancy as a cause of SIADH which was negative for any malignancy  · Continue with 1g Salt tabs BID and fluid restriction - was 1500 and changed to 1200  Plan will be to d/c on daily dosing per Dr Rhonda Hameed  · Elias Cruz for d/c from a nephrology standpoint, will need outpatient f/u with [facility] nephrology  Recommending BMP in 1 week with results to PCP for further management of NACL tabs  Protonix changed to Pepcid  ACEI stopped as well    · Given additional salt tab today and given torsemide PO x 1 dose on 10/24  · Na 134 today  · BMP in AM

## 2019-10-25 NOTE — ASSESSMENT & PLAN NOTE
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

## 2019-10-25 NOTE — ASSESSMENT & PLAN NOTE
· Patient has chronic Herrera present on admission- herrera was replaced in ED 10/14   · Placed on recent admission (10/3/19) at [facility] where she developed urinary retention    · Patient will follow up outpatient with urology for voiding trial  Has appt on 10/28 with [address] [facility]

## 2019-10-25 NOTE — PROGRESS NOTES
Pt noted this AM with increased temp, Cash Ríos, PA notified and discussed with PA pt's poor apettiite and nausea every time pt tried any food  Tylenol and zofran administered this AM prn  Around 1515 pt noted hypotensive with BP of 87/43 , asymptomatic, PA came to unit and assessed pt  Pt was alert and denied any discomfort  She stated she was hungry, meal tray provided, but pt ate less than 25%, Bolused with 500ml of fluids as ordered  ABT administered as ordered  Denied pain  Rechecked /46

## 2019-10-26 ENCOUNTER — APPOINTMENT (INPATIENT)
Dept: RADIOLOGY | Facility: HOSPITAL | Age: 79
DRG: 545 | End: 2019-10-26
Payer: MEDICARE

## 2019-10-26 LAB
ANION GAP SERPL CALCULATED.3IONS-SCNC: 9 MMOL/L (ref 4–13)
B PARAP IS1001 DNA NPH QL NAA+NON-PROBE: NOT DETECTED
B PERT.PT PRMT NPH QL NAA+NON-PROBE: NOT DETECTED
BASOPHILS # BLD AUTO: 0.01 THOUSANDS/ΜL (ref 0–0.1)
BASOPHILS NFR BLD AUTO: 0 % (ref 0–1)
BUN SERPL-MCNC: 19 MG/DL (ref 5–25)
C PNEUM DNA NPH QL NAA+NON-PROBE: NOT DETECTED
CALCIUM SERPL-MCNC: 8.3 MG/DL (ref 8.3–10.1)
CHLORIDE SERPL-SCNC: 107 MMOL/L (ref 100–108)
CO2 SERPL-SCNC: 22 MMOL/L (ref 21–32)
CREAT SERPL-MCNC: 0.52 MG/DL (ref 0.6–1.3)
EOSINOPHIL # BLD AUTO: 0.07 THOUSAND/ΜL (ref 0–0.61)
EOSINOPHIL NFR BLD AUTO: 1 % (ref 0–6)
ERYTHROCYTE [DISTWIDTH] IN BLOOD BY AUTOMATED COUNT: 18.1 % (ref 11.6–15.1)
FLUAV AG SPEC QL: NOT DETECTED
FLUAV RNA NPH QL NAA+NON-PROBE: NOT DETECTED
FLUBV AG SPEC QL: NOT DETECTED
FLUBV RNA NPH QL NAA+NON-PROBE: NOT DETECTED
GFR SERPL CREATININE-BSD FRML MDRD: 91 ML/MIN/1.73SQ M
GLUCOSE SERPL-MCNC: 101 MG/DL (ref 65–140)
GLUCOSE SERPL-MCNC: 117 MG/DL (ref 65–140)
GLUCOSE SERPL-MCNC: 120 MG/DL (ref 65–140)
GLUCOSE SERPL-MCNC: 132 MG/DL (ref 65–140)
GLUCOSE SERPL-MCNC: 180 MG/DL (ref 65–140)
HADV DNA NPH QL NAA+NON-PROBE: NOT DETECTED
HCOV 229E RNA NPH QL NAA+NON-PROBE: NOT DETECTED
HCOV HKU1 RNA NPH QL NAA+NON-PROBE: NOT DETECTED
HCOV NL63 RNA NPH QL NAA+NON-PROBE: NOT DETECTED
HCOV OC43 RNA NPH QL NAA+NON-PROBE: NOT DETECTED
HCT VFR BLD AUTO: 28.1 % (ref 34.8–46.1)
HGB BLD-MCNC: 8.6 G/DL (ref 11.5–15.4)
HMPV RNA NPH QL NAA+NON-PROBE: NOT DETECTED
HPIV 1+2+3+4 RNA NPH QL NAA+NON-PROBE: NOT DETECTED
HPIV1 RNA NPH QL NAA+NON-PROBE: NOT DETECTED
HPIV2 RNA NPH QL NAA+NON-PROBE: NOT DETECTED
HPIV3 RNA NPH QL NAA+NON-PROBE: NOT DETECTED
HPIV4 RNA NPH QL NAA+NON-PROBE: NOT DETECTED
IMM GRANULOCYTES # BLD AUTO: 0.06 THOUSAND/UL (ref 0–0.2)
IMM GRANULOCYTES NFR BLD AUTO: 1 % (ref 0–2)
LYMPHOCYTES # BLD AUTO: 0.99 THOUSANDS/ΜL (ref 0.6–4.47)
LYMPHOCYTES NFR BLD AUTO: 11 % (ref 14–44)
MCH RBC QN AUTO: 23 PG (ref 26.8–34.3)
MCHC RBC AUTO-ENTMCNC: 30.6 G/DL (ref 31.4–37.4)
MCV RBC AUTO: 75 FL (ref 82–98)
MONOCYTES # BLD AUTO: 0.36 THOUSAND/ΜL (ref 0.17–1.22)
MONOCYTES NFR BLD AUTO: 4 % (ref 4–12)
NEUTROPHILS # BLD AUTO: 7.66 THOUSANDS/ΜL (ref 1.85–7.62)
NEUTS SEG NFR BLD AUTO: 83 % (ref 43–75)
NRBC BLD AUTO-RTO: 0 /100 WBCS
PLATELET # BLD AUTO: 369 THOUSANDS/UL (ref 149–390)
PMV BLD AUTO: 9.6 FL (ref 8.9–12.7)
POTASSIUM SERPL-SCNC: 3.6 MMOL/L (ref 3.5–5.3)
PROCALCITONIN SERPL-MCNC: 0.93 NG/ML
RBC # BLD AUTO: 3.74 MILLION/UL (ref 3.81–5.12)
RSV B RNA SPEC QL NAA+PROBE: NOT DETECTED
RSV RNA NPH QL NAA+NON-PROBE: NOT DETECTED
RV+EV RNA NPH QL NAA+NON-PROBE: NOT DETECTED
SODIUM SERPL-SCNC: 138 MMOL/L (ref 136–145)
WBC # BLD AUTO: 9.15 THOUSAND/UL (ref 4.31–10.16)

## 2019-10-26 PROCEDURE — 99232 SBSQ HOSP IP/OBS MODERATE 35: CPT | Performed by: INTERNAL MEDICINE

## 2019-10-26 PROCEDURE — 87486 CHLMYD PNEUM DNA AMP PROBE: CPT | Performed by: INTERNAL MEDICINE

## 2019-10-26 PROCEDURE — 70450 CT HEAD/BRAIN W/O DYE: CPT

## 2019-10-26 PROCEDURE — 84145 PROCALCITONIN (PCT): CPT | Performed by: PHYSICIAN ASSISTANT

## 2019-10-26 PROCEDURE — 85025 COMPLETE CBC W/AUTO DIFF WBC: CPT | Performed by: PHYSICIAN ASSISTANT

## 2019-10-26 PROCEDURE — 87633 RESP VIRUS 12-25 TARGETS: CPT | Performed by: INTERNAL MEDICINE

## 2019-10-26 PROCEDURE — 86592 SYPHILIS TEST NON-TREP QUAL: CPT | Performed by: INTERNAL MEDICINE

## 2019-10-26 PROCEDURE — 87798 DETECT AGENT NOS DNA AMP: CPT | Performed by: INTERNAL MEDICINE

## 2019-10-26 PROCEDURE — 99223 1ST HOSP IP/OBS HIGH 75: CPT | Performed by: INTERNAL MEDICINE

## 2019-10-26 PROCEDURE — 82948 REAGENT STRIP/BLOOD GLUCOSE: CPT

## 2019-10-26 PROCEDURE — 99233 SBSQ HOSP IP/OBS HIGH 50: CPT | Performed by: PHYSICIAN ASSISTANT

## 2019-10-26 PROCEDURE — 80048 BASIC METABOLIC PNL TOTAL CA: CPT | Performed by: PHYSICIAN ASSISTANT

## 2019-10-26 PROCEDURE — 87581 M.PNEUMON DNA AMP PROBE: CPT | Performed by: INTERNAL MEDICINE

## 2019-10-26 PROCEDURE — 99222 1ST HOSP IP/OBS MODERATE 55: CPT | Performed by: INTERNAL MEDICINE

## 2019-10-26 RX ADMIN — FAMOTIDINE 20 MG: 40 POWDER, FOR SUSPENSION ORAL at 09:30

## 2019-10-26 RX ADMIN — CEFEPIME HYDROCHLORIDE 2000 MG: 2 INJECTION, POWDER, FOR SOLUTION INTRAVENOUS at 05:06

## 2019-10-26 RX ADMIN — ENOXAPARIN SODIUM 40 MG: 40 INJECTION SUBCUTANEOUS at 09:29

## 2019-10-26 RX ADMIN — FERROUS SULFATE TAB 325 MG (65 MG ELEMENTAL FE) 325 MG: 325 (65 FE) TAB at 09:29

## 2019-10-26 RX ADMIN — PRAVASTATIN SODIUM 40 MG: 20 TABLET ORAL at 16:45

## 2019-10-26 RX ADMIN — SODIUM CHLORIDE TAB 1 GM 1 G: 1 TAB at 16:46

## 2019-10-26 RX ADMIN — INSULIN LISPRO 1 UNITS: 100 INJECTION, SOLUTION INTRAVENOUS; SUBCUTANEOUS at 11:21

## 2019-10-26 RX ADMIN — SODIUM CHLORIDE TAB 1 GM 1 G: 1 TAB at 09:29

## 2019-10-26 NOTE — CONSULTS
Consultation - Infectious Disease   [place] [de-identified] y o  female MRN: [id number]  Unit/Bed#: -01 Encounter: [id number]      IMPRESSION & RECOMMENDATIONS:   1  Fever  Patient noted to have intermittent low-grade fever and isolated fever yesterday  There are no obvious sources on exam   Previous workup reviewed at [facility] which was largely unremarkable except for splenic infarct  Echo at that time was also unremarkable  Cultures on this admission have been unremarkable  Unclear for isolated fever is due to problem 4  Lower suspicion for bacterial meningitis  Flu swab negative; would rule out other viral URIs  Recent chest x-ray unremarkable and patient is comfortable on room air  Patient is awake on exam but she is just very slow to respond and recent MRI of the head was also negative  Lower suspicion at this time for viral encephalitis  At this time would hold further antibiotics  Continue to trend fever curve/vitals  Repeat CBC/chemistry tomorrow  Send respiratory viral panel  Follow-up CT of the head  Follow-up transesophageal echo  Repeat blood cultures with next fever  Neurology has been consulted  Additional care as per primary  Additional interventions pending clinical course    2  Hyponatremia  Sodium appears to be stable today  Ongoing follow-up by Nephrology  3  Metabolic encephalopathy  Patient initially presented confused likely due to problem 2  It seems that her mental status improved with adjustment in her sodium level  Workup for adrenal insufficiency was negative  Unclear etiology for her underlying hyponatremia  Continue to monitor mental status  Continue to monitor electrolytes  Will check syphilis testing  Additional imaging pending as above  Additional care as per primary     4  Splenic infarct  Appears to be evolving on imaging  Possibly contributing to problem 1  Unclear etiology for this lesion  Was not present on imaging in February    Recent echo at [facility] unremarkable  Patient without other prosthetic material   Follow up pending cultures  Repeat blood cultures with next fever  Follow-up transesophageal echo  Hematology evaluation appreciated  Additional care as per primary      5  Type 2 diabetes  Ongoing management as per primary service  Above plan reviewed briefly with the patient at bedside  Above plan discussed with primary service PA earlier today  ID consult service will continue to follow  HISTORY OF PRESENT ILLNESS:  Reason for Consult:  Metabolic encephalopathy and fever; progressive decline in function and mental status    HPI: Jaiden Carter is a [de-identified]y o  year old female with past medical history documented for cardiac disease, diabetes, hyperlipidemia and hypertension  She has also had previous PCI  She presented to the hospital on 10/14 and at that time history was gathered from family  Reportedly the patient use to be a very functional 44-year-old who drove herself to places and had a good level of activity  As of September she had a urinary tract infection with subsequent hyponatremia and it is since that time that she has been poorly functional   Patient had been recommended for sodium tablets on her last admission  Family reported that she has not been eating well  She seems to be more lethargic and confused  Reportedly in the ER patient was given 500 cc of fluid and had some improvement in her mental status  She was noted on admission to have an indwelling Rapp catheter  She was found to be hyponatremic to 126  UA noted the presence of bacteria and so the patient was started on ceftriaxone  She was without leukocytosis on admission  Platelets noted to be 407  Creatinine at 0 73  Patient's Rapp catheter was exchanged in the emergency department    The patient subsequently had developed hypoglycemia in conjunction with her anorexia and hyponatremia and there was question for underlying adrenal insufficiency  Case was discussed with endocrinology  Patient was also seen by geriatric  Apparently patient's mental status seemed to improve with the addition of steroid  Antibiotics were stopped on 10/16 based on culture results  Patient reportedly was much more interactive on 10/16  She was seen by endocrinology and was not felt to have adrenal insufficiency and it was thought that her improvement may have been due to improvement in her glucose after stopping oral hypoglycemic agents  Patient also seemed to be much more awake and interactive on geriatric evaluation on 10/17  On subsequent evaluation it seems that the patient's mental status seems to fluctuate with changes in her sodium  Patient was seen by Nephrology on 10/19  There is concern for aspiration of the patient's meds on 10/20 she was noted to have low-grade fever at that time too  Patient also had some mild left upper quadrant pain and reportedly has had splenic infarcts seen on prior imaging  Patient was seen by speech and her diet was modified  Patient was seen by General surgery consideration was made for imaging however no urgent recommendations  Patient was also seen by Oncology for these splenic infarcts  She was recommended for CT of the abdomen along with echo  Patient went on to have repeat imaging and then she developed a fever yesterday  Additional cultures and workup were sent  The patient was recommended for cardiology evaluation  Patient had also reported to provider this morning that she was having some neck pain and so neurology was also consulted  She is plan for transesophageal echo on Monday  At this time she remains afebrile  White blood cell count 9 1  She was given 2 doses of cefepime yesterday  Urine culture from admission was unremarkable  Repeat blood cultures are pending  Flu swab was negative  Blood cultures from 10/14 were negative  Chest x-ray is unremarkable    CT of the abdomen noted with splenic infarct and otherwise chronic changes present  CT of the head is pending  Patient's other vitals are stable  Sodium and creatinine remains stable  CBC otherwise unremarkable including differential   No other prior significant culture data in our system  Patient is not been seen by our service previously  Previous cultures reviewed in Care everywhere  Patient had echo on 10/01 at [facility] which was largely unremarkable  MRI in September of the brain was negative  Cervical spine imaging at that time for neck pain showed arthritis at C5-6  Previous Infectious Disease consult reviewed at [facility] and the patient had received 5 days of meropenem as well as ceftriaxone while she was there  Blood cultures without growth at that time  Urine cultures had with various organisms including ESBL E coli as well as lactobacillus  Antibiotics at that time were discontinued and fevers were thought to be due to her involving splenic infarct  Echo at that time had been reviewed as well  We are consulted at this time for further evaluation of this patient given her atypical presentation along with high fever yesterday  On evaluation, patient is easily a woken on exam   She is very slow to respond may be hard of hearing  Her responses some questions or quite vague like when I ask her how long she has been in the hospital and she proceed to tell me it has been a long time  She seems to be come either frustrated or irritated with questioning very easily and then will close her eye and then when asked further questions or exam and then she will wake again  Blood sugar checked at bedside and was 101  The patient denies having any pain at this time  She denies having any nausea or vomiting  When asked about how she is feeling since being in the hospital she says that she thinks things might be getting better    She does recall coming in because of low sodium but can't really give me more detail other than that  REVIEW OF SYSTEMS:  Unable to obtain full review of systems given patient's either inability to answer questions refusal to do so  PAST MEDICAL HISTORY:  Past Medical History:   Diagnosis Date    Cardiac disease     Diabetes mellitus ([address] )     Hyperlipidemia     Hypertension      Past Surgical History:   Procedure Laterality Date    CORONARY ANGIOPLASTY WITH STENT PLACEMENT         FAMILY HISTORY:  Non-contributory    SOCIAL HISTORY:  Social History   Social History     Substance and Sexual Activity   Alcohol Use No     Social History     Substance and Sexual Activity   Drug Use No     Social History     Tobacco Use   Smoking Status Current Every Day Smoker    Packs/day: 0 20       ALLERGIES:  No Known Allergies    MEDICATIONS:  All current active medications have been reviewed  PHYSICAL EXAM:  Temp:  [97 4 °F (36 3 °C)-99 3 °F (37 4 °C)] 99 3 °F (37 4 °C)  HR:  [70-86] 83  Resp:  [15-18] 16  BP: ()/(43-50) 118/50  SpO2:  [87 %-95 %] 94 %  Temp (24hrs), Av 7 °F (37 1 °C), Min:97 4 °F (36 3 °C), Max:99 3 °F (37 4 °C)  Current: Temperature: 99 3 °F (37 4 °C)    Intake/Output Summary (Last 24 hours) at 10/26/2019 1521  Last data filed at 10/26/2019 1428  Gross per 24 hour   Intake 240 ml   Output 450 ml   Net -210 ml       General Appearance:  Chronically ill-appearing, nontoxic, and in no distress   Head:  Normocephalic, without obvious abnormality, atraumatic   Eyes:  Conjunctiva pink and sclera anicteric, both eyes   Nose: Nares normal, mucosa normal, no drainage   Throat: Oropharynx moist without lesions   Neck: Supple, symmetrical, no adenopathy, no tenderness/mass/nodules; she has no reproducible pain on exam    Back:   Unable to fully turn the patient at this time to examine her back     Lungs:   Clear to auscultation anteriorly bilaterally, respirations unlabored   Chest Wall:  No tenderness or deformity   Heart:  RRR; no murmur, rub or gallop   Abdomen: Soft, non-tender, non-distended, positive bowel sounds; no acute issues noted around catheter site  Extremities: No cyanosis, clubbing or edema   Skin: No rashes or lesions  No draining wounds noted  Lymph nodes: Cervical, supraclavicular nodes normal   Neurologic: Patient is oriented to self and to place  She seems disinterested on exam   She will answer some questions on exam and then proceed to close her eyes and not participate  With encouragement she will flex and extend her knees and move her toes  I am able to get her to hold both of her arms bilaterally against gravity  LABS, IMAGING, & OTHER STUDIES:  Lab Results:  I have personally reviewed pertinent labs  Results from last 7 days   Lab Units 10/26/19  0927 10/25/19  1142 10/24/19  0537   WBC Thousand/uL 9 15 10 36* 5 79   HEMOGLOBIN g/dL 8 6* 9 0* 9 3*   PLATELETS Thousands/uL 369 397* 349     Results from last 7 days   Lab Units 10/26/19  0529   POTASSIUM mmol/L 3 6   CHLORIDE mmol/L 107   CO2 mmol/L 22   BUN mg/dL 19   CREATININE mg/dL 0 52*   EGFR ml/min/1 73sq m 91   CALCIUM mg/dL 8 3     Results from last 7 days   Lab Units 10/25/19  1142   INFLUENZA B PCR  Not Detected   RSV PCR  Not Detected       Imaging Studies:   I have personally reviewed pertinent imaging study reports and images in PACS  Other Studies:   I have personally reviewed pertinent reports

## 2019-10-26 NOTE — PLAN OF CARE
Problem: Prexisting or High Potential for Compromised Skin Integrity  Goal: Skin integrity is maintained or improved  Description  INTERVENTIONS:  - Identify patients at risk for skin breakdown  - Assess and monitor skin integrity  - Assess and monitor nutrition and hydration status  - Monitor labs   - Assess for incontinence   - Turn and reposition patient  - Assist with mobility/ambulation  - Relieve pressure over bony prominences  - Avoid friction and shearing  - Provide appropriate hygiene as needed including keeping skin clean and dry  - Evaluate need for skin moisturizer/barrier cream  - Collaborate with interdisciplinary team   - Patient/family teaching  - Consider wound care consult   Outcome: Progressing     Problem: Nutrition/Hydration-ADULT  Goal: Nutrient/Hydration intake appropriate for improving, restoring or maintaining nutritional needs  Description  Monitor and assess patient's nutrition/hydration status for malnutrition  Collaborate with interdisciplinary team and initiate plan and interventions as ordered  Monitor patient's weight and dietary intake as ordered or per policy  Utilize nutrition screening tool and intervene as necessary  Determine patient's food preferences and provide high-protein, high-caloric foods as appropriate       INTERVENTIONS:  - Monitor oral intake, urinary output, labs, and treatment plans  - Assess nutrition and hydration status and recommend course of action  - Evaluate amount of meals eaten  - Assist patient with eating if necessary   - Allow adequate time for meals  - Recommend/ encourage appropriate diets, oral nutritional supplements, and vitamin/mineral supplements  - Order, calculate, and assess calorie counts as needed  - Recommend, monitor, and adjust tube feedings and TPN/PPN based on assessed needs  - Assess need for intravenous fluids  - Provide specific nutrition/hydration education as appropriate  - Include patient/family/caregiver in decisions related to nutrition  Outcome: Progressing     Problem: Potential for Falls  Goal: Patient will remain free of falls  Description  INTERVENTIONS:  - Assess patient frequently for physical needs  -  Identify cognitive and physical deficits and behaviors that affect risk of falls    -  [place] fall precautions as indicated by assessment   - Educate patient/family on patient safety including physical limitations  - Instruct patient to call for assistance with activity based on assessment  - Modify environment to reduce risk of injury  - Consider OT/PT consult to assist with strengthening/mobility  Outcome: Progressing     Problem: NEUROSENSORY - ADULT  Goal: Achieves stable or improved neurological status  Description  INTERVENTIONS  - Monitor and report changes in neurological status  - Monitor vital signs such as temperature, blood pressure, glucose, and any other labs ordered   - Initiate measures to prevent increased intracranial pressure  - Monitor for seizure activity and implement precautions if appropriate      Outcome: Progressing     Problem: RESPIRATORY - ADULT  Goal: Achieves optimal ventilation and oxygenation  Description  INTERVENTIONS:  - Assess for changes in respiratory status  - Assess for changes in mentation and behavior  - Position to facilitate oxygenation and minimize respiratory effort  - Encourage broncho-pulmonary hygiene including cough, deep breathe, Incentive Spirometry  - Assess and instruct to report SOB or any respiratory difficulty  - Respiratory Therapy support as indicated   Outcome: Progressing     Problem: GASTROINTESTINAL - ADULT  Goal: Maintains adequate nutritional intake  Description  INTERVENTIONS:  - Monitor percentage of each meal consumed  - Identify factors contributing to decreased intake, treat as appropriate  - Assist with meals as needed  - Monitor I&O, weight, and lab values if indicated  - Obtain nutrition services referral as needed  Outcome: Progressing     Problem: GENITOURINARY - ADULT  Goal: Urinary catheter remains patent  Description  INTERVENTIONS:  - Assess patency of urinary catheter  - If patient has a chronic herrera, consider changing catheter if non-functioning  - Follow guidelines for intermittent irrigation of non-functioning urinary catheter  Outcome: Progressing     Problem: METABOLIC, FLUID AND ELECTROLYTES - ADULT  Goal: Electrolytes maintained within normal limits  Description  INTERVENTIONS:  - Monitor labs and assess patient for signs and symptoms of electrolyte imbalances  - Administer electrolyte replacement as ordered  - Monitor response to electrolyte replacements, including repeat lab results as appropriate  - Instruct patient on fluid and nutrition as appropriate  Outcome: Progressing  Goal: Fluid balance maintained  Description  INTERVENTIONS:  - Monitor labs   - Monitor I/O and WT  - Instruct patient on fluid and nutrition as appropriate  - Assess for signs & symptoms of volume excess or deficit  Outcome: Progressing  Goal: Glucose maintained within target range  Description  INTERVENTIONS:  - Monitor Blood Glucose as ordered  - Assess for signs and symptoms of hyperglycemia and hypoglycemia  - Administer ordered medications to maintain glucose within target range  - Assess nutritional intake and initiate nutrition service referral as needed  Outcome: Progressing     Problem: SKIN/TISSUE INTEGRITY - ADULT  Goal: Skin integrity remains intact  Description  INTERVENTIONS  - Identify patients at risk for skin breakdown  - Assess and monitor skin integrity  - Assess and monitor nutrition and hydration status  - Monitor labs (i e  albumin)  - Assess for incontinence   - Turn and reposition patient  - Assist with mobility/ambulation  - Relieve pressure over bony prominences  - Avoid friction and shearing  - Provide appropriate hygiene as needed including keeping skin clean and dry  - Evaluate need for skin moisturizer/barrier cream  - Collaborate with interdisciplinary team (i e  Nutrition, Rehabilitation, etc )   - Patient/family teaching  Outcome: Progressing  Goal: Oral mucous membranes remain intact  Description  INTERVENTIONS  - Assess oral mucosa and hygiene practices  - Implement preventative oral hygiene regimen  - Implement oral medicated treatments as ordered  - Initiate Nutrition services referral as needed  Outcome: Progressing     Problem: HEMATOLOGIC - ADULT  Goal: Maintains hematologic stability  Description  INTERVENTIONS  - Monitor labs      Outcome: Progressing

## 2019-10-26 NOTE — ASSESSMENT & PLAN NOTE
· She had been afebrile x 72 hrs however morning of 10/25 fever as high as 102 5  · ? Possibly related to splenic infarct  · Urine culture not significant this admission, 3009-0823 GNR   At [facility], pt was noted to have ESBL in urine which was suspected to be colonization  · Admission blood cultrues are neg, will send repeat  · Procalcitonin normal initially, 0 93 now  · Repeat CXR negative  · WBCs within normal limits  · Flu/RSV pending  · Blood culture pending  · Continue cefepime while awaiting results, consider Infectious Disease consult  · Patient has been afebrile since initiation of cefepime

## 2019-10-26 NOTE — ASSESSMENT & PLAN NOTE
· Since September she has had a progressive decline - she has had no appetite, not really eating/drinking with weakness  She was previously reportedly very independent prior to her admission at [facility] in September with UTI  · Geriatrics following, recommending outpatient follow up  · Speech following, on soft diet  · No recent colonoscopy/EGD, recommend outpatient GI evaluation

## 2019-10-26 NOTE — ASSESSMENT & PLAN NOTE
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  · Presented with sodium of 126  Sodium 138 today  · TSH normal this admission  · Cosyntropin stim test negative for adrenal insufficiency  · CT chest abdomen pelvis was done at [facility] to rule out any occult malignancy as a cause of SIADH which was negative for any malignancy  · Continue with 1g Salt tabs BID and fluid restriction - was 1500 and changed to 1200  Plan will be to d/c on daily dosing per Dr Pollo Lou  · Divya Paulino for d/c from a nephrology standpoint, will need outpatient f/u with [facility] nephrology  Recommending BMP in 1 week with results to PCP for further management of NACL tabs  Protonix changed to Pepcid  ACEI stopped as well    · Given additional salt tab today and given torsemide PO x 1 dose on 10/24  · Na 138 today  · BMP in AM

## 2019-10-26 NOTE — ASSESSMENT & PLAN NOTE
· Lisinopril stopped  Amlodipine changed to 5 mg p o  B i d  Per Nephrology  Continue atenolol 50mg daily with hold parameters    · Routine vitals

## 2019-10-26 NOTE — ASSESSMENT & PLAN NOTE
Lab Results   Component Value Date    HGBA1C 6 4 (H) 10/15/2019     Recent Labs     10/25/19  1053 10/25/19  1555 10/25/19  2102 10/26/19  0602   POCGLU 163* 143* 179* 132       · Takes metformin and glipizide at baseline, will plan to resume metformin at discharge and hold glipizide    · Continue with SSI  · Diabetic diet  · Monitor accuchecks, avoid hypoglycemia

## 2019-10-26 NOTE — ASSESSMENT & PLAN NOTE
· Baseline seems between 8-11  · hgb 9 0 most recent check  · Iron studies suggestive of low iron  · Continue  p o  iron supplementation which was started this admission    · CBC in AM

## 2019-10-26 NOTE — PROGRESS NOTES
[facility]  Progress Note - [place] 1940, [de-identified] y o  female MRN: [id number]  Unit/Bed#: -Phil Encounter: [id number]  Primary Care Provider: Juancarlos Carvajal MD   Date and time admitted to hospital: 10/14/2019  4:20 PM    Splenic infarct  Assessment & Plan  · Noted on CT scan done at [facility] 9/2019, new moderate sized splenic infarcts  · Unclear etiology of splenic infarcts  Hematology consult appreciated, recommending repeat CTA abdomen and pelvis to evaluate for source of emboli  Recent echocardiogram done September 30th at [facility] was negative for thrombus  · CTA abdomen/pelvis:  No acute aortic injury or aneurysm  Patent celiac and splenic arteries  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  Evolving splenic infarct  Additional chronic/incidental findings as described  · Discussed with hematology, to consider starting ASA  · Telemetry has been on since admission and is unremarkable for atrial fibrillation  Discontinued  · Hematology/Oncology recommending further cardiac evaluation, cardiology consulted for LENO versus loop recorder placement  · General surgery consult appreciated, no further workup from their standpoint, they have deferred to Hematology/Oncology  · Consider lower extremity duplex  Microcytic anemia  Assessment & Plan  · Baseline seems between 8-11  · hgb 9 0 most recent check  · Iron studies suggestive of low iron  · Continue  p o  iron supplementation which was started this admission  · CBC in AM    Low grade fever  Assessment & Plan  · She had been afebrile x 72 hrs however morning of 10/25 fever as high as 102 5  · ? Possibly related to splenic infarct  · Urine culture not significant this admission, [id number] GNR   At [facility], pt was noted to have ESBL in urine which was suspected to be colonization  · Admission blood cultrues are neg, will send repeat  · Procalcitonin normal initially, 0 93 now  · Repeat CXR negative  · WBCs within normal limits  · Flu/RSV pending  · Blood culture pending  · Continue cefepime while awaiting results, consider Infectious Disease consult    Bradycardia  Assessment & Plan  · Resolved with lower dose of atenolol  · EKG shows NSR in 80's  · TSH normal this admission  Severe protein-calorie malnutrition ([address] )  Assessment & Plan  Malnutrition Findings:   Malnutrition type: Chronic illness(Related to medical condition as evidenced by 10% weight loss over the past month and <75% energy intake needs met >1 month treated with ensure compact supplements)  Degree of Malnutrition: Other severe protein calorie malnutrition    BMI Findings: Body mass index is 22 26 kg/m²  Nutrition consult and supplements  Urinary retention  Assessment & Plan  · Patient has chronic Herrera present on admission- herrera was replaced in ED 10/14   · Placed on recent admission (10/3/19) at [facility] where she developed urinary retention  · Patient will follow up outpatient with urology for voiding trial  Has appt on 10/28 with [facility]    Failure to thrive in adult  Assessment & Plan  · Since September she has had a progressive decline - she has had no appetite, not really eating/drinking with weakness  She was previously reportedly very independent prior to her admission at [facility] in September with UTI  · Geriatrics following, recommending outpatient follow up  · Speech following, on soft diet  · No recent colonoscopy/EGD, recommend outpatient GI evaluation  HLD (hyperlipidemia)  Assessment & Plan  · Continue statin    Essential hypertension  Assessment & Plan  · Lisinopril stopped  Amlodipine changed to 5 mg p o  B i d  Per Nephrology  Continue atenolol 50mg daily with hold parameters    · Routine vitals    Type 2 diabetes mellitus with diabetic neuropathy, without long-term current use of insulin [facility])  Assessment & Plan  Lab Results   Component Value Date    HGBA1C 6 4 (H) 10/15/2019     Recent Labs     10/25/19  1053 10/25/19  1555 10/25/19  2102 10/26/19  0602   POCGLU 163* 143* 179* 132       · Takes metformin and glipizide at baseline, will plan to resume metformin at discharge and hold glipizide  · Continue with SSI  · Diabetic diet  · Monitor accuchecks, avoid hypoglycemia    Hyponatremia  Assessment & Plan  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  · Presented with sodium of 126  Sodium 138 today  · TSH normal this admission  · Cosyntropin stim test negative for adrenal insufficiency  · CT chest abdomen pelvis was done at [facility] to rule out any occult malignancy as a cause of SIADH which was negative for any malignancy  · Continue with 1g Salt tabs BID and fluid restriction - was 1500 and changed to 1200  Plan will be to d/c on daily dosing per Dr Racquel Rudolph  · Ernie Dominguez for d/c from a nephrology standpoint, will need outpatient f/u with [facility] nephrology  Recommending BMP in 1 week with results to PCP for further management of NACL tabs  Protonix changed to Pepcid  ACEI stopped as well  · Given additional salt tab today and given torsemide PO x 1 dose on 10/24  · Na 138 today  · BMP in AM    * Metabolic encephalopathy  Assessment & Plan  · POA  Likely in setting of hyponatremia with component of delirium contributing given recurrent hospitalizations/underlying cognitive impairment  · Urine culture unremarkable - monitor off antibiotics at this time  Pt remains asymptomatic  · Mental status seems to be at baseline although does wax and wane  On exam today pt is AO x3 and able to state she was brought to the hospital "for my sodium"  · She has hx of adrenal insufficiency, previously on steroids and then tapered off  She follows with Endocrinology as outpatient      · Cosyntropin stim test with adequate response in cortisol, hydrocortisone discontinued, she does not have adrenal insufficiency   · Vitamin B12 and folate normal   · MRI brain was done at [facility] last month which was negative for any acute infarct but possible small questionable hemorrhage versus area of mineralization  CT head without contrast in [facility] negative for bleed  · Geriatrics following, patient should follow up outpatient with them    VTE Pharmacologic Prophylaxis:   Pharmacologic: Enoxaparin (Lovenox)  Mechanical VTE Prophylaxis in Place: No    Patient Centered Rounds: I have performed bedside rounds with nursing staff today  Discussions with Specialists or Other Care Team Provider: Discussed retired tax with Oncology team, will defer to Cardiology in regards to need for LENO versus loop recorder  Discussed with Cardiology AP, will pursue further cardiac evaluation for splenic infarction  Discussed patient also with Neurology and Infectious disease in regards to recent fever and neck pain as well as continued AMS from baseline per nursing report  Education and Discussions with Family / Patient: Discussed care plan with patient  Patient said she did not want her daughter to be called today  Time Spent for Care: 45 minutes  More than 50% of total time spent on counseling and coordination of care as described above  Current Length of Stay: 12 day(s)    Current Patient Status: Inpatient   Certification Statement: The patient will continue to require additional inpatient hospital stay due to Further cardiac evaluation, currently receiving IV antibiotics    Discharge Plan:  Patient currently lives at home with daughter, plan to return pending patient course  Anticipate 48-72 hours prior to discharge  Code Status: Level 1 - Full Code      Subjective:   Patient reports neck pain/stiffness today  Denies and SOB, lightheadedness or dizziness  She reports she is eating and drinking well       Objective:     Vitals:   Temp (24hrs), Av 6 °F (37 °C), Min:97 4 °F (36 3 °C), Max:99 3 °F (37 4 °C)    Temp:  [97 4 °F (36 3 °C)-99 3 °F (37 4 °C)] 99 2 °F (37 3 °C)  HR:  [70-86] 86  Resp:  [15-18] 15  BP: ()/(43-48) 114/48  SpO2:  [87 %-96 %] 93 %  Body mass index is 22 26 kg/m²  Input and Output Summary (last 24 hours): Intake/Output Summary (Last 24 hours) at 10/26/2019 1114  Last data filed at 10/26/2019 0843  Gross per 24 hour   Intake 240 ml   Output 50 ml   Net 190 ml       Physical Exam:     Physical Exam   Constitutional: She appears well-developed  She appears cachectic  She has a sickly appearance  No distress  HENT:   Head: Normocephalic and atraumatic  Eyes: Conjunctivae are normal  No scleral icterus  Cardiovascular: Normal rate and regular rhythm  No murmur heard  Pulmonary/Chest: Effort normal and breath sounds normal  No respiratory distress  She has no wheezes  She has no rales  Abdominal: Soft  She exhibits no distension  There is no tenderness  Musculoskeletal: She exhibits no edema  Skin: Skin is warm and dry  No erythema  Nursing note and vitals reviewed        Additional Data:     Labs:    Results from last 7 days   Lab Units 10/26/19  0927   WBC Thousand/uL 9 15   HEMOGLOBIN g/dL 8 6*   HEMATOCRIT % 28 1*   PLATELETS Thousands/uL 369   NEUTROS PCT % 83*   LYMPHS PCT % 11*   MONOS PCT % 4   EOS PCT % 1     Results from last 7 days   Lab Units 10/26/19  0529   SODIUM mmol/L 138   POTASSIUM mmol/L 3 6   CHLORIDE mmol/L 107   CO2 mmol/L 22   BUN mg/dL 19   CREATININE mg/dL 0 52*   ANION GAP mmol/L 9   CALCIUM mg/dL 8 3   GLUCOSE RANDOM mg/dL 120         Results from last 7 days   Lab Units 10/26/19  0602 10/25/19  2102 10/25/19  1555 10/25/19  1053 10/25/19  0712 10/24/19  2103 10/24/19  1608 10/24/19  1053 10/24/19  0534 10/23/19  2117 10/23/19  1632 10/23/19  1108   POC GLUCOSE mg/dl 132 179* 143* 163* 136 141* 113 134 115 133 186* 173*         Results from last 7 days   Lab Units 10/26/19  0529 10/20/19  0449   PROCALCITONIN ng/ml 0 93* 0 07           * I Have Reviewed All Lab Data Listed Above  * Additional Pertinent Lab Tests Reviewed: All Labs Within Last 24 Hours Reviewed    Imaging:    Imaging Reports Reviewed Today Include:    · CXR 10/25:  Small pleural effusion  No pneumothorax  Imaging Personally Reviewed by Myself Includes:   · CXR 10/25:  Cardiomegaly, small pleural effusion  No apparent infiltrate  Recent Cultures (last 7 days):           Last 24 Hours Medication List:     Current Facility-Administered Medications:  acetaminophen 650 mg Oral Q6H PRN Trina Reece PA-C    aluminum-magnesium hydroxide-simethicone 30 mL Oral Q6H PRN Rasheeda Sarah MD    amLODIPine 5 mg Oral Daily Augustine Jin MD    atenolol 50 mg Oral Daily Gerry Gowers, MD    cefepime 2,000 mg Intravenous Q12H Trina Reece PA-C Last Rate: 2,000 mg (10/26/19 0506)   docusate sodium 100 mg Oral BID PRN Rasheeda Sarah MD    enoxaparin 40 mg Subcutaneous Daily Rasheeda Sarah MD    famotidine 20 mg Oral Daily Augustine Jin MD    ferrous sulfate 325 mg Oral Daily With Breakfast Gerry Gowers, MD    insulin lispro 1-5 Units Subcutaneous HS Rasheeda Sarah MD    insulin lispro 1-6 Units Subcutaneous TID AC Rasheeda Sarah MD    ondansetron 4 mg Intravenous Q6H PRN Rasheeda Sarah MD    pravastatin 40 mg Oral Daily With Jennifer Paredes MD    sodium chloride 1 g Oral BID With Meals Augustine Jin MD         Today, Patient Was Seen By: Coby Murphy PA-C    ** Please Note: Dictation voice to text software may have been used in the creation of this document   **

## 2019-10-26 NOTE — CONSULTS
Consultation - Cardiology Team One  [place] [de-identified] y o  female MRN: [id number]  Unit/Bed#: -01 Encounter: [id number]    Inpatient consult to Cardiology  Consult performed by: JAQUELINE Puente  Consult ordered by: Jayshree Tavares PA-C      Physician Requesting Consult: Larry Villatoro DO  Reason for Consult / Principal Problem:  splenic infarct, evaluate for cardiac source    Assessment/ Plan    Splenic infarct  TTE on 10/1/19 at [facility]- LVEF 81-67%, no cardioembolic source of splenic infarct identified  Recommended LENO if high suspicion for cardioembolic source  Previously on telemetry which did not show any afib per notes  Heart rate and rhythm are regular on exam today  EKG from 10/20- NSR  Will proceed with LENO on Monday to definitively rule in/out cardiac source of infarct  Resumption of telemetry to continue to monitor for arrhythmia  Altered mental status- originally thought due to hyponatremia but that is now resolved and she is reportedly not back to baseline  Also being treated for sepsis of unknown source  Sepsis of unknown source- afebrile since starting cefepime yesterday  On-going work up per primary team  Flu swab pending  CXR without pneumonia  CAD- remote history of stenting  She does not recall when  Does not follow with outpatient cardiologist   Unclear as to why she is not on ASA  Continue statin and reduced dose of atenolol  HTN- controlled to low, 24 hour average /46  On amlodipine 5 mg daily and atenolol 50 mg daily  HL- direct LDL 72 (2014)  Continue statin  DM2, controlled  A1c 6 4  Management per primary team     SIADH- nephrology following, hyponatremia resolved  Na 138  On 1200 mL FR and salt tabs  Chronic microcytic anemia- hemoglobin stable, 8 6      Urinary retention s/p herrera catheter    History of Present Illness   HPI: [place] is a [de-identified]y o  year old female who has a history of CAD with unclear history of stenting, hypertension, hyperlipidemia, type 2 diabetes, SIADH, anemia, and chronic Rapp catheter  Cannot find any evidence that she follows with an outpatient cardiologist  In September, she presented to [facility] with altered mental status  While there she was diagnosed with a splenic infarct as well as urinary retention for which a Rapp catheter was placed  She developed a UTI while hospitalized and was treated with antibiotics  Hyponatremia POA improved with IV hydration  She was evaluated by Nephrology who ordered a fluid restriction and salt tablets  She was apparently discharged at her baseline mental status  She presented to the ED on 10/14/2019 with increased lethargy and confusion since her discharge from [facility]   She has had ongoing workup over the course of her 12 days day which included consults to geriatric medicine, Endocrinology, Nephrology, General surgery, and medical oncology  She is currently being treated for presumed sepsis from an unclear source  A flu swab is now pending  Bobbi-Onc has requested a Cardiology consultation due to finding of evolving splenic infarct  There is no clear etiology to explain the splenic infarct however they note that 60-70% of splenic infarcts come from a cardiac source  She was originally monitored on telemetry which did not demonstrate any atrial fibrillation or atrial flutter per notes  She is no longer on telemetry monitor  She did have an echocardiogram at [facility] which did not demonstrate any cardiac source of embolus  Per that report, they recommended a LENO if there was a strong suspicion for cardiac embolism  Monika believes it would be reasonable to proceed with anticoagulation for her splenic infarct but would like cardiology input  EKG reviewed personally: 10/20- NSR  Telemetry reviewed personally: N/A  Review of Systems   Constitution: Positive for decreased appetite and malaise/fatigue  Negative for fever  Cardiovascular: Negative for chest pain, dyspnea on exertion, orthopnea and palpitations  Respiratory: Negative for cough, shortness of breath and sputum production  Musculoskeletal: Positive for neck pain  Gastrointestinal: Negative for bloating, abdominal pain and nausea  Neurological: Positive for weakness  Negative for headaches and light-headedness  Historical Information   Past Medical History:   Diagnosis Date    Cardiac disease     Diabetes mellitus ([facility] [address] )     Hyperlipidemia     Hypertension      Past Surgical History:   Procedure Laterality Date    CORONARY ANGIOPLASTY WITH STENT PLACEMENT       Social History     Substance and Sexual Activity   Alcohol Use No     Social History     Substance and Sexual Activity   Drug Use No     Social History     Tobacco Use   Smoking Status Current Every Day Smoker    Packs/day: 0 20     Family History: No family history on file      Meds/Allergies   all current active meds have been reviewed and current meds:   Current Facility-Administered Medications   Medication Dose Route Frequency    acetaminophen (TYLENOL) tablet 650 mg  650 mg Oral Q6H PRN    aluminum-magnesium hydroxide-simethicone (MYLANTA) 200-200-20 mg/5 mL oral suspension 30 mL  30 mL Oral Q6H PRN    amLODIPine (NORVASC) tablet 5 mg  5 mg Oral Daily    atenolol (TENORMIN) tablet 50 mg  50 mg Oral Daily    cefepime (MAXIPIME) 2,000 mg in dextrose 5 % 50 mL IVPB  2,000 mg Intravenous Q12H    docusate sodium (COLACE) capsule 100 mg  100 mg Oral BID PRN    enoxaparin (LOVENOX) subcutaneous injection 40 mg  40 mg Subcutaneous Daily    famotidine (PEPCID) oral suspension 20 mg  20 mg Oral Daily    ferrous sulfate tablet 325 mg  325 mg Oral Daily With Breakfast    insulin lispro (HumaLOG) 100 units/mL subcutaneous injection 1-5 Units  1-5 Units Subcutaneous HS    insulin lispro (HumaLOG) 100 units/mL subcutaneous injection 1-6 Units  1-6 Units Subcutaneous TID AC    ondansetron [facility] injection 4 mg  4 mg Intravenous Q6H PRN    pravastatin (PRAVACHOL) tablet 40 mg  40 mg Oral Daily With Dinner    sodium chloride tablet 1 g  1 g Oral BID With Meals        No Known Allergies    Objective   Vitals: Blood pressure (!) 114/48, pulse 86, temperature 99 2 °F (37 3 °C), temperature source Oral, resp  rate 15, height 5' 2" (1 575 m), weight 55 2 kg (121 lb 11 2 oz), SpO2 93 %  ,     Body mass index is 22 26 kg/m²  ,     Systolic (68CLF), ZT , Min:87 , SGL:016     Diastolic (48YVE), VWH:09, Min:43, Max:48      Intake/Output Summary (Last 24 hours) at 10/26/2019 1000  Last data filed at 10/26/2019 0843  Gross per 24 hour   Intake 240 ml   Output 550 ml   Net -310 ml     Weight (last 2 days)     Date/Time   Weight    10/26/19 0600    Bed scale malfunctioning, pt too weak to stand    Weight: Bed scale malfunctioning, pt too weak to stand at 10/26/19 0600    10/24/19 0536   55 2 (121 7)            Invasive Devices     Peripheral Intravenous Line            Peripheral IV 10/23/19 Right Antecubital 2 days    Peripheral IV 10/25/19 Left Wrist less than 1 day          Drain            Urethral Catheter Straight-tip 16 Fr  11 days              Physical Exam   Constitutional: She is oriented to person, place, and time  No distress  Neck: Neck supple  No JVD present  Cardiovascular: Normal rate, regular rhythm, normal heart sounds and intact distal pulses  Exam reveals no gallop and no friction rub  No murmur heard  Pulmonary/Chest: Effort normal  No respiratory distress  She has no rales  Diminished at bases, poor inspiratory effort  Room air  Abdominal: Soft  Bowel sounds are normal  She exhibits no distension  There is no tenderness  Musculoskeletal: She exhibits no edema  Neurological: She is alert and oriented to person, place, and time  Limited historian   Skin: Skin is warm and dry  She is not diaphoretic  Psychiatric: She has a normal mood and affect       LABORATORY RESULTS:      CBC with diff:   Results from last 7 days   Lab Units 10/26/19  0927 10/25/19  1142 10/24/19  0537 10/23/19  0534 10/22/19  0550 10/21/19  0604 10/20/19  0449   WBC Thousand/uL 9 15 10 36* 5 79 6 45 5 97 6 33 8 15   HEMOGLOBIN g/dL 8 6* 9 0* 9 3* 8 6* 9 1* 9 8* 9 0*   HEMATOCRIT % 28 1* 29 5* 30 8* 29 1* 30 0* 32 2* 29 4*   MCV fL 75* 75* 76* 77* 75* 75* 75*   PLATELETS Thousands/uL 369 397* 349 389 418* 432* 399*   MCH pg 23 0* 22 9* 22 8* 22 8* 22 9* 23 0* 23 1*   MCHC g/dL 30 6* 30 5* 30 2* 29 6* 30 3* 30 4* 30 6*   RDW % 18 1* 17 9* 18 1* 18 3* 18 1* 18 1* 17 7*   MPV fL 9 6 9 9 9 7 10 1 9 3 9 8 9 8   NRBC AUTO /100 WBCs 0 0 0 0 0 0 0       CMP:  Results from last 7 days   Lab Units 10/26/19  0529 10/25/19  0626 10/24/19  0537 10/23/19  0534 10/22/19  0550 10/21/19  0604 10/20/19  0449   POTASSIUM mmol/L 3 6 4 1 3 9 4 2 4 0 3 9 4 1   CHLORIDE mmol/L 107 105 103 108 106 105 104   CO2 mmol/L 22 23 22 22 21 23 22   BUN mg/dL 19 13 12 13 13 16 14   CREATININE mg/dL 0 52* 0 54* 0 49* 0 56* 0 47* 0 52* 0 60   CALCIUM mg/dL 8 3 9 0 8 4 8 7 8 6 8 7 8 6   EGFR ml/min/1 73sq m 91 89 92 88 94 91 86     BMP:  Results from last 7 days   Lab Units 10/26/19  0529 10/25/19  0626 10/24/19  0537 10/23/19  0534 10/22/19  0550 10/21/19  0604 10/20/19  0449   POTASSIUM mmol/L 3 6 4 1 3 9 4 2 4 0 3 9 4 1   CHLORIDE mmol/L 107 105 103 108 106 105 104   CO2 mmol/L 22 23 22 22 21 23 22   BUN mg/dL 19 13 12 13 13 16 14   CREATININE mg/dL 0 52* 0 54* 0 49* 0 56* 0 47* 0 52* 0 60   CALCIUM mg/dL 8 3 9 0 8 4 8 7 8 6 8 7 8 6      Lab Results   Component Value Date    NTBNP 4,480 (H) 10/19/2019      Results from last 7 days   Lab Units 10/20/19  0449   MAGNESIUM mg/dL 1 5*       Lipid Profile:   Lab Results   Component Value Date    CHOL 141 12/12/2014     Lab Results   Component Value Date    HDL 45 12/12/2014     Lab Results   Component Value Date    LDLCALC 72 12/12/2014     Lab Results   Component Value Date    TRIG 122 12/12/2014     Imaging: I have personally reviewed pertinent reports  and I have personally reviewed pertinent films in PACS  Xr Chest Pa & Lateral    Result Date: 10/25/2019  Narrative: CHEST INDICATION:   fever  COMPARISON:  None EXAM PERFORMED/VIEWS:  XR CHEST PA & LATERAL FINDINGS: Cardiomediastinal silhouette appears unremarkable  Small left pleural effusion  Osseous structures appear within normal limits for patient age  Impression: Small pleural effusion  No pneumothorax  Workstation performed: [id number]     Xr Chest Pa & Lateral    Result Date: 10/19/2019  Narrative: CHEST INDICATION:   tachypnea  COMPARISON:  Two-view chest 7/60/17 EXAM PERFORMED/VIEWS:  XR CHEST PA & LATERAL FINDINGS: Prominent cardiac silhouette likely accentuated by portable semierect technique and kyphotic positioning  Aortic calcification is present  Minimal left basilar airspace disease  Small left pleural effusion  No pneumothorax or pulmonary edema  Mild degenerative changes of the spine  Impression: Minimal left basilar airspace disease attributed to compressive atelectasis secondary small left pleural effusion  Correlate with clinical findings to exclude pneumonia and/or aspiration  Workstation performed: [id number]     Cta Abdomen Pelvis W Wo Contrast    Result Date: 10/24/2019  Narrative: CT ANGIOGRAM OF THE ABDOMEN AND PELVIS WITH AND WITHOUT IV CONTRAST INDICATION: Splenic infarct  COMPARISON: CT abdomen/pelvis report dated 9/29/2019 from [facility]  Images are not available for direct comparison  TECHNIQUE:  CT angiogram examination of the abdomen and pelvis was performed according to standard protocol  This examination, like all CT scans performed in the [facility], was performed utilizing techniques to minimize radiation dose exposure, including the use of iterative reconstruction and automated exposure control  Contrast as well as noncontrast images were obtained    Rad dose 1050 83 mGy-cm IV Contrast:  100 mL of iohexol (OMNIPAQUE) Enteric Contrast:  Not administered  FINDINGS: VASCULAR STRUCTURES:  ABDOMINAL AORTA: Normal in caliber  No aortic aneurysm, dissection, penetrating ulcer, or intramural hematoma  Moderate to severe calcific atheromatous plaque formation, predominantly of the infrarenal aorta  CELIAC ARTERY: Patent  Mild ostial stenosis  The splenic artery is patent, noting moderate atherosclerotic calcification  SUPERIOR MESENTERIC ARTERY: Patent  INFERIOR MESENTERIC ARTERY: Patent COMMON ILIAC ARTERIES: Patent  Heavy atherosclerosis  INTERNAL ILIAC ARTERIES: Patent  EXTERNAL ILIAC ARTERIES: Occluded on the right with distal reconstitution distally through the inferior epigastric artery  Patent on the left  OTHER FINDINGS ABDOMEN LOWER CHEST:  Cardiomegaly, without pericardial effusion  Coronary artery calcification  Small right and trace left pleural effusions with associated passive atelectasis  LIVER/BILIARY TREE:  Unremarkable  GALLBLADDER:  No calcified gallstones  No pericholecystic inflammatory change  Gallbladder mural calcification  SPLEEN: Evolving posteromedial splenic infarct  PANCREAS:  Unremarkable  ADRENAL GLANDS:  Unremarkable  KIDNEYS/URETERS:  No urinary calculi  No hydronephrosis  No suspicious renal lesion  Right renal lower pole cyst   Bilateral subcentimeter hypoattenuating renal lesions, too small to further characterize by CT  PELVIS REPRODUCTIVE ORGANS:  Unremarkable for patient's age  URINARY BLADDER: Underdistended in the presence of a Rapp catheter with moderate volume of intraluminal air  ADDITIONAL ABDOMINAL AND PELVIC STRUCTURES STOMACH AND BOWEL: Extensive sigmoid diverticulosis without findings of diverticulitis  No findings of bowel obstruction  ABDOMINOPELVIC CAVITY:  No pathologically enlarged mesenteric or retroperitoneal lymph nodes  No ascites or free intraperitoneal air   ABDOMINAL WALL/INGUINAL REGIONS: Unremarkable  OSSEOUS STRUCTURES:  No acute fracture or destructive osseous lesion  Impression: 1  No acute aortic injury or aneurysm  2   Patent celiac and splenic arteries  3   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  4   Evolving splenic infarct  5   Additional chronic/incidental findings as described  Workstation performed: [id number]     Xr Abdomen 1 Vw Portable    Result Date: 10/21/2019  Narrative: ABDOMEN INDICATION:   abdominal pain  COMPARISON:  None VIEWS:  AP supine FINDINGS: There is a nonobstructive bowel gas pattern  No discernible free air on this supine study  Upright or left lateral decubitus imaging is more sensitive to detect subtle free air in the appropriate setting  No pathologic calcifications or soft tissue masses  Visualized lung bases are clear  Visualized osseous structures are unremarkable for the patient's age  Impression: There is a nonobstructive bowel gas pattern  Workstation performed: [id number]     Thank you for allowing us to participate in this patient's care  Counseling / Coordination of Care  Total floor / unit time spent today 45 minutes  Greater than 50% of total time was spent with the patient and / or family counseling and / or coordination of care  A description of the counseling / coordination of care: Review of history, current assessment, development of a plan  Code Status: Level 1 - Full Code    ** Please Note: Dragon 360 Dictation voice to text software may have been used in the creation of this document   **

## 2019-10-26 NOTE — ASSESSMENT & PLAN NOTE
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

## 2019-10-26 NOTE — ASSESSMENT & PLAN NOTE
· Patient has chronic Herrera present on admission- herrera was replaced in ED 10/14   · Placed on recent admission (10/3/19) at [facility] where she developed urinary retention    · Patient will follow up outpatient with urology for voiding trial  Has appt on 10/28 with [address] [facility]

## 2019-10-26 NOTE — ASSESSMENT & PLAN NOTE
· POA  Likely in setting of hyponatremia with component of delirium contributing given recurrent hospitalizations/underlying cognitive impairment  · Urine culture unremarkable - monitor off antibiotics at this time  Pt remains asymptomatic  · Mental status seems to be at baseline although does wax and wane  On exam today pt is AO x3 and able to state she was brought to the hospital "for my sodium"  · She has hx of adrenal insufficiency, previously on steroids and then tapered off  She follows with Endocrinology as outpatient  · Cosyntropin stim test with adequate response in cortisol, hydrocortisone discontinued, she does not have adrenal insufficiency   · Vitamin B12 and folate normal   · MRI brain was done at [facility] last month which was negative for any acute infarct but possible small questionable hemorrhage versus area of mineralization  CT head without contrast in [facility] negative for bleed    · Geriatrics following, patient should follow up outpatient with them

## 2019-10-27 LAB
ANION GAP SERPL CALCULATED.3IONS-SCNC: 9 MMOL/L (ref 4–13)
BASOPHILS # BLD AUTO: 0.02 THOUSANDS/ΜL (ref 0–0.1)
BASOPHILS NFR BLD AUTO: 0 % (ref 0–1)
BUN SERPL-MCNC: 13 MG/DL (ref 5–25)
CALCIUM SERPL-MCNC: 8.6 MG/DL (ref 8.3–10.1)
CHLORIDE SERPL-SCNC: 106 MMOL/L (ref 100–108)
CO2 SERPL-SCNC: 22 MMOL/L (ref 21–32)
CREAT SERPL-MCNC: 0.44 MG/DL (ref 0.6–1.3)
EOSINOPHIL # BLD AUTO: 0.09 THOUSAND/ΜL (ref 0–0.61)
EOSINOPHIL NFR BLD AUTO: 2 % (ref 0–6)
ERYTHROCYTE [DISTWIDTH] IN BLOOD BY AUTOMATED COUNT: 18.2 % (ref 11.6–15.1)
GFR SERPL CREATININE-BSD FRML MDRD: 96 ML/MIN/1.73SQ M
GLUCOSE SERPL-MCNC: 110 MG/DL (ref 65–140)
GLUCOSE SERPL-MCNC: 124 MG/DL (ref 65–140)
GLUCOSE SERPL-MCNC: 147 MG/DL (ref 65–140)
GLUCOSE SERPL-MCNC: 148 MG/DL (ref 65–140)
GLUCOSE SERPL-MCNC: 181 MG/DL (ref 65–140)
HCT VFR BLD AUTO: 28.9 % (ref 34.8–46.1)
HGB BLD-MCNC: 8.7 G/DL (ref 11.5–15.4)
IMM GRANULOCYTES # BLD AUTO: 0.04 THOUSAND/UL (ref 0–0.2)
IMM GRANULOCYTES NFR BLD AUTO: 1 % (ref 0–2)
LYMPHOCYTES # BLD AUTO: 0.68 THOUSANDS/ΜL (ref 0.6–4.47)
LYMPHOCYTES NFR BLD AUTO: 11 % (ref 14–44)
MCH RBC QN AUTO: 22.5 PG (ref 26.8–34.3)
MCHC RBC AUTO-ENTMCNC: 30.1 G/DL (ref 31.4–37.4)
MCV RBC AUTO: 75 FL (ref 82–98)
MONOCYTES # BLD AUTO: 0.19 THOUSAND/ΜL (ref 0.17–1.22)
MONOCYTES NFR BLD AUTO: 3 % (ref 4–12)
NEUTROPHILS # BLD AUTO: 5.02 THOUSANDS/ΜL (ref 1.85–7.62)
NEUTS SEG NFR BLD AUTO: 83 % (ref 43–75)
NRBC BLD AUTO-RTO: 0 /100 WBCS
PLATELET # BLD AUTO: 377 THOUSANDS/UL (ref 149–390)
PMV BLD AUTO: 9.6 FL (ref 8.9–12.7)
POTASSIUM SERPL-SCNC: 3.8 MMOL/L (ref 3.5–5.3)
RBC # BLD AUTO: 3.86 MILLION/UL (ref 3.81–5.12)
RPR SER QL: NORMAL
SODIUM SERPL-SCNC: 137 MMOL/L (ref 136–145)
WBC # BLD AUTO: 6.04 THOUSAND/UL (ref 4.31–10.16)

## 2019-10-27 PROCEDURE — 92610 EVALUATE SWALLOWING FUNCTION: CPT

## 2019-10-27 PROCEDURE — 82948 REAGENT STRIP/BLOOD GLUCOSE: CPT

## 2019-10-27 PROCEDURE — 99232 SBSQ HOSP IP/OBS MODERATE 35: CPT | Performed by: INTERNAL MEDICINE

## 2019-10-27 PROCEDURE — 80048 BASIC METABOLIC PNL TOTAL CA: CPT | Performed by: PHYSICIAN ASSISTANT

## 2019-10-27 PROCEDURE — G8996 SWALLOW CURRENT STATUS: HCPCS

## 2019-10-27 PROCEDURE — G8997 SWALLOW GOAL STATUS: HCPCS

## 2019-10-27 PROCEDURE — 85025 COMPLETE CBC W/AUTO DIFF WBC: CPT | Performed by: PHYSICIAN ASSISTANT

## 2019-10-27 RX ORDER — ASPIRIN 81 MG/1
81 TABLET ORAL DAILY
Status: DISCONTINUED | OUTPATIENT
Start: 2019-10-27 | End: 2019-10-29

## 2019-10-27 RX ORDER — MIRTAZAPINE 15 MG/1
7.5 TABLET, FILM COATED ORAL
Status: DISCONTINUED | OUTPATIENT
Start: 2019-10-27 | End: 2019-11-11

## 2019-10-27 RX ORDER — ACETAMINOPHEN 650 MG/1
650 SUPPOSITORY RECTAL EVERY 6 HOURS PRN
Status: DISCONTINUED | OUTPATIENT
Start: 2019-10-27 | End: 2019-11-08

## 2019-10-27 RX ADMIN — ACETAMINOPHEN 650 MG: 650 SUPPOSITORY RECTAL at 18:04

## 2019-10-27 RX ADMIN — ALUMINUM HYDROXIDE, MAGNESIUM HYDROXIDE, AND SIMETHICONE 30 ML: 200; 200; 20 SUSPENSION ORAL at 16:30

## 2019-10-27 NOTE — ASSESSMENT & PLAN NOTE
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  · Presented with sodium of 126  Sodium 138 today  · TSH normal this admission  · Cosyntropin stim test negative for adrenal insufficiency  · CT chest abdomen pelvis was done at [facility] to rule out any occult malignancy as a cause of SIADH which was negative for any malignancy  · Continue with 1g Salt tabs BID and fluid restriction - was 1500 and changed to 1200  Plan will be to d/c on daily dosing per Dr Danna Plaza  · [id number] [address] for d/c from a nephrology standpoint, will need outpatient f/u with [facility] nephrology  Recommending BMP in 1 week with results to PCP for further management of NACL tabs  Protonix changed to Pepcid  ACEI stopped as well    · Given additional salt tab today and given torsemide PO x 1 dose on 10/24  · Na 137 today  · BMP in AM

## 2019-10-27 NOTE — ASSESSMENT & PLAN NOTE
· Baseline seems between 8-11  · hgb 8 7 most recent check  · Iron studies suggestive of low iron  · Continue  p o  iron supplementation which was started this admission    · CBC in AM

## 2019-10-27 NOTE — ASSESSMENT & PLAN NOTE
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

## 2019-10-27 NOTE — ASSESSMENT & PLAN NOTE
Lab Results   Component Value Date    HGBA1C 6 4 (H) 10/15/2019     Recent Labs     10/26/19  1112 10/26/19  1607 10/26/19  2104 10/27/19  0547   POCGLU 180* 101 117 124       · Takes metformin and glipizide at baseline, will plan to resume metformin at discharge and hold glipizide    · Continue with SSI  · Diabetic diet  · Monitor accuchecks, avoid hypoglycemia

## 2019-10-27 NOTE — PROGRESS NOTES
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  -extensive workup was done please review previous notes  -now on salt tablets 1 g 2 times daily with a 1 2 L fluid restrict  2  Anemia-with a hemoglobin that is stable  3  Encephalopathy-unclear etiology also with a fever 48 hours ago ruling out fluid  4   Hypertension with blood pressures that are stable Will on amlodipine 5 mg daily and atenolol 50 mg daily    IMPRESSION & PLAN    More responsive today she knows she is in the hospital  Fluid negative but still having fevers temperature of a 100 4° today  Not on any antibiotic  Continue salt tablets 1 g 2 times daily and fluid restriction no other signs of sepsis at this time  Serum sodium stable  currently on atenolol and amlodipine with hold parameter of 130 blood pressures remain less than 120/60 will discontinue amlodipine  Following up on immunofixation sent for hyponatremia work up    SUBJECTIVE:    Patient seen today more coherent knows that she is in the hospital and more responsive to questions    OBJECTIVE:  Current Weight: Weight - Scale: (pt unable to stand, bed scale malfunctioning)  Vitals:    10/27/19 1104   BP: 112/58   Pulse: 90   Resp: 19   Temp: 100 4 °F (38 °C)   SpO2: 92%       Intake/Output Summary (Last 24 hours) at 10/27/2019 1147  Last data filed at 10/27/2019 1001  Gross per 24 hour   Intake 340 ml   Output 800 ml   Net -460 ml       General:  Frail and cachectic  Eyes:  Pallor  ENT: lips and mucous membranes moist  Neck: supple, no JVD  Chest: clear breath sounds bilateral, no crackles, ronchus or wheezings  CVS: normal rate, regular rhythm  Abdomen: soft, non-tender, non-distended, normoactive bowel sounds  Extremities: no edema of both legs  Skin: no rash  Neuro:  Awake not oriented        Medications:    Current Facility-Administered Medications:     acetaminophen (TYLENOL) tablet 650 mg, 650 mg, Oral, Q6H PRN, Trina Reece PA-C, 650 mg at 10/25/19 0923    aluminum-magnesium hydroxide-simethicone (MYLANTA) 200-200-20 mg/5 mL oral suspension 30 mL, 30 mL, Oral, Q6H PRN, Ion Dykes MD    amLODIPine (NORVASC) tablet 5 mg, 5 mg, Oral, Daily, Jamaica Thomas MD, Stopped at 10/27/19 0906    aspirin (ECOTRIN LOW STRENGTH) EC tablet 81 mg, 81 mg, Oral, Daily, Heaven MAHAN PA-C    atenolol (TENORMIN) tablet 50 mg, 50 mg, Oral, Daily, Daylin Roca MD, 50 mg at 10/25/19 7212    docusate sodium (COLACE) capsule 100 mg, 100 mg, Oral, BID PRN, Ion Dykes MD    enoxaparin (LOVENOX) subcutaneous injection 40 mg, 40 mg, Subcutaneous, Daily, Ion Dykes MD, 40 mg at 10/26/19 0929    famotidine (PEPCID) oral suspension 20 mg, 20 mg, Oral, Daily, Jamaica Thomas MD, 20 mg at 10/26/19 0930    ferrous sulfate tablet 325 mg, 325 mg, Oral, Daily With Breakfast, Daylin Roca MD, 325 mg at 10/26/19 0929    insulin lispro (HumaLOG) 100 units/mL subcutaneous injection 1-5 Units, 1-5 Units, Subcutaneous, HS, Ion Dykes MD, 1 Units at 10/25/19 2302    insulin lispro (HumaLOG) 100 units/mL subcutaneous injection 1-6 Units, 1-6 Units, Subcutaneous, TID AC, 1 Units at 10/26/19 1121 **AND** Fingerstick Glucose (POCT), , , TID AC, Ion Dykse MD    ondansetron [facility]) injection 4 mg, 4 mg, Intravenous, Q6H PRN, Ion Dykes MD, 4 mg at 10/25/19 0919    pravastatin (PRAVACHOL) tablet 40 mg, 40 mg, Oral, Daily With Dinner, Ion Dykes MD, 40 mg at 10/26/19 1645    sodium chloride tablet 1 g, 1 g, Oral, BID With Meals, Jamaica Thomas MD, 1 g at 10/26/19 1646    Invasive Devices:   Urethral Catheter Straight-tip 16 Fr   (Active)   Amt returned on insertion(mL) 20 mL 10/14/2019  9:52 PM   Reasons to continue Urinary Catheter  Chronic urinary catheter 10/25/2019  9:00 PM   Goal for Removal N/A- discharging with herrera 10/25/2019  9:00 PM   Site Assessment Clean;Skin intact 10/25/2019  9:00 PM   Collection Container Standard drainage bag 10/25/2019  9:00 PM   Securement Method Securing device (Describe) 10/25/2019  9:00 PM   Output (mL) 400 mL 10/26/2019  2:28 PM     Lab Results:   Results from last 7 days   Lab Units 10/27/19  0542 10/26/19  0927 10/26/19  0529 10/25/19  1142 10/25/19  0626 10/24/19  0537 10/23/19  0534   WBC Thousand/uL 6 04 9 15  --  10 36*  --  5 79 6 45   HEMOGLOBIN g/dL 8 7* 8 6*  --  9 0*  --  9 3* 8 6*   HEMATOCRIT % 28 9* 28 1*  --  29 5*  --  30 8* 29 1*   PLATELETS Thousands/uL 377 369  --  397*  --  349 389   POTASSIUM mmol/L 3 8  --  3 6  --  4 1 3 9 4 2   CHLORIDE mmol/L 106  --  107  --  105 103 108   CO2 mmol/L 22  --  22  --  23 22 22   BUN mg/dL 13  --  19  --  13 12 13   CREATININE mg/dL 0 44*  --  0 52*  --  0 54* 0 49* 0 56*   CALCIUM mg/dL 8 6  --  8 3  --  9 0 8 4 8 7   BLOOD CULTURE   --   --   --  No Growth at 24 hrs    No Growth at 24 hrs   --   --   --

## 2019-10-27 NOTE — ASSESSMENT & PLAN NOTE
· Since September she has had a progressive decline - she has had no appetite, not really eating/drinking with weakness  She was previously reportedly very independent prior to her admission at [facility] in September with UTI  · Geriatrics following, recommending outpatient follow up  · Speech following, on soft diet  · No recent colonoscopy/EGD, recommend outpatient GI evaluation

## 2019-10-27 NOTE — PROGRESS NOTES
Progress Note - Infectious Disease   Pam Oliveira [de-identified] y o  female MRN: [id number]  Unit/Bed#: -01 Encounter: [id number]      Impression/Plan:  1  Fever  Patient noted to have intermittent low-grade fever and isolated fever high fever  There are no obvious sources on exam   Previous workup reviewed at [facility] which was largely unremarkable except for splenic infarct  Echo at that time was also unremarkable  Cultures on this admission have been unremarkable  Unclear for isolated fever is due to problem 4  Lower suspicion for bacterial meningitis  Flu swab negative; respiratory viral panel negative  Recent chest x-ray unremarkable and patient is comfortable on room air  Patient is awake on exam but she is just very slow to respond and recent MRI of the head was also negative  Lower suspicion at this time for viral encephalitis  Repeat CT of the head unremarkable  Continue to monitor off antibiotics  Continue to trend fever curve/vitals  Repeat CBC/chemistry tomorrow  Follow-up transesophageal echo  Repeat blood cultures with next fever  Follow up pending culture  Neurology has been consulted--consider workup for paraneoplastic syndrome  Additional care as per primary  Additional interventions pending clinical course     2  Hyponatremia  Sodium appears to be stable today  Ongoing follow-up by Nephrology      3  Metabolic encephalopathy  Patient initially presented confused likely due to problem 2  It seems that her mental status improved with adjustment in her sodium level  Workup for adrenal insufficiency was negative  Unclear etiology for her underlying hyponatremia  Continue to monitor mental status  Continue to monitor electrolytes  Follow-up syphilis testing  Additional care as per primary     4  Splenic infarct  Appears to be evolving on imaging  Possibly contributing to problem 1  Unclear etiology for this lesion  Was not present on imaging in February    Recent echo at [facility] unremarkable  Patient without other prosthetic material   Follow up pending cultures  Repeat blood cultures with next fever  Follow-up transesophageal echo  Hematology evaluation appreciated  Additional care as per primary      5  Type 2 diabetes  Ongoing management as per primary service      Above plan discussed briefly with the patient at bedside      ID consult service will continue to follow  Antibiotics:  None    24 hour events:  No acute events noted overnight on chart review  Patient remains afebrile  She is without leukocytosis  Respiratory panel negative  Blood cultures so far without growth  CT of the head unremarkable  Patient's other vitals are stable  Labs otherwise unremarkable  Transesophageal echo ordered    Subjective:  Patient slow to respond to questions but she denies having any nausea, vomiting, chest pain or shortness of breath  She denies having any pain on palpation of her abdomen  She reports still feeling overall fatigued and tired  Objective:  Vitals:  Temp:  [98 2 °F (36 8 °C)-99 3 °F (37 4 °C)] 99 1 °F (37 3 °C)  HR:  [] 100  Resp:  [14-20] 14  BP: (111-123)/(50-58) 111/58  SpO2:  [93 %-95 %] 95 %  Temp (24hrs), Av 9 °F (37 2 °C), Min:98 2 °F (36 8 °C), Max:99 3 °F (37 4 °C)  Current: Temperature: 99 1 °F (37 3 °C)    Physical Exam:   General Appearance:  Patient is easily a woken on exam   She is slow to respond to questions  She may be hard of hearing at baseline  Throat: Oropharynx moist without lesions  Lungs:   Clear to auscultation anterior bilaterally; no wheezes, rhonchi or rales; respirations unlabored on room air   Heart:  RRR; no murmur, rub or gallop   Abdomen:   Soft, non-tender, non-distended, positive bowel sounds  Extremities: No clubbing, cyanosis or edema   Skin: No new rashes or lesions  No new draining wounds noted         Labs, Imaging, & Other studies:   All pertinent labs and imaging studies were personally reviewed  Results from last 7 days   Lab Units 10/27/19  0542 10/26/19  0927 10/25/19  1142   WBC Thousand/uL 6 04 9 15 10 36*   HEMOGLOBIN g/dL 8 7* 8 6* 9 0*   PLATELETS Thousands/uL 377 369 397*     Results from last 7 days   Lab Units 10/27/19  0542   POTASSIUM mmol/L 3 8   CHLORIDE mmol/L 106   CO2 mmol/L 22   BUN mg/dL 13   CREATININE mg/dL 0 44*   EGFR ml/min/1 73sq m 96   CALCIUM mg/dL 8 6     Results from last 7 days   Lab Units 10/25/19  1142   BLOOD CULTURE  No Growth at 24 hrs  No Growth at 24 hrs     INFLUENZA B PCR  Not Detected   RSV PCR  Not Detected

## 2019-10-27 NOTE — ASSESSMENT & PLAN NOTE
· She had been afebrile x 72 hrs however morning of 10/25 fever as high as 102 5  · ? Possibly related to splenic infarct  · Urine culture not significant this admission, 7260-6963 GNR   At [facility], pt was noted to have ESBL in urine which was suspected to be colonization  · Admission blood cultrues are neg, will send repeat  · Procalcitonin normal initially, 0 93 now  · Repeat CXR negative  · WBCs within normal limits  · Flu/RSV negative  · Blood culture negative at 24 hours  · Infectious Disease following  · Continue to monitor off cefepime

## 2019-10-27 NOTE — PROGRESS NOTES
[facility]  Progress Note - [place] 1940, [de-identified] y o  female MRN: [id number]  Unit/Bed#: -Phil Encounter: [id number]  Primary Care Provider: Karrie Malhotra MD   Date and time admitted to hospital: 10/14/2019  4:20 PM    Splenic infarct  Assessment & Plan  · Noted on CT scan done at [facility] 9/2019, new moderate sized splenic infarcts  · Unclear etiology of splenic infarcts  Hematology consult appreciated, recommending repeat CTA abdomen and pelvis to evaluate for source of emboli  Recent echocardiogram done September 30th at 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  Evolving splenic infarct  Additional chronic/incidental findings as described  · Discussed with hematology, to consider starting ASA  · Telemetry has been on since admission and is unremarkable for atrial fibrillation  Discontinued  · Hematology/Oncology recommending further cardiac evaluation, cardiology consulted will follow for Afib  · LENO pending, continue to monitor on telemetry  · General surgery consult appreciated, no further workup from their standpoint, they have deferred to Hematology/Oncology  · Consider lower extremity duplex  Microcytic anemia  Assessment & Plan  · Baseline seems between 8-11  · hgb 8 7 most recent check  · Iron studies suggestive of low iron  · Continue  p o  iron supplementation which was started this admission  · CBC in AM    Low grade fever  Assessment & Plan  · She had been afebrile x 72 hrs however morning of 10/25 fever as high as 102 5  · ? Possibly related to splenic infarct  · Urine culture not significant this admission, [id number] GNR   At [facility], pt was noted to have ESBL in urine which was suspected to be colonization  · Admission blood cultrues are neg, will send repeat  · Procalcitonin normal initially, 0 93 now  · Repeat CXR negative  · WBCs within normal limits  · Flu/RSV negative  · Blood culture negative at 24 hours  · Infectious Disease following  · Continue to monitor off cefepime    Bradycardia  Assessment & Plan  · Resolved with lower dose of atenolol  · EKG shows NSR in 80's  · TSH normal this admission  Severe protein-calorie malnutrition ([address] )  Assessment & Plan  Malnutrition Findings:   Malnutrition type: Chronic illness(Related to medical condition as evidenced by 10% weight loss over the past month and <75% energy intake needs met >1 month treated with ensure compact supplements)  Degree of Malnutrition: Other severe protein calorie malnutrition    BMI Findings: Body mass index is 22 26 kg/m²  Nutrition consult and supplements  Urinary retention  Assessment & Plan  · Patient has chronic Herrera present on admission- herrera was replaced in ED 10/14   · Placed on recent admission (10/3/19) at [place] where she developed urinary retention  · Patient will follow up outpatient with urology for voiding trial  Has appt on 10/28 with [facility]rology    Failure to thrive in adult  Assessment & Plan  · Since September she has had a progressive decline - she has had no appetite, not really eating/drinking with weakness  She was previously reportedly very independent prior to her admission at [facility] in September with UTI  · Geriatrics following, recommending outpatient follow up  · Speech following, on soft diet  · No recent colonoscopy/EGD, recommend outpatient GI evaluation  HLD (hyperlipidemia)  Assessment & Plan  · Continue statin    Essential hypertension  Assessment & Plan  · Lisinopril stopped  Amlodipine changed to 5 mg p o  B i d  Per Nephrology  Continue atenolol 50mg daily with hold parameters    · Routine vitals    Type 2 diabetes mellitus with diabetic neuropathy, without long-term current use of insulin (HCC)  Assessment & Plan  Lab Results   Component Value Date    HGBA1C 6 4 (H) 10/15/2019     Recent Labs     10/26/19  1112 10/26/19  1607 10/26/19  2104 10/27/19  0547   POCGLU 180* 101 117 124       · Takes metformin and glipizide at baseline, will plan to resume metformin at discharge and hold glipizide  · Continue with SSI  · Diabetic diet  · Monitor accuchecks, avoid hypoglycemia    Hyponatremia  Assessment & Plan  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  · Presented with sodium of 126  Sodium 138 today  · TSH normal this admission  · Cosyntropin stim test negative for adrenal insufficiency  · CT chest abdomen pelvis was done at [facility] to rule out any occult malignancy as a cause of SIADH which was negative for any malignancy  · Continue with 1g Salt tabs BID and fluid restriction - was 1500 and changed to 1200  Plan will be to d/c on daily dosing per Dr Pam Stevens  · Nuris Ambriz for d/c from a nephrology standpoint, will need outpatient f/u with [facility] nephrology  Recommending BMP in 1 week with results to PCP for further management of NACL tabs  Protonix changed to Pepcid  ACEI stopped as well  · Given additional salt tab today and given torsemide PO x 1 dose on 10/24  · Na 137 today  · BMP in AM    * Metabolic encephalopathy  Assessment & Plan  · POA  Likely in setting of hyponatremia with component of delirium contributing given recurrent hospitalizations/underlying cognitive impairment  · Urine culture unremarkable - monitor off antibiotics at this time  Pt remains asymptomatic  · Mental status seems to be at baseline although does wax and wane  On exam today pt is AO x3 and able to state she was brought to the hospital "for my sodium"  · She has hx of adrenal insufficiency, previously on steroids and then tapered off  She follows with Endocrinology as outpatient      · Cosyntropin stim test with adequate response in cortisol, hydrocortisone discontinued, she does not have adrenal insufficiency   · Vitamin B12 and folate normal   · MRI brain was done at [facility] last month which was negative for any acute infarct but possible small questionable hemorrhage versus area of mineralization  CT head without contrast in [facility] negative for bleed  · Geriatrics following, patient should follow up outpatient with them  · Neurology consulted as well as Infectious Disease  · Continue to hold antibiotics  · Follow-up on imaging  · CT head shows no acute abnormality, chronic lacunar infarct left lentiform nucleus  · Pending CT, neurology considering LP  · Can take Cardiology-recommended aspirin pending timing of LP      VTE Pharmacologic Prophylaxis:   Pharmacologic: Enoxaparin (Lovenox)  Mechanical VTE Prophylaxis in Place: Yes    Patient Centered Rounds: I have performed bedside rounds with nursing staff today  Discussions with Specialists or Other Care Team Provider:  Discussed with Cardiology, wilbur pending  Discussed with Neurology, will plan for LP following results of CT    Education and Discussions with Family / Patient:  Discussed care plan with patient at bedside  Answered all questions to the best my ability  Time Spent for Care: 30 minutes  More than 50% of total time spent on counseling and coordination of care as described above  Current Length of Stay: 13 day(s)    Current Patient Status: Inpatient   Certification Statement: The patient will continue to require additional inpatient hospital stay due to Awaiting WILBUR, LP    Discharge Plan:  Patient currently lives at home with daughter, plan to return pending further evaluation, possible intervention  Anticipate 48-72 hours prior to discharge    Code Status: Level 1 - Full Code      Subjective:   Patient minimally interactive, says "yes" when asked if she has persistent neck pain but cannot elaborate    Patient says she is tired and continuously closes her eyes to return to sleep, not willing to participate in interview  Objective:     Vitals:   Temp (24hrs), Av 1 °F (37 3 °C), Min:98 2 °F (36 8 °C), Max:100 4 °F (38 °C)    Temp:  [98 2 °F (36 8 °C)-100 4 °F (38 °C)] 100 4 °F (38 °C)  HR:  [] 90  Resp:  [14-20] 19  BP: (111-123)/(50-58) 112/58  SpO2:  [92 %-95 %] 92 %  Body mass index is 22 26 kg/m²  Input and Output Summary (last 24 hours): Intake/Output Summary (Last 24 hours) at 10/27/2019 1115  Last data filed at 10/27/2019 1001  Gross per 24 hour   Intake 340 ml   Output 800 ml   Net -460 ml       Physical Exam:     Physical Exam   Constitutional: She is sleeping  No distress  HENT:   Head: Normocephalic and atraumatic  Eyes: Conjunctivae are normal  No scleral icterus  Cardiovascular: Normal rate and regular rhythm  No murmur heard  Pulmonary/Chest: Effort normal and breath sounds normal  No respiratory distress  She has no wheezes  She has no rales  Abdominal: Soft  She exhibits no distension  There is no tenderness  Musculoskeletal: She exhibits no edema  Skin: Skin is warm and dry  No erythema  Psychiatric: She has a normal mood and affect  She is slowed  Oriented to self, location and year   Nursing note and vitals reviewed        Additional Data:     Labs:    Results from last 7 days   Lab Units 10/27/19  0542   WBC Thousand/uL 6 04   HEMOGLOBIN g/dL 8 7*   HEMATOCRIT % 28 9*   PLATELETS Thousands/uL 377   NEUTROS PCT % 83*   LYMPHS PCT % 11*   MONOS PCT % 3*   EOS PCT % 2     Results from last 7 days   Lab Units 10/27/19  0542   SODIUM mmol/L 137   POTASSIUM mmol/L 3 8   CHLORIDE mmol/L 106   CO2 mmol/L 22   BUN mg/dL 13   CREATININE mg/dL 0 44*   ANION GAP mmol/L 9   CALCIUM mg/dL 8 6   GLUCOSE RANDOM mg/dL 110         Results from last 7 days   Lab Units 10/27/19  1037 10/27/19  0547 10/26/19  2104 10/26/19  1607 10/26/19  1112 10/26/19  0602 10/25/19  2102 10/25/19  1555 10/25/19  1053 10/25/19  7923 10/24/19  2109 10/24/19  1269 POC GLUCOSE mg/dl 181* 124 117 101 180* 132 179* 143* 163* 136 141* 113         Results from last 7 days   Lab Units 10/26/19  0529   PROCALCITONIN ng/ml 0 93*           * I Have Reviewed All Lab Data Listed Above  * Additional Pertinent Lab Tests Reviewed: All Labs Within Last 24 Hours Reviewed    Imaging:    Imaging Reports Reviewed Today Include:   · CT head 10/26:  No acute intracranial abnormality  Microangiopathic changes  Chronic lacunar infarction left lentiform nucleus  Imaging Personally Reviewed by Myself Includes:  None    Recent Cultures (last 7 days):     Results from last 7 days   Lab Units 10/25/19  1142   BLOOD CULTURE  No Growth at 24 hrs  No Growth at 24 hrs  INFLUENZA B PCR  Not Detected   RSV PCR  Not Detected       Last 24 Hours Medication List:     Current Facility-Administered Medications:  acetaminophen 650 mg Oral Q6H PRN Trina Reece PA-C   aluminum-magnesium hydroxide-simethicone 30 mL Oral Q6H PRN Massiel Grayson, MD   amLODIPine 5 mg Oral Daily Alondra Rose MD   atenolol 50 mg Oral Daily Lisbet Breen MD   docusate sodium 100 mg Oral BID PRN Satimanuel Grayson MD   enoxaparin 40 mg Subcutaneous Daily Satimanuel Grayson MD   famotidine 20 mg Oral Daily Alnodra Rose MD   ferrous sulfate 325 mg Oral Daily With Breakfast Lisbet Breen MD   insulin lispro 1-5 Units Subcutaneous HS Massiel Grayson MD   insulin lispro 1-6 Units Subcutaneous TID AC Massiel Grayson MD   ondansetron 4 mg Intravenous Q6H PRN Massiel Grayson MD   pravastatin 40 mg Oral Daily With Kiki Hoyos MD   sodium chloride 1 g Oral BID With Meals Alondra Rose MD        Today, Patient Was Seen By: Yeison Navas PA-C    ** Please Note: Dictation voice to text software may have been used in the creation of this document   **

## 2019-10-27 NOTE — ASSESSMENT & PLAN NOTE
· Patient has chronic Herrera present on admission- herrera was replaced in ED 10/14   · Placed on recent admission (10/3/19) at [facility] where she developed urinary retention    · Patient will follow up outpatient with urology for voiding trial  Has appt on 10/28 with [facility] Urology

## 2019-10-27 NOTE — PLAN OF CARE
Problem: Prexisting or High Potential for Compromised Skin Integrity  Goal: Skin integrity is maintained or improved  Description  INTERVENTIONS:  - Identify patients at risk for skin breakdown  - Assess and monitor skin integrity  - Assess and monitor nutrition and hydration status  - Monitor labs   - Assess for incontinence   - Turn and reposition patient  - Assist with mobility/ambulation  - Relieve pressure over bony prominences  - Avoid friction and shearing  - Provide appropriate hygiene as needed including keeping skin clean and dry  - Evaluate need for skin moisturizer/barrier cream  - Collaborate with interdisciplinary team   - Patient/family teaching  - Consider wound care consult   Outcome: Progressing     Problem: Nutrition/Hydration-ADULT  Goal: Nutrient/Hydration intake appropriate for improving, restoring or maintaining nutritional needs  Description  Monitor and assess patient's nutrition/hydration status for malnutrition  Collaborate with interdisciplinary team and initiate plan and interventions as ordered  Monitor patient's weight and dietary intake as ordered or per policy  Utilize nutrition screening tool and intervene as necessary  Determine patient's food preferences and provide high-protein, high-caloric foods as appropriate       INTERVENTIONS:  - Monitor oral intake, urinary output, labs, and treatment plans  - Assess nutrition and hydration status and recommend course of action  - Evaluate amount of meals eaten  - Assist patient with eating if necessary   - Allow adequate time for meals  - Recommend/ encourage appropriate diets, oral nutritional supplements, and vitamin/mineral supplements  - Order, calculate, and assess calorie counts as needed  - Recommend, monitor, and adjust tube feedings and TPN/PPN based on assessed needs  - Assess need for intravenous fluids  - Provide specific nutrition/hydration education as appropriate  - Include patient/family/caregiver in decisions related to nutrition  Outcome: Progressing     Problem: Potential for Falls  Goal: Patient will remain free of falls  Description  INTERVENTIONS:  - Assess patient frequently for physical needs  -  Identify cognitive and physical deficits and behaviors that affect risk of falls    -  [place] fall precautions as indicated by assessment   - Educate patient/family on patient safety including physical limitations  - Instruct patient to call for assistance with activity based on assessment  - Modify environment to reduce risk of injury  - Consider OT/PT consult to assist with strengthening/mobility  Outcome: Progressing     Problem: NEUROSENSORY - ADULT  Goal: Achieves stable or improved neurological status  Description  INTERVENTIONS  - Monitor and report changes in neurological status  - Monitor vital signs such as temperature, blood pressure, glucose, and any other labs ordered   - Initiate measures to prevent increased intracranial pressure  - Monitor for seizure activity and implement precautions if appropriate      Outcome: Progressing     Problem: RESPIRATORY - ADULT  Goal: Achieves optimal ventilation and oxygenation  Description  INTERVENTIONS:  - Assess for changes in respiratory status  - Assess for changes in mentation and behavior  - Position to facilitate oxygenation and minimize respiratory effort  - Encourage broncho-pulmonary hygiene including cough, deep breathe, Incentive Spirometry  - Assess and instruct to report SOB or any respiratory difficulty  - Respiratory Therapy support as indicated   Outcome: Progressing     Problem: GASTROINTESTINAL - ADULT  Goal: Maintains adequate nutritional intake  Description  INTERVENTIONS:  - Monitor percentage of each meal consumed  - Identify factors contributing to decreased intake, treat as appropriate  - Assist with meals as needed  - Monitor I&O, weight, and lab values if indicated  - Obtain nutrition services referral as needed  Outcome: Progressing     Problem: GENITOURINARY - ADULT  Goal: Urinary catheter remains patent  Description  INTERVENTIONS:  - Assess patency of urinary catheter  - If patient has a chronic herrera, consider changing catheter if non-functioning  - Follow guidelines for intermittent irrigation of non-functioning urinary catheter  Outcome: Progressing     Problem: METABOLIC, FLUID AND ELECTROLYTES - ADULT  Goal: Electrolytes maintained within normal limits  Description  INTERVENTIONS:  - Monitor labs and assess patient for signs and symptoms of electrolyte imbalances  - Administer electrolyte replacement as ordered  - Monitor response to electrolyte replacements, including repeat lab results as appropriate  - Instruct patient on fluid and nutrition as appropriate  Outcome: Progressing  Goal: Fluid balance maintained  Description  INTERVENTIONS:  - Monitor labs   - Monitor I/O and WT  - Instruct patient on fluid and nutrition as appropriate  - Assess for signs & symptoms of volume excess or deficit  Outcome: Progressing  Goal: Glucose maintained within target range  Description  INTERVENTIONS:  - Monitor Blood Glucose as ordered  - Assess for signs and symptoms of hyperglycemia and hypoglycemia  - Administer ordered medications to maintain glucose within target range  - Assess nutritional intake and initiate nutrition service referral as needed  Outcome: Progressing     Problem: SKIN/TISSUE INTEGRITY - ADULT  Goal: Skin integrity remains intact  Description  INTERVENTIONS  - Identify patients at risk for skin breakdown  - Assess and monitor skin integrity  - Assess and monitor nutrition and hydration status  - Monitor labs (i e  albumin)  - Assess for incontinence   - Turn and reposition patient  - Assist with mobility/ambulation  - Relieve pressure over bony prominences  - Avoid friction and shearing  - Provide appropriate hygiene as needed including keeping skin clean and dry  - Evaluate need for skin moisturizer/barrier cream  - Collaborate with interdisciplinary team (i e  Nutrition, Rehabilitation, etc )   - Patient/family teaching  Outcome: Progressing  Goal: Oral mucous membranes remain intact  Description  INTERVENTIONS  - Assess oral mucosa and hygiene practices  - Implement preventative oral hygiene regimen  - Implement oral medicated treatments as ordered  - Initiate Nutrition services referral as needed  Outcome: Progressing     Problem: HEMATOLOGIC - ADULT  Goal: Maintains hematologic stability  Description  INTERVENTIONS  - Monitor labs      Outcome: Progressing

## 2019-10-27 NOTE — ASSESSMENT & PLAN NOTE
· POA  Likely in setting of hyponatremia with component of delirium contributing given recurrent hospitalizations/underlying cognitive impairment  · Urine culture unremarkable - monitor off antibiotics at this time  Pt remains asymptomatic  · Mental status seems to be at baseline although does wax and wane  On exam today pt is AO x3 and able to state she was brought to the hospital "for my sodium"  · She has hx of adrenal insufficiency, previously on steroids and then tapered off  She follows with Endocrinology as outpatient  · Cosyntropin stim test with adequate response in cortisol, hydrocortisone discontinued, she does not have adrenal insufficiency   · Vitamin B12 and folate normal   · MRI brain was done at [facility] last month which was negative for any acute infarct but possible small questionable hemorrhage versus area of mineralization  CT head without contrast in [facility] negative for bleed  · Geriatrics following, patient should follow up outpatient with them  · Neurology consulted as well as Infectious Disease  · Continue to hold antibiotics  · Follow-up on imaging  · CT head shows no acute abnormality, chronic lacunar infarct left lentiform nucleus    · Pending CT, neurology considering LP

## 2019-10-28 ENCOUNTER — ANESTHESIA (INPATIENT)
Dept: NON INVASIVE DIAGNOSTICS | Facility: HOSPITAL | Age: 79
DRG: 545 | End: 2019-10-28
Payer: MEDICARE

## 2019-10-28 ENCOUNTER — APPOINTMENT (INPATIENT)
Dept: NON INVASIVE DIAGNOSTICS | Facility: HOSPITAL | Age: 79
DRG: 545 | End: 2019-10-28
Attending: INTERNAL MEDICINE
Payer: MEDICARE

## 2019-10-28 LAB
ANION GAP SERPL CALCULATED.3IONS-SCNC: 7 MMOL/L (ref 4–13)
BUN SERPL-MCNC: 13 MG/DL (ref 5–25)
CALCIUM SERPL-MCNC: 8.4 MG/DL (ref 8.3–10.1)
CHLORIDE SERPL-SCNC: 107 MMOL/L (ref 100–108)
CO2 SERPL-SCNC: 23 MMOL/L (ref 21–32)
CREAT SERPL-MCNC: 0.41 MG/DL (ref 0.6–1.3)
ERYTHROCYTE [DISTWIDTH] IN BLOOD BY AUTOMATED COUNT: 18.1 % (ref 11.6–15.1)
GFR SERPL CREATININE-BSD FRML MDRD: 98 ML/MIN/1.73SQ M
GLUCOSE SERPL-MCNC: 117 MG/DL (ref 65–140)
GLUCOSE SERPL-MCNC: 124 MG/DL (ref 65–140)
GLUCOSE SERPL-MCNC: 125 MG/DL (ref 65–140)
GLUCOSE SERPL-MCNC: 126 MG/DL (ref 65–140)
GLUCOSE SERPL-MCNC: 142 MG/DL (ref 65–140)
HCT VFR BLD AUTO: 27.3 % (ref 34.8–46.1)
HGB BLD-MCNC: 8.1 G/DL (ref 11.5–15.4)
MCH RBC QN AUTO: 22.8 PG (ref 26.8–34.3)
MCHC RBC AUTO-ENTMCNC: 29.7 G/DL (ref 31.4–37.4)
MCV RBC AUTO: 77 FL (ref 82–98)
PLATELET # BLD AUTO: 296 THOUSANDS/UL (ref 149–390)
PMV BLD AUTO: 10.1 FL (ref 8.9–12.7)
POTASSIUM SERPL-SCNC: 4.1 MMOL/L (ref 3.5–5.3)
RBC # BLD AUTO: 3.56 MILLION/UL (ref 3.81–5.12)
SODIUM SERPL-SCNC: 137 MMOL/L (ref 136–145)
WBC # BLD AUTO: 5.34 THOUSAND/UL (ref 4.31–10.16)

## 2019-10-28 PROCEDURE — 80048 BASIC METABOLIC PNL TOTAL CA: CPT | Performed by: INTERNAL MEDICINE

## 2019-10-28 PROCEDURE — 93320 DOPPLER ECHO COMPLETE: CPT | Performed by: INTERNAL MEDICINE

## 2019-10-28 PROCEDURE — 93325 DOPPLER ECHO COLOR FLOW MAPG: CPT | Performed by: INTERNAL MEDICINE

## 2019-10-28 PROCEDURE — 82948 REAGENT STRIP/BLOOD GLUCOSE: CPT

## 2019-10-28 PROCEDURE — 85027 COMPLETE CBC AUTOMATED: CPT | Performed by: INTERNAL MEDICINE

## 2019-10-28 PROCEDURE — 97530 THERAPEUTIC ACTIVITIES: CPT

## 2019-10-28 PROCEDURE — 99232 SBSQ HOSP IP/OBS MODERATE 35: CPT | Performed by: INTERNAL MEDICINE

## 2019-10-28 PROCEDURE — 93312 ECHO TRANSESOPHAGEAL: CPT

## 2019-10-28 PROCEDURE — 93312 ECHO TRANSESOPHAGEAL: CPT | Performed by: INTERNAL MEDICINE

## 2019-10-28 PROCEDURE — 99233 SBSQ HOSP IP/OBS HIGH 50: CPT | Performed by: PHYSICIAN ASSISTANT

## 2019-10-28 PROCEDURE — 99222 1ST HOSP IP/OBS MODERATE 55: CPT | Performed by: INTERNAL MEDICINE

## 2019-10-28 PROCEDURE — 92526 ORAL FUNCTION THERAPY: CPT

## 2019-10-28 PROCEDURE — G0515 COGNITIVE SKILLS DEVELOPMENT: HCPCS

## 2019-10-28 PROCEDURE — 93005 ELECTROCARDIOGRAM TRACING: CPT

## 2019-10-28 RX ORDER — BISACODYL 10 MG
10 SUPPOSITORY, RECTAL RECTAL DAILY PRN
Status: DISCONTINUED | OUTPATIENT
Start: 2019-10-28 | End: 2019-10-28

## 2019-10-28 RX ORDER — SODIUM CHLORIDE 9 MG/ML
INJECTION, SOLUTION INTRAVENOUS CONTINUOUS PRN
Status: DISCONTINUED | OUTPATIENT
Start: 2019-10-28 | End: 2019-10-28 | Stop reason: SURG

## 2019-10-28 RX ORDER — BISACODYL 10 MG
10 SUPPOSITORY, RECTAL RECTAL DAILY PRN
Status: DISCONTINUED | OUTPATIENT
Start: 2019-10-28 | End: 2019-11-16 | Stop reason: HOSPADM

## 2019-10-28 RX ORDER — PROPOFOL 10 MG/ML
INJECTION, EMULSION INTRAVENOUS AS NEEDED
Status: DISCONTINUED | OUTPATIENT
Start: 2019-10-28 | End: 2019-10-28 | Stop reason: SURG

## 2019-10-28 RX ADMIN — PROPOFOL 20 MG: 10 INJECTION, EMULSION INTRAVENOUS at 13:02

## 2019-10-28 RX ADMIN — PROPOFOL 100 MG: 10 INJECTION, EMULSION INTRAVENOUS at 12:43

## 2019-10-28 RX ADMIN — MIRTAZAPINE 7.5 MG: 15 TABLET, FILM COATED ORAL at 21:08

## 2019-10-28 RX ADMIN — PHENYLEPHRINE HYDROCHLORIDE 100 MCG: 10 INJECTION INTRAVENOUS at 12:49

## 2019-10-28 RX ADMIN — PHENYLEPHRINE HYDROCHLORIDE 100 MCG: 10 INJECTION INTRAVENOUS at 13:06

## 2019-10-28 RX ADMIN — SODIUM CHLORIDE TAB 1 GM 1 G: 1 TAB at 10:56

## 2019-10-28 RX ADMIN — SODIUM CHLORIDE TAB 1 GM 1 G: 1 TAB at 15:46

## 2019-10-28 RX ADMIN — FERROUS SULFATE TAB 325 MG (65 MG ELEMENTAL FE) 325 MG: 325 (65 FE) TAB at 10:54

## 2019-10-28 RX ADMIN — BISACODYL 10 MG: 10 SUPPOSITORY RECTAL at 10:55

## 2019-10-28 RX ADMIN — NYSTATIN 500000 UNITS: 500000 SUSPENSION ORAL at 14:14

## 2019-10-28 RX ADMIN — ENOXAPARIN SODIUM 40 MG: 40 INJECTION SUBCUTANEOUS at 10:54

## 2019-10-28 RX ADMIN — PHENYLEPHRINE HYDROCHLORIDE 200 MCG: 10 INJECTION INTRAVENOUS at 12:47

## 2019-10-28 RX ADMIN — ACETAMINOPHEN 650 MG: 650 SUPPOSITORY RECTAL at 19:56

## 2019-10-28 RX ADMIN — SODIUM CHLORIDE: 0.9 INJECTION, SOLUTION INTRAVENOUS at 12:08

## 2019-10-28 RX ADMIN — PHENYLEPHRINE HYDROCHLORIDE 200 MCG: 10 INJECTION INTRAVENOUS at 12:54

## 2019-10-28 RX ADMIN — NYSTATIN 500000 UNITS: 500000 SUSPENSION ORAL at 17:04

## 2019-10-28 RX ADMIN — NYSTATIN 500000 UNITS: 500000 SUSPENSION ORAL at 21:08

## 2019-10-28 RX ADMIN — FAMOTIDINE 20 MG: 40 POWDER, FOR SUSPENSION ORAL at 10:56

## 2019-10-28 RX ADMIN — ASPIRIN 81 MG: 81 TABLET, COATED ORAL at 10:54

## 2019-10-28 RX ADMIN — PRAVASTATIN SODIUM 40 MG: 20 TABLET ORAL at 15:47

## 2019-10-28 NOTE — PLAN OF CARE
Problem: OCCUPATIONAL THERAPY ADULT  Goal: Performs self-care activities at highest level of function for planned discharge setting  See evaluation for individualized goals  Description  Treatment Interventions: ADL retraining, Functional transfer training, UE strengthening/ROM, Endurance training, Cognitive reorientation, Patient/family training, Compensatory technique education, Energy conservation, Activityengagement      PROGRESSING:    See flowsheet documentation for full assessment, interventions and recommendations  Note:   Limitation: Decreased ADL status, Decreased UE strength, Decreased Safe judgement during ADL, Decreased cognition, Decreased endurance, Decreased self-care trans, Decreased high-level ADLs     Assessment: Patient presents supine in bed responding to her name being called  Patient able to state full name and   Patient pleasant and cooperative sometimes responding slowly and requiring increased amount of time  Patient appearring sleepy at times  Patient asked that I turned on the tv and mentioned wanting to watch "Yound and Restless"  Patient reported what time the show came on which was a couple hours from now and appearred to want to plan ahead  Patient requested specific tv channel  Patient would benefit from [address] with focus on increasing functional strength and endurance, increasing independence with transfer skills from bed to chair, increasing functional independence with self care task performance for grooming and assisting with upper body bathing for carryover into her daily routine, and increasing daily cognitive reorientation skills for carryover into her daily routine        OT Discharge Recommendation: Short Term Rehab  OT - OK to Discharge: (when medically cleared)  Gerline Litten

## 2019-10-28 NOTE — ASSESSMENT & PLAN NOTE
· Patient has chronic Herrera present on admission- herrera was replaced in ED 10/14   · Placed on recent admission (10/3/19) at [facility] where she developed urinary retention    · Patient will follow up outpatient with urology for voiding trial  Has appt on 10/28 with [address] [facility]

## 2019-10-28 NOTE — OCCUPATIONAL THERAPY NOTE
Occupational Therapy Treatment Note:         10/28/19 1134   Restrictions/Precautions   Other Precautions Contact/isolation;Cognitive; Chair Alarm; Bed Alarm;Multiple lines;Telemetry;O2;Fall Risk;Hard of hearing   Pain Assessment   Pain Assessment No/denies pain   Pain Score No Pain   ADL   Where Assessed Supine, bed   Grooming Assistance 3  Moderate Assistance   Grooming Deficit Setup;Verbal cueing; Increased time to complete;Wash/dry hands; Wash/dry face   UB Bathing Assistance 2  Maximal Assistance   UB Bathing Deficit Setup;Verbal cueing; Increased time to complete   Toileting Assistance  1  Total Assistance   Toileting Deficit Setup;Verbal cueing; Increased time to complete;Use of bedpan/urinal setup;Perineal hygiene   Toileting Comments   (patient has catheter and used bed pan during this session)   Bed Mobility   Rolling R 3  Moderate assistance   Additional items Assist x 1; Increased time required;Verbal cues   Cognition   Overall Cognitive Status Impaired   Arousal/Participation Responsive; Cooperative   Attention Attends with cues to redirect   Orientation Level Oriented to person   Memory Decreased recall of recent events;Decreased recall of precautions   Following Commands Follows one step commands without difficulty   Activity Tolerance   Activity Tolerance Other (Comment)  (responses slowly and with increased time required)   Assessment   Assessment Patient presents supine in bed responding to her name being called  Patient able to state full name and   Patient pleasant and cooperative sometimes responding slowly and requiring increased amount of time  Patient appearring sleepy at times  Patient asked that I turned on the tv and mentioned wanting to watch "Yound and Restless"  Patient reported what time the show came on which was a couple hours from now and appearred to want to plan ahead  Patient requested specific tv channel   Patient would benefit from [address] with focus on increasing functional strength and endurance, increasing independence with transfer skills from bed to chair, increasing functional independence with self care task performance for grooming and assisting with upper body bathing for carryover into her daily routine, and increasing daily cognitive reorientation skills for carryover into her daily routine  Plan   Treatment Interventions ADL retraining; Endurance training;Cognitive reorientation   Goal Expiration Date 10/29/19   OT Treatment Day 1   OT Frequency 3-5x/wk   Recommendation   OT Discharge Recommendation Short Term Rehab   OT - OK to Discharge   (when medically cleared)   Zee Poisson

## 2019-10-28 NOTE — QUICK NOTE
Progress Note - Infectious Disease   Pam Oliveira [de-identified] y o  female MRN: [id number]  Unit/Bed#: -01 Encounter: [id number]      Impression/Plan:  1  Fever   Patient noted to have intermittent low-grade fever and isolated fever high fever  Estle Nasrin are no obvious sources on exam   Previous workup reviewed at [facility] which was largely unremarkable except for splenic infarct   Echo at that time was also unremarkable   Cultures on this admission have been unremarkable  Esequiel Ivy if isolated fever is due to problem 4  Lower suspicion for bacterial meningitis   Flu swab negative; respiratory viral panel negative   Recent chest x-ray unremarkable and patient is comfortable on room air  Patient is awake on exams but she is just very slow to respond and recent MRI of the head was also negative   Lower suspicion at this time for viral encephalitis  Repeat CT of the head unremarkable  No obvious infectious etiology at this time  Continue to monitor off antibiotics  Continue to trend fever curve/vitals  Follow-up transesophageal echo  Follow up pending cultures  Neurology has been consulted--consider workup for paraneoplastic syndrome  Consider reviewing her case with her outpatient endocrinologist  Additional care as per primary     2  Hyponatremia   Sodium appears to be stable today   Ongoing follow-up by Nephrology      3  Metabolic encephalopathy   Patient initially presented confused likely due to problem 2  It seems that her mental status improved with adjustment in her sodium level   Workup for adrenal insufficiency was negative   Unclear etiology for her underlying hyponatremia  Syphilis testing negative  Continue to monitor mental status  Continue to monitor electrolytes  Additional evaluations as above  Additional care as per primary     4  Splenic infarct   Appears to be evolving on imaging   Possibly contributing to problem 1   Unclear etiology for this lesion   Was not present on imaging in  echo at [facility] unremarkable   Patient without other prosthetic material   Follow up pending cultures  Repeat blood cultures with next fever  Follow-up transesophageal echo  Hematology evaluation appreciated  Additional care as per primary     5  Type 2 diabetes   Ongoing management as per primary service      Above plan discussed with primary service      ID consult service will formally re-evaluate the patient again tomorrow  Antibiotics:  None    24 hour events:  No acute events noted overnight on chart review  Patient remains afebrile without leukocytosis   Cultures remain without growth  No new imaging overnight  Patient's other vitals are stable  Labs unremarkable  RPR negative  Transesophageal echo pending    Subjective:  Unable to evaluate patient today as she was at transesophageal echo  Objective:  Vitals:  Temp:  [98 4 °F (36 9 °C)-100 1 °F (37 8 °C)] 98 6 °F (37 °C)  HR:  [79-94] 87  Resp:  [16-17] 16  BP: (101-121)/(48-63) 105/58  SpO2:  [93 %-99 %] 97 %  Temp (24hrs), Av 2 °F (37 3 °C), Min:98 4 °F (36 9 °C), Max:100 1 °F (37 8 °C)  Current: Temperature: 98 6 °F (37 °C)    Physical Exam:   Unable to evaluate patient she was at transesophageal echo  Labs, Imaging, & Other studies:   All pertinent labs and imaging studies were personally reviewed  Results from last 7 days   Lab Units 10/28/19  0513 10/27/19  0542 10/26/19  0927   WBC Thousand/uL 5 34 6 04 9 15   HEMOGLOBIN g/dL 8 1* 8 7* 8 6*   PLATELETS Thousands/uL 296 377 369     Results from last 7 days   Lab Units 10/28/19  0513   POTASSIUM mmol/L 4 1   CHLORIDE mmol/L 107   CO2 mmol/L 23   BUN mg/dL 13   CREATININE mg/dL 0 41*   EGFR ml/min/1 73sq m 98   CALCIUM mg/dL 8 4     Results from last 7 days   Lab Units 10/25/19  1142   BLOOD CULTURE  No Growth at 48 hrs  No Growth at 48 hrs     INFLUENZA B PCR  Not Detected   RSV PCR  Not Detected

## 2019-10-28 NOTE — SPEECH THERAPY NOTE
Speech Language/Pathology    Speech/Language Pathology Progress Note    Patient Name: [place]  PGRGI'[id number] Date: 10/28/2019       Subjective:  Pt awake, alert  Current Diet: npo, pt w/ episode of "aspiration" yesterday following speech session? Unable to find on what or when this occurred  Objective:  Pt seen w/ puremarilou nt by straw (4 oz), thin by straw (4 oz)  Pt w/ adequate retrieval, intermittent oral holding noted  Pt requires cues to transfer or the presentation of an additional tsp  She is able to transfer the thin in a timely manner by straw but by the end of the session she was orally holding the nt  This puts her at risk for oral spill/loss of control  There was no noted vocal changes, coughing or throat clearing w/ any material      Assessment:  Pt w/ noted oral dysphagia, no overt coughing or choking  Though she presents w/ a fever her CXR is clear & there is no pna noted  She requires cues to transfer & swallow  At this time a diet would be appropriate w/ full supervision  WOuld resume the nt w/ the oral holding being a factor       Plan/Recommendations:  Begin keesha arndt (cue pt to swallow, slowly feed)  Speech to f/u

## 2019-10-28 NOTE — ANESTHESIA POSTPROCEDURE EVALUATION
Post-Op Assessment Note    CV Status:  Stable  Pain Score: 0    Pain management: adequate     Mental Status:  Lethargic (baseline)   Hydration Status:  Euvolemic   PONV Controlled:  Controlled   Airway Patency:  Patent   Post Op Vitals Reviewed:  Yes              BP   94/51   Temp      Pulse  86   Resp   16   SpO2   97 on NC 2 L

## 2019-10-28 NOTE — ASSESSMENT & PLAN NOTE
· She had been afebrile x 72 hrs however morning of 10/25 fever as high as 102 5  · Possibly related to splenic infarct  · Urine culture not significant this admission, 3706-7910 GNR   At [facility], pt was noted to have ESBL in urine which was suspected to be colonization  · Admission blood cultrues are neg, will send repeat  · Procalcitonin normal initially, 0 93 now  · Repeat CXR negative  · WBCs within normal limits  · Flu/RSV negative  · Blood culture negative at 24 hours  · Infectious Disease following  · Continue to monitor off cefepime

## 2019-10-28 NOTE — PHYSICAL THERAPY NOTE
PHYSICAL THERAPY Treatment NOTE    Patient Name: [place]  ILSNV'W Date: 10/28/2019        10/28/19 1633   Pain Assessment   Pain Assessment No/denies pain   Pain Score No Pain   Restrictions/Precautions   Weight Bearing Precautions Per Order No   Other Precautions Contact/isolation;Cognitive; Chair Alarm; Bed Alarm;Multiple lines; Fall Risk;O2   General   Chart Reviewed Yes   Additional Pertinent History Pt  is an [de-identified] yo F who presents with metabolic encephalopathy   Family/Caregiver Present No   Cognition   Overall Cognitive Status Impaired   Arousal/Participation Persistent stimuli required   Attention Attends with cues to redirect   Orientation Level Oriented to person;Disoriented to place; Disoriented to time;Disoriented to situation   Memory Decreased recall of recent events;Decreased recall of precautions   Following Commands Follows one step commands without difficulty   Comments Pt  was identified with fullname and birthdate via ID [employer]   Subjective   Subjective Pt  agreeable to PT session   Bed Mobility   Rolling R 3  Moderate assistance   Additional items Assist x 1; Increased time required;LE management;Verbal cues;HOB elevated; Bedrails   Rolling L 3  Moderate assistance   Additional items Assist x 1; Increased time required;LE management;Verbal cues; Bedrails;HOB elevated   Supine to Sit 3  Moderate assistance   Additional items Assist x 1; Increased time required;Verbal cues;LE management   Sit to Supine 2  Maximal assistance   Additional items Assist x 2; Increased time required;LE management;Verbal cues; Bedrails   Additional Comments Pt  performs bed mobility with variable levels of assistance requiring increased time and LE management to perform all tasks with VCs for sequencing, and use of bedrails for ease of transitions   Transfers   Sit to Stand 2  Maximal assistance   Additional items Increased time required;Assist x 2;Verbal cues  (for hand placement and sequencing)   Stand to Sit 2  Maximal assistance   Additional items Assist x 2; Increased time required;Verbal cues  (to reach back to control descent)   Additional Comments Pt  performed sit<>stand transfers with MaxAx2 with VCs for hand placement and sequencing and to reach back to control descent  Balance   Static Sitting Poor +   Dynamic Sitting Poor   Static Standing Zero   Endurance Deficit   Endurance Deficit Yes   Endurance Deficit Description postural degradation noted in static standing with fatigue   Activity Tolerance   Activity Tolerance Patient limited by fatigue;Patient limited by pain   Nurse Made Aware Spoke to RN   Assessment   Prognosis Guarded   Problem List Decreased strength;Decreased range of motion;Decreased endurance; Impaired balance;Decreased mobility; Decreased coordination;Decreased cognition; Impaired judgement;Decreased safety awareness;Pain   Assessment Pt  is an [de-identified] yo F who presents with metabolic encephalopathy  Pt  was identified with fullname and birthdate via ID bracelet  Pt  agreeable to PT session  Pt  Continues to demonstrate need for ModA-MaxAx2 (variable) assistance with all mobility tasks with limited cue following and difficulty with motor planning  Progress towards goals is slowed 2/2 pain, cognitive deficits, and limited activity tolerance  Pt  Will benefit from continued skilled therapy to increase functional independence and aid patient in return to PLOF  Goals   Patient Goals to get out   STG Expiration Date 11/07/19   PT Treatment Day 4   Plan   Treatment/Interventions Functional transfer training;LE strengthening/ROM; Therapeutic exercise;Cognitive reorientation; Endurance training;Patient/family training;Equipment eval/education; Bed mobility;Gait training;Spoke to nursing;Spoke to case management; Family   Progress Slow progress, medical status limitations   PT Frequency 2-3x/wk   Recommendation Recommendation Short-term skilled PT   Equipment Recommended Walker   PT - OK to Discharge Yes   Additional Comments to rehab when medically appropriate     Nereida White, PT, DPT 10/28/2019

## 2019-10-28 NOTE — ASSESSMENT & PLAN NOTE
· Since September she has had a progressive decline - she has had no appetite, not really eating/drinking with weakness  She was previously reportedly very independent prior to her admission at [facility] in September with UTI  · Geriatrics following, recommending outpatient follow up  · Speech following, on soft diet  · No recent colonoscopy/EGD, recommend outpatient GI evaluation

## 2019-10-28 NOTE — PROGRESS NOTES
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 ACE-inhibitor and PPI could cause SIADH --Protonix changed to Pepcid and Lisinopril was discontinued as her blood pressure is running low and lisinopril can cause SIADH  ? Workup:  Serum osmolality 276, urine osmolality 624, urine sodium 90, uric acid 4 1, cortisol 40 8, TSH 2 8, normal LFTs except for albumin of 2 2, chest x-ray with minimal left basilar airspace disease attributed to compressive atelectasis and small left pleural effusion, echocardiogram at [facility] revealed ejection fraction of 50-55% with mild pulmonary hypertension, mild mitral regurgitation and tricuspid regurgitation / ACT H was low at 1 6 at [facility] [place] cortisol of 19 4) // [facility] CT chest, abdomen and pelvis on 09/29 at [facility]:  No signs of malignancy   There were new small bilateral pleural effusions with new infarcts involving the medial aspect of the spleen  Low ACTH level at [facility] was likely a lab error   Would have her PCP recheck as an outpatient  ? SPEP faint possible band in the gamma region   Immunofixation pending - [facility]  ? Sodium level  has improved  and is normal today  ? Continue sodium chloride 1 g p o  B i d  and liberalize fluid restriction to 1500 mL per day  ? Check a m  BMP   2  Anemia:  Hemoglobin 8 1 today  ? Iron saturation 7%, ferritin 608  ? Started on oral iron by primary team  ? Management per primary team  3  Encephalopathy:  Originally Due to hyponatremia and UTI   Overall, mental status seems to have improved today  4   Hypertension:  Blood pressure running on the low side but seems to have improved  · Continue to hold lisinopril and  Norvasc   · Trend blood pressure with these changes  5  Fever  · Cultures negative so far  · Per Infectious Disease  · Currently afebrile  6  Splenic infarct  · Workup and management per primary team and Hematology  · For LENO    Subjective: The patient was seen examined  She denied any shortness of breath, chest pain or pressure, abdominal pain, vomiting, diarrhea      Objective:     Vitals: Blood pressure 105/58, pulse 87, temperature 98 6 °F (37 °C), resp  rate 16, height 5' 2" (1 575 m), weight 55 2 kg (121 lb 11 2 oz), SpO2 97 %  ,Body mass index is 22 26 kg/m²  Temp (24hrs), Av 2 °F (37 3 °C), Min:98 4 °F (36 9 °C), Max:100 1 °F (37 8 °C)      Weight (last 2 days)     Date/Time   Weight    10/27/19 06    pt unable to stand, bed scale malfunctioning    Weight: pt unable to stand, bed scale malfunctioning at 10/27/19 0600    10/26/19 0600    Bed scale malfunctioning, pt too weak to stand    Weight: Bed scale malfunctioning, pt too weak to stand at 10/26/19 0600                     Intake/Output Summary (Last 24 hours) at 10/28/2019 1243  Last data filed at 10/27/2019 2100  Gross per 24 hour   Intake 50 ml   Output 300 ml   Net -250 ml     I/O last 24 hours: In: 46 [P O :390]  Out: 300 [Urine:300]        Physical Exam    Physical Exam   Constitutional: No distress  Neck: No JVD present  Cardiovascular: Normal heart sounds  Exam reveals no gallop and no friction rub  Pulmonary/Chest: Effort normal and breath sounds normal  No respiratory distress  She has no wheezes  She has no rales  Abdominal: Soft  Bowel sounds are normal  She exhibits no distension  There is no tenderness  There is no rebound and no guarding  Musculoskeletal: She exhibits no edema  Neurological:   Alert, slow to respond to some questions  She knows she is in the hospital but did not know the name of the hospital   She knew the year and president     Skin: She is not diaphoretic  Vitals reviewed                Invasive Devices     Peripheral Intravenous Line            Peripheral IV 10/25/19 Left Wrist 3 days          Drain            Urethral Catheter Straight-tip 16 Fr  13 days                Medications:    Scheduled Meds:  Current Facility-Administered Medications:  acetaminophen 650 mg Rectal Q6H PRN Herman Rueda DO   aluminum-magnesium hydroxide-simethicone 30 mL Oral Q6H PRN Artur Meeks MD   aspirin 81 mg Oral Daily Heaven MAHAN PA-C   atenolol 50 mg Oral Daily Jermaine Phillips MD   bisacodyl 10 mg Rectal Daily PRN Robert MAHAN PA-C   docusate sodium 100 mg Oral BID PRN Artur Meeks MD   enoxaparin 40 mg Subcutaneous Daily Artur Meeks MD   famotidine 20 mg Oral Daily Vimal Renee MD   ferrous sulfate 325 mg Oral Daily With Breakfast Jermaine Phillips MD   insulin lispro 1-5 Units Subcutaneous HS Artur Meeks MD   insulin lispro 1-6 Units Subcutaneous TID AC Artur Meeks MD   mirtazapine 7 5 mg Oral HS Heaven MAHAN PA-C   nystatin 500,000 Units Swish & Swallow 4x Daily Heaven MAHAN PA-C   ondansetron 4 mg Intravenous Q6H PRN Artur Meeks MD   pravastatin 40 mg Oral Daily With Matty Peña MD   sodium chloride 1 g Oral BID With Meals Vimal Renee MD       PRN Meds:   acetaminophen    aluminum-magnesium hydroxide-simethicone    bisacodyl    docusate sodium    ondansetron    Continuous Infusions:         LAB RESULTS:      Results from last 7 days   Lab Units 10/28/19  0513 10/27/19  0542 10/26/19  8618 10/26/19  0529 10/25/19  1142 10/25/19  0626 10/24/19  0537 10/23/19  0534 10/22/19  0550   WBC Thousand/uL 5 34 6 04 9 15  --  10 36*  --  5 79 6 45 5 97   HEMOGLOBIN g/dL 8 1* 8 7* 8 6*  --  9 0*  --  9 3* 8 6* 9 1*   HEMATOCRIT % 27 3* 28 9* 28 1*  --  29 5*  --  30 8* 29 1* 30 0*   PLATELETS Thousands/uL 296 377 369  --  397*  --  349 389 418*   NEUTROS PCT %  --  83* 83*  --  89*  --  77* 70  --    LYMPHS PCT %  -- 11* 11*  --  7*  --  17 21  --    LYMPHO PCT %  --   --   --   --   --   --   --   --  14   MONOS PCT %  --  3* 4  --  3*  --  4 6  --    MONO PCT %  --   --   --   --   --   --   --   --  5   EOS PCT %  --  2 1  --  0  --  1 1 1   POTASSIUM mmol/L 4 1 3 8  --  3 6  --  4 1 3 9 4 2 4 0   CHLORIDE mmol/L 107 106  --  107  --  105 103 108 106   CO2 mmol/L 23 22  --  22  --  23 22 22 21   BUN mg/dL 13 13  --  19  --  13 12 13 13   CREATININE mg/dL 0 41* 0 44*  --  0 52*  --  0 54* 0 49* 0 56* 0 47*   CALCIUM mg/dL 8 4 8 6  --  8 3  --  9 0 8 4 8 7 8 6       CUTURES:  Lab Results   Component Value Date    BLOODCX No Growth at 48 hrs  10/25/2019    BLOODCX No Growth at 48 hrs  10/25/2019    BLOODCX No Growth After 5 Days  10/14/2019    BLOODCX No Growth After 5 Days  10/14/2019    URINECX 7661-5935 cfu/ml Gram Negative Nirav (A) 10/15/2019    URINECX >100,000 cfu/ml Escherichia coli 07/16/2017                 PLEASE NOTE:  This encounter was completed utilizing the [employer]/Fluency Direct Speech Voice Recognition Software  Grammatical errors, random word insertions, pronoun errors and incomplete sentences are occasional consequences of the system due to software limitations, ambient noise and hardware issues  These may be missed by proof reading prior to affixing electronic signature  Any questions or concerns about the content, text or information contained within the body of this dictation should be directly addressed to the physician for clarification  Please do not hesitate to call me directly if you have any any questions or concerns

## 2019-10-28 NOTE — ASSESSMENT & PLAN NOTE
· POA  Likely in setting of hyponatremia with component of delirium contributing given recurrent hospitalizations/underlying cognitive impairment  · Urine culture unremarkable - monitor off antibiotics at this time  Pt remains asymptomatic  · Mental status seems to be at baseline although does wax and wane  On exam today pt is AO x3 and able to state she was brought to the hospital "for my sodium"  · She has hx of adrenal insufficiency, previously on steroids and then tapered off  She follows with Endocrinology as outpatient  · Cosyntropin stim test with adequate response in cortisol, hydrocortisone discontinued, she does not have adrenal insufficiency   · Vitamin B12 and folate normal   · MRI brain was done at [facility] last month which was negative for any acute infarct but possible small questionable hemorrhage versus area of mineralization  CT head without contrast in [facility] negative for bleed  · Geriatrics following, patient should follow up outpatient with them  · Neurology consulted as well as Infectious Disease  · Continue to hold antibiotics  · Follow-up on imaging  · CT head shows no acute abnormality, chronic lacunar infarct left lentiform nucleus    · Neurology considering LP

## 2019-10-28 NOTE — CONSULTS
Consultation - Palliative and [address] [place] [address] [de-identified] y o  female [id number]    Assessment:   - Encephalopathy   - Hyponatremia   - splenic infarct   - Poor PO intake    - cognitive decline related swallowing   - anemia    Plan:  1  Symptom management    - symptoms currently controlled   - recommend starting fluconazole vs nystatin swish and swallow for thrush    2  Goals    - will need to schedule meeting with the patient's daughter for family meeting     Code Status: Full code - Level 1   Decisional apparatus:  Patient is not competent on my exam today  If competence is lost, patient's substitute decision maker would default to next of kin by [place] 169  I have reviewed the patient's controlled substance dispensing history in the Prescription Drug Monitoring Program in compliance with the [facility] regulations before prescribing any controlled substances  We appreciate the invitation to be involved in this patient's care  We will follow along while she is hospitalized  Please do not hesitate to reach our on call provider through our clinic answering service at  should you have acute symptom control concerns  IDENTIFICATION:        Inpatient consult to Palliative Care     Performed by  Deja Padilla DO     Authorized by Dayton Gardner PA-C          Physician Requesting Consult: Jessie Rojo DO  Reason for Consult / Principal Problem: goals of care discussion  Hx and PE limited by: patient's encephalopathy    HISTORY OF PRESENT ILLNESS:       Kavitha Hay is a [de-identified] y o  female with PMH significant for hyponatremia, adrenal insufficiency, type 2 DM, HTN, HLD and CAD s/p PCI who presented to [facility] on 10/14/19 with increasing lethargy and confusion  She was diagnosed with UTI and hyponatremia in September and since that time has had multiple hospitalizations at [facility] for similar presentations of hyponatremia, fevers and poor PO intake    Prior to those admissions she was reportedly a relatively healthy [de-identified]year old and independent with ADLs and driving  During this recent time she has had a generally decline with poor PO intake  She was admitted to [address] [place] [address] for increasing confusion and not eating  She was found to have a sodium of 128  She was initially treated with IVF bolus as well as IV antibiotics for presumed urinary infection with some improvement  She was noted to have an indwelling herrera catheter on admission  She was also started on steroids for suspected adrenal insufficiency and Endocrinology was consulted  She had a cosyntropin test that was within normal limits and steroids were discontinued  The patient's urine culture came back unremarkable so antibiotics were discontinued  She developed LUQ pain and on imaging review from recent hospitalization at [facility] she had splenic infarcts  General surgery was consulted and they remarked that there was no surgical intervention needed at this time  Hematology was consulted and recommended that she have LENO to investigate any cardioembolic cause of the splenic infarcts  She was having fevers so cefepime was briefly started  ID was consulted and they recommended watching her off antibiotics so a new set of blood cultures could be drawn  She continued to have low grade fevers  Chest XR was unremarkable, respiratory panel and flue were negative and the blood cultures have been negative x2 for 48 hours  The patient continued to have poor PO intake and was evaluated by speech  She seemed to have cognitive difficulty with eating and required repeated cueing to have her swallow and not keep the food pocketed in her mouth  Speech therapy recommended a nectar thick liquid diet with pureed foods  Unfortunately the patient has not been taking in enough nourishment to sustain her for the long run  She was started on Remeron last evening in hopes to stimulate her appetite and in order to help with her sleep wake cycle  Palliative care was consulted for goals of care discussion  Review of Systems   Unable to perform ROS: mental status change       Past Medical History:   Diagnosis Date    Cardiac disease     Diabetes mellitus ([address] )     Hyperlipidemia     Hypertension      Past Surgical History:   Procedure Laterality Date    CORONARY ANGIOPLASTY WITH STENT PLACEMENT       Social History     Socioeconomic History    Marital status: Single     Spouse name: Not on file    Number of children: Not on file    Years of education: Not on file    Highest education level: Not on file   Occupational History    Not on file   Social Needs    Financial resource strain: Not on file    Food insecurity:     Worry: Not on file     Inability: Not on file    Transportation needs:     Medical: Not on file     Non-medical: Not on file   Tobacco Use    Smoking status: Current Every Day Smoker     Packs/day: 0 20   Substance and Sexual Activity    Alcohol use: No    Drug use: No    Sexual activity: Not on file   Lifestyle    Physical activity:     Days per week: Not on file     Minutes per session: Not on file    Stress: Not on file   Relationships    Social connections:     Talks on phone: Not on file     Gets together: Not on file     Attends [employer] service: Not on file     Active member of club or organization: Not on file     Attends meetings of clubs or organizations: Not on file     Relationship status: Not on file    Intimate partner violence:     Fear of current or ex partner: Not on file     Emotionally abused: Not on file     Physically abused: Not on file     Forced sexual activity: Not on file   Other Topics Concern    Not on file   Social History Narrative    Not on file     No family history on file  MEDICATIONS / ALLERGIES:    all current active meds have been reviewed    No Known Allergies    OBJECTIVE:    Physical Exam  Physical Exam   Constitutional: She appears well-developed     HENT:   Head: Normocephalic and atraumatic  Eyes: EOM are normal    Neck: No tracheal deviation present  Cardiovascular: Normal rate, regular rhythm and normal heart sounds  Pulmonary/Chest: Effort normal and breath sounds normal    Abdominal: Soft  Bowel sounds are normal  She exhibits no distension  There is no tenderness  Musculoskeletal: She exhibits no edema or deformity  Neurological: She is alert  Oriented to person, place, year and month, president of the [place]  She is unable to answer questions about details of her hospitalization   Skin: Skin is warm and dry  No pallor  Psychiatric: Her behavior is normal    Nursing note and vitals reviewed  Lab Results: I have personally reviewed pertinent labs

## 2019-10-28 NOTE — ASSESSMENT & PLAN NOTE
Lab Results   Component Value Date    HGBA1C 6 4 (H) 10/15/2019     Recent Labs     10/27/19  1603 10/27/19  2047 10/28/19  0715 10/28/19  1026   POCGLU 148* 147* 126 124       · Takes metformin and glipizide at baseline, will plan to resume metformin at discharge and hold glipizide    · Continue with SSI  · Diabetic diet  · Monitor accuchecks, avoid hypoglycemia

## 2019-10-28 NOTE — ASSESSMENT & PLAN NOTE
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  · Presented with sodium of 126  Sodium 138 today  · TSH normal this admission  · Cosyntropin stim test negative for adrenal insufficiency  · CT chest abdomen pelvis was done at [facility] to rule out any occult malignancy as a cause of SIADH which was negative for any malignancy  · Continue with 1g Salt tabs BID and fluid restriction - resume 1500ml Plan will be to d/c on daily dosing per Dr Chepe Velasquez  · [id number] Cherrie Dumas for d/c from a nephrology standpoint, will need outpatient f/u with [facility] nephrology  Recommending BMP in 1 week with results to PCP for further management of NACL tabs  Protonix changed to Pepcid  ACEI stopped as well    · Given additional salt tab today and given torsemide PO x 1 dose on 10/24  · Na 137 today  · BMP in AM

## 2019-10-28 NOTE — PROGRESS NOTES
Spoke with Patient's daughter Annie Lake to obtain consent for LENO  The procedure was explained as well as the risks  All questions were answered  Daughter consented for anesthesia as well as for the procedure

## 2019-10-28 NOTE — PROGRESS NOTES
[facility] [facility]  Progress Note - [place] 1940, [de-identified] y o  female MRN: [id number]  Unit/Bed#: -Phil Encounter: [id number]  Primary Care Provider: Karrie Malhotra MD   Date and time admitted to hospital: 10/14/2019  4:20 PM    Splenic infarct  Assessment & Plan  · Noted on CT scan done at [facility] 9/2019, new moderate sized splenic infarcts  · Unclear etiology of splenic infarcts  Hematology consult appreciated, recommending repeat CTA abdomen and pelvis to evaluate for source of emboli  Recent echocardiogram done September 30th at 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  Evolving splenic infarct  Additional chronic/incidental findings as described  · Discussed with hematology, to consider starting ASA  · Telemetry has been on since admission and is unremarkable for atrial fibrillation  Discontinued  · Hematology/Oncology recommending further cardiac evaluation, cardiology consulted will follow for Afib  · LENO negative for PFO, 55% EF  · General surgery consult appreciated, no further workup from their standpoint, they have deferred to Hematology/Oncology  · Consider lower extremity duplex  Microcytic anemia  Assessment & Plan  · Baseline seems between 8-11  · hgb 8 1 most recent check  · Iron studies suggestive of low iron  · Continue  p o  iron supplementation which was started this admission  · CBC in AM    Low grade fever  Assessment & Plan  · She had been afebrile x 72 hrs however morning of 10/25 fever as high as 102 5  · Possibly related to splenic infarct  · Urine culture not significant this admission, [id number] GNR   At [facility], pt was noted to have ESBL in urine which was suspected to be colonization  · Admission blood cultrues are neg, will send repeat  · Procalcitonin normal initially, 0 93 now  · Repeat CXR negative  · WBCs within normal limits  · Flu/RSV negative  · Blood culture negative at 24 hours  · Infectious Disease following  · Continue to monitor off cefepime    Bradycardia  Assessment & Plan  · Resolved with lower dose of atenolol  · EKG shows NSR in 80's  · TSH normal this admission  Severe protein-calorie malnutrition ([address] )  Assessment & Plan  Malnutrition Findings:   Malnutrition type: Chronic illness(Related to medical condition as evidenced by 10% weight loss over the past month and <75% energy intake needs met >1 month treated with ensure compact supplements)  Degree of Malnutrition: Other severe protein calorie malnutrition    BMI Findings: Body mass index is 22 26 kg/m²  Reported weight has fluctuated significantly throughout hospitalization, will work on obtaining more accurate weights  Nutrition consult and supplements  Urinary retention  Assessment & Plan  · Patient has chronic Herrera present on admission- herrera was replaced in ED 10/14   · Placed on recent admission (10/3/19) at [facility] where she developed urinary retention  · Patient will follow up outpatient with urology for voiding trial  Has appt on 10/28 with [facility]    Failure to thrive in adult  Assessment & Plan  · Since September she has had a progressive decline - she has had no appetite, not really eating/drinking with weakness  She was previously reportedly very independent prior to her admission at [facility] in September with UTI  · Geriatrics following, recommending outpatient follow up  · Speech following, on soft diet  · No recent colonoscopy/EGD, recommend outpatient GI evaluation  HLD (hyperlipidemia)  Assessment & Plan  · Continue statin    Essential hypertension  Assessment & Plan  · Lisinopril stopped  Amlodipine changed to 5 mg p o  B i d  Per Nephrology  Continue atenolol 50mg daily with hold parameters    · Routine vitals    Type 2 diabetes mellitus with diabetic neuropathy, without long-term current use of insulin [facility])  Assessment & Plan  Lab Results   Component Value Date    HGBA1C 6 4 (H) 10/15/2019     Recent Labs     10/27/19  1603 10/27/19  2047 10/28/19  0715 10/28/19  1026   POCGLU 148* 147* 126 124       · Takes metformin and glipizide at baseline, will plan to resume metformin at discharge and hold glipizide  · Continue with SSI  · Diabetic diet  · Monitor accuchecks, avoid hypoglycemia    Hyponatremia  Assessment & Plan  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  · Presented with sodium of 126  Sodium 138 today  · TSH normal this admission  · Cosyntropin stim test negative for adrenal insufficiency  · CT chest abdomen pelvis was done at [facility] to rule out any occult malignancy as a cause of SIADH which was negative for any malignancy  · Continue with 1g Salt tabs BID and fluid restriction - resume 1500ml Plan will be to d/c on daily dosing per Dr Pollo Lou  · Divya Paulino for d/c from a nephrology standpoint, will need outpatient f/u with [facility] nephrology  Recommending BMP in 1 week with results to PCP for further management of NACL tabs  Protonix changed to Pepcid  ACEI stopped as well  · Given additional salt tab today and given torsemide PO x 1 dose on 10/24  · Na 137 today  · BMP in AM    * Metabolic encephalopathy  Assessment & Plan  · POA  Likely in setting of hyponatremia with component of delirium contributing given recurrent hospitalizations/underlying cognitive impairment  · Urine culture unremarkable - monitor off antibiotics at this time  Pt remains asymptomatic  · Mental status seems to be at baseline although does wax and wane  On exam today pt is AO x3 and able to state she was brought to the hospital "for my sodium"  · She has hx of adrenal insufficiency, previously on steroids and then tapered off    She follows with Endocrinology as outpatient  · Cosyntropin stim test with adequate response in cortisol, hydrocortisone discontinued, she does not have adrenal insufficiency   · Vitamin B12 and folate normal   · MRI brain was done at [facility] last month which was negative for any acute infarct but possible small questionable hemorrhage versus area of mineralization  CT head without contrast in [facility] negative for bleed  · Geriatrics following, patient should follow up outpatient with them  · Neurology consulted as well as Infectious Disease  · Continue to hold antibiotics  · Follow-up on imaging  · CT head shows no acute abnormality, chronic lacunar infarct left lentiform nucleus  · Neurology considering LP      VTE Pharmacologic Prophylaxis:   Pharmacologic: Enoxaparin (Lovenox)  Mechanical VTE Prophylaxis in Place: Yes    Patient Centered Rounds: I have performed bedside rounds with nursing staff today  Discussions with Specialists or Other Care Team Provider:  Discussed with speech, will change to p r n  With nectar thick liquids, discussed with palliative Care was attempting to reach patient's daughter  Will wait this conversation to decide on timing of family meeting  Education and Discussions with Family / Patient:  Discussed care plan with patient  Palliative Care attempting to reach patient's daughter, will follow-up  Time Spent for Care: 1 hour  More than 50% of total time spent on counseling and coordination of care as described above  Current Length of Stay: 14 day(s)    Current Patient Status: Inpatient   Certification Statement: The patient will continue to require additional inpatient hospital stay due to Continued decline, failure to thrive  Discharge Plan:  Pending patient progress, will need to establish goals of care with patient's daughter at family meeting  Code Status: Level 1 - Full Code      Subjective:   Patient appears more alert today  She requests water    Denies that there is anything wrong at present  Objective:     Vitals:   Temp (24hrs), Av 2 °F (37 3 °C), Min:98 4 °F (36 9 °C), Max:100 1 °F (37 8 °C)    Temp:  [98 4 °F (36 9 °C)-100 1 °F (37 8 °C)] 98 6 °F (37 °C)  HR:  [79-94] 93  Resp:  [16-18] 18  BP: (101-121)/(48-63) 121/58  SpO2:  [87 %-100 %] 87 %  Body mass index is 22 19 kg/m²  Input and Output Summary (last 24 hours): Intake/Output Summary (Last 24 hours) at 10/28/2019 1526  Last data filed at 10/28/2019 1309  Gross per 24 hour   Intake 350 ml   Output 150 ml   Net 200 ml       Physical Exam:     Physical Exam   Constitutional: She appears well-developed and well-nourished  No distress  HENT:   Head: Normocephalic and atraumatic  Eyes: Conjunctivae are normal  No scleral icterus  Cardiovascular: Normal rate and regular rhythm  No murmur heard  Pulmonary/Chest: Effort normal and breath sounds normal  No respiratory distress  She has no wheezes  She has no rales  Abdominal: Soft  She exhibits no distension  There is no tenderness  Musculoskeletal: She exhibits no edema  Neurological: She is alert  Skin: Skin is warm and dry  No erythema  Psychiatric: She has a normal mood and affect  Cognition and memory are impaired  Nursing note and vitals reviewed        Additional Data:     Labs:    Results from last 7 days   Lab Units 10/28/19  0513 10/27/19  0542   WBC Thousand/uL 5 34 6 04   HEMOGLOBIN g/dL 8 1* 8 7*   HEMATOCRIT % 27 3* 28 9*   PLATELETS Thousands/uL 296 377   NEUTROS PCT %  --  83*   LYMPHS PCT %  --  11*   MONOS PCT %  --  3*   EOS PCT %  --  2     Results from last 7 days   Lab Units 10/28/19  0513   SODIUM mmol/L 137   POTASSIUM mmol/L 4 1   CHLORIDE mmol/L 107   CO2 mmol/L 23   BUN mg/dL 13   CREATININE mg/dL 0 41*   ANION GAP mmol/L 7   CALCIUM mg/dL 8 4   GLUCOSE RANDOM mg/dL 117         Results from last 7 days   Lab Units 10/28/19  1026 10/28/19  0715 10/27/19  2047 10/27/19  1603 10/27/19  1037 10/27/19  0547 10/26/19  2104 10/26/19  1607 10/26/19  1112 10/26/19  0602 10/25/19  2102 10/25/19  1555   POC GLUCOSE mg/dl 124 126 147* 148* 181* 124 117 101 180* 132 179* 143*         Results from last 7 days   Lab Units 10/26/19  0529   PROCALCITONIN ng/ml 0 93*           * I Have Reviewed All Lab Data Listed Above  * Additional Pertinent Lab Tests Reviewed: All Labs Within Last 24 Hours Reviewed    Imaging:    Imaging Reports Reviewed Today Include:  Codey results:  No PFO, no thrombus, 55% EF  Imaging Personally Reviewed by Myself Includes:  None    Recent Cultures (last 7 days):     Results from last 7 days   Lab Units 10/25/19  1142   BLOOD CULTURE  No Growth at 48 hrs  No Growth at 48 hrs     INFLUENZA B PCR  Not Detected   RSV PCR  Not Detected       Last 24 Hours Medication List:     Current Facility-Administered Medications:  acetaminophen 650 mg Rectal Q6H PRN Herman Rueda DO   aluminum-magnesium hydroxide-simethicone 30 mL Oral Q6H PRN Drea Mcguire MD   aspirin 81 mg Oral Daily Heaven MAHAN PA-C   atenolol 50 mg Oral Daily Macario Phillips MD   bisacodyl 10 mg Rectal Daily PRN Carlee MAHAN PA-C   docusate sodium 100 mg Oral BID PRN Drea Mcguire MD   enoxaparin 40 mg Subcutaneous Daily Drea Mcguire MD   famotidine 20 mg Oral Daily Carmen Miguel MD   ferrous sulfate 325 mg Oral Daily With Breakfast Macario Phillips MD   insulin lispro 1-5 Units Subcutaneous HS Drea Mcguire MD   insulin lispro 1-6 Units Subcutaneous TID AC Drea Mcguire MD   mirtazapine 7 5 mg Oral HS Heaven MAHAN PA-C   nystatin 500,000 Units Swish & Swallow 4x Daily Heaven MAHAN PA-C   ondansetron 4 mg Intravenous Q6H PRN Drea Mcguire MD   pravastatin 40 mg Oral Daily With Karen Drew MD   sodium chloride 1 g Oral BID With Meals Carmen Miguel MD        Today, Patient Was Seen By: Marleni Breaux PA-C    ** Please Note: Dictation voice to text software may have been used in the creation of this document   **

## 2019-10-28 NOTE — ANESTHESIA PREPROCEDURE EVALUATION
Review of Systems/Medical History  Patient summary reviewed        Cardiovascular  Hyperlipidemia, Hypertension ,    Pulmonary       GI/Hepatic            Endo/Other  Diabetes ,      GYN       Hematology  Anemia ,    Comment: Splenic infarct Musculoskeletal       Neurology      Comment: encephalopathy Psychology           Physical Exam    Airway    Mallampati score: II  TM Distance: >3 FB  Neck ROM: full     Dental       Cardiovascular      Pulmonary      Other Findings        Anesthesia Plan  ASA Score- 4     Anesthesia Type- IV sedation with anesthesia with ASA Monitors  Additional Monitors:   Airway Plan:         Plan Factors-    Induction- intravenous  Postoperative Plan-     Informed Consent- Anesthetic plan and risks discussed with patient  I personally reviewed this patient with the CRNA  Discussed and agreed on the Anesthesia Plan with the CRNA  Belen Munoz

## 2019-10-28 NOTE — ASSESSMENT & PLAN NOTE
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

## 2019-10-28 NOTE — ASSESSMENT & PLAN NOTE
Malnutrition Findings:   Malnutrition type: Chronic illness(Related to medical condition as evidenced by 10% weight loss over the past month and <75% energy intake needs met >1 month treated with ensure compact supplements)  Degree of Malnutrition: Other severe protein calorie malnutrition    BMI Findings: Body mass index is 22 26 kg/m²  Reported weight has fluctuated significantly throughout hospitalization, will work on obtaining more accurate weights  Nutrition consult and supplements

## 2019-10-28 NOTE — ASSESSMENT & PLAN NOTE
· Baseline seems between 8-11  · hgb 8 1 most recent check  · Iron studies suggestive of low iron  · Continue  p o  iron supplementation which was started this admission    · CBC in AM

## 2019-10-28 NOTE — PLAN OF CARE
Problem: PHYSICAL THERAPY ADULT  Goal: Performs mobility at highest level of function for planned discharge setting  See evaluation for individualized goals  Description  Treatment/Interventions: Functional transfer training, LE strengthening/ROM, Therapeutic exercise, Endurance training, Patient/family training, Equipment eval/education, Bed mobility, Gait training, Spoke to nursing  Equipment Recommended: Walker(current use of RW for mobility)       See flowsheet documentation for full assessment, interventions and recommendations  Outcome: Progressing  Note:   Prognosis: Guarded  Problem List: Decreased strength, Decreased range of motion, Decreased endurance, Impaired balance, Decreased mobility, Decreased coordination, Decreased cognition, Impaired judgement, Decreased safety awareness, Pain  Assessment: Pt  is an [de-identified] yo F who presents with metabolic encephalopathy  Pt  was identified with fullname and birthdate via ID bracelet  Pt  agreeable to PT session  Pt  Continues to demonstrate need for ModA-MaxAx2 (variable) assistance with all mobility tasks with limited cue following and difficulty with motor planning  Progress towards goals is slowed 2/2 pain, cognitive deficits, and limited activity tolerance  Pt  Will benefit from continued skilled therapy to increase functional independence and aid patient in return to PLOF  Recommendation: Short-term skilled PT     PT - OK to Discharge: Yes    See flowsheet documentation for full assessment

## 2019-10-29 ENCOUNTER — APPOINTMENT (INPATIENT)
Dept: RADIOLOGY | Facility: HOSPITAL | Age: 79
DRG: 545 | End: 2019-10-29
Payer: MEDICARE

## 2019-10-29 PROBLEM — I48.91 A-FIB (HCC): Status: ACTIVE | Noted: 2019-10-29

## 2019-10-29 LAB
ANION GAP SERPL CALCULATED.3IONS-SCNC: 7 MMOL/L (ref 4–13)
APTT PPP: 27 SECONDS (ref 23–37)
APTT PPP: 43 SECONDS (ref 23–37)
ATRIAL RATE: 394 BPM
ATRIAL RATE: 468 BPM
BASOPHILS # BLD MANUAL: 0 THOUSAND/UL (ref 0–0.1)
BASOPHILS NFR MAR MANUAL: 0 % (ref 0–1)
BUN SERPL-MCNC: 14 MG/DL (ref 5–25)
CALCIUM SERPL-MCNC: 8.6 MG/DL (ref 8.3–10.1)
CHLORIDE SERPL-SCNC: 109 MMOL/L (ref 100–108)
CO2 SERPL-SCNC: 21 MMOL/L (ref 21–32)
CREAT SERPL-MCNC: 0.43 MG/DL (ref 0.6–1.3)
CRP SERPL QL: >90 MG/L
CRP SERPL QL: >90 MG/L
EOSINOPHIL # BLD MANUAL: 0 THOUSAND/UL (ref 0–0.4)
EOSINOPHIL NFR BLD MANUAL: 0 % (ref 0–6)
ERYTHROCYTE [DISTWIDTH] IN BLOOD BY AUTOMATED COUNT: 18.2 % (ref 11.6–15.1)
ERYTHROCYTE [SEDIMENTATION RATE] IN BLOOD: 100 MM/HOUR (ref 0–20)
GFR SERPL CREATININE-BSD FRML MDRD: 96 ML/MIN/1.73SQ M
GLUCOSE SERPL-MCNC: 126 MG/DL (ref 65–140)
GLUCOSE SERPL-MCNC: 145 MG/DL (ref 65–140)
GLUCOSE SERPL-MCNC: 150 MG/DL (ref 65–140)
GLUCOSE SERPL-MCNC: 151 MG/DL (ref 65–140)
GLUCOSE SERPL-MCNC: 170 MG/DL (ref 65–140)
HCT VFR BLD AUTO: 30.2 % (ref 34.8–46.1)
HGB BLD-MCNC: 8.8 G/DL (ref 11.5–15.4)
INR PPP: 1.3 (ref 0.84–1.19)
LYMPHOCYTES # BLD AUTO: 0.23 THOUSAND/UL (ref 0.6–4.47)
LYMPHOCYTES # BLD AUTO: 3 % (ref 14–44)
MAGNESIUM SERPL-MCNC: 2 MG/DL (ref 1.6–2.6)
MCH RBC QN AUTO: 22.9 PG (ref 26.8–34.3)
MCHC RBC AUTO-ENTMCNC: 29.1 G/DL (ref 31.4–37.4)
MCV RBC AUTO: 78 FL (ref 82–98)
MONOCYTES # BLD AUTO: 0.23 THOUSAND/UL (ref 0–1.22)
MONOCYTES NFR BLD: 3 % (ref 4–12)
NEUTROPHILS # BLD MANUAL: 7.06 THOUSAND/UL (ref 1.85–7.62)
NEUTS BAND NFR BLD MANUAL: 1 % (ref 0–8)
NEUTS SEG NFR BLD AUTO: 91 % (ref 43–75)
NRBC BLD AUTO-RTO: 0 /100 WBCS
PLATELET # BLD AUTO: 396 THOUSANDS/UL (ref 149–390)
PLATELET BLD QL SMEAR: ADEQUATE
PMV BLD AUTO: 9.7 FL (ref 8.9–12.7)
POIKILOCYTOSIS BLD QL SMEAR: PRESENT
POTASSIUM SERPL-SCNC: 4.2 MMOL/L (ref 3.5–5.3)
PROTHROMBIN TIME: 15.8 SECONDS (ref 11.6–14.5)
QRS AXIS: 25 DEGREES
QRS AXIS: 45 DEGREES
QRSD INTERVAL: 82 MS
QRSD INTERVAL: 90 MS
QT INTERVAL: 282 MS
QT INTERVAL: 328 MS
QTC INTERVAL: 395 MS
QTC INTERVAL: 455 MS
RBC # BLD AUTO: 3.85 MILLION/UL (ref 3.81–5.12)
RBC MORPH BLD: PRESENT
SODIUM SERPL-SCNC: 137 MMOL/L (ref 136–145)
T WAVE AXIS: 14 DEGREES
T WAVE AXIS: 29 DEGREES
VARIANT LYMPHS # BLD AUTO: 2 %
VENTRICULAR RATE: 116 BPM
VENTRICULAR RATE: 118 BPM
WBC # BLD AUTO: 7.67 THOUSAND/UL (ref 4.31–10.16)

## 2019-10-29 PROCEDURE — 70496 CT ANGIOGRAPHY HEAD: CPT

## 2019-10-29 PROCEDURE — 71045 X-RAY EXAM CHEST 1 VIEW: CPT

## 2019-10-29 PROCEDURE — 85027 COMPLETE CBC AUTOMATED: CPT | Performed by: PHYSICIAN ASSISTANT

## 2019-10-29 PROCEDURE — 99232 SBSQ HOSP IP/OBS MODERATE 35: CPT | Performed by: INTERNAL MEDICINE

## 2019-10-29 PROCEDURE — 93010 ELECTROCARDIOGRAM REPORT: CPT | Performed by: INTERNAL MEDICINE

## 2019-10-29 PROCEDURE — 86618 LYME DISEASE ANTIBODY: CPT | Performed by: PHYSICIAN ASSISTANT

## 2019-10-29 PROCEDURE — 86376 MICROSOMAL ANTIBODY EACH: CPT | Performed by: PHYSICIAN ASSISTANT

## 2019-10-29 PROCEDURE — 80048 BASIC METABOLIC PNL TOTAL CA: CPT | Performed by: PHYSICIAN ASSISTANT

## 2019-10-29 PROCEDURE — 86140 C-REACTIVE PROTEIN: CPT | Performed by: INTERNAL MEDICINE

## 2019-10-29 PROCEDURE — 86800 THYROGLOBULIN ANTIBODY: CPT | Performed by: PHYSICIAN ASSISTANT

## 2019-10-29 PROCEDURE — 85610 PROTHROMBIN TIME: CPT | Performed by: INTERNAL MEDICINE

## 2019-10-29 PROCEDURE — 70498 CT ANGIOGRAPHY NECK: CPT

## 2019-10-29 PROCEDURE — 93005 ELECTROCARDIOGRAM TRACING: CPT

## 2019-10-29 PROCEDURE — 82948 REAGENT STRIP/BLOOD GLUCOSE: CPT

## 2019-10-29 PROCEDURE — 83735 ASSAY OF MAGNESIUM: CPT | Performed by: INTERNAL MEDICINE

## 2019-10-29 PROCEDURE — 85652 RBC SED RATE AUTOMATED: CPT | Performed by: INTERNAL MEDICINE

## 2019-10-29 PROCEDURE — 85007 BL SMEAR W/DIFF WBC COUNT: CPT | Performed by: PHYSICIAN ASSISTANT

## 2019-10-29 PROCEDURE — 92526 ORAL FUNCTION THERAPY: CPT

## 2019-10-29 PROCEDURE — 85730 THROMBOPLASTIN TIME PARTIAL: CPT | Performed by: INTERNAL MEDICINE

## 2019-10-29 PROCEDURE — 87389 HIV-1 AG W/HIV-1&-2 AB AG IA: CPT | Performed by: INTERNAL MEDICINE

## 2019-10-29 PROCEDURE — 99223 1ST HOSP IP/OBS HIGH 75: CPT | Performed by: PSYCHIATRY & NEUROLOGY

## 2019-10-29 RX ORDER — METOPROLOL TARTRATE 5 MG/5ML
5 INJECTION INTRAVENOUS EVERY 6 HOURS PRN
Status: DISCONTINUED | OUTPATIENT
Start: 2019-10-29 | End: 2019-10-29

## 2019-10-29 RX ORDER — HEPARIN SODIUM 1000 [USP'U]/ML
4400 INJECTION, SOLUTION INTRAVENOUS; SUBCUTANEOUS AS NEEDED
Status: DISCONTINUED | OUTPATIENT
Start: 2019-10-29 | End: 2019-10-30

## 2019-10-29 RX ORDER — METOPROLOL TARTRATE 5 MG/5ML
2.5 INJECTION INTRAVENOUS EVERY 6 HOURS PRN
Status: DISCONTINUED | OUTPATIENT
Start: 2019-10-29 | End: 2019-11-10

## 2019-10-29 RX ORDER — METOPROLOL TARTRATE 5 MG/5ML
2.5 INJECTION INTRAVENOUS EVERY 6 HOURS
Status: DISCONTINUED | OUTPATIENT
Start: 2019-10-29 | End: 2019-11-04

## 2019-10-29 RX ORDER — ACETAMINOPHEN 650 MG/1
650 SUPPOSITORY RECTAL ONCE
Status: COMPLETED | OUTPATIENT
Start: 2019-10-29 | End: 2019-10-29

## 2019-10-29 RX ORDER — HEPARIN SODIUM 1000 [USP'U]/ML
2200 INJECTION, SOLUTION INTRAVENOUS; SUBCUTANEOUS AS NEEDED
Status: DISCONTINUED | OUTPATIENT
Start: 2019-10-29 | End: 2019-10-30

## 2019-10-29 RX ORDER — ATENOLOL 50 MG/1
50 TABLET ORAL DAILY
Status: DISCONTINUED | OUTPATIENT
Start: 2019-10-30 | End: 2019-10-29

## 2019-10-29 RX ORDER — HEPARIN SODIUM 10000 [USP'U]/100ML
3-30 INJECTION, SOLUTION INTRAVENOUS
Status: DISCONTINUED | OUTPATIENT
Start: 2019-10-29 | End: 2019-10-30

## 2019-10-29 RX ADMIN — PRAVASTATIN SODIUM 40 MG: 20 TABLET ORAL at 17:24

## 2019-10-29 RX ADMIN — SODIUM CHLORIDE TAB 1 GM 1 G: 1 TAB at 06:50

## 2019-10-29 RX ADMIN — FAMOTIDINE 20 MG: 40 POWDER, FOR SUSPENSION ORAL at 08:47

## 2019-10-29 RX ADMIN — METOPROLOL TARTRATE 2.5 MG: 5 INJECTION, SOLUTION INTRAVENOUS at 15:12

## 2019-10-29 RX ADMIN — METOPROLOL TARTRATE 25 MG: 25 TABLET ORAL at 12:14

## 2019-10-29 RX ADMIN — NYSTATIN 500000 UNITS: 500000 SUSPENSION ORAL at 07:51

## 2019-10-29 RX ADMIN — SODIUM CHLORIDE TAB 1 GM 1 G: 1 TAB at 17:24

## 2019-10-29 RX ADMIN — FERROUS SULFATE TAB 325 MG (65 MG ELEMENTAL FE) 325 MG: 325 (65 FE) TAB at 06:50

## 2019-10-29 RX ADMIN — IOHEXOL 85 ML: 350 INJECTION, SOLUTION INTRAVENOUS at 19:03

## 2019-10-29 RX ADMIN — NYSTATIN 500000 UNITS: 500000 SUSPENSION ORAL at 17:23

## 2019-10-29 RX ADMIN — INSULIN LISPRO 1 UNITS: 100 INJECTION, SOLUTION INTRAVENOUS; SUBCUTANEOUS at 06:51

## 2019-10-29 RX ADMIN — ENOXAPARIN SODIUM 40 MG: 40 INJECTION SUBCUTANEOUS at 08:47

## 2019-10-29 RX ADMIN — HEPARIN SODIUM AND DEXTROSE 18 UNITS/KG/HR: 10000; 5 INJECTION INTRAVENOUS at 10:26

## 2019-10-29 RX ADMIN — SODIUM CHLORIDE 500 ML: 0.9 INJECTION, SOLUTION INTRAVENOUS at 00:30

## 2019-10-29 RX ADMIN — ASPIRIN 81 MG: 81 TABLET, COATED ORAL at 08:47

## 2019-10-29 RX ADMIN — HEPARIN SODIUM 2200 UNITS: 1000 INJECTION INTRAVENOUS; SUBCUTANEOUS at 17:43

## 2019-10-29 RX ADMIN — INSULIN LISPRO 1 UNITS: 100 INJECTION, SOLUTION INTRAVENOUS; SUBCUTANEOUS at 12:19

## 2019-10-29 RX ADMIN — METOPROLOL TARTRATE 2.5 MG: 5 INJECTION INTRAVENOUS at 09:52

## 2019-10-29 RX ADMIN — NYSTATIN 500000 UNITS: 500000 SUSPENSION ORAL at 12:18

## 2019-10-29 RX ADMIN — ACETAMINOPHEN 650 MG: 650 SUPPOSITORY RECTAL at 17:40

## 2019-10-29 NOTE — ASSESSMENT & PLAN NOTE
· Continue atenolol 50 mg daily  · Hold home dose ACEI  · Amlodipine on hold as well  · BP controlled without ACEI or CCB

## 2019-10-29 NOTE — SOCIAL WORK
Palliative meeting on Thursday  CM updated family SN[facility] choice [facility] with Ecin message   CM will follow

## 2019-10-29 NOTE — PROGRESS NOTES
General Cardiology   Progress Note -  Team One   [place] T[place] [de-identified] y o  female MRN: [id number]    Unit/Bed#: -01 Encounter: [id number]    Assessment/ Plan    New onset atrial fibrillation  Pt went into Afib with RVR last night around 2330  Remains in Afib with rates 100-120s  [id number], also has known splenic infarct  Started on IV heparin for anticoagulation due to possible need for LP (neurology to see today)  Once that is complete, can switch to any oral agent  Heme/onc suggested Eliquis which would be acceptable  Change atenolol to metoprolol tartrate 25 mg BID  Preserved LV function on LENO yesterday  Splenic infarct   LENO yesterday- LVEF 55%, moderate TR  No cardiac source of embolism identified  Placed back on tele Saturday- no events until went into Afib with RVR last night  Started on IV heparin for anticoagulation       Altered mental status- originally thought due to hyponatremia but that is now resolved and she is not back to baseline  Infectious work up negative, neurology consulted today- vasculitis vs  Autoimmune encephalitis       Fevers- infectious work up so far negative, being watched off of antibiotics       CAD- remote history of stenting  Does not follow with an outpatient cardiologist   Continue ASA, beta blocker, and statin      HTN- 24 hour average /66  Hypotensive overnight when first noted to be in afib, given IVF bolus with improved BP       HL- direct LDL 72 (2014)  Continue statin      DM2, controlled  A1c 6 4  Management per primary team      SIADH- nephrology following, hyponatremia resolved  Na 137  On 1500 mL FR and salt tabs      Chronic microcytic anemia- hemoglobin stable, 8 8  Subjective  Review of Systems   Unable to perform ROS: mental status change     Objective:   Vitals: Blood pressure 117/68, pulse (!) 128, temperature 99 3 °F (37 4 °C), resp  rate 18, height 5' 2" (1 575 m), weight 54 6 kg (120 lb 4 8 oz), SpO2 96 %  ,     Body mass index is 22 kg/m²  ,     Systolic (55AUA), BIU:061 , Min:92 , QNI:494     Diastolic (97AIY), HVV:49, Min:49, Max:100      Intake/Output Summary (Last 24 hours) at 10/29/2019 1043  Last data filed at 10/28/2019 1948  Gross per 24 hour   Intake 300 ml   Output 450 ml   Net -150 ml     Weight (last 2 days)     Date/Time   Weight    10/29/19 0600   54 6 (120 3)    10/28/19 0600   55 (121 31)    10/27/19 0600    pt unable to stand, bed scale malfunctioning    Weight: pt unable to stand, bed scale malfunctioning at 10/27/19 0600            Telemetry Review: Afib with RVR, occasional PVCs  Rates 100s-120s  EKG personally reviewed by JAQUELINE Zacarias  10/28 2346 Afib with RVR,     10/29 1044 Afib with RVR, PVC    Physical Exam   Constitutional: She is sleeping  She is easily aroused  No distress  Neck: Neck supple  No JVD present  Cardiovascular: Normal heart sounds and intact distal pulses  An irregularly irregular rhythm present  Tachycardia present  Exam reveals no gallop and no friction rub  No murmur heard  Pulmonary/Chest: Effort normal  No respiratory distress  Faint crackles at bases, room air  Abdominal: Soft  Bowel sounds are normal  She exhibits no distension  Musculoskeletal: She exhibits no edema  Neurological: She is easily aroused  GCS eye subscore is 3  GCS verbal subscore is 4  GCS motor subscore is 6  Oriented to self only  Skin: Skin is warm and dry  She is not diaphoretic  There is pallor       LABORATORY RESULTS      CBC with diff: Results from last 7 days   Lab Units 10/29/19  0625 10/28/19  0513 10/27/19  0542 10/26/19  0927 10/25/19  1142 10/24/19  0537 10/23/19  0534   WBC Thousand/uL 7 67 5 34 6 04 9 15 10 36* 5 79 6 45   HEMOGLOBIN g/dL 8 8* 8 1* 8 7* 8 6* 9 0* 9 3* 8 6*   HEMATOCRIT % 30 2* 27 3* 28 9* 28 1* 29 5* 30 8* 29 1*   MCV fL 78* 77* 75* 75* 75* 76* 77*   PLATELETS Thousands/uL 396* 296 377 369 397* 349 389   MCH pg 22 9* 22 8* 22 5* 23 0* 22 9* 22 8* 22 8*   MCHC g/dL 29 1* 29 7* 30 1* 30 6* 30 5* 30 2* 29 6*   RDW % 18 2* 18 1* 18 2* 18 1* 17 9* 18 1* 18 3*   MPV fL 9 7 10 1 9 6 9 6 9 9 9 7 10 1   NRBC AUTO /100 WBCs 0  --  0 0 0 0 0     CMP:  Results from last 7 days   Lab Units 10/29/19  0625 10/28/19  0513 10/27/19  0542 10/26/19  0529 10/25/19  0626 10/24/19  0537 10/23/19  0534   POTASSIUM mmol/L 4 2 4 1 3 8 3 6 4 1 3 9 4 2   CHLORIDE mmol/L 109* 107 106 107 105 103 108   CO2 mmol/L 21 23 22 22 23 22 22   BUN mg/dL 14 13 13 19 13 12 13   CREATININE mg/dL 0 43* 0 41* 0 44* 0 52* 0 54* 0 49* 0 56*   CALCIUM mg/dL 8 6 8 4 8 6 8 3 9 0 8 4 8 7   EGFR ml/min/1 73sq m 96 98 96 91 89 92 88     BMP:  Results from last 7 days   Lab Units 10/29/19  0625 10/28/19  0513 10/27/19  0542 10/26/19  0529 10/25/19  0626 10/24/19  0537 10/23/19  0534   POTASSIUM mmol/L 4 2 4 1 3 8 3 6 4 1 3 9 4 2   CHLORIDE mmol/L 109* 107 106 107 105 103 108   CO2 mmol/L 21 23 22 22 23 22 22   BUN mg/dL 14 13 13 19 13 12 13   CREATININE mg/dL 0 43* 0 41* 0 44* 0 52* 0 54* 0 49* 0 56*   CALCIUM mg/dL 8 6 8 4 8 6 8 3 9 0 8 4 8 7     Lab Results   Component Value Date    NTBNP 4,480 (H) 10/19/2019      Results from last 7 days   Lab Units 10/29/19  0625   MAGNESIUM mg/dL 2 0     Lipid Profile:   Lab Results   Component Value Date    CHOL 141 2014     Lab Results   Component Value Date    HDL 45 2014     Lab Results   Component Value Date    LDLCALC 72 2014     Lab Results   Component Value Date    TRIG 122 2014     Cardiac testing:   No results found for this or any previous visit    Results for orders placed during the hospital encounter of 10/14/19   LENO    Narrative [address]  [facility], [address] [facility]  [phone]    Transesophageal Echocardiogram  2D, Doppler, and Color Doppler    Study date:  28-Oct-2019    Patient: Jordin Enamorado  MR number: [id number]  Account number: [de-identified]  : 1940  Age: [de-identified] years  Gender: Female  Status: Inpatient  Location: Echo lab  Height: 62 in  Weight: 121 lb  BP: 116/ 60 mmHg    Indications: Arterial embolism and thrombosis  Diagnoses: I74 9 - Embolism and thrombosis of unspecified artery    Sonographer:  Laureen Mandel RDCS  Interpreting Physician:  Hernesto Negrete MD  Primary Physician:  Noe Garcia MD  Referring Physician:  Genia Sacks, MD  Group:  [facility]  Cardiology Fellow:  Cecy Mackenzie MD  RN:  Gonzalo Agudelo RN    SUMMARY    LEFT VENTRICLE:  Systolic function was normal  Ejection fraction was estimated to be 55 %  Although no diagnostic regional wall motion abnormality was identified, this possibility cannot be completely excluded on the basis of this study  LEFT ATRIAL APPENDAGE:  No thrombus was identified  ATRIAL SEPTUM:  No defect or patent foramen ovale was identified  Contrast injection was performed  There was no right-to-left shunt, with provocative maneuvers to increase right atrial pressure  TRICUSPID VALVE:  There was moderate regurgitation  AORTA:  There was diffuse atheromatous disease of the proximal and mid descending thoracic aorta  The atheroma were 2-3 mm thick, sessile with no evidence of ulceration or thrombus  HISTORY: PRIOR HISTORY: Hypertension  Hyperlipidemia  Diabetes  Bradycardia  Cardiac disease  Smoker  PROCEDURE: The procedure was performed in the echo lab  This was a routine study  The risks and alternatives of the procedure were explained to the patient's next of kin and informed consent was obtained  The transesophageal approach was  used  The study included complete 2D imaging, complete spectral Doppler, and color Doppler  The heart rate was 87 bpm, at the start of the study  An adult omniplane probe was inserted by the cardiology fellow under direct supervision of  the attending cardiologist  Intubated with moderate difficulty  Four intubation attempt(s)   There was no blood detected on the probe  Intravenous contrast (agitated saline, 10 ml) was administered to evaluate shunting  Image quality was  adequate  There were no complications during the procedure  MEDICATIONS: Anesthesia administered by anesthesia team     LEFT VENTRICLE: Size was normal  Systolic function was normal  Ejection fraction was estimated to be 55 %  Although no diagnostic regional wall motion abnormality was identified, this possibility cannot be completely excluded on the basis  of this study  Wall thickness was normal     RIGHT VENTRICLE: The size was normal  Systolic function was normal  Wall thickness was normal     LEFT ATRIUM: Size was normal  No thrombus was identified  APPENDAGE: The appendage was small  No thrombus was identified  DOPPLER: The function was normal (normal emptying velocity)  ATRIAL SEPTUM: No defect or patent foramen ovale was identified  Contrast injection was performed  There was no right-to-left shunt, with provocative maneuvers to increase right atrial pressure  RIGHT ATRIUM: No thrombus was identified  MITRAL VALVE: Valve structure was normal  There was normal leaflet separation  DOPPLER: There was mild regurgitation  AORTIC VALVE: The valve was trileaflet  Leaflets exhibited normal thickness and normal cuspal separation  DOPPLER: There was trace regurgitation  TRICUSPID VALVE: The valve structure was normal  There was normal leaflet separation  DOPPLER: There was moderate regurgitation  Estimated peak PA pressure was 30 mmHg  PULMONIC VALVE: Leaflets exhibited normal thickness, no calcification, and normal cuspal separation  PERICARDIUM: There was no pericardial effusion  The pericardium was normal in appearance  AORTA: The root exhibited normal size  There was diffuse atheromatous disease of the proximal and mid descending thoracic aorta  The atheroma were 2-3 mm thick, sessile with no evidence of ulceration or thrombus  There was no evidence for  dissection  There was no evidence for aneurysm      [address] Accredited Echocardiography Laboratory    Prepared and electronically signed by    Bekah Solo MD  Signed 28-Oct-2019 14:20:29       Meds/Allergies   all current active meds have been reviewed and current meds:   Current Facility-Administered Medications   Medication Dose Route Frequency    acetaminophen (TYLENOL) rectal suppository 650 mg  650 mg Rectal Q6H PRN    aluminum-magnesium hydroxide-simethicone (MYLANTA) 200-200-20 mg/5 mL oral suspension 30 mL  30 mL Oral Q6H PRN    aspirin (ECOTRIN LOW STRENGTH) EC tablet 81 mg  81 mg Oral Daily    [START ON 10/30/2019] atenolol (TENORMIN) tablet 50 mg  50 mg Oral Daily    bisacodyl (DULCOLAX) rectal suppository 10 mg  10 mg Rectal Daily PRN    docusate sodium (COLACE) capsule 100 mg  100 mg Oral BID PRN    famotidine (PEPCID) oral suspension 20 mg  20 mg Oral Daily    ferrous sulfate tablet 325 mg  325 mg Oral Daily With Breakfast    heparin (porcine) 25,000 units in 250 mL infusion (premix)  3-30 Units/kg/hr (Order-Specific) Intravenous Titrated    heparin (porcine) injection 2,200 Units  2,200 Units Intravenous PRN    heparin (porcine) injection 4,400 Units  4,400 Units Intravenous PRN    insulin lispro (HumaLOG) 100 units/mL subcutaneous injection 1-5 Units  1-5 Units Subcutaneous HS    insulin lispro (HumaLOG) 100 units/mL subcutaneous injection 1-6 Units  1-6 Units Subcutaneous TID AC    metoprolol (LOPRESSOR) injection 2 5 mg  2 5 mg Intravenous Q6H PRN    mirtazapine (REMERON) tablet 7 5 mg  7 5 mg Oral HS    nystatin (MYCOSTATIN) oral suspension 500,000 Units  500,000 Units Swish & Swallow 4x Daily    ondansetron (ZOFRAN) injection 4 mg  4 mg Intravenous Q6H PRN    pravastatin (PRAVACHOL) tablet 40 mg  40 mg Oral Daily With Dinner    sodium chloride tablet 1 g  1 g Oral BID With Meals     Medications Prior to Admission   Medication    amLODIPine (NORVASC) 10 mg tablet    atenolol (TENORMIN) 50 mg tablet    enalapril (VASOTEC) 20 mg tablet    glipiZIDE (GLUCOTROL) 10 mg tablet    metFORMIN (GLUCOPHAGE) 1000 MG tablet    omeprazole (PriLOSEC) 20 mg delayed release capsule    saxagliptin (ONGLYZA) 5 MG tablet    simvastatin (ZOCOR) 20 mg tablet    sodium chloride 1 g tablet       heparin (porcine) 3-30 Units/kg/hr (Order-Specific) Last Rate: 18 Units/kg/hr (10/29/19 1026)     Counseling / Coordination of Care  Total floor / unit time spent today 20 minutes  Greater than 50% of total time was spent with the patient and / or family counseling and / or coordination of care  ** Please Note: Dragon 360 Dictation voice to text software may have been used in the creation of this document   **

## 2019-10-29 NOTE — ASSESSMENT & PLAN NOTE
· Afib with RVR on 10 29 on tele intermittently  · TSH normal this admission  · K normal today  · LENO showed EF 55% without any intracardiac thrombus  · Obtain EKG  · netoprolol prn  · Changed atenolol BP holding goals from 098 to 465 systolic  · Iv heparin for AC given Afib and splenic infarct  · Cardiology on board, d/w team, they will re evaluate pt again today

## 2019-10-29 NOTE — ASSESSMENT & PLAN NOTE
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  · Presented with sodium of 126  Sodium 137 today  · TSH normal this admission  · Cosyntropin stim test negative for adrenal insufficiency  · CT chest abdomen pelvis was done at [facility] to rule out any occult malignancy as a cause of SIADH which was negative for any malignancy  · Continue with 1g Salt tabs BID and fluid restriction - resume 1500ml Plan will be to d/c on daily dosing per Dr Roseline Conner  · Sangeeta Mccarthy for d/c from a nephrology standpoint, will need outpatient f/u with [facility] nephrology  Recommending BMP in 1 week with results to PCP for further management of NACL tabs  Protonix changed to Pepcid  ACEI stopped as well    · Given additional salt tab today and given torsemide PO x 1 dose on 10/24  · BMP in AM

## 2019-10-29 NOTE — QUICK NOTE
Received a call from radiologist regarding iv infiltration in left arm and 80cc contrast dye in patient' s left arm  Evaluated the patient in her room  Her left arm was elevated with a pillow and ice pack was applied  Patient was sleeping and woke up on calling her name  Denied any pain or discomfort  LUE: slight swelling with mild induration? In  arm without any tenderness, swollen area is not tense, radial pulse palpable and warm extremity    - d/w nursing staff regarding closer monitoring of left arm overnight with arm elevation and ice pack  - If decompensates or left arm exam is worsening with concerns for necrosis>> Will need immediate surgical evaluation  - will also request my night team to evaluate patient again for LUE exam overnight  - metoprolol iv held due to systolic BP 91V with HR of 84  - Avoid using lUE for blood draws, meds, iv lines or BP monitoring overnight   -check BP every 4 hours overnight  - cautious iv hydration if needed  -consider upgrading level of care if any decompensation

## 2019-10-29 NOTE — PROGRESS NOTES
Progress note - Palliative and [facility] [de-identified] y o  female [id number]    Addendum 11:13 AM  I was able to reach the patient's daughter and gave her an update on the last 24 hours events  We scheduled a family meeting for 10/31/19 at 11:00AM   The primary team was updated  Assessment:              - Encephalopathy              - Hyponatremia              - splenic infarct              - Poor PO intake               - Oropharyngeal dysphagia              - anemia   - New onset Afib with RVR    This is an [de-identified]year old female who presents with unexplained fevers, encephalopathy with  poor PO intake, worsening oral pharyngeal dysphagia, splenic infarcts with new onset Afib with RVR that was captured on telemetry  Her infectious work up has been negative thus far and she is currently being watched off antibiotics  She was just started on heparin drip for anticoagulation in the setting of splenic infarct and new Afib  Neurology consult in pending and work up for vasculitis or will be initiated  We plan to get a hold of the daughter in order to schedule a family meeting to discuss goals of care if the work up continues to be negative      Plan:  1  Symptom management               - symptoms currently controlled     2  Goals               - will need to schedule meeting with the patient's daughter for family meeting   - I have left message with the patient's daughter in order to schedule a family meeting                 Code Status: Full code - Level 1              Decisional apparatus:  Patient is not competent on my exam today  If competence is lost, patient's substitute/ decision maker would default to next of kin by [place] 169  Interval history:       The patient went into new onset Afib with RVR overnight  She was asymptomatic at the time  She was started on heparin drip as this was thought to be the possible etiology of her splenic infarcts    Her LENO went well and no thrombus was demonstrated on the study  She reports she has no appetite today  She remains on modified diet  Neurology will be consulted today per the primary team in order to start vasculitis work up which was was added to the differential based on the patient's encephalopathy as well as unexplained fevers  We were unable to speak with the patient's daughter yesterday  A message was left for her to call the palliative care office  MEDICATIONS / ALLERGIES:     all current active meds have been reviewed    No Known Allergies    OBJECTIVE:    Physical Exam  Physical Exam   Constitutional: She appears well-developed  No distress  HENT:   Head: Normocephalic and atraumatic  Eyes: EOM are normal    Neck: No tracheal deviation present  Cardiovascular: Normal rate and regular rhythm  Pulmonary/Chest: Effort normal and breath sounds normal    Abdominal: Soft  Bowel sounds are normal    Musculoskeletal: She exhibits no edema  Neurological:   Seems slightly confused   Skin: Skin is warm and dry  Psychiatric: Her behavior is normal    Nursing note and vitals reviewed  Lab Results: I have personally reviewed pertinent labs

## 2019-10-29 NOTE — PROGRESS NOTES
Progress Note - Infectious Disease   Pam Oliveira [de-identified] y o  female MRN: [id number]  Unit/Bed#: -01 Encounter: [id number]      Impression/Plan:  1  Fever   Patient noted to have intermittent low-grade fever and isolated fever high fever  Johnny Birkenhead are no obvious sources on exam   Previous workup reviewed at [facility] which was largely unremarkable except for splenic infarct   Echo at that time was also unremarkable  Transesophageal echo here negative  Flu swab, chest x-ray, respiratory viral panel, urine culture and blood cultures have been unremarkable  CT of the head also unremarkable  RPR negative  Lower suspicion for infectious etiology  Sed rate and CRP noted to be significantly elevated  Would question if there is an autoimmune encephalitis/vasculitis (temporal arteritis) leading to this patient's presentation  No indication for antimicrobial therapy at this time  Continue to trend fever curve/vitals  Follow up pending cultures  Will send HIV testing for completeness  Follow-up Lyme testing  Neurology evaluation ongoing and plans for LP  Additional care as per primary     2  Hyponatremia   Sodium appears to be stable today   Ongoing follow-up by Nephrology      3  Metabolic encephalopathy   Patient initially presented confused likely due to problem 2  It seems that her mental status improved with adjustment in her sodium level   Workup for adrenal insufficiency was negative   Unclear etiology for her underlying hyponatremia and now intermittent fevers as above  Continue to monitor mental status  Continue to monitor electrolytes  Additional evaluations as above  Additional care as per primary     4  Splenic infarct   Appears to be evolving on imaging   Possibly contributing to problem 1   Unclear etiology for this lesion   Was not present on imaging in February   Recent echo at [facility] unremarkable   Patient without other prosthetic material   Transesophageal echo negative  Serial abdominal exams  Hematology evaluation appreciated  Additional care as per primary     5  Type 2 diabetes   Ongoing management as per primary service      Above plan discussed in detail with primary service      ID consult service will continue to follow  Antibiotics:  None    24 hour events:  Patient had fever to 103 yesterday, no further fevers thereafter  No new culture data  No new imaging  Patient's other vitals are stable  Patient's other labs unremarkable  Transesophageal echo noted  Sed rate and CRP ordered    Subjective:  Patient seen at bedside and again she is very slow to respond on exam   I attempted to show her pictures of family members and herself that are sitting on her window sill she could not recognize anybody in the pictures  She denied having any abdominal pain and she denies having any chest pain or shortness of breath  She again will almost seem exacerbated on exam and then started close her eyes and then wake up again to voice but is very slowed in all of her action  Objective:  Vitals:  Temp:  [98 6 °F (37 °C)-103 9 °F (39 9 °C)] 100 2 °F (37 9 °C)  HR:  [] 118  Resp:  [16-20] 18  BP: ()/() 116/67  SpO2:  [87 %-100 %] 98 %  Temp (24hrs), Av 8 °F (38 2 °C), Min:98 6 °F (37 °C), Max:103 9 °F (39 9 °C)  Current: Temperature: 100 2 °F (37 9 °C)    Physical Exam:   General Appearance:  Can be a woken by voice is very slowed with her actions and motion, nontoxic, no acute distress  Throat: Oropharynx moist without lesions  Lungs:   Clear to auscultation bilaterally on quite brief; no wheezes, rhonchi or rales; respirations unlabored on room air   Heart:  Irregular; no murmur, rub or gallop   Abdomen:   Soft, non-tender, non-distended, positive bowel sounds  Extremities: No clubbing, cyanosis or edema   Skin: No new rashes or lesions  No new draining wounds noted         Labs, Imaging, & Other studies:   All pertinent labs and imaging studies were personally reviewed  Results from last 7 days   Lab Units 10/29/19  0625 10/28/19  0513 10/27/19  0542   WBC Thousand/uL 7 67 5 34 6 04   HEMOGLOBIN g/dL 8 8* 8 1* 8 7*   PLATELETS Thousands/uL 396* 296 377     Results from last 7 days   Lab Units 10/29/19  0625   POTASSIUM mmol/L 4 2   CHLORIDE mmol/L 109*   CO2 mmol/L 21   BUN mg/dL 14   CREATININE mg/dL 0 43*   EGFR ml/min/1 73sq m 96   CALCIUM mg/dL 8 6     Results from last 7 days   Lab Units 10/25/19  1142   BLOOD CULTURE  No Growth at 72 hrs  No Growth at 72 hrs     INFLUENZA B PCR  Not Detected   RSV PCR  Not Detected

## 2019-10-29 NOTE — ASSESSMENT & PLAN NOTE
· Baseline seems between 8-11  · hgb 8 8   · Iron studies suggestive of low iron  · Continue  p o  iron supplementation which was started this admission    · CBC in AM

## 2019-10-29 NOTE — SPEECH THERAPY NOTE
Speech Language/Pathology    Speech/Language Pathology Progress Note    Patient Name: [place]  Today's Date: 10/29/2019       Subjective:  "I just want to nap"  Pt leaning R, difficulty keeping upright & repositioning  Current Diet: pt made npo again 2* coughing/gagging w/ pills & suctioning for mucous  Objective:  Pt seen w/ puree, ht & nt by tsp, nt by straw, thin by straw  Pt w/ weak seal today, oral holding has increased w/ pt needed cues to transfer or tsp to cue  Pt w/ mult swallows, no overt coughing  Then took small tsps & cup sips of the liquids again w/o difficulty  nsg brought in swish & swallow, pt able to take first portion  Noted oral holding but did swallow w/o incident  Upon offering the rest of it the pt presented w/ mult swallows, & gagging/close to vomiting noted  Then coughing occurred- ?able bottom up regurgitation? ? Pt sounded more like vomiting  Assessment:  Pt w/ ongoing debilitation, poor nutrition  Has been made npo several times & was previously not eating well  Will proceed w/ a VBS to assess for esophageal & pharyngeal component however a short term NG or keofeed should be considered as she is just getting weaker       Plan/Recommendations:  Please order VBS for tomorrow  Consider offered puree,ht by tsp until it can be completed  Speech to follow

## 2019-10-29 NOTE — CONSULTS
Consultation - Neurology   Ronan Carrillo [de-identified] y o  female MRN: [id number]  Unit/Bed#: -01 Encounter: [id number]      Assessment/Plan   Assessment:  43-year-old female with acute on chronic hyponatremia, adrenal insufficiency, multiple vascular risk factors as listed in HPI, new onset AFib with RVR, being further evaluated for encephalopathy  Subacute encephalopathy:  -per patient's daughter, patient was fully functional and independent, including all ADLs and IADLs prior to her admissions to [facility] in September 2019    -it appears she was admitted 3 times a [facility] with acute on chronic hyponatremia and altered mental status, as well as UTI   -since September, patient has had diminished appetite and has been confused, less responsive  -MRI brain on 09/27/2019 at [facility] demonstrated left inferior cerebellar mineralization but was otherwise unremarkable  CT brain this admission demonstrated chronic left lentiform nucleus lacunar infarct  -CT chest abdomen and pelvis performed at [facility] demonstrated no tumor or lymphadenopathy   -patient has been intermittently febrile during this admission, with temperature 103 9° on 10/28/2019   -concern for possible autoimmune encephalitis, and less likely, CNS vasculitis as etiology of encephalopathy  AFib with RVR:  -discovered on telemetry on 10/29/2019   -patient has been on heparin drip as patient was in dissipated to possibly have lumbar puncture performed as part of workup   -Cardiology on board  Fevers:  -intermittently low-grade at times higher as discussed above   -blood cultures negative  Urine culture positive for [id number] gram-negative rods, not felt to represent acute UTI  -currently being observed off antibiotics  Results:  -MRI brain on 09/27/2019 at [facility] demonstrated left inferior cerebellar mineralization but was otherwise unremarkable    CT brain this admission demonstrated chronic left lentiform nucleus lacunar infarct  -CT chest abdomen and pelvis performed at [facility] demonstrated no tumor or lymphadenopathy   -Nonreactive RPR, CRP greater than 90, , TSH 2 82, B12 443, and folate 4 9  Sodium on admission was 126 and is currently 137  -LENO demonstrated EF 55%, normal size atria, no MAC, no thrombus or PFO  Plan:  1  As discussed with patient's daughter, Karrie Pollard, over the phone (who consented to procedure), will perform lumbar puncture tomorrow morning with CSF labs as ordered  Heparin will be stopped at 0600 and stat PT/PTT will be performed at 0800  Aspirin also currently being held  2  Check additional serum lab work included thyroglobulin antibodies, anti TPO, Lyme serology  Also a repeat CRP, ESR, and JAMILAH  3  CTA head and neck  Examined and discussed with Dr Erin Patel  Discussed with patient's daughter, Karrie Pollard, over the phone  History of Present Illness     Reason for Consult / Principal Problem:  Encephalopathy  Hx and PE limited by:  Patient unable fully cooperate with interview/exam due to encephalopathy  HPI: [place] is a [de-identified] y o  right handed female with complicated recent medical history, including hypertension, dyslipidemia, DM2, acute on chronic hyponatremia, 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), CAD, splenic infarct, and most recently with AFib with RVR, presents with altered mental status  Per discussion with patient's daughter, Karrie Pollard, over the phone, patient was completely independent with ADLs/IADLs including driving and independent living prior to September 2019  She had multiple admissions to [facility] in September 2019 for acute on chronic hyponatremia, altered mental status, and UTI  Since September, she has had diminished appetite and has not returned to her premorbid cognitive baseline       She has had intermittent fevers this admission, with a fever of 103 9 yesterday  While at [facility], she had CT chest abdomen and pelvis, which was negative for tumor or lymphadenopathy  MRI brain performed on 09/27/2019 demonstrated left inferior cerebellar increased susceptibility consistent with mineralization  CT brain from this admission demonstrated chronic left lentiform nucleus lacunar infarct  Pertinent labs this admission include nonreactive RPR, CRP greater than 90, , TSH 2 82, B12 443, and folate 4 9  Sodium on admission was 126 and is currently 137  LENO demonstrated EF 55%, normal size atria, no MAC, no thrombus or PFO  She did have a tick bite back in July 2019 and does not appear she has been tested for Lyme  Blood cultures were negative after 5 days  Urine culture positive for 1404-3351 cfu/ml Gram negative rods  She is currently being observed off antibiotics  Inpatient consult to Neurology  Consult performed by: Wesly Johnson PA-C  Consult ordered by: Andreia Julian MD          Review of Systems   Unable to perform ROS: Mental status change       Historical Information   Past Medical History:   Diagnosis Date    Cardiac disease     Diabetes mellitus ([facility] [address] )     Hyperlipidemia     Hypertension      Past Surgical History:   Procedure Laterality Date    CORONARY ANGIOPLASTY WITH STENT PLACEMENT       Social History   Social History     Substance and Sexual Activity   Alcohol Use No     Social History     Substance and Sexual Activity   Drug Use No     Social History     Tobacco Use   Smoking Status Current Every Day Smoker    Packs/day: 0 20     Family History: No family history on file  Review of previous medical records was completed  Meds/Allergies   PTA meds:   Prior to Admission Medications   Prescriptions Last Dose Informant Patient Reported? Taking?    amLODIPine (NORVASC) 10 mg tablet   Yes No   Sig: Take 10 mg by mouth daily   atenolol (TENORMIN) 50 mg tablet   Yes No   Sig: Take 50 mg by mouth 2 (two) times a day   enalapril (VASOTEC) 20 mg tablet   Yes No   Sig: Take 20 mg by mouth 2 (two) times a day   glipiZIDE (GLUCOTROL) 10 mg tablet   Yes No   Sig: Take 10 mg by mouth 2 (two) times a day before meals   metFORMIN (GLUCOPHAGE) 1000 MG tablet   Yes No   Sig: Take 1,000 mg by mouth 2 (two) times a day with meals   omeprazole (PriLOSEC) 20 mg delayed release capsule   Yes No   Sig: Take 20 mg by mouth daily   saxagliptin (ONGLYZA) 5 MG tablet   Yes No   Sig: Take 5 mg by mouth daily   simvastatin (ZOCOR) 20 mg tablet   Yes No   Sig: Take 20 mg by mouth daily at bedtime   sodium chloride 1 g tablet   No No   Sig: Take 2 tablets by mouth 2 (two) times a day with meals for 30 days      Facility-Administered Medications: None       No Known Allergies    Objective   Vitals:Blood pressure 117/68, pulse (!) 128, temperature 99 3 °F (37 4 °C), resp  rate 18, height 5' 2" (1 575 m), weight 54 6 kg (120 lb 4 8 oz), SpO2 96 %  ,Body mass index is 22 kg/m²  Intake/Output Summary (Last 24 hours) at 10/29/2019 1445  Last data filed at 10/28/2019 1948  Gross per 24 hour   Intake    Output 450 ml   Net -450 ml       Invasive Devices: Invasive Devices     Peripheral Intravenous Line            Peripheral IV 10/28/19 Right Forearm 1 day          Drain            Urethral Catheter Straight-tip 16 Fr  14 days              Limited exam as patient not fully cooperative  Physical Exam   General:  Patient is well-developed, well-nourished, and in no acute distress  HEENT:  Head normocephalic  Eyes anicteric  Cardiovascular:  With irregularly irregular rhythm  Tachycardic  Abdomen:  Soft, nontender, with bowel sounds present  Extremities:  With no significant edema  Skin:  No rashes      Neurologic Exam  Mental Status:  Patient was somnolent and required repeated verbal stimulation to maintain alertness during exam   Able to state her full name and the correct year, but incorrectly stated her location as [facility], did not state the month or president  At times, when asked a question, patient would simply stare at examiner  Gait deferred for safety  Cranial Nerves:   II:  Bilateral blink to threat noted  Pupils equal, round, reactive to light with normal accomodation  Could not visualize optic fundi  III,IV,VI: extraocular movements intact with no nystagmus  V:  Patient uncooperative with sensory exam   VII: Face is symmetric with no weakness noted  VIII:  Could not assess  IX/X: Uvula midline  Soft palate elevation symmetric  XII: Tongue midline with no atrophy or fasciculations with appropriate movement  Coordination:  Could not assess  No abnormal/adventitious movements noted  Patient moving all 4 extremities without obvious lateralized weakness  Able to hold bilateral upper extremities in the air briefly  Patient uncooperative with sensory exam     Muscle stretch reflexes:  2 bilateral upper extremities, 2 right knee, 1+ left knee, absent bilateral ankles  Toe responses were downgoing bilaterally  Lab Results:   CBC:   Results from last 7 days   Lab Units 10/29/19  0625 10/28/19  0513 10/27/19  0542   WBC Thousand/uL 7 67 5 34 6 04   RBC Million/uL 3 85 3 56* 3 86   HEMOGLOBIN g/dL 8 8* 8 1* 8 7*   HEMATOCRIT % 30 2* 27 3* 28 9*   MCV fL 78* 77* 75*   PLATELETS Thousands/uL 396* 296 377   , BMP/CMP:   Results from last 7 days   Lab Units 10/29/19  0625 10/28/19  0513 10/27/19  0542   SODIUM mmol/L 137 137 137   POTASSIUM mmol/L 4 2 4 1 3 8   CHLORIDE mmol/L 109* 107 106   CO2 mmol/L 21 23 22   BUN mg/dL 14 13 13   CREATININE mg/dL 0 43* 0 41* 0 44*   CALCIUM mg/dL 8 6 8 4 8 6   EGFR ml/min/1 73sq m 96 98 96   , TSH:     Imaging Studies: I have personally reviewed pertinent reports     and I have personally reviewed pertinent films in PACS CT brain  EKG, Pathology, and Other Studies: I have personally reviewed pertinent films in PACS  VTE Prophylaxis: Sequential compression device Barb Brandt)     Code Status: Level 1 - Full Code  Advance Directive and Living Will:      Power of :    POLST:

## 2019-10-29 NOTE — ASSESSMENT & PLAN NOTE
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  Evolving splenic infarct  Additional chronic/incidental findings as described  · Discussed with hematology, to consider starting ASA  · Telemetry has been on since admission and is unremarkable for atrial fibrillation  Discontinued  · Hematology/Oncology recommending further cardiac evaluation, cardiology consulted will follow for Afib  · LENO negative for PFO, 55% EF  · Went in to Afib with RVR on 10/29 early morning>> started on iv heparin for now, await cardiology eval   · General surgery consult appreciated, no further workup from their standpoint, they have deferred to Hematology/Oncology

## 2019-10-29 NOTE — ASSESSMENT & PLAN NOTE
· POA  Likely in setting of hyponatremia with component of delirium contributing given recurrent hospitalizations/underlying cognitive impairment  · Urine culture unremarkable - monitor off antibiotics at this time  Pt remains asymptomatic  · Vasculitis vs autoimmune encephalitis? · Mental status seems to be at baseline although does wax and wane  On exam today pt is AO x3 and able to state she was brought to the hospital "for my sodium"  · She has hx of adrenal insufficiency, previously on steroids and then tapered off  She follows with Endocrinology as outpatient  · Cosyntropin stim test with adequate response in cortisol, hydrocortisone discontinued, she does not have adrenal insufficiency   · Vitamin B12 and folate normal   · MRI brain was done at [facility] last month which was negative for any acute infarct but possible small questionable hemorrhage versus area of mineralization  CT head without contrast in [facility] negative for bleed  · Geriatrics following, patient should follow up outpatient with them  · Repeat CT head this admission shows no acute abnormality, chronic lacunar infarct left lentiform nucleus  · Consult neurology to evaluate for need for LP and also to guide regarding further work up if needed for suspicion of vasculitis?   · Check ESR, CRP and JAMILAH  · Consider rheum eval if needed pending above work up and neurology eval

## 2019-10-29 NOTE — PROGRESS NOTES
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  · Presented with sodium of 126  Sodium 137 today  · TSH normal this admission  · Cosyntropin stim test negative for adrenal insufficiency  · CT chest abdomen pelvis was done at [facility] to rule out any occult malignancy as a cause of SIADH which was negative for any malignancy  · Continue with 1g Salt tabs BID and fluid restriction - resume 1500ml Plan will be to d/c on daily dosing per Dr Johnson Jones  · [address] Cherrie Dumas for d/c from a nephrology standpoint, will need outpatient f/u with [facility] nephrology  Recommending BMP in 1 week with results to PCP for further management of NACL tabs  Protonix changed to Pepcid  ACEI stopped as well  · Given additional salt tab today and given torsemide PO x 1 dose on 10/24  · BMP in AM    * Metabolic encephalopathy  Assessment & Plan  · POA  Likely in setting of hyponatremia with component of delirium contributing given recurrent hospitalizations/underlying cognitive impairment  · Urine culture unremarkable - monitor off antibiotics at this time  Pt remains asymptomatic  · Vasculitis vs autoimmune encephalitis? · Mental status seems to be at baseline although does wax and wane  On exam today pt is AO x3 and able to state she was brought to the hospital "for my sodium"  · She has hx of adrenal insufficiency, previously on steroids and then tapered off  She follows with Endocrinology as outpatient      · Cosyntropin stim test with adequate response in cortisol, hydrocortisone discontinued, she does not have adrenal insufficiency   · Vitamin B12 and folate normal   · MRI brain was done at [facility] last month which was negative for any acute infarct but possible small questionable hemorrhage versus area of mineralization  CT head without contrast in [facility] negative for bleed  · Geriatrics following, patient should follow up outpatient with them  · Repeat CT head this admission shows no acute abnormality, chronic lacunar infarct left lentiform nucleus  · Consult neurology to evaluate for need for LP and also to guide regarding further work up if needed for suspicion of vasculitis? · Check ESR, CRP and JAMILAH  · Consider rheum eval if needed pending above work up and neurology eval     A-fib ([address] )  Assessment & Plan  · Afib with RVR on 10 29 on tele intermittently  · TSH normal this admission  · K normal today  · LENO showed EF 55% without any intracardiac thrombus  · Obtain EKG  · netoprolol prn  · Changed atenolol BP holding goals from 109 to 040 systolic  · Iv heparin for AC given Afib and splenic infarct  · Cardiology on board, d/w team, they will re evaluate pt again today  Splenic infarct  Assessment & Plan  · Noted on CT scan done at [facility] 9/2019, new moderate sized splenic infarcts  · Unclear etiology of splenic infarcts  Hematology consult appreciated, recommending repeat CTA abdomen and pelvis to evaluate for source of emboli  Recent echocardiogram done September 30th at [facility] was negative for thrombus  · CTA abdomen/pelvis:  No acute aortic injury or aneurysm  Patent celiac and splenic arteries  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  Evolving splenic infarct  Additional chronic/incidental findings as described  · Discussed with hematology, to consider starting ASA  · Telemetry has been on since admission and is unremarkable for atrial fibrillation  Discontinued  · Hematology/Oncology recommending further cardiac evaluation, cardiology consulted will follow for Afib  · LENO negative for PFO, 55% EF    · Went in to Afib with RVR on 10/29 early morning>> started on iv heparin for now, await cardiology eval   · General surgery consult appreciated, no further workup from their standpoint, they have deferred to Hematology/Oncology  Microcytic anemia  Assessment & Plan  · Baseline seems between 8-11  · hgb 8 8   · Iron studies suggestive of low iron  · Continue  p o  iron supplementation which was started this admission  · CBC in AM     Low grade fever  Assessment & Plan  · She had been afebrile x 72 hrs however morning of 10/25 fever as high as 102 5  · Possibly related to splenic infarct  · Urine culture not significant this admission, [id number] GNR  At [facility], pt was noted to have ESBL in urine which was suspected to be colonization  · Low suspicion of viral encephalitis  · ??vasculitis ? · Admission blood cultrues are neg  · Repeat CXR negative  · WBCs within normal limits  · Flu/RSV negative  · Blood culture negative at 72 hours  · Infectious Disease following, appreciate input  · Continue to monitor off ABX  · Will check JAMILAH, sed rate, CRP  · Consult neurology to evaluate for need for LP and also to guide regarding further work up if needed for suspicion of vasculitis? Severe protein-calorie malnutrition ([facility] [address] )  Assessment & Plan  Malnutrition Findings:   Malnutrition type: Chronic illness(Related to medical condition as evidenced by 10% weight loss over the past month and <75% energy intake needs met >1 month treated with ensure compact supplements)  Degree of Malnutrition: Other severe protein calorie malnutrition    BMI Findings: Body mass index is 22 kg/m²  Reported weight has fluctuated significantly throughout hospitalization, will work on obtaining more accurate weights  Nutrition consult and supplements      Urinary retention  Assessment & Plan  · Patient has chronic Herrera present on admission- herrera was replaced in ED 10/14   · Placed on recent admission (10/3/19) at [facility] where she developed urinary retention  · Patient will follow up outpatient with urology for voiding trial     Failure to thrive in adult  Assessment & Plan  · Since September she has had a progressive decline - she has had no appetite, not really eating/drinking with weakness  She was previously reportedly very independent prior to her admission at [facility] in September with UTI  · Geriatrics following, recommending outpatient follow up  · Speech following, on soft diet  · No recent colonoscopy/EGD, recommend outpatient GI evaluation  HLD (hyperlipidemia)  Assessment & Plan  · Continue statin    Essential hypertension  Assessment & Plan  · Continue atenolol 50 mg daily  · Hold home dose ACEI  · Amlodipine on hold as well  · BP controlled without ACEI or CCB  Type 2 diabetes mellitus with diabetic neuropathy, without long-term current use of insulin [facility])  Assessment & Plan  Lab Results   Component Value Date    HGBA1C 6 4 (H) 10/15/2019     Recent Labs     10/28/19  1026 10/28/19  1613 10/28/19  2116 10/29/19  0650   POCGLU 124 125 142* 150*       · Takes metformin and glipizide at baseline, will plan to resume metformin at discharge and hold glipizide  · Continue with SSI  · Diabetic diet  · Monitor accuchecks, avoid hypoglycemia        VTE Pharmacologic Prophylaxis:   Pharmacologic: Heparin  Mechanical VTE Prophylaxis in Place: Yes    Patient Centered Rounds: I have performed bedside rounds with nursing staff today  Discussions with Specialists or Other Care Team Provider: GAYATRI PATTERSON, palliative care    Time Spent for Care: 30 minutes  More than 50% of total time spent on counseling and coordination of care as described above      Current Length of Stay: 15 day(s)    Current Patient Status: Inpatient   Certification Statement: The patient will continue to require additional inpatient hospital stay due to not medically stable    Discharge Plan: when medically stable    Code Status: Level 1 - Full Code      Subjective:   Overnight events of afib with RVR noted  Pt is asymptomatic  Reports she feels comfortable and does not have any pain  Still with intermittent temp spikes  Objective:     Vitals:   Temp (24hrs), Av 6 °F (38 1 °C), Min:98 6 °F (37 °C), Max:103 9 °F (39 9 °C)    Temp:  [98 6 °F (37 °C)-103 9 °F (39 9 °C)] 99 3 °F (37 4 °C)  HR:  [] 128  Resp:  [16-20] 18  BP: ()/() 117/68  SpO2:  [87 %-100 %] 96 %  Body mass index is 22 kg/m²  Input and Output Summary (last 24 hours): Intake/Output Summary (Last 24 hours) at 10/29/2019 1038  Last data filed at 10/28/2019 1948  Gross per 24 hour   Intake 300 ml   Output 450 ml   Net -150 ml       Physical Exam:     Physical Exam   Constitutional: No distress  Eyes: Pupils are equal, round, and reactive to light  Cardiovascular: Normal heart sounds  No murmur heard  Irregular, tachy     Pulmonary/Chest: Effort normal and breath sounds normal  No respiratory distress  She has no wheezes  She has no rales  Abdominal: Soft  Bowel sounds are normal  She exhibits no distension  There is no tenderness  Musculoskeletal: She exhibits no edema  Neurological: She is alert  Awake and communicative  Squeezes fingers and wiggles toes b/l   Skin: Skin is warm  Additional Data:     Labs:    Results from last 7 days   Lab Units 10/29/19  0625  10/27/19  0542   WBC Thousand/uL 7 67   < > 6 04   HEMOGLOBIN g/dL 8 8*   < > 8 7*   HEMATOCRIT % 30 2*   < > 28 9*   PLATELETS Thousands/uL 396*   < > 377   BANDS PCT % 1  --   --    NEUTROS PCT %  --   --  83*   LYMPHS PCT %  --   --  11*   LYMPHO PCT % 3*  --   --    MONOS PCT %  --   --  3*   MONO PCT % 3*  --   --    EOS PCT % 0  --  2    < > = values in this interval not displayed       Results from last 7 days   Lab Units 10/29/19  0625   SODIUM mmol/L 137   POTASSIUM mmol/L 4 2   CHLORIDE mmol/L 109*   CO2 mmol/L 21   BUN mg/dL 14   CREATININE mg/dL 0 43*   ANION GAP mmol/L 7   CALCIUM mg/dL 8 6   GLUCOSE RANDOM mg/dL 151*         Results from last 7 days   Lab Units 10/29/19  0650 10/28/19  2116 10/28/19  1613 10/28/19  1026 10/28/19  0715 10/27/19  2047 10/27/19  1603 10/27/19  1037 10/27/19  0547 10/26/19  2104 10/26/19  1607 10/26/19  1112   POC GLUCOSE mg/dl 150* 142* 125 124 126 147* 148* 181* 124 117 101 180*         Results from last 7 days   Lab Units 10/26/19  0529   PROCALCITONIN ng/ml 0 93*           * I Have Reviewed All Lab Data Listed Above  * Additional Pertinent Lab Tests Reviewed: All Labs Within Last 24 Hours Reviewed    Imaging:    CT head wo contrast   Final Result by [facility] (10/26 1715)      No acute intracranial abnormality  Microangiopathic changes  Chronic lacunar infarct left lentiform nucleus  Workstation performed: [id number]         XR chest pa & lateral   Final Result by 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  4   Evolving splenic infarct  5   Additional chronic/incidental findings as described  Workstation performed: [id number]         XR abdomen 1 vw portable   Final Result by West Ruiz MD (10/21 6232)      There is a nonobstructive bowel gas pattern  Workstation performed: [id number]         XR chest pa & lateral   Final Result by Cintia Butcher MD (10/19 0919)      Minimal left basilar airspace disease attributed to compressive atelectasis secondary small left pleural effusion  Correlate with clinical findings to exclude pneumonia and/or aspiration                    Workstation performed: [id number] Recent Cultures (last 7 days):     Results from last 7 days   Lab Units 10/25/19  1142   BLOOD CULTURE  No Growth at 72 hrs  No Growth at 72 hrs  INFLUENZA B PCR  Not Detected   RSV PCR  Not Detected       Last 24 Hours Medication List:     Current Facility-Administered Medications:  acetaminophen 650 mg Rectal Q6H PRN Herman Rueda DO    aluminum-magnesium hydroxide-simethicone 30 mL Oral Q6H PRN Rome Watts MD    aspirin 81 mg Oral Daily [address] [facility] LUPE MAHAN    [START ON 10/30/2019] atenolol 50 mg Oral Daily Melissa Griffiths MD    bisacodyl 10 mg Rectal Daily PRN Heaven MAHAN PA-C    docusate sodium 100 mg Oral BID PRN Rome Watts MD    famotidine 20 mg Oral Daily Angelica Wang MD    ferrous sulfate 325 mg Oral Daily With Breakfast Melissa Griffiths MD    heparin (porcine) 3-30 Units/kg/hr (Order-Specific) Intravenous Titrated Melissa Griffiths MD Last Rate: 18 Units/kg/hr (10/29/19 1026)   heparin (porcine) 2,200 Units Intravenous PRN Melissa Griffiths MD    heparin (porcine) 4,400 Units Intravenous PRN Melissa Griffiths MD    insulin lispro 1-5 Units Subcutaneous HS Rome Watts MD    insulin lispro 1-6 Units Subcutaneous TID AC Rome Watts MD    metoprolol 2 5 mg Intravenous Q6H PRN Melissa Griffiths MD    mirtazapine 7 5 mg Oral HS Heaven MAHAN PA-C    nystatin 500,000 Units Swish & Swallow 4x Daily Heaven MAHAN PA-C    ondansetron 4 mg Intravenous Q6H PRN Rome Watts MD    pravastatin 40 mg Oral Daily With Grace Grove MD    sodium chloride 1 g Oral BID With Meals Angelica Wang MD         Today, Patient Was Seen By: Melissa Griffiths MD    ** Please Note: Dictation voice to text software may have been used in the creation of this document   **

## 2019-10-29 NOTE — PROGRESS NOTES
Progress Note - Nephrology   Ulissesncmarilou Osorio [de-identified] y o  female MRN: [id number]  Unit/Bed#: -01 Encounter: [id number]      Assessment / Plan:    1  Acute on chronic hyponatremia:  Admitted with a sodium of 126  Baseline sodium around 126-135   In the past was diagnosed with adrenal insufficiency but cortisol okay   Suspected to have SIADH this admission  ? ACE-inhibitor and PPI could cause SIADH --Protonix changed to Pepcid and Lisinopril was discontinued as her blood pressure is running low and lisinopril can cause SIADH  ? Workup:  Serum osmolality 276, urine osmolality 624, urine sodium 90, uric acid 4 1, cortisol 40 8, TSH 2 8, normal LFTs except for albumin of 2 2, chest x-ray with minimal left basilar airspace disease attributed to compressive atelectasis and small left pleural effusion, echocardiogram at [facility] revealed ejection fraction of 50-55% with mild pulmonary hypertension, mild mitral regurgitation and tricuspid regurgitation / ACT H was low at 1 6 at [facility] [place] cortisol of 19 4) // LVH CT chest, abdomen and pelvis on 09/29 at [facility]:  No signs of malignancy   There were new small bilateral pleural effusions with new infarcts involving the medial aspect of the spleen  Low ACTH level at [facility] was likely a lab error   Would have her PCP recheck as an outpatient  ? SPEP faint possible band in the gamma region   Immunofixation pending - [facility]  ? Sodium level  has improved and remains normal   ? Continue sodium chloride 1 g p o  B i d  and fluid restriction at 1500 mL per day  ? Check a m  BMP   2  Anemia:  Hemoglobin 8 1 today  ? Iron saturation 7%, ferritin 608  ? Started on oral iron by primary team  ? Management per primary team  3  Encephalopathy:  Originally Due to hyponatremia and UTI   Overall, mental status seems to have improved   4   Hypertension:  Blood pressure running on the low side but seems to have improved  · Continue to hold lisinopril and  Norvasc   · Trend blood pressure with these changes  5  Fever  · Cultures negative so far  · Per Infectious Disease  · Currently afebrile  6  Splenic infarct  · Workup and management per primary team and Hematology  · CT of abdomen and pelvis/ LENO results noted  · Patient did go into atrial fibrillation with RVR early this morning  Started on heparin  Cardiology to evaluate    Discussed and coordinated care with Dr Adrian Blandon this a m  Subjective: The patient was seen examined  She denies any shortness of breath, chest pain or pressure, abdominal pain  She seems to be status quo compared to yesterday  Her case was reviewed with her bedside nurse as well as Dr Adrian Blandon today  Objective:     Vitals: Blood pressure 117/68, pulse (!) 128, temperature 99 3 °F (37 4 °C), resp  rate 18, height 5' 2" (1 575 m), weight 54 6 kg (120 lb 4 8 oz), SpO2 96 %  ,Body mass index is 22 kg/m²  Temp (24hrs), Av 6 °F (38 1 °C), Min:98 6 °F (37 °C), Max:103 9 °F (39 9 °C)      Weight (last 2 days)     Date/Time   Weight    10/29/19 0600   54 6 (120 3)    10/28/19 0600   55 (121 31)    10/27/19 0600    pt unable to stand, bed scale malfunctioning    Weight: pt unable to stand, bed scale malfunctioning at 10/27/19 0600                     Intake/Output Summary (Last 24 hours) at 10/29/2019 1121  Last data filed at 10/28/2019 1948  Gross per 24 hour   Intake 300 ml   Output 450 ml   Net -150 ml     I/O last 24 hours: In: 300 [I V :300]  Out: 450 [Urine:450]        Physical Exam    Physical Exam   Constitutional: No distress  Neck: No JVD present  Cardiovascular: Normal heart sounds  Exam reveals no gallop and no friction rub  Pulmonary/Chest: Effort normal and breath sounds normal  No respiratory distress  She has no wheezes  She has no rales  Abdominal: Soft  Bowel sounds are normal  She exhibits no distension  There is no tenderness  There is no rebound and no guarding  Musculoskeletal: She exhibits no edema  Skin: She is not diaphoretic  Vitals reviewed                Invasive Devices     Peripheral Intravenous Line            Peripheral IV 10/28/19 Right Forearm less than 1 day          Drain            Urethral Catheter Straight-tip 16 Fr  14 days                Medications:    Scheduled Meds:  Current Facility-Administered Medications:  acetaminophen 650 mg Rectal Q6H PRN Herman Rueda DO    aluminum-magnesium hydroxide-simethicone 30 mL Oral Q6H PRN Nithin Arellano MD    aspirin 81 mg Oral Daily Heaven MAHAN PA-C    bisacodyl 10 mg Rectal Daily PRN Heaven MAHAN PA-C    docusate sodium 100 mg Oral BID PRN Nithin Arellano MD    famotidine 20 mg Oral Daily Andre Whitley MD    ferrous sulfate 325 mg Oral Daily With Breakfast Ross Triplett MD    heparin (porcine) 3-30 Units/kg/hr (Order-Specific) Intravenous Titrated Ross Triplett MD Last Rate: 18 Units/kg/hr (10/29/19 1026)   heparin (porcine) 2,200 Units Intravenous PRN Ross Triplett MD    heparin (porcine) 4,400 Units Intravenous PRN Ross Triplett MD    insulin lispro 1-5 Units Subcutaneous HS Nithin Arellano MD    insulin lispro 1-6 Units Subcutaneous TID [facility] Nithin Arellano MD    metoprolol 2 5 mg Intravenous Q6H PRN Ross Triplett MD    metoprolol tartrate 25 mg Oral Q12H [facility] JAQUELINE Bhatia    mirtazapine 7 5 mg Oral HS Heaven MAHAN PA-C    nystatin 500,000 Units Swish & Swallow 4x Daily Heaven MAHAN PA-C    ondansetron 4 mg Intravenous Q6H PRN Nithin Arellano MD    pravastatin 40 mg Oral Daily With Radha Mitchell MD    sodium chloride 1 g Oral BID With Meals nAdre Whitley MD        PRN Meds:   acetaminophen    aluminum-magnesium hydroxide-simethicone    bisacodyl    docusate sodium    heparin (porcine)    heparin (porcine)    metoprolol    ondansetron    Continuous Infusions:  heparin (porcine) 3-30 Units/kg/hr (Order-Specific) Last Rate: 18 Units/kg/hr (10/29/19 1026) LAB RESULTS:      Results from last 7 days   Lab Units 10/29/19  0625 10/28/19  0513 10/27/19  0542 10/26/19  5161 10/26/19  0529 10/25/19  1142 10/25/19  0626 10/24/19  0537 10/23/19  0534   WBC Thousand/uL 7 67 5 34 6 04 9 15  --  10 36*  --  5 79 6 45   HEMOGLOBIN g/dL 8 8* 8 1* 8 7* 8 6*  --  9 0*  --  9 3* 8 6*   HEMATOCRIT % 30 2* 27 3* 28 9* 28 1*  --  29 5*  --  30 8* 29 1*   PLATELETS Thousands/uL 396* 296 377 369  --  397*  --  349 389   NEUTROS PCT %  --   --  83* 83*  --  89*  --  77* 70   LYMPHS PCT %  --   --  11* 11*  --  7*  --  17 21   LYMPHO PCT % 3*  --   --   --   --   --   --   --   --    MONOS PCT %  --   --  3* 4  --  3*  --  4 6   MONO PCT % 3*  --   --   --   --   --   --   --   --    EOS PCT % 0  --  2 1  --  0  --  1 1   POTASSIUM mmol/L 4 2 4 1 3 8  --  3 6  --  4 1 3 9 4 2   CHLORIDE mmol/L 109* 107 106  --  107  --  105 103 108   CO2 mmol/L 21 23 22  --  22  --  23 22 22   BUN mg/dL 14 13 13  --  19  --  13 12 13   CREATININE mg/dL 0 43* 0 41* 0 44*  --  0 52*  --  0 54* 0 49* 0 56*   CALCIUM mg/dL 8 6 8 4 8 6  --  8 3  --  9 0 8 4 8 7   MAGNESIUM mg/dL 2 0  --   --   --   --   --   --   --   --        CUTURES:  Lab Results   Component Value Date    BLOODCX No Growth at 72 hrs  10/25/2019    BLOODCX No Growth at 72 hrs  10/25/2019    BLOODCX No Growth After 5 Days  10/14/2019    BLOODCX No Growth After 5 Days  10/14/2019    URINECX 8012-0071 cfu/ml Gram Negative Nirav (A) 10/15/2019    URINECX >100,000 cfu/ml Escherichia coli 07/16/2017                 PLEASE NOTE:  This encounter was completed utilizing the [employer] Direct Speech Voice Recognition Software  Grammatical errors, random word insertions, pronoun errors and incomplete sentences are occasional consequences of the system due to software limitations, ambient noise and hardware issues  These may be missed by proof reading prior to affixing electronic signature   Any questions or concerns about the content, text or information contained within the body of this dictation should be directly addressed to the physician for clarification  Please do not hesitate to call me directly if you have any any questions or concerns

## 2019-10-29 NOTE — ASSESSMENT & PLAN NOTE
· Patient has chronic Herrera present on admission- herrera was replaced in ED 10/14   · Placed on recent admission (10/3/19) at [facility] where she developed urinary retention    · Patient will follow up outpatient with urology for voiding trial

## 2019-10-29 NOTE — QUICK NOTE
RN called to notify that telemetry was showing afib with RVR  RN got EKG  Pt does not have known history of afib  She was sleeping when afib began and continued to be asymptomatic  RN had called cardiology since they are consulted; cardiology wanted SLIM to evaluate pt  Pt seen and evaluated  She was sleeping when I entered the room, woke up to voice  RN reports she is not oriented at baseline  Pt seemed sleepy and closed her eyes multiple times throughout encounter  She shook her head "no" when I asked if she was having chest pain or palpitations  VSS, BP noted to be 92/61  Will give 500mL NSS bolus over 30 minutes  Pt is currently anticoagulated on lovenox

## 2019-10-29 NOTE — ASSESSMENT & PLAN NOTE
· Since September she has had a progressive decline - she has had no appetite, not really eating/drinking with weakness  She was previously reportedly very independent prior to her admission at [facility] in September with UTI  · Geriatrics following, recommending outpatient follow up  · Speech following, on soft diet  · No recent colonoscopy/EGD, recommend outpatient GI evaluation

## 2019-10-29 NOTE — ASSESSMENT & PLAN NOTE
Lab Results   Component Value Date    HGBA1C 6 4 (H) 10/15/2019     Recent Labs     10/28/19  1026 10/28/19  1613 10/28/19  2116 10/29/19  0650   POCGLU 124 125 142* 150*       · Takes metformin and glipizide at baseline, will plan to resume metformin at discharge and hold glipizide    · Continue with SSI  · Diabetic diet  · Monitor accuchecks, avoid hypoglycemia

## 2019-10-29 NOTE — ASSESSMENT & PLAN NOTE
· She had been afebrile x 72 hrs however morning of 10/25 fever as high as 102 5  · Possibly related to splenic infarct  · Urine culture not significant this admission, 1104-8266 GNR  At / [facility], pt was noted to have ESBL in urine which was suspected to be colonization  · Low suspicion of viral encephalitis  · ??vasculitis ? · Admission blood cultrues are neg  · Repeat CXR negative  · WBCs within normal limits  · Flu/RSV negative  · Blood culture negative at 72 hours  · Infectious Disease following, appreciate input  · Continue to monitor off ABX  · Will check JAMILAH, sed rate, CRP  · Consult neurology to evaluate for need for LP and also to guide regarding further work up if needed for suspicion of vasculitis?

## 2019-10-29 NOTE — ASSESSMENT & PLAN NOTE
Malnutrition Findings:   Malnutrition type: Chronic illness(Related to medical condition as evidenced by 10% weight loss over the past month and <75% energy intake needs met >1 month treated with ensure compact supplements)  Degree of Malnutrition: Other severe protein calorie malnutrition    BMI Findings: Body mass index is 22 kg/m²  Reported weight has fluctuated significantly throughout hospitalization, will work on obtaining more accurate weights  Nutrition consult and supplements

## 2019-10-30 ENCOUNTER — APPOINTMENT (OUTPATIENT)
Dept: RADIOLOGY | Facility: HOSPITAL | Age: 79
DRG: 545 | End: 2019-10-30
Attending: INTERNAL MEDICINE
Payer: MEDICARE

## 2019-10-30 PROBLEM — T80.1XXA IV INFILTRATION: Status: ACTIVE | Noted: 2019-10-30

## 2019-10-30 LAB
ANION GAP SERPL CALCULATED.3IONS-SCNC: 5 MMOL/L (ref 4–13)
APPEARANCE CSF: CLEAR
APTT PPP: 29 SECONDS (ref 23–37)
APTT PPP: 38 SECONDS (ref 23–37)
APTT PPP: 46 SECONDS (ref 23–37)
APTT PPP: 90 SECONDS (ref 23–37)
B BURGDOR IGG+IGM SER-ACNC: <0.91 ISR (ref 0–0.9)
BACTERIA BLD CULT: NORMAL
BACTERIA BLD CULT: NORMAL
BASOPHILS # BLD AUTO: 0.03 THOUSANDS/ΜL (ref 0–0.1)
BASOPHILS NFR BLD AUTO: 0 % (ref 0–1)
BUN SERPL-MCNC: 17 MG/DL (ref 5–25)
C GATTII+NEOFOR DNA CSF QL NAA+NON-PROBE: NOT DETECTED
CALCIUM SERPL-MCNC: 8.6 MG/DL (ref 8.3–10.1)
CHLORIDE SERPL-SCNC: 110 MMOL/L (ref 100–108)
CMV DNA CSF QL NAA+NON-PROBE: NOT DETECTED
CO2 SERPL-SCNC: 25 MMOL/L (ref 21–32)
CREAT SERPL-MCNC: 0.43 MG/DL (ref 0.6–1.3)
E COLI K1 DNA CSF QL NAA+NON-PROBE: NOT DETECTED
EOSINOPHIL # BLD AUTO: 0.02 THOUSAND/ΜL (ref 0–0.61)
EOSINOPHIL NFR BLD AUTO: 0 % (ref 0–6)
ERYTHROCYTE [DISTWIDTH] IN BLOOD BY AUTOMATED COUNT: 18.4 % (ref 11.6–15.1)
EV RNA CSF QL NAA+NON-PROBE: NOT DETECTED
GFR SERPL CREATININE-BSD FRML MDRD: 96 ML/MIN/1.73SQ M
GLUCOSE CSF-MCNC: 65 MG/DL (ref 50–80)
GLUCOSE SERPL-MCNC: 106 MG/DL (ref 65–140)
GLUCOSE SERPL-MCNC: 114 MG/DL (ref 65–140)
GLUCOSE SERPL-MCNC: 120 MG/DL (ref 65–140)
GLUCOSE SERPL-MCNC: 135 MG/DL (ref 65–140)
GLUCOSE SERPL-MCNC: 97 MG/DL (ref 65–140)
GP B STREP DNA CSF QL NAA+NON-PROBE: NOT DETECTED
HAEM INFLU DNA CSF QL NAA+NON-PROBE: NOT DETECTED
HCT VFR BLD AUTO: 26 % (ref 34.8–46.1)
HCT VFR BLD AUTO: 26.8 % (ref 34.8–46.1)
HGB BLD-MCNC: 7.6 G/DL (ref 11.5–15.4)
HGB BLD-MCNC: 7.9 G/DL (ref 11.5–15.4)
HHV6 DNA CSF QL NAA+NON-PROBE: NOT DETECTED
HIV 1+2 AB+HIV1 P24 AG SERPL QL IA: NORMAL
HSV1 DNA CSF QL NAA+NON-PROBE: NOT DETECTED
HSV2 DNA CSF QL NAA+NON-PROBE: NOT DETECTED
IMM GRANULOCYTES # BLD AUTO: 0.05 THOUSAND/UL (ref 0–0.2)
IMM GRANULOCYTES NFR BLD AUTO: 1 % (ref 0–2)
INR PPP: 1.26 (ref 0.84–1.19)
L MONOCYTOG DNA CSF QL NAA+NON-PROBE: NOT DETECTED
LYMPHOCYTES # BLD AUTO: 1.06 THOUSANDS/ΜL (ref 0.6–4.47)
LYMPHOCYTES NFR BLD AUTO: 15 % (ref 14–44)
LYMPHOCYTES NFR CSF MANUAL: 87 %
MAGNESIUM SERPL-MCNC: 2.1 MG/DL (ref 1.6–2.6)
MCH RBC QN AUTO: 22.8 PG (ref 26.8–34.3)
MCHC RBC AUTO-ENTMCNC: 29.5 G/DL (ref 31.4–37.4)
MCV RBC AUTO: 77 FL (ref 82–98)
MONOCYTES # BLD AUTO: 0.42 THOUSAND/ΜL (ref 0.17–1.22)
MONOCYTES NFR BLD AUTO: 6 % (ref 4–12)
MONOS+MACROS CSF MANUAL: 13 %
N MEN DNA CSF QL NAA+NON-PROBE: NOT DETECTED
NEUTROPHILS # BLD AUTO: 5.61 THOUSANDS/ΜL (ref 1.85–7.62)
NEUTS SEG NFR BLD AUTO: 78 % (ref 43–75)
NRBC BLD AUTO-RTO: 0 /100 WBCS
PARECHOVIRUS A RNA CSF QL NAA+NON-PROBE: NOT DETECTED
PLATELET # BLD AUTO: 413 THOUSANDS/UL (ref 149–390)
PMV BLD AUTO: 9.7 FL (ref 8.9–12.7)
POTASSIUM SERPL-SCNC: 3.7 MMOL/L (ref 3.5–5.3)
PROT CSF-MCNC: 37 MG/DL (ref 15–45)
PROTHROMBIN TIME: 15.4 SECONDS (ref 11.6–14.5)
RBC # BLD AUTO: 3.47 MILLION/UL (ref 3.81–5.12)
RBC # CSF MANUAL: 1 UL (ref 0–10)
S PNEUM DNA CSF QL NAA+NON-PROBE: NOT DETECTED
SODIUM SERPL-SCNC: 140 MMOL/L (ref 136–145)
THYROGLOB AB SERPL-ACNC: 1.1 IU/ML (ref 0–0.9)
THYROPEROXIDASE AB SERPL-ACNC: 21 IU/ML (ref 0–34)
TOTAL CELLS COUNTED BLD: NO
TOTAL CELLS COUNTED SPEC: 52
TUBE # CSF: 4
VZV DNA CSF QL NAA+NON-PROBE: NOT DETECTED
WBC # BLD AUTO: 7.19 THOUSAND/UL (ref 4.31–10.16)
WBC # CSF AUTO: 2 /UL (ref 0–5)

## 2019-10-30 PROCEDURE — 86256 FLUORESCENT ANTIBODY TITER: CPT | Performed by: PHYSICIAN ASSISTANT

## 2019-10-30 PROCEDURE — 62270 DX LMBR SPI PNXR: CPT | Performed by: PHYSICIAN ASSISTANT

## 2019-10-30 PROCEDURE — 86255 FLUORESCENT ANTIBODY SCREEN: CPT

## 2019-10-30 PROCEDURE — 009U3ZX DRAINAGE OF SPINAL CANAL, PERCUTANEOUS APPROACH, DIAGNOSTIC: ICD-10-PCS | Performed by: INTERNAL MEDICINE

## 2019-10-30 PROCEDURE — 83735 ASSAY OF MAGNESIUM: CPT | Performed by: INTERNAL MEDICINE

## 2019-10-30 PROCEDURE — 89050 BODY FLUID CELL COUNT: CPT | Performed by: PHYSICIAN ASSISTANT

## 2019-10-30 PROCEDURE — 82948 REAGENT STRIP/BLOOD GLUCOSE: CPT

## 2019-10-30 PROCEDURE — 84157 ASSAY OF PROTEIN OTHER: CPT | Performed by: PHYSICIAN ASSISTANT

## 2019-10-30 PROCEDURE — 83519 RIA NONANTIBODY: CPT

## 2019-10-30 PROCEDURE — 82945 GLUCOSE OTHER FLUID: CPT | Performed by: PHYSICIAN ASSISTANT

## 2019-10-30 PROCEDURE — 74230 X-RAY XM SWLNG FUNCJ C+: CPT

## 2019-10-30 PROCEDURE — 87070 CULTURE OTHR SPECIMN AEROBIC: CPT | Performed by: PHYSICIAN ASSISTANT

## 2019-10-30 PROCEDURE — 86618 LYME DISEASE ANTIBODY: CPT | Performed by: PHYSICIAN ASSISTANT

## 2019-10-30 PROCEDURE — 83519 RIA NONANTIBODY: CPT | Performed by: PHYSICIAN ASSISTANT

## 2019-10-30 PROCEDURE — 85730 THROMBOPLASTIN TIME PARTIAL: CPT | Performed by: INTERNAL MEDICINE

## 2019-10-30 PROCEDURE — 86341 ISLET CELL ANTIBODY: CPT

## 2019-10-30 PROCEDURE — 99232 SBSQ HOSP IP/OBS MODERATE 35: CPT | Performed by: INTERNAL MEDICINE

## 2019-10-30 PROCEDURE — 86038 ANTINUCLEAR ANTIBODIES: CPT | Performed by: INTERNAL MEDICINE

## 2019-10-30 PROCEDURE — 84182 PROTEIN WESTERN BLOT TEST: CPT

## 2019-10-30 PROCEDURE — 99233 SBSQ HOSP IP/OBS HIGH 50: CPT | Performed by: INTERNAL MEDICINE

## 2019-10-30 PROCEDURE — 92611 MOTION FLUOROSCOPY/SWALLOW: CPT

## 2019-10-30 PROCEDURE — 85014 HEMATOCRIT: CPT | Performed by: INTERNAL MEDICINE

## 2019-10-30 PROCEDURE — 86592 SYPHILIS TEST NON-TREP QUAL: CPT | Performed by: PHYSICIAN ASSISTANT

## 2019-10-30 PROCEDURE — 86039 ANTINUCLEAR ANTIBODIES (ANA): CPT | Performed by: INTERNAL MEDICINE

## 2019-10-30 PROCEDURE — 89051 BODY FLUID CELL COUNT: CPT | Performed by: PHYSICIAN ASSISTANT

## 2019-10-30 PROCEDURE — 99233 SBSQ HOSP IP/OBS HIGH 50: CPT | Performed by: PSYCHIATRY & NEUROLOGY

## 2019-10-30 PROCEDURE — 85025 COMPLETE CBC W/AUTO DIFF WBC: CPT | Performed by: INTERNAL MEDICINE

## 2019-10-30 PROCEDURE — 87483 CNS DNA AMP PROBE TYPE 12-25: CPT | Performed by: PHYSICIAN ASSISTANT

## 2019-10-30 PROCEDURE — 85018 HEMOGLOBIN: CPT | Performed by: INTERNAL MEDICINE

## 2019-10-30 PROCEDURE — 80048 BASIC METABOLIC PNL TOTAL CA: CPT | Performed by: INTERNAL MEDICINE

## 2019-10-30 PROCEDURE — 85610 PROTHROMBIN TIME: CPT | Performed by: PHYSICIAN ASSISTANT

## 2019-10-30 RX ORDER — LIDOCAINE HYDROCHLORIDE 20 MG/ML
2 INJECTION, SOLUTION EPIDURAL; INFILTRATION; INTRACAUDAL; PERINEURAL ONCE
Status: DISCONTINUED | OUTPATIENT
Start: 2019-10-30 | End: 2019-11-16 | Stop reason: HOSPADM

## 2019-10-30 RX ORDER — SODIUM CHLORIDE 1000 MG
1 TABLET, SOLUBLE MISCELLANEOUS DAILY
Status: DISCONTINUED | OUTPATIENT
Start: 2019-10-31 | End: 2019-11-01

## 2019-10-30 RX ORDER — HEPARIN SODIUM 10000 [USP'U]/100ML
3-30 INJECTION, SOLUTION INTRAVENOUS
Status: DISCONTINUED | OUTPATIENT
Start: 2019-10-30 | End: 2019-11-06

## 2019-10-30 RX ORDER — LIDOCAINE HYDROCHLORIDE 20 MG/ML
5 INJECTION, SOLUTION EPIDURAL; INFILTRATION; INTRACAUDAL; PERINEURAL ONCE
Status: COMPLETED | OUTPATIENT
Start: 2019-10-30 | End: 2019-10-30

## 2019-10-30 RX ORDER — HEPARIN SODIUM 1000 [USP'U]/ML
4400 INJECTION, SOLUTION INTRAVENOUS; SUBCUTANEOUS AS NEEDED
Status: DISCONTINUED | OUTPATIENT
Start: 2019-10-30 | End: 2019-11-06

## 2019-10-30 RX ORDER — HEPARIN SODIUM 1000 [USP'U]/ML
2200 INJECTION, SOLUTION INTRAVENOUS; SUBCUTANEOUS AS NEEDED
Status: DISCONTINUED | OUTPATIENT
Start: 2019-10-30 | End: 2019-11-06

## 2019-10-30 RX ADMIN — HEPARIN SODIUM AND DEXTROSE 18 UNITS/KG/HR: 10000; 5 INJECTION INTRAVENOUS at 14:33

## 2019-10-30 RX ADMIN — METOPROLOL TARTRATE 2.5 MG: 5 INJECTION, SOLUTION INTRAVENOUS at 20:43

## 2019-10-30 RX ADMIN — METOPROLOL TARTRATE 2.5 MG: 5 INJECTION, SOLUTION INTRAVENOUS at 08:45

## 2019-10-30 RX ADMIN — NYSTATIN 500000 UNITS: 500000 SUSPENSION ORAL at 17:23

## 2019-10-30 RX ADMIN — HEPARIN SODIUM AND DEXTROSE 18 UNITS/KG/HR: 10000; 5 INJECTION INTRAVENOUS at 17:22

## 2019-10-30 RX ADMIN — HEPARIN SODIUM 2200 UNITS: 1000 INJECTION INTRAVENOUS; SUBCUTANEOUS at 23:59

## 2019-10-30 RX ADMIN — LIDOCAINE HYDROCHLORIDE 5 ML: 20 INJECTION, SOLUTION EPIDURAL; INFILTRATION; INTRACAUDAL; PERINEURAL at 10:47

## 2019-10-30 RX ADMIN — METOPROLOL TARTRATE 2.5 MG: 5 INJECTION, SOLUTION INTRAVENOUS at 14:23

## 2019-10-30 NOTE — PROGRESS NOTES
General Cardiology   Progress Note -  [facility] One   [place] Tee [de-identified] y o  female MRN: [id number]    Unit/Bed#: -01 Encounter: [id number]    Assessment/ Plan    New onset atrial fibrillation  Afib with RVR 10/28  Converted to NSR 10/29 around 1530  [id number], also has known splenic infarct  Continue IV heparin for anticoagulation  Switch to Eliquis 5 mg BID when on-going work up is complete, timing TBD by primary team   Not tolerating oral medications so she is on IV Lopressor 2 5 mg Q6 hours  Transition to metoprolol tartrate 25 mg BID when tolerating PO  Tele review- NSR since 1530 yesterday  Splenic infarct   Continue anticoagulation the setting of new onset Afib  LENO mostly unremarkable       Altered mental status- originally thought due to hyponatremia but that is now resolved and she is not back to baseline  Waxes and wanes, she is more oriented today than yesterday  Infectious work up negative  Neurology now on board, LP today       Fevers- infectious work up so far negative, being watched off of antibiotics       CAD- remote history of stenting  Does not follow with an outpatient cardiologist   Continue ASA, beta blocker, and statin      HTN- stable, 24 hour average /53  Off of home amlodipine and enalapril       HL- direct LDL 72 (2014)  Continue statin      DM2, controlled  A1c 6 4  Management per primary team      Hyponatremia, resolved  Suspect cause is SIADH, seen by nephrology  Na 140  Subjective  Tired but no other specific complaints offered today  Review of Systems   Constitution: Positive for decreased appetite and malaise/fatigue  Negative for chills  Cardiovascular: Negative for chest pain, dyspnea on exertion and palpitations  Respiratory: Negative for cough and shortness of breath  Gastrointestinal: Negative for bloating and nausea  Neurological: Negative for dizziness and light-headedness  All other systems reviewed and are negative      Objective: Vitals: Blood pressure 121/55, pulse 83, temperature (!) 96 6 °F (35 9 °C), resp  rate 19, height 5' 2" (1 575 m), weight 54 6 kg (120 lb 4 8 oz), SpO2 99 %  ,     Body mass index is 22 kg/m²  ,     Systolic (43GCL), KBP:264 , Min:85 , JDV:145     Diastolic (24WYW), WYR:70, Min:48, Max:68      Intake/Output Summary (Last 24 hours) at 10/30/2019 1001  Last data filed at 10/29/2019 1730  Gross per 24 hour   Intake    Output 350 ml   Net -350 ml     Weight (last 2 days)     Date/Time   Weight    10/29/19 0600   54 6 (120 3)    10/28/19 0600   55 (121 31)            Telemetry Review: Converted to NSR 10/29 1530  Currently NSR  Physical Exam   Constitutional: She is oriented to person, place, and time  No distress  Neck: Neck supple  No JVD present  Cardiovascular: Normal rate, regular rhythm, normal heart sounds and intact distal pulses  Exam reveals no gallop and no friction rub  No murmur heard  Pulmonary/Chest: Effort normal  No respiratory distress  She has rales (bibasilar)  2LNC   Abdominal: Soft  She exhibits no distension  Musculoskeletal: She exhibits no edema  Neurological: She is alert and oriented to person, place, and time  Sleeping, easily aroused  AAO x 3 today  Skin: Skin is warm and dry  She is not diaphoretic  There is pallor       LABORATORY RESULTS      CBC with diff:   Results from last 7 days   Lab Units 10/30/19  0831 10/29/19  0625 10/28/19  0513 10/27/19  0542 10/26/19  0927 10/25/19  1142 10/24/19  0537   WBC Thousand/uL 7 19 7 67 5 34 6 04 9 15 10 36* 5 79   HEMOGLOBIN g/dL 7 9* 8 8* 8 1* 8 7* 8 6* 9 0* 9 3*   HEMATOCRIT % 26 8* 30 2* 27 3* 28 9* 28 1* 29 5* 30 8*   MCV fL 77* 78* 77* 75* 75* 75* 76*   PLATELETS Thousands/uL 413* 396* 296 377 369 397* 349   MCH pg 22 8* 22 9* 22 8* 22 5* 23 0* 22 9* 22 8*   MCHC g/dL 29 5* 29 1* 29 7* 30 1* 30 6* 30 5* 30 2*   RDW % 18 4* 18 2* 18 1* 18 2* 18 1* 17 9* 18 1*   MPV fL 9 7 9 7 10 1 9 6 9 6 9 9 9 7   NRBC AUTO /100 WBCs 0 0  -- 0 0 0 0     CMP:  Results from last 7 days   Lab Units 10/30/19  0831 10/29/19  9719 10/28/19  0513 10/27/19  0542 10/26/19  0529 10/25/19  0626 10/24/19  0537   POTASSIUM mmol/L 3 7 4 2 4 1 3 8 3 6 4 1 3 9   CHLORIDE mmol/L 110* 109* 107 106 107 105 103   CO2 mmol/L 25 21 23 22 22 23 22   BUN mg/dL 17 14 13 13 19 13 12   CREATININE mg/dL 0 43* 0 43* 0 41* 0 44* 0 52* 0 54* 0 49*   CALCIUM mg/dL 8 6 8 6 8 4 8 6 8 3 9 0 8 4   EGFR ml/min/1 73sq m 96 96 98 96 91 89 92     BMP:  Results from last 7 days   Lab Units 10/30/19  0831 10/29/19  0625 10/28/19  0513 10/27/19  0542 10/26/19  0529 10/25/19  0626 10/24/19  0537   POTASSIUM mmol/L 3 7 4 2 4 1 3 8 3 6 4 1 3 9   CHLORIDE mmol/L 110* 109* 107 106 107 105 103   CO2 mmol/L 25 21 23 22 22 23 22   BUN mg/dL 17 14 13 13 19 13 12   CREATININE mg/dL 0 43* 0 43* 0 41* 0 44* 0 52* 0 54* 0 49*   CALCIUM mg/dL 8 6 8 6 8 4 8 6 8 3 9 0 8 4     Lab Results   Component Value Date    NTBNP 4,480 (H) 10/19/2019      Results from last 7 days   Lab Units 10/30/19  0831 10/29/19  0625   MAGNESIUM mg/dL 2 1 2 0     Results from last 7 days   Lab Units 10/30/19  0831 10/29/19  1010   INR  1 26* 1 30*     Lipid Profile:   Lab Results   Component Value Date    CHOL 141 2014     Lab Results   Component Value Date    HDL 45 2014     Lab Results   Component Value Date    LDLCALC 72 2014     Lab Results   Component Value Date    TRIG 122 2014     Cardiac testing:   No results found for this or any previous visit    Results for orders placed during the hospital encounter of 10/14/19   LENO    [facility]  [facility], [address] [place] [address]  [phone]    Transesophageal Echocardiogram  2D, Doppler, and Color Doppler    Study date:  28-Oct-2019    Patient: Brittany Wheeler  MR number: [id number]  Account number: [de-identified]  : 1940  Age: [de-identified] years  Gender: Female  Status: Inpatient  Location: Echo lab  Height: 62 in  Weight: 121 lb  BP: 116/ 60 mmHg    Indications: Arterial embolism and thrombosis  Diagnoses: I74 9 - Embolism and thrombosis of unspecified artery    Sonographer:  Beni Sylvester RDCS  Interpreting Physician:  Jennifer Miguel MD  Primary Physician:  Rose Mary Vines MD  Referring Physician:  Cassidy Driver MD  Group:  [facility]  Cardiology Fellow:  Nasrin Whitkaer MD  RN:  Wesley Walker RN    SUMMARY    LEFT VENTRICLE:  Systolic function was normal  Ejection fraction was estimated to be 55 %  Although no diagnostic regional wall motion abnormality was identified, this possibility cannot be completely excluded on the basis of this study  LEFT ATRIAL APPENDAGE:  No thrombus was identified  ATRIAL SEPTUM:  No defect or patent foramen ovale was identified  Contrast injection was performed  There was no right-to-left shunt, with provocative maneuvers to increase right atrial pressure  TRICUSPID VALVE:  There was moderate regurgitation  AORTA:  There was diffuse atheromatous disease of the proximal and mid descending thoracic aorta  The atheroma were 2-3 mm thick, sessile with no evidence of ulceration or thrombus  HISTORY: PRIOR HISTORY: Hypertension  Hyperlipidemia  Diabetes  Bradycardia  Cardiac disease  Smoker  PROCEDURE: The procedure was performed in the echo lab  This was a routine study  The risks and alternatives of the procedure were explained to the patient's next of kin and informed consent was obtained  The transesophageal approach was  used  The study included complete 2D imaging, complete spectral Doppler, and color Doppler  The heart rate was 87 bpm, at the start of the study  An adult omniplane probe was inserted by the cardiology fellow under direct supervision of  the attending cardiologist  Intubated with moderate difficulty  Four intubation attempt(s)  There was no blood detected on the probe   Intravenous contrast (agitated saline, 10 ml) was administered to evaluate shunting  Image quality was  adequate  There were no complications during the procedure  MEDICATIONS: Anesthesia administered by anesthesia team     LEFT VENTRICLE: Size was normal  Systolic function was normal  Ejection fraction was estimated to be 55 %  Although no diagnostic regional wall motion abnormality was identified, this possibility cannot be completely excluded on the basis  of this study  Wall thickness was normal     RIGHT VENTRICLE: The size was normal  Systolic function was normal  Wall thickness was normal     LEFT ATRIUM: Size was normal  No thrombus was identified  APPENDAGE: The appendage was small  No thrombus was identified  DOPPLER: The function was normal (normal emptying velocity)  ATRIAL SEPTUM: No defect or patent foramen ovale was identified  Contrast injection was performed  There was no right-to-left shunt, with provocative maneuvers to increase right atrial pressure  RIGHT ATRIUM: No thrombus was identified  MITRAL VALVE: Valve structure was normal  There was normal leaflet separation  DOPPLER: There was mild regurgitation  AORTIC VALVE: The valve was trileaflet  Leaflets exhibited normal thickness and normal cuspal separation  DOPPLER: There was trace regurgitation  TRICUSPID VALVE: The valve structure was normal  There was normal leaflet separation  DOPPLER: There was moderate regurgitation  Estimated peak PA pressure was 30 mmHg  PULMONIC VALVE: Leaflets exhibited normal thickness, no calcification, and normal cuspal separation  PERICARDIUM: There was no pericardial effusion  The pericardium was normal in appearance  AORTA: The root exhibited normal size  There was diffuse atheromatous disease of the proximal and mid descending thoracic aorta  The atheroma were 2-3 mm thick, sessile with no evidence of ulceration or thrombus  There was no evidence for  dissection  There was no evidence for aneurysm      [address] Accredited Echocardiography Laboratory    Prepared and electronically signed by    Jazmine Oakes MD  Signed 28-Oct-2019 14:20:29       Meds/Allergies   all current active meds have been reviewed and current meds:   Current Facility-Administered Medications   Medication Dose Route Frequency    acetaminophen (TYLENOL) rectal suppository 650 mg  650 mg Rectal Q6H PRN    aluminum-magnesium hydroxide-simethicone (MYLANTA) 200-200-20 mg/5 mL oral suspension 30 mL  30 mL Oral Q6H PRN    bisacodyl (DULCOLAX) rectal suppository 10 mg  10 mg Rectal Daily PRN    docusate sodium (COLACE) capsule 100 mg  100 mg Oral BID PRN    famotidine (PEPCID) oral suspension 20 mg  20 mg Oral Daily    ferrous sulfate tablet 325 mg  325 mg Oral Daily With Breakfast    heparin (porcine) injection 2,200 Units  2,200 Units Intravenous PRN    heparin (porcine) injection 4,400 Units  4,400 Units Intravenous PRN    insulin lispro (HumaLOG) 100 units/mL subcutaneous injection 1-5 Units  1-5 Units Subcutaneous HS    insulin lispro (HumaLOG) 100 units/mL subcutaneous injection 1-6 Units  1-6 Units Subcutaneous TID AC    lidocaine (PF) (XYLOCAINE-MPF) 2 % injection 2 mL  2 mL Infiltration Once    lidocaine (PF) (XYLOCAINE-MPF) 2 % injection 5 mL  5 mL Infiltration Once    metoprolol (LOPRESSOR) injection 2 5 mg  2 5 mg Intravenous Q6H PRN    metoprolol (LOPRESSOR) injection 2 5 mg  2 5 mg Intravenous Q6H    mirtazapine (REMERON) tablet 7 5 mg  7 5 mg Oral HS    nystatin (MYCOSTATIN) oral suspension 500,000 Units  500,000 Units Swish & Swallow 4x Daily    ondansetron (ZOFRAN) injection 4 mg  4 mg Intravenous Q6H PRN    pravastatin (PRAVACHOL) tablet 40 mg  40 mg Oral Daily With Dinner    sodium chloride tablet 1 g  1 g Oral BID With Meals     Medications Prior to Admission   Medication    amLODIPine (NORVASC) 10 mg tablet    atenolol (TENORMIN) 50 mg tablet    enalapril (VASOTEC) 20 mg tablet    glipiZIDE (GLUCOTROL) 10 mg tablet  metFORMIN (GLUCOPHAGE) 1000 MG tablet    omeprazole (PriLOSEC) 20 mg delayed release capsule    saxagliptin (ONGLYZA) 5 MG tablet    simvastatin (ZOCOR) 20 mg tablet    sodium chloride 1 g tablet     Counseling / Coordination of Care  Total floor / unit time spent today 20 minutes  Greater than 50% of total time was spent with the patient and / or family counseling and / or coordination of care  ** Please Note: Dragon 360 Dictation voice to text software may have been used in the creation of this document   **

## 2019-10-30 NOTE — PROGRESS NOTES
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  · Presented with sodium of 126  Sodium 137 yesterday, BMP pending this AM   · TSH normal this admission  · Cosyntropin stim test negative for adrenal insufficiency  · CT chest abdomen pelvis was done at [facility] to rule out any occult malignancy as a cause of SIADH which was negative for any malignancy  · Continue with 1g Salt tabs BID and fluid restriction - resume 1500ml Plan will be to d/c on daily dosing per Dr Daniel Wesley  · Sindhu Madrid for d/c from a nephrology standpoint, will need outpatient f/u with [facility] nephrology  Recommending BMP in 1 week with results to PCP for further management of NACL tabs  Protonix changed to Pepcid  ACEI stopped as well  · Given additional salt tab today and given torsemide PO x 1 dose on 10/24  · BMP in AM    Low grade fever  Assessment & Plan  · Still with intermittent fevers  · Concerns for encephalitis vs vasculitis (less likely), less likely related to splenic infarct  · No apparent infectious etiology  · Urine culture not significant this admission, [id number] GNR  At [facility], pt was noted to have ESBL in urine which was suspected to be colonization  · Admission blood cultrues are neg  · Noted elevated sed rate and CRP  · Repeat CXR negative  · WBCs within normal limits  · Flu/RSV negative  · Blood culture negative at 72 hours  · Infectious Disease following, appreciate input  · Continue to monitor off ABX  · Work up for autoimmune etiology as mentioned above  · Appreciate neurology input  * Metabolic encephalopathy  Assessment & Plan  · POA    Likely in setting of hyponatremia with component of delirium contributing given recurrent hospitalizations/underlying cognitive impairment  · Urine culture unremarkable - monitor off antibiotics at this time  Pt remains asymptomatic  · Vasculitis vs autoimmune encephalitis?, less likely CNS vasculitis per neurology although encephalitis remains in differential   · Noted elevated CRP and sed rate  · She has hx of adrenal insufficiency, previously on steroids and then tapered off  She follows with Endocrinology as outpatient  · Cosyntropin stim test with adequate response in cortisol, hydrocortisone discontinued, she does not have adrenal insufficiency   · Vitamin B12 and folate normal   · Anti microsomal Abs pending  · MRI brain was done at [facility] last month which was negative for any acute infarct but possible small questionable hemorrhage versus area of mineralization  CT head without contrast in [facility] negative for bleed  · Geriatrics following, patient should follow up outpatient with them  · Repeat CT head this admission shows no acute abnormality, chronic lacunar infarct left lentiform nucleus  · Neurology on board, plan for LP likely today  Appreciate input  · CTA was ordered per neurology to evaluate for CNS vasculitis which could not be completed on 10/29 due to iv infiltration and contrast extravasation  · Follow JAMILAH, lyme titer, HIV, ENS 1 panel  , anti thyroglobulin Abs  · Consider rheum eval if needed pending above work up and neurology eval     A-fib ([address] )  Assessment & Plan  · Afib with RVR on 10 29 on tele intermittently  · Now in NSR   · TSH normal this admission  · LENO showed EF 55% without any intracardiac thrombus  · Continue iv metoprolol 2 5mg Q6 hourly for now, switch to po when able to tolerate po  · Iv heparin for AC given Afib and splenic infarct  · Cardiology on board, d/w team, they will re evaluate pt again today      IV infiltration and contrast extravasation in left arm  Assessment & Plan  · LUE exam is stable as compared to yesterday  · Left arm swelling is improving   No redness or tenderness  · Continue with left arm elevation a dn ice packs  · Low threshold to obtain surgery consult if exam deteriorates  Splenic infarct  Assessment & Plan  · Noted on CT scan done at [facility] 9/2019, new moderate sized splenic infarcts  · Likely etiology cardio embolic as pt was noted to have PAF with RVR on te;e on 10/29  · CTA abdomen/pelvis:  No acute aortic injury or aneurysm  Patent celiac and splenic arteries  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  Evolving splenic infarct  Additional chronic/incidental findings as described  · LENO negative for PFO and obvious intracardiac thrombus, 55% EF  · General surgery consult appreciated, no further workup from their standpoint  · Appreciate hematology input  · Continue iv heparin for now for [facility] for PAF, switch to DOAC when LP and other procedures are done  · Appreciate cardiology input  Microcytic anemia  Assessment & Plan  · Baseline seems between 8-11  · Recent hgb 8 8, pending this AM  · Iron studies suggestive of low iron  · Continue  p o  iron supplementation which was started this admission  · CBC in AM     Severe protein-calorie malnutrition (HCC)  Assessment & Plan  Malnutrition Findings:   Malnutrition type: Chronic illness(Related to medical condition as evidenced by 10% weight loss over the past month and <75% energy intake needs met >1 month treated with ensure compact supplements)  Degree of Malnutrition: Other severe protein calorie malnutrition    BMI Findings: Body mass index is 22 kg/m²  Reported weight has fluctuated significantly throughout hospitalization, will work on obtaining more accurate weights  Nutrition consult and supplements      Urinary retention  Assessment & Plan  · Patient has chronic Herrera present on admission- herrera was replaced in ED 10/14 · Placed on recent admission (10/3/19) at [facility] where she developed urinary retention  · Patient will follow up outpatient with urology for voiding trial     Failure to thrive in adult  Assessment & Plan  · Since September she has had a progressive decline - she has had no appetite, not really eating/drinking with weakness  She was previously reportedly very independent prior to her admission at [facility] in September with UTI  · Geriatrics following, recommending outpatient follow up  · Speech following, NPO for now, VBS tpday  · Plan for family meeting with palliative care on 10/31  · No recent colonoscopy/EGD, recommend outpatient GI evaluation  HLD (hyperlipidemia)  Assessment & Plan  · Continue statin    Essential hypertension  Assessment & Plan  · Atenolol switched to metoprolol given Afib with RVR,  · Hold home dose ACEI  · Amlodipine on hold as well  · BP controlled without ACEI or CCB  Type 2 diabetes mellitus with diabetic neuropathy, without long-term current use of insulin [facility])  Assessment & Plan  Lab Results   Component Value Date    HGBA1C 6 4 (H) 10/15/2019     Recent Labs     10/29/19  1042 10/29/19  1559 10/29/19  2114 10/30/19  0639   POCGLU 170* 126 145* 135       · Takes metformin and glipizide at baseline, will plan to resume metformin at discharge and hold glipizide  · Continue with SSI  · Diabetic diet  · Monitor accuchecks, avoid hypoglycemia        VTE Pharmacologic Prophylaxis:   Pharmacologic: Heparin Drip  Mechanical VTE Prophylaxis in Place: Yes    Patient Centered Rounds: I have performed bedside rounds with nursing staff today  Discussions with Specialists or Other Care Team Provider: CM    Education and Discussions with Family / Patient: plan of care, patient / left a VM for daughter last evening, will call again today  Time Spent for Care: 30 minutes  More than 50% of total time spent on counseling and coordination of care as described above      Current Length of Stay: 16 day(s)    Current Patient Status: Inpatient   Certification Statement: The patient will continue to require additional inpatient hospital stay due to not medically stable    Discharge Plan: when medically stabl    Code Status: Level 1 - Full Code      Subjective:   Overnight events noted  Pt is comfortable this AM without any acute pain or discomfort  Objective:     Vitals:   Temp (24hrs), Av 8 °F (37 1 °C), Min:96 6 °F (35 9 °C), Max:101 3 °F (38 5 °C)    Temp:  [96 6 °F (35 9 °C)-101 3 °F (38 5 °C)] 96 6 °F (35 9 °C)  HR:  [] 83  Resp:  [18-32] 19  BP: ()/(48-68) 121/55  SpO2:  [94 %-100 %] 99 %  Body mass index is 22 kg/m²  Input and Output Summary (last 24 hours): Intake/Output Summary (Last 24 hours) at 10/30/2019 0858  Last data filed at 10/29/2019 1730  Gross per 24 hour   Intake    Output 350 ml   Net -350 ml       Physical Exam:     Physical Exam  Constitutional: No distress  Eyes: Pupils are equal, round, and reactive to light  Cardiovascular: Normal heart sounds  No murmur heard  Regular rate and rhythm  Pulmonary/Chest: Effort normal and breath sounds normal  No respiratory distress  She has no wheezes  She has no rales  Abdominal: Soft  Bowel sounds are normal  She exhibits no distension  There is no tenderness  Musculoskeletal: no LE edema  Mild edema in left arm , better as comapred to yesterday, no tenderness or erythema  Neurological: She is alert  Awake and communicative  Moves all extremities  Skin: Skin is warm            Additional Data:     Labs:    Results from last 7 days   Lab Units 10/30/19  0831 10/29/19  0625   WBC Thousand/uL 7 19 7 67   HEMOGLOBIN g/dL 7 9* 8 8*   HEMATOCRIT % 26 8* 30 2*   PLATELETS Thousands/uL 413* 396*   BANDS PCT %  --  1   NEUTROS PCT % 78*  --    LYMPHS PCT % 15  --    LYMPHO PCT %  --  3*   MONOS PCT % 6  --    MONO PCT %  --  3*   EOS PCT % 0 0     Results from last 7 days   Lab Units 10/30/19  0831 SODIUM mmol/L 140   POTASSIUM mmol/L 3 7   CHLORIDE mmol/L 110*   CO2 mmol/L 25   BUN mg/dL 17   CREATININE mg/dL 0 43*   ANION GAP mmol/L 5   CALCIUM mg/dL 8 6   GLUCOSE RANDOM mg/dL 120     Results from last 7 days   Lab Units 10/30/19  0831   INR  1 26*     Results from last 7 days   Lab Units 10/30/19  0639 10/29/19  2114 10/29/19  1559 10/29/19  1042 10/29/19  0650 10/28/19  2116 10/28/19  1613 10/28/19  1026 10/28/19  0715 10/27/19  2047 10/27/19  1603 10/27/19  1037   POC GLUCOSE mg/dl 135 145* 126 170* 150* 142* 125 124 126 147* 148* 181*         Results from last 7 days   Lab Units 10/26/19  0529   PROCALCITONIN ng/ml 0 93*           * I Have Reviewed All Lab Data Listed Above  * Additional Pertinent Lab Tests Reviewed: All Labs Within Last 24 Hours Reviewed    Imaging:    CTA head and neck w wo contrast   Final Result by Birtha Essex, MD (1940)      1  No acute intracranial hemorrhage, mass effect or edema  Microangiopathic changes  2   Approximately 80 mL of Omnipaque 350 infiltrated in the left arm  Further clinical assessment and clinical follow-up advised  I personally discussed this study with Dr Jose Chang on 10/29/2019 at 7:35 PM                            Workstation performed: [id number]         XR chest portable   Final Result by Tim Mott MD (10/29 1702)      Persistent small left effusion  Slight increase in left base atelectasis  Workstation performed: [id number]         CT head wo contrast   Final Result by Symone Dunlap DO (10/26 1715)      No acute intracranial abnormality  Microangiopathic changes  Chronic lacunar infarct left lentiform nucleus  Workstation performed: [id number]         XR chest pa & lateral   Final Result by Symone Dunlap DO (10/25 1546)      Small pleural effusion  No pneumothorax              Workstation performed: [id number]         CTA abdomen pelvis w wo contrast   Final Result by [facility] MD Geo (10/24 4350)      1  No acute aortic injury or aneurysm  2   Patent celiac and splenic arteries  3   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  4   Evolving splenic infarct  5   Additional chronic/incidental findings as described  Workstation performed: [id number]         XR abdomen 1 vw portable   Final Result by Kelley Castillo MD (10/21 5512)      There is a nonobstructive bowel gas pattern  Workstation performed: [id number]         XR chest pa & lateral   Final Result by Dain Cogan, MD (10/19 1591)      Minimal left basilar airspace disease attributed to compressive atelectasis secondary small left pleural effusion  Correlate with clinical findings to exclude pneumonia and/or aspiration  Workstation performed: [id number]         FL barium swallow video w speech    (Results Pending)       Recent Cultures (last 7 days):     Results from last 7 days   Lab Units 10/25/19  1142   BLOOD CULTURE  No Growth After 4 Days  No Growth After 4 Days     INFLUENZA B PCR  Not Detected   RSV PCR  Not Detected       Last 24 Hours Medication List:     Current Facility-Administered Medications:  acetaminophen 650 mg Rectal Q6H PRN Herman Rueda DO   aluminum-magnesium hydroxide-simethicone 30 mL Oral Q6H PRN Bella Paulson MD   bisacodyl 10 mg Rectal Daily PRN Heaven MAHAN PA-C   docusate sodium 100 mg Oral BID PRN Bella Paulson MD   famotidine 20 mg Oral Daily Tommy Andujar MD   ferrous sulfate 325 mg Oral Daily With Breakfast Adelita Calixto MD   heparin (porcine) 2,200 Units Intravenous PRN Adelita Calixto MD   heparin (porcine) 4,400 Units Intravenous PRN Adelita Calixto MD   insulin lispro 1-5 Units Subcutaneous HS Bella Paulson MD   insulin lispro 1-6 Units Subcutaneous TID AC Bella Paulson MD   lidocaine (PF) 2 mL Infiltration Once Nola Goins PA-C   metoprolol 2 5 mg Intravenous Q6H PRN Lyndsey Godinez MD   metoprolol 2 5 mg Intravenous Q6H Lyndsey Godinez MD   mirtazapine 7 5 mg Oral HS Heaven MAHAN PA-C   nystatin 500,000 Units Swish & Swallow 4x Daily Heaven MAHAN PA-C   ondansetron 4 mg Intravenous Q6H PRN Diepsh Ayoub MD   pravastatin 40 mg Oral Daily With Magdaleno Escoto MD   sodium chloride 1 g Oral BID With Meals Rabia Davis MD        Today, Patient Was Seen By: Lyndsey Godinez MD    ** Please Note: Dictation voice to text software may have been used in the creation of this document   **

## 2019-10-30 NOTE — PROGRESS NOTES
Progress Note - Nephrology   Vasyl Rico [de-identified] y o  female MRN: [id number]  Unit/Bed#: -01 Encounter: [id number]      Assessment / Plan:      1  Acute on chronic hyponatremia:  Admitted with a sodium of 126  Baseline sodium around 126-135  In the past was diagnosed with adrenal insufficiency but cortisol okay   Suspected to have SIADH this admission  ? ACE-inhibitor and PPI could cause SIADH --Protonix changed to Pepcid and Lisinopril was discontinued as her blood pressure is running low and lisinopril can cause SIADH  ? Workup:  Serum osmolality 276, urine osmolality 624, urine sodium 90, uric acid 4 1, cortisol 40 8, TSH 2 8, normal LFTs except for albumin of 2 2, chest x-ray with minimal left basilar airspace disease attributed to compressive atelectasis and small left pleural effusion, echocardiogram at [facility] revealed ejection fraction of 50-55% with mild pulmonary hypertension, mild mitral regurgitation and tricuspid regurgitation / ACT H was low at 1 6 at [facility] cortisol of 19 4) // LVH CT chest, abdomen and pelvis on 09/29 at [facility]:  No signs of malignancy   There were new small bilateral pleural effusions with new infarcts involving the medial aspect of the spleen   Low ACTH level at [facility] was likely a lab error   Would have her PCP recheck as an outpatient  ? SPEP faint possible band in the gamma region   Immunofixation pending - sent to [facility]  ? Sodium level  has improved and remains normal   Sodium has trended up somewhat today  ? Decreased sodium chloride tablets to 1 g daily  If her sodium level increases tomorrow, would hold the sodium chloride tablets  Continue fluid restriction at 1500 mL per day if NPO status lifted  ? Check a m  BMP   2  Anemia:  Hemoglobin 8 1 today  ? Iron saturation 7%, ferritin 608  ? Started on oral iron by primary team  ? Management per primary team  3   Encephalopathy:  Originally Due to hyponatremia and UTI   Overall, mental status seems to have improved   4  Hypertension:  Blood pressure running on the low side but seems to have improved  · Continue to hold lisinopril and  Norvasc   · Trend blood pressure   5    Fever  · Cultures negative so far  · Per Infectious Disease  · T-max 101 3° 5:30 p m  On 10/29  6  Splenic infarct  · Workup and management per primary team and Hematology  · CT of abdomen and pelvis/ LENO results noted  · Patient did go into atrial fibrillation with RVR, now back in normal sinus  cardiology has evaluated the patient  She is on anticoagulation    Subjective: The patient was seen examined this morning at 11:00 a m  Amanda Payne She was sleeping but did awake and answer questions  She knew she was in the hospital, the year was 2019 and she knew her name  She denied any shortness of breath, chest pain or pressure, abdominal pain, vomiting or diarrhea  Objective:     Vitals: Blood pressure (!) 104/42, pulse 97, temperature (!) 96 6 °F (35 9 °C), resp  rate 18, height 5' 2" (1 575 m), weight 57 kg (125 lb 9 6 oz), SpO2 97 %  ,Body mass index is 22 97 kg/m²  Temp (24hrs), Av 3 °F (36 8 °C), Min:96 6 °F (35 9 °C), Max:101 3 °F (38 5 °C)      Weight (last 2 days)     Date/Time   Weight    10/30/19 06:29:14   57 (125 6)    10/29/19 0600   54 6 (120 3)    10/28/19 0600   55 (121 31)                     Intake/Output Summary (Last 24 hours) at 10/30/2019 1452  Last data filed at 10/29/2019 1730  Gross per 24 hour   Intake    Output 350 ml   Net -350 ml     I/O last 24 hours: In: -   Out: 350 [Urine:350]        Physical Exam    Physical Exam   Constitutional: No distress  Neck: No JVD present  Cardiovascular: Normal heart sounds  Exam reveals no gallop and no friction rub  Pulmonary/Chest: Effort normal and breath sounds normal  No respiratory distress  She has no wheezes  She has no rales  Abdominal: Soft  Bowel sounds are normal  She exhibits no distension   There is no tenderness  There is no rebound and no guarding  Musculoskeletal: She exhibits no edema  Skin: She is not diaphoretic  Vitals reviewed                Invasive Devices     Peripheral Intravenous Line            Peripheral IV 10/28/19 Right Forearm 2 days    Peripheral IV 10/30/19 Right Antecubital less than 1 day          Drain            Urethral Catheter Straight-tip 16 Fr  15 days                Medications:    Scheduled Meds:  Current Facility-Administered Medications:  acetaminophen 650 mg Rectal Q6H PRN Herman Rueda DO    aluminum-magnesium hydroxide-simethicone 30 mL Oral Q6H PRN Glenn Brown MD    bisacodyl 10 mg Rectal Daily PRN Heaven MAHAN PA-C    docusate sodium 100 mg Oral BID PRN Glenn Brown MD    famotidine 20 mg Oral Daily Scar Mcclelland MD    ferrous sulfate 325 mg Oral Daily With Breakfast Dylan Alvarez MD    heparin (porcine) 3-30 Units/kg/hr (Order-Specific) Intravenous Titrated Dylan Alvarez MD Last Rate: 18 Units/kg/hr (10/30/19 1433)   heparin (porcine) 2,200 Units Intravenous PRN Dylan Alvarez MD    heparin (porcine) 4,400 Units Intravenous PRN Dylan Alvarez MD    insulin lispro 1-5 Units Subcutaneous HS Glenn Brown MD    insulin lispro 1-6 Units Subcutaneous TID AC Glenn Brown MD    lidocaine (PF) 2 mL Infiltration Once Sherryterrie Navarro PA-C    metoprolol 2 5 mg Intravenous Q6H PRN Dylan Alvarez MD    metoprolol 2 5 mg Intravenous Q6H Dylan Alvarez MD    mirtazapine 7 5 mg Oral HS Heaven MAHAN PA-C    nystatin 500,000 Units Swish & Swallow 4x Daily Heaven MAHAN PA-C    ondansetron 4 mg Intravenous Q6H PRN Glenn Brown MD    pravastatin 40 mg Oral Daily With Yasir Huber MD    sodium chloride 1 g Oral BID With Meals Scar Mcclelland MD        PRN Meds:   acetaminophen    aluminum-magnesium hydroxide-simethicone    bisacodyl    docusate sodium    heparin (porcine)    heparin (porcine)    metoprolol    ondansetron    Continuous Infusions:  heparin (porcine) 3-30 Units/kg/hr (Order-Specific) Last Rate: 18 Units/kg/hr (10/30/19 1433)           LAB RESULTS:      Results from last 7 days   Lab Units 10/30/19  1430 10/30/19  0831 10/29/19  0625 10/28/19  0513 10/27/19  0542 10/26/19  0927 10/26/19  0529 10/25/19  1142 10/25/19  0626 10/24/19  0537   WBC Thousand/uL  --  7 19 7 67 5 34 6 04 9 15  --  10 36*  --  5 79   HEMOGLOBIN g/dL 7 6* 7 9* 8 8* 8 1* 8 7* 8 6*  --  9 0*  --  9 3*   HEMATOCRIT % 26 0* 26 8* 30 2* 27 3* 28 9* 28 1*  --  29 5*  --  30 8*   PLATELETS Thousands/uL  --  413* 396* 296 377 369  --  397*  --  349   NEUTROS PCT %  --  78*  --   --  83* 83*  --  89*  --  77*   LYMPHS PCT %  --  15  --   --  11* 11*  --  7*  --  17   LYMPHO PCT %  --   --  3*  --   --   --   --   --   --   --    MONOS PCT %  --  6  --   --  3* 4  --  3*  --  4   MONO PCT %  --   --  3*  --   --   --   --   --   --   --    EOS PCT %  --  0 0  --  2 1  --  0  --  1   POTASSIUM mmol/L  --  3 7 4 2 4 1 3 8  --  3 6  --  4 1 3 9   CHLORIDE mmol/L  --  110* 109* 107 106  --  107  --  105 103   CO2 mmol/L  --  25 21 23 22  --  22  --  23 22   BUN mg/dL  --  17 14 13 13  --  19  --  13 12   CREATININE mg/dL  --  0 43* 0 43* 0 41* 0 44*  --  0 52*  --  0 54* 0 49*   CALCIUM mg/dL  --  8 6 8 6 8 4 8 6  --  8 3  --  9 0 8 4   MAGNESIUM mg/dL  --  2 1 2 0  --   --   --   --   --   --   --        CUTURES:  Lab Results   Component Value Date    BLOODCX No Growth After 4 Days  10/25/2019    BLOODCX No Growth After 4 Days  10/25/2019    BLOODCX No Growth After 5 Days  10/14/2019    BLOODCX No Growth After 5 Days  10/14/2019    URINECX 4041-0900 cfu/ml Gram Negative Nirav (A) 10/15/2019    URINECX >100,000 cfu/ml Escherichia coli 07/16/2017                 PLEASE NOTE:  This encounter was completed utilizing the [employer] Direct Speech Voice Recognition Software   Grammatical errors, random word insertions, pronoun errors and incomplete sentences are occasional consequences of the system due to software limitations, ambient noise and hardware issues  These may be missed by proof reading prior to affixing electronic signature  Any questions or concerns about the content, text or information contained within the body of this dictation should be directly addressed to the physician for clarification  Please do not hesitate to call me directly if you have any any questions or concerns

## 2019-10-30 NOTE — ASSESSMENT & PLAN NOTE
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

## 2019-10-30 NOTE — ASSESSMENT & PLAN NOTE
· Atenolol switched to metoprolol given Afib with RVR,  · Hold home dose ACEI  · Amlodipine on hold as well  · BP controlled without ACEI or CCB

## 2019-10-30 NOTE — ASSESSMENT & PLAN NOTE
· POA  Likely in setting of hyponatremia with component of delirium contributing given recurrent hospitalizations/underlying cognitive impairment  · Urine culture unremarkable - monitor off antibiotics at this time  Pt remains asymptomatic  · Vasculitis vs autoimmune encephalitis?, less likely CNS vasculitis per neurology although encephalitis remains in differential   · Noted elevated CRP and sed rate  · She has hx of adrenal insufficiency, previously on steroids and then tapered off  She follows with Endocrinology as outpatient  · Cosyntropin stim test with adequate response in cortisol, hydrocortisone discontinued, she does not have adrenal insufficiency   · Vitamin B12 and folate normal   · Anti microsomal Abs pending  · MRI brain was done at [facility] last month which was negative for any acute infarct but possible small questionable hemorrhage versus area of mineralization  CT head without contrast in [facility] negative for bleed  · Geriatrics following, patient should follow up outpatient with them  · Repeat CT head this admission shows no acute abnormality, chronic lacunar infarct left lentiform nucleus  · Neurology on board, plan for LP likely today  Appreciate input  · CTA was ordered per neurology to evaluate for CNS vasculitis which could not be completed on 10/29 due to iv infiltration and contrast extravasation  · Follow JAMILAH, lyme titer, HIV, ENS 1 panel   , anti thyroglobulin Abs  · Consider rheum eval if needed pending above work up and neurology eval

## 2019-10-30 NOTE — ASSESSMENT & PLAN NOTE
· Afib with RVR on 10 29 on tele intermittently  · Now in NSR   · TSH normal this admission  · LENO showed EF 55% without any intracardiac thrombus  · Continue iv metoprolol 2 5mg Q6 hourly for now, switch to po when able to tolerate po  · Iv heparin for AC given Afib and splenic infarct  · Cardiology on board, d/w team, they will re evaluate pt again today

## 2019-10-30 NOTE — NUTRITION
10/30/19 1503   Recommendations/Interventions   Summary Patient NPO, VBS to be completed per RN  Patient with decreased appetite over the past few days  Family meeting to be held 10/31  Generalized trace edema noted, nursing skin care plan reviewed  Nutrition Recommendations Other (specify); Lab - consider order (specify)  (Per speech recommendation suggest advance diet to Level 1 dysphagia, HTL until VBS completed  Suggest add Magic cup BID and Honeyshake once daily   If aggressive measures desired consider initiate alternate means of nutrition secondary to malnutrition  )

## 2019-10-30 NOTE — PLAN OF CARE
Problem: Nutrition/Hydration-ADULT  Goal: Nutrient/Hydration intake appropriate for improving, restoring or maintaining nutritional needs  Description  Monitor and assess patient's nutrition/hydration status for malnutrition  Collaborate with interdisciplinary team and initiate plan and interventions as ordered  Monitor patient's weight and dietary intake as ordered or per policy  Utilize nutrition screening tool and intervene as necessary  Determine patient's food preferences and provide high-protein, high-caloric foods as appropriate  INTERVENTIONS:  - Monitor oral intake, urinary output, labs, and treatment plans  - Assess nutrition and hydration status and recommend course of action  - Evaluate amount of meals eaten  - Assist patient with eating if necessary   - Allow adequate time for meals  - Recommend/ encourage appropriate diets, oral nutritional supplements, and vitamin/mineral supplements  - Order, calculate, and assess calorie counts as needed  - Recommend, monitor, and adjust tube feedings and TPN/PPN based on assessed needs  - Assess need for intravenous fluids  - Provide specific nutrition/hydration education as appropriate  - Include patient/family/caregiver in decisions related to nutrition  Outcome: Not Progressing   NPO, VBS to be completed  Will monitor diet advancement/tolerance  May need to consider initiating alternate means of nutrition if aggressive measures are desired

## 2019-10-30 NOTE — QUICK NOTE
Attempt to see patient earlier in the afternoon and she was not present at bedside  Chart reviewed and patient again had fever spike yesterday  Unclear if this is related to the extravasation of contrast in her arm  She remains without leukocytosis  LP was done today and glucose as well as protein are unremarkable  ME panel was negative  Gram stain/culture, VDRL and Lyme testing are pending  Cell count on the fluid is also unremarkable  CTA was unremarkable  Patient's other vitals are otherwise stable  Sed rate and CRP are significantly elevated  HIV testing was negative  Peripheral Lyme testing is pending  Suspicion remains at this time for an autoimmune process/encephalitis  Despite her intermittent fever spikes the patient remains otherwise hemodynamically stable  Will continue to hold antibiotics at this time and monitor clinically  Will formally re-evaluate the patient again in consult tomorrow

## 2019-10-30 NOTE — ASSESSMENT & PLAN NOTE
· Still with intermittent fevers  · Concerns for encephalitis vs vasculitis (less likely), less likely related to splenic infarct  · No apparent infectious etiology  · Urine culture not significant this admission, [id number]  At [facility], pt was noted to have ESBL in urine which was suspected to be colonization  · Admission blood cultrues are neg  · Noted elevated sed rate and CRP  · Repeat CXR negative  · WBCs within normal limits  · Flu/RSV negative  · Blood culture negative at 72 hours  · Infectious Disease following, appreciate input  · Continue to monitor off ABX  · Work up for autoimmune etiology as mentioned above  · Appreciate neurology input

## 2019-10-30 NOTE — ASSESSMENT & PLAN NOTE
· Patient has chronic Herrera present on admission- herrera was replaced in ED 10/14   · Placed on recent admission (10/3/19) at [facility] where she developed urinary retention    · Patient will follow up outpatient with urology for voiding trial

## 2019-10-30 NOTE — ASSESSMENT & PLAN NOTE
Lab Results   Component Value Date    HGBA1C 6 4 (H) 10/15/2019     Recent Labs     10/29/19  1042 10/29/19  1559 10/29/19  2114 10/30/19  0639   POCGLU 170* 126 145* 135       · Takes metformin and glipizide at baseline, will plan to resume metformin at discharge and hold glipizide    · Continue with SSI  · Diabetic diet  · Monitor accuchecks, avoid hypoglycemia

## 2019-10-30 NOTE — OCCUPATIONAL THERAPY NOTE
OT CANCEL NOTE:    Attempted to see patient for OT Re evaluation this am, however, pt is currently on bedrest following Lumbar puncture  OT will continue to follow and reevaluate as appropriate

## 2019-10-30 NOTE — ASSESSMENT & PLAN NOTE
· Since September she has had a progressive decline - she has had no appetite, not really eating/drinking with weakness  She was previously reportedly very independent prior to her admission at [facility] in September with UTI  · Geriatrics following, recommending outpatient follow up  · Speech following, NPO for now, VBS tpday  · Plan for family meeting with palliative care on 10/31  · No recent colonoscopy/EGD, recommend outpatient GI evaluation

## 2019-10-30 NOTE — PROCEDURES
Procedure note: Lumbar puncture  Indication:  Persistent encephalopathy, fevers  The patient was placed in the forward flexed position  L3-L4 interspace was palpated and sterilely prepped,  draped, and anesthetized with 1% lidocaine  A 20-gauge spinal needle was inserted into the subarachnoid space without difficulty  Approximately 10-15 cc of clear spinal fluid was removed for analysis  The needle was withdrawn and a bandage was placed  The patient tolerated well  There were no complications      Mónica Bravo PA-C

## 2019-10-30 NOTE — ASSESSMENT & PLAN NOTE
· Baseline seems between 8-11  · Recent hgb 8 8, pending this AM  · Iron studies suggestive of low iron  · Continue  p o  iron supplementation which was started this admission    · CBC in AM

## 2019-10-30 NOTE — ASSESSMENT & PLAN NOTE
· LUE exam is stable as compared to yesterday  · Left arm swelling is improving   No redness or tenderness  · Continue with left arm elevation a dn ice packs  · Low threshold to obtain surgery consult if exam deteriorates

## 2019-10-30 NOTE — PROGRESS NOTES
Progress Note - Neurology   Benja Lee [de-identified] y o  female MRN: [id number]  Unit/Bed#: -01 Encounter: [id number]    Assessment:  79-year-old female with acute on chronic hyponatremia, adrenal insufficiency, hypertension, DM2, dyslipidemia, new onset AFib with RVR, being further evaluated for persistent encephalopathy      Subacute encephalopathy:  -per patient's daughter, patient was fully functional and independent, including all ADLs and IADLs, prior to her admissions to [facility] in September 2019    -it appears she was admitted 3 times a [facility] with acute on chronic hyponatremia and altered mental status, as well as UTI   -since September, patient has had diminished appetite and has been confused  -MRI brain on 09/27/2019 at [facility] demonstrated left inferior cerebellar mineralization but was otherwise unremarkable  CT brain this admission demonstrated chronic left lentiform nucleus lacunar infarct  -CT chest abdomen and pelvis performed at [facility] demonstrated no tumor or lymphadenopathy   -patient has been intermittently febrile during this admission, with temperature 103 9° on 10/28/2019   -concern for possible autoimmune encephalitis, and less likely, CNS vasculitis as etiology of encephalopathy   -patient slightly more interactive today and more oriented  Stated she is at [facility] and did not appear to be responding to internal stimuli as she was yesterday   -tolerated LP well     -unfortunately, CTA could not be completed as the contrast extravasated into her [facility] space      AFib with RVR:  -discovered on telemetry on 10/29/2019   -patient has been on heparin drip as patient was anticipated to have lumbar puncture performed as part of workup   -Cardiology on board      Fevers:  -intermittently low-grade at times higher as discussed above   -blood cultures negative    Urine culture positive for [id number] gram-negative rods, not felt to represent acute UTI  -currently being observed off antibiotics  -afebrile today      Results:  -MRI brain on 09/27/2019 at [facility] demonstrated left inferior cerebellar mineralization but was otherwise unremarkable  CT brain this admission demonstrated chronic left lentiform nucleus lacunar infarct  -CT chest abdomen and pelvis performed at [facility] demonstrated no tumor or lymphadenopathy   -Nonreactive RPR, CRP greater than 90, , TSH 2 82, B12 443, and folate 4 9  Sodium on admission was 126 and is currently 137  -LENO demonstrated EF 55%, normal size atria, no MAC, no thrombus or PFO  -antithyroglobulin antibody 1 1  Anti-TPO 21       Plan:  1  Await pending CSF studies including protein, glucose, culture and Gram stain, VDRL, Lyme, ENC-1, meningitis/encephalitis panel, RBC, WBC with differential   2  Await additional serum lab work including Lyme serology, ENS-1, JAMILAH  3  Will hold off on re-attempting CTA head and neck for now pending results of CSF studies  Examined and discussed with Dr Mayur Cobb  Subjective:   Patient denies headache or other pain complaints  States she feels generally weak  More interactive today  ROS: 12 point review of systems performed and negative except as indicated above  Vitals: Blood pressure 121/55, pulse 83, temperature (!) 96 6 °F (35 9 °C), resp  rate 19, height 5' 2" (1 575 m), weight 54 6 kg (120 lb 4 8 oz), SpO2 99 %  ,Body mass index is 22 kg/m²  Physical Exam:   General:  Patient appears to be in no acute distress  HEENT:  Head normocephalic  Eyes anicteric  Neurologic:  Patient is alert  Oriented to person, location, and year, but incorrectly stated the month as April  Correctly stated the current president's name  Able to more briskly follow simple commands  EOMs intact without nystagmus  Face appears symmetric  Full strength noted bilateral upper extremities without drift    Patient did have drift of the bilateral lower extremities  Lab, Imaging and other studies: I have personally reviewed pertinent reports      As outlined above  VTE Prophylaxis: Sequential compression device (Venodyne)

## 2019-10-30 NOTE — PROCEDURES
Video Swallow Study      Patient Name: Artemio Watt's Date: 10/30/2019        Past Medical History  Past Medical History:   Diagnosis Date    Cardiac disease     Diabetes mellitus ([address] )     Hyperlipidemia     Hypertension         Past Surgical History  Past Surgical History:   Procedure Laterality Date    CORONARY ANGIOPLASTY WITH STENT PLACEMENT       Video Barium Swallow Study    Summary:  Images are on PACS for review  Pt presents w/ mod oropharyngeal dysphagia w/ reduced manipulation & transfers, poor hyolaryngeal elevation/movement,poor epiglottic movement & noted pharyngeal retention w/ all material   The pt requires mult swallows to clear  No gross aspiration w/ anything today though she is at risk given the small space  She would do best w/ runny purees/thin or nt  The pt has increased gagging with meds - ? Trying to offer them cut in half w/ puree & then follow w/ another tsp of puree  Recommendations:  Diet: runny puree  Liquids: try nt -speech will trial thin  Meds: ?crushed in runny puree vs cut in half w/ mult tsps of puree  Strategies: small bites/sips, alternate bites w/ sips   Upright position  F/u ST tx: yes  Therapy Prognosis: guarded  Prognosis considerations: baseline confusion  Full Supervision  Aspiration Precautions  RResults reviewed with: pt, nursing, family, physician, dietician  Aspiration precautions posted  Previous VBS:  -  Current Diet:   Pt made npo   Premorbid diet:  Puree/nt  Dentition:  edentulous  O2 requirement:  nc  Oral mech:  Strength and ROM: midl reduced    Vocal Quality/Speech:    Cognitive status:      Consistencies administered: Barium laden applesauce, banana, cheerios/nectar, soft solid, hard solid, honey thick, nectar thick, thin liquids, 13mm barium pill  Liquids were administered by cup and straw  Pt was seated laterally at 90 degrees     Pt viewed in AP position    Oral stage:    Lip closure: fair  Mastication: pt w/o dentures, pt w/ munching/mashing manipulation   Bolus formation: fair, mildly reduced  Bolus control: labored  Transfer: labored  Residue: minimal, needs cues     Pharyngeal stage:    Pt w/ narrow appearing pharynx with the neck pushing inward, reducing the vallecular space     Swallow promptness: mild delay  Spill to valleculae:w/ all   Spill to pyriforms: w/ all   Epiglottic inversion: poor inversion, hits the PPW  Laryngeal excursion: poor both anterior & superior  Pharyngeal constriction: mild/mod reduced  Vallecular retention: mod w/ all, pill stuck & needed mult tsps puree to clear  Pyriform retention: mild/mod w/ all, needs swallows to clear  PPW coating:   Osteophytes: curved cervical spine  CP prominence: mild prominence  Retropulsion from prominence: minimal  Transient penetration: during the swallow inconsistently   Epiglottic undercoat:  Penetration: trace at times  Aspiration: no gross aspiration noted  Strategies:  Response to aspiration:    Screening of Esophageal stage:    Mild delayed clearing distally

## 2019-10-30 NOTE — ASSESSMENT & PLAN NOTE
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  · Presented with sodium of 126  Sodium 137 yesterday, BMP pending this AM   · TSH normal this admission  · Cosyntropin stim test negative for adrenal insufficiency  · CT chest abdomen pelvis was done at [facility] to rule out any occult malignancy as a cause of SIADH which was negative for any malignancy  · Continue with 1g Salt tabs BID and fluid restriction - resume 1500ml Plan will be to d/c on daily dosing per Dr Allison Galeana  · [id number] Cherrie Dumas for d/c from a nephrology standpoint, will need outpatient f/u with [facility] nephrology  Recommending BMP in 1 week with results to PCP for further management of NACL tabs  Protonix changed to Pepcid  ACEI stopped as well    · Given additional salt tab today and given torsemide PO x 1 dose on 10/24  · BMP in AM

## 2019-10-31 ENCOUNTER — APPOINTMENT (INPATIENT)
Dept: RADIOLOGY | Facility: HOSPITAL | Age: 79
DRG: 545 | End: 2019-10-31
Payer: MEDICARE

## 2019-10-31 PROBLEM — Z71.89 GOALS OF CARE, COUNSELING/DISCUSSION: Status: ACTIVE | Noted: 2019-10-31

## 2019-10-31 LAB
% PARASITEMIA: 0
ALBUMIN SERPL BCP-MCNC: 1.8 G/DL (ref 3.5–5)
ALP SERPL-CCNC: 57 U/L (ref 46–116)
ALT SERPL W P-5'-P-CCNC: 20 U/L (ref 12–78)
ANION GAP SERPL CALCULATED.3IONS-SCNC: 9 MMOL/L (ref 4–13)
APTT PPP: 56 SECONDS (ref 23–37)
APTT PPP: 63 SECONDS (ref 23–37)
AST SERPL W P-5'-P-CCNC: 23 U/L (ref 5–45)
BASOPHILS # BLD AUTO: 0.01 THOUSANDS/ΜL (ref 0–0.1)
BASOPHILS NFR BLD AUTO: 0 % (ref 0–1)
BILIRUB DIRECT SERPL-MCNC: 0.09 MG/DL (ref 0–0.2)
BILIRUB SERPL-MCNC: 0.27 MG/DL (ref 0.2–1)
BUN SERPL-MCNC: 15 MG/DL (ref 5–25)
CALCIUM SERPL-MCNC: 8.7 MG/DL (ref 8.3–10.1)
CHLORIDE SERPL-SCNC: 110 MMOL/L (ref 100–108)
CO2 SERPL-SCNC: 21 MMOL/L (ref 21–32)
CREAT SERPL-MCNC: 0.37 MG/DL (ref 0.6–1.3)
EOSINOPHIL # BLD AUTO: 0.02 THOUSAND/ΜL (ref 0–0.61)
EOSINOPHIL NFR BLD AUTO: 0 % (ref 0–6)
ERYTHROCYTE [DISTWIDTH] IN BLOOD BY AUTOMATED COUNT: 18.4 % (ref 11.6–15.1)
GFR SERPL CREATININE-BSD FRML MDRD: 101 ML/MIN/1.73SQ M
GLUCOSE SERPL-MCNC: 108 MG/DL (ref 65–140)
GLUCOSE SERPL-MCNC: 108 MG/DL (ref 65–140)
GLUCOSE SERPL-MCNC: 123 MG/DL (ref 65–140)
GLUCOSE SERPL-MCNC: 140 MG/DL (ref 65–140)
GLUCOSE SERPL-MCNC: 97 MG/DL (ref 65–140)
HCT VFR BLD AUTO: 28.2 % (ref 34.8–46.1)
HGB BLD-MCNC: 8.3 G/DL (ref 11.5–15.4)
IMM GRANULOCYTES # BLD AUTO: 0.03 THOUSAND/UL (ref 0–0.2)
IMM GRANULOCYTES NFR BLD AUTO: 1 % (ref 0–2)
LYMPHOCYTES # BLD AUTO: 0.79 THOUSANDS/ΜL (ref 0.6–4.47)
LYMPHOCYTES NFR BLD AUTO: 13 % (ref 14–44)
MCH RBC QN AUTO: 22.7 PG (ref 26.8–34.3)
MCHC RBC AUTO-ENTMCNC: 29.4 G/DL (ref 31.4–37.4)
MCV RBC AUTO: 77 FL (ref 82–98)
MONOCYTES # BLD AUTO: 0.25 THOUSAND/ΜL (ref 0.17–1.22)
MONOCYTES NFR BLD AUTO: 4 % (ref 4–12)
NEUTROPHILS # BLD AUTO: 5 THOUSANDS/ΜL (ref 1.85–7.62)
NEUTS SEG NFR BLD AUTO: 82 % (ref 43–75)
NRBC BLD AUTO-RTO: 0 /100 WBCS
PARASITE BLD: NO
PATHOLOGIST INTERPRETATION: NORMAL
PLATELET # BLD AUTO: 502 THOUSANDS/UL (ref 149–390)
PMV BLD AUTO: 9.7 FL (ref 8.9–12.7)
POTASSIUM SERPL-SCNC: 3.6 MMOL/L (ref 3.5–5.3)
PROT SERPL-MCNC: 7 G/DL (ref 6.4–8.2)
RBC # BLD AUTO: 3.66 MILLION/UL (ref 3.81–5.12)
SODIUM SERPL-SCNC: 140 MMOL/L (ref 136–145)
WBC # BLD AUTO: 6.1 THOUSAND/UL (ref 4.31–10.16)

## 2019-10-31 PROCEDURE — C9113 INJ PANTOPRAZOLE SODIUM, VIA: HCPCS | Performed by: INTERNAL MEDICINE

## 2019-10-31 PROCEDURE — 80048 BASIC METABOLIC PNL TOTAL CA: CPT | Performed by: INTERNAL MEDICINE

## 2019-10-31 PROCEDURE — NC001 PR NO CHARGE: Performed by: FAMILY MEDICINE

## 2019-10-31 PROCEDURE — 99232 SBSQ HOSP IP/OBS MODERATE 35: CPT | Performed by: INTERNAL MEDICINE

## 2019-10-31 PROCEDURE — 99232 SBSQ HOSP IP/OBS MODERATE 35: CPT | Performed by: FAMILY MEDICINE

## 2019-10-31 PROCEDURE — G8988 SELF CARE GOAL STATUS: HCPCS

## 2019-10-31 PROCEDURE — 87798 DETECT AGENT NOS DNA AMP: CPT | Performed by: INTERNAL MEDICINE

## 2019-10-31 PROCEDURE — 85025 COMPLETE CBC W/AUTO DIFF WBC: CPT | Performed by: INTERNAL MEDICINE

## 2019-10-31 PROCEDURE — 97530 THERAPEUTIC ACTIVITIES: CPT

## 2019-10-31 PROCEDURE — 99233 SBSQ HOSP IP/OBS HIGH 50: CPT | Performed by: PSYCHIATRY & NEUROLOGY

## 2019-10-31 PROCEDURE — 85730 THROMBOPLASTIN TIME PARTIAL: CPT | Performed by: INTERNAL MEDICINE

## 2019-10-31 PROCEDURE — 80076 HEPATIC FUNCTION PANEL: CPT | Performed by: INTERNAL MEDICINE

## 2019-10-31 PROCEDURE — 86753 PROTOZOA ANTIBODY NOS: CPT | Performed by: INTERNAL MEDICINE

## 2019-10-31 PROCEDURE — 97168 OT RE-EVAL EST PLAN CARE: CPT

## 2019-10-31 PROCEDURE — 87207 SMEAR SPECIAL STAIN: CPT | Performed by: INTERNAL MEDICINE

## 2019-10-31 PROCEDURE — 82948 REAGENT STRIP/BLOOD GLUCOSE: CPT

## 2019-10-31 PROCEDURE — 86666 EHRLICHIA ANTIBODY: CPT | Performed by: INTERNAL MEDICINE

## 2019-10-31 PROCEDURE — G8987 SELF CARE CURRENT STATUS: HCPCS

## 2019-10-31 RX ORDER — SODIUM CHLORIDE, SODIUM GLUCONATE, SODIUM ACETATE, POTASSIUM CHLORIDE, MAGNESIUM CHLORIDE, SODIUM PHOSPHATE, DIBASIC, AND POTASSIUM PHOSPHATE .53; .5; .37; .037; .03; .012; .00082 G/100ML; G/100ML; G/100ML; G/100ML; G/100ML; G/100ML; G/100ML
250 INJECTION, SOLUTION INTRAVENOUS ONCE
Status: COMPLETED | OUTPATIENT
Start: 2019-10-31 | End: 2019-10-31

## 2019-10-31 RX ORDER — FLUTICASONE PROPIONATE 50 MCG
1 SPRAY, SUSPENSION (ML) NASAL 2 TIMES DAILY
Status: DISCONTINUED | OUTPATIENT
Start: 2019-10-31 | End: 2019-11-16 | Stop reason: HOSPADM

## 2019-10-31 RX ORDER — PANTOPRAZOLE SODIUM 40 MG/1
40 INJECTION, POWDER, FOR SOLUTION INTRAVENOUS
Status: DISCONTINUED | OUTPATIENT
Start: 2019-10-31 | End: 2019-11-04

## 2019-10-31 RX ADMIN — METOPROLOL TARTRATE 2.5 MG: 5 INJECTION INTRAVENOUS at 18:31

## 2019-10-31 RX ADMIN — HEPARIN SODIUM AND DEXTROSE 22 UNITS/KG/HR: 10000; 5 INJECTION INTRAVENOUS at 18:46

## 2019-10-31 RX ADMIN — FLUTICASONE PROPIONATE 1 SPRAY: 50 SPRAY, METERED NASAL at 18:31

## 2019-10-31 RX ADMIN — NYSTATIN 500000 UNITS: 500000 SUSPENSION ORAL at 14:39

## 2019-10-31 RX ADMIN — PANTOPRAZOLE SODIUM 40 MG: 40 INJECTION, POWDER, FOR SOLUTION INTRAVENOUS at 12:13

## 2019-10-31 RX ADMIN — METOPROLOL TARTRATE 2.5 MG: 5 INJECTION, SOLUTION INTRAVENOUS at 14:39

## 2019-10-31 RX ADMIN — HEPARIN SODIUM 2200 UNITS: 1000 INJECTION INTRAVENOUS; SUBCUTANEOUS at 11:35

## 2019-10-31 RX ADMIN — FLUTICASONE PROPIONATE 1 SPRAY: 50 SPRAY, METERED NASAL at 14:43

## 2019-10-31 RX ADMIN — NYSTATIN 500000 UNITS: 500000 SUSPENSION ORAL at 10:55

## 2019-10-31 RX ADMIN — NYSTATIN 500000 UNITS: 500000 SUSPENSION ORAL at 18:20

## 2019-10-31 RX ADMIN — NYSTATIN 500000 UNITS: 500000 SUSPENSION ORAL at 22:03

## 2019-10-31 RX ADMIN — MIRTAZAPINE 7.5 MG: 15 TABLET, FILM COATED ORAL at 22:03

## 2019-10-31 RX ADMIN — METOPROLOL TARTRATE 2.5 MG: 5 INJECTION, SOLUTION INTRAVENOUS at 10:56

## 2019-10-31 RX ADMIN — SODIUM CHLORIDE, SODIUM GLUCONATE, SODIUM ACETATE, POTASSIUM CHLORIDE AND MAGNESIUM CHLORIDE 250 ML: 526; 502; 368; 37; 30 INJECTION, SOLUTION INTRAVENOUS at 18:12

## 2019-10-31 RX ADMIN — PRAVASTATIN SODIUM 40 MG: 20 TABLET ORAL at 18:21

## 2019-10-31 RX ADMIN — METOPROLOL TARTRATE 2.5 MG: 5 INJECTION, SOLUTION INTRAVENOUS at 03:12

## 2019-10-31 NOTE — ASSESSMENT & PLAN NOTE
· Afib with RVR on 10 29 on tele intermittently  · Now in NSR   · TSH normal this admission  · LENO showed EF 55% without any intracardiac thrombus  · Continue iv metoprolol 2 5mg Q6 hourly for now, switch to po when able to tolerate po  · Iv heparin for AC given Afib and splenic infarct  · Cardiology on board, appreciate input

## 2019-10-31 NOTE — ASSESSMENT & PLAN NOTE
· Baseline seems between 8-11  · Recent hgb 8 3 on 10/31  · Iron studies suggestive of low iron  · Continue  p o  iron supplementation which was started this admission    · CBC in AM

## 2019-10-31 NOTE — PROGRESS NOTES
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 ACE-inhibitor and PPI could cause SIADH --Protonix changed to Pepcid and Lisinopril was discontinued as her blood pressure is running low and lisinopril can cause SIADH  ? Workup:  Serum osmolality 276, urine osmolality 624, urine sodium 90, uric acid 4 1, cortisol 40 8, TSH 2 8, normal LFTs except for albumin of 2 2, chest x-ray with minimal left basilar airspace disease attributed to compressive atelectasis and small left pleural effusion, echocardiogram at [facility] revealed ejection fraction of 50-55% with mild pulmonary hypertension, mild mitral regurgitation and tricuspid regurgitation / ACT H was low at 1 6 at [facility] [place] cortisol of 19 4) // LVH CT chest, abdomen and pelvis on 09/29 at [facility]:  No signs of malignancy   There were new small bilateral pleural effusions with new infarcts involving the medial aspect of the spleen   Low ACTH level at [facility] was likely a lab error   Would have her PCP recheck as an outpatient  ? SPEP faint possible band in the gamma region   Immunofixation pending - sent to [facility]  ? Sodium level  has improved and remains normal       ? Continue sodium chloride tablets at 1 g daily  If her sodium level increases would hold the sodium chloride tablets  Continue fluid restriction at 1500 mL per day if NPO status lifted  ? Check a m  BMP   2  Anemia:  Hemoglobin 8 3 today  ? Iron saturation 7%, ferritin 608  ? Started on oral iron by primary team  ? Management per primary team  3   Encephalopathy:  Originally due to hyponatremia and UTI   Overall, mental status seems to have improved   4  Hypertension:  Blood pressure running on the low side but seems to have improved  · Continue to hold lisinopril and  Norvasc   · Trend blood pressure   5    Fever  · Cultures negative so far  · Per Infectious Disease  · T-max 100 5°   6  Splenic infarct  · Workup and management per primary team and Hematology  · CT of abdomen and pelvis/ LENO results noted  · Patient did go into atrial fibrillation with RVR, now back in normal sinus    cardiology has evaluated the patient  She is on anticoagulation  7  Encephalopathy  · Workup continuing per Neurology      Subjective: The patient was seen examined  She denied any shortness of breath, chest pain, abdominal pain, nauseousness, vomiting, diarrhea  Objective:     Vitals: Blood pressure 113/70, pulse 98, temperature 100 5 °F (38 1 °C), temperature source Oral, resp  rate 19, height 5' 2" (1 575 m), weight 56 8 kg (125 lb 3 5 oz), SpO2 96 %  ,Body mass index is 22 9 kg/m²  Temp (24hrs), Av 8 °F (37 1 °C), Min:97 °F (36 1 °C), Max:100 5 °F (38 1 °C)      Weight (last 2 days)     Date/Time   Weight    10/31/19 0600   56 8 (125 22)    10/30/19 06:29:14   57 (125 6)    10/29/19 0600   54 6 (120 3)                     Intake/Output Summary (Last 24 hours) at 10/31/2019 1047  Last data filed at 10/31/2019 0627  Gross per 24 hour   Intake    Output 650 ml   Net -650 ml     I/O last 24 hours: In: -   Out: 650 [Urine:650]        Physical Exam    Physical Exam   Constitutional: No distress  Neck: No JVD present  Cardiovascular: Normal heart sounds  Exam reveals no gallop and no friction rub  Pulmonary/Chest: Effort normal and breath sounds normal  No respiratory distress  She has no wheezes  She has no rales  Abdominal: Soft  Bowel sounds are normal  She exhibits no distension  There is no tenderness  There is no rebound and no guarding  Musculoskeletal: She exhibits no edema  Neurological: She is alert     Patient knows the year and the [facility]   She knows her name  Skin: She is not diaphoretic  Vitals reviewed                Invasive Devices     Peripheral Intravenous Line            Peripheral IV 10/28/19 Right Forearm 2 days    Peripheral IV 10/30/19 Right Antecubital 1 day          Drain            Urethral Catheter Straight-tip 16 Fr  16 days                Medications:    Scheduled Meds:  Current Facility-Administered Medications:  acetaminophen 650 mg Rectal Q6H PRN Herman Rueda DO    aluminum-magnesium hydroxide-simethicone 30 mL Oral Q6H PRN Gentry Bradley MD    bisacodyl 10 mg Rectal Daily PRN Heaven MAHAN PA-C    docusate sodium 100 mg Oral BID PRN Gentry Bradley MD    famotidine 20 mg Oral Daily Naya Callahan MD    ferrous sulfate 325 mg Oral Daily With Breakfast Nickie Lewis MD    heparin (porcine) 3-30 Units/kg/hr (Order-Specific) Intravenous Titrated Nickie Lewis MD Last Rate: 20 Units/kg/hr (10/31/19 0000)   heparin (porcine) 2,200 Units Intravenous PRN Nickie Lewis MD    heparin (porcine) 4,400 Units Intravenous PRN Nickie Lewis MD    insulin lispro 1-5 Units Subcutaneous HS Gentry Bradley MD    insulin lispro 1-6 Units Subcutaneous TID AC Gentry Bradley MD    lidocaine (PF) 2 mL Infiltration Once Radha Ríos PA-C    metoprolol 2 5 mg Intravenous Q6H PRN Nickie Lewis MD    metoprolol 2 5 mg Intravenous Q6H Nickie Lewis MD    mirtazapine 7 5 mg Oral HS Heaven MAHAN PA-C    nystatin 500,000 Units Swish & Swallow 4x Daily Heaven MAHAN PA-C    ondansetron 4 mg Intravenous Q6H PRN Gentry Bradley MD    pravastatin 40 mg Oral Daily With Nishant Patino MD    sodium chloride 1 g Oral Daily Naya Callahan MD        PRN Meds:   acetaminophen    aluminum-magnesium hydroxide-simethicone    bisacodyl    docusate sodium    heparin (porcine)    heparin (porcine)    metoprolol    ondansetron    Continuous Infusions:  heparin (porcine) 3-30 Units/kg/hr (Order-Specific) Last Rate: 20 Units/kg/hr (10/31/19 0000)           LAB RESULTS:      Results from last 7 days   Lab Units 10/31/19  0617 10/30/19  1430 10/30/19  0831 10/29/19  8918 10/28/19  0513 10/27/19  0542 10/26/19  0927 10/26/19  0529 10/25/19  1142  10/25/19  0626   WBC Thousand/uL 6 10  --  7 19 7 67 5 34 6 04 9 15  --  10 36*  --   --    HEMOGLOBIN g/dL 8 3* 7 6* 7 9* 8 8* 8 1* 8 7* 8 6*  --  9 0*   < >  --    HEMATOCRIT % 28 2* 26 0* 26 8* 30 2* 27 3* 28 9* 28 1*  --  29 5*   < >  --    PLATELETS Thousands/uL 502*  --  413* 396* 296 377 369  --  397*  --   --    NEUTROS PCT % 82*  --  78*  --   --  83* 83*  --  89*  --   --    LYMPHS PCT % 13*  --  15  --   --  11* 11*  --  7*  --   --    LYMPHO PCT %  --   --   --  3*  --   --   --   --   --   --   --    MONOS PCT % 4  --  6  --   --  3* 4  --  3*  --   --    MONO PCT %  --   --   --  3*  --   --   --   --   --   --   --    EOS PCT % 0  --  0 0  --  2 1  --  0  --   --    POTASSIUM mmol/L 3 6  --  3 7 4 2 4 1 3 8  --  3 6  --   --  4 1   CHLORIDE mmol/L 110*  --  110* 109* 107 106  --  107  --   --  105   CO2 mmol/L 21  --  25 21 23 22  --  22  --   --  23   BUN mg/dL 15  --  17 14 13 13  --  19  --   --  13   CREATININE mg/dL 0 37*  --  0 43* 0 43* 0 41* 0 44*  --  0 52*  --   --  0 54*   CALCIUM mg/dL 8 7  --  8 6 8 6 8 4 8 6  --  8 3  --   --  9 0   MAGNESIUM mg/dL  --   --  2 1 2 0  --   --   --   --   --   --   --     < > = values in this interval not displayed  CUTURES:  Lab Results   Component Value Date    BLOODCX No Growth After 5 Days  10/25/2019    BLOODCX No Growth After 5 Days  10/25/2019    BLOODCX No Growth After 5 Days  10/14/2019    BLOODCX No Growth After 5 Days  10/14/2019    URINECX 0629-6553 cfu/ml Gram Negative Nirav (A) 10/15/2019    URINECX >100,000 cfu/ml Escherichia coli 07/16/2017                 PLEASE NOTE:  This encounter was completed utilizing the [employer]/Fluency Direct Speech Voice Recognition Software   Grammatical errors, random word insertions, pronoun errors and incomplete sentences are occasional consequences of the system due to software limitations, ambient noise and hardware issues  These may be missed by proof reading prior to affixing electronic signature  Any questions or concerns about the content, text or information contained within the body of this dictation should be directly addressed to the physician for clarification  Please do not hesitate to call me directly if you have any any questions or concerns

## 2019-10-31 NOTE — OCCUPATIONAL THERAPY NOTE
Occupational Therapy Re-Evaluation     Pam Oliveira    10/31/2019    Principal Problem:    Metabolic encephalopathy  Active Problems:    Hyponatremia    Type 2 diabetes mellitus with diabetic neuropathy, without long-term current use of insulin (HCC)    Essential hypertension    HLD (hyperlipidemia)    Failure to thrive in adult    Urinary retention    Severe protein-calorie malnutrition (HCC)    Bradycardia    Fever    Microcytic anemia    Splenic infarct    A-fib (HCC)    IV infiltration and contrast extravasation in left arm    Goals of care, counseling/discussion      [unfilled]    Past Surgical History:   Procedure Laterality Date    CORONARY ANGIOPLASTY WITH STENT PLACEMENT          10/31/19 1505   Note Type   Note type Re-eval   Restrictions/Precautions   Weight Bearing Precautions Per Order No   Other Precautions Contact/isolation;Cognitive; Chair Alarm;Multiple lines;Telemetry; Fall Risk;Pain;O2   Pain Assessment   Pain Assessment FLACC   Pain Rating: FLACC (Rest) - Face 1   Pain Rating: FLACC (Rest) - Legs 0   Pain Rating: FLACC (Rest) - Activity 0   Pain Rating: FLACC (Rest) - Cry 1   Pain Rating: FLACC (Rest) - Consolability 0   Score: FLACC (Rest) 2   Pain Rating: FLACC (Activity) - Face 1   Pain Rating: FLACC (Activity) - Legs 0   Pain Rating: FLACC (Activity) - Activity 0   Pain Rating: FLACC (Activity) - Cry 1   Pain Rating: FLACC (Activity) - Consolability 0   Score: FLACC (Activity) 2   Home Living   Additional Comments see initial OT eval   Prior Function   Comments see initial OT eval   Lifestyle   Autonomy Has needed assist w self care/mobility for the past one month  was at [facility] rehab for rehab following 2 hospitalizations   Reciprocal Relationships supportive family   Psychosocial   Psychosocial (WDL) X   Patient Behaviors/Mood Flat affect   ADL   Eating Assistance 4  Minimal Assistance   Eating Deficit Setup; Thickened liquids   Grooming Assistance 3  Moderate Assistance   UB Bathing Assistance 3  Moderate Assistance   LB Bathing Assistance 2  Maximal Assistance   UB Dressing Assistance 3  Moderate Assistance   LB Dressing Assistance 2  Maximal Assistance   Toileting Assistance  2  Maximal Assistance   Functional Assistance 3  Moderate Assistance   Functional Deficit Steadying;Verbal cueing;Supervision/safety; Increased time to complete  (Ax2)   Bed Mobility   Rolling R 3  Moderate assistance   Additional items Assist x 1; Increased time required;Verbal cues;LE management   Rolling L 3  Moderate assistance   Additional items Assist x 1; Increased time required;Verbal cues   Supine to Sit 3  Moderate assistance   Additional items Assist x 2;HOB elevated; Increased time required;Verbal cues;LE management   Sit to Supine Unable to assess   Additional Comments Pt performed rolling L and R w/ Mod A X1, for initiation into rolling and assist in side lying  Pt went from supine to sit w/ Mod A x2 for UB support and LE management, HOB elevated for assist  Pt sat EOB w/ Mod A for sitting balance/trunk control; presenting w/ lean to L side, requires ongoing cues for positioning into midline   Transfers   Sit to Stand 3  Moderate assistance   Additional items Assist x 2; Increased time required; Impulsive   Stand to Sit 3  Moderate assistance   Additional items Assist x 2; Increased time required;Verbal cues   Additional Comments Pt performed sit-stand from EOB w/ Mod A x2 for moderate force production into standing and +VC for safety/proper technique and hand placement   Functional Mobility   Functional Mobility 3  Moderate assistance   Additional Comments Pt took lateral side steps towards HOB w/ Mod Ax2, RW for support in standing, +VC for initiating and sequencing B/l LE advancement towards chair   Assist for RW management and assist for line management   Additional items Rolling walker   Balance   Static Sitting Poor +   Dynamic Sitting Poor   Static Standing Poor   Dynamic Standing Poor   Ambulatory Poor Activity Tolerance   Activity Tolerance Patient limited by fatigue;Patient limited by pain; Other (Comment)  (limited by cognition)   Medical Staff Made Aware [place]   Nurse Made Aware yes, melody COHEN Assessment   RUE Assessment WFL   LUE Assessment   LUE Assessment WFL   Hand Function   Gross Motor Coordination Functional   Fine Motor Coordination Functional   Sensation   Light Touch No apparent deficits   Vision-Basic Assessment   Current Vision Wears glasses all the time   Cognition   Overall Cognitive Status Impaired   Arousal/Participation Arousable; Cooperative;Lethargic   Attention Attends with cues to redirect   Orientation Level Oriented to person;Oriented to place; Disoriented to time;Disoriented to situation   Memory Decreased recall of precautions;Decreased recall of recent events;Decreased short term memory   Following Commands Follows one step commands with increased time or repetition   Comments Pt is cooperative; is confused; has decreased understanding of deficits and safety awareness  Requires cues for re-direction to task, needs repetitive cues to follow 1 step commands  Is slow to respond  Assessment   Limitation Decreased ADL status; Decreased Safe judgement during ADL;Decreased cognition;Decreased endurance;Decreased self-care trans;Decreased high-level ADLs; Visual deficit   Prognosis Fair   Assessment Pt is a [age] [facility] y/o female initially admitted 10/14/19 for confusion and lethargy  Pt had been recently hospitalized from 9/11/-9/14 and 9/24-10/3 for UTI and change in mental status  Pt had then been D/C'd to [facility] following that hospitalization  Pt then readmitted for above mentioned deficit  Pt seen for OT re-evaluation s/p lumbar puncture on 10/30, decline in functional status and expiration of goals  Pt seen for initial OT evaluation on 10/15/19 and performing at a level of mod assist for bed mobiltiy and sit to stand, max assist for ADLS    Pt currently performing @ a level of Mod A UB ADLS, Max A LB ADLS, Mod A x2 bed mobility, Mod A x2 transfers and functional mobility w/ RW for stability and +VC for sequencing, initiating task  Pt remains limited 2* pain, fatigue, activity tolerance, functional mobility, forward functional reach, trunk control, balance, standing tolerance, endurance, impaired cognition, decreased insight into deficits, poor safety awareness, dysphagia, confusion, and decreased I w/ ADLS  Continue to recommend STR upon D/C when medically stable  Pt continues to benefit from immediate inpatient skilled OT services 3-5x/wk  Will continue to follow to address goals stated below to be met within the next 10-14 days:   Goals   Patient Goals to rest (is lethargic w/ impaired cognition)   LTG Time Frame 10-14   Long Term Goal #1 see below listed goals   Plan   Treatment Interventions ADL retraining;Functional transfer training;UE strengthening/ROM; Endurance training;Cognitive reorientation;Patient/family training;Equipment evaluation/education; Compensatory technique education;Continued evaluation; Energy conservation; Activityengagement   Goal Expiration Date 11/14/19   OT Frequency 3-5x/wk   Recommendation   OT Discharge Recommendation Short Term Rehab   OT - OK to Discharge Yes  (when medically stable)   Barthel Index   Feeding 5   Bathing 0   Grooming Score 0   Dressing Score 0   Bladder Score 0   Bowels Score 10   Toilet Use Score 5   Transfers (Bed/Chair) Score 5   Mobility (Level Surface) Score 0   Stairs Score 0   Barthel Index Score 25   Modified Levi Scale   Modified Belden Scale 4       GOALS    1) Pt will complete rolling left/right in bed with Min assist, as prerequisite for further engagement in ADLS   2) Pt will complete supine to sit transfer with Min A using B/L UEs to initiate bed mobility   3) Pt will tolerate sitting at EOB 20 minutes with S assist and stable vital signs, as prerequisite for further engagement in ADLS   4) Pt will complete grooming task with S assist and increased time to increase independence in functional tasks  5) Pt will increase B/L UE ROM 1/2 MMT to increase functional UB use during ADLS   6) Pt will complete UB ADLS with S and use of AD/DME as needed to increase independence in functional tasks  7) Pt will complete LB ADLS with Min A and use of AD/DME as needed to increase independence in functional tasks  8) Pt will complete sit to stand transfer / sit pivot transfer / stand pivot transfer with Min assist on/off all ADL surfaces   9) Pt will follow 100% simple one step verbal commands and be A/Ox4 consistently t/o use of external environmental cues to increased awareness for functional tasks  10) Pt will demonstrate 100% carryover of E C  techniques t/o fx'l I/ADL/ leisure tasks w/o cues s/p skilled education  11) Pt will improve functional mobility to Min A w/ use of DME as needed to increase engagement in meaningful activities     Diana Berg MS, OTR/L

## 2019-10-31 NOTE — PHYSICAL THERAPY NOTE
PHYSICAL THERAPY Treatment NOTE    Patient Name: [place]  [id number] Date: 10/31/2019        10/31/19 1506   Pain Assessment   Pain Assessment 0-10   Pain Score No Pain   Restrictions/Precautions   Weight Bearing Precautions Per Order No   Other Precautions Contact/isolation;Cognitive; Chair Alarm; Bed Alarm; Fall Risk;O2;Multiple lines;Hard of hearing;Visual impairment   General   Chart Reviewed Yes   Additional Pertinent History Pt  is an [de-identified] yo F Who presents with metabolic encephalopathy  Family/Caregiver Present No   Cognition   Overall Cognitive Status Impaired   Arousal/Participation Persistent stimuli required   Attention Attends with cues to redirect   Orientation Level Oriented to person;Disoriented to place; Disoriented to time;Disoriented to situation   Memory Decreased recall of recent events;Decreased short term memory   Following Commands Follows one step commands with increased time or repetition   Comments Pt  was identified with full name and birthdate   Subjective   Subjective Pt  agreeable to PT session   Bed Mobility   Rolling R 3  Moderate assistance   Additional items Assist x 1; Increased time required   Rolling L 3  Moderate assistance   Additional items Assist x 1; Increased time required   Supine to Sit 3  Moderate assistance   Additional items Assist x 2; Increased time required;LE management;HOB elevated   Sit to Supine Unable to assess   Additional Comments Pt  performed rolling with ModAx1 and supine to sit with ModAx2 with increased time required and VCs for sequencing, and body mechanics  Little carryover noted througout session, poor command following  Transfers   Sit to Stand 3  Moderate assistance   Additional items Assist x 2; Increased time required;Verbal cues  (for hand placement and sequencing)   Stand to Sit 3  Moderate assistance   Additional items Assist x 2;Impulsive;Verbal cues  (to reach back to control descent)   Additional Comments Pt  performs sit<>stand transfers with ModAx2 requiring increased time to stand and impulsive to return to sitting with VCs for hand placement and sequencing and to reach back to control descent  Ambulation/Elevation   Gait pattern Improper Weight shift;Narrow ROSAS; Forward Flexion;Decreased foot clearance;Shuffling; Inconsistent demetrius; Redundant gait at times; Short stride; Ataxia; Step to;Excessively slow   Gait Assistance 3  Moderate assist   Additional items Assist x 2;Verbal cues  (for sequencing, and gait mechanics)   Assistive Device Rolling walker   Distance 5'x1 from bed to chair   Balance   Static Sitting Poor +   Dynamic Sitting Poor   Static Standing Zero   Ambulatory Zero   Endurance Deficit   Endurance Deficit Yes   Endurance Deficit Description postural and gait degradation noted with fatigue   Activity Tolerance   Activity Tolerance Patient limited by fatigue   Nurse Made Aware Spoke to RN   Assessment   Prognosis Guarded   Problem List Decreased strength;Decreased range of motion;Decreased endurance; Impaired balance;Decreased mobility; Decreased coordination;Decreased safety awareness;Pain   Assessment Pt  is an [de-identified] yo F Who presents with metabolic encephalopathy  Pt  was identified with full name and birthdate  Pt  agreeable to PT session  Pt  Demonstrates continued need for Ax2 for bed mobility transfers and gait with limited activity tolerance noted  Pt  Demonstrates ability to assist with functional mobility however command following and use of VCs is limited  Pt  Is progressing towards goals, however progress is slow 2/2 to limited activity tolerance and current cognitive state  Pt  Will benefit from continued skilled therapy to increased functional independence and aid patient in return to PLOF with decreased burden of care      Goals   Patient Goals to get some rest   STG Expiration Date 11/07/19   PT Treatment Day 5   Plan   Treatment/Interventions Functional transfer training;LE strengthening/ROM; Therapeutic exercise; Endurance training;Cognitive reorientation;Patient/family training;Equipment eval/education; Bed mobility;Gait training;Spoke to case management;Spoke to nursing   Progress Slow progress, medical status limitations   PT Frequency 2-3x/wk   Recommendation   Recommendation Post acute IP rehab   Equipment Recommended Walker   PT - OK to Discharge Yes   Additional Comments to rehab when medically appropriate     Nelly Edmondson, PT, DPT 10/31/2019

## 2019-10-31 NOTE — ASSESSMENT & PLAN NOTE
Lab Results   Component Value Date    HGBA1C 6 4 (H) 10/15/2019     Recent Labs     10/30/19  1603 10/30/19  2051 10/31/19  0629 10/31/19  1053   POCGLU 106 97 108 97       · Takes metformin and glipizide at baseline, will plan to resume metformin at discharge and hold glipizide    · Continue with SSI  · Diabetic diet  · Monitor accuchecks, avoid hypoglycemia

## 2019-10-31 NOTE — ASSESSMENT & PLAN NOTE
· Since September she has had a progressive decline - she has had no appetite, not really eating/drinking with weakness  She was previously reportedly very independent prior to her admission at [facility] in September with UTI  · Geriatrics following, recommending outpatient follow up  · Speech following, status post VBS on 10/30, discussed with Swallow therapy, okay for pureed diet with nectar thick liquids  · Continue nystatin for thrush  · Obtain barium swallow for obstruction  · Plan for family meeting with palliative care on 10/31  · No recent colonoscopy/EGD, recommend outpatient GI evaluation

## 2019-10-31 NOTE — PLAN OF CARE
Problem: PHYSICAL THERAPY ADULT  Goal: Performs mobility at highest level of function for planned discharge setting  See evaluation for individualized goals  Description  Treatment/Interventions: Functional transfer training, LE strengthening/ROM, Therapeutic exercise, Endurance training, Patient/family training, Equipment eval/education, Bed mobility, Gait training, Spoke to nursing  Equipment Recommended: Walker(current use of RW for mobility)       See flowsheet documentation for full assessment, interventions and recommendations  Outcome: Progressing  Note:   Prognosis: Guarded  Problem List: Decreased strength, Decreased range of motion, Decreased endurance, Impaired balance, Decreased mobility, Decreased coordination, Decreased safety awareness, Pain  Assessment: Pt  is an [de-identified] yo F Who presents with metabolic encephalopathy  Pt  was identified with full name and birthdate  Pt  agreeable to PT session  Pt  Demonstrates continued need for Ax2 for bed mobility transfers and gait with limited activity tolerance noted  Pt  Demonstrates ability to assist with functional mobility however command following and use of VCs is limited  Pt  Is progressing towards goals, however progress is slow 2/2 to limited activity tolerance and current cognitive state  Pt  Will benefit from continued skilled therapy to increased functional independence and aid patient in return to PLOF with decreased burden of care  Recommendation: Post acute IP rehab     PT - OK to Discharge: Yes    See flowsheet documentation for full assessment

## 2019-10-31 NOTE — PLAN OF CARE
Problem: OCCUPATIONAL THERAPY ADULT  Goal: Performs self-care activities at highest level of function for planned discharge setting  See evaluation for individualized goals  Description  Treatment Interventions: ADL retraining, Functional transfer training, UE strengthening/ROM, Endurance training, Cognitive reorientation, Patient/family training, Compensatory technique education, Energy conservation, Activityengagement          See flowsheet documentation for full assessment, interventions and recommendations  Note:   Limitation: Decreased ADL status, Decreased Safe judgement during ADL, Decreased cognition, Decreased endurance, Decreased self-care trans, Decreased high-level ADLs, Visual deficit  Prognosis: Fair  Assessment: Pt is a [de-identified] y/o female initially admitted 10/14/19 for confusion and lethargy  Pt had been recently hospitalized from 9/11/-9/14 and 9/24-10/3 for UTI and change in mental status  Pt had then been D/C'd to [facility] following that hospitalization  Pt then readmitted for above mentioned deficit  Pt seen for OT re-evaluation s/p lumbar puncture on 10/30, decline in functional status and expiration of goals  Pt seen for initial OT evaluation on 10/15/19 and performing at a level of mod assist for bed mobiltiy and sit to stand, max assist for ADLS  Pt currently performing @ a level of Mod A UB ADLS, Max A LB ADLS, Mod A x2 bed mobility, Mod A x2 transfers and functional mobility w/ RW for stability and +VC for sequencing, initiating task  Pt remains limited 2* pain, fatigue, activity tolerance, functional mobility, forward functional reach, trunk control, balance, standing tolerance, endurance, impaired cognition, decreased insight into deficits, poor safety awareness, dysphagia, confusion, and decreased I w/ ADLS  Continue to recommend STR upon D/C when medically stable  Pt continues to benefit from immediate inpatient skilled OT services 3-5x/wk   Will continue to follow to address goals stated below to be met within the next 10-14 days:     OT Discharge Recommendation: Short Term Rehab  OT - OK to Discharge: Yes(when medically stable)  Gabriel Marcelino MS, OTR/L

## 2019-10-31 NOTE — ASSESSMENT & PLAN NOTE
· POA  Likely in setting of hyponatremia with component of delirium contributing given recurrent hospitalizations/underlying cognitive impairment  · Urine culture unremarkable - monitor off antibiotics at this time  Pt remains asymptomatic  · Vasculitis vs autoimmune encephalitis?, less likely CNS vasculitis per neurology although encephalitis remains in differential   · Noted elevated CRP and sed rate  · She has hx of adrenal insufficiency, previously on steroids and then tapered off  She follows with Endocrinology as outpatient  · Cosyntropin stim test with adequate response in cortisol, hydrocortisone discontinued, she does not have adrenal insufficiency   · Vitamin B12 and folate normal   · Lyme titer negative  · RPR negative  · MRI brain was done at [facility] last month which was negative for any acute infarct but possible small questionable hemorrhage versus area of mineralization  CT head without contrast in [facility] negative for bleed  · Geriatrics following, patient should follow up outpatient with them  · Repeat CT head this admission shows no acute abnormality, chronic lacunar infarct left lentiform nucleus  · Neurology on board, plan for LP likely today  Appreciate input  · CTA was ordered per neurology to evaluate for CNS vasculitis which could not be completed on 10/29 due to iv infiltration and contrast extravasation  · MRA without contrast ordered per Neurology to evaluate for CNS vasculitis  · Follow JAMILAH, HIV, ENS 1 panel   , anti thyroglobulin Abs  · Rheum consult given elevated inflammatory markers and fever with no apparent infectious etiology

## 2019-10-31 NOTE — ASSESSMENT & PLAN NOTE
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  · Presented with sodium of 126  Sodium 137 yesterday, BMP pending this AM   · TSH normal this admission  · Cosyntropin stim test negative for adrenal insufficiency  · CT chest abdomen pelvis was done at [facility] to rule out any occult malignancy as a cause of SIADH which was negative for any malignancy  · Continue with 1g Salt tabs BID and fluid restriction - resume 1500ml Plan will be to d/c on daily dosing per Dr Christene Hodgkins  · [id number] [address]as for d/c from a nephrology standpoint, will need outpatient f/u with [facility] nephrology  Recommending BMP in 1 week with results to PCP for further management of NACL tabs  Protonix changed to Pepcid  ACEI stopped as well    · Given additional salt tab today and given torsemide PO x 1 dose on 10/24  · BMP in AM

## 2019-10-31 NOTE — PHYSICAL THERAPY NOTE
Physical Therapy Treatment note     Patient Name: Artemio Hansens Date: 10/31/2019     Problem List  Principal Problem:    Metabolic encephalopathy  Active Problems:    Hyponatremia    Type 2 diabetes mellitus with diabetic neuropathy, without long-term current use of insulin (HCC)    Essential hypertension    HLD (hyperlipidemia)    Failure to thrive in adult    Urinary retention    Severe protein-calorie malnutrition (HCC)    Bradycardia    Fever    Microcytic anemia    Splenic infarct    A-fib (HCC)    IV infiltration and contrast extravasation in left arm    Goals of care, counseling/discussion       Past Medical History  Past Medical History:   Diagnosis Date    Cardiac disease     Diabetes mellitus ([address] )     Hyperlipidemia     Hypertension         Past Surgical History  Past Surgical History:   Procedure Laterality Date    CORONARY ANGIOPLASTY WITH STENT PLACEMENT             10/31/19 8449   Pain Assessment   Pain Assessment 0-10   Pain Score No Pain   Restrictions/Precautions   Weight Bearing Precautions Per Order No   Other Precautions Contact/isolation;Cognitive; Bed Alarm; Chair Alarm;Multiple lines; Fall Risk;O2;Hard of hearing;Visual impairment   General   Chart Reviewed Yes   Family/Caregiver Present No   Cognition   Overall Cognitive Status Impaired   Arousal/Participation Arousable   Attention Attends with cues to redirect   Orientation Level Oriented to person;Disoriented to place; Disoriented to time;Disoriented to situation   Memory Decreased recall of precautions;Decreased recall of recent events;Decreased short term memory   Following Commands Follows one step commands with increased time or repetition   Comments Pt is cooperative willing to participate in therapy  Pt able to follow 1 step commands with repetitive cues   Increased time to process/respond    Subjective   Subjective Pt agreeable to PT session   Bed Mobility   Sit to Supine 3  Moderate assistance   Additional items Assist x 2; Increased time required;Verbal cues   Additional Comments Pt required trunk support and BLE management for Sit to supine  Sat EOB ~2 minutes with supervision  Pt demostrates for trunk control due to increase lean towwards the L    Transfers   Sit to Stand 3  Moderate assistance   Additional items Assist x 2; Increased time required;Verbal cues   Stand to Sit 3  Moderate assistance   Additional items Assist x 2; Increased time required;Verbal cues   Stand pivot 3  Moderate assistance   Additional items Assist x 2; Increased time required;Verbal cues   Additional Comments Pt performed sit to stand transfers 3 times; with increased fatigue increased difficulty to lift bottom of the chair  Required max cueing for proper hand placement   Ambulation/Elevation   Gait pattern Improper Weight shift;Decreased foot clearance; Short stride;Retropulsion; Step to;Excessively slow; Foward flexed   Gait Assistance 3  Moderate assist   Additional items Assist x 2;Verbal cues  (VCs for sequencing )   Assistive Device Rolling walker   Distance 4 feet fowrad and 4 feet back with RW, required step by step VCs for sequencing, strong posterior lean    Balance   Static Sitting Poor +   Dynamic Sitting Poor   Static Standing Zero   Dynamic Standing Poor   Ambulatory Zero   Endurance Deficit   Endurance Deficit Yes   Endurance Deficit Description LE weakness, fatigue, cognition   Activity Tolerance   Activity Tolerance Patient limited by fatigue   Nurse Made Aware Spoke to RN    Assessment   Prognosis Guarded   Problem List Decreased strength;Decreased endurance; Impaired balance;Decreased mobility; Decreased cognition;Decreased safety awareness; Impaired vision; Impaired hearing   Assessment Patient seen for physical therapy session focusing on functional mobility, standing tolerance, and activity tolerance  Pt continues to require significant assistance for all functional mobility   Pt required mod A x 2 for bed mobility, transfers, and ambulation  Pt performed total ~3 STS with mod A x 2 with increased fatigue the more difficult for the patient to clear her bottom off the surface  Pt required continuous verbal cues for step sequencing when ambulation  Pt is demonstrated poor carryover, requiring frequent VCs for safety and proper hand placement during activity  Primary limiting factors are cognitive state, decreased activity tolerance, and generalized weakness  Patient would continue to benefit from skilled inpatient PT to maximize functional mobility and to decrease risk of falls  Upon discharge once medically stable; recommend rehab to improve patient's independence and decrease burden of care  End of session, patient supine in bed with all needs in reach, bed alarm on, and PCA present  Goals   Patient Goals did not state   STG Expiration Date 11/07/19   PT Treatment Day 6   Plan   Treatment/Interventions Functional transfer training; Therapeutic exercise;Gait training;Bed mobility   Progress Slow progress, medical status limitations   PT Frequency 2-3x/wk   Recommendation   Recommendation Post acute IP rehab   Equipment Recommended Walker   PT - OK to Discharge Yes  (to rehab once medically stable)       Alexi Adjutant, DPT, PT

## 2019-10-31 NOTE — ASSESSMENT & PLAN NOTE
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

## 2019-10-31 NOTE — PROGRESS NOTES
Progress Note - Infectious Disease   Pam Oliveira [de-identified] y o  female MRN: [id number]  Unit/Bed#: -01 Encounter: [id number]      Impression/Plan:  1  Fever   Patient noted to have intermittent low-grade fever and isolated fever high fever  Alirio Gavel are no obvious sources on exam   Previous workup reviewed at [facility] which was largely unremarkable except for splenic infarct   Echo at that time was also unremarkable  Transesophageal echo here negative  Flu swab, chest x-ray, respiratory viral panel, urine culture and blood cultures have been unremarkable  CT of the head also unremarkable  RPR and HIV negative  Lyme testing peripherally negative  CSF culture negative and additional studies pending  Lower suspicion for infectious etiology  Sed rate and CRP noted to be significantly elevated  Would question if there is an autoimmune encephalitis  No indication for antimicrobial therapy at this time  Continue to trend fever curve/vitals  Follow up pending cultures/CSF studies  Neurology evaluation ongoing  Additional care as per primary     2  Hyponatremia   Sodium appears to be stable today   Ongoing follow-up by Nephrology      3  Metabolic encephalopathy   Patient initially presented confused likely due to problem 2  It seems that her mental status improved with adjustment in her sodium level   Workup for adrenal insufficiency was negative   Unclear etiology for her underlying hyponatremia and now intermittent fevers as above  Continue to monitor mental status  Continue to monitor electrolytes  Additional evaluations as above  Additional care as per primary     4  Splenic infarct   Appears to be evolving on imaging   Possibly contributing to problem 1  Unclear etiology for this lesion   Was not present on imaging in February   Recent echo at [facility] unremarkable   Patient without other prosthetic material   Transesophageal echo negative    Serial abdominal exams  Hematology evaluation appreciated  Additional care as per primary     5  Type 2 diabetes   Ongoing management as per primary service      Above plan discussed in detail with the patient's daughter and with primary service      ID consult service will continue to follow  Antibiotics:  None    24 hour events:  Patient noted with low-grade fever overnight, no further fevers today  White blood cell count 6 1  No new culture data  Parasite smear, Anaplasma and Ehrlichia testing noted  No new imaging overnight  Patient remains intermittently tachycardic  Labs are otherwise unremarkable  CSF studies unremarkable, Lyme testing negative peripherally, RPR and HIV negative    Subjective:  Patient continues to ask for water on exam particularly ice water  She seems to be more responsive to her daughter  Daughter reports that she does have better hearing in the right ear than the left  She denies any new travel, new exposure new foods  She does recall a tick bite about 2 months ago  Does not have significant animal exposure at home  She denies any recent cat scratches or cat bite  Daughter confirms that the patient was very active up until September and was essentially taking care of herself at home  She is unclear as to why the patient developed adrenal insufficiency 2 years ago  She is not chronically on steroids  Objective:  Vitals:  Temp:  [97 °F (36 1 °C)-100 5 °F (38 1 °C)] 97 4 °F (36 3 °C)  HR:  [] 108  Resp:  [16-19] 16  BP: (107-129)/(52-76) 112/63  SpO2:  [89 %-99 %] 98 %  Temp (24hrs), Av 6 °F (37 °C), Min:97 °F (36 1 °C), Max:100 5 °F (38 1 °C)  Current: Temperature: (!) 97 4 °F (36 3 °C)    Physical Exam:   General Appearance:  Awake, interactive, nontoxic, no acute distress  Patient remains hard of hearing  Throat: Oropharynx moist without lesions      Lungs:   Rhonchi heard at the bases bilaterally; no wheezes, or rales; respirations unlabored on room air   Heart:  RRR; no murmur, rub or gallop Abdomen:   Soft, non-tender, non-distended, positive bowel sounds  Extremities: No clubbing, cyanosis or edema   Skin: No new rashes or lesions  No new draining wounds noted  Labs, Imaging, & Other studies:   All pertinent labs and imaging studies were personally reviewed  Results from last 7 days   Lab Units 10/31/19  0617 10/30/19  1430 10/30/19  0831 10/29/19  0625   WBC Thousand/uL 6 10  --  7 19 7 67   HEMOGLOBIN g/dL 8 3* 7 6* 7 9* 8 8*   PLATELETS Thousands/uL 502*  --  413* 396*     Results from last 7 days   Lab Units 10/31/19  0617   POTASSIUM mmol/L 3 6   CHLORIDE mmol/L 110*   CO2 mmol/L 21   BUN mg/dL 15   CREATININE mg/dL 0 37*   EGFR ml/min/1 73sq m 101   CALCIUM mg/dL 8 7   AST U/L 23   ALT U/L 20   ALK PHOS U/L 57     Results from last 7 days   Lab Units 10/25/19  1142   BLOOD CULTURE  No Growth After 5 Days  No Growth After 5 Days     INFLUENZA B PCR  Not Detected   RSV PCR  Not Detected

## 2019-10-31 NOTE — ASSESSMENT & PLAN NOTE
· Patient has chronic Herrera present on admission- herrera was replaced in ED 10/14   · Placed on recent admission (10/3/19) at [facility] where she developed urinary retention    · Patient will follow up outpatient with urology for voiding trial

## 2019-10-31 NOTE — PROGRESS NOTES
Progress Note - Pam Oliveira 1940, [de-identified] y o  female MRN: [id number]    Unit/Bed#: -01 Encounter: [id number]    Primary Care Provider: Clovis Dickerson MD   Date and time admitted to hospital: 10/14/2019  4:20 PM        * Metabolic encephalopathy  Assessment & Plan  · POA  Likely in setting of hyponatremia with component of delirium contributing given recurrent hospitalizations/underlying cognitive impairment  · Urine culture unremarkable - monitor off antibiotics at this time  Pt remains asymptomatic  · Vasculitis vs autoimmune encephalitis?, less likely CNS vasculitis per neurology although encephalitis remains in differential   · Noted elevated CRP and sed rate  · She has hx of adrenal insufficiency, previously on steroids and then tapered off  She follows with Endocrinology as outpatient  · Cosyntropin stim test with adequate response in cortisol, hydrocortisone discontinued, she does not have adrenal insufficiency   · Vitamin B12 and folate normal   · Lyme titer negative  · RPR negative  · MRI brain was done at [facility] last month which was negative for any acute infarct but possible small questionable hemorrhage versus area of mineralization  CT head without contrast in [facility] negative for bleed  · Geriatrics following, patient should follow up outpatient with them  · Repeat CT head this admission shows no acute abnormality, chronic lacunar infarct left lentiform nucleus  · Neurology on board, plan for LP likely today  Appreciate input  · CTA was ordered per neurology to evaluate for CNS vasculitis which could not be completed on 10/29 due to iv infiltration and contrast extravasation  · MRA without contrast ordered per Neurology to evaluate for CNS vasculitis  · Follow JAMILAH, HIV, ENS 1 panel   , anti thyroglobulin Abs  · Rheum consult given elevated inflammatory markers and fever with no apparent infectious etiology    Fever  Assessment & Plan  · Still with intermittent fevers  · Concerns for encephalitis vs vasculitis (less likely), less likely related to splenic infarct  · No apparent infectious etiology  · Urine culture not significant this admission, [id number] GNR  At [facility], pt was noted to have ESBL in urine which was suspected to be colonization  · Admission blood cultrues are neg  · Noted elevated sed rate and CRP  · Repeat CXR negative  · WBCs within normal limits  · Flu/RSV negative  · Blood culture negative so far  · Lyme titer negative  · Evaluating for Anaplasma, Babesiosis given history of tick bite 2 months before  Although suspicion is low  · Rheumatology consult given elevated inflammatory markers and persistent fever despite lack of infectious source  · Infectious Disease following, appreciate input  · Continue to monitor off ABX  · Work up for autoimmune etiology as mentioned above  · Appreciate neurology input  A-fib 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  · Presented with sodium of 126  Sodium 137 yesterday, BMP pending this AM   · TSH normal this admission  · Cosyntropin stim test negative for adrenal insufficiency  · CT chest abdomen pelvis was done at [facility] to rule out any occult malignancy as a cause of SIADH which was negative for any malignancy  · Continue with 1g Salt tabs BID and fluid restriction - resume 1500ml Plan will be to d/c on daily dosing per Dr Marina Vasques  · [id number] [address] for d/c from a nephrology standpoint, will need outpatient f/u with [facility] nephrology  Recommending BMP in 1 week with results to PCP for further management of NACL tabs  Protonix changed to Pepcid  ACEI stopped as well  · Given additional salt tab today and given torsemide PO x 1 dose on 10/24  · BMP in AM    Goals of care, counseling/discussion  Assessment & Plan  · Participated in a family meeting on  10 31 with palliative care  · Daughter is overwhelmed right now due to her brother's death a day before and would like to think more about code status  · Currently patient remains full code  Splenic infarct  Assessment & Plan  · Noted on CT scan done at [facility] 9/2019, new moderate sized splenic infarcts  · Likely etiology cardio embolic as pt was noted to have PAF with RVR on te;e on 10/29  · CTA abdomen/pelvis:  No acute aortic injury or aneurysm  Patent celiac and splenic arteries  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  Evolving splenic infarct  Additional chronic/incidental findings as described  · LENO negative for PFO and obvious intracardiac thrombus, 55% EF  · General surgery consult appreciated, no further workup from their standpoint  · Appreciate hematology input  · Continue iv heparin for now for [facility] for PAF, switch to DOAC when LP and other procedures are done  Await rheum input if there is any need for biopsy  · Appreciate cardiology input  Microcytic anemia  Assessment & Plan  · Baseline seems between 8-11  · Recent hgb 8 3 on 10/31  · Iron studies suggestive of low iron  · Continue  p o  iron supplementation which was started this admission    · CBC in AM     Severe protein-calorie malnutrition (HCC)  Assessment & Plan  Malnutrition Findings:   Malnutrition type: Chronic illness(Related to medical condition as evidenced by 10% weight loss over the past month and <75% energy intake needs met >1 month treated with ensure compact supplements)  Degree of Malnutrition: Other severe protein calorie malnutrition    BMI Findings: Body mass index is 22 9 kg/m²  Reported weight has fluctuated significantly throughout hospitalization, will work on obtaining more accurate weights  Nutrition consult and supplements  Urinary retention  Assessment & Plan  · Patient has chronic Herrera present on admission- herrera was replaced in ED 10/14   · Placed on recent admission (10/3/19) at [facility] where she developed urinary retention  · Patient will follow up outpatient with urology for voiding trial     Failure to thrive in adult  Assessment & Plan  · Since September she has had a progressive decline - she has had no appetite, not really eating/drinking with weakness  She was previously reportedly very independent prior to her admission at [facility] in September with UTI  · Geriatrics following, recommending outpatient follow up  · Speech following, status post VBS on 10/30, discussed with Swallow therapy, okay for pureed diet with nectar thick liquids  · Continue nystatin for thrush  · Obtain barium swallow for obstruction  · Plan for family meeting with palliative care on 10/31  · No recent colonoscopy/EGD, recommend outpatient GI evaluation  HLD (hyperlipidemia)  Assessment & Plan  · Continue statin    Essential hypertension  Assessment & Plan  · Atenolol switched to metoprolol given Afib with RVR,  · Hold home dose ACEI  · Amlodipine on hold as well  · BP controlled without ACEI or CCB  Type 2 diabetes mellitus with diabetic neuropathy, without long-term current use of insulin [facility])  Assessment & Plan  Lab Results   Component Value Date    HGBA1C 6 4 (H) 10/15/2019     Recent Labs     10/30/19  1603 10/30/19  2051 10/31/19  0629 10/31/19  1053   POCGLU 106 97 108 97       · Takes metformin and glipizide at baseline, will plan to resume metformin at discharge and hold glipizide    · Continue with SSI  · Diabetic diet  · Monitor accuchecks, avoid hypoglycemia      VTE Pharmacologic Prophylaxis: Pharmacologic: Heparin Drip  Mechanical VTE Prophylaxis in Place: Yes    Patient Centered Rounds: I have performed bedside rounds with nursing staff today  Discussions with Specialists or Other Care Team Provider:  Neurology, id, palliative care,     Education and Discussions with Family / Patient:  Discussed plan of care in detail with patient and daughter    Time Spent for Care: 30 minutes  More than 50% of total time spent on counseling and coordination of care as described above  Current Length of Stay: 17 day(s)    Current Patient Status: Inpatient   Certification Statement: The patient will continue to require additional inpatient hospital stay due to Not medically stable    Discharge Plan:  When medically stable    Code Status: Level 1 - Full Code      Subjective:   No overnight events reported  Patient is more communicative as compared to yesterday  She reports having nasal secretions when she coughs  Denies any dyspnea  No chest pain  Objective:     Vitals:   Temp (24hrs), Av 9 °F (37 2 °C), Min:97 4 °F (36 3 °C), Max:100 5 °F (38 1 °C)    Temp:  [97 4 °F (36 3 °C)-100 5 °F (38 1 °C)] 97 4 °F (36 3 °C)  HR:  [] 108  Resp:  [16-19] 16  BP: (107-129)/(53-76) 112/63  SpO2:  [89 %-99 %] 98 %  Body mass index is 22 9 kg/m²  Input and Output Summary (last 24 hours): Intake/Output Summary (Last 24 hours) at 10/31/2019 1619  Last data filed at 10/31/2019 1400  Gross per 24 hour   Intake    Output 750 ml   Net -750 ml       Physical Exam:     Physical Exam  Constitutional: No distress  Eyes: Pupils are equal, round, and reactive to light  Cardiovascular: Normal heart sounds    No murmur heard  Regular rate and rhythm  Pulmonary/Chest: Effort normal and breath sounds normal  No respiratory distress  She has no wheezes  She has no rales  Abdominal: Soft  Bowel sounds are normal  She exhibits no distension  There is no tenderness     Musculoskeletal: no LE edema a  Neurological: She is alert    Awake and communicative  Moves all extremities  Skin: Skin is warm         Additional Data:     Labs:    Results from last 7 days   Lab Units 10/31/19  0617  10/29/19  0625   WBC Thousand/uL 6 10   < > 7 67   HEMOGLOBIN g/dL 8 3*   < > 8 8*   HEMATOCRIT % 28 2*   < > 30 2*   PLATELETS Thousands/uL 502*   < > 396*   BANDS PCT %  --   --  1   NEUTROS PCT % 82*   < >  --    LYMPHS PCT % 13*   < >  --    LYMPHO PCT %  --   --  3*   MONOS PCT % 4   < >  --    MONO PCT %  --   --  3*   EOS PCT % 0   < > 0    < > = values in this interval not displayed  Results from last 7 days   Lab Units 10/31/19  0617   SODIUM mmol/L 140   POTASSIUM mmol/L 3 6   CHLORIDE mmol/L 110*   CO2 mmol/L 21   BUN mg/dL 15   CREATININE mg/dL 0 37*   ANION GAP mmol/L 9   CALCIUM mg/dL 8 7   ALBUMIN g/dL 1 8*   TOTAL BILIRUBIN mg/dL 0 27   ALK PHOS U/L 57   ALT U/L 20   AST U/L 23   GLUCOSE RANDOM mg/dL 108     Results from last 7 days   Lab Units 10/30/19  0831   INR  1 26*     Results from last 7 days   Lab Units 10/31/19  1053 10/31/19  0629 10/30/19  2051 10/30/19  1603 10/30/19  1044 10/30/19  0639 10/29/19  2114 10/29/19  1559 10/29/19  1042 10/29/19  0650 10/28/19  2116 10/28/19  1613   POC GLUCOSE mg/dl 97 108 97 106 114 135 145* 126 170* 150* 142* 125         Results from last 7 days   Lab Units 10/26/19  0529   PROCALCITONIN ng/ml 0 93*           * I Have Reviewed All Lab Data Listed Above  * Additional Pertinent Lab Tests Reviewed: All Labs Within Last 24 Hours Reviewed    Imaging:      FL barium swallow video w speech   Final Result by SYSTEMGENERALIN, DOCUMENTATION (10/30 1556)      CTA head and neck w wo contrast   Final Result by Tiffanie Velasquez MD (1940)      1  No acute intracranial hemorrhage, mass effect or edema  Microangiopathic changes  2   Approximately 80 mL of Omnipaque 350 infiltrated in the left arm  Further clinical assessment and clinical follow-up advised  I personally discussed this study with Dr Comfort Lancaster on 10/29/2019 at 7:35 PM                            Workstation performed: [id number]         XR chest portable   Final Result by Marianna Mcdonald MD (10/29 1702)      Persistent small left effusion  Slight increase in left base atelectasis  Workstation performed: [id number]         CT head wo contrast   Final Result by Symone Dunlap DO (10/26 1715)      No acute intracranial abnormality  Microangiopathic changes  Chronic lacunar infarct left lentiform nucleus  Workstation performed: [id number]         XR chest pa & lateral   Final Result by Symone Dunlap DO (10/25 1546)      Small pleural effusion  No pneumothorax  Workstation performed: [id number]         CTA abdomen pelvis w wo contrast   Final Result by Thong Cohen MD (10/24 1707)      1  No acute aortic injury or aneurysm  2   Patent celiac and splenic arteries  3   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  4   Evolving splenic infarct  5   Additional chronic/incidental findings as described  Workstation performed: [id number]         XR abdomen 1 vw portable   Final Result by Peter Leonardo MD (10/21 8830)      There is a nonobstructive bowel gas pattern  Workstation performed: [id number]         XR chest pa & lateral   Final Result by Sangita Johnson MD (10/19 4699)      Minimal left basilar airspace disease attributed to compressive atelectasis secondary small left pleural effusion  Correlate with clinical findings to exclude pneumonia and/or aspiration  Workstation performed: [id number]         FL barium swallow    (Results Pending)   MRI inpatient order    (Results Pending)       Recent Cultures (last 7 days):     Results from last 7 days   Lab Units 10/25/19  1142   BLOOD CULTURE  No Growth After 5 Days    No Growth After 5 Days  INFLUENZA B PCR  Not Detected   RSV PCR  Not Detected       Last 24 Hours Medication List:     Current Facility-Administered Medications:  acetaminophen 650 mg Rectal Q6H PRN Herman Rueda DO    aluminum-magnesium hydroxide-simethicone 30 mL Oral Q6H PRN Mirna Albrecht MD    bisacodyl 10 mg Rectal Daily PRN Heaven MAHAN PA-C    docusate sodium 100 mg Oral BID PRN Mirna Albrecht MD    ferrous sulfate 325 mg Oral Daily With Breakfast Caity Montoya MD    fluticasone 1 spray Each Nare BID Caity Montoya MD    heparin (porcine) 3-30 Units/kg/hr (Order-Specific) Intravenous Titrated Caity Montoya MD Last Rate: 22 Units/kg/hr (10/31/19 1135)   heparin (porcine) 2,200 Units Intravenous PRN Caity Montoya MD    heparin (porcine) 4,400 Units Intravenous PRN Caity Montoya MD    insulin lispro 1-5 Units Subcutaneous HS Mirna Albrecht MD    insulin lispro 1-6 Units Subcutaneous TID AC Mirna Albrecht MD    lidocaine (PF) 2 mL Infiltration Once Adriana Davila PA-C    metoprolol 2 5 mg Intravenous Q6H PRN Caity Montoya MD    metoprolol 2 5 mg Intravenous Q6H Caity Montoya MD    mirtazapine 7 5 mg Oral HS Heaven MAHAN PA-C    multi-electrolyte 250 mL Intravenous Once Caity Montoya MD    nystatin 500,000 Units Swish & Swallow 4x Daily Heaven MAHAN PA-C    ondansetron 4 mg Intravenous Q6H PRN Mirna Albrecht MD    pantoprazole 40 mg Intravenous Q24H [facility] Caity Montoya MD    pravastatin 40 mg Oral Daily With Amy Ayon MD    sodium chloride 1 g Oral Daily Purnima Soni MD         Today, Patient Was Seen By: Caity Montoya MD    ** Please Note: Dictation voice to text software may have been used in the creation of this document   **

## 2019-10-31 NOTE — PROGRESS NOTES
Progress note - Palliative and [facility] [de-identified] y o  female [id number]    Assessment:              - Encephalopathy   - failure to thrive              - Hyponatremia - resolved              - splenic infarct              - Poor PO intake               - Oropharyngeal dysphagia              - anemia              - New onset Afib with RVR     This is an [de-identified]year old female who presents with unexplained fevers, encephalopathy with  poor PO intake, worsening oral pharyngeal dysphagia, splenic infarcts with new onset Afib with RVR that was captured on telemetry  Her infectious work up has been negative thus far and she is currently being watched off antibiotics  She was just started on heparin drip for anticoagulation in the setting of splenic infarct and new Afib  Neurology now onboard and LP was done on 10/30  CSF looks benign  There are CSF studies pending and CTA head      Plan:  1  Symptom management               - symptoms currently controlled     2  Goals               - Continue with disease directed therapy and workup   - The patient's daughter is overwhelmed because her brother  of stage IV lung cancer on 10/30  - A discussion about code status was started and she will think about it                 Code Status: Full code - Level 1              Decisional apparatus:  Patient is not competent on my exam today   If competence is lost, patient's substitute/ decision maker would default to next of kin by [place]  Interval history:  The patient had been NPO until a VBS was performed yesterday and the result just came back  She has no obstruction but continues to need repeated cues  They recommended pureed diet with nectar thick  CTA head and neck is ordered and pending, LP was yesterday and CSF was bland but some studies are still pending  CRP and ESR are markedly elevated and she continues with low grade intermittent fevers  JAMILAH pending           Family meeting at 11:00AM on 10/31/19: The patient's daughter, Samantha Canseco, was in attendance  With the [facility] attending and myself  Discussion:   - The patient does not have a living will or advanced directives   - The patient does not have medical [address] never discussed what her mother wishes were in medical situations   - The patient had 5 children total, only two are living now, including Samantha Canseco   - One of the patient's son  yesterday on 10/30  Samantha Canseco does not want to tell the patient at this time  - We discussed the possibility of not finding a cause for the patient's lack of appetite and encephalopathy but the rest of the work up is still pending    Graciela Leung emphasized how well the patient was functionally until this September so she feels like there is possibly a specific cause and that she would like to exhaust all the options for work up   - we discussed the different code options and Samantha Canseco would like to think about it and keep her a level 1 full code for now  MEDICATIONS / ALLERGIES:     all current active meds have been reviewed    No Known Allergies    OBJECTIVE:    Physical Exam  Physical Exam   Constitutional: She appears well-developed  HENT:   Head: Normocephalic and atraumatic  Right Ear: External ear normal    Left Ear: External ear normal    Eyes: EOM are normal  Right eye exhibits no discharge  Left eye exhibits no discharge  Neck: No tracheal deviation present  Cardiovascular: Normal rate and intact distal pulses  Pulmonary/Chest: Effort normal  No respiratory distress  Abdominal: Soft  Bowel sounds are normal  She exhibits no distension  Neurological: She is alert  Slow to respond and answers are not always appropriate   Skin: Skin is warm and dry  Psychiatric: She has a normal mood and affect  Nursing note and vitals reviewed  Lab Results: I have personally reviewed pertinent labs

## 2019-10-31 NOTE — PROGRESS NOTES
Progress Note - Neurology   Elan Wilson [de-identified] y o  female MRN: [id number]  Unit/Bed#: -01 Encounter: [id number]    Assessment:  24-year-old female with acute on chronic hyponatremia, adrenal insufficiency, hypertension, DM2, dyslipidemia, new onset AFib with RVR, being further evaluated for persistent encephalopathy      Subacute encephalopathy:  -per patient's daughter, patient was fully functional and independent, including all ADLs and IADLs, prior to her admissions to [facility] in September 2019    -it appears she was admitted 3 times a [facility] with acute on chronic hyponatremia and altered mental status, as well as UTI   -since September, patient has had diminished appetite and has been confused  -MRI brain on 09/27/2019 at [facility] demonstrated left inferior cerebellar mineralization but was otherwise unremarkable  CT brain this admission demonstrated chronic left lentiform nucleus lacunar infarct  -CT chest abdomen and pelvis performed at [facility] demonstrated no tumor or lymphadenopathy   -lumbar puncture performed 10/30/2019  Thus far, CSF results benign  -will re-attempt CTA head and neck  Prior examination inconclusive due to contrast extravasation  ADDENDUM: pt's IV infiltrated on re-attempt of CTA head/neck  Will instead order MRAs without contrast      AFib with RVR:  -discovered on telemetry on 10/29/2019   -patient back on her heparin drip  Pt apparently failed swallow eval and is not able to transition to [address] at this time      Fevers:  -continues to be intermittently febrile, this morning with temperature 100 5°   -origin as yet unclear  Infectious Disease on board      Results:  -MRI brain on 09/27/2019 at [facility] demonstrated left inferior cerebellar mineralization but was otherwise unremarkable  CT brain this admission demonstrated chronic left lentiform nucleus lacunar infarct    -CT chest abdomen and pelvis performed at [facility] demonstrated no tumor or lymphadenopathy   -Nonreactive RPR, CRP greater than 90, , TSH 2 82, B12 443, and folate 4 9  Sodium on admission was 126 and is currently 137  -LENO demonstrated EF 55%, normal size atria, no MAC, no thrombus or PFO  -antithyroglobulin antibody 1 1  Anti-TPO 21    -CSF labs:  Protein 37, glucose 65, WBC 2, RBC 1, no xanthochromia, meningitis/encephalitis panel negative   -serum Lyme serology negative      Plan:  1  Await pending CSF studies including culture and Gram stain, VDRL, Lyme, ENC-2   2  Await additional serum lab work including ENS-1, JAMILAH  3  MRA intra/extracranial pending  4  Per primary service, Rheumatology consult ordered  Discussed with Dr Janny Peoples  Discussed with primary team and nursing  Subjective:   Patient denies pain and specifically denies headache  No visual deficits  Patient remains NPO after video barium swallow yesterday  Now back on heparin drip  Per case management, patient's son  of stage IV cancer yesterday (patient may not be aware)  ROS: 12 point review of systems performed and negative except as indicated above  Vitals: Blood pressure 113/70, pulse 98, temperature 100 5 °F (38 1 °C), temperature source Oral, resp  rate 19, height 5' 2" (1 575 m), weight 56 8 kg (125 lb 3 5 oz), SpO2 96 %  ,Body mass index is 22 9 kg/m²  Physical Exam:   General:  Patient appears to be in no acute distress  HEENT:  Head normocephalic  Eyes anicteric  Neurologic:  Patient is alert  Speech is slow but with no dysarthria or obvious symbolic language difficulty  Oriented to person, but again states she is in [facility] and that it is 2019  Patient very hard of hearing and at times requires repetitive questioning before she will answer  Face appears symmetric      Lab, Imaging and other studies:   CBC:   Results from last 7 days   Lab Units 10/31/19  0617 10/30/19  1430 10/30/19  0831 10/29/19  0625   WBC Thousand/uL 6 10  --  7 19 7 67   RBC Million/uL 3 66*  --  3 47* 3 85   HEMOGLOBIN g/dL 8 3* 7 6* 7 9* 8 8*   HEMATOCRIT % 28 2* 26 0* 26 8* 30 2*   MCV fL 77*  --  77* 78*   PLATELETS Thousands/uL 502*  --  413* 396*   , BMP/CMP:   Results from last 7 days   Lab Units 10/31/19  0617 10/30/19  0831 10/29/19  0625   SODIUM mmol/L 140 140 137   POTASSIUM mmol/L 3 6 3 7 4 2   CHLORIDE mmol/L 110* 110* 109*   CO2 mmol/L 21 25 21   BUN mg/dL 15 17 14   CREATININE mg/dL 0 37* 0 43* 0 43*   CALCIUM mg/dL 8 7 8 6 8 6   EGFR ml/min/1 73sq m 101 96 96     VTE Prophylaxis: Heparin

## 2019-10-31 NOTE — PLAN OF CARE
Problem: PHYSICAL THERAPY ADULT  Goal: Performs mobility at highest level of function for planned discharge setting  See evaluation for individualized goals  Description  Treatment/Interventions: Functional transfer training, LE strengthening/ROM, Therapeutic exercise, Endurance training, Patient/family training, Equipment eval/education, Bed mobility, Gait training, Spoke to nursing  Equipment Recommended: Walker(current use of RW for mobility)       See flowsheet documentation for full assessment, interventions and recommendations  Note:   Prognosis: Guarded  Problem List: Decreased strength, Decreased endurance, Impaired balance, Decreased mobility, Decreased cognition, Decreased safety awareness, Impaired vision, Impaired hearing  Assessment: Patient seen for physical therapy session focusing on functional mobility, standing tolerance, and activity tolerance  Pt continues to require significant assistance for all functional mobility  Pt required mod A x 2 for bed mobility, transfers, and ambulation  Pt performed total ~3 STS with mod A x 2 with increased fatigue the more difficult for the patient to clear her bottom off the surface  Pt required continuous verbal cues for step sequencing when ambulation  Pt is demonstrated poor carryover, requiring frequent VCs for safety and proper hand placement during activity  Primary limiting factors are cognitive state, decreased activity tolerance, and generalized weakness  Patient would continue to benefit from skilled inpatient PT to maximize functional mobility and to decrease risk of falls  Upon discharge once medically stable; recommend rehab to improve patient's independence and decrease burden of care  End of session, patient supine in bed with all needs in reach, bed alarm on, and PCA present  Recommendation: Post acute IP rehab     PT - OK to Discharge: Yes(to rehab once medically stable)    See flowsheet documentation for full assessment

## 2019-10-31 NOTE — ASSESSMENT & PLAN NOTE
· Still with intermittent fevers  · Concerns for encephalitis vs vasculitis (less likely), less likely related to splenic infarct  · No apparent infectious etiology  · Urine culture not significant this admission, 9639-7824 GNR  At [facility], pt was noted to have ESBL in urine which was suspected to be colonization  · Admission blood cultrues are neg  · Noted elevated sed rate and CRP  · Repeat CXR negative  · WBCs within normal limits  · Flu/RSV negative  · Blood culture negative so far  · Lyme titer negative  · Evaluating for Anaplasma, Babesiosis given history of tick bite 2 months before  Although suspicion is low  · Rheumatology consult given elevated inflammatory markers and persistent fever despite lack of infectious source  · Infectious Disease following, appreciate input  · Continue to monitor off ABX  · Work up for autoimmune etiology as mentioned above  · Appreciate neurology input

## 2019-10-31 NOTE — PROGRESS NOTES
Received a call from Cat Scan IV site infiltrated when flushed by hand unable to ge a new line will send patient back    Dr Martin Barbosa aware

## 2019-10-31 NOTE — ASSESSMENT & PLAN NOTE
· Participated in a family meeting on  10 31 with palliative care  · Daughter is overwhelmed right now due to her brother's death a day before and would like to think more about code status  · Currently patient remains full code

## 2019-10-31 NOTE — PLAN OF CARE
Problem: Prexisting or High Potential for Compromised Skin Integrity  Goal: Skin integrity is maintained or improved  Description  INTERVENTIONS:  - Identify patients at risk for skin breakdown  - Assess and monitor skin integrity  - Assess and monitor nutrition and hydration status  - Monitor labs   - Assess for incontinence   - Turn and reposition patient  - Assist with mobility/ambulation  - Relieve pressure over bony prominences  - Avoid friction and shearing  - Provide appropriate hygiene as needed including keeping skin clean and dry  - Evaluate need for skin moisturizer/barrier cream  - Collaborate with interdisciplinary team   - Patient/family teaching  - Consider wound care consult   Outcome: Progressing     Problem: Nutrition/Hydration-ADULT  Goal: Nutrient/Hydration intake appropriate for improving, restoring or maintaining nutritional needs  Description  Monitor and assess patient's nutrition/hydration status for malnutrition  Collaborate with interdisciplinary team and initiate plan and interventions as ordered  Monitor patient's weight and dietary intake as ordered or per policy  Utilize nutrition screening tool and intervene as necessary  Determine patient's food preferences and provide high-protein, high-caloric foods as appropriate       INTERVENTIONS:  - Monitor oral intake, urinary output, labs, and treatment plans  - Assess nutrition and hydration status and recommend course of action  - Evaluate amount of meals eaten  - Assist patient with eating if necessary   - Allow adequate time for meals  - Recommend/ encourage appropriate diets, oral nutritional supplements, and vitamin/mineral supplements  - Order, calculate, and assess calorie counts as needed  - Recommend, monitor, and adjust tube feedings and TPN/PPN based on assessed needs  - Assess need for intravenous fluids  - Provide specific nutrition/hydration education as appropriate  - Include patient/family/caregiver in decisions related to nutrition  Outcome: Progressing     Problem: Potential for Falls  Goal: Patient will remain free of falls  Description  INTERVENTIONS:  - Assess patient frequently for physical needs  -  Identify cognitive and physical deficits and behaviors that affect risk of falls    -  [place] fall precautions as indicated by assessment   - Educate patient/family on patient safety including physical limitations  - Instruct patient to call for assistance with activity based on assessment  - Modify environment to reduce risk of injury  - Consider OT/PT consult to assist with strengthening/mobility  Outcome: Progressing     Problem: NEUROSENSORY - ADULT  Goal: Achieves stable or improved neurological status  Description  INTERVENTIONS  - Monitor and report changes in neurological status  - Monitor vital signs such as temperature, blood pressure, glucose, and any other labs ordered   - Initiate measures to prevent increased intracranial pressure  - Monitor for seizure activity and implement precautions if appropriate      Outcome: Progressing     Problem: RESPIRATORY - ADULT  Goal: Achieves optimal ventilation and oxygenation  Description  INTERVENTIONS:  - Assess for changes in respiratory status  - Assess for changes in mentation and behavior  - Position to facilitate oxygenation and minimize respiratory effort  - Encourage broncho-pulmonary hygiene including cough, deep breathe, Incentive Spirometry  - Assess and instruct to report SOB or any respiratory difficulty  - Respiratory Therapy support as indicated   Outcome: Progressing     Problem: GASTROINTESTINAL - ADULT  Goal: Maintains adequate nutritional intake  Description  INTERVENTIONS:  - Monitor percentage of each meal consumed  - Identify factors contributing to decreased intake, treat as appropriate  - Assist with meals as needed  - Monitor I&O, weight, and lab values if indicated  - Obtain nutrition services referral as needed  Outcome: Progressing     Problem: GENITOURINARY - ADULT  Goal: Urinary catheter remains patent  Description  INTERVENTIONS:  - Assess patency of urinary catheter  - If patient has a chronic herrera, consider changing catheter if non-functioning  - Follow guidelines for intermittent irrigation of non-functioning urinary catheter  Outcome: Progressing     Problem: METABOLIC, FLUID AND ELECTROLYTES - ADULT  Goal: Electrolytes maintained within normal limits  Description  INTERVENTIONS:  - Monitor labs and assess patient for signs and symptoms of electrolyte imbalances  - Administer electrolyte replacement as ordered  - Monitor response to electrolyte replacements, including repeat lab results as appropriate  - Instruct patient on fluid and nutrition as appropriate  Outcome: Progressing  Goal: Fluid balance maintained  Description  INTERVENTIONS:  - Monitor labs   - Monitor I/O and WT  - Instruct patient on fluid and nutrition as appropriate  - Assess for signs & symptoms of volume excess or deficit  Outcome: Progressing  Goal: Glucose maintained within target range  Description  INTERVENTIONS:  - Monitor Blood Glucose as ordered  - Assess for signs and symptoms of hyperglycemia and hypoglycemia  - Administer ordered medications to maintain glucose within target range  - Assess nutritional intake and initiate nutrition service referral as needed  Outcome: Progressing     Problem: SKIN/TISSUE INTEGRITY - ADULT  Goal: Skin integrity remains intact  Description  INTERVENTIONS  - Identify patients at risk for skin breakdown  - Assess and monitor skin integrity  - Assess and monitor nutrition and hydration status  - Monitor labs (i e  albumin)  - Assess for incontinence   - Turn and reposition patient  - Assist with mobility/ambulation  - Relieve pressure over bony prominences  - Avoid friction and shearing  - Provide appropriate hygiene as needed including keeping skin clean and dry  - Evaluate need for skin moisturizer/barrier cream  - Collaborate with interdisciplinary team (i e  Nutrition, Rehabilitation, etc )   - Patient/family teaching  Outcome: Progressing  Goal: Oral mucous membranes remain intact  Description  INTERVENTIONS  - Assess oral mucosa and hygiene practices  - Implement preventative oral hygiene regimen  - Implement oral medicated treatments as ordered  - Initiate Nutrition services referral as needed  Outcome: Progressing     Problem: HEMATOLOGIC - ADULT  Goal: Maintains hematologic stability  Description  INTERVENTIONS  - Monitor labs      Outcome: Progressing

## 2019-10-31 NOTE — ASSESSMENT & PLAN NOTE
Malnutrition Findings:   Malnutrition type: Chronic illness(Related to medical condition as evidenced by 10% weight loss over the past month and <75% energy intake needs met >1 month treated with ensure compact supplements)  Degree of Malnutrition: Other severe protein calorie malnutrition    BMI Findings: Body mass index is 22 9 kg/m²  Reported weight has fluctuated significantly throughout hospitalization, will work on obtaining more accurate weights  Nutrition consult and supplements

## 2019-10-31 NOTE — SOCIAL WORK
CM noted that palliative meeting took place  Pt started IV Hep[porfirio gtt  Updated Sugar Pickaway/admissions via [facility],  Pt & family off unit for procedure

## 2019-11-01 ENCOUNTER — APPOINTMENT (INPATIENT)
Dept: RADIOLOGY | Facility: HOSPITAL | Age: 79
DRG: 545 | End: 2019-11-01
Payer: MEDICARE

## 2019-11-01 ENCOUNTER — APPOINTMENT (INPATIENT)
Dept: RADIOLOGY | Facility: HOSPITAL | Age: 79
DRG: 545 | End: 2019-11-01
Attending: INTERNAL MEDICINE
Payer: MEDICARE

## 2019-11-01 LAB
ANA HOMOGEN SER QL IF: NORMAL
ANA HOMOGEN TITR SER: NORMAL {TITER}
ANION GAP SERPL CALCULATED.3IONS-SCNC: 9 MMOL/L (ref 4–13)
APTT PPP: 73 SECONDS (ref 23–37)
APTT PPP: 85 SECONDS (ref 23–37)
BUN SERPL-MCNC: 16 MG/DL (ref 5–25)
CALCIUM SERPL-MCNC: 8.5 MG/DL (ref 8.3–10.1)
CHLORIDE SERPL-SCNC: 110 MMOL/L (ref 100–108)
CO2 SERPL-SCNC: 23 MMOL/L (ref 21–32)
CREAT SERPL-MCNC: 0.4 MG/DL (ref 0.6–1.3)
ERYTHROCYTE [DISTWIDTH] IN BLOOD BY AUTOMATED COUNT: 18.3 % (ref 11.6–15.1)
GFR SERPL CREATININE-BSD FRML MDRD: 99 ML/MIN/1.73SQ M
GLUCOSE SERPL-MCNC: 106 MG/DL (ref 65–140)
GLUCOSE SERPL-MCNC: 121 MG/DL (ref 65–140)
GLUCOSE SERPL-MCNC: 176 MG/DL (ref 65–140)
GLUCOSE SERPL-MCNC: 90 MG/DL (ref 65–140)
GLUCOSE SERPL-MCNC: 98 MG/DL (ref 65–140)
HCT VFR BLD AUTO: 26.1 % (ref 34.8–46.1)
HGB BLD-MCNC: 7.8 G/DL (ref 11.5–15.4)
MCH RBC QN AUTO: 22.9 PG (ref 26.8–34.3)
MCHC RBC AUTO-ENTMCNC: 29.9 G/DL (ref 31.4–37.4)
MCV RBC AUTO: 77 FL (ref 82–98)
PLATELET # BLD AUTO: 466 THOUSANDS/UL (ref 149–390)
PMV BLD AUTO: 9.3 FL (ref 8.9–12.7)
POTASSIUM SERPL-SCNC: 3.2 MMOL/L (ref 3.5–5.3)
RBC # BLD AUTO: 3.4 MILLION/UL (ref 3.81–5.12)
REAGIN AB CSF QL: NON REACTIVE
RYE IGE QN: POSITIVE
SODIUM SERPL-SCNC: 142 MMOL/L (ref 136–145)
WBC # BLD AUTO: 4.95 THOUSAND/UL (ref 4.31–10.16)

## 2019-11-01 PROCEDURE — 99232 SBSQ HOSP IP/OBS MODERATE 35: CPT | Performed by: PSYCHIATRY & NEUROLOGY

## 2019-11-01 PROCEDURE — 85027 COMPLETE CBC AUTOMATED: CPT | Performed by: INTERNAL MEDICINE

## 2019-11-01 PROCEDURE — 70544 MR ANGIOGRAPHY HEAD W/O DYE: CPT

## 2019-11-01 PROCEDURE — 97530 THERAPEUTIC ACTIVITIES: CPT

## 2019-11-01 PROCEDURE — NC001 PR NO CHARGE: Performed by: FAMILY MEDICINE

## 2019-11-01 PROCEDURE — 97116 GAIT TRAINING THERAPY: CPT

## 2019-11-01 PROCEDURE — 99232 SBSQ HOSP IP/OBS MODERATE 35: CPT | Performed by: INTERNAL MEDICINE

## 2019-11-01 PROCEDURE — 92526 ORAL FUNCTION THERAPY: CPT

## 2019-11-01 PROCEDURE — 74220 X-RAY XM ESOPHAGUS 1CNTRST: CPT

## 2019-11-01 PROCEDURE — 62270 DX LMBR SPI PNXR: CPT | Performed by: PSYCHIATRY & NEUROLOGY

## 2019-11-01 PROCEDURE — 99232 SBSQ HOSP IP/OBS MODERATE 35: CPT | Performed by: FAMILY MEDICINE

## 2019-11-01 PROCEDURE — 85730 THROMBOPLASTIN TIME PARTIAL: CPT | Performed by: INTERNAL MEDICINE

## 2019-11-01 PROCEDURE — C9113 INJ PANTOPRAZOLE SODIUM, VIA: HCPCS | Performed by: INTERNAL MEDICINE

## 2019-11-01 PROCEDURE — 80048 BASIC METABOLIC PNL TOTAL CA: CPT | Performed by: INTERNAL MEDICINE

## 2019-11-01 PROCEDURE — 70547 MR ANGIOGRAPHY NECK W/O DYE: CPT

## 2019-11-01 PROCEDURE — 97535 SELF CARE MNGMENT TRAINING: CPT

## 2019-11-01 PROCEDURE — 82948 REAGENT STRIP/BLOOD GLUCOSE: CPT

## 2019-11-01 RX ORDER — POTASSIUM CHLORIDE 14.9 MG/ML
20 INJECTION INTRAVENOUS ONCE
Status: COMPLETED | OUTPATIENT
Start: 2019-11-01 | End: 2019-11-01

## 2019-11-01 RX ADMIN — METOPROLOL TARTRATE 2.5 MG: 5 INJECTION, SOLUTION INTRAVENOUS at 14:06

## 2019-11-01 RX ADMIN — METOPROLOL TARTRATE 2.5 MG: 5 INJECTION, SOLUTION INTRAVENOUS at 08:29

## 2019-11-01 RX ADMIN — NYSTATIN 500000 UNITS: 500000 SUSPENSION ORAL at 14:01

## 2019-11-01 RX ADMIN — PANTOPRAZOLE SODIUM 40 MG: 40 INJECTION, POWDER, FOR SOLUTION INTRAVENOUS at 08:25

## 2019-11-01 RX ADMIN — MIRTAZAPINE 7.5 MG: 15 TABLET, FILM COATED ORAL at 22:48

## 2019-11-01 RX ADMIN — INSULIN LISPRO 1 UNITS: 100 INJECTION, SOLUTION INTRAVENOUS; SUBCUTANEOUS at 22:47

## 2019-11-01 RX ADMIN — NYSTATIN 500000 UNITS: 500000 SUSPENSION ORAL at 22:48

## 2019-11-01 RX ADMIN — FLUTICASONE PROPIONATE 1 SPRAY: 50 SPRAY, METERED NASAL at 18:45

## 2019-11-01 RX ADMIN — FLUTICASONE PROPIONATE 1 SPRAY: 50 SPRAY, METERED NASAL at 08:42

## 2019-11-01 RX ADMIN — NYSTATIN 500000 UNITS: 500000 SUSPENSION ORAL at 18:45

## 2019-11-01 RX ADMIN — POTASSIUM CHLORIDE 20 MEQ: 14.9 INJECTION, SOLUTION INTRAVENOUS at 08:32

## 2019-11-01 RX ADMIN — NYSTATIN 500000 UNITS: 500000 SUSPENSION ORAL at 08:32

## 2019-11-01 RX ADMIN — METOPROLOL TARTRATE 2.5 MG: 5 INJECTION, SOLUTION INTRAVENOUS at 20:16

## 2019-11-01 RX ADMIN — PRAVASTATIN SODIUM 40 MG: 20 TABLET ORAL at 18:12

## 2019-11-01 NOTE — SPEECH THERAPY NOTE
Speech Language/Pathology    Speech/Language Pathology Progress Note    Patient Name: [place]  Today's Date: 11/1/2019     Problem List  Principal Problem:    Metabolic encephalopathy  Active Problems:    Hyponatremia    Type 2 diabetes mellitus with diabetic neuropathy, without long-term current use of insulin (HCC)    Essential hypertension    HLD (hyperlipidemia)    Failure to thrive in adult    Urinary retention    Severe protein-calorie malnutrition (HCC)    Bradycardia    Fever    Microcytic anemia    Splenic infarct    A-fib (HCC)    IV infiltration and contrast extravasation in left arm    Goals of care, counseling/discussion       Past Medical History  Past Medical History:   Diagnosis Date    Cardiac disease     Diabetes mellitus ([address] )     Hyperlipidemia     Hypertension         Past Surgical History  Past Surgical History:   Procedure Laterality Date    CORONARY ANGIOPLASTY WITH STENT PLACEMENT           Subjective:  Patient was upright in her bed  Nursing was setting up her lunch tray  She asked for water  Objective:  SLP saw patient for her lunch tray  Patient had pureed beef with gravy, mashed potatoes, and pureed peas  Patient fed self without difficulty, but required encouragement in order to consume PO  She reported to the SLP, "I don't eat much " She ate about 6 bites of the pureed food  She exhibited mildly reduced manipulation and control of the bolus  Mild delay in transfer and swallow initiation  No overt s/s of aspiration with the pureed textures  SLP trialed thin liquids  Patient had an immediate cough with the thin via the straw and the cup  Trials were discontinued  Patient continued to ask for thin water and SLP provided education about aspiration  Patient consumed the NTL  Mild delay in transfer  No overt s/s of aspiration    Nursing and the PCA reported that she was coughing on all textures and liquids this AM at breakfast and only minimal bites were taken (She had waffles with syrup)  This was not noted during her lunch with the NTL or pureed textures  SLP educated staff that she needs runny pureed textures for her PO  Assessment:  Patient had immediate coughing with the thin liquids via the cup and straw  Mildly reduced manipulation and control of the pureed bolus  Mild delay in transfer and swallow initiation with puree and NTL  Plan/Recommendations:   Continue with runny pureed textures and nectar thick liquids  ST to continue to follow

## 2019-11-01 NOTE — ASSESSMENT & PLAN NOTE
Malnutrition Findings:   Malnutrition type: Chronic illness(Related to medical condition as evidenced by 10% weight loss over the past month and <75% energy intake needs met >1 month treated with ensure compact supplements)  Degree of Malnutrition: Other severe protein calorie malnutrition    BMI Findings: Body mass index is 23 1 kg/m²  Reported weight has fluctuated significantly throughout hospitalization, will work on obtaining more accurate weights  Nutrition consult and supplements

## 2019-11-01 NOTE — ASSESSMENT & PLAN NOTE
· Patient has chronic Herrera present on admission- herrera was replaced in ED 10/14   · Placed on recent admission (10/3/19) at [facility] where she developed urinary retention    · Patient will follow up outpatient with urology for voiding trial

## 2019-11-01 NOTE — ASSESSMENT & PLAN NOTE
Lab Results   Component Value Date    HGBA1C 6 4 (H) 10/15/2019     Recent Labs     10/31/19  1657 10/31/19  2114 11/01/19  0616 11/01/19  1054   POCGLU 140 123 98 121       · Takes metformin and glipizide at baseline, will plan to resume metformin at discharge and hold glipizide    · Continue with SSI  · Diabetic diet  · Monitor accuchecks, avoid hypoglycemia

## 2019-11-01 NOTE — ASSESSMENT & PLAN NOTE
· Afib with RVR on 10 29 on tele intermittently  · TSH normal this admission  · LENO showed EF 55% without any intracardiac thrombus  · Continue iv metoprolol 2 5mg Q6 hourly for now, switch to po when able to tolerate po  · Iv heparin for AC given Afib and splenic infarct  · Cardiology on board, appreciate input

## 2019-11-01 NOTE — ASSESSMENT & PLAN NOTE
· Since September she has had a progressive decline - she has had no appetite, not really eating/drinking with weakness  She was previously reportedly very independent prior to her admission at [facility] in September with UTI  · Geriatrics following, recommending outpatient follow up  · Speech following, status post VBS on 10/30, discussed with Swallow therapy, okay for pureed diet with nectar thick liquids  · Continue nystatin for thrush  · Obtain barium swallow to evaluate for esophageal obstruction  · Goals of care as mentioned

## 2019-11-01 NOTE — PROGRESS NOTES
Progress note - Palliative and [address] [facility] [de-identified] y o  female [id number]    Assessment:              - Encephalopathy              - failure to thrive              - Hyponatremia - resolved              - splenic infarct              - Poor PO intake               - Oropharyngeal dysphagia              - anemia              - New onset Afib with RVR     This is an [de-identified]year old female who presents with unexplained fevers, encephalopathy with  poor PO intake, worsening oral pharyngeal dysphagia, splenic infarcts with new onset Afib with RVR that was captured on telemetry   Her infectious work up has been negative thus far and she is currently being watched off antibiotics   She was just started on heparin drip for anticoagulation in the setting of splenic infarct and new Afib   Neurology now onboard and LP was done on 10/30  CSF looks benign  There are some CSF studies, JAMILAH and MRA of the head and neck that are pending  Family meeting was held on 10/31/19 with the patient's daughter, Nichole Dean  She would like to continue the work up for now with no limitations on care  The patient would be appropriate for DNR/DNI code status but the family is not ready at this time to make that change so she remains a full code for now      Plan:  1  Symptom management               - symptoms currently controlled     2  Goals               - Continue with disease directed therapy and workup              - The patient's daughter is overwhelmed because her brother  of stage IV lung cancer on 10/30  - A discussion about code status was started and she will think about it   - We will continue to follow and provide support to the patient and family   - I called daughter, Nichole Dean, and left a message    She will call the office if she has any questions                 Code Status: Full code - Level 1              Decisional apparatus:  Patient is not competent on my exam today   If competence is lost, patient's substitute/ decision maker would default to next of kin by [place] [address] [place]  Interval history:       The patient still remains with poor PO intake  She is interactive today but is still confused  MEDICATIONS / ALLERGIES:     all current active meds have been reviewed    No Known Allergies    OBJECTIVE:    Physical Exam  Physical Exam   Constitutional: She appears well-developed  HENT:   Head: Normocephalic and atraumatic  Right Ear: External ear normal    Left Ear: External ear normal    Eyes: EOM are normal  Right eye exhibits no discharge  Left eye exhibits no discharge  Neck: No tracheal deviation present  Cardiovascular: Normal rate and regular rhythm  Pulmonary/Chest: Effort normal  No respiratory distress  Abdominal: Soft  Bowel sounds are normal    Musculoskeletal: She exhibits no edema or deformity  Neurological: She is alert  Oriented to person and place only  No focal deficits on exam   Skin: Skin is warm and dry  Psychiatric: Her behavior is normal    Nursing note and vitals reviewed  Lab Results: I have personally reviewed pertinent labs

## 2019-11-01 NOTE — ASSESSMENT & PLAN NOTE
· POA  Likely in setting of hyponatremia with component of delirium contributing given recurrent hospitalizations/underlying cognitive impairment  · 11/1:  Patient is more communicative and AAO x3 today  · Urine culture unremarkable - monitor off antibiotics at this time  Pt remains asymptomatic  · Vasculitis vs autoimmune encephalitis?, less likely CNS vasculitis per neurology although encephalitis remains in differential   · Noted elevated CRP and sed rate  · She has hx of adrenal insufficiency, previously on steroids and then tapered off  She follows with Endocrinology as outpatient  · Cosyntropin stim test with adequate response in cortisol, hydrocortisone discontinued, she does not have adrenal insufficiency   · Vitamin B12 and folate normal   · Lyme titer negative  · RPR negative  · MRI brain was done at [facility] last month which was negative for any acute infarct but possible small questionable hemorrhage versus area of mineralization  CT head without contrast in [facility] negative for bleed  · Geriatrics following, patient should follow up outpatient with them  · Repeat CT head this admission shows no acute abnormality, chronic lacunar infarct left lentiform nucleus  · Neurology on board, plan for LP likely today  Appreciate input  · CTA was ordered per neurology to evaluate for CNS vasculitis which could not be completed on 10/29 due to iv infiltration and contrast extravasation  · MRA without contrast ordered per Neurology to evaluate for CNS vasculitis  · Follow JAMILAH, HIV, ENS 1 panel   , anti thyroglobulin Abs  · Rheum consult given elevated inflammatory markers and fever with no apparent infectious etiology

## 2019-11-01 NOTE — SOCIAL WORK
CM met with pt today after procedure  Pt stated that she just returned from her swallowing study  CM later left message for pt's dtr, Doe Jeter to offer condolences on the death of her brother and offer support if needed  CM consulted pt's nurse, Mariam Beckett who reported that family hasn't told the pt yet of her son's death and don't want her to know yet

## 2019-11-01 NOTE — ASSESSMENT & PLAN NOTE
· Still with intermittent fevers  · Concerns for encephalitis vs vasculitis (less likely), less likely related to splenic infarct  · No apparent infectious etiology  · Urine culture not significant this admission, [id number] GNR  At [facility], pt was noted to have ESBL in urine which was suspected to be colonization  · Admission blood cultrues are neg  · Noted elevated sed rate and CRP  · Repeat CXR negative  · WBCs within normal limits  · Flu/RSV negative  · Blood culture negative so far  · Lyme titer negative  · Evaluating for Anaplasma, Babesiosis given history of tick bite 2 months before  Although suspicion is low  · Rheumatology consult given elevated inflammatory markers and persistent fever despite lack of infectious source  · Infectious Disease following, appreciate input  · Continue to monitor off ABX  · Work up for autoimmune etiology as mentioned above  · Appreciate neurology input

## 2019-11-01 NOTE — ASSESSMENT & PLAN NOTE
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

## 2019-11-01 NOTE — OCCUPATIONAL THERAPY NOTE
Occupational Therapy Treatment Note      Pam Oliveira    11/1/2019    Principal Problem:    Metabolic encephalopathy  Active Problems:    Hyponatremia    Type 2 diabetes mellitus with diabetic neuropathy, without long-term current use of insulin (HCC)    Essential hypertension    HLD (hyperlipidemia)    Failure to thrive in adult    Urinary retention    Severe protein-calorie malnutrition (HCC)    Bradycardia    Fever    Microcytic anemia    Splenic infarct    A-fib (HCC)    IV infiltration and contrast extravasation in left arm    Goals of care, counseling/discussion      Past Medical History:   Diagnosis Date    Cardiac disease     Diabetes mellitus ([address] )     Hyperlipidemia     Hypertension        Past Surgical History:   Procedure Laterality Date    CORONARY ANGIOPLASTY WITH STENT PLACEMENT          11/01/19 1213   Restrictions/Precautions   Weight Bearing Precautions Per Order No   Other Precautions Contact/isolation;Cognitive;Multiple lines;Telemetry;O2;Fall Risk   General   Response to Previous Treatment Patient reporting fatigue but able to participate   Lifestyle   Autonomy Has needed assist w self care/mobility for the past one month  was at [facility] rehab for rehab following 2 hospitalizations   Reciprocal Relationships supportive family   Pain Assessment   Pain Assessment No/denies pain   Pain Score No Pain   ADL   Grooming Comments able to initiate combing hair  needs assist for completion  also able to "pat her face" with a washcloth, needs assist for completion   UB Bathing Assistance 2  Maximal Assistance   Functional Standing Tolerance   Time 2 min    Activity standing holding onto walker, assist of 2   Comments able to take a few steps forward and backward  Bed Mobility   Supine to Sit 2  Maximal assistance   Additional items Assist x 2; Increased time required;LE management   Sit to Supine 2  Maximal assistance   Additional items Assist x 2; Increased time required;LE management Transfers   Sit to Stand 3  Moderate assistance   Additional items Assist x 2; Increased time required;Verbal cues   Stand to Sit 3  Moderate assistance   Additional items Assist x 2; Increased time required;Verbal cues   Cognition   Overall Cognitive Status Impaired   Arousal/Participation Arousable; Cooperative   Attention Attends with cues to redirect   Orientation Level Oriented to person;Oriented to place   Memory Decreased recall of precautions;Decreased recall of recent events;Decreased short term memory   Following Commands Follows one step commands with increased time or repetition   Activity Tolerance   Activity Tolerance Patient limited by fatigue   Assessment   Assessment pt seen for skilled OT treatment session  she is able to answer that she is at [facility] and that October just finished  unable to name next month even with cues  when told it is November she stated "of course"  when asked what big holiday is in November she answered "Easter"  Pt with decreased prob solving, decreased attention, frequent cues needed to attend to task  She needed max assist of 2 for supine to sit, able to sit at EOB x 20 min w mod assist for balancing self  mod assist of 2 for sit to stand and to take a few steps  Pt w poor endurance  she expressed concern about being so weak and unable to walk  Pt will continue to benefit from skilled OT while in acute care  Plan   Treatment Interventions ADL retraining;Functional transfer training; Endurance training;Cognitive reorientation;Patient/family training; Compensatory technique education; Energy conservation; Activityengagement   Goal Expiration Date 11/14/19   OT Treatment Day 2   OT Frequency 3-5x/wk   Recommendation   OT Discharge Recommendation Short Term Rehab   OT - OK to Discharge Yes

## 2019-11-01 NOTE — PROGRESS NOTES
Progress Note - Pam Oliveira 1940, [de-identified] y o  female MRN: [id number]    Unit/Bed#: -01 Encounter: [id number]    Primary Care Provider: Marv Kiser MD   Date and time admitted to hospital: 10/14/2019  4:20 PM        * Metabolic encephalopathy  Assessment & Plan  · POA  Likely in setting of hyponatremia with component of delirium contributing given recurrent hospitalizations/underlying cognitive impairment  · 11/1:  Patient is more communicative and AAO x3 today  · Urine culture unremarkable - monitor off antibiotics at this time  Pt remains asymptomatic  · Vasculitis vs autoimmune encephalitis?, less likely CNS vasculitis per neurology although encephalitis remains in differential   · Noted elevated CRP and sed rate  · She has hx of adrenal insufficiency, previously on steroids and then tapered off  She follows with Endocrinology as outpatient  · Cosyntropin stim test with adequate response in cortisol, hydrocortisone discontinued, she does not have adrenal insufficiency   · Vitamin B12 and folate normal   · Lyme titer negative  · RPR negative  · MRI brain was done at [facility] last month which was negative for any acute infarct but possible small questionable hemorrhage versus area of mineralization  CT head without contrast in [facility] negative for bleed  · Geriatrics following, patient should follow up outpatient with them  · Repeat CT head this admission shows no acute abnormality, chronic lacunar infarct left lentiform nucleus  · Neurology on board, plan for LP likely today  Appreciate input  · CTA was ordered per neurology to evaluate for CNS vasculitis which could not be completed on 10/29 due to iv infiltration and contrast extravasation  · MRA without contrast ordered per Neurology to evaluate for CNS vasculitis  · Follow JAMILAH, HIV, ENS 1 panel   , anti thyroglobulin Abs  · Rheum consult given elevated inflammatory markers and fever with no apparent infectious etiology    Fever  Assessment & Plan  · Still with intermittent fevers  · Concerns for encephalitis vs vasculitis (less likely), less likely related to splenic infarct  · No apparent infectious etiology  · Urine culture not significant this admission, [id number] GNR  At [facility], pt was noted to have ESBL in urine which was suspected to be colonization  · Admission blood cultrues are neg  · Noted elevated sed rate and CRP  · Repeat CXR negative  · WBCs within normal limits  · Flu/RSV negative  · Blood culture negative so far  · Lyme titer negative  · Evaluating for Anaplasma, Babesiosis given history of tick bite 2 months before  Although suspicion is low  · Rheumatology consult given elevated inflammatory markers and persistent fever despite lack of infectious source  · Infectious Disease following, appreciate input  · Continue to monitor off ABX  · Work up for autoimmune etiology as mentioned above  · Appreciate neurology input  A-fib 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  · Presented with sodium of 126  Sodium 142 today  · TSH normal this admission  · Cosyntropin stim test negative for adrenal insufficiency  · CT chest abdomen pelvis was done at [facility] to rule out any occult malignancy as a cause of SIADH which was negative for any malignancy  · Sodium tablets stopped on 11/1 by Nephrology  · Continue 1500 cc fluid restriction  · Sindhu Madrid for d/c from a nephrology standpoint, will need outpatient f/u with [facility] nephrology   Recommending BMP in 1 week with results to PCP for further management of NACL tabs  Protonix changed to Pepcid  ACEI stopped as well  · BMP in AM    Goals of care, counseling/discussion  Assessment & Plan  · Participated in a family meeting on  10 31 with palliative care  · Daughter is overwhelmed right now due to her brother's death a day before and would like to think more about code status  · Currently patient remains full code  Splenic infarct  Assessment & Plan  · Noted on CT scan done at [facility] 9/2019, new moderate sized splenic infarcts  · Likely etiology cardio embolic as pt was noted to have PAF with RVR on te;e on 10/29  · CTA abdomen/pelvis:  No acute aortic injury or aneurysm  Patent celiac and splenic arteries  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  Evolving splenic infarct  Additional chronic/incidental findings as described  · LENO negative for PFO and obvious intracardiac thrombus, 55% EF  · General surgery consult appreciated, no further workup from their standpoint  · Appreciate hematology input  · Continue iv heparin for now for [facility] for PAF, switch to DOAC when LP and other procedures are done  Await rheum input if there is any need for biopsy  · Appreciate cardiology input  Microcytic anemia  Assessment & Plan  · Baseline seems between 8-11  · Hemoglobin 7 8 today  · Iron studies suggestive of low iron  · Continue  p o  iron supplementation which was started this admission  · CBC in AM     Severe protein-calorie malnutrition (HCC)  Assessment & Plan  Malnutrition Findings:   Malnutrition type: Chronic illness(Related to medical condition as evidenced by 10% weight loss over the past month and <75% energy intake needs met >1 month treated with ensure compact supplements)  Degree of Malnutrition: Other severe protein calorie malnutrition    BMI Findings: Body mass index is 23 1 kg/m²     Reported weight has fluctuated significantly throughout hospitalization, will work on obtaining more accurate weights  Nutrition consult and supplements  Urinary retention  Assessment & Plan  · Patient has chronic Herrera present on admission- herrera was replaced in ED 10/14   · Placed on recent admission (10/3/19) at [facility] where she developed urinary retention  · Patient will follow up outpatient with urology for voiding trial     Failure to thrive in adult  Assessment & Plan  · Since September she has had a progressive decline - she has had no appetite, not really eating/drinking with weakness  She was previously reportedly very independent prior to her admission at [facility] in September with UTI  · Geriatrics following, recommending outpatient follow up  · Speech following, status post VBS on 10/30, discussed with Swallow therapy, okay for pureed diet with nectar thick liquids  · Continue nystatin for thrush  · Obtain barium swallow to evaluate for esophageal obstruction  · Goals of care as mentioned  HLD (hyperlipidemia)  Assessment & Plan  · Continue statin    Essential hypertension  Assessment & Plan  · Atenolol switched to metoprolol given Afib with RVR,  · Hold home dose ACEI  · Amlodipine on hold as well  · BP controlled without ACEI or CCB  Type 2 diabetes mellitus with diabetic neuropathy, without long-term current use of insulin [facility])  Assessment & Plan  Lab Results   Component Value Date    HGBA1C 6 4 (H) 10/15/2019     Recent Labs     10/31/19  1657 10/31/19  2114 11/01/19  0616 11/01/19  1054   POCGLU 140 123 98 121       · Takes metformin and glipizide at baseline, will plan to resume metformin at discharge and hold glipizide    · Continue with SSI  · Diabetic diet  · Monitor accuchecks, avoid hypoglycemia        VTE Pharmacologic Prophylaxis:   Pharmacologic: Heparin Drip  Mechanical VTE Prophylaxis in Place: Yes    Patient Centered Rounds: I have performed bedside rounds with nursing staff today     Discussions with Specialists or Other Care Team Provider:      Education and Discussions with Family / Patient:  Plan of care discussed with the patient  Had a detailed discussion with patient's daughter yesterday  No new results to update regarding today and rheumatology consult is still pending to update regarding that as well  Will update daughter tomorrow  Time Spent for Care: 30 minutes  More than 50% of total time spent on counseling and coordination of care as described above  Current Length of Stay: 18 day(s)    Current Patient Status: Inpatient   Certification Statement: The patient will continue to require additional inpatient hospital stay due to Not medically stable    Discharge Plan: , when medically stable rehab on discharge    Code Status: Level 1 - Full Code      Subjective:   No overnight events reported  Patient is much more communicative and alert this morning  AAO x3 and denies any acute pain  Able to answer more questions as compared to before  Objective:     Vitals:   Temp (24hrs), Av 6 °F (36 4 °C), Min:96 3 °F (35 7 °C), Max:98 4 °F (36 9 °C)    Temp:  [96 3 °F (35 7 °C)-98 4 °F (36 9 °C)] 98 4 °F (36 9 °C)  HR:  [] 81  Resp:  [16-20] 18  BP: (104-122)/(39-64) 112/56  SpO2:  [90 %-100 %] 99 %  Body mass index is 23 1 kg/m²  Input and Output Summary (last 24 hours): Intake/Output Summary (Last 24 hours) at 2019 1259  Last data filed at 2019 1219  Gross per 24 hour   Intake 135 ml   Output 250 ml   Net -115 ml       Physical Exam:     Physical Exam  Constitutional: No distress  Eyes: Pupils are equal, round, and reactive to light  Cardiovascular: Normal heart sounds    No murmur heard  Regular rate and rhythm  Pulmonary/Chest: Effort normal and breath sounds normal  No respiratory distress  She has no wheezes  She has no rales  Abdominal: Soft  Bowel sounds are normal  She exhibits no distension  There is no tenderness  Musculoskeletal: no LE edema a  Neurological: She is alert    Awake and communicative, oriented x3  Squeezes fingers and wiggles toes bilaterally  Skin: Skin is warm         Additional Data:     Labs:    Results from last 7 days   Lab Units 11/01/19  0618 10/31/19  0617  10/29/19  0625   WBC Thousand/uL 4 95 6 10   < > 7 67   HEMOGLOBIN g/dL 7 8* 8 3*   < > 8 8*   HEMATOCRIT % 26 1* 28 2*   < > 30 2*   PLATELETS Thousands/uL 466* 502*   < > 396*   BANDS PCT %  --   --   --  1   NEUTROS PCT %  --  82*   < >  --    LYMPHS PCT %  --  13*   < >  --    LYMPHO PCT %  --   --   --  3*   MONOS PCT %  --  4   < >  --    MONO PCT %  --   --   --  3*   EOS PCT %  --  0   < > 0    < > = values in this interval not displayed  Results from last 7 days   Lab Units 11/01/19  0618 10/31/19  0617   SODIUM mmol/L 142 140   POTASSIUM mmol/L 3 2* 3 6   CHLORIDE mmol/L 110* 110*   CO2 mmol/L 23 21   BUN mg/dL 16 15   CREATININE mg/dL 0 40* 0 37*   ANION GAP mmol/L 9 9   CALCIUM mg/dL 8 5 8 7   ALBUMIN g/dL  --  1 8*   TOTAL BILIRUBIN mg/dL  --  0 27   ALK PHOS U/L  --  57   ALT U/L  --  20   AST U/L  --  23   GLUCOSE RANDOM mg/dL 90 108     Results from last 7 days   Lab Units 10/30/19  0831   INR  1 26*     Results from last 7 days   Lab Units 11/01/19  1054 11/01/19  0616 10/31/19  2114 10/31/19  1657 10/31/19  1053 10/31/19  0629 10/30/19  2051 10/30/19  1603 10/30/19  1044 10/30/19  0639 10/29/19  2114 10/29/19  1559   POC GLUCOSE mg/dl 121 98 123 140 97 108 97 106 114 135 145* 126         Results from last 7 days   Lab Units 10/26/19  0529   PROCALCITONIN ng/ml 0 93*           * I Have Reviewed All Lab Data Listed Above  * Additional Pertinent Lab Tests Reviewed: All Labs Within Last 24 Hours Reviewed    Imaging:    FL barium swallow video w speech   Final Result by NABEEL, HEIDI (10/30 1556)      CTA head and neck w wo contrast   Final Result by Evan Miller MD (1940)      1    No acute intracranial hemorrhage, mass effect or edema  Microangiopathic changes  2   Approximately 80 mL of Omnipaque 350 infiltrated in the left arm  Further clinical assessment and clinical follow-up advised  I personally discussed this study with Dr Mohan Herrera on 10/29/2019 at 7:35 PM                            Workstation performed: [id number]         XR chest portable   Final Result by Hugo Serna MD (10/29 1702)      Persistent small left effusion  Slight increase in left base atelectasis  Workstation performed: [id number]         CT head wo contrast   Final Result by Symone Dunlap DO (10/26 1715)      No acute intracranial abnormality  Microangiopathic changes  Chronic lacunar infarct left lentiform nucleus  Workstation performed: [id number]         XR chest pa & lateral   Final Result by Symone Dunlap DO (10/25 4896)      Small pleural effusion  No pneumothorax  Workstation performed: [id number]         CTA abdomen pelvis w wo contrast   Final Result by Estuardo Higginbotham MD (10/24 1707)      1  No acute aortic injury or aneurysm  2   Patent celiac and splenic arteries  3   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  4   Evolving splenic infarct  5   Additional chronic/incidental findings as described  Workstation performed: [id number]         XR abdomen 1 vw portable   Final Result by Donavan Corbin MD (10/21 9843)      There is a nonobstructive bowel gas pattern  Workstation performed: [id number]         XR chest pa & lateral   Final Result by Nancy Torrez MD (10/19 9875)      Minimal left basilar airspace disease attributed to compressive atelectasis secondary small left pleural effusion  Correlate with clinical findings to exclude pneumonia and/or aspiration                    Workstation performed: [id number]         FL barium swallow    (Results Pending)   MRA head wo contrast    (Results Pending)   MRA carotids wo contrast    (Results Pending)       Recent Cultures (last 7 days):           Last 24 Hours Medication List:     Current Facility-Administered Medications:  acetaminophen 650 mg Rectal Q6H PRN Herman Rueda DO    aluminum-magnesium hydroxide-simethicone 30 mL Oral Q6H PRN Donya Shane MD    bisacodyl 10 mg Rectal Daily PRN Heaven MAHAN PA-C    docusate sodium 100 mg Oral BID PRN Donya Shane MD    ferrous sulfate 325 mg Oral Daily With Breakfast Beatriz Bravo MD    fluticasone 1 spray Each Nare BID Beatriz Bravo MD    heparin (porcine) 3-30 Units/kg/hr (Order-Specific) Intravenous Titrated Beatriz Bravo MD Last Rate: 22 Units/kg/hr (11/01/19 0733)   heparin (porcine) 2,200 Units Intravenous PRN Beatriz Bravo MD    heparin (porcine) 4,400 Units Intravenous PRN Beatriz Bravo MD    insulin lispro 1-5 Units Subcutaneous HS Donya Shane MD    insulin lispro 1-6 Units Subcutaneous TID AC Donya Shane MD    lidocaine (PF) 2 mL Infiltration Once Aj Mayo PA-C    metoprolol 2 5 mg Intravenous Q6H PRN Beatriz Bravo MD    metoprolol 2 5 mg Intravenous Q6H Beatriz Bravo MD    mirtazapine 7 5 mg Oral HS Heaven MAHAN PA-C    nystatin 500,000 Units Swish & Swallow 4x Daily Heaven MAHAN PA-C    ondansetron 4 mg Intravenous Q6H PRN Donya Shane MD    pantoprazole 40 mg Intravenous Q24H [facility] Beatriz Bravo MD    pravastatin 40 mg Oral Daily With Marine Delgadillo MD         Today, Patient Was Seen By: Beatriz Bravo MD    ** Please Note: Dictation voice to text software may have been used in the creation of this document   **

## 2019-11-01 NOTE — ASSESSMENT & PLAN NOTE
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  · Presented with sodium of 126  Sodium 142 today  · TSH normal this admission  · Cosyntropin stim test negative for adrenal insufficiency  · CT chest abdomen pelvis was done at [facility] to rule out any occult malignancy as a cause of SIADH which was negative for any malignancy  · Sodium tablets stopped on 11/1 by Nephrology  · Continue 1500 cc fluid restriction  · [id number] Cherrie Dumas for d/c from a nephrology standpoint, will need outpatient f/u with [facility] nephrology  Recommending BMP in 1 week with results to PCP for further management of NACL tabs  Protonix changed to Pepcid  ACEI stopped as well    · BMP in AM

## 2019-11-01 NOTE — PROGRESS NOTES
Progress Note - Neurology   Nikki Dc [de-identified] y o  female MRN: [id number]  Unit/Bed#: -01 Encounter: [id number]    Assessment:  43-year-old female with acute on chronic hyponatremia, adrenal insufficiency, hypertension, DM2, dyslipidemia, new onset AFib with RVR, being further evaluated for persistent encephalopathy      Subacute encephalopathy:  -per patient's daughter, patient was fully functional and independent, including all ADLs and IADLs, prior to her admissions to [facility] in September 2019    -it appears she was admitted 3 times a [facility] with acute on chronic hyponatremia and altered mental status, as well as UTI   -since September, patient has had diminished appetite and has been confused  -MRI brain on 09/27/2019 at [facility] demonstrated left inferior cerebellar mineralization but was otherwise unremarkable  CT brain this admission demonstrated chronic left lentiform nucleus lacunar infarct  -CT chest abdomen and pelvis performed at [facility] demonstrated no tumor or lymphadenopathy   -lumbar puncture performed 10/30/2019  Thus far, CSF results benign  -CTA head/neck attempted twice; both attempts resulted in IV difficulties  MRA intra/extracranial w/o contrast ordered as alternative   -Patient appears brighter today, oriented to person, place, month, and year  Diet was upgraded to pureed and she was able to have a good breakfast   -origin of encephalopathy as yet unclear  As noted above, patient does appear improved today      Elevated CRP and ESR:  -CRP greater than 90,    -Rheumatology has been consulted  AFib with RVR:  -discovered on telemetry on 10/29/2019   -patient on heparin drip, PTT 73 today      Fevers:  -afebrile today with temp 98  4      Results:  -MRI brain on 09/27/2019 at [facility] demonstrated left inferior cerebellar mineralization but was otherwise unremarkable    CT brain this admission demonstrated chronic left lentiform nucleus lacunar infarct  -CT chest abdomen and pelvis performed at [facility] demonstrated no tumor or lymphadenopathy   -Nonreactive RPR, CRP greater than 90, , TSH 2 82, B12 443, and folate 4 9  Sodium on admission was 126 and is currently 137  -LENO demonstrated EF 55%, normal size atria, no MAC, no thrombus or PFO  -antithyroglobulin antibody 1 1  Anti-TPO 21    -CSF labs:  Protein 37, glucose 65, WBC 2, RBC 1, no xanthochromia, meningitis/encephalitis panel negative  Culture with no growth  -serum Lyme serology negative      Plan:  1  Await pending CSF studies including VDRL, Lyme, ENC-2   2  Await additional serum lab work including ENS-1, JAMILAH  3  MRA intra/extracranial pending  4  Per primary service, Rheumatology consult ordered  May consider empiric trial on Solu-Medrol x5 days, pending Rheumatology evaluation and patient's clinical course  Discussed Dr Tierney Mckenzie  Subjective:   Patient states she feels good today  Only complaint is L arm pain around IV site  Was upgraded to pureed diet, had good breakfast   Denies headache, visual deficits  ROS: 12 point review of systems performed and negative except as indicated above  Vitals: Blood pressure 120/55, pulse 85, temperature 98 4 °F (36 9 °C), resp  rate 18, height 5' 2" (1 575 m), weight 57 3 kg (126 lb 4 8 oz), SpO2 100 %  ,Body mass index is 23 1 kg/m²  Physical Exam:   Gen: patient is elderly appearing female in no acute distress  HEENT: head normocephalic  Eyes anicteric  Neuro: Alert, pleasantly interactive  Able to state her full name, [place], and that we just finished October 2019  Inaccurate with simple calculations (states 4 quarters in $1 50)  EOMs intact  Face is symmetric      Lab, Imaging and other studies:   CBC:   Results from last 7 days   Lab Units 11/01/19  0618 10/31/19  0617 10/30/19  1430 10/30/19  0831   WBC Thousand/uL 4 95 6 10 --  7 19   RBC Million/uL 3 40* 3 66*  --  3 47*   HEMOGLOBIN g/dL 7 8* 8 3* 7 6* 7 9*   HEMATOCRIT % 26 1* 28 2* 26 0* 26 8*   MCV fL 77* 77*  --  77*   PLATELETS Thousands/uL 466* 502*  --  413*   , BMP/CMP:   Results from last 7 days   Lab Units 11/01/19  0618 10/31/19  0617 10/30/19  0831   SODIUM mmol/L 142 140 140   POTASSIUM mmol/L 3 2* 3 6 3 7   CHLORIDE mmol/L 110* 110* 110*   CO2 mmol/L 23 21 25   BUN mg/dL 16 15 17   CREATININE mg/dL 0 40* 0 37* 0 43*   CALCIUM mg/dL 8 5 8 7 8 6   AST U/L  --  23  --    ALT U/L  --  20  --    ALK PHOS U/L  --  57  --    EGFR ml/min/1 73sq m 99 101 96     VTE Prophylaxis: Heparin

## 2019-11-01 NOTE — PLAN OF CARE
Problem: PHYSICAL THERAPY ADULT  Goal: Performs mobility at highest level of function for planned discharge setting  See evaluation for individualized goals  Description  Treatment/Interventions: Functional transfer training, LE strengthening/ROM, Therapeutic exercise, Endurance training, Patient/family training, Equipment eval/education, Bed mobility, Gait training, Spoke to nursing  Equipment Recommended: Walker(current use of RW for mobility)       See flowsheet documentation for full assessment, interventions and recommendations  Outcome: Progressing  Note:   Prognosis: Guarded  Problem List: Decreased strength, Decreased range of motion, Decreased endurance, Impaired balance, Decreased mobility, Decreased coordination, Decreased cognition, Decreased safety awareness  Assessment: Pt  is an [de-identified] F who presents with metabolic encephalopathy  Pt  Was identified with full name and birthdate  Pt  Agreeable to PT session  Pt  Demonstrates increased functional mobility and activity tolerance as well as increased tolerance of static sitting at EOB with decreased assistance to maintain postural stability  Pt  Is progressing towards goals however progress is slowed 2/2 limited activity tolerance and current cognitive state  Pt  Will benefit from continued skilled therapy to increase functional independence and aid patient in return to PLOF with decreased burden of care  Recommendation: Post acute IP rehab     PT - OK to Discharge: Yes(to rehab when medically appropriate)    See flowsheet documentation for full assessment

## 2019-11-01 NOTE — PHYSICIAN ADVISOR
Current patient class: Inpatient  The patient is currently on Hospital Day: 23      The patient was admitted to the hospital at 2050 on 10/14/19 for the following diagnosis:  Abnormal laboratory test result [R89 9]  Abnormal urinalysis [R82 90]     CMS OUTLIER STAY REVIEW    After review of the relevant documentation, labs, vital signs and test results, the patient is appropriate for CONTINUED INPATIENT ADMISSION  The patient continues to remain hospitalized receiving acute medical care  The patient has surpassed the expected duration of stay, however given the clinical condition, need for further acute care management, the patient is appropriate to remain in an inpatient status  The patient still being actively managed, and does have unresolved medical issues requiring further hospitalization  This review is conducted at 10 day intervals, to help satisfy the requirements for significant outlier stay review as per CMS  Given the current condition of this patient, the patient satisfies this review was determination for continued inpatient stay  Rationale is as follows: The patient is a [de-identified] yrs old Female who presented to the ED at 10/14/2019  4:20 PM with a chief complaint of being more lethargic and confused  Patient was also noted to be hyponatremic with a sodium level of 126  Patient was also found to be hypoglycemic as well as with hyponatremia and failure to thrive there was concerns about potential adrenal insufficiency which was being worked up as well  Endocrinology consultation was requested and the patient was initially put on hydrocortisone  Patient's mental status did start improving with correction of the hyponatremia however her mental status started to decline again  Nephrology was consulted as well to help with management of the hyponatremia  The patient then started having some fevers and infectious workup was ordered    On 10/29 the patient was found to be in atrial fibrillation with rapid ventricular response and the patient was being further monitored on telemetry  Given the fevers with no obvious source the patient was being worked up for autoimmune etiology of the fevers  Neurology was also consulted for the metabolic encephalopathy  Currently the patient is awaiting rheumatology consultation given the elevated inflammatory markers and fever with no apparent infectious etiology  Given the patient's clinical course as well as these fevers with elevated inflammatory markers as well as still being encephalopathic patient is still inpatient status appropriate      The patients vitals on arrival were ED Triage Vitals   Temperature Pulse Respirations Blood Pressure SpO2   10/14/19 1626 10/14/19 1626 10/14/19 1626 10/14/19 1626 10/14/19 1626   98 5 °F (36 9 °C) 65 18 107/56 96 %      Temp Source Heart Rate Source Patient Position - Orthostatic VS BP Location FiO2 (%)   10/14/19 1626 10/14/19 1626 10/14/19 1846 10/14/19 1626 --   Oral Monitor Lying Right arm       Pain Score       10/14/19 1626       No Pain           Past Medical History:   Diagnosis Date    Cardiac disease     Diabetes mellitus ([facility] [address] )     Hyperlipidemia     Hypertension      Past Surgical History:   Procedure Laterality Date    CORONARY ANGIOPLASTY WITH STENT PLACEMENT             Consults have been placed to:   IP CONSULT TO CASE MANAGEMENT  IP CONSULT TO NUTRITION SERVICES  IP CONSULT TO GERONTOLOGY  IP CONSULT TO ENDOCRINOLOGY  IP CONSULT TO NEPHROLOGY  IP CONSULT TO ACUTE CARE SURGERY  IP CONSULT TO HEMATOLOGY  IP CONSULT TO CARDIOLOGY  IP CONSULT TO INFECTIOUS DISEASES  IP CONSULT TO PALLIATIVE CARE  IP CONSULT TO NEUROLOGY  IP CONSULT TO RHEUMATOLOGY    Vitals:    11/01/19 0214 11/01/19 0238 11/01/19 0600 11/01/19 0829   BP: (!) 112/39 (!) 104/48  120/55   BP Location:  Right arm     Pulse: 82 80  85   Resp:  20  18   Temp: 98 2 °F (36 8 °C)   98 4 °F (36 9 °C)   TempSrc:       SpO2: 99%   100%   Weight:   57 3 kg (126 lb 4 8 oz)    Height:           Most recent labs:    Recent Labs     10/30/19  0831  10/31/19  0617 11/01/19  0618   WBC 7 19  --  6 10 4 95   HGB 7 9*   < > 8 3* 7 8*   HCT 26 8*   < > 28 2* 26 1*   *  --  502* 466*   K 3 7  --  3 6 3 2*   CALCIUM 8 6  --  8 7 8 5   BUN 17  --  15 16   CREATININE 0 43*  --  0 37* 0 40*   INR 1 26*  --   --   --    AST  --   --  23  --    ALT  --   --  20  --    ALKPHOS  --   --  57  --     < > = values in this interval not displayed         Scheduled Meds:  Current Facility-Administered Medications:  acetaminophen 650 mg Rectal Q6H PRN Herman Rueda DO    aluminum-magnesium hydroxide-simethicone 30 mL Oral Q6H PRN Artur Meeks MD    bisacodyl 10 mg Rectal Daily PRN Heaven MAHAN PA-C    docusate sodium 100 mg Oral BID PRN Artur Meeks MD    ferrous sulfate 325 mg Oral Daily With Breakfast Jermaine Phillips MD    fluticasone 1 spray Each Nare BID Jermaine Phillips MD    heparin (porcine) 3-30 Units/kg/hr (Order-Specific) Intravenous Titrated Jermaine Phillips MD Last Rate: 22 Units/kg/hr (11/01/19 0733)   heparin (porcine) 2,200 Units Intravenous PRN Jermaine Phillips MD    heparin (porcine) 4,400 Units Intravenous PRN Jermaine Phillips MD    insulin lispro 1-5 Units Subcutaneous HS Artur Meeks MD    insulin lispro 1-6 Units Subcutaneous TID AC Artur Meeks MD    lidocaine (PF) 2 mL Infiltration Once Antonio Cleveland PA-C    metoprolol 2 5 mg Intravenous Q6H PRN Jermaine Phillips MD    metoprolol 2 5 mg Intravenous Q6H Jermaine Phillips MD    mirtazapine 7 5 mg Oral HS Heaven MAHAN PA-C    nystatin 500,000 Units Swish & Swallow 4x Daily Heaven MAHAN PA-C    ondansetron 4 mg Intravenous Q6H PRN Artur Meeks MD    pantoprazole 40 mg Intravenous Q24H [address] Jermaine Phillips MD    pravastatin 40 mg Oral Daily With Matty Peña MD      Continuous Infusions:  heparin (porcine) 3-30 Units/kg/hr (Order-Specific) Last Rate: 22 Units/kg/hr (11/01/19 3881) PRN Meds:   acetaminophen    aluminum-magnesium hydroxide-simethicone    bisacodyl    docusate sodium    heparin (porcine)    heparin (porcine)    metoprolol    ondansetron    Surgical procedures (if appropriate):

## 2019-11-01 NOTE — PROGRESS NOTES
Progress Note - Infectious Disease   Pam Oliveira [de-identified] y o  female MRN: [id number]  Unit/Bed#: -01 Encounter: [id number]      Impression/Plan:  1  Fever   Patient noted to have intermittent low-grade fever and isolated fever high fever  Maximino Felty are no obvious sources on exam   Previous workup reviewed at [facility] which was largely unremarkable except for splenic infarct   Echo at that time was also unremarkable   Transesophageal echo here negative   Flu swab, chest x-ray, respiratory viral panel, urine culture and blood cultures have been unremarkable   CT of the head also unremarkable   RPR and HIV negative  Lyme testing peripherally negative, parasite smear negative  CSF culture negative and additional studies pending  Lower suspicion for infectious etiology   Sed rate and CRP noted to be significantly elevated   Would question if there is an autoimmune encephalitis  No indication for antimicrobial therapy at this time  Continue to trend fever curve/vitals  Follow up pending cultures/CSF studies  Neurology evaluation ongoing  Rheumatology evaluation pending  Additional care as per primary     2  Hyponatremia   Sodium appears to be stable today   Ongoing follow-up by Nephrology      3  Metabolic encephalopathy   Patient initially presented confused likely due to problem 2  It seems that her mental status improved with adjustment in her sodium level   Workup for adrenal insufficiency was negative   Unclear etiology for her underlying hyponatremia and now intermittent fevers as above  Patient seems more interactive today compared to yesterday  Continue to monitor mental status  Continue to monitor electrolytes  Additional evaluations as above  Additional care as per primary     4  Splenic infarct   Appears to be evolving on imaging   Possibly contributing to problem 1   Unclear etiology for this lesion   Was not present on imaging in February   Recent echo at [facility] unremarkable   Patient without other prosthetic material   Transesophageal echo negative  Serial abdominal exams  Hematology evaluation appreciated  Additional care as per primary     5  Type 2 diabetes   Ongoing management as per primary service      Above plan discussed in detail with the patient      ID consult service will formally re-evaluate this patient again on Monday  Please contact ID attending on call if any additional questions or concerns over the weekend  Antibiotics:  None    24 hour events:  No acute events noted overnight on chart review  Patient remains afebrile  She is without leukocytosis  Parasite smear negative and CSF culture without growth  No new imaging  Patient's other vitals are stable  Labs are otherwise unremarkable    Subjective:  Patient much more awake and talkative this morning  She denies having any nausea or vomiting  She reports that she feels hungry  She recalls that her daughter was visiting her yesterday and is unsure if she is coming to visit her today  She recalls that she is in the hospital in why she has been here  Again she remains hard of hearing at baseline  She is also able to recall for me that she did get bit by a tick about 2 months ago and that years ago she was treated for Lyme disease  Objective:  Vitals:  Temp:  [96 3 °F (35 7 °C)-98 5 °F (36 9 °C)] 98 4 °F (36 9 °C)  HR:  [] 85  Resp:  [16-20] 18  BP: (104-129)/(39-64) 120/55  SpO2:  [89 %-100 %] 100 %  Temp (24hrs), Av 7 °F (36 5 °C), Min:96 3 °F (35 7 °C), Max:98 5 °F (36 9 °C)  Current: Temperature: 98 4 °F (36 9 °C)    Physical Exam:   General Appearance:  More Alert this morning than prior evaluation, interactive, nontoxic, no acute distress  Speech remains slowed as well as response time  Throat: Oropharynx moist without lesions      Lungs:   Decreased breath sounds throughout; no wheezes, rhonchi or rales; respirations unlabored on room air, patient continues with wet cough Heart:  RRR; no murmur, rub or gallop   Abdomen:   Soft, non-tender, non-distended, positive bowel sounds  Extremities: No clubbing, cyanosis or edema   Skin: No new rashes or lesions  No New draining wounds noted  Labs, Imaging, & Other studies:   All pertinent labs and imaging studies were personally reviewed  Results from last 7 days   Lab Units 11/01/19  0618 10/31/19  0617 10/30/19  1430 10/30/19  0831   WBC Thousand/uL 4 95 6 10  --  7 19   HEMOGLOBIN g/dL 7 8* 8 3* 7 6* 7 9*   PLATELETS Thousands/uL 466* 502*  --  413*     Results from last 7 days   Lab Units 11/01/19  0618 10/31/19  0617   POTASSIUM mmol/L 3 2* 3 6   CHLORIDE mmol/L 110* 110*   CO2 mmol/L 23 21   BUN mg/dL 16 15   CREATININE mg/dL 0 40* 0 37*   EGFR ml/min/1 73sq m 99 101   CALCIUM mg/dL 8 5 8 7   AST U/L  --  23   ALT U/L  --  20   ALK PHOS U/L  --  57     Results from last 7 days   Lab Units 10/25/19  1142   BLOOD CULTURE  No Growth After 5 Days  No Growth After 5 Days     INFLUENZA B PCR  Not Detected   RSV PCR  Not Detected

## 2019-11-01 NOTE — PLAN OF CARE
Problem: Nutrition/Hydration-ADULT  Goal: Nutrient/Hydration intake appropriate for improving, restoring or maintaining nutritional needs  Description  Monitor and assess patient's nutrition/hydration status for malnutrition  Collaborate with interdisciplinary team and initiate plan and interventions as ordered  Monitor patient's weight and dietary intake as ordered or per policy  Utilize nutrition screening tool and intervene as necessary  Determine patient's food preferences and provide high-protein, high-caloric foods as appropriate  INTERVENTIONS:  - Monitor oral intake, urinary output, labs, and treatment plans  - Assess nutrition and hydration status and recommend course of action  - Evaluate amount of meals eaten  - Assist patient with eating if necessary   - Allow adequate time for meals  - Recommend/ encourage appropriate diets, oral nutritional supplements, and vitamin/mineral supplements  - Order, calculate, and assess calorie counts as needed  - Recommend, monitor, and adjust tube feedings and TPN/PPN based on assessed needs  - Assess need for intravenous fluids  - Provide specific nutrition/hydration education as appropriate  - Include patient/family/caregiver in decisions related to nutrition  Outcome: Not Progressing   Appetite remains poor, EN recommendations provided

## 2019-11-01 NOTE — PHYSICAL THERAPY NOTE
PHYSICAL THERAPY Treatment NOTE    Patient Name: Artemio SOARES Date: 11/1/2019 11/01/19 1214   Pain Assessment   Pain Assessment No/denies pain   Pain Score No Pain   Restrictions/Precautions   Weight Bearing Precautions Per Order No   Other Precautions Contact/isolation;Cognitive;Multiple lines;Telemetry;O2;Fall Risk   General   Chart Reviewed Yes   Additional Pertinent History Pt  is an [de-identified] F who presents with metabolic encephalopathy   Cognition   Overall Cognitive Status Impaired   Arousal/Participation Arousable; Cooperative   Attention Attends with cues to redirect   Orientation Level Oriented to person;Oriented to place   Memory Decreased recall of precautions;Decreased recall of recent events;Decreased short term memory   Following Commands Follows one step commands with increased time or repetition   Subjective   Subjective Pt  agreeable to PT session    Bed Mobility   Supine to Sit 2  Maximal assistance   Additional items Assist x 2; Increased time required;LE management   Sit to Supine 2  Maximal assistance   Additional items Assist x 2; Increased time required;LE management   Transfers   Sit to Stand 3  Moderate assistance   Additional items Assist x 2; Increased time required;Verbal cues   Stand to Sit 3  Moderate assistance   Additional items Assist x 2; Increased time required;Verbal cues   Ambulation/Elevation   Gait pattern Improper Weight shift;Retropulsion;Narrow ROSAS; Decreased foot clearance;Scissoring;Shuffling; Inconsistent demetrius; Redundant gait at times; Short stride; Step to;Excessively slow   Gait Assistance 3  Moderate assist   Additional items Assist x 2;Verbal cues  (for posture, AD management and gait mechancis)   Assistive Device Rolling walker   Distance 5'x1 advance retreat, 5'x1 sidestepping   Balance   Static Sitting Poor +   Dynamic Sitting Poor   Static Standing Poor -   Ambulatory Zero Endurance Deficit   Endurance Deficit Yes   Endurance Deficit Description postural and gait degradation noted with fatigue   Activity Tolerance   Activity Tolerance Patient limited by fatigue   Nurse Made Aware spoke to [address] Staff Made Aware Spoke to OT Dena Fuller   Assessment   Prognosis Guarded   Problem List Decreased strength;Decreased range of motion;Decreased endurance; Impaired balance;Decreased mobility; Decreased coordination;Decreased cognition;Decreased safety awareness   Assessment Pt  is an [de-identified] F who presents with metabolic encephalopathy  Pt  Was identified with full name and birthdate  Pt  Agreeable to PT session  Pt  Demonstrates increased functional mobility and activity tolerance as well as increased tolerance of static sitting at EOB with decreased assistance to maintain postural stability  Pt  Is progressing towards goals however progress is slowed 2/2 limited activity tolerance and current cognitive state  Pt  Will benefit from continued skilled therapy to increase functional independence and aid patient in return to PLOF with decreased burden of care  Goals   Patient Goals did not state   STG Expiration Date 11/07/19   PT Treatment Day 7   Plan   Treatment/Interventions Functional transfer training;LE strengthening/ROM; Therapeutic exercise; Endurance training;Cognitive reorientation;Patient/family training;Equipment eval/education; Bed mobility;Gait training;Spoke to nursing;Spoke to case management;OT   Progress Slow progress, medical status limitations   PT Frequency 2-3x/wk   Recommendation   Recommendation Post acute IP rehab   Equipment Recommended Walker   PT - OK to Discharge Yes  (to rehab when medically appropriate)     Faina Newberry, PT, DPT 11/1/2019

## 2019-11-01 NOTE — ASSESSMENT & PLAN NOTE
· Baseline seems between 8-11  · Hemoglobin 7 8 today  · Iron studies suggestive of low iron  · Continue  p o  iron supplementation which was started this admission    · CBC in AM

## 2019-11-01 NOTE — PLAN OF CARE
Problem: OCCUPATIONAL THERAPY ADULT  Goal: Performs self-care activities at highest level of function for planned discharge setting  See evaluation for individualized goals  Description  Treatment Interventions: ADL retraining, Functional transfer training, UE strengthening/ROM, Endurance training, Cognitive reorientation, Patient/family training, Compensatory technique education, Energy conservation, Activityengagement          See flowsheet documentation for full assessment, interventions and recommendations  Note:   Limitation: Decreased ADL status, Decreased Safe judgement during ADL, Decreased cognition, Decreased endurance, Decreased self-care trans, Decreased high-level ADLs, Visual deficit  Prognosis: Fair  Assessment: pt seen for skilled OT treatment session  she is able to answer that she is at [facility] and that October just finished  unable to name next month even with cues  when told it is November she stated "of course"  when asked what big holiday is in November she answered "Easter"  Pt with decreased prob solving, decreased attention, frequent cues needed to attend to task  She needed max assist of 2 for supine to sit, able to sit at EOB x 20 min w mod assist for balancing self  mod assist of 2 for sit to stand and to take a few steps  Pt w poor endurance  she expressed concern about being so weak and unable to walk  Pt will continue to benefit from skilled OT while in acute care  OT Discharge Recommendation: Short Term Rehab  OT - OK to Discharge:  Yes

## 2019-11-01 NOTE — NUTRITION
11/01/19 4848   Recommendations/Interventions   Summary Patient's appetite remains poor, patient only taking minimal amounts of food  PCA assisting with meals  Nursing skin care plan reviewed  ? initiate alternate means of nutrition secondary to prolonged poor appetite  Interventions Diet: continued as ordered; Supplement adjust  (Ensure compact discontinued and Honeyshake once daily, magic cup BID ordered )   Nutrition Recommendations Continue diet as ordered;Lab - consider order (specify); Tube Feeding Recommendation provided  (Secondary to prolonged poor appetite suggest initiate cyclic EN to provide 07% nutritional needs (? keofeed vs PEG)  Rec: Jevity 1 2 kcal @ 60 ml/hr x 16 hours/day (1152 kcal, 53 grams protein, 774 ml tv)   Monitor electrolytes and weight  )

## 2019-11-01 NOTE — PROGRESS NOTES
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 ACE-inhibitor and PPI could cause SIADH --Protonix changed to Pepcid and Lisinopril was discontinued as her blood pressure is running low and lisinopril can cause SIADH  ? Workup:  Serum osmolality 276, urine osmolality 624, urine sodium 90, uric acid 4 1, cortisol 40 8, TSH 2 8, normal LFTs except for albumin of 2 2, chest x-ray with minimal left basilar airspace disease attributed to compressive atelectasis and small left pleural effusion, echocardiogram at [facility] revealed ejection fraction of 50-55% with mild pulmonary hypertension, mild mitral regurgitation and tricuspid regurgitation / ACT H was low at 1 6 at [facility] [place] cortisol of 19 4) // LVH CT chest, abdomen and pelvis on 09/29 at [facility]:  No signs of malignancy   There were new small bilateral pleural effusions with new infarcts involving the medial aspect of the spleen   Low ACTH level at [facility] was likely a lab error   Would have her PCP recheck as an outpatient  ? SPEP faint possible band in the gamma region   Immunofixation pending - sent to the [facility]  ? Sodium level  has improved and remains normal       · Sodium chloride tablets have been discontinued 11/1     · May need hypotonic fluids as her sodium level is high normal now and she is on a pureed diet  2  Anemia:  Hemoglobin 8 3 today  ? Iron saturation 7%, ferritin 608  ? Started on oral iron by primary team  ? Management per primary team  3  Encephalopathy:  Originally due to hyponatremia and UTI   Overall, mental status seems to have improved   4   Hypertension: [address] pressure running on the low side but seems to have improved  · Continue to hold lisinopril and  Norvasc   · Trend blood pressure   5    Fever  · Cultures negative so far  · Per Infectious Disease  · T-max 98 4   6  Splenic infarct  · Workup and management per primary team and Hematology  · CT of abdomen and pelvis/ LENO results noted  · Patient did go into atrial fibrillation with RVR, now back in normal sinus    cardiology has evaluated the patient  Rina Kerr is on anticoagulation  7  Encephalopathy  · Workup continuing per Neurology    8  Urinary retention  · + chronic Rapp per notes    Subjective: The patient was seen examined this morning  She was sleep in and was a woken  She knew the year and place was hospital   She knew her name  She had no complaints  She denied any shortness of breath, abdominal pain, vomiting, diarrhea, chills, sweats  Objective:     Vitals: Blood pressure 112/56, pulse 81, temperature 98 4 °F (36 9 °C), resp  rate 18, height 5' 2" (1 575 m), weight 57 3 kg (126 lb 4 8 oz), SpO2 99 %  ,Body mass index is 23 1 kg/m²  Temp (24hrs), Av 6 °F (36 4 °C), Min:96 3 °F (35 7 °C), Max:98 4 °F (36 9 °C)      Weight (last 2 days)     Date/Time   Weight    19 0600   57 3 (126 3)    10/31/19 0600   56 8 (125 22)    10/30/19 06:29:14   57 (125 6)                     Intake/Output Summary (Last 24 hours) at 2019 1257  Last data filed at 2019 1219  Gross per 24 hour   Intake 135 ml   Output 250 ml   Net -115 ml     I/O last 24 hours: In: 135 [P O :135]  Out: 250 [Urine:250]        Physical Exam    Physical Exam   Constitutional: No distress  Neck: No JVD present  Cardiovascular: Normal heart sounds  Exam reveals no gallop and no friction rub  Pulmonary/Chest: Effort normal and breath sounds normal  No respiratory distress  She has no wheezes  She has no rales  Abdominal: Soft  Bowel sounds are normal  She exhibits no distension  There is no tenderness   There is no rebound and no guarding  Musculoskeletal: She exhibits no edema  Skin: She is not diaphoretic  Vitals reviewed                Invasive Devices     Peripheral Intravenous Line            Peripheral IV 10/31/19 Right;Upper Arm less than 1 day          Drain            Urethral Catheter Straight-tip 16 Fr  17 days                Medications:    Scheduled Meds:  Current Facility-Administered Medications:  acetaminophen 650 mg Rectal Q6H PRN Herman Rueda DO    aluminum-magnesium hydroxide-simethicone 30 mL Oral Q6H PRN Ivan Avalos MD    bisacodyl 10 mg Rectal Daily PRN Heaven MAHAN PA-C    docusate sodium 100 mg Oral BID PRN Ivan Avalos MD    ferrous sulfate 325 mg Oral Daily With Breakfast Andrés Wilkinson MD    fluticasone 1 spray Each Nare BID Andrés Wilkinson MD    heparin (porcine) 3-30 Units/kg/hr (Order-Specific) Intravenous Titrated Andrés Wilkinson MD Last Rate: 22 Units/kg/hr (11/01/19 0733)   heparin (porcine) 2,200 Units Intravenous PRN Andrés Wilkinson MD    heparin (porcine) 4,400 Units Intravenous PRN Andrés Wilkinson MD    insulin lispro 1-5 Units Subcutaneous HS Ivan Avalos MD    insulin lispro 1-6 Units Subcutaneous TID AC Ivan Avalos MD    lidocaine (PF) 2 mL Infiltration Once Remonia LUPE Gomez    metoprolol 2 5 mg Intravenous Q6H PRN Andrés Wilkinson MD    metoprolol 2 5 mg Intravenous Q6H Andrés Wilkinson MD    mirtazapine 7 5 mg Oral HS Heaven MAHAN PA-C    nystatin 500,000 Units Swish & Swallow 4x Daily Heaven MAHAN PA-C    ondansetron 4 mg Intravenous Q6H PRN Ivan Avalos MD    pantoprazole 40 mg Intravenous Q24H [address] Andrés Wilkinson MD    pravastatin 40 mg Oral Daily With Kerwin Ambrose MD        PRN Meds:   acetaminophen    aluminum-magnesium hydroxide-simethicone    bisacodyl    docusate sodium    heparin (porcine)    heparin (porcine)    metoprolol    ondansetron    Continuous Infusions:  heparin (porcine) 3-30 Units/kg/hr (Order-Specific) Last Rate: 22 Units/kg/hr (11/01/19 4352)           LAB RESULTS:      Results from last 7 days   Lab Units 11/01/19  0618 10/31/19  0617 10/30/19  1430 10/30/19  0831 10/29/19  6717 10/28/19  2289 10/27/19  0542 10/26/19  0927  10/26/19  0529   WBC Thousand/uL 4 95 6 10  --  7 19 7 67 5 34 6 04 9 15  --   --    HEMOGLOBIN g/dL 7 8* 8 3* 7 6* 7 9* 8 8* 8 1* 8 7* 8 6*   < >  --    HEMATOCRIT % 26 1* 28 2* 26 0* 26 8* 30 2* 27 3* 28 9* 28 1*   < >  --    PLATELETS Thousands/uL 466* 502*  --  413* 396* 296 377 369  --   --    NEUTROS PCT %  --  82*  --  78*  --   --  83* 83*  --   --    LYMPHS PCT %  --  13*  --  15  --   --  11* 11*  --   --    LYMPHO PCT %  --   --   --   --  3*  --   --   --   --   --    MONOS PCT %  --  4  --  6  --   --  3* 4  --   --    MONO PCT %  --   --   --   --  3*  --   --   --   --   --    EOS PCT %  --  0  --  0 0  --  2 1  --   --    POTASSIUM mmol/L 3 2* 3 6  --  3 7 4 2 4 1 3 8  --   --  3 6   CHLORIDE mmol/L 110* 110*  --  110* 109* 107 106  --   --  107   CO2 mmol/L 23 21  --  25 21 23 22  --   --  22   BUN mg/dL 16 15  --  17 14 13 13  --   --  19   CREATININE mg/dL 0 40* 0 37*  --  0 43* 0 43* 0 41* 0 44*  --   --  0 52*   CALCIUM mg/dL 8 5 8 7  --  8 6 8 6 8 4 8 6  --   --  8 3   ALK PHOS U/L  --  57  --   --   --   --   --   --   --   --    ALT U/L  --  20  --   --   --   --   --   --   --   --    AST U/L  --  23  --   --   --   --   --   --   --   --    MAGNESIUM mg/dL  --   --   --  2 1 2 0  --   --   --   --   --     < > = values in this interval not displayed  CUTURES:  Lab Results   Component Value Date    BLOODCX No Growth After 5 Days  10/25/2019    BLOODCX No Growth After 5 Days  10/25/2019    BLOODCX No Growth After 5 Days  10/14/2019    BLOODCX No Growth After 5 Days   10/14/2019    URINECX 3214-6205 cfu/ml Gram Negative Nirav (A) 10/15/2019    URINECX >100,000 cfu/ml Escherichia coli 07/16/2017                 PLEASE NOTE:  This encounter was completed utilizing the [employer]Fluency Direct Speech Voice Recognition Software  Grammatical errors, random word insertions, pronoun errors and incomplete sentences are occasional consequences of the system due to software limitations, ambient noise and hardware issues  These may be missed by proof reading prior to affixing electronic signature  Any questions or concerns about the content, text or information contained within the body of this dictation should be directly addressed to the physician for clarification  Please do not hesitate to call me directly if you have any any questions or concerns

## 2019-11-01 NOTE — PLAN OF CARE
Problem: Prexisting or High Potential for Compromised Skin Integrity  Goal: Skin integrity is maintained or improved  Description  INTERVENTIONS:  - Identify patients at risk for skin breakdown  - Assess and monitor skin integrity  - Assess and monitor nutrition and hydration status  - Monitor labs   - Assess for incontinence   - Turn and reposition patient  - Assist with mobility/ambulation  - Relieve pressure over bony prominences  - Avoid friction and shearing  - Provide appropriate hygiene as needed including keeping skin clean and dry  - Evaluate need for skin moisturizer/barrier cream  - Collaborate with interdisciplinary team   - Patient/family teaching  - Consider wound care consult   Outcome: Progressing     Problem: Nutrition/Hydration-ADULT  Goal: Nutrient/Hydration intake appropriate for improving, restoring or maintaining nutritional needs  Description  Monitor and assess patient's nutrition/hydration status for malnutrition  Collaborate with interdisciplinary team and initiate plan and interventions as ordered  Monitor patient's weight and dietary intake as ordered or per policy  Utilize nutrition screening tool and intervene as necessary  Determine patient's food preferences and provide high-protein, high-caloric foods as appropriate       INTERVENTIONS:  - Monitor oral intake, urinary output, labs, and treatment plans  - Assess nutrition and hydration status and recommend course of action  - Evaluate amount of meals eaten  - Assist patient with eating if necessary   - Allow adequate time for meals  - Recommend/ encourage appropriate diets, oral nutritional supplements, and vitamin/mineral supplements  - Order, calculate, and assess calorie counts as needed  - Recommend, monitor, and adjust tube feedings and TPN/PPN based on assessed needs  - Assess need for intravenous fluids  - Provide specific nutrition/hydration education as appropriate  - Include patient/family/caregiver in decisions related to nutrition  Outcome: Progressing     Problem: Potential for Falls  Goal: Patient will remain free of falls  Description  INTERVENTIONS:  - Assess patient frequently for physical needs  -  Identify cognitive and physical deficits and behaviors that affect risk of falls    -  [place] fall precautions as indicated by assessment   - Educate patient/family on patient safety including physical limitations  - Instruct patient to call for assistance with activity based on assessment  - Modify environment to reduce risk of injury  - Consider OT/PT consult to assist with strengthening/mobility  Outcome: Progressing     Problem: NEUROSENSORY - ADULT  Goal: Achieves stable or improved neurological status  Description  INTERVENTIONS  - Monitor and report changes in neurological status  - Monitor vital signs such as temperature, blood pressure, glucose, and any other labs ordered   - Initiate measures to prevent increased intracranial pressure  - Monitor for seizure activity and implement precautions if appropriate      Outcome: Progressing     Problem: RESPIRATORY - ADULT  Goal: Achieves optimal ventilation and oxygenation  Description  INTERVENTIONS:  - Assess for changes in respiratory status  - Assess for changes in mentation and behavior  - Position to facilitate oxygenation and minimize respiratory effort  - Encourage broncho-pulmonary hygiene including cough, deep breathe, Incentive Spirometry  - Assess and instruct to report SOB or any respiratory difficulty  - Respiratory Therapy support as indicated   Outcome: Progressing     Problem: GASTROINTESTINAL - ADULT  Goal: Maintains adequate nutritional intake  Description  INTERVENTIONS:  - Monitor percentage of each meal consumed  - Identify factors contributing to decreased intake, treat as appropriate  - Assist with meals as needed  - Monitor I&O, weight, and lab values if indicated  - Obtain nutrition services referral as needed  Outcome: Progressing     Problem: GENITOURINARY - ADULT  Goal: Urinary catheter remains patent  Description  INTERVENTIONS:  - Assess patency of urinary catheter  - If patient has a chronic herrera, consider changing catheter if non-functioning  - Follow guidelines for intermittent irrigation of non-functioning urinary catheter  Outcome: Progressing     Problem: METABOLIC, FLUID AND ELECTROLYTES - ADULT  Goal: Electrolytes maintained within normal limits  Description  INTERVENTIONS:  - Monitor labs and assess patient for signs and symptoms of electrolyte imbalances  - Administer electrolyte replacement as ordered  - Monitor response to electrolyte replacements, including repeat lab results as appropriate  - Instruct patient on fluid and nutrition as appropriate  Outcome: Progressing  Goal: Fluid balance maintained  Description  INTERVENTIONS:  - Monitor labs   - Monitor I/O and WT  - Instruct patient on fluid and nutrition as appropriate  - Assess for signs & symptoms of volume excess or deficit  Outcome: Progressing  Goal: Glucose maintained within target range  Description  INTERVENTIONS:  - Monitor Blood Glucose as ordered  - Assess for signs and symptoms of hyperglycemia and hypoglycemia  - Administer ordered medications to maintain glucose within target range  - Assess nutritional intake and initiate nutrition service referral as needed  Outcome: Progressing     Problem: SKIN/TISSUE INTEGRITY - ADULT  Goal: Skin integrity remains intact  Description  INTERVENTIONS  - Identify patients at risk for skin breakdown  - Assess and monitor skin integrity  - Assess and monitor nutrition and hydration status  - Monitor labs (i e  albumin)  - Assess for incontinence   - Turn and reposition patient  - Assist with mobility/ambulation  - Relieve pressure over bony prominences  - Avoid friction and shearing  - Provide appropriate hygiene as needed including keeping skin clean and dry  - Evaluate need for skin moisturizer/barrier cream  - Collaborate with interdisciplinary team (i e  Nutrition, Rehabilitation, etc )   - Patient/family teaching  Outcome: Progressing  Goal: Oral mucous membranes remain intact  Description  INTERVENTIONS  - Assess oral mucosa and hygiene practices  - Implement preventative oral hygiene regimen  - Implement oral medicated treatments as ordered  - Initiate Nutrition services referral as needed  Outcome: Progressing     Problem: HEMATOLOGIC - ADULT  Goal: Maintains hematologic stability  Description  INTERVENTIONS  - Monitor labs      Outcome: Progressing

## 2019-11-02 PROBLEM — R76.8 ANA POSITIVE: Status: ACTIVE | Noted: 2019-11-02

## 2019-11-02 PROBLEM — I65.23 INTERNAL CAROTID ARTERY STENOSIS, BILATERAL: Status: ACTIVE | Noted: 2019-11-02

## 2019-11-02 LAB
ANION GAP SERPL CALCULATED.3IONS-SCNC: 8 MMOL/L (ref 4–13)
APTT PPP: 38 SECONDS (ref 23–37)
APTT PPP: >210 SECONDS (ref 23–37)
BACTERIA CSF CULT: NO GROWTH
BUN SERPL-MCNC: 14 MG/DL (ref 5–25)
CALCIUM SERPL-MCNC: 8.5 MG/DL (ref 8.3–10.1)
CHLORIDE SERPL-SCNC: 110 MMOL/L (ref 100–108)
CO2 SERPL-SCNC: 22 MMOL/L (ref 21–32)
CREAT SERPL-MCNC: 0.39 MG/DL (ref 0.6–1.3)
ERYTHROCYTE [DISTWIDTH] IN BLOOD BY AUTOMATED COUNT: 18.3 % (ref 11.6–15.1)
GFR SERPL CREATININE-BSD FRML MDRD: 100 ML/MIN/1.73SQ M
GLUCOSE SERPL-MCNC: 108 MG/DL (ref 65–140)
GLUCOSE SERPL-MCNC: 127 MG/DL (ref 65–140)
GLUCOSE SERPL-MCNC: 138 MG/DL (ref 65–140)
GLUCOSE SERPL-MCNC: 165 MG/DL (ref 65–140)
GLUCOSE SERPL-MCNC: 201 MG/DL (ref 65–140)
HCT VFR BLD AUTO: 25.1 % (ref 34.8–46.1)
HCT VFR BLD AUTO: 27.1 % (ref 34.8–46.1)
HGB BLD-MCNC: 7.2 G/DL (ref 11.5–15.4)
HGB BLD-MCNC: 8 G/DL (ref 11.5–15.4)
MCH RBC QN AUTO: 22.5 PG (ref 26.8–34.3)
MCHC RBC AUTO-ENTMCNC: 28.7 G/DL (ref 31.4–37.4)
MCV RBC AUTO: 78 FL (ref 82–98)
PLATELET # BLD AUTO: 544 THOUSANDS/UL (ref 149–390)
PMV BLD AUTO: 9.8 FL (ref 8.9–12.7)
POTASSIUM SERPL-SCNC: 3.9 MMOL/L (ref 3.5–5.3)
RBC # BLD AUTO: 3.2 MILLION/UL (ref 3.81–5.12)
SODIUM SERPL-SCNC: 140 MMOL/L (ref 136–145)
WBC # BLD AUTO: 6.42 THOUSAND/UL (ref 4.31–10.16)

## 2019-11-02 PROCEDURE — 80048 BASIC METABOLIC PNL TOTAL CA: CPT | Performed by: INTERNAL MEDICINE

## 2019-11-02 PROCEDURE — 99232 SBSQ HOSP IP/OBS MODERATE 35: CPT | Performed by: INTERNAL MEDICINE

## 2019-11-02 PROCEDURE — 82948 REAGENT STRIP/BLOOD GLUCOSE: CPT

## 2019-11-02 PROCEDURE — 85730 THROMBOPLASTIN TIME PARTIAL: CPT | Performed by: INTERNAL MEDICINE

## 2019-11-02 PROCEDURE — 85014 HEMATOCRIT: CPT | Performed by: INTERNAL MEDICINE

## 2019-11-02 PROCEDURE — 99233 SBSQ HOSP IP/OBS HIGH 50: CPT | Performed by: PSYCHIATRY & NEUROLOGY

## 2019-11-02 PROCEDURE — 85018 HEMOGLOBIN: CPT | Performed by: INTERNAL MEDICINE

## 2019-11-02 PROCEDURE — 85027 COMPLETE CBC AUTOMATED: CPT | Performed by: INTERNAL MEDICINE

## 2019-11-02 PROCEDURE — C9113 INJ PANTOPRAZOLE SODIUM, VIA: HCPCS | Performed by: INTERNAL MEDICINE

## 2019-11-02 RX ORDER — METOPROLOL TARTRATE 5 MG/5ML
2.5 INJECTION INTRAVENOUS ONCE
Status: COMPLETED | OUTPATIENT
Start: 2019-11-02 | End: 2019-11-02

## 2019-11-02 RX ADMIN — METOPROLOL TARTRATE 2.5 MG: 5 INJECTION, SOLUTION INTRAVENOUS at 02:48

## 2019-11-02 RX ADMIN — NYSTATIN 500000 UNITS: 500000 SUSPENSION ORAL at 23:55

## 2019-11-02 RX ADMIN — METOPROLOL TARTRATE 2.5 MG: 5 INJECTION, SOLUTION INTRAVENOUS at 08:14

## 2019-11-02 RX ADMIN — METOPROLOL TARTRATE 2.5 MG: 5 INJECTION INTRAVENOUS at 16:39

## 2019-11-02 RX ADMIN — INSULIN LISPRO 2 UNITS: 100 INJECTION, SOLUTION INTRAVENOUS; SUBCUTANEOUS at 06:35

## 2019-11-02 RX ADMIN — PRAVASTATIN SODIUM 40 MG: 20 TABLET ORAL at 16:26

## 2019-11-02 RX ADMIN — INSULIN LISPRO 1 UNITS: 100 INJECTION, SOLUTION INTRAVENOUS; SUBCUTANEOUS at 16:26

## 2019-11-02 RX ADMIN — METOPROLOL TARTRATE 2.5 MG: 5 INJECTION INTRAVENOUS at 18:24

## 2019-11-02 RX ADMIN — FLUTICASONE PROPIONATE 1 SPRAY: 50 SPRAY, METERED NASAL at 08:19

## 2019-11-02 RX ADMIN — NYSTATIN 500000 UNITS: 500000 SUSPENSION ORAL at 18:27

## 2019-11-02 RX ADMIN — HEPARIN SODIUM AND DEXTROSE 23 UNITS/KG/HR: 10000; 5 INJECTION INTRAVENOUS at 20:35

## 2019-11-02 RX ADMIN — HEPARIN SODIUM 4400 UNITS: 1000 INJECTION, SOLUTION INTRAVENOUS; SUBCUTANEOUS at 08:07

## 2019-11-02 RX ADMIN — METOPROLOL TARTRATE 2.5 MG: 5 INJECTION, SOLUTION INTRAVENOUS at 20:26

## 2019-11-02 RX ADMIN — NYSTATIN 500000 UNITS: 500000 SUSPENSION ORAL at 08:15

## 2019-11-02 RX ADMIN — PANTOPRAZOLE SODIUM 40 MG: 40 INJECTION, POWDER, FOR SOLUTION INTRAVENOUS at 08:10

## 2019-11-02 RX ADMIN — HEPARIN SODIUM AND DEXTROSE 22 UNITS/KG/HR: 10000; 5 INJECTION INTRAVENOUS at 00:48

## 2019-11-02 RX ADMIN — METOPROLOL TARTRATE 2.5 MG: 5 INJECTION, SOLUTION INTRAVENOUS at 13:10

## 2019-11-02 NOTE — ASSESSMENT & PLAN NOTE
· Since September she has had a progressive decline - she has had no appetite, not really eating/drinking with weakness  She was previously reportedly very independent prior to her admission at [facility] in September with UTI  · Geriatrics following, recommending outpatient follow up  · Speech following, status post VBS on 10/30, discussed with Swallow therapy, okay for pureed diet with nectar thick liquids  · Continue nystatin for thrush  · Barium swallow negative for esophageal obstruction  · Goals of care as mentioned

## 2019-11-02 NOTE — ASSESSMENT & PLAN NOTE
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  · Presented with sodium of 126  Sodium 140 today  · TSH normal this admission  · Cosyntropin stim test negative for adrenal insufficiency  · CT chest abdomen pelvis was done at [facility] to rule out any occult malignancy as a cause of SIADH which was negative for any malignancy  · Sodium tablets stopped on 11/1 by Nephrology  · Continue 1500 cc fluid restriction  · [facility] for d/c from a nephrology standpoint, will need outpatient f/u with [facility] nephrology  Recommending BMP in 1 week with results to PCP for further management of NACL tabs  Protonix changed to Pepcid  ACEI stopped as well    · BMP in AM

## 2019-11-02 NOTE — ASSESSMENT & PLAN NOTE
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

## 2019-11-02 NOTE — ASSESSMENT & PLAN NOTE
Lab Results   Component Value Date    HGBA1C 6 4 (H) 10/15/2019     Recent Labs     11/01/19  1054 11/01/19  1612 11/01/19 2050 11/02/19  0610   POCGLU 121 106 176* 201*       · Takes metformin and glipizide at baseline, will plan to resume metformin at discharge and hold glipizide    · Continue with SSI  · Diabetic diet  · Monitor accuchecks, avoid hypoglycemia

## 2019-11-02 NOTE — ASSESSMENT & PLAN NOTE
Malnutrition Findings:   Malnutrition type: Chronic illness(Related to medical condition as evidenced by 10% weight loss over the past month and <75% energy intake needs met >1 month treated with ensure compact supplements)  Degree of Malnutrition: Other severe protein calorie malnutrition    BMI Findings: Body mass index is 21 95 kg/m²  Reported weight has fluctuated significantly throughout hospitalization, will work on obtaining more accurate weights  Nutrition consult and supplements

## 2019-11-02 NOTE — PROGRESS NOTES
Pt had trouble swallowing pills crushed in applesauce sitting upright  Pt sounded like she was aspirating  Encouraged pt to cough and attempted to suction  Pt satting  In high 90s no complaints of SOB  Right lung did sound coarse on auscultation  Left - clear/diminished Ava with SLIM aware  No new orders currently  Will continue to monitor

## 2019-11-02 NOTE — ASSESSMENT & PLAN NOTE
· With homogeneous pattern  · Noted elevated sed rate and CRP   · ?? Concerns for vasculitis as a cause for encephalopathy  · Check rheumatoid factor and ANCA panel  · Await rheumatology input, already consulted

## 2019-11-02 NOTE — PLAN OF CARE
Problem: Prexisting or High Potential for Compromised Skin Integrity  Goal: Skin integrity is maintained or improved  Description  INTERVENTIONS:  - Identify patients at risk for skin breakdown  - Assess and monitor skin integrity  - Assess and monitor nutrition and hydration status  - Monitor labs   - Assess for incontinence   - Turn and reposition patient  - Assist with mobility/ambulation  - Relieve pressure over bony prominences  - Avoid friction and shearing  - Provide appropriate hygiene as needed including keeping skin clean and dry  - Evaluate need for skin moisturizer/barrier cream  - Collaborate with interdisciplinary team   - Patient/family teaching  - Consider wound care consult   Outcome: Progressing     Problem: Nutrition/Hydration-ADULT  Goal: Nutrient/Hydration intake appropriate for improving, restoring or maintaining nutritional needs  Description  Monitor and assess patient's nutrition/hydration status for malnutrition  Collaborate with interdisciplinary team and initiate plan and interventions as ordered  Monitor patient's weight and dietary intake as ordered or per policy  Utilize nutrition screening tool and intervene as necessary  Determine patient's food preferences and provide high-protein, high-caloric foods as appropriate       INTERVENTIONS:  - Monitor oral intake, urinary output, labs, and treatment plans  - Assess nutrition and hydration status and recommend course of action  - Evaluate amount of meals eaten  - Assist patient with eating if necessary   - Allow adequate time for meals  - Recommend/ encourage appropriate diets, oral nutritional supplements, and vitamin/mineral supplements  - Order, calculate, and assess calorie counts as needed  - Recommend, monitor, and adjust tube feedings and TPN/PPN based on assessed needs  - Assess need for intravenous fluids  - Provide specific nutrition/hydration education as appropriate  - Include patient/family/caregiver in decisions related to nutrition  Outcome: Progressing     Problem: Potential for Falls  Goal: Patient will remain free of falls  Description  INTERVENTIONS:  - Assess patient frequently for physical needs  -  Identify cognitive and physical deficits and behaviors that affect risk of falls    -  [place] fall precautions as indicated by assessment   - Educate patient/family on patient safety including physical limitations  - Instruct patient to call for assistance with activity based on assessment  - Modify environment to reduce risk of injury  - Consider OT/PT consult to assist with strengthening/mobility  Outcome: Progressing     Problem: NEUROSENSORY - ADULT  Goal: Achieves stable or improved neurological status  Description  INTERVENTIONS  - Monitor and report changes in neurological status  - Monitor vital signs such as temperature, blood pressure, glucose, and any other labs ordered   - Initiate measures to prevent increased intracranial pressure  - Monitor for seizure activity and implement precautions if appropriate      Outcome: Progressing     Problem: RESPIRATORY - ADULT  Goal: Achieves optimal ventilation and oxygenation  Description  INTERVENTIONS:  - Assess for changes in respiratory status  - Assess for changes in mentation and behavior  - Position to facilitate oxygenation and minimize respiratory effort  - Encourage broncho-pulmonary hygiene including cough, deep breathe, Incentive Spirometry  - Assess and instruct to report SOB or any respiratory difficulty  - Respiratory Therapy support as indicated   Outcome: Progressing     Problem: GASTROINTESTINAL - ADULT  Goal: Maintains adequate nutritional intake  Description  INTERVENTIONS:  - Monitor percentage of each meal consumed  - Identify factors contributing to decreased intake, treat as appropriate  - Assist with meals as needed  - Monitor I&O, weight, and lab values if indicated  - Obtain nutrition services referral as needed  Outcome: Progressing     Problem: GENITOURINARY - ADULT  Goal: Urinary catheter remains patent  Description  INTERVENTIONS:  - Assess patency of urinary catheter  - If patient has a chronic herrera, consider changing catheter if non-functioning  - Follow guidelines for intermittent irrigation of non-functioning urinary catheter  Outcome: Progressing     Problem: METABOLIC, FLUID AND ELECTROLYTES - ADULT  Goal: Electrolytes maintained within normal limits  Description  INTERVENTIONS:  - Monitor labs and assess patient for signs and symptoms of electrolyte imbalances  - Administer electrolyte replacement as ordered  - Monitor response to electrolyte replacements, including repeat lab results as appropriate  - Instruct patient on fluid and nutrition as appropriate  Outcome: Progressing  Goal: Fluid balance maintained  Description  INTERVENTIONS:  - Monitor labs   - Monitor I/O and WT  - Instruct patient on fluid and nutrition as appropriate  - Assess for signs & symptoms of volume excess or deficit  Outcome: Progressing  Goal: Glucose maintained within target range  Description  INTERVENTIONS:  - Monitor Blood Glucose as ordered  - Assess for signs and symptoms of hyperglycemia and hypoglycemia  - Administer ordered medications to maintain glucose within target range  - Assess nutritional intake and initiate nutrition service referral as needed  Outcome: Progressing     Problem: SKIN/TISSUE INTEGRITY - ADULT  Goal: Skin integrity remains intact  Description  INTERVENTIONS  - Identify patients at risk for skin breakdown  - Assess and monitor skin integrity  - Assess and monitor nutrition and hydration status  - Monitor labs (i e  albumin)  - Assess for incontinence   - Turn and reposition patient  - Assist with mobility/ambulation  - Relieve pressure over bony prominences  - Avoid friction and shearing  - Provide appropriate hygiene as needed including keeping skin clean and dry  - Evaluate need for skin moisturizer/barrier cream  - Collaborate with interdisciplinary team (i e  Nutrition, Rehabilitation, etc )   - Patient/family teaching  Outcome: Progressing  Goal: Oral mucous membranes remain intact  Description  INTERVENTIONS  - Assess oral mucosa and hygiene practices  - Implement preventative oral hygiene regimen  - Implement oral medicated treatments as ordered  - Initiate Nutrition services referral as needed  Outcome: Progressing     Problem: HEMATOLOGIC - ADULT  Goal: Maintains hematologic stability  Description  INTERVENTIONS  - Monitor labs      Outcome: Progressing

## 2019-11-02 NOTE — ASSESSMENT & PLAN NOTE
· Noted to have bilateral internal carotid artery stenosis left 60-70% and right 50-60% with severe left ECA stenosis  · Discussed with neurologist, will obtain MRI brain to eval for stroke in presence of carotid artery stenosis  If no new stroke as compared to previous MRI in [facility], then further workup and follow-up can be pursued outpatient for asymptomatic carotid artery stenosis per Neurology  Appreciate input  ·  Continue statin and anticoagulation with IV heparin

## 2019-11-02 NOTE — ASSESSMENT & PLAN NOTE
· Patient has chronic Herrera present on admission- herrera was replaced in ED 10/14   · Placed on recent admission (10/3/19) at [facility] where she developed urinary retention    · Patient will follow up outpatient with urology for voiding trial

## 2019-11-02 NOTE — ASSESSMENT & PLAN NOTE
· Still with intermittent fevers  · Concerns for encephalitis vs vasculitis (less likely), less likely related to splenic infarct  · No apparent infectious etiology  · Urine culture not significant this admission, [id number] GNR  At [facility], pt was noted to have ESBL in urine which was suspected to be colonization  · Admission blood cultrues are neg  · Noted elevated sed rate and CRP  · Repeat CXR negative  · WBCs within normal limits  · Flu/RSV negative  · Blood culture negative so far  · Lyme titer negative  · Evaluating for Anaplasma, Babesiosis given history of tick bite 2 months before  Although suspicion is low  · Rheumatology consult given elevated inflammatory markers and persistent fever despite lack of infectious source and now positive JAMILAH  · Infectious Disease following, appreciate input  · Continue to monitor off ABX  · Work up for autoimmune etiology as mentioned above  · Appreciate neurology input

## 2019-11-02 NOTE — ASSESSMENT & PLAN NOTE
· POA  Likely in setting of hyponatremia with component of delirium contributing given recurrent hospitalizations/underlying cognitive impairment  · 11/1:  Patient is more communicative and AAO x3 today  · Urine culture unremarkable - monitor off antibiotics at this time  Pt remains asymptomatic  · Vasculitis vs autoimmune encephalitis?, less likely CNS vasculitis per neurology although encephalitis remains in differential   · Noted elevated CRP and sed rate  · She has hx of adrenal insufficiency, previously on steroids and then tapered off  She follows with Endocrinology as outpatient  · Cosyntropin stim test with adequate response in cortisol, hydrocortisone discontinued, she does not have adrenal insufficiency   · Vitamin B12 and folate normal   · Lyme titer negative  · RPR negative  · JAMILAH positive with homogenous pattern  · HIV negative  · MRI brain was done at [facility] last month which was negative for any acute infarct but possible small questionable hemorrhage versus area of mineralization  CT head without contrast in [facility] negative for bleed  · MRA head and neck done this admission>> MRA brain unremarkable although MRA neck showed ICA stenosis b/l, left >right and severe left ICA stenosis  · Geriatrics following, patient should follow up outpatient with them  · Repeat CT head this admission shows no acute abnormality, chronic lacunar infarct left lentiform nucleus  · Neurology on board, plan for LP likely today  Appreciate input  · Follow ENS 1 panel  , anti thyroglobulin Abs, CSF lyme  · Rheum consult given elevated inflammatory markers and fever with no apparent infectious etiology and now positive JAMILAH

## 2019-11-02 NOTE — PROGRESS NOTES
Progress Note - Pam Oliveira 1940, [de-identified] y o  female MRN: [id number]    Unit/Bed#: -01 Encounter: [id number]    Primary Care Provider: Sydnie Hooper MD   Date and time admitted to hospital: 10/14/2019  4:20 PM        * Metabolic encephalopathy  Assessment & Plan  · POA  Likely in setting of hyponatremia with component of delirium contributing given recurrent hospitalizations/underlying cognitive impairment  · 11/1:  Patient is more communicative and AAO x3 today  · Urine culture unremarkable - monitor off antibiotics at this time  Pt remains asymptomatic  · Vasculitis vs autoimmune encephalitis?, less likely CNS vasculitis per neurology although encephalitis remains in differential   · Noted elevated CRP and sed rate  · She has hx of adrenal insufficiency, previously on steroids and then tapered off  She follows with Endocrinology as outpatient  · Cosyntropin stim test with adequate response in cortisol, hydrocortisone discontinued, she does not have adrenal insufficiency   · Vitamin B12 and folate normal   · Lyme titer negative  · RPR negative  · JAMILAH positive with homogenous pattern  · HIV negative  · MRI brain was done at [facility] last month which was negative for any acute infarct but possible small questionable hemorrhage versus area of mineralization  CT head without contrast in [facility] negative for bleed  · MRA head and neck done this admission>> MRA brain unremarkable although MRA neck showed ICA stenosis b/l, left >right and severe left ICA stenosis  · Geriatrics following, patient should follow up outpatient with them  · Repeat CT head this admission shows no acute abnormality, chronic lacunar infarct left lentiform nucleus  · Neurology on board, plan for LP likely today  Appreciate input  · Follow ENS 1 panel   , anti thyroglobulin Abs, CSF lyme  · Rheum consult given elevated inflammatory markers and fever with no apparent infectious etiology and now positive JAMILAH     Fever  Assessment & Plan  · Still with intermittent fevers  · Concerns for encephalitis vs vasculitis (less likely), less likely related to splenic infarct  · No apparent infectious etiology  · Urine culture not significant this admission, [id number] GNR  At [facility], pt was noted to have ESBL in urine which was suspected to be colonization  · Admission blood cultrues are neg  · Noted elevated sed rate and CRP  · Repeat CXR negative  · WBCs within normal limits  · Flu/RSV negative  · Blood culture negative so far  · Lyme titer negative  · Evaluating for Anaplasma, Babesiosis given history of tick bite 2 months before  Although suspicion is low  · Rheumatology consult given elevated inflammatory markers and persistent fever despite lack of infectious source and now positive JAMILAH  · Infectious Disease following, appreciate input  · Continue to monitor off ABX  · Work up for autoimmune etiology as mentioned above  · Appreciate neurology input  [facility])  Assessment & Plan  · Afib with RVR on 10 29 on tele intermittently  · TSH normal this admission  · LENO showed EF 55% without any intracardiac thrombus  · Continue iv metoprolol 2 5mg Q6 hourly for now, switch to po when able to tolerate po  · Iv heparin for AC given Afib and splenic infarct  · Cardiology on board, appreciate input  Hyponatremia  Assessment & Plan  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 to rule out any occult malignancy as a cause of SIADH which was negative for any malignancy  · Sodium tablets stopped on 11/1 by Nephrology  · Continue 1500 cc fluid restriction  · [id number] Cherrie Dumas for d/c from a nephrology standpoint, will need outpatient f/u with [facility] nephrology  Recommending BMP in 1 week with results to PCP for further management of NACL tabs  Protonix changed to Pepcid  ACEI stopped as well  · BMP in AM    JAMILAH positive  Assessment & Plan  · With homogeneous pattern  · Noted elevated sed rate and CRP   · ?? Concerns for vasculitis as a cause for encephalopathy  · Check rheumatoid factor and ANCA panel  · Await rheumatology input, already consulted  Internal carotid artery stenosis, bilateral  Assessment & Plan  · Noted to have bilateral internal carotid artery stenosis left 60-70% and right 50-60% with severe left ECA stenosis  · Discussed with neurologist, will obtain MRI brain to eval for stroke in presence of carotid artery stenosis  If no new stroke as compared to previous MRI in [facility], then further workup and follow-up can be pursued outpatient for asymptomatic carotid artery stenosis per Neurology  Appreciate input  ·  Continue statin and anticoagulation with IV heparin  Goals of care, counseling/discussion  Assessment & Plan  · Participated in a family meeting on  10 31 with palliative care  · Daughter is overwhelmed right now due to her brother's death a day before and would like to think more about code status  · Currently patient remains full code  Splenic infarct  Assessment & Plan  · Noted on CT scan done at [facility] 9/2019, new moderate sized splenic infarcts  · Likely etiology cardio embolic as pt was noted to have PAF with RVR on te;e on 10/29  · CTA abdomen/pelvis:  No acute aortic injury or aneurysm  Patent celiac and splenic arteries  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  Evolving splenic infarct  Additional chronic/incidental findings as described  · LENO negative for PFO and obvious intracardiac thrombus, 55% EF  · General surgery consult appreciated, no further workup from their standpoint  · Appreciate hematology input    · Continue iv heparin for now for [facility] for PAF, switch to DOAC when LP and other procedures are done  Await rheum input if there is any need for biopsy  · Appreciate cardiology input  Microcytic anemia  Assessment & Plan  · Baseline seems between 8-11  · Hemoglobin 8 today     · Iron studies suggestive of low iron  · Continue  p o  iron supplementation which was started this admission  · CBC in AM     Severe protein-calorie malnutrition (HCC)  Assessment & Plan  Malnutrition Findings:   Malnutrition type: Chronic illness(Related to medical condition as evidenced by 10% weight loss over the past month and <75% energy intake needs met >1 month treated with ensure compact supplements)  Degree of Malnutrition: Other severe protein calorie malnutrition    BMI Findings: Body mass index is 21 95 kg/m²  Reported weight has fluctuated significantly throughout hospitalization, will work on obtaining more accurate weights  Nutrition consult and supplements  Urinary retention  Assessment & Plan  · Patient has chronic Herrera present on admission- herrera was replaced in ED 10/14   · Placed on recent admission (10/3/19) at [facility] where she developed urinary retention  · Patient will follow up outpatient with urology for voiding trial     Failure to thrive in adult  Assessment & Plan  · Since September she has had a progressive decline - she has had no appetite, not really eating/drinking with weakness  She was previously reportedly very independent prior to her admission at [facility] in September with UTI  · Geriatrics following, recommending outpatient follow up  · Speech following, status post VBS on 10/30, discussed with Swallow therapy, okay for pureed diet with nectar thick liquids  · Continue nystatin for thrush  · Barium swallow negative for esophageal obstruction  · Goals of care as mentioned        HLD (hyperlipidemia)  Assessment & Plan  · Continue statin    Essential hypertension  Assessment & Plan  · Atenolol switched to metoprolol given Afib with RVR,  · Hold home dose ACEI  · Amlodipine on hold as well  · BP controlled without ACEI or CCB  Type 2 diabetes mellitus with diabetic neuropathy, without long-term current use of insulin [facility])  Assessment & Plan  Lab Results   Component Value Date    HGBA1C 6 4 (H) 10/15/2019     Recent Labs     19  1054 19  1612 19  2050 19  0610   POCGLU 121 106 176* 201*       · Takes metformin and glipizide at baseline, will plan to resume metformin at discharge and hold glipizide  · Continue with SSI  · Diabetic diet  · Monitor accuchecks, avoid hypoglycemia        VTE Pharmacologic Prophylaxis:   Pharmacologic: Heparin Drip  Mechanical VTE Prophylaxis in Place: Yes    Patient Centered Rounds: I have performed bedside rounds with nursing staff today  Discussions with Specialists or Other Care Team Provider: neurology, CM    Education and Discussions with Family / Patient: plan of care, patient  Left a VM for patient' s daughter Vesta Backer  Time Spent for Care: 30 minutes  More than 50% of total time spent on counseling and coordination of care as described above  Current Length of Stay: 19 day(s)    Current Patient Status: Inpatient   Certification Statement: The patient will continue to require additional inpatient hospital stay due to not medical;y stable    Discharge Plan: when medically stable    Code Status: Level 1 - Full Code      Subjective:   Overnight events of possible aspiration concerns noted  Patient is comfortable this morning  Was sleeping but woke up on calling her name  Denies any acute pain or discomfort  She mentioned "I am okay"  Drifted back to sleep quickly      Objective:     Vitals:   Temp (24hrs), Av 5 °F (36 9 °C), Min:97 7 °F (36 5 °C), Max:99 2 °F (37 3 °C)    Temp:  [97 7 °F (36 5 °C)-99 2 °F (37 3 °C)] 99 2 °F (37 3 °C)  HR:  [81-99] 97  Resp:  [18-19] 18  BP: (112-149)/(56-74) 125/60  SpO2:  [97 %-100 %] 99 %  Body mass index is 21 95 kg/m²  Input and Output Summary (last 24 hours): Intake/Output Summary (Last 24 hours) at 11/2/2019 1021  Last data filed at 11/2/2019 0600  Gross per 24 hour   Intake 160 ml   Output 400 ml   Net -240 ml       Physical Exam:     Physical Exam  Constitutional: No distress  Eyes: Pupils are equal, round, and reactive to light  Cardiovascular: Normal heart sounds    No murmur heard  Regular rate and rhythm  Pulmonary/Chest: Effort normal and breath sounds normal  No respiratory distress  She has no wheezes  She has no rales  Abdominal: Soft  Bowel sounds are normal  She exhibits no distension  There is no tenderness  Musculoskeletal: no LE edema a  Neurological: She is alert    Wakes up on calling her name and answers questions  Squeezed fingers  Skin: Skin is warm      Additional Data:     Labs:    Results from last 7 days   Lab Units 11/02/19  0913 11/02/19  0606  10/31/19  0617  10/29/19  0625   WBC Thousand/uL  --  6 42   < > 6 10   < > 7 67   HEMOGLOBIN g/dL 8 0* 7 2*   < > 8 3*   < > 8 8*   HEMATOCRIT % 27 1* 25 1*   < > 28 2*   < > 30 2*   PLATELETS Thousands/uL  --  544*   < > 502*   < > 396*   BANDS PCT %  --   --   --   --   --  1   NEUTROS PCT %  --   --   --  82*   < >  --    LYMPHS PCT %  --   --   --  13*   < >  --    LYMPHO PCT %  --   --   --   --   --  3*   MONOS PCT %  --   --   --  4   < >  --    MONO PCT %  --   --   --   --   --  3*   EOS PCT %  --   --   --  0   < > 0    < > = values in this interval not displayed       Results from last 7 days   Lab Units 11/02/19  0606  10/31/19  0617   SODIUM mmol/L 140   < > 140   POTASSIUM mmol/L 3 9   < > 3 6   CHLORIDE mmol/L 110*   < > 110*   CO2 mmol/L 22   < > 21   BUN mg/dL 14   < > 15   CREATININE mg/dL 0 39*   < > 0 37*   ANION GAP mmol/L 8   < > 9   CALCIUM mg/dL 8 5   < > 8 7   ALBUMIN g/dL  --   --  1 8*   TOTAL BILIRUBIN mg/dL  --   --  0 27   ALK PHOS U/L  --   --  57   ALT U/L  --   --  20   AST U/L  --   -- 23   GLUCOSE RANDOM mg/dL 127   < > 108    < > = values in this interval not displayed  Results from last 7 days   Lab Units 10/30/19  0831   INR  1 26*     Results from last 7 days   Lab Units 11/02/19  1015 11/02/19  0610 11/01/19  2050 11/01/19  1612 11/01/19  1054 11/01/19  0616 10/31/19  2114 10/31/19  1657 10/31/19  1053 10/31/19  0629 10/30/19  2051 10/30/19  1603   POC GLUCOSE mg/dl 108 201* 176* 106 121 98 123 140 97 108 97 106                   * I Have Reviewed All Lab Data Listed Above  * Additional Pertinent Lab Tests Reviewed: All Labs Within Last 24 Hours Reviewed    Imaging:    MRA carotids wo contrast   Final Result by Rekha Ruffin MD (11/01 1615)      Bilateral cervical carotid atherosclerotic disease with atheromatous plaque suspected in the distal common carotid arteries and extending into the ICA and ECA origins  There is left greater than right ICA origin stenosis of approximately 60-70% stenosis    on the left and 50-60% stenosis on the right  Severe left ECA stenosis is suspected  Correlation with carotid duplex imaging is recommended  Alternatively, if the patient can receive IV contrast, CTA imaging may provide additional characterization of the atherosclerotic disease and stenosis  Patent codominant vertebral arteries with antegrade flow  Workstation performed: [id number]         MRA head wo contrast   Final Result by Rekha Ruffin MD (11/01 7688)      Unremarkable MRA of the [place] aside from anatomic variations as described above  Workstation performed: [id number]         FL barium swallow   Final Result by Kevan Reveles MD (11/01 1628)         1  No evidence of obstruction  2   Pooling of contrast within the region of the vallecula, some of which the patient does clear with repeated swallows  Correlation with speech pathology report recommended           Workstation performed: [id number]         FL barium swallow video w speech Final Result by HEIDI LEES (10/30 1556)      CTA head and neck w wo contrast   Final Result by Eric Cristina MD (1939)      1  No acute intracranial hemorrhage, mass effect or edema  Microangiopathic changes  2   Approximately 80 mL of Omnipaque 350 infiltrated in the left arm  Further clinical assessment and clinical follow-up advised  I personally discussed this study with Dr Monica Maldonado on 10/29/2019 at 7:35 PM                            Workstation performed: [id number]         XR chest portable   Final Result by Yanet Kwok MD (10/29 1702)      Persistent small left effusion  Slight increase in left base atelectasis  Workstation performed: [id number]         CT head wo contrast   Final Result by Rafaela Martines DO (10/26 1715)      No acute intracranial abnormality  Microangiopathic changes  Chronic lacunar infarct left lentiform nucleus  Workstation performed: [id number]         XR chest pa & lateral   Final Result by Rafaela Martines DO (10/25 5396)      Small pleural effusion  No pneumothorax  Workstation performed: [id number]         CTA abdomen pelvis w wo contrast   Final Result by Merline Spell, MD (10/24 1707)      1  No acute aortic injury or aneurysm  2   Patent celiac and splenic arteries  3   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  4   Evolving splenic infarct  5   Additional chronic/incidental findings as described  Workstation performed: [id number]         XR abdomen 1 vw portable   Final Result by Radha Medrano MD (10/21 3585)      There is a nonobstructive bowel gas pattern                  Workstation performed: [id number]         XR chest pa & lateral   Final Result by Pato Grier MD (10/19 9642)      Minimal left basilar airspace disease attributed to compressive atelectasis secondary small left pleural effusion  Correlate with clinical findings to exclude pneumonia and/or aspiration  Workstation performed: [id number]         MRI inpatient order    (Results Pending)       Recent Cultures (last 7 days):           Last 24 Hours Medication List:     Current Facility-Administered Medications:  acetaminophen 650 mg Rectal Q6H PRN Herman Rueda DO    aluminum-magnesium hydroxide-simethicone 30 mL Oral Q6H PRN John Wahl MD    bisacodyl 10 mg Rectal Daily PRN Heaven MAHAN PA-C    docusate sodium 100 mg Oral BID PRN John Wahl MD    ferrous sulfate 325 mg Oral Daily With Breakfast Gayla Frye MD    fluticasone 1 spray Each Nare BID Gayla Frye MD    heparin (porcine) 3-30 Units/kg/hr (Order-Specific) Intravenous Titrated Gayla Frye MD Last Rate: 22 Units/kg/hr (11/02/19 0048)   heparin (porcine) 2,200 Units Intravenous PRN Gayla Frye MD    heparin (porcine) 4,400 Units Intravenous PRN Gayla rFye MD    insulin lispro 1-5 Units Subcutaneous HS John Wahl MD    insulin lispro 1-6 Units Subcutaneous TID AC John Wahl MD    lidocaine (PF) 2 mL Infiltration Once Pinkey Fontan, LUPE    metoprolol 2 5 mg Intravenous Q6H PRN Gayla Frye MD    metoprolol 2 5 mg Intravenous Q6H Gayla Frye MD    mirtazapine 7 5 mg Oral HS Heaven MAHAN PA-C    nystatin 500,000 Units Swish & Swallow 4x Daily Heaven MAHAN PA-C    ondansetron 4 mg Intravenous Q6H PRN John Wahl MD    pantoprazole 40 mg Intravenous Q24H [facility] Gayla Frye MD    pravastatin 40 mg Oral Daily With Mishel Harrison MD         Today, Patient Was Seen By: Gayla Frye MD    ** Please Note: Dictation voice to text software may have been used in the creation of this document   **

## 2019-11-02 NOTE — PROGRESS NOTES
Progress Note - Neurology   Shobha Aguilar [de-identified] y o  female MRN: [id number]  Unit/Bed#: -01 Encounter: [id number]    Assessment:  1  Two month history of fluctuating mental status impairments, variably associated with episodic hyponatremia  2  Marked Increased CRP and sed rate  3  Homogeneous JAMILAH elevated titer  4  Previously unremarkable MRI imaging at [facility] September 2019, excepting small focus of probable hemosiderin in the left cerebellum  5  MRA  without overt evidence of vasculitis, but incidental identification bilateral carotid stenosis  6  Recent splenic infarct  7  PAF, currently on heparin drip  8  LP with benign CSF profile thus far     Plan:  1  Check MRI brain to look for any evolving changes  2  Unless there is evidence for recent infarctions, would deem her carotid stenosis as incidental/asymptomatic, and would not require further inpatient investigation but would need outpatient [facility] surge follow-up  3  Okay from neurologic perspective to switch IV heparin to oral anticoagulation  4  Pending Rheumatology evaluation, consider 5 day pulse steroid trial  5  Await outstanding CSF labs and serum encephalitis antibody panel       Subjective:   Patient is sitting up in bed, awake, but minimally conversant  Does not offer any complaints  Denies any current pain, nausea, visual disturbance  She denies subjective experience of difficulty thinking  Does admit to feeling generally weak  ROS:    Review of Systems   Constitutional: Negative for chills and fever  HENT: Positive for hearing loss and trouble swallowing  Negative for mouth sores, sinus pain and sore throat  Eyes: Negative for pain and visual disturbance  Respiratory: Negative for cough, chest tightness and shortness of breath  Cardiovascular: Negative for chest pain and palpitations  Gastrointestinal: Negative for nausea and vomiting  Genitourinary: Rapp in place   Musculoskeletal: Positive for gait problem  Negative for arthralgias and back pain  Skin: Negative  Neurological: Negative for dizziness, tremors, facial asymmetry, speech difficulty, numbness and headaches  Vitals: Blood pressure 124/67, pulse (!) 139, temperature 99 2 °F (37 3 °C), resp  rate 18, height 5' 2" (1 575 m), weight 54 4 kg (120 lb), SpO2 98 %, not currently breastfeeding  ,Body mass index is 21 95 kg/m²  Physical Exam   Constitutional: She appears well-developed  No distress  HENT:   Head: Normocephalic and atraumatic  Eyes: Pupils are equal, round, and reactive to light  EOM are normal  Right eye exhibits no discharge  Left eye exhibits no discharge  Neck: Normal range of motion  Neck supple  Cardiovascular: Normal rate and regular rhythm  Pulmonary/Chest: No respiratory distress  Skin: She is not diaphoretic  Neurologic Exam     Mental Status   She is awake, but with decreased attention and concentration  She is correctly oriented to location and time  She is able follow simple commands  She was correct with number of quarters in a dollar, not nickels in a dollar  Overall more subdued appearing today     Cranial Nerves     CN III, IV, VI   Pupils are equal, round, and reactive to light  Extraocular motions are normal    Visual fields were full to threat  Extraocular movements are intact though requires extra cuing  No facial asymmetry  Speech is clear  Tongue protrudes midline (persistent dark brown/blackish coating noted)  Motor Exam Minimal spontaneous movements  preserved  and biceps  Very weak at hip flexion and ankle movements  Tone with some spasticity in the upper inner ears, fairly normal in the lowers  Plantar responses are flexor bilaterally        Lab Results: I have personally reviewed pertinent reports      Imaging Studies: I have personally reviewed pertinent films in PACS

## 2019-11-02 NOTE — PROGRESS NOTES
NEPHROLOGY PROGRESS NOTE   Pam Oliveira [de-identified] y o  female MRN: [id number]  Unit/Bed#: -01 Encounter: [id number]  Reason for Consult:  Hyponatremia    ASSESSMENT/PLAN:  1  Hyponatremia overall, suspecting SIADH currently, urine osmolality 624, urine sodium 90, cortisol 40 8  2  Encephalopathy, Neurology following, status post LP, CSF  culture negative  3  Anemia of chronic disease, continue to monitor, may require transfusion  4  History of atrial fibrillation, ejection fraction 55%  5  Splenic infarct, workup management as per primary service and Hematology    PLAN:  · Overall renal function and electrolytes appears stable  · Continue to monitor electrolytes closely, if sodium starts to rise may need gentle hypotonic IV fluids  · No changes in her current regimen, will sign off at this time, please call questions or concerns    SUBJECTIVE:  Seen examined  Patient does awaken with stimuli although appears very sleepy  Apparent aspiration event last evening  Review of Systems    OBJECTIVE:  Current Weight: Weight - Scale: 54 4 kg (120 lb)  Vitals:    11/01/19 2017 11/01/19 2304 11/02/19 0600 11/02/19 0658   BP: 130/62 132/62  125/60   BP Location:       Pulse: 94 99  97   Resp:  18     Temp:    99 2 °F (37 3 °C)   TempSrc:       SpO2: 100% 97%  99%   Weight:   54 4 kg (120 lb)    Height:           Intake/Output Summary (Last 24 hours) at 11/2/2019 0830  Last data filed at 11/2/2019 0600  Gross per 24 hour   Intake 160 ml   Output 400 ml   Net -240 ml       Physical Exam   Constitutional: She is sleeping  No distress  Neck: Neck supple  Cardiovascular: Normal rate and regular rhythm  Pulmonary/Chest: Effort normal    Abdominal: Soft  Musculoskeletal: She exhibits no edema  Neurological: She is alert  Skin: Skin is warm and dry  No rash noted         Medications:    Current Facility-Administered Medications:     acetaminophen (TYLENOL) rectal suppository 650 mg, 650 mg, Rectal, Q6H PRN, Hetul Yusuf Nardin, DO, 650 mg at 10/28/19 1956    aluminum-magnesium hydroxide-simethicone (MYLANTA) 200-200-20 mg/5 mL oral suspension 30 mL, 30 mL, Oral, Q6H PRN, Ion Dykes MD, 30 mL at 10/27/19 1630    bisacodyl (DULCOLAX) rectal suppository 10 mg, 10 mg, Rectal, Daily PRN, Heaven MAHAN PA-C    docusate sodium (COLACE) capsule 100 mg, 100 mg, Oral, BID PRN, Ion Dykes MD    ferrous sulfate tablet 325 mg, 325 mg, Oral, Daily With Breakfast, Daylin Roca MD, Stopped at 11/01/19 0824    fluticasone (FLONASE) 50 mcg/act nasal spray 1 spray, 1 spray, Each Nare, BID, Daylin Roca MD, 1 spray at 11/02/19 0819    heparin (porcine) 25,000 units in 250 mL infusion (premix), 3-30 Units/kg/hr (Order-Specific), Intravenous, Titrated, Daylin Roca MD, Last Rate: 12 1 mL/hr at 11/02/19 0048, 22 Units/kg/hr at 11/02/19 0048    heparin (porcine) injection 2,200 Units, 2,200 Units, Intravenous, PRN, Daylin Roca MD, 2,200 Units at 10/31/19 1135    heparin (porcine) injection 4,400 Units, 4,400 Units, Intravenous, PRN, Daylin Roca MD, 4,400 Units at 11/02/19 0807    insulin lispro (HumaLOG) 100 units/mL subcutaneous injection 1-5 Units, 1-5 Units, Subcutaneous, HS, Ion Dykes MD, 1 Units at 11/01/19 2247    insulin lispro (HumaLOG) 100 units/mL subcutaneous injection 1-6 Units, 1-6 Units, Subcutaneous, TID AC, 2 Units at 11/02/19 0635 **AND** Fingerstick Glucose (POCT), , , TID AC, Ion Dykes MD    lidocaine (PF) (XYLOCAINE-MPF) 2 % injection 2 mL, 2 mL, Infiltration, Once, Ava Y Alderiso, PA-C    metoprolol (LOPRESSOR) injection 2 5 mg, 2 5 mg, Intravenous, Q6H PRN, Daylin Roca MD, 2 5 mg at 10/31/19 1831    metoprolol (LOPRESSOR) injection 2 5 mg, 2 5 mg, Intravenous, Q6H, Daylin Roca MD, 2 5 mg at 11/02/19 0814    mirtazapine (REMERON) tablet 7 5 mg, 7 5 mg, Oral, HS, Heaven MAHAN PA-C, 7 5 mg at 11/01/19 2248    nystatin (MYCOSTATIN) oral suspension 500,000 Units, 500,000 Units, Swish & Swallow, 4x Daily, Heaven MAHAN PA-C, 500,000 Units at 11/02/19 0815    ondansetron [facility]) injection 4 mg, 4 mg, Intravenous, Q6H PRN, Camila Kan MD, 4 mg at 10/25/19 0919    pantoprazole (PROTONIX) injection 40 mg, 40 mg, Intravenous, Q24H [address], Teodora Mir MD, 40 mg at 11/02/19 0810    pravastatin (PRAVACHOL) tablet 40 mg, 40 mg, Oral, Daily With Tanja Marquez MD, 40 mg at 11/01/19 1812    Laboratory Results:  Results from last 7 days   Lab Units 11/02/19  0606 11/01/19  0618 10/31/19  0617 10/30/19  1430 10/30/19  0831 10/29/19  0625 10/28/19  0513 10/27/19  0542   WBC Thousand/uL 6 42 4 95 6 10  --  7 19 7 67 5 34 6 04   HEMOGLOBIN g/dL 7 2* 7 8* 8 3* 7 6* 7 9* 8 8* 8 1* 8 7*   HEMATOCRIT % 25 1* 26 1* 28 2* 26 0* 26 8* 30 2* 27 3* 28 9*   PLATELETS Thousands/uL 544* 466* 502*  --  413* 396* 296 377   POTASSIUM mmol/L 3 9 3 2* 3 6  --  3 7 4 2 4 1 3 8   CHLORIDE mmol/L 110* 110* 110*  --  110* 109* 107 106   CO2 mmol/L 22 23 21  --  25 21 23 22   BUN mg/dL 14 16 15  --  17 14 13 13   CREATININE mg/dL 0 39* 0 40* 0 37*  --  0 43* 0 43* 0 41* 0 44*   CALCIUM mg/dL 8 5 8 5 8 7  --  8 6 8 6 8 4 8 6   MAGNESIUM mg/dL  --   --   --   --  2 1 2 0  --   --

## 2019-11-03 ENCOUNTER — APPOINTMENT (INPATIENT)
Dept: RADIOLOGY | Facility: HOSPITAL | Age: 79
DRG: 545 | End: 2019-11-03
Payer: MEDICARE

## 2019-11-03 PROBLEM — T80.1XXA IV INFILTRATION: Status: RESOLVED | Noted: 2019-10-30 | Resolved: 2019-11-03

## 2019-11-03 PROBLEM — I63.9 ACUTE CVA (CEREBROVASCULAR ACCIDENT) (HCC): Status: ACTIVE | Noted: 2019-11-03

## 2019-11-03 LAB
ANION GAP SERPL CALCULATED.3IONS-SCNC: 6 MMOL/L (ref 4–13)
APTT PPP: 111 SECONDS (ref 23–37)
APTT PPP: 53 SECONDS (ref 23–37)
APTT PPP: 57 SECONDS (ref 23–37)
APTT PPP: >210 SECONDS (ref 23–37)
BUN SERPL-MCNC: 20 MG/DL (ref 5–25)
CALCIUM SERPL-MCNC: 8.3 MG/DL (ref 8.3–10.1)
CHLORIDE SERPL-SCNC: 109 MMOL/L (ref 100–108)
CO2 SERPL-SCNC: 25 MMOL/L (ref 21–32)
CREAT SERPL-MCNC: 0.55 MG/DL (ref 0.6–1.3)
ERYTHROCYTE [DISTWIDTH] IN BLOOD BY AUTOMATED COUNT: 18.5 % (ref 11.6–15.1)
GFR SERPL CREATININE-BSD FRML MDRD: 89 ML/MIN/1.73SQ M
GLUCOSE SERPL-MCNC: 125 MG/DL (ref 65–140)
GLUCOSE SERPL-MCNC: 133 MG/DL (ref 65–140)
GLUCOSE SERPL-MCNC: 137 MG/DL (ref 65–140)
GLUCOSE SERPL-MCNC: 141 MG/DL (ref 65–140)
GLUCOSE SERPL-MCNC: 161 MG/DL (ref 65–140)
HCT VFR BLD AUTO: 26.4 % (ref 34.8–46.1)
HGB BLD-MCNC: 7.8 G/DL (ref 11.5–15.4)
MCH RBC QN AUTO: 22.6 PG (ref 26.8–34.3)
MCHC RBC AUTO-ENTMCNC: 29.5 G/DL (ref 31.4–37.4)
MCV RBC AUTO: 77 FL (ref 82–98)
PLATELET # BLD AUTO: 562 THOUSANDS/UL (ref 149–390)
PMV BLD AUTO: 10.5 FL (ref 8.9–12.7)
POTASSIUM SERPL-SCNC: 3.5 MMOL/L (ref 3.5–5.3)
RBC # BLD AUTO: 3.45 MILLION/UL (ref 3.81–5.12)
SODIUM SERPL-SCNC: 140 MMOL/L (ref 136–145)
WBC # BLD AUTO: 8.04 THOUSAND/UL (ref 4.31–10.16)

## 2019-11-03 PROCEDURE — 70551 MRI BRAIN STEM W/O DYE: CPT

## 2019-11-03 PROCEDURE — 85027 COMPLETE CBC AUTOMATED: CPT | Performed by: INTERNAL MEDICINE

## 2019-11-03 PROCEDURE — C9113 INJ PANTOPRAZOLE SODIUM, VIA: HCPCS | Performed by: INTERNAL MEDICINE

## 2019-11-03 PROCEDURE — 85730 THROMBOPLASTIN TIME PARTIAL: CPT | Performed by: INTERNAL MEDICINE

## 2019-11-03 PROCEDURE — 82948 REAGENT STRIP/BLOOD GLUCOSE: CPT

## 2019-11-03 PROCEDURE — 99232 SBSQ HOSP IP/OBS MODERATE 35: CPT | Performed by: INTERNAL MEDICINE

## 2019-11-03 PROCEDURE — 80048 BASIC METABOLIC PNL TOTAL CA: CPT | Performed by: INTERNAL MEDICINE

## 2019-11-03 RX ORDER — SODIUM CHLORIDE, SODIUM GLUCONATE, SODIUM ACETATE, POTASSIUM CHLORIDE, MAGNESIUM CHLORIDE, SODIUM PHOSPHATE, DIBASIC, AND POTASSIUM PHOSPHATE .53; .5; .37; .037; .03; .012; .00082 G/100ML; G/100ML; G/100ML; G/100ML; G/100ML; G/100ML; G/100ML
250 INJECTION, SOLUTION INTRAVENOUS ONCE
Status: COMPLETED | OUTPATIENT
Start: 2019-11-03 | End: 2019-11-03

## 2019-11-03 RX ADMIN — METOPROLOL TARTRATE 2.5 MG: 5 INJECTION, SOLUTION INTRAVENOUS at 19:25

## 2019-11-03 RX ADMIN — NYSTATIN 500000 UNITS: 500000 SUSPENSION ORAL at 21:30

## 2019-11-03 RX ADMIN — FLUTICASONE PROPIONATE 1 SPRAY: 50 SPRAY, METERED NASAL at 17:42

## 2019-11-03 RX ADMIN — METOPROLOL TARTRATE 2.5 MG: 5 INJECTION INTRAVENOUS at 03:00

## 2019-11-03 RX ADMIN — HEPARIN SODIUM AND DEXTROSE 19 UNITS/KG/HR: 10000; 5 INJECTION INTRAVENOUS at 19:23

## 2019-11-03 RX ADMIN — NYSTATIN 500000 UNITS: 500000 SUSPENSION ORAL at 08:42

## 2019-11-03 RX ADMIN — METOPROLOL TARTRATE 2.5 MG: 5 INJECTION, SOLUTION INTRAVENOUS at 13:37

## 2019-11-03 RX ADMIN — FLUTICASONE PROPIONATE 1 SPRAY: 50 SPRAY, METERED NASAL at 08:51

## 2019-11-03 RX ADMIN — INSULIN LISPRO 1 UNITS: 100 INJECTION, SOLUTION INTRAVENOUS; SUBCUTANEOUS at 12:29

## 2019-11-03 RX ADMIN — PANTOPRAZOLE SODIUM 40 MG: 40 INJECTION, POWDER, FOR SOLUTION INTRAVENOUS at 08:43

## 2019-11-03 RX ADMIN — MIRTAZAPINE 7.5 MG: 15 TABLET, FILM COATED ORAL at 21:31

## 2019-11-03 RX ADMIN — METOPROLOL TARTRATE 2.5 MG: 5 INJECTION, SOLUTION INTRAVENOUS at 08:42

## 2019-11-03 RX ADMIN — HEPARIN SODIUM 2200 UNITS: 1000 INJECTION INTRAVENOUS; SUBCUTANEOUS at 08:39

## 2019-11-03 RX ADMIN — SODIUM CHLORIDE, SODIUM GLUCONATE, SODIUM ACETATE, POTASSIUM CHLORIDE AND MAGNESIUM CHLORIDE 250 ML: 526; 502; 368; 37; 30 INJECTION, SOLUTION INTRAVENOUS at 12:39

## 2019-11-03 RX ADMIN — METOPROLOL TARTRATE 2.5 MG: 5 INJECTION, SOLUTION INTRAVENOUS at 01:50

## 2019-11-03 NOTE — ASSESSMENT & PLAN NOTE
· Since September she has had a progressive decline - she has had no appetite, not really eating/drinking with weakness  She was previously reportedly very independent prior to her admission at [facility] in September with UTI  · Geriatrics following, recommending outpatient follow up  · Speech following, status post VBS on 10/30, discussed with Swallow therapy, okay for pureed diet with nectar thick liquids  · Continue nystatin for thrush  · Barium swallow negative for esophageal obstruction  · Goals of care as mentioned

## 2019-11-03 NOTE — ASSESSMENT & PLAN NOTE
· Has been afebrile for more than 24 hours  · Concerns for encephalitis vs vasculitis (less likely), less likely related to splenic infarct  · No apparent infectious etiology  · Urine culture not significant this admission, [id number] GNR  At [facility], pt was noted to have ESBL in urine which was suspected to be colonization  · Admission blood cultrues are neg  · Noted elevated sed rate and CRP  · Repeat CXR negative  · Flu/RSV negative  · Blood parasitessmear negative  · Blood culture negative so far  · Lyme titer negative  · Evaluating for Anaplasma, Babesiosis given history of tick bite 2 months before  Although suspicion is low  · Rheumatology consult given elevated inflammatory markers and persistent fever despite lack of infectious source and now positive JAMILAH  · Infectious Disease following, appreciate input  · Continue to monitor off ABX  · Work up for autoimmune etiology as mentioned above  · Appreciate neurology input

## 2019-11-03 NOTE — ASSESSMENT & PLAN NOTE
Lab Results   Component Value Date    HGBA1C 6 4 (H) 10/15/2019     Recent Labs     11/02/19  1536 11/02/19  2106 11/03/19  0626 11/03/19  1055   POCGLU 165* 138 141* 161*       · Takes metformin and glipizide at baseline, will plan to resume metformin at discharge and hold glipizide    · Continue with SSI  · Diabetic diet  · Monitor accuchecks, avoid hypoglycemia

## 2019-11-03 NOTE — ASSESSMENT & PLAN NOTE
· POA  Likely in setting of hyponatremia with component of delirium contributing given recurrent hospitalizations/underlying cognitive impairment  · 11/1:  Patient is more communicative and AAO x3 today  · Urine culture unremarkable - monitor off antibiotics at this time  Pt remains asymptomatic  · Vasculitis vs autoimmune encephalitis?, less likely CNS vasculitis per neurology although encephalitis remains in differential   · Noted elevated CRP and sed rate  · She has hx of adrenal insufficiency, previously on steroids and then tapered off  She follows with Endocrinology as outpatient  · Cosyntropin stim test with adequate response in cortisol, hydrocortisone discontinued, she does not have adrenal insufficiency   · Vitamin B12 and folate normal   · Lyme titer negative  · RPR negative  · JAMILAH positive with homogenous pattern  · Anti thyroglobulin antibodies borderline high  · Meningitis/encephalitis panel negative  · HIV negative  · MRI brain was done at [facility] last month which was negative for any acute infarct but possible small questionable hemorrhage versus area of mineralization  CT head without contrast in [facility] negative for bleed  · MRA head and neck done this admission>> MRA brain unremarkable although MRA neck showed ICA stenosis b/l, left >right and severe left ICA stenosis  · Repeat CT head this admission shows no acute abnormality, chronic lacunar infarct left lentiform nucleus  · Repeat MRI brain on 11/03 shows single focus of acute/subacute infarct in left pre central gyrus  PLAN:  · Neurology on board, discussed with Neurology team on 11/03 regarding MRI findings, per them, MRI findings of acute stroke is not significant enough to cause this level of subacute encephalopathy  · Continue statin and therapeutic anticoagulation    · Follow ENS 1 panel  , CSF lyme, CSF VDRL  · Rheum consult given elevated inflammatory markers and fever with no apparent infectious etiology and now positive JAMILAH

## 2019-11-03 NOTE — ASSESSMENT & PLAN NOTE
MRI brain on 11/3 shows acute to subacute CVA in left pre central gyrus  In setting of afib  D/w neurology, appreciate input  continue statin and therapeutic AC  neurochecks  ST/ PT/ OT already following

## 2019-11-03 NOTE — PROGRESS NOTES
Progress Note - Pam Oliveira 1940, [de-identified] y o  female MRN: [id number]    Unit/Bed#: -01 Encounter: [id number]    Primary Care Provider: Naveen Stoll MD   Date and time admitted to hospital: 10/14/2019  4:20 PM        * Metabolic encephalopathy  Assessment & Plan  · POA  Likely in setting of hyponatremia with component of delirium contributing given recurrent hospitalizations/underlying cognitive impairment  · 11/1:  Patient is more communicative and AAO x3 today  · Urine culture unremarkable - monitor off antibiotics at this time  Pt remains asymptomatic  · Vasculitis vs autoimmune encephalitis?, less likely CNS vasculitis per neurology although encephalitis remains in differential   · Noted elevated CRP and sed rate  · She has hx of adrenal insufficiency, previously on steroids and then tapered off  She follows with Endocrinology as outpatient  · Cosyntropin stim test with adequate response in cortisol, hydrocortisone discontinued, she does not have adrenal insufficiency   · Vitamin B12 and folate normal   · Lyme titer negative  · RPR negative  · JAMILAH positive with homogenous pattern  · Anti thyroglobulin antibodies borderline high  · Meningitis/encephalitis panel negative  · HIV negative  · MRI brain was done at [facility] last month which was negative for any acute infarct but possible small questionable hemorrhage versus area of mineralization  CT head without contrast in [facility] negative for bleed  · MRA head and neck done this admission>> MRA brain unremarkable although MRA neck showed ICA stenosis b/l, left >right and severe left ICA stenosis  · Repeat CT head this admission shows no acute abnormality, chronic lacunar infarct left lentiform nucleus  · Repeat MRI brain on 11/03 shows single focus of acute/subacute infarct in left pre central gyrus      PLAN:  · Neurology on board, discussed with Neurology team on 11/03 regarding MRI findings, per them, MRI findings of acute stroke is not significant enough to cause this level of subacute encephalopathy  · Continue statin and therapeutic anticoagulation  · Follow ENS 1 panel  , CSF lyme, CSF VDRL  · Rheum consult given elevated inflammatory markers and fever with no apparent infectious etiology and now positive JAMILAH  Fever  Assessment & Plan  · Has been afebrile for more than 24 hours  · Concerns for encephalitis vs vasculitis (less likely), less likely related to splenic infarct  · No apparent infectious etiology  · Urine culture not significant this admission, [id number] GNR  At [facility], pt was noted to have ESBL in urine which was suspected to be colonization  · Admission blood cultrues are neg  · Noted elevated sed rate and CRP  · Repeat CXR negative  · Flu/RSV negative  · Blood parasitessmear negative  · Blood culture negative so far  · Lyme titer negative  · Evaluating for Anaplasma, Babesiosis given history of tick bite 2 months before  Although suspicion is low  · Rheumatology consult given elevated inflammatory markers and persistent fever despite lack of infectious source and now positive JAMILAH  · Infectious Disease following, appreciate input  · Continue to monitor off ABX  · Work up for autoimmune etiology as mentioned above  · Appreciate neurology input  A-fib [facility])  Assessment & Plan  · Afib with RVR on 10 29 on tele intermittently  · TSH normal this admission  · LENO showed EF 55% without any intracardiac thrombus  · Continue iv metoprolol 2 5mg Q6 hourly for now, switch to po when able to tolerate po  · Iv heparin for AC given Afib and splenic infarct  · Cardiology on board, appreciate input  Hyponatremia  Assessment & Plan  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  · Presented with sodium of 126  Sodium 140 today  · TSH normal this admission  · Cosyntropin stim test negative for adrenal insufficiency    · CT chest abdomen pelvis was done at [facility] to rule out any occult malignancy as a cause of SIADH which was negative for any malignancy  · Sodium tablets stopped on 11/1 by Nephrology  · Continue 1500 cc fluid restriction  · [id number] Cherrie Dumas for d/c from a nephrology standpoint, will need outpatient f/u with [facility] nephrology  Recommending BMP in 1 week with results to PCP for further management of NACL tabs  Protonix changed to Pepcid  ACEI stopped as well  · BMP in AM    Acute CVA (cerebrovascular accident) [facility])  Assessment & Plan  MRI brain on 11/3 shows acute to subacute CVA in left pre central gyrus  In setting of afib  D/w neurology, appreciate input  continue statin and therapeutic AC  neurochecks  ST/ PT/ OT already following  JAMILAH positive  Assessment & Plan  · With homogeneous pattern  · Noted elevated sed rate and CRP   · ?? Concerns for vasculitis as a cause for encephalopathy  · Check rheumatoid factor and ANCA panel  · Await rheumatology input, already consulted  Internal carotid artery stenosis, bilateral  Assessment & Plan  · Noted to have bilateral internal carotid artery stenosis left 60-70% and right 50-60% with severe left ECA stenosis  · MRI brain shows small acute to subacute single focus of infarct in left precentral gyrus  Discussed with Neurology regarding this findings  Appreciate Neurology input  ·  Continue statin and anticoagulation with IV heparin  Goals of care, counseling/discussion  Assessment & Plan  · Participated in a family meeting on  10 31 with palliative care  · Daughter is overwhelmed right now due to her brother's death a day before and would like to think more about code status  · Currently patient remains full code  Splenic infarct  Assessment & Plan  · Noted on CT scan done at [facility] 9/2019, new moderate sized splenic infarcts  · Likely etiology cardio embolic as pt was noted to have PAF with RVR on te;e on 10/29    · CTA abdomen/pelvis:  No acute aortic injury or aneurysm  Patent celiac and splenic arteries  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  Evolving splenic infarct  Additional chronic/incidental findings as described  · LENO negative for PFO and obvious intracardiac thrombus, 55% EF  · General surgery consult appreciated, no further workup from their standpoint  · Appreciate hematology input  · Continue iv heparin for now for [facility] for PAF, switch to DOAC when LP and other procedures are done  Await rheum input if there is any need for biopsy  · Appreciate cardiology input  Microcytic anemia  Assessment & Plan  · Baseline seems between 8-11  · Hemoglobin 7 8 today     · Iron studies suggestive of low iron  · Continue  p o  iron supplementation which was started this admission  · CBC in AM     Severe protein-calorie malnutrition (HCC)  Assessment & Plan  Malnutrition Findings:   Malnutrition type: Chronic illness(Related to medical condition as evidenced by 10% weight loss over the past month and <75% energy intake needs met >1 month treated with ensure compact supplements)  Degree of Malnutrition: Other severe protein calorie malnutrition    BMI Findings: Body mass index is 21 77 kg/m²  Reported weight has fluctuated significantly throughout hospitalization, will work on obtaining more accurate weights  Nutrition consult and supplements  Urinary retention  Assessment & Plan  · Patient has chronic Herrera present on admission- herrera was replaced in ED 10/14   · Placed on recent admission (10/3/19) at [facility] where she developed urinary retention  · Patient will follow up outpatient with urology for voiding trial     Failure to thrive in adult  Assessment & Plan  · Since September she has had a progressive decline - she has had no appetite, not really eating/drinking with weakness    She was previously reportedly very independent prior to her admission at [facility] in September with UTI  · Geriatrics following, recommending outpatient follow up  · Speech following, status post VBS on 10/30, discussed with Swallow therapy, okay for pureed diet with nectar thick liquids  · Continue nystatin for thrush  · Barium swallow negative for esophageal obstruction  · Goals of care as mentioned  HLD (hyperlipidemia)  Assessment & Plan  · Continue statin    Essential hypertension  Assessment & Plan  · Atenolol switched to metoprolol given Afib with RVR,  · Hold home dose ACEI  · Amlodipine on hold as well  · BP controlled without ACEI or CCB  Type 2 diabetes mellitus with diabetic neuropathy, without long-term current use of insulin [facility])  Assessment & Plan  Lab Results   Component Value Date    HGBA1C 6 4 (H) 10/15/2019     Recent Labs     11/02/19  1536 11/02/19  2106 11/03/19  0626 11/03/19  1055   POCGLU 165* 138 141* 161*       · Takes metformin and glipizide at baseline, will plan to resume metformin at discharge and hold glipizide  · Continue with SSI  · Diabetic diet  · Monitor accuchecks, avoid hypoglycemia      VTE Pharmacologic Prophylaxis:   Pharmacologic: Heparin Drip  Mechanical VTE Prophylaxis in Place: Yes    Patient Centered Rounds: I have performed bedside rounds with nursing staff today  Discussions with Specialists or Other Care Team Provider: neurology, CM    Education and Discussions with Family / Patient:  Discussed plan of care with patient  Discussed plan of care with daughter on phone  Time Spent for Care: 30 minutes  More than 50% of total time spent on counseling and coordination of care as described above      Current Length of Stay: 20 day(s)    Current Patient Status: Inpatient   Certification Statement: The patient will continue to require additional inpatient hospital stay due to Not medically stable    Discharge Plan:  Not medically stable    Code Status: Level 1 - Full Code      Subjective:   Overnight events of AFib with RVR noted which is resolved now  Patient was tired this morning although woke up on calling her name and denied any acute pain or discomfort  She reported that she wants to drink some water  Objective:     Vitals:   Temp (24hrs), Av 9 °F (36 6 °C), Min:97 4 °F (36 3 °C), Max:98 3 °F (36 8 °C)    Temp:  [97 4 °F (36 3 °C)-98 3 °F (36 8 °C)] 97 4 °F (36 3 °C)  HR:  [] 87  Resp:  [20-24] 22  BP: (107-155)/(58-72) 132/59  SpO2:  [92 %-97 %] 97 %  Body mass index is 21 77 kg/m²  Input and Output Summary (last 24 hours): Intake/Output Summary (Last 24 hours) at 11/3/2019 1619  Last data filed at 11/3/2019 0600  Gross per 24 hour   Intake 0 ml   Output 200 ml   Net -200 ml       Physical Exam:     Physical Exam  Constitutional: No distress  Eyes: Pupils are equal, round, and reactive to light  Cardiovascular: Normal heart sounds    No murmur heard  Regular rate and rhythm  Pulmonary/Chest: Effort normal and breath sounds normal  No respiratory distress  She has no wheezes  She has no rales  Abdominal: Soft  Bowel sounds are normal  She exhibits no distension  There is no tenderness  Musculoskeletal: no LE edema a  Neurological: She is alert    Wakes up on calling her name and answers questions  Squeezed fingers  Skin: Skin is warm      Additional Data:     Labs:    Results from last 7 days   Lab Units 19  0823  10/31/19  0617  10/29/19  0625   WBC Thousand/uL 8 04   < > 6 10   < > 7 67   HEMOGLOBIN g/dL 7 8*   < > 8 3*   < > 8 8*   HEMATOCRIT % 26 4*   < > 28 2*   < > 30 2*   PLATELETS Thousands/uL 562*   < > 502*   < > 396*   BANDS PCT %  --   --   --   --  1   NEUTROS PCT %  --   --  82*   < >  --    LYMPHS PCT %  --   --  13*   < >  --    LYMPHO PCT %  --   --   --   --  3*   MONOS PCT %  --   --  4   < >  --    MONO PCT %  --   --   --   --  3*   EOS PCT %  --   --  0   < > 0    < > = values in this interval not displayed       Results from last 7 days   Lab Units 19  0823  10/31/19  6860 SODIUM mmol/L 140   < > 140   POTASSIUM mmol/L 3 5   < > 3 6   CHLORIDE mmol/L 109*   < > 110*   CO2 mmol/L 25   < > 21   BUN mg/dL 20   < > 15   CREATININE mg/dL 0 55*   < > 0 37*   ANION GAP mmol/L 6   < > 9   CALCIUM mg/dL 8 3   < > 8 7   ALBUMIN g/dL  --   --  1 8*   TOTAL BILIRUBIN mg/dL  --   --  0 27   ALK PHOS U/L  --   --  57   ALT U/L  --   --  20   AST U/L  --   --  23   GLUCOSE RANDOM mg/dL 133   < > 108    < > = values in this interval not displayed  Results from last 7 days   Lab Units 10/30/19  0831   INR  1 26*     Results from last 7 days   Lab Units 11/03/19  1612 11/03/19  1055 11/03/19  0626 11/02/19  2106 11/02/19  1536 11/02/19  1015 11/02/19  0610 11/01/19  2050 11/01/19  1612 11/01/19  1054 11/01/19  0616 10/31/19  2114   POC GLUCOSE mg/dl 137 161* 141* 138 165* 108 201* 176* 106 121 98 123                   * I Have Reviewed All Lab Data Listed Above  * Additional Pertinent Lab Tests Reviewed: All Labs Within Last 24 Hours Reviewed    Imaging:    MRI brain wo contrast   Final Result by Jonatan Hernandez MD (11/03 1018)      1  Single focus of acute/subacute infarct in the left precentral gyrus  No hemorrhagic transformation  2   Mild chronic white matter microangiopathy  3   A  focus of gradient susceptibility signal in the anterior medial left cerebellum with corresponding T2 hypointensity, could represent small cavernoma versus sequela of remote microhemorrhage  4   Bilateral mastoid effusions  Correlate for mastoiditis                I personally discussed this study with Yeimy Lala on 11/3/2019 at 10:18 AM                   Workstation performed: [id number]         MRA carotids wo contrast   Final Result by Dianna Horne MD (11/01 1615)      Bilateral cervical carotid atherosclerotic disease with atheromatous plaque suspected in the distal common carotid arteries and extending into the ICA and ECA origins    There is left greater than right ICA origin stenosis of approximately 60-70% stenosis    on the left and 50-60% stenosis on the right  Severe left ECA stenosis is suspected  Correlation with carotid duplex imaging is recommended  Alternatively, if the patient can receive IV contrast, CTA imaging may provide additional characterization of the atherosclerotic disease and stenosis  Patent codominant vertebral arteries with antegrade flow  Workstation performed: [id number]         MRA head wo contrast   Final Result by Judy Keane MD (11/01 1558)      Unremarkable MRA of the [place] aside from anatomic variations as described above  Workstation performed: [id number]         FL barium swallow   Final Result by Linda Srivastava MD (11/01 1628)         1  No evidence of obstruction  2   Pooling of contrast within the region of the vallecula, some of which the patient does clear with repeated swallows  Correlation with speech pathology report recommended  Workstation performed: [id number]         FL barium swallow video w speech   Final Result by NABEEL, DOCUMENTATION (10/30 1556)      CTA head and neck w wo contrast   Final Result by Gabriel Yousif MD (1939)      1  No acute intracranial hemorrhage, mass effect or edema  Microangiopathic changes  2   Approximately 80 mL of Omnipaque 350 infiltrated in the left arm  Further clinical assessment and clinical follow-up advised  I personally discussed this study with Dr Rashad eMjia on 10/29/2019 at 7:35 PM                            Workstation performed: [id number]         XR chest portable   Final Result by Rubi Galdamez MD (10/29 1702)      Persistent small left effusion  Slight increase in left base atelectasis  Workstation performed: [id number]         CT head wo contrast   Final Result by Rafaela 6, DO (10/26 1715)      No acute intracranial abnormality  Microangiopathic changes    Chronic lacunar infarct left lentiform nucleus  Workstation performed: [id number]         XR chest pa & lateral   Final Result by Symone Dunlap DO (10/25 1546)      Small pleural effusion  No pneumothorax  Workstation performed: [id number]         CTA abdomen pelvis w wo contrast   Final Result by Ana Rosa Dasilva MD (10/24 4607)      1  No acute aortic injury or aneurysm  2   Patent celiac and splenic arteries  3   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  4   Evolving splenic infarct  5   Additional chronic/incidental findings as described  Workstation performed: [id number]         XR abdomen 1 vw portable   Final Result by Johanna Ndiaye MD (10/21 1646)      There is a nonobstructive bowel gas pattern  Workstation performed: [id number]         XR chest pa & lateral   Final Result by Liam Brittle, MD (10/19 3022)      Minimal left basilar airspace disease attributed to compressive atelectasis secondary small left pleural effusion  Correlate with clinical findings to exclude pneumonia and/or aspiration                    Workstation performed: [id number]             Recent Cultures (last 7 days):           Last 24 Hours Medication List:     Current Facility-Administered Medications:  acetaminophen 650 mg Rectal Q6H PRN Herman Rueda DO    aluminum-magnesium hydroxide-simethicone 30 mL Oral Q6H PRN Alex Graham MD    bisacodyl 10 mg Rectal Daily PRN Heaven MAHAN PA-C    docusate sodium 100 mg Oral BID PRN Alex Graham MD    ferrous sulfate 325 mg Oral Daily With Breakfast Lizz Sanders MD    fluticasone 1 spray Each Nare BID Lizz Sanders MD    heparin (porcine) 3-30 Units/kg/hr (Order-Specific) Intravenous Titrated Lizz Sanders MD Last Rate: Stopped (11/03/19 1525)   heparin (porcine) 2,200 Units Intravenous PRN Lizz Sanders MD    heparin (porcine) 4,400 Units Intravenous PRN Lizz Sanders MD    insulin lispro 1-5 Units Subcutaneous HS Clearance MD Seema    insulin lispro 1-6 Units Subcutaneous TID AC Clearance MD Seema    lidocaine (PF) 2 mL Infiltration Once Supriya Ho PA-C    metoprolol 2 5 mg Intravenous Q6H PRN Herbert Fischer MD    metoprolol 2 5 mg Intravenous Q6H Herbert Fischer MD    mirtazapine 7 5 mg Oral HS Heaven MAHAN PA-C    nystatin 500,000 Units Swish & Swallow 4x Daily Heaven MAHAN PA-C    ondansetron 4 mg Intravenous Q6H PRN Clearance MD Seema    pantoprazole 40 mg Intravenous Q24H [facility] Herbert Fischer MD    pravastatin 40 mg Oral Daily With Emmy Washington MD         Today, Patient Was Seen By: Herbert Fischer MD    ** Please Note: Dictation voice to text software may have been used in the creation of this document   **

## 2019-11-03 NOTE — ASSESSMENT & PLAN NOTE
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

## 2019-11-03 NOTE — ASSESSMENT & PLAN NOTE
· Patient has chronic Herrera present on admission- herrera was replaced in ED 10/14   · Placed on recent admission (10/3/19) at [facility] where she developed urinary retention    · Patient will follow up outpatient with urology for voiding trial

## 2019-11-03 NOTE — ASSESSMENT & PLAN NOTE
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  · Presented with sodium of 126  Sodium 140 today  · TSH normal this admission  · Cosyntropin stim test negative for adrenal insufficiency  · CT chest abdomen pelvis was done at [facility] to rule out any occult malignancy as a cause of SIADH which was negative for any malignancy  · Sodium tablets stopped on 11/1 by Nephrology  · Continue 1500 cc fluid restriction  · Annamarie Tran for d/c from a nephrology standpoint, will need outpatient f/u with [facility] nephrology  Recommending BMP in 1 week with results to PCP for further management of NACL tabs  Protonix changed to Pepcid  ACEI stopped as well    · BMP in AM

## 2019-11-03 NOTE — ASSESSMENT & PLAN NOTE
Malnutrition Findings:   Malnutrition type: Chronic illness(Related to medical condition as evidenced by 10% weight loss over the past month and <75% energy intake needs met >1 month treated with ensure compact supplements)  Degree of Malnutrition: Other severe protein calorie malnutrition    BMI Findings: Body mass index is 21 77 kg/m²  Reported weight has fluctuated significantly throughout hospitalization, will work on obtaining more accurate weights  Nutrition consult and supplements

## 2019-11-03 NOTE — ASSESSMENT & PLAN NOTE
· Noted to have bilateral internal carotid artery stenosis left 60-70% and right 50-60% with severe left ECA stenosis  · MRI brain shows small acute to subacute single focus of infarct in left precentral gyrus  Discussed with Neurology regarding this findings  Appreciate Neurology input  ·  Continue statin and anticoagulation with IV heparin

## 2019-11-04 ENCOUNTER — APPOINTMENT (INPATIENT)
Dept: RADIOLOGY | Facility: HOSPITAL | Age: 79
DRG: 545 | End: 2019-11-04
Payer: MEDICARE

## 2019-11-04 LAB
A PHAGOCYTOPH DNA BLD QL NAA+PROBE: NEGATIVE
A PHAGOCYTOPH IGG TITR SER IF: NEGATIVE {TITER}
A PHAGOCYTOPH IGM TITR SER IF: NEGATIVE {TITER}
ANION GAP SERPL CALCULATED.3IONS-SCNC: 9 MMOL/L (ref 4–13)
ANISOCYTOSIS BLD QL SMEAR: PRESENT
APTT PPP: 40 SECONDS (ref 23–37)
APTT PPP: 55 SECONDS (ref 23–37)
APTT PPP: 66 SECONDS (ref 23–37)
B MICROTI IGG TITR SER: NORMAL {TITER}
B MICROTI IGM TITR SER: NORMAL {TITER}
BASOPHILS # BLD MANUAL: 0 THOUSAND/UL (ref 0–0.1)
BASOPHILS NFR MAR MANUAL: 0 % (ref 0–1)
BUN SERPL-MCNC: 19 MG/DL (ref 5–25)
CALCIUM SERPL-MCNC: 8.7 MG/DL (ref 8.3–10.1)
CHLORIDE SERPL-SCNC: 110 MMOL/L (ref 100–108)
CO2 SERPL-SCNC: 21 MMOL/L (ref 21–32)
CREAT SERPL-MCNC: 0.53 MG/DL (ref 0.6–1.3)
E CHAFFEENSIS IGG TITR SER IF: NEGATIVE {TITER}
E CHAFFEENSIS IGM TITR SER IF: NEGATIVE {TITER}
EOSINOPHIL # BLD MANUAL: 0 THOUSAND/UL (ref 0–0.4)
EOSINOPHIL NFR BLD MANUAL: 0 % (ref 0–6)
ERYTHROCYTE [DISTWIDTH] IN BLOOD BY AUTOMATED COUNT: 18.4 % (ref 11.6–15.1)
ERYTHROCYTE [DISTWIDTH] IN BLOOD BY AUTOMATED COUNT: 18.7 % (ref 11.6–15.1)
GFR SERPL CREATININE-BSD FRML MDRD: 90 ML/MIN/1.73SQ M
GLUCOSE SERPL-MCNC: 139 MG/DL (ref 65–140)
GLUCOSE SERPL-MCNC: 145 MG/DL (ref 65–140)
GLUCOSE SERPL-MCNC: 168 MG/DL (ref 65–140)
GLUCOSE SERPL-MCNC: 179 MG/DL (ref 65–140)
GLUCOSE SERPL-MCNC: 198 MG/DL (ref 65–140)
HCT VFR BLD AUTO: 22.7 % (ref 34.8–46.1)
HCT VFR BLD AUTO: 26.8 % (ref 34.8–46.1)
HGB BLD-MCNC: 6.6 G/DL (ref 11.5–15.4)
HGB BLD-MCNC: 8 G/DL (ref 11.5–15.4)
LYMPHOCYTES # BLD AUTO: 0.42 THOUSAND/UL (ref 0.6–4.47)
LYMPHOCYTES # BLD AUTO: 5 % (ref 14–44)
MCH RBC QN AUTO: 22.3 PG (ref 26.8–34.3)
MCH RBC QN AUTO: 23 PG (ref 26.8–34.3)
MCHC RBC AUTO-ENTMCNC: 29.1 G/DL (ref 31.4–37.4)
MCHC RBC AUTO-ENTMCNC: 29.9 G/DL (ref 31.4–37.4)
MCV RBC AUTO: 77 FL (ref 82–98)
MCV RBC AUTO: 77 FL (ref 82–98)
MONOCYTES # BLD AUTO: 0.25 THOUSAND/UL (ref 0–1.22)
MONOCYTES NFR BLD: 3 % (ref 4–12)
NEUTROPHILS # BLD MANUAL: 7.65 THOUSAND/UL (ref 1.85–7.62)
NEUTS BAND NFR BLD MANUAL: 3 % (ref 0–8)
NEUTS SEG NFR BLD AUTO: 89 % (ref 43–75)
NRBC BLD AUTO-RTO: 0 /100 WBCS
OVALOCYTES BLD QL SMEAR: PRESENT
PLATELET # BLD AUTO: 538 THOUSANDS/UL (ref 149–390)
PLATELET # BLD AUTO: 603 THOUSANDS/UL (ref 149–390)
PLATELET BLD QL SMEAR: ABNORMAL
PMV BLD AUTO: 10.6 FL (ref 8.9–12.7)
PMV BLD AUTO: 9.7 FL (ref 8.9–12.7)
POIKILOCYTOSIS BLD QL SMEAR: PRESENT
POLYCHROMASIA BLD QL SMEAR: PRESENT
POTASSIUM SERPL-SCNC: 3.4 MMOL/L (ref 3.5–5.3)
RBC # BLD AUTO: 2.96 MILLION/UL (ref 3.81–5.12)
RBC # BLD AUTO: 3.48 MILLION/UL (ref 3.81–5.12)
RBC MORPH BLD: PRESENT
SODIUM SERPL-SCNC: 140 MMOL/L (ref 136–145)
WBC # BLD AUTO: 7.49 THOUSAND/UL (ref 4.31–10.16)
WBC # BLD AUTO: 8.32 THOUSAND/UL (ref 4.31–10.16)

## 2019-11-04 PROCEDURE — 85730 THROMBOPLASTIN TIME PARTIAL: CPT | Performed by: INTERNAL MEDICINE

## 2019-11-04 PROCEDURE — 86255 FLUORESCENT ANTIBODY SCREEN: CPT | Performed by: INTERNAL MEDICINE

## 2019-11-04 PROCEDURE — 82948 REAGENT STRIP/BLOOD GLUCOSE: CPT

## 2019-11-04 PROCEDURE — 86431 RHEUMATOID FACTOR QUANT: CPT | Performed by: INTERNAL MEDICINE

## 2019-11-04 PROCEDURE — 99233 SBSQ HOSP IP/OBS HIGH 50: CPT | Performed by: PSYCHIATRY & NEUROLOGY

## 2019-11-04 PROCEDURE — 99232 SBSQ HOSP IP/OBS MODERATE 35: CPT | Performed by: INTERNAL MEDICINE

## 2019-11-04 PROCEDURE — 80048 BASIC METABOLIC PNL TOTAL CA: CPT | Performed by: INTERNAL MEDICINE

## 2019-11-04 PROCEDURE — 86430 RHEUMATOID FACTOR TEST QUAL: CPT | Performed by: INTERNAL MEDICINE

## 2019-11-04 PROCEDURE — 70481 CT ORBIT/EAR/FOSSA W/DYE: CPT

## 2019-11-04 PROCEDURE — 85007 BL SMEAR W/DIFF WBC COUNT: CPT | Performed by: INTERNAL MEDICINE

## 2019-11-04 PROCEDURE — 85027 COMPLETE CBC AUTOMATED: CPT | Performed by: INTERNAL MEDICINE

## 2019-11-04 RX ORDER — POTASSIUM CHLORIDE 20 MEQ/1
40 TABLET, EXTENDED RELEASE ORAL ONCE
Status: COMPLETED | OUTPATIENT
Start: 2019-11-04 | End: 2019-11-04

## 2019-11-04 RX ORDER — ATORVASTATIN CALCIUM 40 MG/1
40 TABLET, FILM COATED ORAL
Status: DISCONTINUED | OUTPATIENT
Start: 2019-11-04 | End: 2019-11-16 | Stop reason: HOSPADM

## 2019-11-04 RX ORDER — METOPROLOL TARTRATE 5 MG/5ML
2.5 INJECTION INTRAVENOUS ONCE
Status: COMPLETED | OUTPATIENT
Start: 2019-11-04 | End: 2019-11-04

## 2019-11-04 RX ORDER — PANTOPRAZOLE SODIUM 40 MG/1
40 TABLET, DELAYED RELEASE ORAL
Status: DISCONTINUED | OUTPATIENT
Start: 2019-11-04 | End: 2019-11-16 | Stop reason: HOSPADM

## 2019-11-04 RX ADMIN — FLUTICASONE PROPIONATE 1 SPRAY: 50 SPRAY, METERED NASAL at 08:41

## 2019-11-04 RX ADMIN — HEPARIN SODIUM 2200 UNITS: 1000 INJECTION INTRAVENOUS; SUBCUTANEOUS at 16:07

## 2019-11-04 RX ADMIN — INSULIN LISPRO 2 UNITS: 100 INJECTION, SOLUTION INTRAVENOUS; SUBCUTANEOUS at 16:00

## 2019-11-04 RX ADMIN — POTASSIUM CHLORIDE 40 MEQ: 1500 TABLET, EXTENDED RELEASE ORAL at 17:02

## 2019-11-04 RX ADMIN — FLUTICASONE PROPIONATE 1 SPRAY: 50 SPRAY, METERED NASAL at 17:02

## 2019-11-04 RX ADMIN — HEPARIN SODIUM AND DEXTROSE 22 UNITS/KG/HR: 10000; 5 INJECTION INTRAVENOUS at 15:02

## 2019-11-04 RX ADMIN — PANTOPRAZOLE SODIUM 40 MG: 40 TABLET, DELAYED RELEASE ORAL at 08:41

## 2019-11-04 RX ADMIN — NYSTATIN 500000 UNITS: 500000 SUSPENSION ORAL at 17:03

## 2019-11-04 RX ADMIN — FERROUS SULFATE TAB 325 MG (65 MG ELEMENTAL FE) 325 MG: 325 (65 FE) TAB at 08:41

## 2019-11-04 RX ADMIN — NYSTATIN 500000 UNITS: 500000 SUSPENSION ORAL at 08:34

## 2019-11-04 RX ADMIN — NYSTATIN 500000 UNITS: 500000 SUSPENSION ORAL at 12:49

## 2019-11-04 RX ADMIN — MIRTAZAPINE 7.5 MG: 15 TABLET, FILM COATED ORAL at 21:23

## 2019-11-04 RX ADMIN — METOPROLOL TARTRATE 2.5 MG: 5 INJECTION INTRAVENOUS at 20:07

## 2019-11-04 RX ADMIN — IOHEXOL 85 ML: 350 INJECTION, SOLUTION INTRAVENOUS at 18:08

## 2019-11-04 RX ADMIN — METOPROLOL TARTRATE 2.5 MG: 5 INJECTION, SOLUTION INTRAVENOUS at 01:20

## 2019-11-04 RX ADMIN — HEPARIN SODIUM 2200 UNITS: 1000 INJECTION INTRAVENOUS; SUBCUTANEOUS at 00:09

## 2019-11-04 RX ADMIN — METOPROLOL TARTRATE 25 MG: 25 TABLET ORAL at 20:07

## 2019-11-04 RX ADMIN — INSULIN LISPRO 1 UNITS: 100 INJECTION, SOLUTION INTRAVENOUS; SUBCUTANEOUS at 21:23

## 2019-11-04 RX ADMIN — METOPROLOL TARTRATE 2.5 MG: 5 INJECTION, SOLUTION INTRAVENOUS at 08:30

## 2019-11-04 RX ADMIN — METOPROLOL TARTRATE 2.5 MG: 5 INJECTION INTRAVENOUS at 15:19

## 2019-11-04 RX ADMIN — PRAVASTATIN SODIUM 40 MG: 20 TABLET ORAL at 16:06

## 2019-11-04 RX ADMIN — NYSTATIN 500000 UNITS: 500000 SUSPENSION ORAL at 21:23

## 2019-11-04 NOTE — ASSESSMENT & PLAN NOTE
· Afib with RVR on 10 29 on tele intermittently  · TSH normal this admission  · LENO showed EF 55% without any intracardiac thrombus  · Switch metoprolol from IV to p o  25 mg b i d  As patient is able to tolerate p o  Diet now  ·  Iv heparin for AC given Afib and splenic infarct  · Cardiology on board, appreciate input

## 2019-11-04 NOTE — ASSESSMENT & PLAN NOTE
· Baseline seems between 8-11  · Last hemoglobin 7 8, pending this morning  · Iron studies suggestive of low iron  · Continue  p o  iron supplementation which was started this admission    · CBC in AM

## 2019-11-04 NOTE — ASSESSMENT & PLAN NOTE
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  · Presented with sodium of 126  Last sodium 140  Pending this morning  ·   TSH normal this admission  · Cosyntropin stim test negative for adrenal insufficiency  · CT chest abdomen pelvis was done at [facility] to rule out any occult malignancy as a cause of SIADH which was negative for any malignancy  · Sodium tablets stopped on 11/1 by Nephrology  · Continue 1500 cc fluid restriction  · Kacie Khan for d/c from a nephrology standpoint, will need outpatient f/u with [facility] nephrology  Recommending BMP in 1 week with results to PCP for further management of NACL tabs  Protonix changed to Pepcid  ACEI stopped as well    · BMP in AM

## 2019-11-04 NOTE — PROGRESS NOTES
Progress Note - Infectious Disease   Pam Oliveira [de-identified] y o  female MRN: [id number]  Unit/Bed#: -01 Encounter: [id number]      Impression/Plan:  1  Fever   Patient noted to have intermittent low-grade fever and isolated high fevers  Audrey Ashlyn are no obvious sources on exam   Previous workup reviewed at [facility] which was largely unremarkable except for splenic infarct   Echo at that time was also unremarkable   Transesophageal echo here negative   Flu swab, chest x-ray, respiratory viral panel, urine culture and blood cultures have been unremarkable   CT of the head also unremarkable   RPR and HIV negative   Lyme testing peripherally negative, parasite smear negative, Anaplasma testing negative   CSF culture negative and additional studies pending  Lower suspicion for infectious etiology   Sed rate and CRP noted to be significantly elevated   Would question if there is an autoimmune encephalitis  MRI of the head reviewed and would rule out mastoiditis--although clinically no other symptoms  Continue to monitor off antibiotics  Would obtain dedicated CT imaging of the temporal bones  Consider ENT evaluation based on imaging  Continue to trend fever curve/vitals  Follow up pending cultures/CSF studies  Neurology evaluation ongoing  Rheumatology evaluation pending  Additional care as per primary     2  Hyponatremia   Sodium appears to be stable today   Ongoing follow-up by Nephrology      3  Metabolic encephalopathy   Patient initially presented confused likely due to problem 2  It seems that her mental status improved with adjustment in her sodium level   Workup for adrenal insufficiency was negative   Unclear etiology for her underlying hyponatremia and now intermittent fevers as above  Patient seems more interactive today    Continue to monitor mental status  Continue to monitor electrolytes  Additional evaluations as above  Additional care as per primary     4  Splenic infarct   Appears to be evolving on imaging   Possibly contributing to problem 1  Unclear etiology for this lesion   Was not present on imaging in February   Recent echo at [facility] unremarkable   Patient without other prosthetic material   Transesophageal echo negative  Serial abdominal exams  Hematology evaluation appreciated  Additional care as per primary     5  Type 2 diabetes   Ongoing management as per primary service      Above plan discussed in detail with the patient and with primary service  ID consult service will continue to follow  Antibiotics:  None    24 hour events:  No acute events noted overnight on chart review  Patient apparently had continued improvement in her mentation over the weekend  She remains afebrile without leukocytosis  No new culture data  MRI imaging of the head noted  Patient's other vitals are stable  Patient's other labs unremarkable    Subjective:  Patient is more awake on exam today  She can't tell me her name and her daughter's name and why she is in the hospital   She denies having any progressing or new pain  She has difficulty remembering how long she has been here in the hospital   She denies having any ear pain or drainage from her ears  Objective:  Vitals:  Temp:  [96 °F (35 6 °C)-98 2 °F (36 8 °C)] 96 °F (35 6 °C)  HR:  [87-95] 93  Resp:  [17-22] 17  BP: (106-155)/(56-72) 130/62  SpO2:  [96 %-98 %] 98 %  Temp (24hrs), Av 1 °F (36 2 °C), Min:96 °F (35 6 °C), Max:98 2 °F (36 8 °C)  Current: Temperature: (!) 96 °F (35 6 °C)    Physical Exam:   General Appearance:  Alert, interactive, nontoxic, no acute distress  Speech and response time remains slowed  Patient is hard of hearing at baseline  Throat: Oropharynx moist without lesions  Lungs:   Clear to auscultation bilaterally; no wheezes, rhonchi or rales; respirations unlabored on room air   Heart:  RRR; no murmur, rub or gallop   Abdomen:   Soft, non-tender, non-distended, positive bowel sounds  Extremities: No clubbing, cyanosis or edema   Skin: No new rashes or lesions  No New draining wounds noted  Labs, Imaging, & Other studies:   All pertinent labs and imaging studies were personally reviewed  Results from last 7 days   Lab Units 11/03/19  0823 11/02/19  0913 11/02/19  0606 11/01/19  0618   WBC Thousand/uL 8 04  --  6 42 4 95   HEMOGLOBIN g/dL 7 8* 8 0* 7 2* 7 8*   PLATELETS Thousands/uL 562*  --  544* 466*     Results from last 7 days   Lab Units 11/03/19  0823  10/31/19  0617   POTASSIUM mmol/L 3 5   < > 3 6   CHLORIDE mmol/L 109*   < > 110*   CO2 mmol/L 25   < > 21   BUN mg/dL 20   < > 15   CREATININE mg/dL 0 55*   < > 0 37*   EGFR ml/min/1 73sq m 89   < > 101   CALCIUM mg/dL 8 3   < > 8 7   AST U/L  --   --  23   ALT U/L  --   --  20   ALK PHOS U/L  --   --  57    < > = values in this interval not displayed

## 2019-11-04 NOTE — ASSESSMENT & PLAN NOTE
· Since September she has had a progressive decline - she has had no appetite, not really eating/drinking with weakness  She was previously reportedly very independent prior to her admission at [facility] in September with UTI  · Geriatrics following, recommending outpatient follow up  · Speech following, status post VBS on 10/30, discussed with Swallow therapy, okay for pureed diet with nectar thick liquids  · Continue nystatin for thrush  · Barium swallow negative for esophageal obstruction  · Goals of care as mentioned

## 2019-11-04 NOTE — ASSESSMENT & PLAN NOTE
· Noted on CT scan done at 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

## 2019-11-04 NOTE — PROGRESS NOTES
Progress Note - Pam Oliveira 1940, [de-identified] y o  female MRN: [id number]    Unit/Bed#: -01 Encounter: [id number]    Primary Care Provider: Robby Hopkins MD   Date and time admitted to hospital: 10/14/2019  4:20 PM        * Metabolic encephalopathy  Assessment & Plan  · POA  Likely in setting of hyponatremia with component of delirium contributing given recurrent hospitalizations/underlying cognitive impairment  · 11/1:  Patient is more communicative and AAO x3 today  · Urine culture unremarkable - monitor off antibiotics at this time  Pt remains asymptomatic  · Vasculitis vs autoimmune encephalitis?, less likely CNS vasculitis per neurology although encephalitis remains in differential   · Noted elevated CRP and sed rate  · She has hx of adrenal insufficiency, previously on steroids and then tapered off  She follows with Endocrinology as outpatient  · Cosyntropin stim test with adequate response in cortisol, hydrocortisone discontinued, she does not have adrenal insufficiency   · Vitamin B12 and folate normal   · Lyme titer negative  · RPR negative  · JAMILAH positive with homogenous pattern  · Anti thyroglobulin antibodies borderline high  · Meningitis/encephalitis panel negative  · HIV negative  · MRI brain was done at [facility] last month which was negative for any acute infarct but possible small questionable hemorrhage versus area of mineralization  CT head without contrast in [facility] negative for bleed  · MRA head and neck done this admission>> MRA brain unremarkable although MRA neck showed ICA stenosis b/l, left >right and severe left ICA stenosis  · Repeat CT head this admission shows no acute abnormality, chronic lacunar infarct left lentiform nucleus  · Repeat MRI brain on 11/03 shows single focus of acute/subacute infarct in left pre central gyrus      PLAN:  · Neurology on board, discussed with Neurology team on 11/03 regarding MRI findings, per them, MRI findings of acute stroke is not significant enough to cause this level of subacute encephalopathy  · Continue statin and therapeutic anticoagulation  · Follow ENS 1 panel  , CSF lyme, CSF VDRL  · Rheum consult given elevated inflammatory markers and fever with no apparent infectious etiology and now positive JAMILAH  Fever  Assessment & Plan  · Has been afebrile for more than 48 hours  · Concerns for encephalitis vs vasculitis (less likely), less likely related to splenic infarct  · No apparent infectious etiology  · Urine culture not significant this admission, 8966-4524 GNR  [facility] noted to have ESBL in urine which was suspected to be colonization  · Admission blood cultrues are neg  · Noted elevated sed rate and CRP  · Repeat CXR negative  · Flu/RSV negative  · Blood parasitessmear negative  · Blood culture negative so far  · Lyme titer negative  · Evaluating for Anaplasma, Babesiosis given history of tick bite 2 months before  Although suspicion is low  Anaplasma titer negative  · Rheumatology consult given elevated inflammatory markers and persistent fever despite lack of infectious source and now positive JAMILAH  · Infectious Disease following, appreciate input  · Continue to monitor off ABX  · Work up for autoimmune etiology as mentioned above  · Appreciate neurology input  A-fib [facility])  Assessment & Plan  · Afib with RVR on 10 29 on tele intermittently  · TSH normal this admission  · LENO showed EF 55% without any intracardiac thrombus  · Switch metoprolol from IV to p o  25 mg b i d  As patient is able to tolerate p o  Diet now  ·  Iv heparin for AC given Afib and splenic infarct  · Cardiology on board, appreciate input  Hyponatremia  Assessment & Plan  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 to rule out any occult malignancy as a cause of SIADH which was negative for any malignancy  · Sodium tablets stopped on 11/1 by Nephrology  · Continue 1500 cc fluid restriction  · [id number] Cherrie Dumas for d/c from a nephrology standpoint, will need outpatient f/u with [facility] nephrology  Recommending BMP in 1 week with results to PCP for further management of NACL tabs  Protonix changed to Pepcid  ACEI stopped as well  · BMP in AM    Acute CVA (cerebrovascular accident) [facility])  Assessment & Plan  MRI brain on 11/3 shows acute to subacute CVA in left pre central gyrus  In setting of afib  D/w neurology, appreciate input  continue statin and therapeutic AC  neurochecks  ST/ PT/ OT already following  JAMILAH positive  Assessment & Plan  · With homogeneous pattern  · Noted elevated sed rate and CRP   · ?? Concerns for vasculitis as a cause for encephalopathy  · Check rheumatoid factor and ANCA panel  · Await rheumatology input, already consulted  Internal carotid artery stenosis, bilateral  Assessment & Plan  · Noted to have bilateral internal carotid artery stenosis left 60-70% and right 50-60% with severe left ECA stenosis  · MRI brain shows small acute to subacute single focus of infarct in left precentral gyrus  Discussed with Neurology regarding this findings  Appreciate Neurology input  ·  Continue statin and anticoagulation with IV heparin  Goals of care, counseling/discussion  Assessment & Plan  · Participated in a family meeting on  10 31 with palliative care  · Daughter is overwhelmed right now due to her brother's death a day before and would like to think more about code status  · Currently patient remains full code  Splenic infarct  Assessment & Plan  · Noted on CT scan done at 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  Evolving splenic infarct  Additional chronic/incidental findings as described  · LENO negative for PFO and obvious intracardiac thrombus, 55% EF  · General surgery consult appreciated, no further workup from their standpoint  · Appreciate hematology input  · Continue iv heparin for now for [facility] for PAF, switch to DOAC, if no further procedure is planned  Await rheum input if there is any need for biopsy  · Appreciate cardiology input  Microcytic anemia  Assessment & Plan  · Baseline seems between 8-11  · Last hemoglobin 7 8, pending this morning  · Iron studies suggestive of low iron  · Continue  p o  iron supplementation which was started this admission  · CBC in AM     Severe protein-calorie malnutrition (HCC)  Assessment & Plan  Malnutrition Findings:   Malnutrition type: Chronic illness(Related to medical condition as evidenced by 10% weight loss over the past month and <75% energy intake needs met >1 month treated with ensure compact supplements)  Degree of Malnutrition: Other severe protein calorie malnutrition    BMI Findings: Body mass index is 21 77 kg/m²  Reported weight has fluctuated significantly throughout hospitalization, will work on obtaining more accurate weights  Nutrition consult and supplements  Urinary retention  Assessment & Plan  · Patient has chronic Herrera present on admission- herrera was replaced in ED 10/14   · Placed on recent admission (10/3/19) at [facility] where she developed urinary retention  · Patient will follow up outpatient with urology for voiding trial   · Can attempt trial of void prior to discharge      Failure to thrive in adult  Assessment & Plan  · Since September she has had a progressive decline - she has had no appetite, not really eating/drinking with weakness  She was previously reportedly very independent prior to her admission at [facility] in September with UTI  · Geriatrics following, recommending outpatient follow up  · Speech following, status post VBS on 10/30, discussed with Swallow therapy, okay for pureed diet with nectar thick liquids  · Continue nystatin for thrush  · Barium swallow negative for esophageal obstruction  · Goals of care as mentioned  Essential hypertension  Assessment & Plan  · Atenolol switched to metoprolol given Afib with RVR,  · Hold home dose ACEI  · Amlodipine on hold as well  · BP controlled without ACEI or CCB  Type 2 diabetes mellitus with diabetic neuropathy, without long-term current use of insulin [facility])  Assessment & Plan  Lab Results   Component Value Date    HGBA1C 6 4 (H) 10/15/2019     Recent Labs     11/03/19  1055 11/03/19  1612 11/03/19  2105 11/04/19  0606   POCGLU 161* 137 125 139       · Takes metformin and glipizide at baseline, will plan to resume metformin at discharge and hold glipizide  · Continue with SSI  · Diabetic diet  · Monitor accuchecks, avoid hypoglycemia        VTE Pharmacologic Prophylaxis:   Pharmacologic: Heparin Drip  Mechanical VTE Prophylaxis in Place: Yes    Patient Centered Rounds: I have performed bedside rounds with nursing staff today  Discussions with Specialists or Other Care Team Provider:      Education and Discussions with Family / Patient:  Plan of care discussed with the patient this morning  Discussed plan of care in detail with daughter yesterday evening  No new updates this morning, will call daughter later today if Rheumatology sees the patient  Time Spent for Care: 30 minutes  More than 50% of total time spent on counseling and coordination of care as described above      Current Length of Stay: 21 day(s)    Current Patient Status: Inpatient   Certification Statement: The patient will continue to require additional inpatient hospital stay due to Not medically stable    Discharge Plan:  Hopefully discharged to rehab in next 24-48 hours if patient is afebrile and cleared by consultants  Code Status: Level 1 - Full Code      Subjective:   No overnight events reported  Patient is much more awake and communicative this morning  She reports that she just had breakfast and nursing staff also agrees that she had more breakfast today as compared to before  Patient reports some pain in her buttocks and sees that she needs repositioning  No shortness of breath  She is AO x3 today  Objective:     Vitals:   Temp (24hrs), Av 1 °F (36 2 °C), Min:96 °F (35 6 °C), Max:98 2 °F (36 8 °C)    Temp:  [96 °F (35 6 °C)-98 2 °F (36 8 °C)] 96 °F (35 6 °C)  HR:  [81-95] 93  Resp:  [17-22] 17  BP: (106-155)/(56-72) 130/62  SpO2:  [96 %-98 %] 98 %  Body mass index is 21 77 kg/m²  Input and Output Summary (last 24 hours): Intake/Output Summary (Last 24 hours) at 2019 0834  Last data filed at 2019 0752  Gross per 24 hour   Intake 170 ml   Output 500 ml   Net -330 ml       Physical Exam:     Physical Exam  Constitutional: No distress  Eyes: Pupils are equal, round, and reactive to light  Cardiovascular: Normal heart sounds    No murmur heard  Regular rate and rhythm  Pulmonary/Chest: Effort normal and breath sounds normal  No respiratory distress  She has no wheezes  She has no rales  Abdominal: Soft  Bowel sounds are normal  She exhibits no distension  There is no tenderness  Musculoskeletal: no LE edema a  Neurological: She is alert    Awake and communicative  AAO x3    Nonfocal   Skin: Skin is warm      Additional Data:     Labs:    Results from last 7 days   Lab Units 19  0823  10/31/19  0617  10/29/19  0625   WBC Thousand/uL 8 04   < > 6 10   < > 7 67   HEMOGLOBIN g/dL 7 8*   < > 8 3*   < > 8 8*   HEMATOCRIT % 26 4*   < > 28 2*   < > 30 2*   PLATELETS Thousands/uL 562*   < > 502*   < > 396*   BANDS PCT %  --   -- --   --  1   NEUTROS PCT %  --   --  82*   < >  --    LYMPHS PCT %  --   --  13*   < >  --    LYMPHO PCT %  --   --   --   --  3*   MONOS PCT %  --   --  4   < >  --    MONO PCT %  --   --   --   --  3*   EOS PCT %  --   --  0   < > 0    < > = values in this interval not displayed  Results from last 7 days   Lab Units 11/03/19  0823  10/31/19  0617   SODIUM mmol/L 140   < > 140   POTASSIUM mmol/L 3 5   < > 3 6   CHLORIDE mmol/L 109*   < > 110*   CO2 mmol/L 25   < > 21   BUN mg/dL 20   < > 15   CREATININE mg/dL 0 55*   < > 0 37*   ANION GAP mmol/L 6   < > 9   CALCIUM mg/dL 8 3   < > 8 7   ALBUMIN g/dL  --   --  1 8*   TOTAL BILIRUBIN mg/dL  --   --  0 27   ALK PHOS U/L  --   --  57   ALT U/L  --   --  20   AST U/L  --   --  23   GLUCOSE RANDOM mg/dL 133   < > 108    < > = values in this interval not displayed  Results from last 7 days   Lab Units 10/30/19  0831   INR  1 26*     Results from last 7 days   Lab Units 11/04/19  0606 11/03/19  2105 11/03/19  1612 11/03/19  1055 11/03/19  0626 11/02/19  2106 11/02/19  1536 11/02/19  1015 11/02/19  0610 11/01/19  2050 11/01/19  1612 11/01/19  1054   POC GLUCOSE mg/dl 139 125 137 161* 141* 138 165* 108 201* 176* 106 121                   * I Have Reviewed All Lab Data Listed Above  * Additional Pertinent Lab Tests Reviewed: All Labs Within Last 24 Hours Reviewed    Imaging:    MRI brain wo contrast   Final Result by Lenore Menon MD (11/03 1018)      1  Single focus of acute/subacute infarct in the left precentral gyrus  No hemorrhagic transformation  2   Mild chronic white matter microangiopathy  3   A  focus of gradient susceptibility signal in the anterior medial left cerebellum with corresponding T2 hypointensity, could represent small cavernoma versus sequela of remote microhemorrhage  4   Bilateral mastoid effusions    Correlate for mastoiditis                I personally discussed this study with Kandi Akers on 11/3/2019 at 10:18 AM  Workstation performed: [id number]         MRA carotids wo contrast   Final Result by Ruby Liz MD (11/01 1615)      Bilateral cervical carotid atherosclerotic disease with atheromatous plaque suspected in the distal common carotid arteries and extending into the ICA and ECA origins  There is left greater than right ICA origin stenosis of approximately 60-70% stenosis    on the left and 50-60% stenosis on the right  Severe left ECA stenosis is suspected  Correlation with carotid duplex imaging is recommended  Alternatively, if the patient can receive IV contrast, CTA imaging may provide additional characterization of the atherosclerotic disease and stenosis  Patent codominant vertebral arteries with antegrade flow  Workstation performed: [id number]         MRA head wo contrast   Final Result by Ruby Liz MD (11/01 1558)      Unremarkable MRA of the [place] aside from anatomic variations as described above  Workstation performed: [id number]         FL barium swallow   Final Result by Shahida Luciano MD (11/01 1628)         1  No evidence of obstruction  2   Pooling of contrast within the region of the vallecula, some of which the patient does clear with repeated swallows  Correlation with speech pathology report recommended  Workstation performed: [id number]         FL barium swallow video w speech   Final Result by HEIDI LEES (10/30 1556)      CTA head and neck w wo contrast   Final Result by Gladys Padilla MD (1939)      1  No acute intracranial hemorrhage, mass effect or edema  Microangiopathic changes  2   Approximately 80 mL of Omnipaque 350 infiltrated in the left arm  Further clinical assessment and clinical follow-up advised               I personally discussed this study with Dr Anayeli Cox on 10/29/2019 at 7:35 PM                            Workstation performed: [id number]         XR chest portable   Final Result by Ragini Hughes MD (10/29 1702)      Persistent small left effusion  Slight increase in left base atelectasis  Workstation performed: [id number]         CT head wo contrast   Final Result by Rafaela Martines DO (10/26 1715)      No acute intracranial abnormality  Microangiopathic changes  Chronic lacunar infarct left lentiform nucleus  Workstation performed: [id number]         XR chest pa & lateral   Final Result by Rafaela Martines DO (10/25 1546)      Small pleural effusion  No pneumothorax  Workstation performed: [id number]         CTA abdomen pelvis w wo contrast   Final Result by Liang Wilburn MD (10/24 1707)      1  No acute aortic injury or aneurysm  2   Patent celiac and splenic arteries  3   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  4   Evolving splenic infarct  5   Additional chronic/incidental findings as described  Workstation performed: [id number]         XR abdomen 1 vw portable   Final Result by Tl Song MD (10/21 0018)      There is a nonobstructive bowel gas pattern  Workstation performed: [id number]         XR chest pa & lateral   Final Result by Any Balderas MD (10/19 5631)      Minimal left basilar airspace disease attributed to compressive atelectasis secondary small left pleural effusion  Correlate with clinical findings to exclude pneumonia and/or aspiration                    Workstation performed: [id number]           Recent Cultures (last 7 days):           Last 24 Hours Medication List:     Current Facility-Administered Medications:  acetaminophen 650 mg Rectal Q6H PRN Herman Rueda DO    aluminum-magnesium hydroxide-simethicone 30 mL Oral Q6H PRN Ivan Avalos MD    bisacodyl 10 mg Rectal Daily PRN Heaven MAHAN PA-C    docusate sodium 100 mg Oral BID PRN Ivan Avalos MD    ferrous sulfate 325 mg Oral Daily With Breakfast Mirlande Fitzgerald MD    fluticasone 1 spray Each Nare BID Mirlande Fitzgerald MD    heparin (porcine) 3-30 Units/kg/hr (Order-Specific) Intravenous Titrated Mirlande Fitzgerald MD Last Rate: 22 Units/kg/hr (11/04/19 0007)   heparin (porcine) 2,200 Units Intravenous PRN Mirlande Fitzgerald MD    heparin (porcine) 4,400 Units Intravenous PRN Mirlande Fitzgerald MD    insulin lispro 1-5 Units Subcutaneous HS Julissa Bonner MD    insulin lispro 1-6 Units Subcutaneous TID AC Julissa Bonner MD    lidocaine (PF) 2 mL Infiltration Once Martha Ford PA-C    metoprolol 2 5 mg Intravenous Q6H PRN Mirlande Fitzgerald MD    metoprolol 2 5 mg Intravenous Once Mirlande Fitzgerald MD    metoprolol tartrate 25 mg Oral Q12H [address] Mirlande Fitzgerald MD    mirtazapine 7 5 mg Oral HS Heaven MAHAN PA-C    nystatin 500,000 Units Swish & Swallow 4x Daily Heaven MAHAN PA-C    ondansetron 4 mg Intravenous Q6H PRN Julissa Bonner MD    pantoprazole 40 mg Oral Early Morning Mirlande Fitzgerald MD    pravastatin 40 mg Oral Daily With Kimmy Rosen MD         Today, Patient Was Seen By: Mirlande Fitzgerald MD    ** Please Note: Dictation voice to text software may have been used in the creation of this document   **

## 2019-11-04 NOTE — ASSESSMENT & PLAN NOTE
· Patient has chronic Herrera present on admission- herrera was replaced in ED 10/14   · Placed on recent admission (10/3/19) at [facility] where she developed urinary retention  · Patient will follow up outpatient with urology for voiding trial   · Can attempt trial of void prior to discharge

## 2019-11-04 NOTE — ASSESSMENT & PLAN NOTE
Lab Results   Component Value Date    HGBA1C 6 4 (H) 10/15/2019     Recent Labs     11/03/19  1055 11/03/19  1612 11/03/19  2105 11/04/19  0606   POCGLU 161* 137 125 139       · Takes metformin and glipizide at baseline, will plan to resume metformin at discharge and hold glipizide    · Continue with SSI  · Diabetic diet  · Monitor accuchecks, avoid hypoglycemia

## 2019-11-04 NOTE — ASSESSMENT & PLAN NOTE
· Has been afebrile for more than 48 hours  · Concerns for encephalitis vs vasculitis (less likely), less likely related to splenic infarct  · No apparent infectious etiology  · Urine culture not significant this admission, 8998-9525 GNR  At / [facility], pt was noted to have ESBL in urine which was suspected to be colonization  · Admission blood cultrues are neg  · Noted elevated sed rate and CRP  · Repeat CXR negative  · Flu/RSV negative  · Blood parasitessmear negative  · Blood culture negative so far  · Lyme titer negative  · Evaluating for Anaplasma, Babesiosis given history of tick bite 2 months before  Although suspicion is low  Anaplasma titer negative  · Rheumatology consult given elevated inflammatory markers and persistent fever despite lack of infectious source and now positive JAMILAH  · Infectious Disease following, appreciate input  · Continue to monitor off ABX  · Work up for autoimmune etiology as mentioned above  · Appreciate neurology input

## 2019-11-04 NOTE — ASSESSMENT & PLAN NOTE
· POA  Likely in setting of hyponatremia with component of delirium contributing given recurrent hospitalizations/underlying cognitive impairment  · 11/1:  Patient is more communicative and AAO x3 today  · Urine culture unremarkable - monitor off antibiotics at this time  Pt remains asymptomatic  · Vasculitis vs autoimmune encephalitis?, less likely CNS vasculitis per neurology although encephalitis remains in differential   · Noted elevated CRP and sed rate  · She has hx of adrenal insufficiency, previously on steroids and then tapered off  She follows with Endocrinology as outpatient  · Cosyntropin stim test with adequate response in cortisol, hydrocortisone discontinued, she does not have adrenal insufficiency   · Vitamin B12 and folate normal   · Lyme titer negative  · RPR negative  · JAMILAH positive with homogenous pattern  · Anti thyroglobulin antibodies borderline high  · Meningitis/encephalitis panel negative  · HIV negative  · MRI brain was done at [facility] last month which was negative for any acute infarct but possible small questionable hemorrhage versus area of mineralization  CT head without contrast in [facility] negative for bleed  · MRA head and neck done this admission>> MRA brain unremarkable although MRA neck showed ICA stenosis b/l, left >right and severe left ICA stenosis  · Repeat CT head this admission shows no acute abnormality, chronic lacunar infarct left lentiform nucleus  · Repeat MRI brain on 11/03 shows single focus of acute/subacute infarct in left pre central gyrus  PLAN:  · Neurology on board, discussed with Neurology team on 11/03 regarding MRI findings, per them, MRI findings of acute stroke is not significant enough to cause this level of subacute encephalopathy  · Continue statin and therapeutic anticoagulation    · Follow ENS 1 panel  , CSF lyme, CSF VDRL  · Rheum consult given elevated inflammatory markers and fever with no apparent infectious etiology and now positive JAMILAH

## 2019-11-04 NOTE — PROGRESS NOTES
Progress Note - Neurology   [place] [de-identified] y o  female [id number]  Unit/Bed#: /-01    Assessment:  [place] is a [de-identified] y o  female who presented for evaluation of 2 month history of fluctuating altered mental status  Patient was hyponatremic on arrival, but this has improved  She was found to have a markedly elevated CRP/sed rate , so MRA was performed to rule out vasculitis  Also underwent a lumbar puncture, which was unremarkable  MRI brain did reveal an acute/subacute infarction in the left precentral gyrus, but this is unlikely the cause of her altered mental status  No clear neurologic etiologies to her altered mental status at this time  Plan:  - Stroke pathway   Echocardiogram deferred in light of recent LENO and plan for anticoagulation regardless  Discussed with attending neurologists, Dr Isabel Mendoza and Dr Cordelia Fernandez   Lipid Panel pending    Hemoglobin A1c reviewed, 6 4   Okay to transitional to oral AC from a neurologic standpoint  o If concerned for bleeding in setting of recent splenic infarct, may be beneficial to use Eliquis, as patient would require Eliquis 2 5 mg BID due to body weight and age    o If no concern for bleeding due to recent splenic infarct, (with no concern for bleeding in set of recent CVA), can use either Eliquis or Xarelto   Would prefer patient to be switched from pravastatin 40 to atorvastatin 40 due to stroke   Maintain normotension    Continue telemetry   PT/OT/ST   Continue to monitor and notify neurology with any changes  - Consider vascular input given new finding of infarction and carotid stenosis   - CSF studies pending: Lyme, VDRL, ENC-2  - An outpatient hospital follow up appointment has been requested with the neurology team  The office will call the patient to help set up an appointment     - Rheumatology consult pending   - An outpatient hospital follow up appointment has been requested with the neurology team  The office will call the patient to help set up an appointment  - Medical management and supportive care per primary team  Correction of any metabolic or infectious disturbances  Subjective:   Patient was seen and examined with attending neurologist   The patient was awake and alert  She was minimally interactive on exam   She was oriented x3  She did not offer any complaints  She did appear to be deconditioned  She denies any headache, visual disturbances, difficulty swallowing, word-finding difficulties, focal weakness, or numbness/tingling  Past Medical History:   Diagnosis Date    Cardiac disease     Diabetes mellitus ([facility] [address] )     Hyperlipidemia     Hypertension      Past Surgical History:   Procedure Laterality Date    CORONARY ANGIOPLASTY WITH STENT PLACEMENT       No family history on file    Social History     Socioeconomic History    Marital status: Single     Spouse name: Not on file    Number of children: Not on file    Years of education: Not on file    Highest education level: Not on file   Occupational History    Not on file   Social Needs    Financial resource strain: Not on file    Food insecurity:     Worry: Not on file     Inability: Not on file    Transportation needs:     Medical: Not on file     Non-medical: Not on file   Tobacco Use    Smoking status: Current Every Day Smoker     Packs/day: 0 20   Substance and Sexual Activity    Alcohol use: No    Drug use: No    Sexual activity: Not on file   Lifestyle    Physical activity:     Days per week: Not on file     Minutes per session: Not on file    Stress: Not on file   Relationships    Social connections:     Talks on phone: Not on file     Gets together: Not on file     Attends Christianity service: Not on file     Active member of club or organization: Not on file     Attends meetings of clubs or organizations: Not on file     Relationship status: Not on file    Intimate partner violence:     Fear of current or ex partner: Not on file     Emotionally abused: Not on file     Physically abused: Not on file     Forced sexual activity: Not on file   Other Topics Concern    Not on file   Social History Narrative    Not on file         Medications:   All current active meds have been reviewed and current meds:  Scheduled Meds:  Current Facility-Administered Medications:  acetaminophen 650 mg Rectal Q6H PRN Herman Rueda DO    aluminum-magnesium hydroxide-simethicone 30 mL Oral Q6H PRN Deanna Ambriz MD    bisacodyl 10 mg Rectal Daily PRN Heaven MAHAN PA-C    docusate sodium 100 mg Oral BID PRN Deanna Ambriz MD    ferrous sulfate 325 mg Oral Daily With Breakfast Valeri Yan MD    fluticasone 1 spray Each Nare BID Valeri Yan MD    heparin (porcine) 3-30 Units/kg/hr (Order-Specific) Intravenous Titrated Valeri Yan MD Last Rate: 22 Units/kg/hr (11/04/19 0007)   heparin (porcine) 2,200 Units Intravenous PRN Valeri Yan MD    heparin (porcine) 4,400 Units Intravenous PRN Valeri Yan MD    insulin lispro 1-5 Units Subcutaneous HS Deanna Ambriz MD    insulin lispro 1-6 Units Subcutaneous TID AC Deanna Ambriz MD    lidocaine (PF) 2 mL Infiltration Once Reji Mello PA-C    metoprolol 2 5 mg Intravenous Q6H PRN Valeri Yan MD    metoprolol 2 5 mg Intravenous Once Valeri Yan MD    metoprolol tartrate 25 mg Oral Q12H [facility] Valeri Yan MD    mirtazapine 7 5 mg Oral HS Heaven MAHAN PA-C    nystatin 500,000 Units Swish & Swallow 4x Daily Heaven MAHAN PA-C    ondansetron 4 mg Intravenous Q6H PRN Deanna Ambriz MD    pantoprazole 40 mg Oral Early Morning Valeri Yan MD    pravastatin 40 mg Oral Daily With Alix Biggs MD      Continuous Infusions:  heparin (porcine) 3-30 Units/kg/hr (Order-Specific) Last Rate: 22 Units/kg/hr (11/04/19 0007)     PRN Meds:   acetaminophen    aluminum-magnesium hydroxide-simethicone    bisacodyl    docusate sodium    heparin (porcine)    heparin (porcine)    metoprolol    ondansetron       ROS:   A 12 point ROS was completed  Other than the above mentioned complaints in the HPI, all remaining systems were negative  Vitals:   /62   Pulse 93   Temp (!) 96 °F (35 6 °C)   Resp 17   Ht 5' 2" (1 575 m)   Wt 54 kg (119 lb)   SpO2 98%   BMI 21 77 kg/m²     Physical Exam:   Physical Exam   Constitutional: She is oriented to person, place, and time  She appears well-developed and well-nourished  No distress  Resting comfortably in bed   HENT:   Head: Normocephalic and atraumatic  Right Ear: External ear normal    Left Ear: External ear normal    Nose: Nose normal    Mouth/Throat: Oropharynx is clear and moist    Dry mucous membranes   Eyes: Pupils are equal, round, and reactive to light  Conjunctivae and EOM are normal  Right eye exhibits no discharge  Left eye exhibits no discharge  No scleral icterus  Neck: Normal range of motion  Neck supple  Cardiovascular: Normal rate  Pulmonary/Chest: Effort normal  No respiratory distress  Musculoskeletal: She exhibits no edema, tenderness or deformity  Neurological: She is alert and oriented to person, place, and time  No cranial nerve deficit or sensory deficit  Finger-nose-finger test: Past pointing bilaterally  Reflex Scores:       Bicep reflexes are 2+ on the right side and 2+ on the left side  Brachioradialis reflexes are 2+ on the right side and 2+ on the left side  Patellar reflexes are 2+ on the right side and 2+ on the left side  Achilles reflexes are 0 on the right side and 0 on the left side  Speech is slow   Skin: Skin is warm and dry  No rash noted  She is not diaphoretic  No erythema  Ankles are wrapped due to pressure sores    Psychiatric:   Flat affect  Minimally interactive  Nursing note and vitals reviewed  Neurologic Exam     Mental Status   Oriented to person, place, and time  Patient is awake and alert but is minimally interactive  Speech is slow, hypophonic, but not dysarthric  Intermittently struggles with basic commands  Oriented to person, place, year, president  Cranial Nerves     CN II   Visual fields full to confrontation  Visual acuity: (Grossly intact)    CN III, IV, VI   Pupils are equal, round, and reactive to light  Extraocular motions are normal    Conjugate gaze: present    CN V   Facial sensation intact  CN VII   Facial expression full, symmetric  CN VIII   Hearing impaired: Grossly intact  CN XI   Right sternocleidomastoid strength: normal  Left sternocleidomastoid strength: normal    CN XII   Tongue: not atrophic  Fasciculations: absent  Tongue deviation: none    Motor Exam Moves all 4 extremities spontaneously  Full, symmetric  strength bilaterally  Sensory Exam   Light touch normal    Vibration normal    Sensation to temperature intact     Gait, Coordination, and Reflexes     Gait  Gait: (Deferred)    Coordination   Finger-nose-finger test: Past pointing bilaterally  Tremor   Resting tremor: absent  Intention tremor: absent  Action tremor: absent    Reflexes   Right brachioradialis: 2+  Left brachioradialis: 2+  Right biceps: 2+  Left biceps: 2+  Right patellar: 2+  Left patellar: 2+  Right achilles: 0  Left achilles: 0  Right plantar: normal  Left plantar: normal          Labs: I have personally reviewed pertinent reports     Recent Results (from the past 24 hour(s))   APTT    Collection Time: 11/03/19  1:55 PM   Result Value Ref Range     (H) 23 - 37 seconds   Fingerstick Glucose (POCT)    Collection Time: 11/03/19  4:12 PM   Result Value Ref Range    POC Glucose 137 65 - 140 mg/dl   Fingerstick Glucose (POCT)    Collection Time: 11/03/19  9:05 PM   Result Value Ref Range    POC Glucose 125 65 - 140 mg/dl   APTT    Collection Time: 11/03/19 11:03 PM   Result Value Ref Range    PTT 53 (H) 23 - 37 seconds   APTT    Collection Time: 11/04/19  6:03 AM   Result Value Ref Range    PTT 66 (H) 23 - 37 seconds   Fingerstick Glucose (POCT)    Collection Time: 11/04/19  6:06 AM   Result Value Ref Range    POC Glucose 139 65 - 140 mg/dl   Fingerstick Glucose (POCT)    Collection Time: 11/04/19 11:11 AM   Result Value Ref Range    POC Glucose 145 (H) 65 - 140 mg/dl       Imaging: I have personally reviewed pertinent imaging in PACS, including MRI, MRA, CTA,  and I have personally reviewed PACS reports  EKG, Pathology, and Other Studies: I have personally reviewed pertinent reports         VTE Prophylaxis: Sequential compression device (Venodyne)  and Heparin

## 2019-11-05 ENCOUNTER — APPOINTMENT (INPATIENT)
Dept: NON INVASIVE DIAGNOSTICS | Facility: HOSPITAL | Age: 79
DRG: 545 | End: 2019-11-05
Payer: MEDICARE

## 2019-11-05 LAB
ANION GAP SERPL CALCULATED.3IONS-SCNC: 7 MMOL/L (ref 4–13)
APTT PPP: 104 SECONDS (ref 23–37)
APTT PPP: 71 SECONDS (ref 23–37)
APTT PPP: 71 SECONDS (ref 23–37)
APTT PPP: 98 SECONDS (ref 23–37)
BASOPHILS # BLD AUTO: 0.02 THOUSANDS/ΜL (ref 0–0.1)
BASOPHILS NFR BLD AUTO: 0 % (ref 0–1)
BUN SERPL-MCNC: 16 MG/DL (ref 5–25)
CALCIUM SERPL-MCNC: 8.4 MG/DL (ref 8.3–10.1)
CHLORIDE SERPL-SCNC: 110 MMOL/L (ref 100–108)
CHOLEST SERPL-MCNC: 84 MG/DL (ref 50–200)
CO2 SERPL-SCNC: 24 MMOL/L (ref 21–32)
CREAT SERPL-MCNC: 0.48 MG/DL (ref 0.6–1.3)
CRYOGLOB RF SER-ACNC: ABNORMAL [IU]/ML
EOSINOPHIL # BLD AUTO: 0.04 THOUSAND/ΜL (ref 0–0.61)
EOSINOPHIL NFR BLD AUTO: 1 % (ref 0–6)
ERYTHROCYTE [DISTWIDTH] IN BLOOD BY AUTOMATED COUNT: 18.5 % (ref 11.6–15.1)
GFR SERPL CREATININE-BSD FRML MDRD: 93 ML/MIN/1.73SQ M
GLUCOSE SERPL-MCNC: 122 MG/DL (ref 65–140)
GLUCOSE SERPL-MCNC: 138 MG/DL (ref 65–140)
GLUCOSE SERPL-MCNC: 141 MG/DL (ref 65–140)
GLUCOSE SERPL-MCNC: 147 MG/DL (ref 65–140)
GLUCOSE SERPL-MCNC: 150 MG/DL (ref 65–140)
HCT VFR BLD AUTO: 26.9 % (ref 34.8–46.1)
HDLC SERPL-MCNC: 23 MG/DL
HGB BLD-MCNC: 8.1 G/DL (ref 11.5–15.4)
IMM GRANULOCYTES # BLD AUTO: 0.07 THOUSAND/UL (ref 0–0.2)
IMM GRANULOCYTES NFR BLD AUTO: 1 % (ref 0–2)
LDLC SERPL CALC-MCNC: 39 MG/DL (ref 0–100)
LYMPHOCYTES # BLD AUTO: 1.37 THOUSANDS/ΜL (ref 0.6–4.47)
LYMPHOCYTES NFR BLD AUTO: 18 % (ref 14–44)
MCH RBC QN AUTO: 23.1 PG (ref 26.8–34.3)
MCHC RBC AUTO-ENTMCNC: 30.1 G/DL (ref 31.4–37.4)
MCV RBC AUTO: 77 FL (ref 82–98)
MONOCYTES # BLD AUTO: 0.46 THOUSAND/ΜL (ref 0.17–1.22)
MONOCYTES NFR BLD AUTO: 6 % (ref 4–12)
NEUTROPHILS # BLD AUTO: 5.55 THOUSANDS/ΜL (ref 1.85–7.62)
NEUTS SEG NFR BLD AUTO: 74 % (ref 43–75)
NRBC BLD AUTO-RTO: 0 /100 WBCS
PLATELET # BLD AUTO: 590 THOUSANDS/UL (ref 149–390)
PMV BLD AUTO: 10.1 FL (ref 8.9–12.7)
POTASSIUM SERPL-SCNC: 3.2 MMOL/L (ref 3.5–5.3)
RBC # BLD AUTO: 3.5 MILLION/UL (ref 3.81–5.12)
RHEUMATOID FACT SER QL LA: POSITIVE
SODIUM SERPL-SCNC: 141 MMOL/L (ref 136–145)
TRIGL SERPL-MCNC: 112 MG/DL
WBC # BLD AUTO: 7.51 THOUSAND/UL (ref 4.31–10.16)

## 2019-11-05 PROCEDURE — 85730 THROMBOPLASTIN TIME PARTIAL: CPT | Performed by: INTERNAL MEDICINE

## 2019-11-05 PROCEDURE — 85025 COMPLETE CBC W/AUTO DIFF WBC: CPT | Performed by: INTERNAL MEDICINE

## 2019-11-05 PROCEDURE — 80061 LIPID PANEL: CPT | Performed by: PHYSICIAN ASSISTANT

## 2019-11-05 PROCEDURE — 82948 REAGENT STRIP/BLOOD GLUCOSE: CPT

## 2019-11-05 PROCEDURE — 80048 BASIC METABOLIC PNL TOTAL CA: CPT | Performed by: INTERNAL MEDICINE

## 2019-11-05 PROCEDURE — 97530 THERAPEUTIC ACTIVITIES: CPT

## 2019-11-05 PROCEDURE — 99232 SBSQ HOSP IP/OBS MODERATE 35: CPT | Performed by: INTERNAL MEDICINE

## 2019-11-05 PROCEDURE — 99232 SBSQ HOSP IP/OBS MODERATE 35: CPT | Performed by: PHYSICIAN ASSISTANT

## 2019-11-05 PROCEDURE — 97535 SELF CARE MNGMENT TRAINING: CPT

## 2019-11-05 RX ORDER — METOPROLOL TARTRATE 50 MG/1
50 TABLET, FILM COATED ORAL EVERY 12 HOURS SCHEDULED
Status: DISCONTINUED | OUTPATIENT
Start: 2019-11-05 | End: 2019-11-16 | Stop reason: HOSPADM

## 2019-11-05 RX ORDER — POTASSIUM CHLORIDE 20 MEQ/1
40 TABLET, EXTENDED RELEASE ORAL ONCE
Status: COMPLETED | OUTPATIENT
Start: 2019-11-05 | End: 2019-11-05

## 2019-11-05 RX ADMIN — METOPROLOL TARTRATE 50 MG: 50 TABLET, FILM COATED ORAL at 21:15

## 2019-11-05 RX ADMIN — Medication 10 MG: at 10:07

## 2019-11-05 RX ADMIN — FERROUS SULFATE TAB 325 MG (65 MG ELEMENTAL FE) 325 MG: 325 (65 FE) TAB at 07:13

## 2019-11-05 RX ADMIN — NYSTATIN 500000 UNITS: 500000 SUSPENSION ORAL at 07:16

## 2019-11-05 RX ADMIN — MIRTAZAPINE 7.5 MG: 15 TABLET, FILM COATED ORAL at 21:15

## 2019-11-05 RX ADMIN — FLUTICASONE PROPIONATE 1 SPRAY: 50 SPRAY, METERED NASAL at 09:58

## 2019-11-05 RX ADMIN — NYSTATIN 500000 UNITS: 500000 SUSPENSION ORAL at 17:17

## 2019-11-05 RX ADMIN — NYSTATIN 500000 UNITS: 500000 SUSPENSION ORAL at 21:15

## 2019-11-05 RX ADMIN — HEPARIN SODIUM AND DEXTROSE 22 UNITS/KG/HR: 10000; 5 INJECTION INTRAVENOUS at 09:58

## 2019-11-05 RX ADMIN — DOCUSATE SODIUM 100 MG: 100 CAPSULE, LIQUID FILLED ORAL at 09:57

## 2019-11-05 RX ADMIN — POTASSIUM CHLORIDE 40 MEQ: 1500 TABLET, EXTENDED RELEASE ORAL at 12:54

## 2019-11-05 RX ADMIN — METOPROLOL TARTRATE 2.5 MG: 5 INJECTION INTRAVENOUS at 01:41

## 2019-11-05 RX ADMIN — FLUTICASONE PROPIONATE 1 SPRAY: 50 SPRAY, METERED NASAL at 17:17

## 2019-11-05 RX ADMIN — ACETAMINOPHEN 650 MG: 650 SUPPOSITORY RECTAL at 09:20

## 2019-11-05 RX ADMIN — METOPROLOL TARTRATE 2.5 MG: 5 INJECTION INTRAVENOUS at 09:14

## 2019-11-05 RX ADMIN — NYSTATIN 500000 UNITS: 500000 SUSPENSION ORAL at 12:54

## 2019-11-05 RX ADMIN — PANTOPRAZOLE SODIUM 40 MG: 40 TABLET, DELAYED RELEASE ORAL at 07:06

## 2019-11-05 RX ADMIN — METOPROLOL TARTRATE 25 MG: 25 TABLET ORAL at 09:57

## 2019-11-05 RX ADMIN — INSULIN LISPRO 1 UNITS: 100 INJECTION, SOLUTION INTRAVENOUS; SUBCUTANEOUS at 10:46

## 2019-11-05 RX ADMIN — ATORVASTATIN CALCIUM 40 MG: 40 TABLET, FILM COATED ORAL at 15:50

## 2019-11-05 NOTE — OCCUPATIONAL THERAPY NOTE
[address] Progress Note     Patient Name: [place]  Today's Date: 11/5/2019  Problem List  Principal Problem:    Metabolic encephalopathy  Active Problems:    Hyponatremia    Type 2 diabetes mellitus with diabetic neuropathy, without long-term current use of insulin (HCC)    Essential hypertension    Failure to thrive in adult    Urinary retention    Severe protein-calorie malnutrition (HCC)    Fever    Microcytic anemia    Splenic infarct    A-fib (HCC)    Goals of care, counseling/discussion    Internal carotid artery stenosis, bilateral    JAMILAH positive    Acute CVA (cerebrovascular accident) ([facility] [address] )                11/05/19 1456   Restrictions/Precautions   Weight Bearing Precautions Per Order No   Other Precautions Cognitive;Multiple lines; Fall Risk;Pain   Lifestyle   Autonomy Has needed assist w self care/mobility for the past one month  was at [facility] rehab for rehab following 2 hospitalizations   Reciprocal Relationships supportive family   Intrinsic Gratification unknown   Pain Assessment   Pain Assessment FLACC   Pain Rating: FLACC (Rest) - Face 0   Pain Rating: FLACC (Rest) - Legs 0   Pain Rating: FLACC (Rest) - Activity 0   Pain Rating: FLACC (Rest) - Cry 0   Pain Rating: FLACC (Rest) - Consolability 0   Score: FLACC (Rest) 0   Pain Rating: FLACC (Activity) - Face 1   Pain Rating: FLACC (Activity) - Legs 0   Pain Rating: FLACC (Activity) - Activity 0   Pain Rating: FLACC (Activity) - Cry 1   Pain Rating: FLACC (Activity) - Consolability 0   Score: FLACC (Activity) 2   ADL   Grooming Assistance Other (Comment)   Grooming Deficit Brushing hair   Grooming Comments attempted grooming activity w/ pt supine in bed with HOB elevated  VC and demonstration provided to initiate participation, but pt lethargic and unable to initiate   [address]  Other (Comment)   Toileting Deficit Use of bedpan/urinal setup   Toileting Comments pt reported needing to use bathroom for BM   Attempted use of bedpan, but pt unable to go   Bed Mobility   Rolling R 2  Maximal assistance   Additional items Assist x 1;Verbal cues   Rolling L 2  Maximal assistance   Additional items Assist x 1;Verbal cues   Supine to Sit 2  Maximal assistance   Additional items Assist x 2;HOB elevated; Increased time required;Verbal cues;LE management   Sit to Supine 2  Maximal assistance   Additional items Assist x 2; Increased time required;Verbal cues;LE management   Additional Comments Pt received supine in bed with HOB elevated  Required Max A x 2 to generate enough force to sit EOB and provide LE mgmt  Needed VC for safety  Pt moved from sitting EOB to supine in bed with Max A x 2 with LE mgmt and VC for safety  Pt required Max A x 1 rolling L/R when placing bedpan underneath pt for toileting   Transfers   Sit to Stand 2  Maximal assistance   Additional items Assist x 2; Increased time required;Verbal cues   Stand to Sit 2  Maximal assistance   Additional items Assist x 2; Increased time required;Verbal cues   Additional Comments Required Max A x 2, HHA and use of RW  Performed sit to stand from EOB and stand to sit back onto EOB  Required VC for safety, hand placement, and to stand upright  Functional Mobility   Additional Comments pt lethargic and unsafe to mobilize at this time   Cognition   Overall Cognitive Status Impaired   Arousal/Participation Arousable;Lethargic;Persistent stimuli required;Poorly responsive   Attention Difficulty attending to directions   Orientation Level Disoriented to person;Disoriented to place; Disoriented to time;Disoriented to situation   Memory Decreased short term memory;Decreased recall of recent events;Decreased recall of biographical information   Following Commands Follows one step commands inconsistently   Comments Pt unable to correctly answer orientation questions  Pt able to get month and place when given multiple VC and multiple choice options   Pt very lethargic requiring continual stimuli to participate  Pt is minimally responsive and slow to respond  Activity Tolerance   Activity Tolerance Patient limited by fatigue   Medical Staff Made Aware PT [facility] [place] [facility]   Assessment   Assessment Pt seen for skilled OT treatment session focusing on cognitive re-orientation, bed mobility, functional transfers, sitting/standing tolerance, sitting/standing balance, trunk control, ADL retraining, and toileting  Upon entrance into room, pt supine in bed with HOB elevated  Pt agreeable to OT services on this date  Pt requiring Max A x 2 for bed mobility, Max A x 2 with use of RW for functional transfers/mobility, and Max A x 1 for EOB sitting  Pt is very lethargic but arousable, requiring continual verbal/tactile stimuli to encourage participation and engagement throughout session  Pt is minimally responsive and slow to respond  Pt moved supine to sit and reported dizziness once EOB  BP was taken reading 119/73  Pt continued to sit for 5-10 min with Max A x 1  Attempted sit to stand with RW  Pt needed Max A x 2 and VC for safety and hand placement  Pt reported needing to use bathroom for BM  Pt moved from EOB to supine in bed with HOB elevated  Once on bedpan pt unable to go  Attempted initiation of grooming activity with VC and demonstration but pt lethargic and unable to initiate participation  Overall, pt continues to be limited by decreased cognition, lethargy, decreased sitting/standing balance, decreased sitting/standing tolerance, decreased activity tolerance, decreased endurance, limited insight to deficits, poor command following, decreased arousal  Pt will continue to be seen for skilled OT 3-5x/wk to achieve listed goals  Plan   Treatment Interventions ADL retraining;Functional transfer training;UE strengthening/ROM; Endurance training;Cognitive reorientation;Patient/family training;Equipment evaluation/education; Compensatory technique education;Continued evaluation; Energy conservation; Activityengagement   Goal Expiration Date 11/14/19   OT Treatment Day 2   OT Frequency 3-5x/wk   Recommendation   OT Discharge Recommendation Short Term Rehab   OT - OK to Discharge Yes  (once medically stable)   Barthel Index   Feeding 5   Bathing 0   Grooming Score 0   Dressing Score 0   Bladder Score 0   Bowels Score 5   Toilet Use Score 5   Transfers (Bed/Chair) Score 5   Mobility (Level Surface) Score 0   Stairs Score 0   Barthel Index Score 20   Modified [place] Scale   Modified Levi Scale 4     Leana Detweiler, ALLISON

## 2019-11-05 NOTE — NUTRITION
11/05/19 1200   Recommendations/Interventions   Summary Patient's appetite remains very poor, most meal completions 0-25% noted  Patient is not consuming nutrition supplements  Weight loss continues  Nursing skin care plan reviewed  SLIM PA made aware of continued poor po intake and recommendation for EN at this time secondary to malnutrition  Interventions Diet: continued as ordered; Supplement continue;EN initiate   Nutrition Recommendations Tube Feeding Recommendation provided;Lab - consider order (specify)  (Secondary to continued poor po intake and weight loss suggest initiate cyclic EN to provide 05% nutritional needs (? keofeed vs PEG)  Rec: Jevity 1 2 kcal @ 60 ml/hr x16 hours/day (1152 kcal, 53 grams protein, 774 ml tv)   Monitor electrolytes  )

## 2019-11-05 NOTE — SOCIAL WORK
On 11/4/19, CM updated referrals to SNF in [facility]  Physician had expected pt to be ready within 24-48 hours for transfer

## 2019-11-05 NOTE — ASSESSMENT & PLAN NOTE
· POA  Likely in setting of hyponatremia with component of delirium contributing given recurrent hospitalizations/underlying cognitive impairment  · Urine culture unremarkable  No acute UTI  · Vasculitis vs autoimmune encephalitis?, less likely CNS vasculitis per neurology although encephalitis remains in differential   · Noted elevated CRP and sed rate  · She has hx of adrenal insufficiency, previously on steroids and then tapered off  She follows with Endocrinology as outpatient  · Cosyntropin stim test with adequate response in cortisol, hydrocortisone discontinued, she does not have adrenal insufficiency   · Vitamin B12 and folate normal   · Lyme titer negative  · RPR negative  · JAMILAH positive with homogenous pattern  · Anti thyroglobulin antibodies borderline high  · Meningitis/encephalitis panel negative  · HIV negative  · MRI brain was done at [facility] last month which was negative for any acute infarct but possible small questionable hemorrhage versus area of mineralization  CT head without contrast in [facility] negative for bleed  · MRA neck showed ICA stenosis b/l, left >right and severe left ICA stenosis  · Repeat CT head this admission shows no acute abnormality, chronic lacunar infarct left lentiform nucleus  · Repeat MRI brain on 11/03 shows single focus of acute/subacute infarct in left pre central gyrus  · Neurology on board, discussed with Neurology team on 11/03 regarding MRI findings, per them, MRI findings of acute stroke is not significant enough to cause this level of subacute encephalopathy  · Continue statin and therapeutic anticoagulation  · Follow ENS 1 panel  , CSF lyme, CSF VDRL  · Rheum consult given elevated inflammatory markers and fever with no apparent infectious etiology and now positive JAMILAH

## 2019-11-05 NOTE — ASSESSMENT & PLAN NOTE
· MRI brain on 11/3 shows acute to subacute CVA in left pre central gyrus in setting of rapid afib  · Neurology following and appreciate their input  · Continue statin and therapeutic AC  · Currently patient is on heparin gtts in case rheumatology feels the need to do a biopsy  · Eventual transition to oral AC (Eliquis 2 5 mg BID)

## 2019-11-05 NOTE — ASSESSMENT & PLAN NOTE
· Since September she has had a progressive decline - she has had no appetite, not really eating/drinking with weakness  She was previously reportedly very independent prior to her admission at [facility] in September with UTI  · Geriatrics following, recommending outpatient follow up  · Speech following, status post VBS on 10/30, discussed with Swallow therapy, okay for pureed diet with nectar thick liquids  · Continue nystatin for thrush  · Barium swallow negative for esophageal obstruction  · Goals of care as mentioned

## 2019-11-05 NOTE — PLAN OF CARE
Problem: PHYSICAL THERAPY ADULT  Goal: Performs mobility at highest level of function for planned discharge setting  See evaluation for individualized goals  Description  Treatment/Interventions: Functional transfer training, LE strengthening/ROM, Therapeutic exercise, Endurance training, Patient/family training, Equipment eval/education, Bed mobility, Gait training, Spoke to nursing  Equipment Recommended: Walker(current use of RW for mobility)       See flowsheet documentation for full assessment, interventions and recommendations  Outcome: Progressing  Note:   Prognosis: Guarded  Problem List: Decreased strength, Decreased range of motion, Decreased endurance, Impaired balance, Decreased mobility, Decreased coordination, Decreased safety awareness, Pain  Assessment: Pt  is an [de-identified] yo F who presents with metabolic encephalopathy with recently diagnosed Acute CVA  Pt  was identified with full name and birthdate  Pt  agreeable to PT session  Pt  Noted to be extremely lethargic during today's session  Pt  Required increased hands on assistance throughout session with limited ability to maintain static sitting at EOB  Pt  Required MaxAx2 to stand with notable retropulsion and postural degradation in standing requiring impulsive return to sitting  Pt  Is progressing towards goals however progress is slowed 2/2 to limited activity tolerance and lethargy today  Pt  Will benefit from continued skilled therapy to increase functional independence and aid patient in return to PLOF with decreased burden of care  Recommendation: Post acute IP rehab     PT - OK to Discharge: Yes(to rehab when medically appropriate)    See flowsheet documentation for full assessment

## 2019-11-05 NOTE — ASSESSMENT & PLAN NOTE
Malnutrition Findings:   Malnutrition type: Chronic illness(Related to medical condition as evidenced by 10% weight loss over the past month and <75% energy intake needs met >1 month treated with ensure compact supplements)  Degree of Malnutrition: Other severe protein calorie malnutrition    BMI Findings: Body mass index is 21 53 kg/m²  Reported weight has fluctuated significantly throughout hospitalization, will work on obtaining more accurate weights  Nutrition consult and supplements

## 2019-11-05 NOTE — ASSESSMENT & PLAN NOTE
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  · Presented with sodium of 126  Sodium today: 141  · TSH normal this admission  · Cosyntropin stim test negative for adrenal insufficiency  · CT chest abdomen pelvis was done at [facility] to rule out any occult malignancy as a cause of SIADH which was negative for any malignancy  · Sodium tablets stopped on 11/1 by Nephrology  · Continue 1500 cc fluid restriction  · [id number] Cher[address] Dumas for d/c from a nephrology standpoint, will need outpatient f/u with [facility] nephrology  Recommending BMP in 1 week with results to PCP for further management of NACL tabs  Protonix changed to Pepcid  ACEI stopped as well

## 2019-11-05 NOTE — PROGRESS NOTES
Progress Note - Pam Oliveira 1940, [de-identified] y o  female MRN: [id number]  Unit/Bed#: -01 Encounter: [id number]  Primary Care Provider: Maikol Domínguez MD   Date and time admitted to hospital: 10/14/2019  0:72 PM    * Metabolic encephalopathy  Assessment & Plan  · POA  Likely in setting of hyponatremia with component of delirium contributing given recurrent hospitalizations/underlying cognitive impairment  · Urine culture unremarkable  No acute UTI  · Vasculitis vs autoimmune encephalitis?, less likely CNS vasculitis per neurology although encephalitis remains in differential   · Noted elevated CRP and sed rate  · She has hx of adrenal insufficiency, previously on steroids and then tapered off  She follows with Endocrinology as outpatient  · Cosyntropin stim test with adequate response in cortisol, hydrocortisone discontinued, she does not have adrenal insufficiency   · Vitamin B12 and folate normal   · Lyme titer negative  · RPR negative  · JAMILAH positive with homogenous pattern  · Anti thyroglobulin antibodies borderline high  · Meningitis/encephalitis panel negative  · HIV negative  · MRI brain was done at [facility] last month which was negative for any acute infarct but possible small questionable hemorrhage versus area of mineralization  CT head without contrast in [facility] negative for bleed  · MRA neck showed ICA stenosis b/l, left >right and severe left ICA stenosis  · Repeat CT head this admission shows no acute abnormality, chronic lacunar infarct left lentiform nucleus  · Repeat MRI brain on 11/03 shows single focus of acute/subacute infarct in left pre central gyrus  · Neurology on board, discussed with Neurology team on 11/03 regarding MRI findings, per them, MRI findings of acute stroke is not significant enough to cause this level of subacute encephalopathy  · Continue statin and therapeutic anticoagulation    · Follow ENS 1 panel  , CSF lyme, CSF VDRL  · Rheum consult given elevated inflammatory markers and fever with no apparent infectious etiology and now positive JAMILAH  Acute CVA (cerebrovascular accident) [facility])  Assessment & Plan  · MRI brain on 11/3 shows acute to subacute CVA in left pre central gyrus in setting of rapid afib  · Neurology following and appreciate their input  · Continue statin and therapeutic AC  · Currently patient is on heparin gtts in case rheumatology feels the need to do a biopsy  · Eventual transition to oral AC (Eliquis 2 5 mg BID)  JAMILAH positive  Assessment & Plan  · With homogeneous pattern  · Noted elevated sed rate and CRP  · Concerns for vasculitis as a cause for encephalopathy  · Rheumatoid factor positive  · ANCA panel pending  · Await rheumatology input, already consulted  Internal carotid artery stenosis, bilateral  Assessment & Plan  · Noted to have bilateral internal carotid artery stenosis left 60-70% and right 50-60% with severe left ECA stenosis  · MRI brain shows small acute to subacute single focus of infarct in left precentral gyrus  Discussed with Neurology regarding this findings  Appreciate Neurology input who recommends vascular surgery evaluation  · Continue statin and anticoagulation with IV heparin  Goals of care, counseling/discussion  Assessment & Plan  · Participated in a family meeting on  10 31 with palliative care  · Daughter is overwhelmed right now due to her brother's death a day before and would like to think more about code status  · Currently patient remains full code  A-fib [facility])  Assessment & Plan  · Afib with RVR on 10 29 on tele intermittently  · TSH normal this admission  · LENO showed EF 55% without any intracardiac thrombus  · Switch metoprolol from IV to p o  25 mg b i d  As patient is able to tolerate p o  Diet now  ·  IV heparin for AC given Afib and splenic infarct  · Cardiology on board, appreciate input      Splenic infarct  Assessment & Plan  · Noted on CT scan done at [facility] 9/2019, new moderate sized splenic infarcts  · Likely etiology cardio embolic as pt was noted to have PAF with RVR on tele on 10/29  · CTA abdomen/pelvis:  No acute aortic injury or aneurysm  Patent celiac and splenic arteries  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  Evolving splenic infarct  Additional chronic/incidental findings as described  · LENO negative for PFO and obvious intracardiac thrombus, 55% EF  · General surgery consult appreciated, no further workup from their standpoint  · Appreciate hematology input  · Continue iv heparin for now for [facility] for PAF, switch to DOAC, if no further procedure is planned  Await rheum input if there is any need for biopsy  · Appreciate cardiology input  Microcytic anemia  Assessment & Plan  · Baseline seems between 8-11  · Iron studies suggestive of low iron  · Continue  p o  iron supplementation which was started this admission  Fever  Assessment & Plan  · Has been afebrile for more than 48 hours  · Concerns for encephalitis vs vasculitis (less likely), less likely related to splenic infarct  · No apparent infectious etiology  · Urine culture not significant this admission, 5967-6410 GNR  At [facility], pt was noted to have ESBL in urine which was suspected to be colonization  · Admission blood cultrues are neg  · Noted elevated sed rate and CRP  · Repeat CXR negative  · Flu/RSV negative  · Blood parasitessmear negative  · Blood culture negative (final)  · Lyme titer negative  · Evaluating for Anaplasma, Babesiosis given history of tick bite 2 months before  Although suspicion is low  Anaplasma titer negative  · Rheumatology consult given elevated inflammatory markers and persistent fever despite lack of infectious source and now positive JAMILAH  · Infectious Disease following, appreciate input  · Continue to monitor off ABX    · Work up for autoimmune etiology as mentioned above  · Appreciate neurology input  Severe protein-calorie malnutrition ([address] )  Assessment & Plan  Malnutrition Findings:   Malnutrition type: Chronic illness(Related to medical condition as evidenced by 10% weight loss over the past month and <75% energy intake needs met >1 month treated with ensure compact supplements)  Degree of Malnutrition: Other severe protein calorie malnutrition    BMI Findings: Body mass index is 21 53 kg/m²  Reported weight has fluctuated significantly throughout hospitalization, will work on obtaining more accurate weights  Nutrition consult and supplements  Urinary retention  Assessment & Plan  · Patient has chronic Herrera present on admission- herrera was replaced in ED 10/14   · Placed on recent admission (10/3/19) at [facility] where she developed urinary retention  · Patient will follow up outpatient with urology for voiding trial when patient's physical activity levels improve  Failure to thrive in adult  Assessment & Plan  · Since September she has had a progressive decline - she has had no appetite, not really eating/drinking with weakness  She was previously reportedly very independent prior to her admission at [facility] in September with UTI  · Geriatrics following, recommending outpatient follow up  · Speech following, status post VBS on 10/30, discussed with Swallow therapy, okay for pureed diet with nectar thick liquids  · Continue nystatin for thrush  · Barium swallow negative for esophageal obstruction  · Goals of care as mentioned  Unsuccessful attempt to reach patient's daughter today to discuss  · Nutrition has made the recommendation to initiate tube feeding given patient's persistently poor PO intake  Will need to discuss goals of care with patient's daughter before placing Keofed and initiating nutrition with Jevity 1 2 kcal @ 60 ml/hr x16 hours/day      Essential hypertension  Assessment & Plan  · Atenolol switched to metoprolol given Afib with RVR  Patient is back in RVR  Will increase metoprolol to 50 mg BID  · Hold home dose ACEI  · Amlodipine on hold as well  · BP controlled without ACEI or CCB  Type 2 diabetes mellitus with diabetic neuropathy, without long-term current use of insulin [facility])  Assessment & Plan  Lab Results   Component Value Date    HGBA1C 6 4 (H) 10/15/2019     Recent Labs     11/04/19  1552 11/04/19  2054 11/05/19  0525 11/05/19  1045   POCGLU 198* 168* 141* 150*     · Takes metformin and glipizide at baseline, will plan to resume metformin at discharge and hold glipizide  · Continue with SSI  · Diabetic diet  · Monitor accuchecks, avoid hypoglycemia    Hyponatremia  Assessment & Plan  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  · Presented with sodium of 126  Sodium today: 141  · TSH normal this admission  · Cosyntropin stim test negative for adrenal insufficiency  · CT chest abdomen pelvis was done at [facility] to rule out any occult malignancy as a cause of SIADH which was negative for any malignancy  · Sodium tablets stopped on 11/1 by Nephrology  · Continue 1500 cc fluid restriction  · [address] Cherrie Dumas for d/c from a nephrology standpoint, will need outpatient f/u with [facility] nephrology  Recommending BMP in 1 week with results to PCP for further management of NACL tabs  Protonix changed to Pepcid  ACEI stopped as well  VTE Pharmacologic Prophylaxis:   Pharmacologic: Heparin Drip  Mechanical: Mechanical VTE prophylaxis in place  Patient Centered Rounds: I have performed bedside rounds with nursing staff today  Discussions with Specialists or Other Care Team Provider: None  Education and Discussions with Family / Patient: Unsuccessful attempt to reach patient's daughter  Time Spent for Care: 20 minutes  More than 50% of total time spent on counseling and coordination of care as described above      Current Length of Stay: 22 day(s)  Current Patient Status: Inpatient   Certification Statement: The patient will continue to require additional inpatient hospital stay due to need for rheumatology evaluation  Discharge Plan: Patient will need rehab placement at discharge  Code Status: Level 1 - Full Code    Subjective:   Patient does not participate much in interview, exhibiting fatigue  She shakes her head "no" when asked if she has shortness of breath, chest pain  Objective:   Vitals:   Temp (24hrs), Av 7 °F (37 1 °C), Min:98 1 °F (36 7 °C), Max:99 6 °F (37 6 °C)    Temp:  [98 1 °F (36 7 °C)-99 6 °F (37 6 °C)] 98 5 °F (36 9 °C)  HR:  [] 121  Resp:  [18-20] 20  BP: (102-128)/(48-63) 122/62  SpO2:  [92 %-98 %] 97 %  Body mass index is 21 53 kg/m²  Input and Output Summary (last 24 hours): Intake/Output Summary (Last 24 hours) at 2019 1136  Last data filed at 2019 1039  Gross per 24 hour   Intake    Output 775 ml   Net -775 ml       Physical Exam:     Physical Exam   HENT:   Head: Normocephalic and atraumatic  Mouth/Throat: Oropharynx is clear and moist and mucous membranes are normal    Eyes: No scleral icterus  Cardiovascular: Normal rate  An irregularly irregular rhythm present  No murmur heard  Pulmonary/Chest: Breath sounds normal  She has no wheezes  She has no rales  She exhibits no tenderness  Abdominal: Soft  Bowel sounds are normal  She exhibits no distension  There is no tenderness  Musculoskeletal: Normal range of motion  She exhibits no edema  Skin: Skin is warm and dry  No rash noted  Psychiatric: She has a normal mood and affect  Vitals reviewed      Additional Data:   Labs:  Results from last 7 days   Lab Units 19   WBC Thousand/uL 7 51   HEMOGLOBIN g/dL 8 1*   HEMATOCRIT % 26 9*   PLATELETS Thousands/uL 590*   NEUTROS PCT % 74   LYMPHS PCT % 18   MONOS PCT % 6   EOS PCT % 1     Results from last 7 days   Lab Units 11/05/19  0519  10/31/19  0617   POTASSIUM mmol/L 3 2*   < > 3 6 CHLORIDE mmol/L 110*   < > 110*   CO2 mmol/L 24   < > 21   BUN mg/dL 16   < > 15   CREATININE mg/dL 0 48*   < > 0 37*   CALCIUM mg/dL 8 4   < > 8 7   ALK PHOS U/L  --   --  57   ALT U/L  --   --  20   AST U/L  --   --  23    < > = values in this interval not displayed  Results from last 7 days   Lab Units 10/30/19  0831   INR  1 26*       * I Have Reviewed All Lab Data Listed Above  * Additional Pertinent Lab Tests Reviewed: All Labs Within Last 24 Hours Reviewed    Imaging:    Imaging Reports Reviewed Today Include: None new    Cultures:   Blood Culture:   Lab Results   Component Value Date    BLOODCX No Growth After 5 Days  10/25/2019    BLOODCX No Growth After 5 Days  10/25/2019    BLOODCX No Growth After 5 Days  10/14/2019    BLOODCX No Growth After 5 Days   10/14/2019     Urine Culture:   Lab Results   Component Value Date    URINECX 3855-0578 cfu/ml Gram Negative Nirav (A) 10/15/2019    URINECX >100,000 cfu/ml Escherichia coli 07/16/2017     Sputum Culture: No components found for: SPUTUMCX  Wound Culture: No results found for: WOUNDCULT    Last 24 Hours Medication List:     Current Facility-Administered Medications:  acetaminophen 650 mg Rectal Q6H PRN Herman Rueda DO    aluminum-magnesium hydroxide-simethicone 30 mL Oral Q6H PRN Massiel Grayson MD    atorvastatin 40 mg Oral Daily With Lili Prado MD    bisacodyl 10 mg Rectal Daily PRN Mariluz MAHAN PA-C    docusate sodium 100 mg Oral BID PRN Massiel Grayson MD    ferrous sulfate 325 mg Oral Daily With Breakfast Lisbet Breen MD    fluticasone 1 spray Each Nare BID Lisbet Breen MD    heparin (porcine) 3-30 Units/kg/hr (Order-Specific) Intravenous Titrated Lisbet Breen MD Last Rate: 22 Units/kg/hr (11/05/19 0958)   heparin (porcine) 2,200 Units Intravenous PRN Lisbet Breen MD    heparin (porcine) 4,400 Units Intravenous PRN Lisbet Breen MD    insulin lispro 1-5 Units Subcutaneous HS Massiel Grayson MD    insulin lispro 1-6 Units Subcutaneous TID AC Gen Valdivia MD    lidocaine (PF) 2 mL Infiltration Once Matthias Devi PA-C    metoprolol 2 5 mg Intravenous Q6H PRN Ginny Montero MD    metoprolol tartrate 25 mg Oral Q12H [address] Ginny Montero MD    mirtazapine 7 5 mg Oral HS Heaven MAHAN PA-C    nystatin 500,000 Units Swish & Swallow 4x Daily Heaven MAHAN PA-C    ondansetron 4 mg Intravenous Q6H PRN Gen Valdivia MD    pantoprazole 40 mg Oral Early Morning Ginny Montero MD         Today, Patient Was Seen By: Justice Palmer PA-C    ** Please Note: Dragon 360 Dictation voice to text software may have been used in the creation of this document   **

## 2019-11-05 NOTE — ASSESSMENT & PLAN NOTE
· Has been afebrile for more than 48 hours  · Concerns for encephalitis vs vasculitis (less likely), less likely related to splenic infarct  · No apparent infectious etiology  · Urine culture not significant this admission, 8957-8147 GNR  At [facility], pt was noted to have ESBL in urine which was suspected to be colonization  · Admission blood cultrues are neg  · Noted elevated sed rate and CRP  · Repeat CXR negative  · Flu/RSV negative  · Blood parasitessmear negative  · Blood culture negative (final)  · Lyme titer negative  · Evaluating for Anaplasma, Babesiosis given history of tick bite 2 months before  Although suspicion is low  Anaplasma titer negative  · Rheumatology consult given elevated inflammatory markers and persistent fever despite lack of infectious source and now positive JAMILAH  · Infectious Disease following, appreciate input  · Continue to monitor off ABX  · Work up for autoimmune etiology as mentioned above  · Appreciate neurology input

## 2019-11-05 NOTE — ASSESSMENT & PLAN NOTE
· Baseline seems between 8-11  · Iron studies suggestive of low iron  · Continue  p o  iron supplementation which was started this admission

## 2019-11-05 NOTE — PHYSICAL THERAPY NOTE
PHYSICAL THERAPY Treatment NOTE    Patient Name: Artemio PERRY Date: 11/5/2019 11/05/19 1452   Pain Assessment   Pain Assessment FLACC   Pain Rating: FLACC (Rest) - Face 0   Pain Rating: FLACC (Rest) - Legs 0   Pain Rating: FLACC (Rest) - Activity 0   Pain Rating: FLACC (Rest) - Cry 0   Pain Rating: FLACC (Rest) - Consolability 0   Score: FLACC (Rest) 0   Pain Rating: FLACC (Activity) - Face 1   Pain Rating: FLACC (Activity) - Legs 0   Pain Rating: FLACC (Activity) - Activity 0   Pain Rating: FLACC (Activity) - Cry 1   Pain Rating: FLACC (Activity) - Consolability 0   Score: FLACC (Activity) 2   Restrictions/Precautions   Weight Bearing Precautions Per Order No   Other Precautions Cognitive;Multiple lines; Fall Risk;Pain   General   Chart Reviewed Yes   Additional Pertinent History Pt  is an [de-identified] yo F who presents with metabolic encephalopathy with recently diagnosed Acute CVA  Family/Caregiver Present No   Cognition   Overall Cognitive Status Impaired   Arousal/Participation Poorly responsive   Attention Difficulty attending to directions   Orientation Level Oriented to person;Disoriented to place; Disoriented to time;Disoriented to situation   Memory Decreased recall of precautions;Decreased recall of recent events;Decreased short term memory   Following Commands Follows one step commands inconsistently   Comments Pt  was identified with full name and birthdate   Subjective   Subjective Pt  agreeable to PT session   Bed Mobility   Rolling R 2  Maximal assistance   Additional items Assist x 1;Verbal cues   Rolling L 2  Maximal assistance   Additional items Assist x 1;Verbal cues   Supine to Sit 2  Maximal assistance   Additional items Assist x 2;HOB elevated; Increased time required;Verbal cues;LE management   Sit to Supine 2  Maximal assistance   Additional items Assist x 2; Increased time required;Verbal cues;LE management   Additional Comments Pt received supine in bed with HOB elevated  Required Max A x 2 to generate enough force to sit EOB and provide LE mgmt  Needed VC for safety  Pt moved from sitting EOB to supine in bed with Max A x 2 with LE mgmt and VC for safety  Pt required Max A x 1 rolling L/R when placing bedpan underneath pt for toileting   Transfers   Sit to Stand 2  Maximal assistance   Additional items Assist x 2; Increased time required;Verbal cues   Stand to Sit 2  Maximal assistance   Additional items Assist x 2; Increased time required;Verbal cues   Additional Comments Required Max A x 2, HHA and use of RW  Performed sit to stand from EOB and stand to sit back onto EOB  Required VC for safety, hand placement, and to stand upright  Balance   Static Sitting Poor +   Dynamic Sitting Poor   Static Standing Poor -   Dynamic Standing Poor   Ambulatory Zero   Endurance Deficit   Endurance Deficit Yes   Endurance Deficit Description postural and gait degradation noted with fatigue   Activity Tolerance   Activity Tolerance Patient limited by fatigue   Nurse Made Aware Spoke to RN   Medical Staff Made Aware Spoke to ELLIE Damian   Assessment   Prognosis Guarded   Problem List Decreased strength;Decreased range of motion;Decreased endurance; Impaired balance;Decreased mobility; Decreased coordination;Decreased safety awareness;Pain   Assessment Pt  is an [de-identified] yo F who presents with metabolic encephalopathy with recently diagnosed Acute CVA  Pt  was identified with full name and birthdate  Pt  agreeable to PT session  Pt  Noted to be extremely lethargic during today's session  Pt  Required increased hands on assistance throughout session with limited ability to maintain static sitting at EOB  Pt  Required MaxAx2 to stand with notable retropulsion and postural degradation in standing requiring impulsive return to sitting  Pt   Is progressing towards goals however progress is slowed 2/2 to limited activity tolerance and lethargy today  Pt  Will benefit from continued skilled therapy to increase functional independence and aid patient in return to PLOF with decreased burden of care  Goals   Patient Goals will continue to assess   STG Expiration Date 11/07/19   PT Treatment Day 8   Plan   Treatment/Interventions Functional transfer training;LE strengthening/ROM; Therapeutic exercise; Endurance training;Cognitive reorientation;Patient/family training;Equipment eval/education; Bed mobility;Gait training;Spoke to nursing;OT   Progress Slow progress, decreased activity tolerance   PT Frequency 2-3x/wk   Recommendation   Recommendation Post acute IP rehab   Equipment Recommended Walker   Additional Comments to rehab when medically appropriaet     Breanna Mcneill, PT, DPT 11/5/2019

## 2019-11-05 NOTE — ASSESSMENT & PLAN NOTE
· Patient has chronic Herrera present on admission- herrera was replaced in ED 10/14   · Placed on recent admission (10/3/19) at [facility] where she developed urinary retention  · Patient will follow up outpatient with urology for voiding trial when patient's physical activity levels improve

## 2019-11-05 NOTE — ASSESSMENT & PLAN NOTE
· With homogeneous pattern  · Noted elevated sed rate and CRP  · Concerns for vasculitis as a cause for encephalopathy  · Rheumatoid factor positive  · ANCA panel pending  · Await rheumatology input, already consulted

## 2019-11-05 NOTE — ASSESSMENT & PLAN NOTE
Lab Results   Component Value Date    HGBA1C 6 4 (H) 10/15/2019     Recent Labs     11/04/19  1552 11/04/19 2054 11/05/19  0525 11/05/19  1045   POCGLU 198* 168* 141* 150*     · Takes metformin and glipizide at baseline, will plan to resume metformin at discharge and hold glipizide    · Continue with SSI  · Diabetic diet  · Monitor accuchecks, avoid hypoglycemia

## 2019-11-05 NOTE — ASSESSMENT & PLAN NOTE
· Since September she has had a progressive decline - she has had no appetite, not really eating/drinking with weakness  She was previously reportedly very independent prior to her admission at [facility] in September with UTI  · Geriatrics following, recommending outpatient follow up  · Speech following, status post VBS on 10/30, discussed with Swallow therapy, okay for pureed diet with nectar thick liquids  · Continue nystatin for thrush  · Barium swallow negative for esophageal obstruction  · Goals of care as mentioned  Unsuccessful attempt to reach patient's daughter today to discuss  · Nutrition has made the recommendation to initiate tube feeding given patient's persistently poor PO intake  Will place Keofed and initiate nutrition with Jevity 1 2 kcal @ 60 ml/hr x16 hours/day

## 2019-11-05 NOTE — PLAN OF CARE
Problem: OCCUPATIONAL THERAPY ADULT  Goal: Performs self-care activities at highest level of function for planned discharge setting  See evaluation for individualized goals  Description  Treatment Interventions: ADL retraining, Functional transfer training, UE strengthening/ROM, Endurance training, Cognitive reorientation, Patient/family training, Compensatory technique education, Energy conservation, Activityengagement          See flowsheet documentation for full assessment, interventions and recommendations  Outcome: Not Progressing  Note:   Limitation: Decreased ADL status, Decreased Safe judgement during ADL, Decreased cognition, Decreased endurance, Decreased self-care trans, Decreased high-level ADLs  Prognosis: Fair  Assessment: Pt seen for skilled OT treatment session focusing on cognitive re-orientation, bed mobility, functional transfers, sitting/standing tolerance, sitting/standing balance, trunk control, ADL retraining, and toileting  Upon entrance into room, pt supine in bed with HOB elevated  Pt agreeable to OT services on this date  Pt requiring Max A x 2 for bed mobility, Max A x 2 with use of RW for functional transfers/mobility, and Max A x 1 for EOB sitting  Pt is very lethargic but arousable, requiring continual verbal/tactile stimuli to encourage participation and engagement throughout session  Pt is minimally responsive and slow to respond  Pt moved supine to sit and reported dizziness once EOB  BP was taken reading 119/73  Pt continued to sit for 5-10 min with Max A x 1  Attempted sit to stand with RW  Pt needed Max A x 2 and VC for safety and hand placement  Pt reported needing to use bathroom for BM  Pt moved from EOB to supine in bed with HOB elevated  Once on bedpan pt unable to go  Attempted initiation of grooming activity with VC and demonstration but pt lethargic and unable to initiate participation   Overall, pt continues to be limited by decreased cognition, lethargy, decreased sitting/standing balance, decreased sitting/standing tolerance, decreased activity tolerance, decreased endurance, limited insight to deficits, poor command following, decreased arousal  Pt will continue to be seen for skilled OT 3-5x/wk to achieve listed goals       OT Discharge Recommendation: Short Term Rehab  OT - OK to Discharge: Yes(once medically stable)    ALLISON Jacobs

## 2019-11-05 NOTE — ASSESSMENT & PLAN NOTE
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

## 2019-11-05 NOTE — PROGRESS NOTES
Progress Note - Infectious Disease   Pam Oliveira [de-identified] y o  female MRN: [id number]  Unit/Bed#: -01 Encounter: [id number]      Impression/Plan:  1  Fever   Patient noted to have intermittent low-grade fever and isolated high fevers  Zeny Sox are no obvious sources on exam   Previous workup reviewed at [facility] which was largely unremarkable except for splenic infarct   Echo at that time was also unremarkable   Transesophageal echo here negative   Flu swab, chest x-ray, respiratory viral panel, urine culture and blood cultures have been unremarkable   CT of the head also unremarkable   RPR and HIV negative   Lyme testing peripherally negative, parasite smear negative, Anaplasma testing negative   CSF culture negative and additional studies pending  Lower suspicion for infectious etiology   Sed rate and CRP noted to be significantly elevated   Would question if there is an autoimmune encephalitis  MRI of the head reviewed with mastoid effusions, CT temporal bone without mastoiditis  No role for antibiotics at this time  Continue to trend fever curve/vitals  Follow up pending CSF studies  Neurology evaluation appreciated  Rheumatology evaluation pending  Additional care as per primary  Patient is otherwise cleared from an ID standpoint for discharge, given her current stability     2  Hyponatremia   Sodium appears to be stable today   Ongoing follow-up by Nephrology      3  Metabolic encephalopathy   Patient initially presented confused likely due to problem 2  It seems that her mental status improved with adjustment in her sodium level   Workup for adrenal insufficiency was negative   Unclear etiology for her underlying hyponatremia and now intermittent fevers as above   Mental status has been slowly improving    Continue to monitor mental status  Continue to monitor electrolytes  Additional care as per primary     4  Splenic infarct   Appears to be evolving on imaging   Possibly contributing to problem 1  Unclear etiology for this lesion   Was not present on imaging in February   Recent echo at [facility] [place] [place] unremarkable   Patient without other prosthetic material   Transesophageal echo negative  Serial abdominal exams  Hematology evaluation appreciated  Additional care as per primary     5  Type 2 diabetes   Ongoing management as per primary service      Above plan discussed briefly with the patient at bedside and with nursing      ID consult service will continue to follow  Antibiotics:  None    24 hour events:  No acute events noted overnight on chart review  Patient remains afebrile overnight  White blood cell count 7 5  No new culture data  Temporal bone CT noted to be stable without evidence of mastoiditis  Patient's other vitals are stable  Labs otherwise remains stable    Subjective:  Patient remains slow to respond on exam but again is much more awake provide more detailed answers on questioning  She currently denies having any nausea, vomiting, chest pain or shortness of breath  Objective:  Vitals:  Temp:  [98 1 °F (36 7 °C)-99 6 °F (37 6 °C)] 98 5 °F (36 9 °C)  HR:  [] 140  Resp:  [18-20] 20  BP: (102-128)/(48-63) 122/60  SpO2:  [95 %-98 %] 97 %  Temp (24hrs), Av 7 °F (37 1 °C), Min:98 1 °F (36 7 °C), Max:99 6 °F (37 6 °C)  Current: Temperature: 98 5 °F (36 9 °C)    Physical Exam:   General Appearance:  Alert, interactive, nontoxic, no acute distress  Response times remains low but improved compared to prior  Throat: Oropharynx moist without lesions  Lungs:   Clear to auscultation bilaterally; no wheezes, rhonchi or rales; respirations unlabored on room air   Heart:  RRR; no murmur, rub or gallop   Abdomen:   Soft, non-tender, non-distended, positive bowel sounds  Extremities: No clubbing, cyanosis or edema   Skin: No new rashes or lesions  No New draining wounds noted         Labs, Imaging, & Other studies:   All pertinent labs and imaging studies were personally reviewed  Results from last 7 days   Lab Units 11/05/19  0519 11/04/19  1642 11/04/19  1408   WBC Thousand/uL 7 51 7 49 8 32   HEMOGLOBIN g/dL 8 1* 8 0* 6 6*   PLATELETS Thousands/uL 590* 603* 538*     Results from last 7 days   Lab Units 11/05/19  0519  10/31/19  0617   POTASSIUM mmol/L 3 2*   < > 3 6   CHLORIDE mmol/L 110*   < > 110*   CO2 mmol/L 24   < > 21   BUN mg/dL 16   < > 15   CREATININE mg/dL 0 48*   < > 0 37*   EGFR ml/min/1 73sq m 93   < > 101   CALCIUM mg/dL 8 4   < > 8 7   AST U/L  --   --  23   ALT U/L  --   --  20   ALK PHOS U/L  --   --  57    < > = values in this interval not displayed

## 2019-11-05 NOTE — ASSESSMENT & PLAN NOTE
· Noted to have bilateral internal carotid artery stenosis left 60-70% and right 50-60% with severe left ECA stenosis  · MRI brain shows small acute to subacute single focus of infarct in left precentral gyrus  Discussed with Neurology regarding this findings  Appreciate Neurology input who recommends vascular surgery evaluation  · Continue statin and anticoagulation with IV heparin

## 2019-11-05 NOTE — ASSESSMENT & PLAN NOTE
· Atenolol switched to metoprolol given Afib with RVR  Patient is back in RVR  Will increase metoprolol to 50 mg BID  · Hold home dose ACEI  · Amlodipine on hold as well  · BP controlled without ACEI or CCB

## 2019-11-06 ENCOUNTER — APPOINTMENT (INPATIENT)
Dept: RADIOLOGY | Facility: HOSPITAL | Age: 79
DRG: 545 | End: 2019-11-06
Payer: MEDICARE

## 2019-11-06 PROBLEM — R50.9 FEVER: Status: RESOLVED | Noted: 2019-10-20 | Resolved: 2019-11-06

## 2019-11-06 PROBLEM — I48.21 PERMANENT ATRIAL FIBRILLATION (HCC): Status: ACTIVE | Noted: 2019-10-29

## 2019-11-06 PROBLEM — E87.1 HYPONATREMIA: Status: RESOLVED | Noted: 2017-07-16 | Resolved: 2019-11-06

## 2019-11-06 PROBLEM — I48.0 PAROXYSMAL ATRIAL FIBRILLATION (HCC): Status: ACTIVE | Noted: 2019-10-29

## 2019-11-06 LAB
ANION GAP SERPL CALCULATED.3IONS-SCNC: 7 MMOL/L (ref 4–13)
APTT PPP: 63 SECONDS (ref 23–37)
BUN SERPL-MCNC: 15 MG/DL (ref 5–25)
C-ANCA TITR SER IF: NORMAL TITER
CALCIUM SERPL-MCNC: 9.1 MG/DL (ref 8.3–10.1)
CHLORIDE SERPL-SCNC: 105 MMOL/L (ref 100–108)
CO2 SERPL-SCNC: 25 MMOL/L (ref 21–32)
CREAT SERPL-MCNC: 0.58 MG/DL (ref 0.6–1.3)
GFR SERPL CREATININE-BSD FRML MDRD: 87 ML/MIN/1.73SQ M
GLUCOSE SERPL-MCNC: 119 MG/DL (ref 65–140)
GLUCOSE SERPL-MCNC: 124 MG/DL (ref 65–140)
GLUCOSE SERPL-MCNC: 168 MG/DL (ref 65–140)
GLUCOSE SERPL-MCNC: 183 MG/DL (ref 65–140)
MYELOPEROXIDASE AB SER IA-ACNC: <9 U/ML (ref 0–9)
P-ANCA ATYPICAL TITR SER IF: NORMAL TITER
P-ANCA TITR SER IF: NORMAL TITER
POTASSIUM SERPL-SCNC: 4 MMOL/L (ref 3.5–5.3)
PROTEINASE3 AB SER IA-ACNC: <3.5 U/ML (ref 0–3.5)
SODIUM SERPL-SCNC: 137 MMOL/L (ref 136–145)

## 2019-11-06 PROCEDURE — 82948 REAGENT STRIP/BLOOD GLUCOSE: CPT

## 2019-11-06 PROCEDURE — 74018 RADEX ABDOMEN 1 VIEW: CPT

## 2019-11-06 PROCEDURE — 99232 SBSQ HOSP IP/OBS MODERATE 35: CPT | Performed by: INTERNAL MEDICINE

## 2019-11-06 PROCEDURE — 80048 BASIC METABOLIC PNL TOTAL CA: CPT | Performed by: PHYSICIAN ASSISTANT

## 2019-11-06 PROCEDURE — 85730 THROMBOPLASTIN TIME PARTIAL: CPT | Performed by: INTERNAL MEDICINE

## 2019-11-06 PROCEDURE — 99232 SBSQ HOSP IP/OBS MODERATE 35: CPT | Performed by: PHYSICIAN ASSISTANT

## 2019-11-06 PROCEDURE — 71045 X-RAY EXAM CHEST 1 VIEW: CPT

## 2019-11-06 RX ADMIN — INSULIN LISPRO 1 UNITS: 100 INJECTION, SOLUTION INTRAVENOUS; SUBCUTANEOUS at 17:39

## 2019-11-06 RX ADMIN — PANTOPRAZOLE SODIUM 40 MG: 40 TABLET, DELAYED RELEASE ORAL at 06:10

## 2019-11-06 RX ADMIN — FLUTICASONE PROPIONATE 1 SPRAY: 50 SPRAY, METERED NASAL at 08:45

## 2019-11-06 RX ADMIN — INSULIN LISPRO 1 UNITS: 100 INJECTION, SOLUTION INTRAVENOUS; SUBCUTANEOUS at 13:13

## 2019-11-06 RX ADMIN — HEPARIN SODIUM AND DEXTROSE 20 UNITS/KG/HR: 10000; 5 INJECTION INTRAVENOUS at 09:22

## 2019-11-06 RX ADMIN — ATORVASTATIN CALCIUM 40 MG: 40 TABLET, FILM COATED ORAL at 17:38

## 2019-11-06 RX ADMIN — FERROUS SULFATE TAB 325 MG (65 MG ELEMENTAL FE) 325 MG: 325 (65 FE) TAB at 06:12

## 2019-11-06 RX ADMIN — MIRTAZAPINE 7.5 MG: 15 TABLET, FILM COATED ORAL at 21:17

## 2019-11-06 RX ADMIN — NYSTATIN 500000 UNITS: 500000 SUSPENSION ORAL at 17:38

## 2019-11-06 RX ADMIN — NYSTATIN 500000 UNITS: 500000 SUSPENSION ORAL at 22:10

## 2019-11-06 RX ADMIN — APIXABAN 2.5 MG: 2.5 TABLET, FILM COATED ORAL at 17:38

## 2019-11-06 RX ADMIN — METOPROLOL TARTRATE 50 MG: 50 TABLET, FILM COATED ORAL at 08:45

## 2019-11-06 RX ADMIN — METOPROLOL TARTRATE 50 MG: 50 TABLET, FILM COATED ORAL at 21:17

## 2019-11-06 RX ADMIN — NYSTATIN 500000 UNITS: 500000 SUSPENSION ORAL at 13:14

## 2019-11-06 RX ADMIN — INSULIN LISPRO 1 UNITS: 100 INJECTION, SOLUTION INTRAVENOUS; SUBCUTANEOUS at 21:24

## 2019-11-06 RX ADMIN — FLUTICASONE PROPIONATE 1 SPRAY: 50 SPRAY, METERED NASAL at 17:38

## 2019-11-06 RX ADMIN — NYSTATIN 500000 UNITS: 500000 SUSPENSION ORAL at 08:44

## 2019-11-06 RX ADMIN — APIXABAN 2.5 MG: 2.5 TABLET, FILM COATED ORAL at 13:07

## 2019-11-06 NOTE — PHYSICAL THERAPY NOTE
PHYSICAL THERAPY Cancellation NOTE          Patient Name: [place]  LXHMF'[id number] Date: 11/6/2019     Chart reviewed  Spoke to [facility]  Pt  Cleared for PT session  Pt  Extremely lethargic and reporting she does not want to participate in therapy today requesting I check back tomorrow  Will cancel PT at this time and continue to follow will attempt to see as willing and appropriate       Saundra Aiken, PT, DPT 11/6/2019

## 2019-11-06 NOTE — ASSESSMENT & PLAN NOTE
Lab Results   Component Value Date    HGBA1C 6 4 (H) 10/15/2019     Recent Labs     11/05/19  1045 11/05/19  1607 11/05/19  2054 11/06/19  0608   POCGLU 150* 147* 122 124     · Takes metformin and glipizide at baseline, will plan to resume metformin at discharge and hold glipizide    · Continue with SSI  · Diabetic diet  · Monitor accuchecks, avoid hypoglycemia

## 2019-11-06 NOTE — ASSESSMENT & PLAN NOTE
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

## 2019-11-06 NOTE — ASSESSMENT & PLAN NOTE
· Atenolol switched to metoprolol given Afib with RVR  Rate has improved with increase in metoprolol to 50 mg BID  · Amlodipine and enalapril are on hold  Currently, no need to resume given controlled BPs

## 2019-11-06 NOTE — ASSESSMENT & PLAN NOTE
Malnutrition Findings:   Malnutrition type: Chronic illness(Related to medical condition as evidenced by 10% weight loss over the past month and <75% energy intake needs met >1 month treated with ensure compact supplements)  Degree of Malnutrition: Other severe protein calorie malnutrition    BMI Findings: Body mass index is 22 31 kg/m²  Reported weight has fluctuated significantly throughout hospitalization, will work on obtaining more accurate weights  Nutrition consulted and recommends EN  Daughter wants to proceed with keofed placement  Start Jevity 1 2 kcal @ 60ml/hr x16 hours per day

## 2019-11-06 NOTE — PROGRESS NOTES
Progress Note - Pam Oliveira 1940, [de-identified] y o  female MRN: [id number]  Unit/Bed#: -01 Encounter: [id number]  Primary Care Provider: Robert Hardy MD   Date and time admitted to hospital: 10/14/2019  2:48 PM    * Metabolic encephalopathy  Assessment & Plan  · POA  Etiology still unclear  Likely in setting of hyponatremia with component of delirium contributing given recurrent hospitalizations/underlying cognitive impairment  Patient's baseline as of September was driving, independent cooking/cleaningn/ambulating  · CNS imaging does not reveal obvious etiology  · MRI brain: Single focus of acute/subacute infarct in the left precentral gyrus with no hemorrhagic transformation  Mild chronic white matter microangiopathy  Bilateral mastoid effusions  MRA head is negative  Neurology has been consulted and evaluated the patient  Findings on MRI does not support level of encephalopathy  · No infectious source identified  · UA: Negative  · CXR: No evidence of pneumonia  · No wounds or cellulitis  · Ehrlichia/Babesia testing: negative  · Consider Vasculitis vs autoimmune encephalitis  Less likely CNS vasculitis per neurology although encephalitis remains in differential   Per family, patient noted to improve with steroids but has since declined again since they were discontinued  · Autoimmune work up in progress:  · CRP elevated: >90 0  · Anti-nuclear antibodies: Positive  · JAMILAH titer: 1:1280, homogenous pattern  · Quantitative RF elevated: 640  · ANCA: pending  · S/p LP:  · Lyme: pending  · VDRL: Non-reactive  · Meningitis/Encephalitis Panel: Negative  · Protein: 37  · Culture/gram stain: No growth  · She has hx of suspected adrenal insufficiency, previously on steroids and then tapered off  She follows with Endocrinology as outpatient      · Cosyntropin stim test with adequate response in cortisol, hydrocortisone discontinued, she does not have adrenal insufficiency   · Vitamin B12 and folate normal   · Lyme titer negative  · RPR negative  · HIV negative  · Rheumatology consult: Pending  R/O vasculitis as etiology  Acute CVA (cerebrovascular accident) [facility])  Assessment & Plan  · MRI brain on 11/3 shows acute to subacute CVA in left pre central gyrus in setting of rapid afib  · Neurology following and appreciate their input  · Continue statin and therapeutic AC    Paroxysmal atrial fibrillation (HCC)  Assessment & Plan  · Afib with RVR intermittently  Rate appears better controlled with increase in metoprolol  · TSH normal this admission  · LENO showed EF 55% without any intracardiac thrombus  · Will discontinue heparin drip and start patient on Eliquis  Splenic infarct  Assessment & Plan  · Noted on CT scan done at [facility] 9/2019, new moderate sized splenic infarcts  · Likely etiology cardio embolic as pt was noted to have PAF with RVR on tele on 10/29  · CTA abdomen/pelvis:  No acute aortic injury or aneurysm  Patent celiac and splenic arteries  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  Evolving splenic infarct  Additional chronic/incidental findings as described  · LENO negative for PFO and obvious intracardiac thrombus, 55% EF  · General surgery has seen the patient  No surgical intervention is warranted  · Hematology has seen the patient  Recommends treatment with anticoagulation  · Will start patient on Eliquis 2 5 mg BID    Severe protein-calorie malnutrition (HCC)  Assessment & Plan  Malnutrition Findings:   Malnutrition type: Chronic illness(Related to medical condition as evidenced by 10% weight loss over the past month and <75% energy intake needs met >1 month treated with ensure compact supplements)  Degree of Malnutrition: Other severe protein calorie malnutrition    BMI Findings: Body mass index is 22 31 kg/m²     Reported weight has fluctuated significantly throughout hospitalization, will work on obtaining more accurate weights  Nutrition consulted and recommends EN  Daughter wants to proceed with keofed placement  Start Jevity 1 2 kcal @ 60ml/hr x16 hours per day  Failure to thrive in adult  Assessment & Plan  · Since September she has had a progressive decline - she has had no appetite, not really eating/drinking with weakness  She was previously reportedly very independent prior to her admission at [facility] in September with UTI  · Geriatrics following, recommending outpatient follow up  · Speech following, status post VBS on 10/30, discussed with Swallow therapy, okay for pureed diet with nectar thick liquids  · Continue nystatin for thrush  · Barium swallow negative for esophageal obstruction  · Goals of care as mentioned  Unsuccessful attempt to reach patient's daughter today to discuss  · Nutrition has made the recommendation to initiate tube feeding given patient's persistently poor PO intake  Will place Keofed and initiate nutrition with Jevity 1 2 kcal @ 60 ml/hr x16 hours/day  Essential hypertension  Assessment & Plan  · Atenolol switched to metoprolol given Afib with RVR  Rate has improved with increase in metoprolol to 50 mg BID  · Amlodipine and enalapril are on hold  Currently, no need to resume given controlled BPs  Type 2 diabetes mellitus with diabetic neuropathy, without long-term current use of insulin [facility])  Assessment & Plan  Lab Results   Component Value Date    HGBA1C 6 4 (H) 10/15/2019     Recent Labs     11/05/19  1045 11/05/19  1607 11/05/19  2054 11/06/19  0608   POCGLU 150* 147* 122 124     · Takes metformin and glipizide at baseline, will plan to resume metformin at discharge and hold glipizide  · Continue with SSI  · Diabetic diet  · Monitor accuchecks, avoid hypoglycemia      VTE Pharmacologic Prophylaxis:   Pharmacologic: Heparin Drip  Mechanical: Mechanical VTE prophylaxis in place      Patient Centered Rounds: I have performed bedside rounds with nursing staff today  Discussions with Specialists or Other Care Team Provider:  Case management  Education and Discussions with Family / Patient:  Called patient's daughter and provided an update  All questions answered to the best my ability  Time Spent for Care: 30 minutes  More than 50% of total time spent on counseling and coordination of care as described above  Current Length of Stay: 23 day(s)  Current Patient Status: Inpatient   Certification Statement: The patient will continue to require additional inpatient hospital stay due to placement of Keofed  Discharge Plan: Patient is not yet medically stable for discharge  [facility]  Code Status: Level 1 - Full Code    Subjective:   Patient denies any pain  She tells be she never was a big eater  Objective:   Vitals:   Temp (24hrs), Av 6 °F (36 4 °C), Min:96 9 °F (36 1 °C), Max:98 5 °F (36 9 °C)    Temp:  [96 9 °F (36 1 °C)-98 5 °F (36 9 °C)] 98 5 °F (36 9 °C)  HR:  [] 88  Resp:  [18-20] 18  BP: (113-130)/(50-65) 130/65  SpO2:  [96 %-99 %] 98 %  Body mass index is 22 31 kg/m²  Input and Output Summary (last 24 hours): Intake/Output Summary (Last 24 hours) at 2019 1055  Last data filed at 2019 1008  Gross per 24 hour   Intake 459 96 ml   Output 450 ml   Net 9 96 ml       Physical Exam:     Physical Exam   HENT:   Head: Normocephalic and atraumatic  Mouth/Throat: Oropharynx is clear and moist and mucous membranes are normal    Eyes: No scleral icterus  Cardiovascular: Normal rate and regular rhythm  No murmur heard  Pulmonary/Chest: Breath sounds normal  She has no wheezes  She has no rales  She exhibits no tenderness  Abdominal: Soft  Bowel sounds are normal  She exhibits no distension  There is no tenderness  Musculoskeletal: Normal range of motion  She exhibits no edema  Skin: Skin is warm and dry  No rash noted     Psychiatric: She has a normal mood and affect  Vitals reviewed  Additional Data:   Labs:  Results from last 7 days   Lab Units 11/05/19  0519   WBC Thousand/uL 7 51   HEMOGLOBIN g/dL 8 1*   HEMATOCRIT % 26 9*   PLATELETS Thousands/uL 590*   NEUTROS PCT % 74   LYMPHS PCT % 18   MONOS PCT % 6   EOS PCT % 1     Results from last 7 days   Lab Units 11/06/19  0550  10/31/19  0617   POTASSIUM mmol/L 4 0   < > 3 6   CHLORIDE mmol/L 105   < > 110*   CO2 mmol/L 25   < > 21   BUN mg/dL 15   < > 15   CREATININE mg/dL 0 58*   < > 0 37*   CALCIUM mg/dL 9 1   < > 8 7   ALK PHOS U/L  --   --  57   ALT U/L  --   --  20   AST U/L  --   --  23    < > = values in this interval not displayed  * I Have Reviewed All Lab Data Listed Above  * Additional Pertinent Lab Tests Reviewed: All Labs Within Last 24 Hours Reviewed    Imaging:    Imaging Reports Reviewed Today Include: None new    Cultures:   Blood Culture:   Lab Results   Component Value Date    BLOODCX No Growth After 5 Days  10/25/2019    BLOODCX No Growth After 5 Days  10/25/2019    BLOODCX No Growth After 5 Days  10/14/2019    BLOODCX No Growth After 5 Days   10/14/2019     Urine Culture:   Lab Results   Component Value Date    URINECX 9089-5203 cfu/ml Gram Negative Nirav (A) 10/15/2019    URINECX >100,000 cfu/ml Escherichia coli 07/16/2017     Sputum Culture: No components found for: SPUTUMCX  Wound Culture: No results found for: WOUNDCULT    Last 24 Hours Medication List:     Current Facility-Administered Medications:  acetaminophen 650 mg Rectal Q6H PRN Herman Rueda DO    aluminum-magnesium hydroxide-simethicone 30 mL Oral Q6H PRN John Wahl MD    atorvastatin 40 mg Oral Daily With Kirsten Armendariz MD    bisacodyl 10 mg Rectal Daily PRN Clarita MAHAN PA-C    docusate sodium 100 mg Oral BID PRN John Wahl MD    ferrous sulfate 325 mg Oral Daily With Breakfast Gayla Frye MD    fluticasone 1 spray Each Nare BID Gayla Frye MD    heparin (porcine) 3-30 Units/kg/hr (Order-Specific) Intravenous Titrated Gayla Frye MD Last Rate: 20 Units/kg/hr (11/06/19 1850)   heparin (porcine) 2,200 Units Intravenous PRN Gayla Frye MD    heparin (porcine) 4,400 Units Intravenous PRN Gayla Frye MD    insulin lispro 1-5 Units Subcutaneous HS John Wahl MD    insulin lispro 1-6 Units Subcutaneous TID AC John Wahl MD    lidocaine (PF) 2 mL Infiltration Once Demarco Brennan PA-C    metoprolol 2 5 mg Intravenous Q6H PRN Gayla Frye MD    metoprolol tartrate 50 mg Oral Q12H [facility] Magdy Sagastume PA-C    mirtazapine 7 5 mg Oral HS Heaven MAHAN PA-C    nystatin 500,000 Units Swish & Swallow 4x Daily Heaven MAHAN PA-C    ondansetron 4 mg Intravenous Q6H PRN John Wahl MD    pantoprazole 40 mg Oral Early Morning Gayla Frye MD         Today, Patient Was Seen By: Magdy Sagastume PA-C    ** Please Note: Dragon 360 Dictation voice to text software may have been used in the creation of this document   **

## 2019-11-06 NOTE — PROGRESS NOTES
Progress Note - Infectious Disease   Pam Oliveira [de-identified] y o  female MRN: [id number]  Unit/Bed#: -01 Encounter: [id number]      Impression/Plan:  1  Fever   Patient noted to have intermittent low-grade fever and isolated high fevers  Eva Caulk are no obvious sources on exam   Previous workup reviewed at [facility] which was largely unremarkable except for splenic infarct   Echo at that time was also unremarkable   Transesophageal echo here negative   Flu swab, chest x-ray, respiratory viral panel, urine culture and blood cultures have been unremarkable   CT of the head also unremarkable   RPR and HIV negative   Lyme testing peripherally negative, parasite smear negative, Anaplasma testing negative   CSF culture and VDRL negative and additional studies pending  Lower suspicion for infectious etiology   Sed rate and CRP noted to be significantly elevated   Would question if there is an autoimmune process   MRI of the head reviewed with mastoid effusions, CT temporal bone without mastoiditis  No role for antibiotics at this time  Continue to trend fever curve/vitals  Follow up pending CSF studies  Neurology evaluation appreciated  Rheumatology evaluation pending  Additional care as per primary  Further discharge planning as per primary     2  Hyponatremia   Sodium appears to be stable today   Ongoing follow-up by Nephrology      3  Metabolic encephalopathy   Patient initially presented confused likely due to problem 2  It seems that her mental status improved with adjustment in her sodium level   Workup for adrenal insufficiency was negative   Unclear etiology for her underlying hyponatremia and now intermittent fevers as above   Mental status has been slowly improving  Continue to monitor mental status  Continue to monitor electrolytes  Additional care as per primary     4  Splenic infarct   Appears to be evolving on imaging   Possibly contributing to problem 1   Unclear etiology for this lesion   Was not present on imaging in February   Recent echo at [facility] unremarkable   Patient without other prosthetic material   Transesophageal echo negative  Serial abdominal exams  Hematology evaluation appreciated  Additional care as per primary     5  Type 2 diabetes   Ongoing management as per primary service      Above plan discussed briefly with the patient at bedside      ID consult service will continue to follow  Antibiotics:  None    24 hour events:  No acute events noted overnight on chart review  Patient remains afebrile  No new labs this morning  CSF VDRL noted to be negative  No new imaging  Patient's other vitals are stable  Creatinine unremarkable this morning    Subjective:  Patient seen at bedside and denied having any nausea, vomiting, chest pain or shortness of breath  Her responses are very slowed  She has no other new complaints at this time  Objective:  Vitals:  Temp:  [96 9 °F (36 1 °C)-98 5 °F (36 9 °C)] 98 5 °F (36 9 °C)  HR:  [] 88  Resp:  [18-20] 18  BP: (113-130)/(50-65) 130/65  SpO2:  [96 %-99 %] 98 %  Temp (24hrs), Av 6 °F (36 4 °C), Min:96 9 °F (36 1 °C), Max:98 5 °F (36 9 °C)  Current: Temperature: 98 5 °F (36 9 °C)    Physical Exam:   General Appearance:  Alert, interactive, nontoxic, no acute distress  Throat: Oropharynx moist without lesions  Patient again has a wet cough when attempting to give any fluid through a straw  Lungs:   Decreased breath sounds throughout; no wheezes, rhonchi or rales; respirations unlabored on room air   Heart:  RRR; no murmur, rub or gallop   Abdomen:   Soft, non-tender, non-distended, positive bowel sounds  Extremities: No clubbing, cyanosis or edema   Skin: No new rashes or lesions  No new draining wounds noted         Labs, Imaging, & Other studies:   All pertinent labs and imaging studies were personally reviewed  Results from last 7 days   Lab Units 19  0519 19  1642 19  1408   WBC Thousand/uL 7  51 7 49 8 32   HEMOGLOBIN g/dL 8 1* 8 0* 6 6*   PLATELETS Thousands/uL 590* 603* 538*     Results from last 7 days   Lab Units 11/06/19  0550  10/31/19  0617   POTASSIUM mmol/L 4 0   < > 3 6   CHLORIDE mmol/L 105   < > 110*   CO2 mmol/L 25   < > 21   BUN mg/dL 15   < > 15   CREATININE mg/dL 0 58*   < > 0 37*   EGFR ml/min/1 73sq m 87   < > 101   CALCIUM mg/dL 9 1   < > 8 7   AST U/L  --   --  23   ALT U/L  --   --  20   ALK PHOS U/L  --   --  57    < > = values in this interval not displayed

## 2019-11-06 NOTE — ASSESSMENT & PLAN NOTE
· POA  Etiology still unclear  Likely in setting of hyponatremia with component of delirium contributing given recurrent hospitalizations/underlying cognitive impairment  Patient's baseline as of September was driving, independent cooking/cleaningn/ambulating  · CNS imaging does not reveal obvious etiology  · MRI brain: Single focus of acute/subacute infarct in the left precentral gyrus with no hemorrhagic transformation  Mild chronic white matter microangiopathy  Bilateral mastoid effusions  MRA head is negative  Neurology has been consulted and evaluated the patient  Findings on MRI does not support level of encephalopathy  · No infectious source identified  · UA: Negative  · CXR: No evidence of pneumonia  · No wounds or cellulitis  · Ehrlichia/Babesia testing: negative  · Consider Vasculitis vs autoimmune encephalitis  Less likely CNS vasculitis per neurology although encephalitis remains in differential   Per family, patient noted to improve with steroids but has since declined again since they were discontinued  · Autoimmune work up in progress:  · CRP elevated: >90 0  · Anti-nuclear antibodies: Positive  · JAMILAH titer: 1:1280, homogenous pattern  · Quantitative RF elevated: 640  · ANCA: pending  · S/p LP:  · Lyme: pending  · VDRL: Non-reactive  · Meningitis/Encephalitis Panel: Negative  · Protein: 37  · Culture/gram stain: No growth  · She has hx of suspected adrenal insufficiency, previously on steroids and then tapered off  She follows with Endocrinology as outpatient  · Cosyntropin stim test with adequate response in cortisol, hydrocortisone discontinued, she does not have adrenal insufficiency   · Vitamin B12 and folate normal   · Lyme titer negative  · RPR negative  · HIV negative  · Rheumatology consult: Pending  R/O vasculitis as etiology

## 2019-11-06 NOTE — ASSESSMENT & PLAN NOTE
· MRI brain on 11/3 shows acute to subacute CVA in left pre central gyrus in setting of rapid afib  · Neurology following and appreciate their input    · Continue statin and therapeutic AC

## 2019-11-06 NOTE — ASSESSMENT & PLAN NOTE
· Afib with RVR intermittently  Rate appears better controlled with increase in metoprolol  · TSH normal this admission  · LENO showed EF 55% without any intracardiac thrombus  · Will discontinue heparin drip and start patient on Eliquis

## 2019-11-06 NOTE — SOCIAL WORK
CM informed by SLIM that IV Heparin gtt removed  Pt has Polo [place]  Updated  [facility] via [employer]  1150  Per Ecin Message SNF will NOT take pt w/ Polo Spray  Saroj Perdue made aware of same  Pt is not medically cleared for d/c today

## 2019-11-07 ENCOUNTER — TELEPHONE (OUTPATIENT)
Dept: NEUROLOGY | Facility: CLINIC | Age: 79
End: 2019-11-07

## 2019-11-07 ENCOUNTER — APPOINTMENT (INPATIENT)
Dept: NON INVASIVE DIAGNOSTICS | Facility: HOSPITAL | Age: 79
DRG: 545 | End: 2019-11-07
Payer: MEDICARE

## 2019-11-07 LAB
ANION GAP SERPL CALCULATED.3IONS-SCNC: 8 MMOL/L (ref 4–13)
BUN SERPL-MCNC: 16 MG/DL (ref 5–25)
CALCIUM SERPL-MCNC: 8.4 MG/DL (ref 8.3–10.1)
CHLORIDE SERPL-SCNC: 112 MMOL/L (ref 100–108)
CO2 SERPL-SCNC: 23 MMOL/L (ref 21–32)
CREAT SERPL-MCNC: 0.5 MG/DL (ref 0.6–1.3)
GFR SERPL CREATININE-BSD FRML MDRD: 92 ML/MIN/1.73SQ M
GLUCOSE SERPL-MCNC: 128 MG/DL (ref 65–140)
GLUCOSE SERPL-MCNC: 137 MG/DL (ref 65–140)
GLUCOSE SERPL-MCNC: 158 MG/DL (ref 65–140)
GLUCOSE SERPL-MCNC: 176 MG/DL (ref 65–140)
GLUCOSE SERPL-MCNC: 190 MG/DL (ref 65–140)
GLUCOSE SERPL-MCNC: 194 MG/DL (ref 65–140)
POTASSIUM SERPL-SCNC: 4 MMOL/L (ref 3.5–5.3)
SODIUM SERPL-SCNC: 143 MMOL/L (ref 136–145)

## 2019-11-07 PROCEDURE — 82948 REAGENT STRIP/BLOOD GLUCOSE: CPT

## 2019-11-07 PROCEDURE — G0515 COGNITIVE SKILLS DEVELOPMENT: HCPCS

## 2019-11-07 PROCEDURE — 99221 1ST HOSP IP/OBS SF/LOW 40: CPT | Performed by: PHYSICIAN ASSISTANT

## 2019-11-07 PROCEDURE — 97530 THERAPEUTIC ACTIVITIES: CPT

## 2019-11-07 PROCEDURE — 80048 BASIC METABOLIC PNL TOTAL CA: CPT | Performed by: PHYSICIAN ASSISTANT

## 2019-11-07 PROCEDURE — 99232 SBSQ HOSP IP/OBS MODERATE 35: CPT | Performed by: INTERNAL MEDICINE

## 2019-11-07 PROCEDURE — 99232 SBSQ HOSP IP/OBS MODERATE 35: CPT | Performed by: PHYSICIAN ASSISTANT

## 2019-11-07 PROCEDURE — 93005 ELECTROCARDIOGRAM TRACING: CPT

## 2019-11-07 PROCEDURE — 97535 SELF CARE MNGMENT TRAINING: CPT

## 2019-11-07 PROCEDURE — 93880 EXTRACRANIAL BILAT STUDY: CPT

## 2019-11-07 RX ADMIN — APIXABAN 2.5 MG: 2.5 TABLET, FILM COATED ORAL at 09:58

## 2019-11-07 RX ADMIN — NYSTATIN 500000 UNITS: 500000 SUSPENSION ORAL at 12:34

## 2019-11-07 RX ADMIN — FERROUS SULFATE TAB 325 MG (65 MG ELEMENTAL FE) 325 MG: 325 (65 FE) TAB at 09:58

## 2019-11-07 RX ADMIN — APIXABAN 2.5 MG: 2.5 TABLET, FILM COATED ORAL at 18:15

## 2019-11-07 RX ADMIN — METOPROLOL TARTRATE 50 MG: 50 TABLET, FILM COATED ORAL at 09:58

## 2019-11-07 RX ADMIN — METOPROLOL TARTRATE 50 MG: 50 TABLET, FILM COATED ORAL at 21:44

## 2019-11-07 RX ADMIN — ATORVASTATIN CALCIUM 40 MG: 40 TABLET, FILM COATED ORAL at 16:01

## 2019-11-07 RX ADMIN — INSULIN LISPRO 1 UNITS: 100 INJECTION, SOLUTION INTRAVENOUS; SUBCUTANEOUS at 21:43

## 2019-11-07 RX ADMIN — NYSTATIN 500000 UNITS: 500000 SUSPENSION ORAL at 09:57

## 2019-11-07 RX ADMIN — NYSTATIN 500000 UNITS: 500000 SUSPENSION ORAL at 18:15

## 2019-11-07 RX ADMIN — FLUTICASONE PROPIONATE 1 SPRAY: 50 SPRAY, METERED NASAL at 18:18

## 2019-11-07 RX ADMIN — NYSTATIN 500000 UNITS: 500000 SUSPENSION ORAL at 21:43

## 2019-11-07 RX ADMIN — FLUTICASONE PROPIONATE 1 SPRAY: 50 SPRAY, METERED NASAL at 09:58

## 2019-11-07 RX ADMIN — ACETAMINOPHEN 650 MG: 650 SUPPOSITORY RECTAL at 16:01

## 2019-11-07 RX ADMIN — MIRTAZAPINE 7.5 MG: 15 TABLET, FILM COATED ORAL at 21:43

## 2019-11-07 RX ADMIN — INSULIN LISPRO 2 UNITS: 100 INJECTION, SOLUTION INTRAVENOUS; SUBCUTANEOUS at 12:37

## 2019-11-07 RX ADMIN — INSULIN LISPRO 1 UNITS: 100 INJECTION, SOLUTION INTRAVENOUS; SUBCUTANEOUS at 16:12

## 2019-11-07 NOTE — PROGRESS NOTES
Progress Note - Infectious Disease   Pam Oliveira [de-identified] y o  female MRN: [id number]  Unit/Bed#: -01 Encounter: [id number]      Impression/Plan:  1  Fever   Patient noted to have intermittent low-grade fever and isolated high fevers  Peter Divers are no obvious sources on exam   Previous workup reviewed at [facility] which was largely unremarkable except for splenic infarct   Echo at that time was also unremarkable   Transesophageal echo here negative   Flu swab, chest x-ray, respiratory viral panel, urine culture and blood cultures have been unremarkable   CT of the head also unremarkable   RPR and HIV negative   Lyme testing peripherally negative, parasite smear negative, Anaplasma testing negative   CSF culture and VDRL negative and additional studies pending  Lower suspicion for infectious etiology   Sed rate and CRP noted to be significantly elevated   Would question if there is an autoimmune process   MRI of the head reviewed with mastoid effusions, CT temporal bone without mastoiditis  Repeat chest x-ray unremarkable  Continue to monitor off antibiotics  Continue to trend fever curve/vitals  Neurology evaluation appreciated  Rheumatology evaluation pending  Additional care as per primary  Further discharge planning as per primary     2  Hyponatremia   Sodium appears to be stable today   Ongoing follow-up by Nephrology      3  Metabolic encephalopathy   Patient initially presented confused likely due to problem 2  It seems that her mental status improved with adjustment in her sodium level   Workup for adrenal insufficiency was negative   Unclear etiology for her underlying hyponatremia and now intermittent fevers as above   Mental status has been slowly improving  Continue to monitor mental status  Continue to monitor electrolytes  Additional care as per primary     4  Splenic infarct   Appears to be evolving on imaging   Possibly contributing to problem 1   Unclear etiology for this lesion   Was not present on imaging in February   Recent echo at [facility] unremarkable   Patient without other prosthetic material   Transesophageal echo negative  Serial abdominal exams  Hematology evaluation appreciated  Additional care as per primary     5  Type 2 diabetes   Ongoing management as per primary service      Above plan discussed briefly with the patient and with nursing  ID consult service will continue to follow  Antibiotics:  None    24 hour events:  Patient pulled out NGT  No further fevers overnight  No new culture data  Chest x-ray yesterday with mild atelectasis  Patient's other vitals are stable  Creatinine is stable    Subjective:  Patient currently denies having any nausea, vomiting, chest pain  She remains fatigued  Helped by nursing to turn the patient and no wounds noted on her back  Objective:  Vitals:  Temp:  [97 4 °F (36 3 °C)-102 5 °F (39 2 °C)] 102 5 °F (39 2 °C)  HR:  [83-93] 92  Resp:  [16-19] 16  BP: (104-136)/(53-65) 136/65  SpO2:  [95 %-100 %] 100 %  Temp (24hrs), Av 6 °F (37 6 °C), Min:97 4 °F (36 3 °C), Max:102 5 °F (39 2 °C)  Current: Temperature: (!) 102 5 °F (39 2 °C)    Physical Exam:   General Appearance:  Alert, interactive, nontoxic, no acute distress  Throat: Oropharynx moist without lesions  Lungs:   Decreased breath sounds throughout; no wheezes, rhonchi or rales; respirations unlabored on room   Heart:  RRR; no murmur, rub or gallop   Abdomen:   Soft, non-tender, non-distended, positive bowel sounds  Extremities: No clubbing, cyanosis or edema   Skin: No new rashes or lesions  No new draining wounds noted         Labs, Imaging, & Other studies:   All pertinent labs and imaging studies were personally reviewed  Results from last 7 days   Lab Units 19  0519 19  1642 19  1408   WBC Thousand/uL 7 51 7 49 8 32   HEMOGLOBIN g/dL 8 1* 8 0* 6 6*   PLATELETS Thousands/uL 590* 603* 538*     Results from last 7 days   Lab Units 11/07/19  0539   POTASSIUM mmol/L 4 0   CHLORIDE mmol/L 112*   CO2 mmol/L 23   BUN mg/dL 16   CREATININE mg/dL 0 50*   EGFR ml/min/1 73sq m 92   CALCIUM mg/dL 8 4

## 2019-11-07 NOTE — PROGRESS NOTES
Pastoral Care Progress Note    2019  Patient: Elan Wilson : 1940  Admission Date & Time: 10/14/2019 1620  MRN: [id number] [facility]: [id number]                     Chaplaincy Interventions Utilized:               Relationship Building: Provided silent and supportive presence              Chaplaincy Outcomes Achieved:  Unknown outcome          Spiritual Coping Strategies Utilized:   No spiritual coping       19 1500   Clinical Encounter Type   Visited With Patient   Routine Visit Introduction   Continue Visiting No

## 2019-11-07 NOTE — ASSESSMENT & PLAN NOTE
Lab Results   Component Value Date    HGBA1C 6 4 (H) 10/15/2019     Recent Labs     11/06/19  1312 11/06/19  1551 11/06/19 2053 11/07/19  0546   POCGLU 183* 168* 158* 137     · Takes metformin and glipizide at baseline, will plan to resume metformin at discharge and hold glipizide    · Continue with SSI  · Diabetic diet  · Monitor accuchecks, avoid hypoglycemia

## 2019-11-07 NOTE — QUICK NOTE
Received appointment request for follow up with neurovascular neurology  Reviewed chart, patient on stroke pathway  Scheduled stroke hospital follow up 1/21 with Dr Josafat Escoto in [place]  Attempted to meet with patient to discuss follow up care with outpatient neurology and stroke education  Provided new patient packet with appointment details along with stroke education binder and my card  Explained nurse navigator role  Attempted to discuss stroke education, patient confused, no evidence of learning  She states she resides with her son Miguel Ma who assists with care  No family present at this time  Will follow up

## 2019-11-07 NOTE — PROGRESS NOTES
Pastoral Care Progress Note    2019  Patient: Majo Santos : 1940  Admission Date & Time: 10/14/2019 1620  MRN: [id number] [facility]: [id number]                     Chaplaincy Interventions Utilized:               Relationship Building: Provided silent and supportive presence    Ritual: Provided prayer            Chaplaincy Outcomes Achieved:  Unknown outcome          Spiritual Coping Strategies Utilized:   No spiritual coping       19 [address] [facility] [place]   Psychosocial   Needs Expressed Other (Comment)  (Pt is non-verbal)   Ability to Understand Others Sometimes understands

## 2019-11-07 NOTE — ASSESSMENT & PLAN NOTE
· Noted to have bilateral internal carotid artery stenosis left 60-70% and right 50-60% with severe left ECA stenosis  · MRI brain shows small acute to subacute single focus of infarct in left precentral gyrus  Neurology recommends vascular surgery evaluation

## 2019-11-07 NOTE — OCCUPATIONAL THERAPY NOTE
Occupational Therapy Treatment Note:         11/07/19 1430   Restrictions/Precautions   Other Precautions Contact/isolation;Cognitive; Restraints;Multiple lines;Telemetry; Fall Risk   Pain Assessment   Pain Assessment FLACC   Pain Rating: FLACC (Rest) - Face 0   Pain Rating: FLACC (Rest) - Legs 0   Pain Rating: FLACC (Rest) - Activity 0   Pain Rating: FLACC (Rest) - Cry 0   Pain Rating: FLACC (Rest) - Consolability 0   Score: FLACC (Rest) 0   Pain Rating: FLACC (Activity) - Face 1   Pain Rating: FLACC (Activity) - Legs 0   Pain Rating: FLACC (Activity) - Activity 0   Pain Rating: FLACC (Activity) - Cry 1   Pain Rating: FLACC (Activity) - Consolability 0   Score: FLACC (Activity) 2   ADL   Where Assessed Supine, bed   Grooming Assistance Other (Comment)   Grooming Deficit Setup;Verbal cueing;Supervision/safety; Increased time to complete;Brushing hair   Grooming Comments   (patient attempted to hold comb;dependent with follow through)   UB Bathing Assistance 2  Maximal Assistance   UB Bathing Deficit Setup;Verbal cueing; Increased time to complete   LB Bathing Assistance 2  Maximal Assistance   LB Bathing Deficit Setup;Verbal cueing;Supervision/safety; Increased time to complete   Toileting Assistance  1  Total Assistance   Toileting Deficit   (catheter for urination)   Bed Mobility   Rolling R 2  Maximal assistance   Additional items Assist x 1; Increased time required;Verbal cues   Rolling L 2  Maximal assistance   Additional items Assist x 1; Increased time required;Verbal cues   Cognition   Overall Cognitive Status Impaired   Arousal/Participation Arousable; Cooperative;Lethargic;Persistent stimuli required   Attention Difficulty attending to directions   Orientation Level Oriented to person;Oriented to place   Memory Decreased recall of recent events;Decreased recall of precautions   Following Commands Follows one step commands with increased time or repetition   Comments Patient initiated responses with max vc's and coaxing   Activity Tolerance   Activity Tolerance Patient limited by fatigue   Medical Staff Made Aware Patient cleared for OT by RN   Assessment   Assessment Patient participated in skilled OT with focus on adl/self care tasks i e  grooming, activity endurance, functional ROM BUE's for carryover into functional task performance, bed mobility skills, cognitive reorientation skill activity  Patient presents supine in bed, eyes closed  Patient opened eyes with named called several times and tactile stimuli  Patient required repetitive vc's and hands on activity to remain engaged i e UE functional motion  Patient attempted to answer x3, however, very minimal verbaliztions  Patient with flat affect and cooperative throughout this session  Bilateral restraints reapplied at end of this session  Patient would benefit from [address] with focus on increasing functional strengrth and endurance, increasing participation with functional and cognitive task performance, increase independence with self care/adl tasks for carryover into her daily routine  Plan   Treatment Interventions ADL retraining; Endurance training;Cognitive reorientation   Goal Expiration Date 11/14/19   OT Treatment Day 3   OT Frequency 3-5x/wk   Recommendation   OT Discharge Recommendation Short Term Rehab   OT - OK to Discharge   (when medically cleared)   Prudence Terrie

## 2019-11-07 NOTE — NURSING NOTE
Patient's Keofed feeding tube found pulled out by patient  SLIM was called and a new Keofed tube was placed by Critical Care CoordinatorValente Call  Patient is comfortable without pain  Soft limb restraints were placed B/L wrists  Patient is showing no signs of distress  Good circulation  No agitation

## 2019-11-07 NOTE — ASSESSMENT & PLAN NOTE
Malnutrition Findings:   Malnutrition type: Chronic illness(Related to medical condition as evidenced by 10% weight loss over the past month and <75% energy intake needs met >1 month treated with ensure compact supplements)  Degree of Malnutrition: Other severe protein calorie malnutrition    BMI Findings: Body mass index is 22 66 kg/m²  Reported weight has fluctuated significantly throughout hospitalization, will work on obtaining more accurate weights  Nutrition consulted and recommends EN  Daughter wanted to proceed with keofed placement  Started Jevity 1 2 kcal @ 60ml/hr x16 hours per day

## 2019-11-07 NOTE — TELEPHONE ENCOUNTER
----- Message from Danielle Mcekon RN sent at 11/6/2019  8:10 AM EST -----  Regarding: FW: HFU      ----- Message -----  From: Zoë Alexandre PA-C  Sent: 11/4/2019   1:28 PM EST  To: Neurology Tim Clerical  Subject: HFU                                                Diagnosis/Reason for follow-up: Altered mental status of unclear source, incidental finding of acute/subacute infarction   Subspecialty for follow-up: Stroke  Attending or AP?: Attending  Existing neurologist: None  Tests/Labs/Imaging ordered: None, but possibly echocardiogram if not done while inpatient

## 2019-11-07 NOTE — CONSULTS
Consultation - Vascular Surgery   Sherley Govea [de-identified] y o  female MRN: [id number]  Unit/Bed#: -01 Encounter: [id number]      Assessment/ Plan:    B/L carotid stenosis by MRA  -R: 50-60%  -L: 60-70%  L precentral gyrus CVA on MRI brain  --Carotid duplex  --could have other etiologies for stroke given atrial fibrillation and atheromatous disease of the aorta  --Currently patient is minimally responsive  W/U ongoing for encephalopathy & Fever  --Not a surgical candidate for CEA at present given mental status & debility  --cont statin  --Eliquis for afib/ splenic infarct  --? Add antiplatelet (ASA 97KQ daily)    AMS/ Encephalopathy-- r/o Vasculitis/ Encephalitis  Fever  --Neuro, ID following  -LP neg  -Infectious w/u neg  -JAMILAH/ RF +  -Elevated SED/ CRP  --Rheum consult (p)    FTT  Severe Protein Calorie Malnutition of Chronic illness  Dysphagia  --TFs/ diet/ supplements    New Afib  Splenic infarct 9/2019  --Eliquis  --Cardiology/ Heme following    Acute on Chronic HypoNa  --Nephrol following    Urinary retention- Rapp (xchanged 10/14)  Recent UTI    Anemia  DM2  HTN  HLD  CAD/ stents        *d/w Dr Veronica De Leon  _______________________________________________________________  Physician Requesting Consult: Drea Casiano MD    Additional consultants:   · Neurology  · Rheumatology  · Cardiology  · Hematology  · Geriatric medicine  · Acute Care surgery  · Palliative Medicine  · Infectious disease    Reason for Consult / Principal Problem:  Carotid stenosis and stroke    HPI: Sherley Govea is a [de-identified]y o  year old female who presents to [facility] on 10/14/2019 with decreased oral intake/dehydration, lethargy, and confusion  She had recently been hospitalized at [facility] 9/11/19- 9/16/19 for similar symptoms treated for a UTI, hyponatremia, and urinary retention with placement of a Rapp catheter  She was also found to have evidence of a splenic infarct by imaging    Prior to this, it was noted that she was in her normal state of health, independent and functional     Her hospitalization has been complicated by the following:  · Altered mental status/metabolic encephalopathy  · Fever with extensive negative infectious workup to include LENO and LP-- ID following  · Acute on chronic Hyponatremia-- nephrology following  · Adrenal insufficiency treated with course of steroids  · Failure to thrive  · Dysphagia  · Severe protein calorie malnutrition of chronic illness  · Keofed tube placement for supplemental nutritional support due to decreased p o  Intake  · New onset atrial fibrillation and splenic infarct-now on anticoagulation with Eliquis-- cardiology following  Acute Care surgery evaluation without need for surgical intervention  · R/O vasculitis/encephalitis-- laboratory evaluation reveals positive rheumatoid factor and JAMILAH with elevated sed and CRP  Rheumatology eval pending  · New left precentral gyrus stroke on imaging  · Bilateral carotid stenosis, L >R, on MRA  (CTA H/N was attempted however not able to be completed due to IV infiltration of contrast)  Consultation has been placed to vascular surgery due to finding of stroke and carotid stenosis  On my visit, the patient is minimally responsive  She intermittently will open her eyes to name and stimuli  She responded "Hi "  She followed a few simple commands such as squeezing my hands and smiling  Otherwise, she closes her eyes and does not participate  Chart and records were reviewed in detail  Risk factors for carotid stenosis and stroke:  · Age  · Hypertension  · Hyperlipidemia  · Diabetes  · Known atherosclerosis as evidenced by previous history of CAD/stent  · Tobacco abuse    Additionally, of note, she was found with new onset AFib this admission  LENO was performed with evidence of atheromatous aorta  Palliative Care has been following with documentation that the patient lacks competency    Ongoing discussions are being had with the patient's daughter, Davion Bonner    Historical Information   Past Medical History:   Diagnosis Date    [facility]renal hemorrhage ([facility] [address] [facility]     Cardiac disease     Carotid stenosis     Diabetes mellitus ([facility] [address] )     H/O adrenal insufficiency     Hyperlipidemia     Hypertension     Hyponatremia     Urinary retention 09/2019    Rapp    UTI (urinary tract infection) 09/2019     Past Surgical History:   Procedure Laterality Date    CORONARY ANGIOPLASTY WITH STENT PLACEMENT       Social History   Social History     Substance and Sexual Activity   Alcohol Use No     Social History     Substance and Sexual Activity   Drug Use No     Social History     Tobacco Use   Smoking Status Current Every Day Smoker    Packs/day: 0 20     Family History: No family history on file }    Meds/Allergies   Home meds:   Prior to Admission medications    Medication Sig Start Date End Date Taking?  Authorizing Provider   amLODIPine (NORVASC) 10 mg tablet Take 10 mg by mouth daily    Historical Provider, MD   atenolol (TENORMIN) 50 mg tablet Take 50 mg by mouth 2 (two) times a day    Historical Provider, MD   enalapril (VASOTEC) 20 mg tablet Take 20 mg by mouth 2 (two) times a day    Historical Provider, MD   glipiZIDE (GLUCOTROL) 10 mg tablet Take 10 mg by mouth 2 (two) times a day before meals    Historical Provider, MD   metFORMIN (GLUCOPHAGE) 1000 MG tablet Take 1,000 mg by mouth 2 (two) times a day with meals    Historical Provider, MD   omeprazole (PriLOSEC) 20 mg delayed release capsule Take 20 mg by mouth daily    Historical Provider, MD   saxagliptin (ONGLYZA) 5 MG tablet Take 5 mg by mouth daily    Historical Provider, MD   simvastatin (ZOCOR) 20 mg tablet Take 20 mg by mouth daily at bedtime    Historical Provider, MD   sodium chloride 1 g tablet Take 2 tablets by mouth 2 (two) times a day with meals for 30 days 7/20/17 8/19/17  Daily Domingo MD     Scheduled Meds:    Current Facility-Administered Medications:  acetaminophen 650 mg Rectal Q6H PRN Herman Rueda DO   aluminum-magnesium hydroxide-simethicone 30 mL Oral Q6H PRN Kat Parish MD   apixaban 2 5 mg Oral BID Kavon Kay PA-C   atorvastatin 40 mg Oral Daily With Maddie Garcia MD   bisacodyl 10 mg Rectal Daily PRN Akash MAHAN PA-C   docusate sodium 100 mg Oral BID PRN Kat Parish MD   ferrous sulfate 325 mg Oral Daily With Breakfast Mendez Gomez MD   fluticasone 1 spray Each Nare BID Mendez Gomez MD   insulin lispro 1-5 Units Subcutaneous HS Kat Parish MD   insulin lispro 1-6 Units Subcutaneous TID AC Kat Parish MD   lidocaine (PF) 2 mL Infiltration Once Bernie Ozuna PA-C   metoprolol 2 5 mg Intravenous Q6H PRN Mendez Gomez MD   metoprolol tartrate 50 mg Oral Q12H [facility] Kavon Kay PA-C   mirtazapine 7 5 mg Oral HS Heaven MAHAN PA-C   nystatin 500,000 Units Swish & Swallow 4x Daily Heaven MAHAN PA-C   ondansetron 4 mg Intravenous Q6H PRN Kat Parish MD   pantoprazole 40 mg Oral Early Morning Mendez Gomez MD     Continuous Infusions:   PRN Meds:    acetaminophen    aluminum-magnesium hydroxide-simethicone    bisacodyl    docusate sodium    metoprolol    ondansetron    ALLERGIES: No Known Allergies    Review of Systems:  Unable to perform given the patient's mentation/lack of participation    Objective   Vitals:  Blood pressure 116/56, pulse 93, temperature (!) 97 4 °F (36 3 °C), temperature source Axillary, resp  rate 19, height 5' 2" (1 575 m), weight 56 2 kg (123 lb 14 4 oz), SpO2 97 %, not currently breastfeeding  Body mass index is 22 66 kg/m²  I/Os:  I/O       11/05 0701 - 11/06 0700 11/06 0701 - 11/07 0700 11/07 0701 - 11/08 0700    P  O  60 260     I V  (mL/kg) 280 (5 1) 277 8 (4 9)     NG/GT  330     Total Intake(mL/kg) 340 (6 1) 867 8 (15 4)     Urine (mL/kg/hr) 675 (0 5) 200 (0 1)     Total Output 675 200     Net -335 +667 8                  Invasive Lines/Tubes:  Invasive Devices     Peripheral Intravenous Line            Peripheral IV 11/04/19 Left Antecubital 2 days          Drain            Urethral Catheter Straight-tip 16 Fr  23 days    NG/OG/Enteral Tube Nasogastric 8 Fr Right nares 1 day                Physical Exam  General appearance:  Appears stated age  Curled up in right sided fetal position with bilateral wrist restraints  Intermittently, opens eyes to name and stimuli then closes eyes and is non participatory  Intermittently, able to follow only simple commands of squeezing my hands and smiling  Her only verbal communication was "Hi !"  Head: Normocephalic, without obvious abnormality, atraumatic  Keofed tube  Eyes:  Pupils equal, small, and reactive to light  Throat: lips, mucosa, and tongue normal; teeth and gums normal  Neck: no adenopathy, no carotid bruit, no JVD and supple, symmetrical, trachea midline  Back: Deferred  Lungs: clear to auscultation bilaterally  Chest wall: no tenderness  Heart[de-identified] irregularly irregular rhythm c/w Afib  Abdomen: soft, non-tender; bowel sounds normal; no masses,  no organomegaly  Genitalia: + Rapp catheter  Rectal: deferred  Extremities: extremities normal, atraumatic, no cyanosis or edema  Skin: Skin color, texture, turgor normal  No rashes or lesions  Neurologic: Grossly normal-- limited exam  (see general assessment above)  Equal upper extremity  strength    Symmetrical smile    Pulse exam:  Radial: Right: 2+               Left: 2+  Femoral: Right: 2+                   Left: 2+  DP: Right: non-palpable          Left: non-palpable  PT: Right: non-palpable         Left: non-palpable    Lab Results and Cultures:   CBC:   Results from last 7 days   Lab Units 11/05/19  0519 11/04/19  1642 11/04/19  1408 11/03/19  0823 11/02/19  0913 11/02/19  0606   WBC Thousand/uL 7 51 7 49 8 32 8 04  --  6 42   HEMOGLOBIN g/dL 8 1* 8 0* 6 6* 7 8* 8 0* 7 2*   HEMATOCRIT % 26 9* 26 8* 22 7* 26 4* 27 1* 25 1*   PLATELETS Thousands/uL 590* 603* 538* 562*  --  544* BMP/CMP:  Results from last 7 days   Lab Units 11/07/19  0539 11/06/19  0550 11/05/19  0519 11/04/19  1351 11/03/19  0823   POTASSIUM mmol/L 4 0 4 0 3 2* 3 4* 3 5   CHLORIDE mmol/L 112* 105 110* 110* 109*   CO2 mmol/L 23 25 24 21 25   BUN mg/dL 16 15 16 19 20   CREATININE mg/dL 0 50* 0 58* 0 48* 0 53* 0 55*   CALCIUM mg/dL 8 4 9 1 8 4 8 7 8 3     Coags:   Results from last 7 days   Lab Units 11/06/19  0550 11/05/19  2139 11/05/19  1517 11/05/19  0519 11/04/19  2310   PTT seconds 63* 104* 71* 98* 71*     Lipid panel:   Results from last 7 days   Lab Units 11/05/19 0519   TRIGLYCERIDES mg/dL 112   HDL mg/dL 23*     HgbA1c:   Lab Results   Component Value Date    HGBA1C 6 4 (H) 10/15/2019       Urinalysis:   Lab Results   Component Value Date    COLORU Yellow 10/14/2019    CLARITYU Cloudy 10/14/2019    SPECGRAV 1 018 10/14/2019    PHUR 5 5 10/14/2019    PHUR 5 5 07/16/2017    LEUKOCYTESUR Large (A) 10/14/2019    NITRITE Positive (A) 10/14/2019    GLUCOSEU Negative 10/14/2019    KETONESU Negative 10/14/2019    BILIRUBINUR Negative 10/14/2019    BLOODU Moderate (A) 10/14/2019   ,   Urine Culture:   Lab Results   Component Value Date    URINECX 6710-5374 cfu/ml Gram Negative Nirav (A) 10/15/2019     Blood Culture:   Lab Results   Component Value Date    BLOODCX No Growth After 5 Days  10/25/2019    BLOODCX No Growth After 5 Days  10/25/2019       Imaging Studies:    10/23 CTA a/p- Patent celiac & splenic  R EIA occl w/ distal recon thru Inf epigastric a  Evolving splenic infarct  10/26 CT H- (-) acute  Microangngiopathic chgs  Chr Lacunar infarct L lentiform nucleus  10/28 LENO- EF 55%  (-) wma  (-)PK thrombus  (-) PFO  (-) shunt  Mild MR  Tr AI  Mod TR  PAP 30mmHg  Ao w/ diffuse atheromatous dz of prox & mid desc thor ao    (2-3mm thick, sessile w/ no evidence of ulceration or thrombus)  11/1 MRA H - unremarkable C of W  11/1 MRA carotids- B/l atheromatous plaque of distal CCAs into ICA & ECA origins  -R 50-50%  -L 60-70%, Sev ECA stenosis suspected  11/3 MRI brain- Sgl focus of acute/ subacute infarct in L precentral gyrus  Mild chronic white matter microangiopathy  Focus of gradient susceptibility signal in ant medial L cerebellum w/ corresponding T2 hypointensity-- sm cavernoma vs sequela of microhemorrhage  B/L mastoid eff   11/4 CT orbits- stable mild b/l mastoid effs w/o mastoiditis or nasopharyngeal mass   Prob eustachian tube dysfxn  11/6 CXR- mild L base atel    EKG, Pathology, and Other Studies:   VTE Prophylaxis: Sequential compression device (Venodyne) , Eliquis     Code Status: Level 1 - Full Code  Advance Directive and Living Will:      Power of :    POLST:        Chelle Silverman PA-C  11/7/2019

## 2019-11-07 NOTE — ASSESSMENT & PLAN NOTE
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

## 2019-11-07 NOTE — ASSESSMENT & PLAN NOTE
· POA  Etiology still unclear  Likely in setting of hyponatremia with component of delirium contributing given recurrent hospitalizations/underlying cognitive impairment  Patient's baseline as of September was driving, independent cooking/cleaningn/ambulating  · CNS imaging does not reveal obvious etiology  · MRI brain: Single focus of acute/subacute infarct in the left precentral gyrus with no hemorrhagic transformation  Mild chronic white matter microangiopathy  Bilateral mastoid effusions  MRA head is negative  Neurology has been consulted and evaluated the patient  Findings on MRI does not support level of encephalopathy  · No infectious source identified  · UA: Negative  · CXR: No evidence of pneumonia  · No wounds or cellulitis  · Ehrlichia/Babesia testing: negative  · Respiratory panel: Negative  · Flu/RSV: Negative  · Consider Vasculitis vs autoimmune encephalitis  Less likely CNS vasculitis per neurology although encephalitis remains in differential   Per family, patient noted to improve with steroids but has since declined again since they were discontinued  · Autoimmune work up in progress:  · CRP elevated: >90 0  · Anti-nuclear antibodies: Positive  · JAMILAH titer: 1:1280, homogenous pattern  · Quantitative RF elevated: 640  · ANCA: Negative  · S/p LP:  · Lyme: pending  · VDRL: Non-reactive  · Meningitis/Encephalitis Panel: Negative  · Protein: 37  · Culture/gram stain: No growth  · She has hx of suspected adrenal insufficiency, previously on steroids and then tapered off  She follows with Endocrinology as outpatient  · Cosyntropin stim test with adequate response in cortisol, hydrocortisone discontinued, she does not have adrenal insufficiency   · Vitamin B12 and folate normal   · Lyme titer negative  · RPR negative  · HIV negative  · Rheumatology consult: Pending  R/O vasculitis as etiology

## 2019-11-07 NOTE — PHYSICAL THERAPY NOTE
Physical Therapy Cancellation Note        Pt chart reviewed  Pt currently unavailable with cardiology  PT to continue to follow and see as able      Radha Bang, PT, DPT

## 2019-11-07 NOTE — TELEPHONE ENCOUNTER
Provided new patient packet to patient via in person  She is confused  No family present  Will follow up inpatient to round with son Adal Glynn  Closing encounter  Please view my notes during 10/14/19 hospital encounter for more information

## 2019-11-07 NOTE — ASSESSMENT & PLAN NOTE
· Patient has chronic Herrera present on admission- herrera was replaced in ED 10/14   · Placed on recent admission (10/3/19) at [facility] where she developed urinary retention  · Patient will follow up outpatient with urology for voiding trial when patient's physical activity levels improve

## 2019-11-07 NOTE — ASSESSMENT & PLAN NOTE
· Since September she has had a progressive decline - she has had no appetite, not really eating/drinking with weakness  She was previously reportedly very independent prior to her admission at [facility] in September with UTI  · Geriatrics following, recommending outpatient follow up  · Speech following, status post VBS on 10/30, discussed with Swallow therapy, okay for pureed diet with nectar thick liquids  · Continue nystatin for thrush  · Barium swallow negative for esophageal obstruction  · Palliative Care has evaluated the patient and had a family discussion on 10/31/19  At that time, goals of care were still treatment oriented so palliative care has signed off  At this time, patient still has not progressed  We will continue EN for a few more days  If we see no improvement, I would recommend having another family meeting to discuss goals of care again  · Nutrition has made the recommendation to initiate tube feeding given patient's persistently poor PO intake  Keofed placed 11/6  Patient removed and it was reinserted  EN initiated with Jevity 1 2 kcal @ 60 ml/hr x16 hours/day

## 2019-11-07 NOTE — PLAN OF CARE
Problem: Prexisting or High Potential for Compromised Skin Integrity  Goal: Skin integrity is maintained or improved  Description  INTERVENTIONS:  - Identify patients at risk for skin breakdown  - Assess and monitor skin integrity  - Assess and monitor nutrition and hydration status  - Monitor labs   - Assess for incontinence   - Turn and reposition patient  - Assist with mobility/ambulation  - Relieve pressure over bony prominences  - Avoid friction and shearing  - Provide appropriate hygiene as needed including keeping skin clean and dry  - Evaluate need for skin moisturizer/barrier cream  - Collaborate with interdisciplinary team   - Patient/family teaching  - Consider wound care consult   Outcome: Progressing     Problem: Potential for Falls  Goal: Patient will remain free of falls  Description  INTERVENTIONS:  - Assess patient frequently for physical needs  -  Identify cognitive and physical deficits and behaviors that affect risk of falls    -  [place] fall precautions as indicated by assessment   - Educate patient/family on patient safety including physical limitations  - Instruct patient to call for assistance with activity based on assessment  - Modify environment to reduce risk of injury  - Consider OT/PT consult to assist with strengthening/mobility  Outcome: Progressing     Problem: NEUROSENSORY - ADULT  Goal: Achieves stable or improved neurological status  Description  INTERVENTIONS  - Monitor and report changes in neurological status  - Monitor vital signs such as temperature, blood pressure, glucose, and any other labs ordered   - Initiate measures to prevent increased intracranial pressure  - Monitor for seizure activity and implement precautions if appropriate      Outcome: Progressing     Problem: RESPIRATORY - ADULT  Goal: Achieves optimal ventilation and oxygenation  Description  INTERVENTIONS:  - Assess for changes in respiratory status  - Assess for changes in mentation and behavior  - Position to facilitate oxygenation and minimize respiratory effort  - Encourage broncho-pulmonary hygiene including cough, deep breathe, Incentive Spirometry  - Assess and instruct to report SOB or any respiratory difficulty  - Respiratory Therapy support as indicated   Outcome: Progressing     Problem: GENITOURINARY - ADULT  Goal: Urinary catheter remains patent  Description  INTERVENTIONS:  - Assess patency of urinary catheter  - If patient has a chronic herrera, consider changing catheter if non-functioning  - Follow guidelines for intermittent irrigation of non-functioning urinary catheter  Outcome: Progressing     Problem: METABOLIC, FLUID AND ELECTROLYTES - ADULT  Goal: Electrolytes maintained within normal limits  Description  INTERVENTIONS:  - Monitor labs and assess patient for signs and symptoms of electrolyte imbalances  - Administer electrolyte replacement as ordered  - Monitor response to electrolyte replacements, including repeat lab results as appropriate  - Instruct patient on fluid and nutrition as appropriate  Outcome: Progressing  Goal: Fluid balance maintained  Description  INTERVENTIONS:  - Monitor labs   - Monitor I/O and WT  - Instruct patient on fluid and nutrition as appropriate  - Assess for signs & symptoms of volume excess or deficit  Outcome: Progressing  Goal: Glucose maintained within target range  Description  INTERVENTIONS:  - Monitor Blood Glucose as ordered  - Assess for signs and symptoms of hyperglycemia and hypoglycemia  - Administer ordered medications to maintain glucose within target range  - Assess nutritional intake and initiate nutrition service referral as needed  Outcome: Progressing     Problem: SKIN/TISSUE INTEGRITY - ADULT  Goal: Skin integrity remains intact  Description  INTERVENTIONS  - Identify patients at risk for skin breakdown  - Assess and monitor skin integrity  - Assess and monitor nutrition and hydration status  - Monitor labs (i e  albumin)  - Assess for incontinence   - Turn and reposition patient  - Assist with mobility/ambulation  - Relieve pressure over bony prominences  - Avoid friction and shearing  - Provide appropriate hygiene as needed including keeping skin clean and dry  - Evaluate need for skin moisturizer/barrier cream  - Collaborate with interdisciplinary team (i e  Nutrition, Rehabilitation, etc )   - Patient/family teaching  Outcome: Progressing  Goal: Oral mucous membranes remain intact  Description  INTERVENTIONS  - Assess oral mucosa and hygiene practices  - Implement preventative oral hygiene regimen  - Implement oral medicated treatments as ordered  - Initiate Nutrition services referral as needed  Outcome: Progressing     Problem: HEMATOLOGIC - ADULT  Goal: Maintains hematologic stability  Description  INTERVENTIONS  - Monitor labs      Outcome: Progressing

## 2019-11-07 NOTE — PROGRESS NOTES
Progress Note - Pam Oliveira 1940, [de-identified] y o  female MRN: [id number]  Unit/Bed#: -01 Encounter: [id number]  Primary Care Provider: Rolanda Laguna MD   Date and time admitted to hospital: 10/14/2019  0:19 PM    * Metabolic encephalopathy  Assessment & Plan  · POA  Etiology still unclear  Likely in setting of hyponatremia with component of delirium contributing given recurrent hospitalizations/underlying cognitive impairment  Patient's baseline as of September was driving, independent cooking/cleaningn/ambulating  · CNS imaging does not reveal obvious etiology  · MRI brain: Single focus of acute/subacute infarct in the left precentral gyrus with no hemorrhagic transformation  Mild chronic white matter microangiopathy  Bilateral mastoid effusions  MRA head is negative  Neurology has been consulted and evaluated the patient  Findings on MRI does not support level of encephalopathy  · No infectious source identified  · UA: Negative  · CXR: No evidence of pneumonia  · No wounds or cellulitis  · Ehrlichia/Babesia testing: negative  · Respiratory panel: Negative  · Flu/RSV: Negative  · Consider Vasculitis vs autoimmune encephalitis  Less likely CNS vasculitis per neurology although encephalitis remains in differential   Per family, patient noted to improve with steroids but has since declined again since they were discontinued  · Autoimmune work up in progress:  · CRP elevated: >90 0  · Anti-nuclear antibodies: Positive  · JAMILAH titer: 1:1280, homogenous pattern  · Quantitative RF elevated: 640  · ANCA: Negative  · S/p LP:  · Lyme: pending  · VDRL: Non-reactive  · Meningitis/Encephalitis Panel: Negative  · Protein: 37  · Culture/gram stain: No growth  · She has hx of suspected adrenal insufficiency, previously on steroids and then tapered off  She follows with Endocrinology as outpatient      · Cosyntropin stim test with adequate response in cortisol, hydrocortisone discontinued, she does not have adrenal insufficiency   · Vitamin B12 and folate normal   · Lyme titer negative  · RPR negative  · HIV negative  · Rheumatology consult: Pending  R/O vasculitis as etiology  Acute CVA (cerebrovascular accident) [facility])  Assessment & Plan  · MRI brain on 11/3 shows acute to subacute CVA in left pre central gyrus in setting of rapid afib  · Neurology following and appreciate their input  · Continue statin and therapeutic AC    JAMILAH positive  Assessment & Plan  · With homogeneous pattern  · Noted elevated sed rate and CRP  · Concerns for vasculitis as a cause for encephalopathy  · Rheumatoid factor positive  · ANCA panel negative  · Await rheumatology input, already consulted  Internal carotid artery stenosis, bilateral  Assessment & Plan  · Noted to have bilateral internal carotid artery stenosis left 60-70% and right 50-60% with severe left ECA stenosis  · MRI brain shows small acute to subacute single focus of infarct in left precentral gyrus  Neurology recommends vascular surgery evaluation  Goals of care, counseling/discussion  Assessment & Plan  · Participated in a family meeting on 10/31/19 with palliative care  Outcome remained treatment oriented care  Had another discussion with patient's daughter yesterday and she continues to want to pursue treatment care at this time  · Currently patient remains full code  Paroxysmal atrial fibrillation (HCC)  Assessment & Plan  · Afib with RVR intermittently  Rate appears better controlled with increase in metoprolol  · TSH normal this admission  · LENO showed EF 55% without any intracardiac thrombus  · Heparin drip stopped and started on Eliquis  Splenic infarct  Assessment & Plan  · Noted on CT scan done at [facility] 9/2019, new moderate sized splenic infarcts  · Likely etiology cardio embolic as pt was noted to have PAF with RVR on tele on 10/29  · CTA abdomen/pelvis:  No acute aortic injury or aneurysm    Patent celiac and splenic arteries  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  Evolving splenic infarct  Additional chronic/incidental findings as described  · LENO negative for PFO and obvious intracardiac thrombus, 55% EF  · General surgery has seen the patient  No surgical intervention is warranted  · Hematology has seen the patient  Recommends treatment with anticoagulation  · Started on Eliquis 2 5 mg BID 11/6/19    Microcytic anemia  Assessment & Plan  · Baseline seems between 8-11  · Iron studies suggestive of low iron  · Continue p o  iron supplementation which was started this admission  Severe protein-calorie malnutrition ([address] )  Assessment & Plan  Malnutrition Findings:   Malnutrition type: Chronic illness(Related to medical condition as evidenced by 10% weight loss over the past month and <75% energy intake needs met >1 month treated with ensure compact supplements)  Degree of Malnutrition: Other severe protein calorie malnutrition    BMI Findings: Body mass index is 22 66 kg/m²  Reported weight has fluctuated significantly throughout hospitalization, will work on obtaining more accurate weights  Nutrition consulted and recommends EN  Daughter wanted to proceed with keofed placement  Started Jevity 1 2 kcal @ 60ml/hr x16 hours per day  Urinary retention  Assessment & Plan  · Patient has chronic Herrera present on admission- herrera was replaced in ED 10/14   · Placed on recent admission (10/3/19) at [facility] where she developed urinary retention  · Patient will follow up outpatient with urology for voiding trial when patient's physical activity levels improve  Failure to thrive in adult  Assessment & Plan  · Since September she has had a progressive decline - she has had no appetite, not really eating/drinking with weakness    She was previously reportedly very independent prior to her admission at [facility] in September with UTI  · Geriatrics following, recommending outpatient follow up  · Speech following, status post VBS on 10/30, discussed with Swallow therapy, okay for pureed diet with nectar thick liquids  · Continue nystatin for thrush  · Barium swallow negative for esophageal obstruction  · Palliative Care has evaluated the patient and had a family discussion on 10/31/19  At that time, goals of care were still treatment oriented so palliative care has signed off  At this time, patient still has not progressed  We will continue EN for a few more days  If we see no improvement, I would recommend having another family meeting to discuss goals of care again  · Nutrition has made the recommendation to initiate tube feeding given patient's persistently poor PO intake  Keofed placed 11/6  Patient removed and it was reinserted  EN initiated with Jevity 1 2 kcal @ 60 ml/hr x16 hours/day  Essential hypertension  Assessment & Plan  · Atenolol switched to metoprolol given Afib with RVR  Rate has improved with increase in metoprolol to 50 mg BID  · Amlodipine and enalapril are on hold  Currently, no need to resume given controlled BPs  Type 2 diabetes mellitus with diabetic neuropathy, without long-term current use of insulin [facility])  Assessment & Plan  Lab Results   Component Value Date    HGBA1C 6 4 (H) 10/15/2019     Recent Labs     11/06/19  1312 11/06/19  1551 11/06/19  2053 11/07/19  0546   POCGLU 183* 168* 158* 137     · Takes metformin and glipizide at baseline, will plan to resume metformin at discharge and hold glipizide  · Continue with SSI  · Diabetic diet  · Monitor accuchecks, avoid hypoglycemia      VTE Pharmacologic Prophylaxis:   Pharmacologic: Apixaban (Eliquis)  Mechanical: Mechanical VTE prophylaxis in place  Patient Centered Rounds: I have performed bedside rounds with nursing staff today    Discussions with Specialists or Other Care Team Provider: None  Education and Discussions with Family / Patient: Unsuccessful attempt to reach patient's daughter  Time Spent for Care: 20 minutes  More than 50% of total time spent on counseling and coordination of care as described above  Current Length of Stay: 24 day(s)  Current Patient Status: Inpatient   Certification Statement: The patient will continue to require additional inpatient hospital stay due to monitoring status while on Keofed  Discharge Plan: Patient eventually will be discharged to [facility]; however, they will not take her with a Savannah Gayer and daughter would like to monitor her for a few days getting adequate nutrition  Code Status: Level 1 - Full Code    Subjective:   Patient pulled out her Keofed last evening but it was replaced  Currently, she denies any pain or SOB  When I ask her where she is, she tells me the hospital   When I ask her why she is here she tells me because her sodium is low  She is eating very little  Objective:   Vitals:   Temp (24hrs), Av °F (37 2 °C), Min:97 4 °F (36 3 °C), Max:100 6 °F (38 1 °C)    Temp:  [97 4 °F (36 3 °C)-100 6 °F (38 1 °C)] 97 4 °F (36 3 °C)  HR:  [83-93] 93  Resp:  [17-19] 19  BP: (104-123)/(52-57) 116/56  SpO2:  [95 %-97 %] 97 %  Body mass index is 22 66 kg/m²  Input and Output Summary (last 24 hours): Intake/Output Summary (Last 24 hours) at 2019 1022  Last data filed at 2019 1804  Gross per 24 hour   Intake 747 77 ml   Output 200 ml   Net 547 77 ml       Physical Exam:     Physical Exam   HENT:   Head: Normocephalic and atraumatic  Mouth/Throat: Oropharynx is clear and moist and mucous membranes are normal    Keofed in place  Eyes: No scleral icterus  Cardiovascular: Normal rate and regular rhythm  No murmur heard  Pulmonary/Chest: She has no wheezes  She has rales in the right lower field and the left lower field  She exhibits no tenderness  Abdominal: Soft  Bowel sounds are normal  She exhibits no distension  There is no tenderness     Genitourinary:   Genitourinary Comments: Rapp catheter in place draining yohannes colored urine  Musculoskeletal: Normal range of motion  She exhibits no edema  Skin: Skin is warm and dry  No rash noted  Psychiatric: She has a normal mood and affect  Speech is slightly dysarthric   Vitals reviewed  Additional Data:   Labs:  Results from last 7 days   Lab Units 11/05/19  0519   WBC Thousand/uL 7 51   HEMOGLOBIN g/dL 8 1*   HEMATOCRIT % 26 9*   PLATELETS Thousands/uL 590*   NEUTROS PCT % 74   LYMPHS PCT % 18   MONOS PCT % 6   EOS PCT % 1     Results from last 7 days   Lab Units 11/07/19  0539   POTASSIUM mmol/L 4 0   CHLORIDE mmol/L 112*   CO2 mmol/L 23   BUN mg/dL 16   CREATININE mg/dL 0 50*   CALCIUM mg/dL 8 4           * I Have Reviewed All Lab Data Listed Above  * Additional Pertinent Lab Tests Reviewed: All Labs Within Last 24 Hours Reviewed    Imaging:    Imaging Reports Reviewed Today Include: None new    Cultures:   Blood Culture:   Lab Results   Component Value Date    BLOODCX No Growth After 5 Days  10/25/2019    BLOODCX No Growth After 5 Days  10/25/2019    BLOODCX No Growth After 5 Days  10/14/2019    BLOODCX No Growth After 5 Days   10/14/2019     Urine Culture:   Lab Results   Component Value Date    URINECX 4901-7358 cfu/ml Gram Negative Nirav (A) 10/15/2019    URINECX >100,000 cfu/ml Escherichia coli 07/16/2017     Sputum Culture: No components found for: SPUTUMCX  Wound Culture: No results found for: WOUNDCULT    Last 24 Hours Medication List:     Current Facility-Administered Medications:  acetaminophen 650 mg Rectal Q6H PRN Herman Rueda DO   aluminum-magnesium hydroxide-simethicone 30 mL Oral Q6H PRN Haven Pena MD   apixaban 2 5 mg Oral BID Pooja Mason PA-C   atorvastatin 40 mg Oral Daily With Alfred Joseph MD   bisacodyl 10 mg Rectal Daily PRN Zita MAHAN PA-C   docusate sodium 100 mg Oral BID PRN Haven Pena MD   ferrous sulfate 325 mg Oral Daily With Breakfast Elvira Mayes MD   fluticasone 1 spray Each Nare BID Herbert Fischer MD   insulin lispro 1-5 Units Subcutaneous HS Clearance MD Seema   insulin lispro 1-6 Units Subcutaneous TID AC Clearance MD Seema   lidocaine (PF) 2 mL Infiltration Once Supriya Ho PA-C   metoprolol 2 5 mg Intravenous Q6H PRN Herbert Fischer MD   metoprolol tartrate 50 mg Oral Q12H [facility] aJck Baig PA-C   mirtazapine 7 5 mg Oral HS Heaven MAHAN PA-C   nystatin 500,000 Units Swish & Swallow 4x Daily Heaven MAHAN PA-C   ondansetron 4 mg Intravenous Q6H PRN Clearance MD Seema   pantoprazole 40 mg Oral Early Morning Herbert Fischer MD        Today, Patient Was Seen By: Jack Baig PA-C    ** Please Note: Dragon 360 Dictation voice to text software may have been used in the creation of this document   **

## 2019-11-07 NOTE — ASSESSMENT & PLAN NOTE
· Participated in a family meeting on 10/31/19 with palliative care  Outcome remained treatment oriented care  Had another discussion with patient's daughter yesterday and she continues to want to pursue treatment care at this time  · Currently patient remains full code

## 2019-11-07 NOTE — ASSESSMENT & PLAN NOTE
· Afib with RVR intermittently  Rate appears better controlled with increase in metoprolol  · TSH normal this admission  · LENO showed EF 55% without any intracardiac thrombus  · Heparin drip stopped and started on Eliquis

## 2019-11-07 NOTE — ASSESSMENT & PLAN NOTE
· With homogeneous pattern  · Noted elevated sed rate and CRP  · Concerns for vasculitis as a cause for encephalopathy  · Rheumatoid factor positive  · ANCA panel negative  · Await rheumatology input, already consulted

## 2019-11-07 NOTE — NUTRITION
11/07/19 1245   Recommendations/Interventions   Summary Patient's appetite remains poor, most meal completions 0-25% noted  EN initiated and providing approximately 75% nutritional needs, remainder of nutritional needs to be met via po intake  Generalized trace edema noted  Nursing skin care plan reviewed  Interventions Diet: continued as ordered; Supplement continue;EN continue as ordered   Nutrition Recommendations No new recommendations;Lab - consider order (specify)  (Monitor electrolytes and weight  )

## 2019-11-07 NOTE — PLAN OF CARE
Problem: Prexisting or High Potential for Compromised Skin Integrity  Goal: Skin integrity is maintained or improved  Description  INTERVENTIONS:  - Identify patients at risk for skin breakdown  - Assess and monitor skin integrity  - Assess and monitor nutrition and hydration status  - Monitor labs   - Assess for incontinence   - Turn and reposition patient  - Assist with mobility/ambulation  - Relieve pressure over bony prominences  - Avoid friction and shearing  - Provide appropriate hygiene as needed including keeping skin clean and dry  - Evaluate need for skin moisturizer/barrier cream  - Collaborate with interdisciplinary team   - Patient/family teaching  - Consider wound care consult   Outcome: Progressing     Problem: Nutrition/Hydration-ADULT  Goal: Nutrient/Hydration intake appropriate for improving, restoring or maintaining nutritional needs  Description  Monitor and assess patient's nutrition/hydration status for malnutrition  Collaborate with interdisciplinary team and initiate plan and interventions as ordered  Monitor patient's weight and dietary intake as ordered or per policy  Utilize nutrition screening tool and intervene as necessary  Determine patient's food preferences and provide high-protein, high-caloric foods as appropriate       INTERVENTIONS:  - Monitor oral intake, urinary output, labs, and treatment plans  - Assess nutrition and hydration status and recommend course of action  - Evaluate amount of meals eaten  - Assist patient with eating if necessary   - Allow adequate time for meals  - Recommend/ encourage appropriate diets, oral nutritional supplements, and vitamin/mineral supplements  - Order, calculate, and assess calorie counts as needed  - Recommend, monitor, and adjust tube feedings and TPN/PPN based on assessed needs  - Assess need for intravenous fluids  - Provide specific nutrition/hydration education as appropriate  - Include patient/family/caregiver in decisions related to nutrition  Outcome: Progressing     Problem: Potential for Falls  Goal: Patient will remain free of falls  Description  INTERVENTIONS:  - Assess patient frequently for physical needs  -  Identify cognitive and physical deficits and behaviors that affect risk of falls    -  [place] fall precautions as indicated by assessment   - Educate patient/family on patient safety including physical limitations  - Instruct patient to call for assistance with activity based on assessment  - Modify environment to reduce risk of injury  - Consider OT/PT consult to assist with strengthening/mobility  Outcome: Progressing     Problem: NEUROSENSORY - ADULT  Goal: Achieves stable or improved neurological status  Description  INTERVENTIONS  - Monitor and report changes in neurological status  - Monitor vital signs such as temperature, blood pressure, glucose, and any other labs ordered   - Initiate measures to prevent increased intracranial pressure  - Monitor for seizure activity and implement precautions if appropriate      Outcome: Progressing     Problem: RESPIRATORY - ADULT  Goal: Achieves optimal ventilation and oxygenation  Description  INTERVENTIONS:  - Assess for changes in respiratory status  - Assess for changes in mentation and behavior  - Position to facilitate oxygenation and minimize respiratory effort  - Encourage broncho-pulmonary hygiene including cough, deep breathe, Incentive Spirometry  - Assess and instruct to report SOB or any respiratory difficulty  - Respiratory Therapy support as indicated   Outcome: Progressing     Problem: GASTROINTESTINAL - ADULT  Goal: Maintains adequate nutritional intake  Description  INTERVENTIONS:  - Monitor percentage of each meal consumed  - Identify factors contributing to decreased intake, treat as appropriate  - Assist with meals as needed  - Monitor I&O, weight, and lab values if indicated  - Obtain nutrition services referral as needed  Outcome: Progressing     Problem: GENITOURINARY - ADULT  Goal: Urinary catheter remains patent  Description  INTERVENTIONS:  - Assess patency of urinary catheter  - If patient has a chronic herrera, consider changing catheter if non-functioning  - Follow guidelines for intermittent irrigation of non-functioning urinary catheter  Outcome: Progressing     Problem: METABOLIC, FLUID AND ELECTROLYTES - ADULT  Goal: Electrolytes maintained within normal limits  Description  INTERVENTIONS:  - Monitor labs and assess patient for signs and symptoms of electrolyte imbalances  - Administer electrolyte replacement as ordered  - Monitor response to electrolyte replacements, including repeat lab results as appropriate  - Instruct patient on fluid and nutrition as appropriate  Outcome: Progressing  Goal: Fluid balance maintained  Description  INTERVENTIONS:  - Monitor labs   - Monitor I/O and WT  - Instruct patient on fluid and nutrition as appropriate  - Assess for signs & symptoms of volume excess or deficit  Outcome: Progressing  Goal: Glucose maintained within target range  Description  INTERVENTIONS:  - Monitor Blood Glucose as ordered  - Assess for signs and symptoms of hyperglycemia and hypoglycemia  - Administer ordered medications to maintain glucose within target range  - Assess nutritional intake and initiate nutrition service referral as needed  Outcome: Progressing     Problem: SKIN/TISSUE INTEGRITY - ADULT  Goal: Skin integrity remains intact  Description  INTERVENTIONS  - Identify patients at risk for skin breakdown  - Assess and monitor skin integrity  - Assess and monitor nutrition and hydration status  - Monitor labs (i e  albumin)  - Assess for incontinence   - Turn and reposition patient  - Assist with mobility/ambulation  - Relieve pressure over bony prominences  - Avoid friction and shearing  - Provide appropriate hygiene as needed including keeping skin clean and dry  - Evaluate need for skin moisturizer/barrier cream  - Collaborate with interdisciplinary team (i e  Nutrition, Rehabilitation, etc )   - Patient/family teaching  Outcome: Progressing  Goal: Oral mucous membranes remain intact  Description  INTERVENTIONS  - Assess oral mucosa and hygiene practices  - Implement preventative oral hygiene regimen  - Implement oral medicated treatments as ordered  - Initiate Nutrition services referral as needed  Outcome: Progressing     Problem: HEMATOLOGIC - ADULT  Goal: Maintains hematologic stability  Description  INTERVENTIONS  - Monitor labs      Outcome: Progressing     Problem: SAFETY,RESTRAINT: NV/NON-SELF DESTRUCTIVE BEHAVIOR  Goal: Remains free of harm/injury (restraint for non violent/non self-detsructive behavior)  Description  INTERVENTIONS:  - Instruct patient/family regarding restraint use   - Assess and monitor physiologic and psychological status   - Provide interventions and comfort measures to meet assessed patient needs   - Identify and implement measures to help patient regain control  - Assess readiness for release of restraint   Outcome: Progressing  Goal: Returns to optimal restraint-free functioning  Description  INTERVENTIONS:  - Assess the patient's behavior and symptoms that indicate continued need for restraint  - Identify and implement measures to help patient regain control  - Assess readiness for release of restraint   Outcome: Progressing

## 2019-11-07 NOTE — PLAN OF CARE
Problem: OCCUPATIONAL THERAPY ADULT  Goal: Performs self-care activities at highest level of function for planned discharge setting  See evaluation for individualized goals  Description  Treatment Interventions: ADL retraining, Functional transfer training, UE strengthening/ROM, Endurance training, Cognitive reorientation, Patient/family training, Compensatory technique education, Energy conservation, Activityengagement      PROGRESSING:    See flowsheet documentation for full assessment, interventions and recommendations  Note:   Limitation: Decreased ADL status, Decreased Safe judgement during ADL, Decreased cognition, Decreased endurance, Decreased self-care trans, Decreased high-level ADLs  Prognosis: Fair  Assessment: Patient participated in skilled OT with focus on adl/self care tasks i e  grooming, activity endurance, functional ROM BUE's for carryover into functional task performance, bed mobility skills, cognitive reorientation skill activity  Patient presents supine in bed, eyes closed  Patient opened eyes with named called several times and tactile stimuli  Patient required repetitive vc's and hands on activity to remain engaged i e UE functional motion  Patient attempted to answer x3, however, very minimal verbaliztions  Patient with flat affect and cooperative throughout this session  Bilateral restraints reapplied at end of this session  Patient would benefit from [address] with focus on increasing functional strengrth and endurance, increasing participation with functional and cognitive task performance, increase independence with self care/adl tasks for carryover into her daily routine       OT Discharge Recommendation: Short Term Rehab  OT - OK to Discharge: (when medically cleared)  Rayshawn Broussard

## 2019-11-08 ENCOUNTER — APPOINTMENT (INPATIENT)
Dept: RADIOLOGY | Facility: HOSPITAL | Age: 79
DRG: 545 | End: 2019-11-08
Payer: MEDICARE

## 2019-11-08 LAB
ANION GAP SERPL CALCULATED.3IONS-SCNC: 6 MMOL/L (ref 4–13)
B BURGDOR IGG CSF IA-ACNC: 0.13 INDEX (ref 0–0.09)
B BURGDOR IGM CSF IA-ACNC: <0.06 INDEX (ref 0–0.06)
BASOPHILS # BLD AUTO: 0.02 THOUSANDS/ΜL (ref 0–0.1)
BASOPHILS NFR BLD AUTO: 0 % (ref 0–1)
BUN SERPL-MCNC: 17 MG/DL (ref 5–25)
CALCIUM SERPL-MCNC: 8.1 MG/DL (ref 8.3–10.1)
CHLORIDE SERPL-SCNC: 109 MMOL/L (ref 100–108)
CO2 SERPL-SCNC: 23 MMOL/L (ref 21–32)
CREAT SERPL-MCNC: 0.46 MG/DL (ref 0.6–1.3)
EOSINOPHIL # BLD AUTO: 0.11 THOUSAND/ΜL (ref 0–0.61)
EOSINOPHIL NFR BLD AUTO: 2 % (ref 0–6)
ERYTHROCYTE [DISTWIDTH] IN BLOOD BY AUTOMATED COUNT: 19.8 % (ref 11.6–15.1)
GFR SERPL CREATININE-BSD FRML MDRD: 94 ML/MIN/1.73SQ M
GLUCOSE SERPL-MCNC: 142 MG/DL (ref 65–140)
GLUCOSE SERPL-MCNC: 166 MG/DL (ref 65–140)
GLUCOSE SERPL-MCNC: 192 MG/DL (ref 65–140)
GLUCOSE SERPL-MCNC: 203 MG/DL (ref 65–140)
GLUCOSE SERPL-MCNC: 227 MG/DL (ref 65–140)
HCT VFR BLD AUTO: 26.4 % (ref 34.8–46.1)
HGB BLD-MCNC: 7.5 G/DL (ref 11.5–15.4)
IMM GRANULOCYTES # BLD AUTO: 0.06 THOUSAND/UL (ref 0–0.2)
IMM GRANULOCYTES NFR BLD AUTO: 1 % (ref 0–2)
LYMPHOCYTES # BLD AUTO: 1.11 THOUSANDS/ΜL (ref 0.6–4.47)
LYMPHOCYTES NFR BLD AUTO: 18 % (ref 14–44)
MCH RBC QN AUTO: 22.7 PG (ref 26.8–34.3)
MCHC RBC AUTO-ENTMCNC: 28.4 G/DL (ref 31.4–37.4)
MCV RBC AUTO: 80 FL (ref 82–98)
MONOCYTES # BLD AUTO: 0.27 THOUSAND/ΜL (ref 0.17–1.22)
MONOCYTES NFR BLD AUTO: 4 % (ref 4–12)
NEUTROPHILS # BLD AUTO: 4.68 THOUSANDS/ΜL (ref 1.85–7.62)
NEUTS SEG NFR BLD AUTO: 75 % (ref 43–75)
NRBC BLD AUTO-RTO: 0 /100 WBCS
PLATELET # BLD AUTO: 205 THOUSANDS/UL (ref 149–390)
PMV BLD AUTO: 10.4 FL (ref 8.9–12.7)
POTASSIUM SERPL-SCNC: 4.4 MMOL/L (ref 3.5–5.3)
RBC # BLD AUTO: 3.3 MILLION/UL (ref 3.81–5.12)
SODIUM SERPL-SCNC: 138 MMOL/L (ref 136–145)
WBC # BLD AUTO: 6.25 THOUSAND/UL (ref 4.31–10.16)

## 2019-11-08 PROCEDURE — 82948 REAGENT STRIP/BLOOD GLUCOSE: CPT

## 2019-11-08 PROCEDURE — 85025 COMPLETE CBC W/AUTO DIFF WBC: CPT | Performed by: PHYSICIAN ASSISTANT

## 2019-11-08 PROCEDURE — 80048 BASIC METABOLIC PNL TOTAL CA: CPT | Performed by: PHYSICIAN ASSISTANT

## 2019-11-08 PROCEDURE — 93880 EXTRACRANIAL BILAT STUDY: CPT | Performed by: SURGERY

## 2019-11-08 PROCEDURE — 99233 SBSQ HOSP IP/OBS HIGH 50: CPT | Performed by: PHYSICIAN ASSISTANT

## 2019-11-08 PROCEDURE — 99232 SBSQ HOSP IP/OBS MODERATE 35: CPT | Performed by: SURGERY

## 2019-11-08 PROCEDURE — 71046 X-RAY EXAM CHEST 2 VIEWS: CPT

## 2019-11-08 PROCEDURE — 97535 SELF CARE MNGMENT TRAINING: CPT

## 2019-11-08 PROCEDURE — 99232 SBSQ HOSP IP/OBS MODERATE 35: CPT | Performed by: INTERNAL MEDICINE

## 2019-11-08 PROCEDURE — 86235 NUCLEAR ANTIGEN ANTIBODY: CPT | Performed by: INTERNAL MEDICINE

## 2019-11-08 PROCEDURE — 92526 ORAL FUNCTION THERAPY: CPT

## 2019-11-08 RX ORDER — METHYLPREDNISOLONE SODIUM SUCCINATE 40 MG/ML
20 INJECTION, POWDER, LYOPHILIZED, FOR SOLUTION INTRAMUSCULAR; INTRAVENOUS EVERY 12 HOURS SCHEDULED
Status: DISCONTINUED | OUTPATIENT
Start: 2019-11-08 | End: 2019-11-08

## 2019-11-08 RX ORDER — METHYLPREDNISOLONE SODIUM SUCCINATE 40 MG/ML
20 INJECTION, POWDER, LYOPHILIZED, FOR SOLUTION INTRAMUSCULAR; INTRAVENOUS EVERY 12 HOURS SCHEDULED
Status: COMPLETED | OUTPATIENT
Start: 2019-11-09 | End: 2019-11-12

## 2019-11-08 RX ORDER — ACETAMINOPHEN 325 MG/1
650 TABLET ORAL EVERY 6 HOURS PRN
Status: DISCONTINUED | OUTPATIENT
Start: 2019-11-08 | End: 2019-11-16 | Stop reason: HOSPADM

## 2019-11-08 RX ADMIN — NYSTATIN 500000 UNITS: 500000 SUSPENSION ORAL at 14:01

## 2019-11-08 RX ADMIN — INSULIN LISPRO 2 UNITS: 100 INJECTION, SOLUTION INTRAVENOUS; SUBCUTANEOUS at 14:24

## 2019-11-08 RX ADMIN — FLUTICASONE PROPIONATE 1 SPRAY: 50 SPRAY, METERED NASAL at 09:18

## 2019-11-08 RX ADMIN — MIRTAZAPINE 7.5 MG: 15 TABLET, FILM COATED ORAL at 21:41

## 2019-11-08 RX ADMIN — NYSTATIN 500000 UNITS: 500000 SUSPENSION ORAL at 21:41

## 2019-11-08 RX ADMIN — NYSTATIN 500000 UNITS: 500000 SUSPENSION ORAL at 17:34

## 2019-11-08 RX ADMIN — INSULIN LISPRO 1 UNITS: 100 INJECTION, SOLUTION INTRAVENOUS; SUBCUTANEOUS at 17:38

## 2019-11-08 RX ADMIN — ACETAMINOPHEN 650 MG: 325 TABLET ORAL at 17:34

## 2019-11-08 RX ADMIN — INSULIN LISPRO 2 UNITS: 100 INJECTION, SOLUTION INTRAVENOUS; SUBCUTANEOUS at 06:39

## 2019-11-08 RX ADMIN — FLUTICASONE PROPIONATE 1 SPRAY: 50 SPRAY, METERED NASAL at 17:36

## 2019-11-08 RX ADMIN — APIXABAN 2.5 MG: 2.5 TABLET, FILM COATED ORAL at 17:34

## 2019-11-08 RX ADMIN — APIXABAN 2.5 MG: 2.5 TABLET, FILM COATED ORAL at 09:17

## 2019-11-08 RX ADMIN — METOPROLOL TARTRATE 50 MG: 50 TABLET, FILM COATED ORAL at 09:17

## 2019-11-08 RX ADMIN — NYSTATIN 500000 UNITS: 500000 SUSPENSION ORAL at 09:17

## 2019-11-08 RX ADMIN — FERROUS SULFATE TAB 325 MG (65 MG ELEMENTAL FE) 325 MG: 325 (65 FE) TAB at 09:17

## 2019-11-08 RX ADMIN — ATORVASTATIN CALCIUM 40 MG: 40 TABLET, FILM COATED ORAL at 17:35

## 2019-11-08 NOTE — PROGRESS NOTES
Progress Note - Infectious Disease   Pam Oliveira [de-identified] y o  female MRN: [id number]  Unit/Bed#: -01 Encounter: [id number]      Impression/Plan:  1  Fever   Patient noted to have intermittent low-grade fever and isolated high fevers  Lunette Jennifer are no obvious sources on exam   Previous workup reviewed at [facility] which was largely unremarkable except for splenic infarct   Echo at that time was also unremarkable   Transesophageal echo here negative   Flu swab, chest x-ray, respiratory viral panel, urine culture and blood cultures have been unremarkable   CT of the head also unremarkable   RPR and HIV negative   Lyme testing peripherally negative, parasite smear negative, Anaplasma testing negative   CSF culture and VDRL negative and additional studies pending  Lower suspicion for infectious etiology   Sed rate and CRP noted to be significantly elevated  MRI of the head reviewed with mastoid effusions, CT temporal bone without mastoiditis  Repeat chest x-ray unremarkable  Suspect autoimmune process  Continue to monitor off antibiotics  Patient to be started on steroids  Continue to trend fever curve/vitals  Neurology evaluation appreciated  Rheumatology evaluation appreciated  Additional care as per primary     2  Hyponatremia   Sodium appears to be stable today   Ongoing follow-up by Nephrology      3  Metabolic encephalopathy   Patient initially presented confused likely due to problem 2  It seems that her mental status improved with adjustment in her sodium level   Workup for adrenal insufficiency was negative   Unclear etiology for her underlying hyponatremia and now intermittent fevers as above   Mental status has been slowly improving  Continue to monitor mental status  Continue to monitor electrolytes  Additional care as per primary     4  Splenic infarct   Appears to be evolving on imaging   Possibly contributing to problem 1   Unclear etiology for this lesion   Was not present on imaging in  echo at [facility] unremarkable   Patient without other prosthetic material   Transesophageal echo negative  Serial abdominal exams  Hematology evaluation appreciated  Additional care as per primary     5  Type 2 diabetes   Ongoing management as per primary service      Above plan discussed briefly with the patient at bedside and with Rheumatology attending      ID consult service will formally re-evaluate this patient again on Monday  Please contact ID attending on call if any additional questions or concerns over the weekend  Antibiotics:  None    24 hour events:  Patient again had episode of high fever yesterday, none further overnight  No new micro  Chest x-ray yesterday with atelectasis  Patient's other vitals are stable  Patient's other labs otherwise unremarkable    Subjective:  Patient again remains slow does respond on questioning  She denies having any nausea, vomiting, chest pain or shortness of breath  She is hard of hearing at baseline  Objective:  Vitals:  Temp:  [97 °F (36 1 °C)-102 5 °F (39 2 °C)] 97 °F (36 1 °C)  HR:  [] 89  Resp:  [16-20] 20  BP: (113-136)/(47-65) 113/47  SpO2:  [94 %-100 %] 100 %  Temp (24hrs), Av 9 °F (37 2 °C), Min:97 °F (36 1 °C), Max:102 5 °F (39 2 °C)  Current: Temperature: (!) 97 °F (36 1 °C)    Physical Exam:   General Appearance:  Alert, interactive, nontoxic, no acute distress  Slow to respond exam    Throat: Oropharynx moist without lesions  Lungs:   Clear to auscultation bilaterally; no wheezes, rhonchi or rales; respirations unlabored on room air   Heart:  RRR; no murmur, rub or gallop appreciated   Abdomen:   Soft, non-tender, non-distended, positive bowel sounds  Extremities: No clubbing, cyanosis or edema   Skin: No new rashes or lesions  No new draining wounds noted         Labs, Imaging, & Other studies:   All pertinent labs and imaging studies were personally reviewed  Results from last 7 days   Lab Units 11/08/19  0531 11/05/19  0519 11/04/19  1642   WBC Thousand/uL 6 25 7 51 7 49   HEMOGLOBIN g/dL 7 5* 8 1* 8 0*   PLATELETS Thousands/uL 205 590* 603*     Results from last 7 days   Lab Units 11/08/19  0531   POTASSIUM mmol/L 4 4   CHLORIDE mmol/L 109*   CO2 mmol/L 23   BUN mg/dL 17   CREATININE mg/dL 0 46*   EGFR ml/min/1 73sq m 94   CALCIUM mg/dL 8 1*

## 2019-11-08 NOTE — ASSESSMENT & PLAN NOTE
Malnutrition Findings:   Malnutrition type: Chronic illness(Related to medical condition as evidenced by 10% weight loss over the past month and <75% energy intake needs met >1 month treated with ensure compact supplements)  Degree of Malnutrition: Other severe protein calorie malnutrition    BMI Findings: Body mass index is 23 47 kg/m²  Reported weight has fluctuated significantly throughout hospitalization, will work on obtaining more accurate weights  Nutrition consulted and recommends EN  Daughter wanted to proceed with keofed placement  Started Jevity 1 2 kcal @ 60ml/hr x16 hours per day

## 2019-11-08 NOTE — ASSESSMENT & PLAN NOTE
· Patient has chronic Herrera present on admission- herrera was replaced in ED 10/14   · Placed on recent admission (10/3/19) at [facility] where she developed urinary retention  · Patient will follow up outpatient with urology for voiding trial when patient's physical activity levels improve

## 2019-11-08 NOTE — PLAN OF CARE
Problem: SAFETY,RESTRAINT: NV/NON-SELF DESTRUCTIVE BEHAVIOR  Goal: Remains free of harm/injury (restraint for non violent/non self-detsructive behavior)  Description  INTERVENTIONS:  - Instruct patient/family regarding restraint use   - Assess and monitor physiologic and psychological status   - Provide interventions and comfort measures to meet assessed patient needs   - Identify and implement measures to help patient regain control  - Assess readiness for release of restraint   Outcome: Progressing     Problem: SAFETY,RESTRAINT: NV/NON-SELF DESTRUCTIVE BEHAVIOR  Goal: Returns to optimal restraint-free functioning  Description  INTERVENTIONS:  - Assess the patient's behavior and symptoms that indicate continued need for restraint  - Identify and implement measures to help patient regain control  - Assess readiness for release of restraint   Outcome: Progressing     Problem: COPING  Goal: Pt/Family able to verbalize concerns and demonstrate effective coping strategies  Description  INTERVENTIONS:  - Assist patient/family to identify coping skills, available support systems and cultural and spiritual values  - Provide emotional support, including active listening and acknowledgement of concerns of patient and caregivers  - Reduce environmental stimuli, as able  - Provide patient education  - Assess for spiritual pain/suffering and initiate spiritual care, including notification of Pastoral Care or queenie based community as needed  - Assess effectiveness of coping strategies  Outcome: Progressing     Problem: COPING  Goal: Will report anxiety at manageable levels  Description  INTERVENTIONS:  - Administer medication as ordered  - Teach and encourage coping skills  - Provide emotional support  - Assess patient/family for anxiety and ability to cope  Outcome: Progressing     Problem: CONFUSION/THOUGHT DISTURBANCE  Goal: Thought disturbances (confusion, delirium, depression, dementia or psychosis) are managed to maintain or return to baseline mental status and functional level  Description  INTERVENTIONS:  - Assess for possible contributors to  thought disturbance, including but not limited to medications, infection, impaired vision or hearing, underlying metabolic abnormalities, dehydration, respiratory compromise,  psychiatric diagnoses and notify attending PHYSICAN/AP  - Monitor and intervene to maintain adequate nutrition, hydration, elimination, sleep and activity  - Decrease environmental stimuli, including noise as appropriate  - Provide frequent contacts to provide refocusing, direction and reassurance as needed  Approach patient calmly with eye contact and at their level    - [facility] high risk fall precautions, aspiration precautions and other safety measures, as indicated  - If delirium suspected, notify physician/AP of change in condition and request immediate in-person evaluation  - Pursue consults as appropriate including Geriatric (campus dependent), OT for cognitive evaluation/activity planning, psychiatric, pastoral care, etc   Outcome: Progressing

## 2019-11-08 NOTE — CONSULTS
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 4  Splenic infarct on CT scan  Need to rule out any phospholipid disorder with increased risk of clotting, often seen with SLE another connective tissue disorders  5  Metabolic encephalopathy, diabetes type 2, hyponatremia, history of atrial fibrillation    Recommendation:    1  Obtain further autoimmune serologies to see if she has SLE or a variant  Would check double-stranded DNA, Sjogren's, nuclear protein, C3, C4, CH 50, cryo does, phospholipid antibodies, electrophoresis, quantitative immunoglobulins    2  As discussed with the the [facility] service team, a course of moderate dose of steroids may be worth given her for therapeutic and possible diagnostic value  Suggest Solu-Medrol 20 mg IV q 12 hours which is probably close to 1 mg/kg given her small body size  3  Suggest echocardiogram to see if she has any pericardial effusion  She did have pleural effusions on chest x-ray on initial admission  HPI:  This 67-year-old woman is consulted because of prior JAMILAH tests 1:1280 discovered a week ago  She has been a patient in the hospital for the last 25 days with dementia like syndrome low-grade fever, UTI with organism resistant to antibiotics,, atrial fibrillation and hyponatremia  She was felt to have a metabolic encephalopathy but spinal tap was negative for any infection  MRI of the brain did reveal subacute to acute infarct left pre central gyrus and chronic white matter microangiopathy  Mastoid effusions were noted  Splenic infarct was found on CT scan appeared to be evolving  Not clear this been any prior history of DVT or PE etc   Other test of interest was a high titer rheumatoid factor 1:640  Did not find any other autoimmune serologies, sed rate or complements  Urinalysis seem to be benign  Lab test did show anemia with a hemoglobin 7  She did have a platelet count greater than 500,000 on admission that is come down to 200,000  White count some been in the normal range  Ferritin levels very high 600 but low serum iron and saturation  According to the nursing staff and other members of the team, the daughter stated that the patient had been on low-dose prednisone up to 18 months ago for adrenal insufficiency which was said to be due to adrenal hemorrhage  Do not have any prior history of an autoimmune disorder, TB etc   Not sure there has been any history of DVT or PE  The patient was at [facility] last month with some fevers and UTIs  The patient did receive steroids for 1 day and was said to became more alert    Past medical history-cardiac disease, diabetes, hyperlipidemia, hypertension, adrenal insufficiency  Past surgical history is positive for coronary angioplasty  Meds on admission include amlodipine, atenolol, lisinopril, Glucotrol, metformin, Protonix, Januvia, Pravachol  The patient was apparently fairly independent and apparently has had a major change in mental status  Review of systems was not obtainable since the patient was nonverbal     Physical exam reveals an elderly woman not able to give any history with the NG tube present  Skin was slightly pale  Temperature was a 102° yesterday    Skin dry without any palpable purpura, sclerodactyly or vasculitic lesions  HEENT:  Pale conjunctiva  No eye contact made  Oral mucosa revealed very dry tongue  No ulcers could be seen  Neck is supple  Chest exam was limited but did not hear any rales  Cardiac exam revealed regular rhythm  There was a faint possible pericardial rub at the sternum  Abdomen flat and soft  Extremities revealed somewhat cold left hand but pulses are intact  She was slightly tender over the back of the hands and wrists  No knee effusions  Of feet were examined and no vasculitic lesions could be seen  Muscle strength testing could not be done

## 2019-11-08 NOTE — OCCUPATIONAL THERAPY NOTE
OccupationalTherapy Progress Note     Patient Name: Artemio Watt's Date: 11/8/2019  Problem List  Principal Problem:    Metabolic encephalopathy  Active Problems:    Type 2 diabetes mellitus with diabetic neuropathy, without long-term current use of insulin (HCC)    Essential hypertension    Failure to thrive in adult    Urinary retention    Severe protein-calorie malnutrition (HCC)    Microcytic anemia    Splenic infarct    Paroxysmal atrial fibrillation (HCC)    Goals of care, counseling/discussion    Internal carotid artery stenosis, bilateral    JAMILAH positive    Acute CVA (cerebrovascular accident) ([facility] [address] )            11/08/19 1050   Restrictions/Precautions   Weight Bearing Precautions Per Order No   Other Precautions Contact/isolation;Cognitive; Restraints;Multiple lines; Fall Risk;Telemetry   General   Response to Previous Treatment Patient with no complaints from previous session   Lifestyle   Autonomy Has needed assist w self care/mobility for the past month  Was at [facility] rehab following 2 hospitalizations   Reciprocal Relationships supportive family   Pain Assessment   Pain Assessment No/denies pain   Pain Score No Pain   ADL   Where Assessed Edge of bed   Grooming Assistance 4  Minimal Assistance   Grooming Deficit Setup;Verbal cueing;Brushing hair   Grooming Comments Pt requires VC to initate task   Bed Mobility   Supine to Sit 2  Maximal assistance   Additional items Assist x 2;LE management;Verbal cues   Sit to Supine 2  Maximal assistance   Additional items Assist x 2; Increased time required;LE management;Verbal cues   Additional Comments Pt sat EOB for ~5 minutes with min A to complete grooming tasks  Transfers   Additional Comments Pt sat EOB ~5 minutes and denied standing or further mobilization     Cognition   Overall Cognitive Status Impaired   Arousal/Participation Lethargic;Cooperative   Attention Difficulty attending to directions   Orientation Level Oriented to person;Oriented to place   Memory Decreased recall of recent events;Decreased recall of precautions   Following Commands Follows one step commands with increased time or repetition   Comments Pt appears more alert this date with eyes open throughout the entire session and able to verbalize appropriately  Activity Tolerance   Activity Tolerance Patient limited by fatigue   Medical Staff Made Aware RN confirmed okay to see and mobilize pt    Assessment   Assessment Patient participated in Skilled OT session this date with interventions consisting of cognitive reorientation, ADL re training with the use of correct body mechnaics,  therapeutic activities to: increase activity tolerance, increase trunk control and increase OOB/ sitting tolerance   Patient agreeable to OT treatment session, upon arrival patient was found supine in bed  In comparison to previous session, patient with improvements in functional sitting tolerance  Patient requiring verbal cues for safety and frequent rest periods  Patient continues to be functioning below baseline level, occupational performance remains limited secondary to factors listed above and increased risk for falls and injury  From OT standpoint, recommendation at time of d/c would be Short Term Rehab  Patient to benefit from continued Occupational Therapy treatment while in the hospital to address deficits as defined above and maximize level of functional independence with ADLs and functional mobility  Plan   Treatment Interventions ADL retraining;Functional transfer training; Endurance training;Cognitive reorientation;Patient/family training;Equipment evaluation/education; Compensatory technique education; Energy conservation; Activityengagement   Goal Expiration Date 11/14/19   OT Treatment Day 4   OT Frequency 3-5x/wk   Recommendation   OT Discharge Recommendation Short Term Rehab   OT - OK to Discharge Yes  (When medically appropriate to rehab)   Barthel Index   Feeding 5   Bathing 0 Grooming Score 0   Dressing Score 0   Bladder Score 0   Bowels Score 5   Toilet Use Score 5   Transfers (Bed/Chair) Score 5   Mobility (Level Surface) Score 0   Stairs Score 0   Barthel Index Score 20   Modified [place] Scale   Modified [place] Scale 4     Perlita Stark, MOT, OTR/L

## 2019-11-08 NOTE — SPEECH THERAPY NOTE
Speech Language/Pathology    Speech/Language Pathology Progress Note    Patient Name: [place]  Today's Date: 11/8/2019       Subjective:  Pt awake, alert  Current Diet: puree, nt, tube feeds via keofeed  Objective:  Pt awake, alert, seen for ongoing dx dysphagia tx  She is pleasant, remains unable to consistently answer my questions but will nod yes no if she does or does not want po  Pt agreed to some small amts of puree by tsp, nt by tsp  Fair opening w/noted intermittent holding & mult swallows per bolus  Mild throat clearing only w/ initial trials  Mult swallows for all trials  Pt then waved me away not wanting more  Assessment:  Pt tolerating small amts of puree/nt, still w/ noted mild dysphagia    May need to consider permanent alternate means of nutrition     Plan/Recommendations:  Continue current diet  Speech to follow 1-2x week as able while here

## 2019-11-08 NOTE — PROGRESS NOTES
[facility]  Progress Note - [place] 1940, [de-identified] y o  female MRN: [id number]  Unit/Bed#: -01 Encounter: [id number]  Primary Care Provider: Tristan Yanez MD   Date and time admitted to hospital: 10/14/2019  8:62 PM    * Metabolic encephalopathy  Assessment & Plan  · POA  Etiology still unclear  Likely in setting of hyponatremia with component of delirium contributing given recurrent hospitalizations/underlying cognitive impairment  Patient's baseline as of September was driving, independent cooking/cleaningn/ambulating  · CNS imaging does not reveal obvious etiology  · MRI brain: Single focus of acute/subacute infarct in the left precentral gyrus with no hemorrhagic transformation  Mild chronic white matter microangiopathy  Bilateral mastoid effusions  MRA head is negative  Neurology has been consulted and evaluated the patient  Findings on MRI does not support level of encephalopathy  · No infectious source identified  · UA: Negative  · CXR: No evidence of pneumonia  · No wounds or cellulitis  · Ehrlichia/Babesia testing: negative  · Respiratory panel: Negative  · Flu/RSV: Negative  · Consider Vasculitis vs autoimmune encephalitis  Less likely CNS vasculitis per neurology although encephalitis remains in differential   Per family, patient noted to improve with steroids but has since declined again since they were discontinued  · Autoimmune work up in progress:  · CRP elevated: >90 0  · Anti-nuclear antibodies: Positive  · JAMILAH titer: 1:1280, homogenous pattern  · Quantitative RF elevated: 640  · ANCA: Negative  · S/p LP:  · Lyme: pending  · VDRL: Non-reactive  · Meningitis/Encephalitis Panel: Negative  · Protein: 37  · Culture/gram stain: No growth  · She has hx of suspected adrenal insufficiency, previously on steroids and then tapered off  She follows with Endocrinology as outpatient      · Cosyntropin stim test with adequate response in cortisol, hydrocortisone discontinued, she does not have adrenal insufficiency   · Vitamin B12 and folate normal   · Lyme titer negative  · RPR negative  · HIV negative  · Rheumatology consult: Pending  R/O vasculitis as etiology, considering lupus versus other autoimmune diseases including phospholipid antibody syndrome  Further workup pending, will start Solu-Medrol 20 mg b i d  For 4 days    Acute CVA (cerebrovascular accident) [facility])  Assessment & Plan  · MRI brain on 11/3 shows acute to subacute CVA in left pre central gyrus in setting of rapid afib  · Neurology following and appreciate their input  · Continue statin and therapeutic AC    JAMILAH positive  Assessment & Plan  · With homogeneous pattern  · Noted elevated sed rate and CRP  · Concerns for vasculitis as a cause for encephalopathy  · Rheumatoid factor positive  · ANCA panel negative  · Await rheumatology input, already consulted  Internal carotid artery stenosis, bilateral  Assessment & Plan  · Noted to have bilateral internal carotid artery stenosis left 60-70% and right 50-60% with severe left ECA stenosis  · MRI brain shows small acute to subacute single focus of infarct in left precentral gyrus  Neurology recommends vascular surgery evaluation  Goals of care, counseling/discussion  Assessment & Plan  · Participated in a family meeting on 10/31/19 with palliative care  Outcome remained treatment oriented care  Had another discussion with patient's daughter yesterday and she continues to want to pursue treatment care at this time  · Currently patient remains full code  Paroxysmal atrial fibrillation (HCC)  Assessment & Plan  · Afib with RVR intermittently  Rate appears better controlled with increase in metoprolol  · TSH normal this admission  · LENO showed EF 55% without any intracardiac thrombus  · Heparin drip stopped and started on Eliquis      Splenic infarct  Assessment & Plan  · Noted on CT scan done at [facility] 9/2019, new moderate sized splenic infarcts  · Likely etiology cardio embolic as pt was noted to have PAF with RVR on tele on 10/29  · CTA abdomen/pelvis:  No acute aortic injury or aneurysm  Patent celiac and splenic arteries  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 [address] )  Assessment & Plan  Malnutrition Findings:   Malnutrition type: Chronic illness(Related to medical condition as evidenced by 10% weight loss over the past month and <75% energy intake needs met >1 month treated with ensure compact supplements)  Degree of Malnutrition: Other severe protein calorie malnutrition    BMI Findings: Body mass index is 23 47 kg/m²  Reported weight has fluctuated significantly throughout hospitalization, will work on obtaining more accurate weights  Nutrition consulted and recommends EN  Daughter wanted to proceed with keofed placement  Started Jevity 1 2 kcal @ 60ml/hr x16 hours per day  Urinary retention  Assessment & Plan  · Patient has chronic Herrera present on admission- herrera was replaced in ED 10/14   · Placed on recent admission (10/3/19) at [facility] where she developed urinary retention  · Patient will follow up outpatient with urology for voiding trial when patient's physical activity levels improve      Failure to thrive in adult  Assessment & Plan  · Since September she has had a progressive decline - she has had no appetite, not really eating/drinking with weakness  She was previously reportedly very independent prior to her admission at [facility] in September with UTI  · Geriatrics following, recommending outpatient follow up  · Speech following, status post VBS on 10/30, discussed with Swallow therapy, okay for pureed diet with nectar thick liquids  · Continue nystatin for thrush  · Barium swallow negative for esophageal obstruction  · Palliative Care has evaluated the patient and had a family discussion on 10/31/19  At that time, goals of care were still treatment oriented so palliative care has signed off  At this time, patient still has not progressed  We will continue EN for a few more days  If we see no improvement, I would recommend having another family meeting to discuss goals of care again  · Nutrition has made the recommendation to initiate tube feeding given patient's persistently poor PO intake  Keofed placed 11/6  Patient removed and it was reinserted  EN initiated with Jevity 1 2 kcal @ 60 ml/hr x16 hours/day  Essential hypertension  Assessment & Plan  · Atenolol switched to metoprolol given Afib with RVR  Rate has improved with increase in metoprolol to 50 mg BID  · Amlodipine and enalapril are on hold  Currently, no need to resume given controlled BPs  Type 2 diabetes mellitus with diabetic neuropathy, without long-term current use of insulin [facility])  Assessment & Plan  Lab Results   Component Value Date    HGBA1C 6 4 (H) 10/15/2019     Recent Labs     11/07/19  1600 11/07/19  2048 11/08/19  0601 11/08/19  1101   POCGLU 176* 190* 227* 192*     · Takes metformin and glipizide at baseline, will plan to resume metformin at discharge and hold glipizide    · Continue with SSI  · Diabetic diet  · Monitor accuchecks, avoid hypoglycemia      VTE Pharmacologic Prophylaxis:   Pharmacologic: Apixaban (Eliquis)  Mechanical VTE Prophylaxis in Place: Yes    Patient Centered Rounds: I have performed bedside rounds with nursing staff today  Discussions with Specialists or Other Care Team Provider:  Discussed with attending, Dr Trisha Donaldson as well as Rheumatology  Rheumatology believes there may be an autoimmune source, will pursue further evaluation and start patient on Solu-Medrol 20 mg b i d     Education and Discussions with Family / Patient:  Discussed care plan with patient at bedside as well as with patient's daughter over the phone  Answered all questions to the best my ability  Patient's daughter expressed her frustration in length of stay and lack of definitive answer  Provided encouragement and reassurance  Reviewed current labs to the best my ability and provided Rheumatology update  Time Spent for Care: 1 hour  More than 50% of total time spent on counseling and coordination of care as described above  Current Length of Stay: 25 day(s)    Current Patient Status: Inpatient   Certification Statement: The patient will continue to require additional inpatient hospital stay due to Continued Rheumatology workup, IV steroids    Discharge Plan:  Patient eventually to be discharged to [facility]  However, they will not take patient with a Keofed tube in place  Code Status: Level 1 - Full Code      Subjective:   Patient minimally interactive, answers questions when directly asked  Objective:     Vitals:   Temp (24hrs), Av 9 °F (37 2 °C), Min:97 °F (36 1 °C), Max:102 5 °F (39 2 °C)    Temp:  [97 °F (36 1 °C)-102 5 °F (39 2 °C)] 97 °F (36 1 °C)  HR:  [] 89  Resp:  [16-20] 20  BP: (113-136)/(47-65) 113/47  SpO2:  [94 %-100 %] 100 %  Body mass index is 23 47 kg/m²  Input and Output Summary (last 24 hours): Intake/Output Summary (Last 24 hours) at 2019 1527  Last data filed at 2019 1400  Gross per 24 hour   Intake 730 ml   Output 775 ml   Net -45 ml       Physical Exam:     Physical Exam   Constitutional: She appears well-developed and well-nourished  She appears lethargic  No distress  HENT:   Head: Normocephalic and atraumatic  Eyes: Conjunctivae are normal  No scleral icterus  Cardiovascular: Normal rate and regular rhythm  No murmur heard  Pulmonary/Chest: Effort normal  No respiratory distress  She has no wheezes  She has rales in the right lower field and the left lower field  Abdominal: Soft  She exhibits no distension  There is no tenderness  Musculoskeletal: She exhibits no edema  Neurological: She appears lethargic  Skin: Skin is warm and dry  No erythema  Psychiatric: She has a normal mood and affect  She is slowed  Alert and oriented x3   Nursing note and vitals reviewed  Additional Data:     Labs:    Results from last 7 days   Lab Units 11/08/19  0531  11/04/19  1408   WBC Thousand/uL 6 25   < > 8 32   HEMOGLOBIN g/dL 7 5*   < > 6 6*   HEMATOCRIT % 26 4*   < > 22 7*   PLATELETS Thousands/uL 205   < > 538*   BANDS PCT %  --   --  3   NEUTROS PCT % 75   < >  --    LYMPHS PCT % 18   < >  --    LYMPHO PCT %  --   --  5*   MONOS PCT % 4   < >  --    MONO PCT %  --   --  3*   EOS PCT % 2   < > 0    < > = values in this interval not displayed  Results from last 7 days   Lab Units 11/08/19  0531   SODIUM mmol/L 138   POTASSIUM mmol/L 4 4   CHLORIDE mmol/L 109*   CO2 mmol/L 23   BUN mg/dL 17   CREATININE mg/dL 0 46*   ANION GAP mmol/L 6   CALCIUM mg/dL 8 1*   GLUCOSE RANDOM mg/dL 203*         Results from last 7 days   Lab Units 11/08/19  1101 11/08/19  0601 11/07/19  2048 11/07/19  1600 11/07/19  1100 11/07/19  0546 11/06/19  2053 11/06/19  1551 11/06/19  1312 11/06/19  0608 11/05/19  2054 11/05/19  1607   POC GLUCOSE mg/dl 192* 227* 190* 176* 194* 137 158* 168* 183* 124 122 147*                   * I Have Reviewed All Lab Data Listed Above  * Additional Pertinent Lab Tests Reviewed:  All Labs Within Last 24 Hours Reviewed    Imaging:    Imaging Reports Reviewed Today Include: None  Imaging Personally Reviewed by Myself Includes:  None    Recent Cultures (last 7 days):           Last 24 Hours Medication List:     Current Facility-Administered Medications:  acetaminophen 650 mg Rectal Q6H PRN Herman Rueda DO   aluminum-magnesium hydroxide-simethicone 30 mL Oral Q6H PRN Gentry Bradley MD   apixaban 2 5 mg Oral BID Leticia JessyLUPE king   atorvastatin 40 mg Oral Daily With Maricela Schaumann, MD   bisacodyl 10 mg Rectal Daily PRN Sandi Platebrandon MAHAN PA-C   docusate sodium 100 mg Oral BID PRN Gentry Bradley MD   ferrous sulfate 325 mg Oral Daily With Breakfast Nickie Lewis MD   fluticasone 1 spray Each Nare BID Nickie Lewis MD   insulin lispro 1-5 Units Subcutaneous HS Gentry Bradley MD   insulin lispro 1-6 Units Subcutaneous TID AC Gentry Bradley MD   lidocaine (PF) 2 mL Infiltration Once Radha Ríos PA-C   methylPREDNISolone sodium succinate 20 mg Intravenous Q12H [facility] Heaven MAHAN PA-C   metoprolol 2 5 mg Intravenous Q6H PRN Nickie Lewis MD   metoprolol tartrate 50 mg Oral Q12H [facility] Jayyirene JiménezLUPE king   mirtazapine 7 5 mg Oral HS Heaven MAHAN PA-C   nystatin 500,000 Units Swish & Swallow 4x Daily Heaven MAHAN PA-C   ondansetron 4 mg Intravenous Q6H PRN Gentry Bradley MD   pantoprazole 40 mg Oral Early Morning Nickie Lewis MD        Today, Patient Was Seen By: Perlita Vasquez PA-C    ** Please Note: Dictation voice to text software may have been used in the creation of this document   **

## 2019-11-08 NOTE — PLAN OF CARE
Problem: OCCUPATIONAL THERAPY ADULT  Goal: Performs self-care activities at highest level of function for planned discharge setting  See evaluation for individualized goals  Description  Treatment Interventions: ADL retraining, Functional transfer training, UE strengthening/ROM, Endurance training, Cognitive reorientation, Patient/family training, Compensatory technique education, Energy conservation, Activityengagement          See flowsheet documentation for full assessment, interventions and recommendations  Outcome: Progressing  Note:   Limitation: Decreased ADL status, Decreased Safe judgement during ADL, Decreased cognition, Decreased endurance, Decreased self-care trans, Decreased high-level ADLs  Prognosis: Fair  Assessment: Patient participated in Skilled OT session this date with interventions consisting of cognitive reorientation, ADL re training with the use of correct body mechnaics,  therapeutic activities to: increase activity tolerance, increase trunk control and increase OOB/ sitting tolerance   Patient agreeable to OT treatment session, upon arrival patient was found supine in bed  In comparison to previous session, patient with improvements in functional sitting tolerance  Patient requiring verbal cues for safety and frequent rest periods  Patient continues to be functioning below baseline level, occupational performance remains limited secondary to factors listed above and increased risk for falls and injury  From OT standpoint, recommendation at time of d/c would be Short Term Rehab  Patient to benefit from continued Occupational Therapy treatment while in the hospital to address deficits as defined above and maximize level of functional independence with ADLs and functional mobility        OT Discharge Recommendation: Short Term Rehab  OT - OK to Discharge: Yes(When medically appropriate to rehab)

## 2019-11-08 NOTE — PLAN OF CARE
Problem: Potential for Falls  Goal: Patient will remain free of falls  Description  INTERVENTIONS:  - Assess patient frequently for physical needs  -  Identify cognitive and physical deficits and behaviors that affect risk of falls    -  [place] fall precautions as indicated by assessment   - Educate patient/family on patient safety including physical limitations  - Instruct patient to call for assistance with activity based on assessment  - Modify environment to reduce risk of injury  - Consider OT/PT consult to assist with strengthening/mobility  Outcome: Progressing     Problem: NEUROSENSORY - ADULT  Goal: Achieves stable or improved neurological status  Description  INTERVENTIONS  - Monitor and report changes in neurological status  - Monitor vital signs such as temperature, blood pressure, glucose, and any other labs ordered   - Initiate measures to prevent increased intracranial pressure  - Monitor for seizure activity and implement precautions if appropriate      Outcome: Progressing     Problem: RESPIRATORY - ADULT  Goal: Achieves optimal ventilation and oxygenation  Description  INTERVENTIONS:  - Assess for changes in respiratory status  - Assess for changes in mentation and behavior  - Position to facilitate oxygenation and minimize respiratory effort  - Encourage broncho-pulmonary hygiene including cough, deep breathe, Incentive Spirometry  - Assess and instruct to report SOB or any respiratory difficulty  - Respiratory Therapy support as indicated   Outcome: Progressing     Problem: GENITOURINARY - ADULT  Goal: Urinary catheter remains patent  Description  INTERVENTIONS:  - Assess patency of urinary catheter  - If patient has a chronic herrera, consider changing catheter if non-functioning  - Follow guidelines for intermittent irrigation of non-functioning urinary catheter  Outcome: Progressing     Problem: METABOLIC, FLUID AND ELECTROLYTES - ADULT  Goal: Electrolytes maintained within normal limits  Description  INTERVENTIONS:  - Monitor labs and assess patient for signs and symptoms of electrolyte imbalances  - Administer electrolyte replacement as ordered  - Monitor response to electrolyte replacements, including repeat lab results as appropriate  - Instruct patient on fluid and nutrition as appropriate  Outcome: Progressing  Goal: Fluid balance maintained  Description  INTERVENTIONS:  - Monitor labs   - Monitor I/O and WT  - Instruct patient on fluid and nutrition as appropriate  - Assess for signs & symptoms of volume excess or deficit  Outcome: Progressing  Goal: Glucose maintained within target range  Description  INTERVENTIONS:  - Monitor Blood Glucose as ordered  - Assess for signs and symptoms of hyperglycemia and hypoglycemia  - Administer ordered medications to maintain glucose within target range  - Assess nutritional intake and initiate nutrition service referral as needed  Outcome: Progressing     Problem: SKIN/TISSUE INTEGRITY - ADULT  Goal: Skin integrity remains intact  Description  INTERVENTIONS  - Identify patients at risk for skin breakdown  - Assess and monitor skin integrity  - Assess and monitor nutrition and hydration status  - Monitor labs (i e  albumin)  - Assess for incontinence   - Turn and reposition patient  - Assist with mobility/ambulation  - Relieve pressure over bony prominences  - Avoid friction and shearing  - Provide appropriate hygiene as needed including keeping skin clean and dry  - Evaluate need for skin moisturizer/barrier cream  - Collaborate with interdisciplinary team (i e  Nutrition, Rehabilitation, etc )   - Patient/family teaching  Outcome: Progressing  Goal: Oral mucous membranes remain intact  Description  INTERVENTIONS  - Assess oral mucosa and hygiene practices  - Implement preventative oral hygiene regimen  - Implement oral medicated treatments as ordered  - Initiate Nutrition services referral as needed  Outcome: Progressing     Problem: HEMATOLOGIC - ADULT  Goal: Maintains hematologic stability  Description  INTERVENTIONS  - Monitor labs      Outcome: Progressing     Problem: SAFETY,RESTRAINT: NV/NON-SELF DESTRUCTIVE BEHAVIOR  Goal: Remains free of harm/injury (restraint for non violent/non self-detsructive behavior)  Description  INTERVENTIONS:  - Instruct patient/family regarding restraint use   - Assess and monitor physiologic and psychological status   - Provide interventions and comfort measures to meet assessed patient needs   - Identify and implement measures to help patient regain control  - Assess readiness for release of restraint   Outcome: Progressing  Goal: Returns to optimal restraint-free functioning  Description  INTERVENTIONS:  - Assess the patient's behavior and symptoms that indicate continued need for restraint  - Identify and implement measures to help patient regain control  - Assess readiness for release of restraint   Outcome: Progressing

## 2019-11-08 NOTE — ASSESSMENT & PLAN NOTE
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

## 2019-11-08 NOTE — ASSESSMENT & PLAN NOTE
· Since September she has had a progressive decline - she has had no appetite, not really eating/drinking with weakness  She was previously reportedly very independent prior to her admission at [facility] in September with UTI  · Geriatrics following, recommending outpatient follow up  · Speech following, status post VBS on 10/30, discussed with Swallow therapy, okay for pureed diet with nectar thick liquids  · Continue nystatin for thrush  · Barium swallow negative for esophageal obstruction  · Palliative Care has evaluated the patient and had a family discussion on 10/31/19  At that time, goals of care were still treatment oriented so palliative care has signed off  At this time, patient still has not progressed  We will continue EN for a few more days  If we see no improvement, I would recommend having another family meeting to discuss goals of care again  · Nutrition has made the recommendation to initiate tube feeding given patient's persistently poor PO intake  Keofed placed 11/6  Patient removed and it was reinserted  EN initiated with Jevity 1 2 kcal @ 60 ml/hr x16 hours/day

## 2019-11-08 NOTE — ASSESSMENT & PLAN NOTE
· POA  Etiology still unclear  Likely in setting of hyponatremia with component of delirium contributing given recurrent hospitalizations/underlying cognitive impairment  Patient's baseline as of September was driving, independent cooking/cleaningn/ambulating  · CNS imaging does not reveal obvious etiology  · MRI brain: Single focus of acute/subacute infarct in the left precentral gyrus with no hemorrhagic transformation  Mild chronic white matter microangiopathy  Bilateral mastoid effusions  MRA head is negative  Neurology has been consulted and evaluated the patient  Findings on MRI does not support level of encephalopathy  · No infectious source identified  · UA: Negative  · CXR: No evidence of pneumonia  · No wounds or cellulitis  · Ehrlichia/Babesia testing: negative  · Respiratory panel: Negative  · Flu/RSV: Negative  · Consider Vasculitis vs autoimmune encephalitis  Less likely CNS vasculitis per neurology although encephalitis remains in differential   Per family, patient noted to improve with steroids but has since declined again since they were discontinued  · Autoimmune work up in progress:  · CRP elevated: >90 0  · Anti-nuclear antibodies: Positive  · JAMILAH titer: 1:1280, homogenous pattern  · Quantitative RF elevated: 640  · ANCA: Negative  · S/p LP:  · Lyme: pending  · VDRL: Non-reactive  · Meningitis/Encephalitis Panel: Negative  · Protein: 37  · Culture/gram stain: No growth  · She has hx of suspected adrenal insufficiency, previously on steroids and then tapered off  She follows with Endocrinology as outpatient  · Cosyntropin stim test with adequate response in cortisol, hydrocortisone discontinued, she does not have adrenal insufficiency   · Vitamin B12 and folate normal   · Lyme titer negative  · RPR negative  · HIV negative  · Rheumatology consult: Pending  R/O vasculitis as etiology, considering lupus versus other autoimmune diseases including phospholipid antibody syndrome  Further workup pending, will start Solu-Medrol 20 mg b i d   For 4 days

## 2019-11-08 NOTE — ASSESSMENT & PLAN NOTE
Lab Results   Component Value Date    HGBA1C 6 4 (H) 10/15/2019     Recent Labs     11/07/19  1600 11/07/19  2048 11/08/19  0601 11/08/19  1101   POCGLU 176* 190* 227* 192*     · Takes metformin and glipizide at baseline, will plan to resume metformin at discharge and hold glipizide    · Continue with SSI  · Diabetic diet  · Monitor accuchecks, avoid hypoglycemia

## 2019-11-08 NOTE — PROGRESS NOTES
Progress Note - Vascular Surgery   [place] Tee [de-identified] y o  female MRN: [id number]  Unit/Bed#: -01 Encounter: [id number]      Subjective:  No acute events overnight  The patient did not participate with exam this morning as she was very somnolent and only opening eyes for a few seconds  Nurse reports that overnight patient was oriented x3  Vitals:  BP (!) 113/47   Pulse 89   Temp (!) 97 °F (36 1 °C)   Resp 20   Ht 5' 2" (1 575 m)   Wt 58 2 kg (128 lb 4 8 oz)   SpO2 100%   BMI 23 47 kg/m²     I/Os:  I/O last 3 completed shifts: In: 760 [NG/GT:760]  Out: 550 [Urine:550]  No intake/output data recorded  Lab Results and Cultures:   CBC with diff:   Lab Results   Component Value Date    WBC 6 25 11/08/2019    HGB 7 5 (L) 11/08/2019    HCT 26 4 (L) 11/08/2019    MCV 80 (L) 11/08/2019     11/08/2019    MCH 22 7 (L) 11/08/2019    MCHC 28 4 (L) 11/08/2019    RDW 19 8 (H) 11/08/2019    MPV 10 4 11/08/2019    NRBC 0 11/08/2019   ,   BMP/CMP:  Lab Results   Component Value Date    SODIUM 138 11/08/2019    K 4 4 11/08/2019    K 3 9 12/12/2014     (H) 11/08/2019    CL 98 (L) 12/12/2014    CO2 23 11/08/2019    CO2 26 12/12/2014    ANIONGAP 11 12/12/2014    BUN 17 11/08/2019    BUN 10 12/12/2014    CREATININE 0 46 (L) 11/08/2019    CREATININE 0 82 12/12/2014    GLUCOSE 109 12/12/2014    CALCIUM 8 1 (L) 11/08/2019    CALCIUM 9 1 12/12/2014    AST 23 10/31/2019    AST 13 12/12/2014    ALT 20 10/31/2019    ALT 16 12/12/2014    ALKPHOS 57 10/31/2019    ALKPHOS 104 12/12/2014    PROT 8 2 12/12/2014    BILITOT 0 30 12/12/2014    EGFR 94 11/08/2019   ,   Lipid Panel:   Lab Results   Component Value Date    CHOL 141 12/12/2014   ,   Coags:   Lab Results   Component Value Date    PTT 63 (H) 11/06/2019    INR 1 26 (H) 10/30/2019   ,     Blood Culture:   Lab Results   Component Value Date    BLOODCX No Growth After 5 Days  10/25/2019    BLOODCX No Growth After 5 Days   10/25/2019   ,   Urinalysis:   Lab Results   Component Value Date    COLORU Yellow 10/14/2019    CLARITYU Cloudy 10/14/2019    SPECGRAV 1 018 10/14/2019    PHUR 5 5 10/14/2019    PHUR 5 5 07/16/2017    LEUKOCYTESUR Large (A) 10/14/2019    NITRITE Positive (A) 10/14/2019    GLUCOSEU Negative 10/14/2019    KETONESU Negative 10/14/2019    BILIRUBINUR Negative 10/14/2019    BLOODU Moderate (A) 10/14/2019   ,   Urine Culture:   Lab Results   Component Value Date    URINECX 5263-7682 cfu/ml Gram Negative Nirav (A) 10/15/2019   ,   Wound Culure: No results found for: WOUNDCULT    Medications:  Current Facility-Administered Medications   Medication Dose Route Frequency    acetaminophen (TYLENOL) rectal suppository 650 mg  650 mg Rectal Q6H PRN    aluminum-magnesium hydroxide-simethicone (MYLANTA) 200-200-20 mg/5 mL oral suspension 30 mL  30 mL Oral Q6H PRN    apixaban (ELIQUIS) tablet 2 5 mg  2 5 mg Oral BID    atorvastatin (LIPITOR) tablet 40 mg  40 mg Oral Daily With Dinner    bisacodyl (DULCOLAX) rectal suppository 10 mg  10 mg Rectal Daily PRN    docusate sodium (COLACE) capsule 100 mg  100 mg Oral BID PRN    ferrous sulfate tablet 325 mg  325 mg Oral Daily With Breakfast    fluticasone (FLONASE) 50 mcg/act nasal spray 1 spray  1 spray Each Nare BID    insulin lispro (HumaLOG) 100 units/mL subcutaneous injection 1-5 Units  1-5 Units Subcutaneous HS    insulin lispro (HumaLOG) 100 units/mL subcutaneous injection 1-6 Units  1-6 Units Subcutaneous TID AC    lidocaine (PF) (XYLOCAINE-MPF) 2 % injection 2 mL  2 mL Infiltration Once    metoprolol (LOPRESSOR) injection 2 5 mg  2 5 mg Intravenous Q6H PRN    metoprolol tartrate (LOPRESSOR) tablet 50 mg  50 mg Oral Q12H TABATHA    mirtazapine (REMERON) tablet 7 5 mg  7 5 mg Oral HS    nystatin (MYCOSTATIN) oral suspension 500,000 Units  500,000 Units Swish & Swallow 4x Daily    ondansetron (ZOFRAN) injection 4 mg  4 mg Intravenous Q6H PRN    pantoprazole (PROTONIX) EC tablet 40 mg  40 mg Oral Early Morning       Imaging:      Physical Exam:    General:  No acute distress, resting comfortably in bed  CV:  Irregularly irregular  Respiratory:  Nonlabored respirations  Abdominal:  Soft, nontender  Extremities:  No edema      Pulse exam:  Palpable bilateral radial and femoral pulses  Nonpalpable bilateral DP/PT pulses    Assessment:  34-year-old female with multiple comorbidities with recent stroke in setting of newly diagnosed rapid AFib and finding of bilateral carotid stenosis, left greater than right      Plan:  -Patient is not a surgical candidate given her degree of debility and encephalopathy  -Recommend medical treatment with aspirin and statin  -Anticoagulation per primary team  -Workup and management of other issues per primary team and other consultants    Ric Corley MD  11/8/2019

## 2019-11-09 LAB
ABO GROUP BLD: NORMAL
ANION GAP SERPL CALCULATED.3IONS-SCNC: 5 MMOL/L (ref 4–13)
ATRIAL RATE: 300 BPM
ATRIAL RATE: 308 BPM
BASOPHILS # BLD AUTO: 0.02 THOUSANDS/ΜL (ref 0–0.1)
BASOPHILS NFR BLD AUTO: 0 % (ref 0–1)
BLD GP AB SCN SERPL QL: NEGATIVE
BUN SERPL-MCNC: 17 MG/DL (ref 5–25)
C3 SERPL-MCNC: 97.4 MG/DL (ref 90–180)
C4 SERPL-MCNC: 7 MG/DL (ref 10–40)
CALCIUM SERPL-MCNC: 8 MG/DL (ref 8.3–10.1)
CHLORIDE SERPL-SCNC: 109 MMOL/L (ref 100–108)
CO2 SERPL-SCNC: 25 MMOL/L (ref 21–32)
CREAT SERPL-MCNC: 0.49 MG/DL (ref 0.6–1.3)
CRP SERPL QL: 49.5 MG/L
ENA SS-A AB SER-ACNC: >8 AI (ref 0–0.9)
ENA SS-B AB SER-ACNC: <0.2 AI (ref 0–0.9)
EOSINOPHIL # BLD AUTO: 0.13 THOUSAND/ΜL (ref 0–0.61)
EOSINOPHIL NFR BLD AUTO: 2 % (ref 0–6)
ERYTHROCYTE [DISTWIDTH] IN BLOOD BY AUTOMATED COUNT: 19.8 % (ref 11.6–15.1)
ERYTHROCYTE [SEDIMENTATION RATE] IN BLOOD: 102 MM/HOUR (ref 0–20)
GFR SERPL CREATININE-BSD FRML MDRD: 92 ML/MIN/1.73SQ M
GLUCOSE SERPL-MCNC: 126 MG/DL (ref 65–140)
GLUCOSE SERPL-MCNC: 130 MG/DL (ref 65–140)
GLUCOSE SERPL-MCNC: 261 MG/DL (ref 65–140)
GLUCOSE SERPL-MCNC: 277 MG/DL (ref 65–140)
GLUCOSE SERPL-MCNC: 285 MG/DL (ref 65–140)
HCT VFR BLD AUTO: 24.4 % (ref 34.8–46.1)
HCT VFR BLD AUTO: 25.5 % (ref 34.8–46.1)
HGB BLD-MCNC: 7 G/DL (ref 11.5–15.4)
HGB BLD-MCNC: 7.2 G/DL (ref 11.5–15.4)
IGA SERPL-MCNC: 387 MG/DL (ref 70–400)
IGG SERPL-MCNC: 1660 MG/DL (ref 700–1600)
IGM SERPL-MCNC: 252 MG/DL (ref 40–230)
IMM GRANULOCYTES # BLD AUTO: 0.08 THOUSAND/UL (ref 0–0.2)
IMM GRANULOCYTES NFR BLD AUTO: 1 % (ref 0–2)
LYMPHOCYTES # BLD AUTO: 1.03 THOUSANDS/ΜL (ref 0.6–4.47)
LYMPHOCYTES NFR BLD AUTO: 12 % (ref 14–44)
MCH RBC QN AUTO: 22.7 PG (ref 26.8–34.3)
MCHC RBC AUTO-ENTMCNC: 28.7 G/DL (ref 31.4–37.4)
MCV RBC AUTO: 79 FL (ref 82–98)
MONOCYTES # BLD AUTO: 0.35 THOUSAND/ΜL (ref 0.17–1.22)
MONOCYTES NFR BLD AUTO: 4 % (ref 4–12)
NEUTROPHILS # BLD AUTO: 6.83 THOUSANDS/ΜL (ref 1.85–7.62)
NEUTS SEG NFR BLD AUTO: 81 % (ref 43–75)
NRBC BLD AUTO-RTO: 0 /100 WBCS
PLATELET # BLD AUTO: 381 THOUSANDS/UL (ref 149–390)
PMV BLD AUTO: 11.3 FL (ref 8.9–12.7)
POTASSIUM SERPL-SCNC: 4.5 MMOL/L (ref 3.5–5.3)
QRS AXIS: 52 DEGREES
QRS AXIS: 63 DEGREES
QRSD INTERVAL: 78 MS
QRSD INTERVAL: 88 MS
QT INTERVAL: 262 MS
QT INTERVAL: 342 MS
QTC INTERVAL: 386 MS
QTC INTERVAL: 538 MS
RBC # BLD AUTO: 3.09 MILLION/UL (ref 3.81–5.12)
RH BLD: POSITIVE
SODIUM SERPL-SCNC: 139 MMOL/L (ref 136–145)
SPECIMEN EXPIRATION DATE: NORMAL
T WAVE AXIS: 247 DEGREES
T WAVE AXIS: 258 DEGREES
VENTRICULAR RATE: 131 BPM
VENTRICULAR RATE: 149 BPM
WBC # BLD AUTO: 8.44 THOUSAND/UL (ref 4.31–10.16)

## 2019-11-09 PROCEDURE — 86334 IMMUNOFIX E-PHORESIS SERUM: CPT | Performed by: INTERNAL MEDICINE

## 2019-11-09 PROCEDURE — 82948 REAGENT STRIP/BLOOD GLUCOSE: CPT

## 2019-11-09 PROCEDURE — 86200 CCP ANTIBODY: CPT | Performed by: INTERNAL MEDICINE

## 2019-11-09 PROCEDURE — 85732 THROMBOPLASTIN TIME PARTIAL: CPT | Performed by: INTERNAL MEDICINE

## 2019-11-09 PROCEDURE — 86235 NUCLEAR ANTIGEN ANTIBODY: CPT | Performed by: INTERNAL MEDICINE

## 2019-11-09 PROCEDURE — 85705 THROMBOPLASTIN INHIBITION: CPT | Performed by: INTERNAL MEDICINE

## 2019-11-09 PROCEDURE — 82784 ASSAY IGA/IGD/IGG/IGM EACH: CPT | Performed by: INTERNAL MEDICINE

## 2019-11-09 PROCEDURE — 86160 COMPLEMENT ANTIGEN: CPT | Performed by: INTERNAL MEDICINE

## 2019-11-09 PROCEDURE — 99232 SBSQ HOSP IP/OBS MODERATE 35: CPT | Performed by: INTERNAL MEDICINE

## 2019-11-09 PROCEDURE — 86334 IMMUNOFIX E-PHORESIS SERUM: CPT | Performed by: PATHOLOGY

## 2019-11-09 PROCEDURE — 85018 HEMOGLOBIN: CPT | Performed by: INTERNAL MEDICINE

## 2019-11-09 PROCEDURE — 86146 BETA-2 GLYCOPROTEIN ANTIBODY: CPT | Performed by: INTERNAL MEDICINE

## 2019-11-09 PROCEDURE — 85670 THROMBIN TIME PLASMA: CPT | Performed by: INTERNAL MEDICINE

## 2019-11-09 PROCEDURE — 84165 PROTEIN E-PHORESIS SERUM: CPT | Performed by: PATHOLOGY

## 2019-11-09 PROCEDURE — 85613 RUSSELL VIPER VENOM DILUTED: CPT | Performed by: INTERNAL MEDICINE

## 2019-11-09 PROCEDURE — 85652 RBC SED RATE AUTOMATED: CPT | Performed by: INTERNAL MEDICINE

## 2019-11-09 PROCEDURE — 82595 ASSAY OF CRYOGLOBULIN: CPT | Performed by: INTERNAL MEDICINE

## 2019-11-09 PROCEDURE — 86900 BLOOD TYPING SEROLOGIC ABO: CPT | Performed by: INTERNAL MEDICINE

## 2019-11-09 PROCEDURE — 80048 BASIC METABOLIC PNL TOTAL CA: CPT | Performed by: PHYSICIAN ASSISTANT

## 2019-11-09 PROCEDURE — 86225 DNA ANTIBODY NATIVE: CPT | Performed by: INTERNAL MEDICINE

## 2019-11-09 PROCEDURE — 85025 COMPLETE CBC W/AUTO DIFF WBC: CPT | Performed by: PHYSICIAN ASSISTANT

## 2019-11-09 PROCEDURE — 93010 ELECTROCARDIOGRAM REPORT: CPT | Performed by: INTERNAL MEDICINE

## 2019-11-09 PROCEDURE — 86162 COMPLEMENT TOTAL (CH50): CPT | Performed by: INTERNAL MEDICINE

## 2019-11-09 PROCEDURE — 84165 PROTEIN E-PHORESIS SERUM: CPT | Performed by: INTERNAL MEDICINE

## 2019-11-09 PROCEDURE — 86140 C-REACTIVE PROTEIN: CPT | Performed by: INTERNAL MEDICINE

## 2019-11-09 PROCEDURE — 85014 HEMATOCRIT: CPT | Performed by: INTERNAL MEDICINE

## 2019-11-09 PROCEDURE — 86147 CARDIOLIPIN ANTIBODY EA IG: CPT | Performed by: INTERNAL MEDICINE

## 2019-11-09 PROCEDURE — 86850 RBC ANTIBODY SCREEN: CPT | Performed by: INTERNAL MEDICINE

## 2019-11-09 PROCEDURE — 86901 BLOOD TYPING SEROLOGIC RH(D): CPT | Performed by: INTERNAL MEDICINE

## 2019-11-09 RX ADMIN — METHYLPREDNISOLONE SODIUM SUCCINATE 20 MG: 40 INJECTION, POWDER, FOR SOLUTION INTRAMUSCULAR; INTRAVENOUS at 21:34

## 2019-11-09 RX ADMIN — NYSTATIN 500000 UNITS: 500000 SUSPENSION ORAL at 16:13

## 2019-11-09 RX ADMIN — FERROUS SULFATE TAB 325 MG (65 MG ELEMENTAL FE) 325 MG: 325 (65 FE) TAB at 08:07

## 2019-11-09 RX ADMIN — INSULIN LISPRO 4 UNITS: 100 INJECTION, SOLUTION INTRAVENOUS; SUBCUTANEOUS at 11:04

## 2019-11-09 RX ADMIN — METHYLPREDNISOLONE SODIUM SUCCINATE 20 MG: 40 INJECTION, POWDER, FOR SOLUTION INTRAMUSCULAR; INTRAVENOUS at 08:08

## 2019-11-09 RX ADMIN — MIRTAZAPINE 7.5 MG: 15 TABLET, FILM COATED ORAL at 21:34

## 2019-11-09 RX ADMIN — NYSTATIN 500000 UNITS: 500000 SUSPENSION ORAL at 08:07

## 2019-11-09 RX ADMIN — APIXABAN 2.5 MG: 2.5 TABLET, FILM COATED ORAL at 08:07

## 2019-11-09 RX ADMIN — METOPROLOL TARTRATE 50 MG: 50 TABLET, FILM COATED ORAL at 08:07

## 2019-11-09 RX ADMIN — PANTOPRAZOLE SODIUM 40 MG: 40 TABLET, DELAYED RELEASE ORAL at 06:12

## 2019-11-09 RX ADMIN — INSULIN LISPRO 3 UNITS: 100 INJECTION, SOLUTION INTRAVENOUS; SUBCUTANEOUS at 16:18

## 2019-11-09 RX ADMIN — INSULIN LISPRO 2 UNITS: 100 INJECTION, SOLUTION INTRAVENOUS; SUBCUTANEOUS at 21:34

## 2019-11-09 RX ADMIN — METOPROLOL TARTRATE 50 MG: 50 TABLET, FILM COATED ORAL at 21:35

## 2019-11-09 RX ADMIN — APIXABAN 2.5 MG: 2.5 TABLET, FILM COATED ORAL at 16:12

## 2019-11-09 RX ADMIN — ATORVASTATIN CALCIUM 40 MG: 40 TABLET, FILM COATED ORAL at 16:11

## 2019-11-09 RX ADMIN — NYSTATIN 500000 UNITS: 500000 SUSPENSION ORAL at 21:33

## 2019-11-09 NOTE — ASSESSMENT & PLAN NOTE
· Since September she has had a progressive decline - she has had no appetite, not really eating/drinking with weakness  She was previously reportedly very independent prior to her admission at [facility] in September with UTI  · Geriatrics following, recommending outpatient follow up  · Speech following, status post VBS on 10/30, discussed with Swallow therapy, okay for pureed diet with nectar thick liquids  · Continue nystatin for thrush  · Barium swallow negative for esophageal obstruction  · Palliative Care has evaluated the patient and had a family discussion on 10/31/19  At that time, goals of care were still treatment oriented so palliative care has signed off  At this time, patient still has not progressed  We will continue EN for a few more days  If we see no improvement, I would recommend having another family meeting to discuss goals of care again  · Nutrition has made the recommendation to initiate tube feeding given patient's persistently poor PO intake  Keofed placed 11/6  Patient removed and it was reinserted  EN initiated with Jevity 1 2 kcal @ 60 ml/hr x16 hours/day   Improving

## 2019-11-09 NOTE — ASSESSMENT & PLAN NOTE
· With homogeneous pattern, with high titers  · Has elevated sed rate and CRP  · Concerns for vasculitis as a cause for encephalopathy  · Rheumatoid factor high positive  · ANCA panel negative    · Rheumatology considering vasculitis vs  Lupus of elderly  · Started on steroids

## 2019-11-09 NOTE — ASSESSMENT & PLAN NOTE
Lab Results   Component Value Date    HGBA1C 6 4 (H) 10/15/2019     Recent Labs     11/08/19  1611 11/08/19  2105 11/09/19  0531 11/09/19  1102   POCGLU 166* 142* 126 285*     · Takes metformin and glipizide at baseline, will plan to resume metformin at discharge and hold glipizide    · Continue with SSI  · Diabetic diet  · Monitor accuchecks, avoid hypoglycemia

## 2019-11-09 NOTE — PLAN OF CARE
Problem: Prexisting or High Potential for Compromised Skin Integrity  Goal: Skin integrity is maintained or improved  Description  INTERVENTIONS:  - Identify patients at risk for skin breakdown  - Assess and monitor skin integrity  - Assess and monitor nutrition and hydration status  - Monitor labs   - Assess for incontinence   - Turn and reposition patient  - Assist with mobility/ambulation  - Relieve pressure over bony prominences  - Avoid friction and shearing  - Provide appropriate hygiene as needed including keeping skin clean and dry  - Evaluate need for skin moisturizer/barrier cream  - Collaborate with interdisciplinary team   - Patient/family teaching  - Consider wound care consult   Outcome: Progressing     Problem: Nutrition/Hydration-ADULT  Goal: Nutrient/Hydration intake appropriate for improving, restoring or maintaining nutritional needs  Description  Monitor and assess patient's nutrition/hydration status for malnutrition  Collaborate with interdisciplinary team and initiate plan and interventions as ordered  Monitor patient's weight and dietary intake as ordered or per policy  Utilize nutrition screening tool and intervene as necessary  Determine patient's food preferences and provide high-protein, high-caloric foods as appropriate       INTERVENTIONS:  - Monitor oral intake, urinary output, labs, and treatment plans  - Assess nutrition and hydration status and recommend course of action  - Evaluate amount of meals eaten  - Assist patient with eating if necessary   - Allow adequate time for meals  - Recommend/ encourage appropriate diets, oral nutritional supplements, and vitamin/mineral supplements  - Order, calculate, and assess calorie counts as needed  - Recommend, monitor, and adjust tube feedings and TPN/PPN based on assessed needs  - Assess need for intravenous fluids  - Provide specific nutrition/hydration education as appropriate  - Include patient/family/caregiver in decisions related to nutrition  Outcome: Progressing     Problem: Potential for Falls  Goal: Patient will remain free of falls  Description  INTERVENTIONS:  - Assess patient frequently for physical needs  -  Identify cognitive and physical deficits and behaviors that affect risk of falls    -  [place] fall precautions as indicated by assessment   - Educate patient/family on patient safety including physical limitations  - Instruct patient to call for assistance with activity based on assessment  - Modify environment to reduce risk of injury  - Consider OT/PT consult to assist with strengthening/mobility  Outcome: Progressing     Problem: NEUROSENSORY - ADULT  Goal: Achieves stable or improved neurological status  Description  INTERVENTIONS  - Monitor and report changes in neurological status  - Monitor vital signs such as temperature, blood pressure, glucose, and any other labs ordered   - Initiate measures to prevent increased intracranial pressure  - Monitor for seizure activity and implement precautions if appropriate      Outcome: Progressing     Problem: RESPIRATORY - ADULT  Goal: Achieves optimal ventilation and oxygenation  Description  INTERVENTIONS:  - Assess for changes in respiratory status  - Assess for changes in mentation and behavior  - Position to facilitate oxygenation and minimize respiratory effort  - Encourage broncho-pulmonary hygiene including cough, deep breathe, Incentive Spirometry  - Assess and instruct to report SOB or any respiratory difficulty  - Respiratory Therapy support as indicated   Outcome: Progressing     Problem: GASTROINTESTINAL - ADULT  Goal: Maintains adequate nutritional intake  Description  INTERVENTIONS:  - Monitor percentage of each meal consumed  - Identify factors contributing to decreased intake, treat as appropriate  - Assist with meals as needed  - Monitor I&O, weight, and lab values if indicated  - Obtain nutrition services referral as needed  Outcome: Progressing     Problem: GENITOURINARY - ADULT  Goal: Urinary catheter remains patent  Description  INTERVENTIONS:  - Assess patency of urinary catheter  - If patient has a chronic herrera, consider changing catheter if non-functioning  - Follow guidelines for intermittent irrigation of non-functioning urinary catheter  Outcome: Progressing     Problem: METABOLIC, FLUID AND ELECTROLYTES - ADULT  Goal: Electrolytes maintained within normal limits  Description  INTERVENTIONS:  - Monitor labs and assess patient for signs and symptoms of electrolyte imbalances  - Administer electrolyte replacement as ordered  - Monitor response to electrolyte replacements, including repeat lab results as appropriate  - Instruct patient on fluid and nutrition as appropriate  Outcome: Progressing  Goal: Fluid balance maintained  Description  INTERVENTIONS:  - Monitor labs   - Monitor I/O and WT  - Instruct patient on fluid and nutrition as appropriate  - Assess for signs & symptoms of volume excess or deficit  Outcome: Progressing  Goal: Glucose maintained within target range  Description  INTERVENTIONS:  - Monitor Blood Glucose as ordered  - Assess for signs and symptoms of hyperglycemia and hypoglycemia  - Administer ordered medications to maintain glucose within target range  - Assess nutritional intake and initiate nutrition service referral as needed  Outcome: Progressing     Problem: SKIN/TISSUE INTEGRITY - ADULT  Goal: Skin integrity remains intact  Description  INTERVENTIONS  - Identify patients at risk for skin breakdown  - Assess and monitor skin integrity  - Assess and monitor nutrition and hydration status  - Monitor labs (i e  albumin)  - Assess for incontinence   - Turn and reposition patient  - Assist with mobility/ambulation  - Relieve pressure over bony prominences  - Avoid friction and shearing  - Provide appropriate hygiene as needed including keeping skin clean and dry  - Evaluate need for skin moisturizer/barrier cream  - Collaborate with interdisciplinary team (i e  Nutrition, Rehabilitation, etc )   - Patient/family teaching  Outcome: Progressing  Goal: Oral mucous membranes remain intact  Description  INTERVENTIONS  - Assess oral mucosa and hygiene practices  - Implement preventative oral hygiene regimen  - Implement oral medicated treatments as ordered  - Initiate Nutrition services referral as needed  Outcome: Progressing     Problem: HEMATOLOGIC - ADULT  Goal: Maintains hematologic stability  Description  INTERVENTIONS  - Monitor labs      Outcome: Progressing     Problem: SAFETY,RESTRAINT: NV/NON-SELF DESTRUCTIVE BEHAVIOR  Goal: Remains free of harm/injury (restraint for non violent/non self-detsructive behavior)  Description  INTERVENTIONS:  - Instruct patient/family regarding restraint use   - Assess and monitor physiologic and psychological status   - Provide interventions and comfort measures to meet assessed patient needs   - Identify and implement measures to help patient regain control  - Assess readiness for release of restraint   Outcome: Progressing  Goal: Returns to optimal restraint-free functioning  Description  INTERVENTIONS:  - Assess the patient's behavior and symptoms that indicate continued need for restraint  - Identify and implement measures to help patient regain control  - Assess readiness for release of restraint   Outcome: Progressing     Problem: COPING  Goal: Pt/Family able to verbalize concerns and demonstrate effective coping strategies  Description  INTERVENTIONS:  - Assist patient/family to identify coping skills, available support systems and cultural and spiritual values  - Provide emotional support, including active listening and acknowledgement of concerns of patient and caregivers  - Reduce environmental stimuli, as able  - Provide patient education  - Assess for spiritual pain/suffering and initiate spiritual care, including notification of Pastoral Care or [place] based community as needed  - Assess effectiveness of coping strategies  Outcome: Progressing  Goal: Will report anxiety at manageable levels  Description  INTERVENTIONS:  - Administer medication as ordered  - Teach and encourage coping skills  - Provide emotional support  - Assess patient/family for anxiety and ability to cope  Outcome: Progressing     Problem: CONFUSION/THOUGHT DISTURBANCE  Goal: Thought disturbances (confusion, delirium, depression, dementia or psychosis) are managed to maintain or return to baseline mental status and functional level  Description  INTERVENTIONS:  - Assess for possible contributors to  thought disturbance, including but not limited to medications, infection, impaired vision or hearing, underlying metabolic abnormalities, dehydration, respiratory compromise,  psychiatric diagnoses and notify attending PHYSICAN/AP  - Monitor and intervene to maintain adequate nutrition, hydration, elimination, sleep and activity  - Decrease environmental stimuli, including noise as appropriate  - Provide frequent contacts to provide refocusing, direction and reassurance as needed  Approach patient calmly with eye contact and at their level    - [place] high risk fall precautions, aspiration precautions and other safety measures, as indicated  - If delirium suspected, notify physician/AP of change in condition and request immediate in-person evaluation  - Pursue consults as appropriate including Geriatric (campus dependent), OT for cognitive evaluation/activity planning, psychiatric, pastoral care, etc   Outcome: Progressing

## 2019-11-09 NOTE — ASSESSMENT & PLAN NOTE
· Participated in a family meeting on 10/31/19 with palliative care  Outcome remained treatment oriented care  Had another discussion with patient's daughter yesterday and she continues to want to pursue treatment care at this time  · Patient is being worked up for rheumatological diseases  · Currently patient remains full code

## 2019-11-09 NOTE — ASSESSMENT & PLAN NOTE
· POA  Etiology still unclear  Likely in setting of hyponatremia with component of delirium contributing given recurrent hospitalizations/underlying cognitive impairment  Patient's baseline as of September was driving, independent cooking/cleaningn/ambulating  · CNS imaging does not reveal obvious etiology  · MRI brain: Single focus of acute/subacute infarct in the left precentral gyrus with no hemorrhagic transformation  Mild chronic white matter microangiopathy  Bilateral mastoid effusions  MRA head is negative  Neurology has been consulted and evaluated the patient  Findings on MRI does not support level of encephalopathy  · No infectious source identified  · UA: Negative  · CXR: No evidence of pneumonia  · No wounds or cellulitis  · Ehrlichia/Babesia testing: negative  · Respiratory panel: Negative  · Flu/RSV: Negative  · Consider Vasculitis vs autoimmune encephalitis  Less likely CNS vasculitis per neurology although encephalitis remains in differential   Per family, patient noted to improve with steroids but has since declined again since they were discontinued  · Autoimmune work up in progress:  · CRP elevated: >90 0  · Anti-nuclear antibodies: Positive  · JAMILAH titer: 1:1280, homogenous pattern  · Quantitative RF elevated: 640  · ANCA: Negative  · S/p LP:  · Lyme: pending  · VDRL: Non-reactive  · Meningitis/Encephalitis Panel: Negative  · Protein: 37  · Culture/gram stain: No growth  · She has hx of suspected adrenal insufficiency, previously on steroids and then tapered off  She follows with Endocrinology as outpatient  · Cosyntropin stim test with adequate response in cortisol, hydrocortisone discontinued, she does not have adrenal insufficiency   · Vitamin B12 and folate normal   · Lyme titer negative  · RPR negative  · HIV negative    · Rheumatology consult:   R/O vasculitis as etiology, considering lupus of elderly, ruling out other autoimmune diseases including phospholipid antibody syndrome  Further workup pending, will start Solu-Medrol 20 mg b i d   For 4 days

## 2019-11-09 NOTE — PROGRESS NOTES
Progress Note - Infectious Disease   [place] Tee [de-identified] y o  female MRN: [id number]  Unit/Bed#: -01 Encounter: [id number]      Impression/Recommendations:  1  FUO, with elevated ESR and JAMILAH  Patient had extensive infectious workup with negative findings and cultures  I suspect she has underlying vasculitis  Consider temporal arteritis  Rheumatology evaluation noted  Workup is in progress  Systemic corticosteroid was started  Temperature trending down  Continue to observe off antibiotic  Monitor temperature  Follow-up on rheumatology workup  Continue systemic corticosteroid per Rheumatology  2  Splenic infarct, 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  Clinically, this is not appear to be splenic abscess  Abdominal exam is benign  Monitor  3  Encephalopathy, most likely secondary to vasculitis, with hyponatremia contributing  Mental status is stable  Monitor mental status  4  Hyponatremia  This has resolved  5  Poorly controlled DM, with elevated hemoglobin A1c  Discussed with patient  Antibiotics:  None    Subjective:  Systemic corticosteroid was started yesterday  Temperature is down  Patient is comfortable  No chills  Confusion stable  No diarrhea  Objective:  Vitals:  Temp:  [98 °F (36 7 °C)-100 7 °F (38 2 °C)] 98 °F (36 7 °C)  HR:  [61-99] 85  Resp:  [22] 22  BP: (103-131)/(52-61) 118/53  SpO2:  [73 %-99 %] 96 %  Temp (24hrs), Av 3 °F (37 4 °C), Min:98 °F (36 7 °C), Max:100 7 °F (38 2 °C)  Current: Temperature: 98 °F (36 7 °C)    Physical Exam:     General: Awake, alert, cooperative, no distress  Stable confusion  Neck:  Supple  No mass  No lymphadenopathy  Lungs: Expansion symmetric, no rales, no wheezing, respirations unlabored  Heart:  Regular rate and rhythm, S1 and S2 normal, no murmur  Abdomen: Soft, nondistended, non-tender, bowel sounds active all four quadrants,        no masses, no organomegaly  Extremities: No edema  No erythema/warmth   No ulcer  Nontender to palpation  Skin:  No rash  Neuro: Moves all extremities  Invasive Devices     Peripheral Intravenous Line            Peripheral IV 11/07/19 Distal;Dorsal (posterior); Right Forearm 1 day          Drain            Urethral Catheter Straight-tip 16 Fr  25 days    NG/OG/Enteral Tube Nasogastric 8 Fr Right nares 2 days                Labs studies:   I have personally reviewed pertinent labs  Results from last 7 days   Lab Units 11/09/19  0632 11/08/19  0531 11/07/19  0539   POTASSIUM mmol/L 4 5 4 4 4 0   CHLORIDE mmol/L 109* 109* 112*   CO2 mmol/L 25 23 23   BUN mg/dL 17 17 16   CREATININE mg/dL 0 49* 0 46* 0 50*   EGFR ml/min/1 73sq m 92 94 92   CALCIUM mg/dL 8 0* 8 1* 8 4     Results from last 7 days   Lab Units 11/09/19  0649 11/08/19  0531 11/05/19  0519   WBC Thousand/uL 8 44 6 25 7 51   HEMOGLOBIN g/dL 7 0* 7 5* 8 1*   PLATELETS Thousands/uL 381 205 590*           Imaging Studies:   I have personally reviewed pertinent imaging study reports and images in PACS  EKG, Pathology, and Other Studies:   I have personally reviewed pertinent reports

## 2019-11-09 NOTE — ASSESSMENT & PLAN NOTE
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

## 2019-11-09 NOTE — ASSESSMENT & PLAN NOTE
· Patient has chronic Herrera present on admission- herrera was replaced in ED 10/14   · Placed on recent admission (10/3/19) at [facility] where she developed urinary retention  · Patient will follow up outpatient with urology for voiding trial when patient's physical activity levels improve

## 2019-11-09 NOTE — PROGRESS NOTES
Progress Note - Pam Oliveira 1940, [de-identified] y o  female MRN: [id number]    Unit/Bed#: -01 Encounter: [id number]    Primary Care Provider: Adia Luna MD   Date and time admitted to hospital: 10/14/2019  4:20 PM        JAMILAH positive  Assessment & Plan  · With homogeneous pattern, with high titers  · Has elevated sed rate and CRP  · Concerns for vasculitis as a cause for encephalopathy  · Rheumatoid factor high positive  · ANCA panel negative  · Rheumatology considering vasculitis vs  Lupus of elderly  · Started on steroids    Goals of care, counseling/discussion  Assessment & Plan  · Participated in a family meeting on 10/31/19 with palliative care  Outcome remained treatment oriented care  Had another discussion with patient's daughter yesterday and she continues to want to pursue treatment care at this time  · Patient is being worked up for rheumatological diseases  · Currently patient remains full code  Paroxysmal atrial fibrillation (HCC)  Assessment & Plan  · Afib with RVR intermittently  Rate appears better controlled with increase in metoprolol  · TSH normal this admission  · LNEO showed EF 55% without any intracardiac thrombus  · Heparin drip stopped and started on Eliquis  Splenic infarct  Assessment & Plan  · Noted on CT scan done at [facility] 9/2019, new moderate sized splenic infarcts  · Likely etiology cardio embolic as pt was noted to have PAF with RVR on tele on 10/29  · CTA abdomen/pelvis:  No acute aortic injury or aneurysm  Patent celiac and splenic arteries  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  Evolving splenic infarct  Additional chronic/incidental findings as described  · LENO negative for PFO and obvious intracardiac thrombus, 55% EF  · General surgery has seen the patient  No surgical intervention is warranted  · Hematology has seen the patient    Recommends treatment with anticoagulation  · Started on Eliquis 2 5 mg BID 11/6/19  · Monitor hg closely    Severe protein-calorie malnutrition (HCC)  Assessment & Plan  Malnutrition Findings:   Malnutrition type: Chronic illness(Related to medical condition as evidenced by 10% weight loss over the past month and <75% energy intake needs met >1 month treated with ensure compact supplements)  Degree of Malnutrition: Other severe protein calorie malnutrition    BMI Findings: Body mass index is 23 67 kg/m²  Reported weight has fluctuated significantly throughout hospitalization, will work on obtaining more accurate weights  Nutrition consulted and recommends EN  Daughter wanted to proceed with keofed placement  Started Jevity 1 2 kcal @ 60ml/hr x16 hours per day  Urinary retention  Assessment & Plan  · Patient has chronic Herrera present on admission- herrera was replaced in ED 10/14   · Placed on recent admission (10/3/19) at [facility] where she developed urinary retention  · Patient will follow up outpatient with urology for voiding trial when patient's physical activity levels improve  Failure to thrive in adult  Assessment & Plan  · Since September she has had a progressive decline - she has had no appetite, not really eating/drinking with weakness  She was previously reportedly very independent prior to her admission at [facility] in September with UTI  · Geriatrics following, recommending outpatient follow up  · Speech following, status post VBS on 10/30, discussed with Swallow therapy, okay for pureed diet with nectar thick liquids  · Continue nystatin for thrush  · Barium swallow negative for esophageal obstruction  · Palliative Care has evaluated the patient and had a family discussion on 10/31/19  At that time, goals of care were still treatment oriented so palliative care has signed off  At this time, patient still has not progressed  We will continue EN for a few more days    If we see no improvement, I would recommend having another family meeting to discuss goals of care again  · Nutrition has made the recommendation to initiate tube feeding given patient's persistently poor PO intake  Keofed placed 11/6  Patient removed and it was reinserted  EN initiated with Jevity 1 2 kcal @ 60 ml/hr x16 hours/day  Improving       Essential hypertension  Assessment & Plan  · Atenolol switched to metoprolol given Afib with RVR  Rate has improved with increase in metoprolol to 50 mg BID  · Amlodipine and enalapril are on hold  Currently, no need to resume given controlled BPs  Type 2 diabetes mellitus with diabetic neuropathy, without long-term current use of insulin [facility])  Assessment & Plan  Lab Results   Component Value Date    HGBA1C 6 4 (H) 10/15/2019     Recent Labs     11/08/19  1611 11/08/19  2105 11/09/19  0531 11/09/19  1102   POCGLU 166* 142* 126 285*     · Takes metformin and glipizide at baseline, will plan to resume metformin at discharge and hold glipizide  · Continue with SSI  · Diabetic diet  · Monitor accuchecks, avoid hypoglycemia    * Metabolic encephalopathy  Assessment & Plan  · POA  Etiology still unclear  Likely in setting of hyponatremia with component of delirium contributing given recurrent hospitalizations/underlying cognitive impairment  Patient's baseline as of September was driving, independent cooking/cleaningn/ambulating  · CNS imaging does not reveal obvious etiology  · MRI brain: Single focus of acute/subacute infarct in the left precentral gyrus with no hemorrhagic transformation  Mild chronic white matter microangiopathy  Bilateral mastoid effusions  MRA head is negative  Neurology has been consulted and evaluated the patient  Findings on MRI does not support level of encephalopathy  · No infectious source identified  · UA: Negative  · CXR: No evidence of pneumonia  · No wounds or cellulitis    · Ehrlichia/Babesia testing: negative  · Respiratory panel: Negative  · Flu/RSV: Negative  · Consider Vasculitis vs autoimmune encephalitis  Less likely CNS vasculitis per neurology although encephalitis remains in differential   Per family, patient noted to improve with steroids but has since declined again since they were discontinued  · Autoimmune work up in progress:  · CRP elevated: >90 0  · Anti-nuclear antibodies: Positive  · JAMILAH titer: 1:1280, homogenous pattern  · Quantitative RF elevated: 640  · ANCA: Negative  · S/p LP:  · Lyme: pending  · VDRL: Non-reactive  · Meningitis/Encephalitis Panel: Negative  · Protein: 37  · Culture/gram stain: No growth  · She has hx of suspected adrenal insufficiency, previously on steroids and then tapered off  She follows with Endocrinology as outpatient  · Cosyntropin stim test with adequate response in cortisol, hydrocortisone discontinued, she does not have adrenal insufficiency   · Vitamin B12 and folate normal   · Lyme titer negative  · RPR negative  · HIV negative  · Rheumatology consult:   R/O vasculitis as etiology, considering lupus of elderly, ruling out other autoimmune diseases including phospholipid antibody syndrome  Further workup pending, will start Solu-Medrol 20 mg b i d  For 4 days           VTE Pharmacologic Prophylaxis:   Pharmacologic: Apixaban (Eliquis)  Mechanical VTE Prophylaxis in Place: Yes    Patient Centered Rounds: I have performed bedside rounds with nursing staff today  Discussions with Specialists or Other Care Team Provider: medicine    Education and Discussions with Family / Patient: patient     Time Spent for Care: 45 minutes  More than 50% of total time spent on counseling and coordination of care as described above  Current Length of Stay: 26 day(s)    Current Patient Status: Inpatient   Certification Statement: The patient will continue to require additional inpatient hospital stay due to work up of rheumatological diaseases    Discharge Plan: pending results, improvement with treatment      Code Status: Level 1 - Full Code      Subjective:   Patient is improving as fever is better  Has drop in hg today, repeat was 7 2  Patient has no complaints today on my exam  She was eating very minimal and tube feeding is going okay  Objective:     Vitals:   Temp (24hrs), Av 3 °F (37 4 °C), Min:98 °F (36 7 °C), Max:100 7 °F (38 2 °C)    Temp:  [98 °F (36 7 °C)-100 7 °F (38 2 °C)] 98 °F (36 7 °C)  HR:  [61-99] 85  Resp:  [22] 22  BP: (103-131)/(52-61) 118/53  SpO2:  [73 %-99 %] 96 %  Body mass index is 23 67 kg/m²  Input and Output Summary (last 24 hours): Intake/Output Summary (Last 24 hours) at 2019 1201  Last data filed at 2019 1901  Gross per 24 hour   Intake 1120 ml   Output 225 ml   Net 895 ml       Physical Exam:     Physical Exam   Constitutional: She is oriented to person, place, and time  She appears well-developed and well-nourished  HENT:   Head: Normocephalic and atraumatic  Right Ear: External ear normal    Left Ear: External ear normal    Nose: Nose normal    Mouth/Throat: Oropharynx is clear and moist  No oropharyngeal exudate  Eyes: Pupils are equal, round, and reactive to light  Conjunctivae and EOM are normal  Right eye exhibits no discharge  Left eye exhibits no discharge  No scleral icterus  Neck: Normal range of motion  Neck supple  No JVD present  No tracheal deviation present  No thyromegaly present  Cardiovascular: Normal rate, regular rhythm, normal heart sounds and intact distal pulses  Exam reveals no friction rub  Pulmonary/Chest: Effort normal and breath sounds normal  No stridor  No respiratory distress  She has no wheezes  She has no rales  She exhibits no tenderness  Abdominal: Soft  Bowel sounds are normal  She exhibits no distension and no mass  There is no tenderness  There is no rebound and no guarding  No hernia  Musculoskeletal: Normal range of motion  She exhibits no edema, tenderness or deformity     Lymphadenopathy:     She has no cervical adenopathy  Neurological: She is alert and oriented to person, place, and time  She displays normal reflexes  No cranial nerve deficit or sensory deficit  She exhibits normal muscle tone  Coordination normal    Skin: Skin is warm and dry  No rash noted  No erythema  No pallor  Psychiatric: She has a normal mood and affect  Her behavior is normal    Nursing note and vitals reviewed  Additional Data:     Labs:    Results from last 7 days   Lab Units 11/09/19  1007 11/09/19  0649   WBC Thousand/uL  --  8 44   HEMOGLOBIN g/dL 7 2* 7 0*   HEMATOCRIT % 25 5* 24 4*   PLATELETS Thousands/uL  --  381   NEUTROS PCT %  --  81*   LYMPHS PCT %  --  12*   MONOS PCT %  --  4   EOS PCT %  --  2     Results from last 7 days   Lab Units 11/09/19  0632   POTASSIUM mmol/L 4 5   CHLORIDE mmol/L 109*   CO2 mmol/L 25   BUN mg/dL 17   CREATININE mg/dL 0 49*   CALCIUM mg/dL 8 0*           * I Have Reviewed All Lab Data Listed Above  * Additional Pertinent Lab Tests Reviewed:  Harjit 65 Admission Reviewed    Imaging:    Imaging Reports Reviewed Today Include:    Imaging Personally Reviewed by Myself Includes:       Recent Cultures (last 7 days):           Last 24 Hours Medication List:     Current Facility-Administered Medications:  acetaminophen 650 mg Oral Q6H PRN Heaven MAHAN PA-C   aluminum-magnesium hydroxide-simethicone 30 mL Oral Q6H PRN Drea Mcguire MD   apixaban 2 5 mg Oral BID Gregory Danielson PA-C   atorvastatin 40 mg Oral Daily With Gloria Ulloa MD   bisacodyl 10 mg Rectal Daily PRN Carlee MAHAN PA-C   docusate sodium 100 mg Oral BID PRN Drea Mcguire MD   ferrous sulfate 325 mg Oral Daily With Breakfast Macario Phillips MD   fluticasone 1 spray Each Nare BID Macario Phillips MD   insulin lispro 1-5 Units Subcutaneous HS Drea Mcguire MD   insulin lispro 1-6 Units Subcutaneous TID AC Drea Mcguire MD   lidocaine (PF) 2 mL Infiltration Once Pablo Jimenez PA-C methylPREDNISolone sodium succinate 20 mg Intravenous Q12H [facility] Heaven MAHAN PA-C   metoprolol 2 5 mg Intravenous Q6H PRN Nickie Lewis MD   metoprolol tartrate 50 mg Oral Q12H [facility] Leticia BridgesLUPE   mirtazapine 7 5 mg Oral HS Heaven MHAAN PA-C   nystatin 500,000 Units Swish & Swallow 4x Daily Heaven MAHAN PA-C   ondansetron 4 mg Intravenous Q6H PRN Gentry Bradley MD   pantoprazole 40 mg Oral Early Morning Nickie Lewis MD        Today, Patient Was Seen By: Wilner Chiu MD    ** Please Note: Dictation voice to text software may have been used in the creation of this document   **

## 2019-11-09 NOTE — ASSESSMENT & PLAN NOTE
Malnutrition Findings:   Malnutrition type: Chronic illness(Related to medical condition as evidenced by 10% weight loss over the past month and <75% energy intake needs met >1 month treated with ensure compact supplements)  Degree of Malnutrition: Other severe protein calorie malnutrition    BMI Findings: Body mass index is 23 67 kg/m²  Reported weight has fluctuated significantly throughout hospitalization, will work on obtaining more accurate weights  Nutrition consulted and recommends EN  Daughter wanted to proceed with keofed placement  Started Jevity 1 2 kcal @ 60ml/hr x16 hours per day

## 2019-11-10 LAB
ANION GAP SERPL CALCULATED.3IONS-SCNC: 4 MMOL/L (ref 4–13)
BASOPHILS # BLD AUTO: 0.01 THOUSANDS/ΜL (ref 0–0.1)
BASOPHILS NFR BLD AUTO: 0 % (ref 0–1)
BUN SERPL-MCNC: 20 MG/DL (ref 5–25)
CALCIUM SERPL-MCNC: 8.6 MG/DL (ref 8.3–10.1)
CHLORIDE SERPL-SCNC: 106 MMOL/L (ref 100–108)
CO2 SERPL-SCNC: 28 MMOL/L (ref 21–32)
CREAT SERPL-MCNC: 0.52 MG/DL (ref 0.6–1.3)
EOSINOPHIL # BLD AUTO: 0 THOUSAND/ΜL (ref 0–0.61)
EOSINOPHIL NFR BLD AUTO: 0 % (ref 0–6)
ERYTHROCYTE [DISTWIDTH] IN BLOOD BY AUTOMATED COUNT: 19.6 % (ref 11.6–15.1)
GFR SERPL CREATININE-BSD FRML MDRD: 91 ML/MIN/1.73SQ M
GLUCOSE SERPL-MCNC: 273 MG/DL (ref 65–140)
GLUCOSE SERPL-MCNC: 310 MG/DL (ref 65–140)
GLUCOSE SERPL-MCNC: 334 MG/DL (ref 65–140)
GLUCOSE SERPL-MCNC: 337 MG/DL (ref 65–140)
GLUCOSE SERPL-MCNC: 359 MG/DL (ref 65–140)
HCT VFR BLD AUTO: 25.7 % (ref 34.8–46.1)
HEMOCCULT STL QL: NEGATIVE
HGB BLD-MCNC: 7.4 G/DL (ref 11.5–15.4)
IMM GRANULOCYTES # BLD AUTO: 0.06 THOUSAND/UL (ref 0–0.2)
IMM GRANULOCYTES NFR BLD AUTO: 1 % (ref 0–2)
LYMPHOCYTES # BLD AUTO: 1.02 THOUSANDS/ΜL (ref 0.6–4.47)
LYMPHOCYTES NFR BLD AUTO: 13 % (ref 14–44)
MCH RBC QN AUTO: 22.6 PG (ref 26.8–34.3)
MCHC RBC AUTO-ENTMCNC: 28.8 G/DL (ref 31.4–37.4)
MCV RBC AUTO: 79 FL (ref 82–98)
MONOCYTES # BLD AUTO: 0.39 THOUSAND/ΜL (ref 0.17–1.22)
MONOCYTES NFR BLD AUTO: 5 % (ref 4–12)
NEUTROPHILS # BLD AUTO: 6.49 THOUSANDS/ΜL (ref 1.85–7.62)
NEUTS SEG NFR BLD AUTO: 81 % (ref 43–75)
NRBC BLD AUTO-RTO: 0 /100 WBCS
PLATELET # BLD AUTO: 418 THOUSANDS/UL (ref 149–390)
PMV BLD AUTO: 9.7 FL (ref 8.9–12.7)
POTASSIUM SERPL-SCNC: 4.3 MMOL/L (ref 3.5–5.3)
RBC # BLD AUTO: 3.27 MILLION/UL (ref 3.81–5.12)
SODIUM SERPL-SCNC: 138 MMOL/L (ref 136–145)
WBC # BLD AUTO: 7.97 THOUSAND/UL (ref 4.31–10.16)

## 2019-11-10 PROCEDURE — 99232 SBSQ HOSP IP/OBS MODERATE 35: CPT | Performed by: INTERNAL MEDICINE

## 2019-11-10 PROCEDURE — 82272 OCCULT BLD FECES 1-3 TESTS: CPT | Performed by: INTERNAL MEDICINE

## 2019-11-10 PROCEDURE — 85025 COMPLETE CBC W/AUTO DIFF WBC: CPT | Performed by: INTERNAL MEDICINE

## 2019-11-10 PROCEDURE — 82948 REAGENT STRIP/BLOOD GLUCOSE: CPT

## 2019-11-10 PROCEDURE — 80048 BASIC METABOLIC PNL TOTAL CA: CPT | Performed by: INTERNAL MEDICINE

## 2019-11-10 RX ORDER — INSULIN GLARGINE 100 [IU]/ML
10 INJECTION, SOLUTION SUBCUTANEOUS
Status: DISCONTINUED | OUTPATIENT
Start: 2019-11-10 | End: 2019-11-11

## 2019-11-10 RX ORDER — METOPROLOL TARTRATE 5 MG/5ML
2.5 INJECTION INTRAVENOUS EVERY 6 HOURS PRN
Status: DISCONTINUED | OUTPATIENT
Start: 2019-11-10 | End: 2019-11-16 | Stop reason: HOSPADM

## 2019-11-10 RX ADMIN — NYSTATIN 500000 UNITS: 500000 SUSPENSION ORAL at 17:35

## 2019-11-10 RX ADMIN — INSULIN LISPRO 3 UNITS: 100 INJECTION, SOLUTION INTRAVENOUS; SUBCUTANEOUS at 22:13

## 2019-11-10 RX ADMIN — FERROUS SULFATE TAB 325 MG (65 MG ELEMENTAL FE) 325 MG: 325 (65 FE) TAB at 09:24

## 2019-11-10 RX ADMIN — MIRTAZAPINE 7.5 MG: 15 TABLET, FILM COATED ORAL at 22:02

## 2019-11-10 RX ADMIN — METOPROLOL TARTRATE 50 MG: 50 TABLET, FILM COATED ORAL at 09:24

## 2019-11-10 RX ADMIN — NYSTATIN 500000 UNITS: 500000 SUSPENSION ORAL at 13:20

## 2019-11-10 RX ADMIN — NYSTATIN 500000 UNITS: 500000 SUSPENSION ORAL at 09:24

## 2019-11-10 RX ADMIN — INSULIN LISPRO 5 UNITS: 100 INJECTION, SOLUTION INTRAVENOUS; SUBCUTANEOUS at 06:13

## 2019-11-10 RX ADMIN — INSULIN LISPRO 5 UNITS: 100 INJECTION, SOLUTION INTRAVENOUS; SUBCUTANEOUS at 17:34

## 2019-11-10 RX ADMIN — METHYLPREDNISOLONE SODIUM SUCCINATE 20 MG: 40 INJECTION, POWDER, FOR SOLUTION INTRAMUSCULAR; INTRAVENOUS at 22:02

## 2019-11-10 RX ADMIN — PANTOPRAZOLE SODIUM 40 MG: 40 TABLET, DELAYED RELEASE ORAL at 06:13

## 2019-11-10 RX ADMIN — INSULIN GLARGINE 10 UNITS: 100 INJECTION, SOLUTION SUBCUTANEOUS at 22:16

## 2019-11-10 RX ADMIN — METHYLPREDNISOLONE SODIUM SUCCINATE 20 MG: 40 INJECTION, POWDER, FOR SOLUTION INTRAMUSCULAR; INTRAVENOUS at 09:24

## 2019-11-10 RX ADMIN — APIXABAN 2.5 MG: 2.5 TABLET, FILM COATED ORAL at 09:24

## 2019-11-10 RX ADMIN — ATORVASTATIN CALCIUM 40 MG: 40 TABLET, FILM COATED ORAL at 17:35

## 2019-11-10 RX ADMIN — NYSTATIN 500000 UNITS: 500000 SUSPENSION ORAL at 22:02

## 2019-11-10 RX ADMIN — METOPROLOL TARTRATE 50 MG: 50 TABLET, FILM COATED ORAL at 22:02

## 2019-11-10 RX ADMIN — INSULIN LISPRO 5 UNITS: 100 INJECTION, SOLUTION INTRAVENOUS; SUBCUTANEOUS at 13:20

## 2019-11-10 RX ADMIN — APIXABAN 2.5 MG: 2.5 TABLET, FILM COATED ORAL at 17:35

## 2019-11-10 NOTE — ASSESSMENT & PLAN NOTE
· Now resolved and patient seems comfortable  · SpO2 wnl on room air  · Chest x-ray shows small pleural effusion, compression atelectasis  Consider pneumonia clinically  · Recent echocardiogram in September 2019 at [facility] showed EF of 50-55% with intermediate diastolic dysfunction and mild pulmonary hypertension  · No overt signs of pneumonia and patient denies any cough  · procalcitonin normal   · Incentive spirometry  · Encourage ambulation  · Respiratory protocol     · Swallow eval

## 2019-11-10 NOTE — ASSESSMENT & PLAN NOTE
· Patient has chronic Herrera present on admission- herrera was replaced in ED 10/14   · Placed on recent admission (10/3/19) at [facility] where she developed urinary retention  · Patient will follow up outpatient with urology for voiding trial when patient's physical activity levels improve

## 2019-11-10 NOTE — ASSESSMENT & PLAN NOTE
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

## 2019-11-10 NOTE — PROGRESS NOTES
Progress Note - Pam Oliveira [de-identified] y o  female MRN: [id number]    Unit/Bed#: -01 Encounter: [id number]      CC: diabetes f/u    Subjective:   [place] is a [de-identified]y o  year old female with type 2 diabetes  No hypoglycemia  Objective:     Vitals: Blood pressure 130/61, pulse 87, temperature (!) 96 6 °F (35 9 °C), temperature source Oral, resp  rate 18, height 5' 2" (1 575 m), weight 58 7 kg (129 lb 6 6 oz), SpO2 98 %, not currently breastfeeding  ,Body mass index is 23 67 kg/m²  Intake/Output Summary (Last 24 hours) at 11/10/2019 1405  Last data filed at 11/10/2019 1138  Gross per 24 hour   Intake 1398 ml   Output 200 ml   Net 1198 ml       Physical Exam:  General Appearance: awake, appears stated age and cooperative  Head: Normocephalic, without obvious abnormality, atraumatic  Extremities: moves all extremities  Skin: Skin color and temperature normal    Pulm: no labored breathing    Lab, Imaging and other studies: I have personally reviewed pertinent reports  POC Glucose (mg/dl)   Date Value   11/10/2019 310 (H)   11/10/2019 334 (H)   11/09/2019 277 (H)   11/09/2019 261 (H)   11/09/2019 285 (H)   11/09/2019 126   11/08/2019 142 (H)   11/08/2019 166 (H)   11/08/2019 192 (H)   11/08/2019 227 (H)       Assessment:  diabetes with hyperglycemia    Plan:    Diabetes mellitus with steroid induced hyperglycemia - Patient was started on methylprednisone 20 mg twice a day until 11/13  Patient is currently on tube feeds Jevity 1 2, 60 cc/hour for 16 hours + pureed nectar thick diabetic dysphagia diet  I will start patient on 10 units of Lantus in the evening + continue ISS  I will continue to monitor patient's blood sugars and make adjustments as needed  Portions of the record may have been created with voice recognition software

## 2019-11-10 NOTE — PROGRESS NOTES
Progress Note - Pam Oliveira 1940, [de-identified] y o  female MRN: [id number]    Unit/Bed#: -01 Encounter: [id number]    Primary Care Provider: Robert Hardy MD   Date and time admitted to hospital: 10/14/2019  4:20 PM        Acute CVA (cerebrovascular accident) [facility])  Assessment & Plan  · MRI brain on 11/3 shows acute to subacute CVA in left pre central gyrus in setting of rapid afib  · Neurology following and appreciate their input  · Continue statin and therapeutic AC    JAMILAH positive  Assessment & Plan  · With homogeneous pattern, with high titers  · Has elevated sed rate and CRP  · Concerns for vasculitis as a cause for encephalopathy  · Rheumatoid factor high positive  · ANCA panel negative  · Rheumatology considering vasculitis vs  Lupus of elderly  · Started on steroids    Internal carotid artery stenosis, bilateral  Assessment & Plan  · Noted to have bilateral internal carotid artery stenosis left 60-70% and right 50-60% with severe left ECA stenosis  · MRI brain shows small acute to subacute single focus of infarct in left precentral gyrus  Neurology recommends vascular surgery evaluation  Goals of care, counseling/discussion  Assessment & Plan  · Participated in a family meeting on 10/31/19 with palliative care  Outcome remained treatment oriented care  Had another discussion with patient's daughter yesterday and she continues to want to pursue treatment care at this time  · Patient is being worked up for rheumatological diseases  · Currently patient remains full code  Paroxysmal atrial fibrillation (HCC)  Assessment & Plan  · Afib with RVR intermittently  Rate appears better controlled with increase in metoprolol  · TSH normal this admission  · LENO showed EF 55% without any intracardiac thrombus  · Heparin drip stopped and started on Eliquis  Splenic infarct  Assessment & Plan  · Noted on CT scan done at [facility] 9/2019, new moderate sized splenic infarcts    · Likely etiology cardio embolic as pt was noted to have PAF with RVR on tele on 10/29  · CTA abdomen/pelvis:  No acute aortic injury or aneurysm  Patent celiac and splenic arteries  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  Evolving splenic infarct  Additional chronic/incidental findings as described  · LENO negative for PFO and obvious intracardiac thrombus, 55% EF  · General surgery has seen the patient  No surgical intervention is warranted  · Hematology has seen the patient  Recommends treatment with anticoagulation  · Started on Eliquis 2 5 mg BID 11/6/19  · Monitor hg closely    Severe protein-calorie malnutrition (HCC)  Assessment & Plan  Malnutrition Findings:   Malnutrition type: Chronic illness(Related to medical condition as evidenced by 10% weight loss over the past month and <75% energy intake needs met >1 month treated with ensure compact supplements)  Degree of Malnutrition: Other severe protein calorie malnutrition    BMI Findings: Body mass index is 23 67 kg/m²  Reported weight has fluctuated significantly throughout hospitalization, will work on obtaining more accurate weights  Nutrition consulted and recommends EN  Daughter wanted to proceed with keofed placement  Started Jevity 1 2 kcal @ 60ml/hr x16 hours per day  Urinary retention  Assessment & Plan  · Patient has chronic Herrera present on admission- herrera was replaced in ED 10/14   · Placed on recent admission (10/3/19) at [facility] where she developed urinary retention  · Patient will follow up outpatient with urology for voiding trial when patient's physical activity levels improve  Failure to thrive in adult  Assessment & Plan  · Since September she has had a progressive decline - she has had no appetite, not really eating/drinking with weakness    She was previously reportedly very independent prior to her admission at [facility] in September with UTI  · Geriatrics following, recommending outpatient follow up  · Speech following, status post VBS on 10/30, discussed with Swallow therapy, okay for pureed diet with nectar thick liquids  · Continue nystatin for thrush  · Barium swallow negative for esophageal obstruction  · Palliative Care has evaluated the patient and had a family discussion on 10/31/19  At that time, goals of care were still treatment oriented so palliative care has signed off  At this time, patient still has not progressed  We will continue EN for a few more days  If we see no improvement, I would recommend having another family meeting to discuss goals of care again  · Nutrition has made the recommendation to initiate tube feeding given patient's persistently poor PO intake  Keofed placed 11/6  Patient removed and it was reinserted  EN initiated with Jevity 1 2 kcal @ 60 ml/hr x16 hours/day  Improving       Essential hypertension  Assessment & Plan  · Atenolol switched to metoprolol given Afib with RVR  Rate has improved with increase in metoprolol to 50 mg BID  · Amlodipine and enalapril are on hold  Currently, no need to resume given controlled BPs  Type 2 diabetes mellitus with diabetic neuropathy, without long-term current use of insulin [facility])  Assessment & Plan  Lab Results   Component Value Date    HGBA1C 6 4 (H) 10/15/2019     Recent Labs     11/09/19  2119 11/10/19  0611 11/10/19  1044 11/10/19  1614   POCGLU 277* 334* 310* 337*     · Takes metformin and glipizide at baseline, will plan to resume metformin at discharge and hold glipizide  · Continue with SSI  · Diabetic diet  · Monitor accuchecks, avoid hypoglycemia    * Metabolic encephalopathy  Assessment & Plan  · POA  Etiology still unclear  Likely in setting of hyponatremia with component of delirium contributing given recurrent hospitalizations/underlying cognitive impairment  Patient's baseline as of September was driving, independent cooking/cleaningn/ambulating    · CNS imaging does not reveal obvious etiology  · MRI brain: Single focus of acute/subacute infarct in the left precentral gyrus with no hemorrhagic transformation  Mild chronic white matter microangiopathy  Bilateral mastoid effusions  MRA head is negative  Neurology has been consulted and evaluated the patient  Findings on MRI does not support level of encephalopathy  · No infectious source identified  · UA: Negative  · CXR: No evidence of pneumonia  · No wounds or cellulitis  · Ehrlichia/Babesia testing: negative  · Respiratory panel: Negative  · Flu/RSV: Negative  · Consider Vasculitis vs autoimmune encephalitis  Less likely CNS vasculitis per neurology although encephalitis remains in differential   Per family, patient noted to improve with steroids but has since declined again since they were discontinued  · Autoimmune work up in progress:  · CRP elevated: >90 0  · Anti-nuclear antibodies: Positive  · JAMILAH titer: 1:1280, homogenous pattern  · Quantitative RF elevated: 640  · ANCA: Negative  · S/p LP:  · Lyme: pending  · VDRL: Non-reactive  · Meningitis/Encephalitis Panel: Negative  · Protein: 37  · Culture/gram stain: No growth  · She has hx of suspected adrenal insufficiency, previously on steroids and then tapered off  She follows with Endocrinology as outpatient  · Cosyntropin stim test with adequate response in cortisol, hydrocortisone discontinued, she does not have adrenal insufficiency   · Vitamin B12 and folate normal   · Lyme titer negative  · RPR negative  · HIV negative  · Rheumatology consult:   R/O vasculitis as etiology, considering lupus of elderly, ruling out other autoimmune diseases including phospholipid antibody syndrome  Further workup pending, will start Solu-Medrol 20 mg b i d  For 4 days    IV infiltration and contrast extravasation in left armresolved as of 11/3/2019  Assessment & Plan  · LUE exam is stable as compared to yesterday  · Left arm swelling is improving    No redness or tenderness  · Continue with left arm elevation a dn ice packs  · Low threshold to obtain surgery consult if exam deteriorates  Feverresolved as of 11/6/2019  Assessment & Plan  · Has been afebrile for more than 48 hours  · Concerns for encephalitis vs vasculitis (less likely), less likely related to splenic infarct  · No apparent infectious etiology  · Urine culture not significant this admission, 6005-9929 GNR  At [facility], pt was noted to have ESBL in urine which was suspected to be colonization  · Admission blood cultrues are neg  · Noted elevated sed rate and CRP  · Repeat CXR negative  · Flu/RSV negative  · Blood parasitessmear negative  · Blood culture negative (final)  · Lyme titer negative  · Evaluating for Anaplasma, Babesiosis given history of tick bite 2 months before  Although suspicion is low  Anaplasma titer negative  · Rheumatology consult given elevated inflammatory markers and persistent fever despite lack of infectious source and now positive JAMILAH  · Infectious Disease following, appreciate input  · Continue to monitor off ABX  · Work up for autoimmune etiology as mentioned above  · Appreciate neurology input  Left upper quadrant painresolved as of 10/22/2019  Assessment & Plan  · Had episode of LUQ pain which has resolved  No further complaints  Tachypnearesolved as of 10/21/2019  Assessment & Plan  · Now resolved and patient seems comfortable  · SpO2 wnl on room air  · Chest x-ray shows small pleural effusion, compression atelectasis  Consider pneumonia clinically  · Recent echocardiogram in September 2019 at [facility] showed EF of 50-55% with intermediate diastolic dysfunction and mild pulmonary hypertension  · No overt signs of pneumonia and patient denies any cough  · procalcitonin normal   · Incentive spirometry  · Encourage ambulation  · Respiratory protocol     · Swallow eval            VTE Pharmacologic Prophylaxis:   Pharmacologic: Apixaban (Eliquis)  Mechanical VTE Prophylaxis in Place: Yes    Patient Centered Rounds: I have performed bedside rounds with nursing staff today  Discussions with Specialists or Other Care Team Provider:     Education and Discussions with Family / Patient: spoke with daughter regarding patients     Time Spent for Care: 45 minutes  More than 50% of total time spent on counseling and coordination of care as described above  Current Length of Stay: 27 day(s)    Current Patient Status: Inpatient   Certification Statement: The patient will continue to require additional inpatient hospital stay due to still getting tube feeds, improvement with steroids     Discharge Plan: patient    Code Status: Level 1 - Full Code      Subjective:   Patient awake, tolerating tube feeds, feeding herself  Improving overall per daughter  Objective:     Vitals:   Temp (24hrs), Av 7 °F (35 9 °C), Min:96 6 °F (35 9 °C), Max:96 8 °F (36 °C)    Temp:  [96 6 °F (35 9 °C)-96 8 °F (36 °C)] 96 6 °F (35 9 °C)  HR:  [] 79  Resp:  [18-20] 20  BP: (117-131)/(60-61) 131/60  SpO2:  [96 %-98 %] 97 %  Body mass index is 23 67 kg/m²  Input and Output Summary (last 24 hours): Intake/Output Summary (Last 24 hours) at 11/10/2019 1658  Last data filed at 11/10/2019 1657  Gross per 24 hour   Intake 1498 ml   Output 750 ml   Net 748 ml       Physical Exam:     Physical Exam   Constitutional: She is oriented to person, place, and time  She appears well-developed and well-nourished  HENT:   Head: Normocephalic and atraumatic  Right Ear: External ear normal    Left Ear: External ear normal    Nose: Nose normal    Mouth/Throat: Oropharynx is clear and moist  No oropharyngeal exudate  Eyes: Pupils are equal, round, and reactive to light  Conjunctivae and EOM are normal  Right eye exhibits no discharge  Left eye exhibits no discharge  No scleral icterus  Neck: Normal range of motion  Neck supple  No JVD present   No tracheal deviation present  No thyromegaly present  Cardiovascular: Normal rate, regular rhythm, normal heart sounds and intact distal pulses  Exam reveals no gallop and no friction rub  No murmur heard  Pulmonary/Chest: Effort normal and breath sounds normal  No stridor  No respiratory distress  She has no wheezes  She has no rales  She exhibits no tenderness  Abdominal: Soft  Bowel sounds are normal  She exhibits no distension and no mass  There is no tenderness  There is no rebound and no guarding  No hernia  Musculoskeletal: Normal range of motion  She exhibits no edema, tenderness or deformity  Lymphadenopathy:     She has no cervical adenopathy  Neurological: She is alert and oriented to person, place, and time  She displays normal reflexes  No cranial nerve deficit or sensory deficit  She exhibits normal muscle tone  Coordination normal    Skin: Skin is warm and dry  No rash noted  No erythema  No pallor  Psychiatric: She has a normal mood and affect  Her behavior is normal  Thought content normal    Nursing note and vitals reviewed  Additional Data:     Labs:    Results from last 7 days   Lab Units 11/10/19  0953   WBC Thousand/uL 7 97   HEMOGLOBIN g/dL 7 4*   HEMATOCRIT % 25 7*   PLATELETS Thousands/uL 418*   NEUTROS PCT % 81*   LYMPHS PCT % 13*   MONOS PCT % 5   EOS PCT % 0     Results from last 7 days   Lab Units 11/10/19  0953   POTASSIUM mmol/L 4 3   CHLORIDE mmol/L 106   CO2 mmol/L 28   BUN mg/dL 20   CREATININE mg/dL 0 52*   CALCIUM mg/dL 8 6           * I Have Reviewed All Lab Data Listed Above  * Additional Pertinent Lab Tests Reviewed:  [facility]    Imaging:    Imaging Reports Reviewed Today Include:    Imaging Personally Reviewed by Myself Includes:       Recent Cultures (last 7 days):           Last 24 Hours Medication List:     Current Facility-Administered Medications:  acetaminophen 650 mg Oral Q6H PRN Ramon MAHAN PA-C   aluminum-magnesium hydroxide-simethicone 30 mL Oral Q6H PRN Mayra Bowen MD   apixaban 2 5 mg Oral BID Betzaida Ruiz PA-C   atorvastatin 40 mg Oral Daily With Fernanda Castellon MD   bisacodyl 10 mg Rectal Daily PRN Arash MAHAN PA-C   docusate sodium 100 mg Oral BID PRN Mayra Bowen MD   ferrous sulfate 325 mg Oral Daily With Breakfast Brian Avalos MD   fluticasone 1 spray Each Nare BID Brian Avalos MD   insulin glargine 10 Units Subcutaneous HS Nithya Celestin MD   insulin lispro 1-5 Units Subcutaneous HS Mayra Bowen MD   insulin lispro 1-6 Units Subcutaneous TID AC Mayra Bowen MD   lidocaine (PF) 2 mL Infiltration Once Malva Case, LUPE   methylPREDNISolone sodium succinate 20 mg Intravenous Q12H [address] Heaven MAHAN PA-C   metoprolol 2 5 mg Intravenous Q6H PRN Nithya Celestin MD   metoprolol tartrate 50 mg Oral Q12H [address] Betzaida Ruiz PA-C   mirtazapine 7 5 mg Oral HS Heaven MAHAN PA-C   nystatin 500,000 Units Swish & Swallow 4x Daily Heaven MAHAN PA-C   ondansetron 4 mg Intravenous Q6H PRN Mayra Bowen MD   pantoprazole 40 mg Oral Early Morning Brian Avalos MD        Today, Patient Was Seen By: Nithya Celestin MD    ** Please Note: Dictation voice to text software may have been used in the creation of this document   **

## 2019-11-10 NOTE — ASSESSMENT & PLAN NOTE
· Has been afebrile for more than 48 hours  · Concerns for encephalitis vs vasculitis (less likely), less likely related to splenic infarct  · No apparent infectious etiology  · Urine culture not significant this admission, 0705-4036 GNR  At [facility], pt was noted to have ESBL in urine which was suspected to be colonization  · Admission blood cultrues are neg  · Noted elevated sed rate and CRP  · Repeat CXR negative  · Flu/RSV negative  · Blood parasitessmear negative  · Blood culture negative (final)  · Lyme titer negative  · Evaluating for Anaplasma, Babesiosis given history of tick bite 2 months before  Although suspicion is low  Anaplasma titer negative  · Rheumatology consult given elevated inflammatory markers and persistent fever despite lack of infectious source and now positive JAMILAH  · Infectious Disease following, appreciate input  · Continue to monitor off ABX  · Work up for autoimmune etiology as mentioned above  · Appreciate neurology input

## 2019-11-10 NOTE — PLAN OF CARE
Problem: Prexisting or High Potential for Compromised Skin Integrity  Goal: Skin integrity is maintained or improved  Description  INTERVENTIONS:  - Identify patients at risk for skin breakdown  - Assess and monitor skin integrity  - Assess and monitor nutrition and hydration status  - Monitor labs   - Assess for incontinence   - Turn and reposition patient  - Assist with mobility/ambulation  - Relieve pressure over bony prominences  - Avoid friction and shearing  - Provide appropriate hygiene as needed including keeping skin clean and dry  - Evaluate need for skin moisturizer/barrier cream  - Collaborate with interdisciplinary team   - Patient/family teaching  - Consider wound care consult   Outcome: Progressing     Problem: Nutrition/Hydration-ADULT  Goal: Nutrient/Hydration intake appropriate for improving, restoring or maintaining nutritional needs  Description  Monitor and assess patient's nutrition/hydration status for malnutrition  Collaborate with interdisciplinary team and initiate plan and interventions as ordered  Monitor patient's weight and dietary intake as ordered or per policy  Utilize nutrition screening tool and intervene as necessary  Determine patient's food preferences and provide high-protein, high-caloric foods as appropriate       INTERVENTIONS:  - Monitor oral intake, urinary output, labs, and treatment plans  - Assess nutrition and hydration status and recommend course of action  - Evaluate amount of meals eaten  - Assist patient with eating if necessary   - Allow adequate time for meals  - Recommend/ encourage appropriate diets, oral nutritional supplements, and vitamin/mineral supplements  - Order, calculate, and assess calorie counts as needed  - Recommend, monitor, and adjust tube feedings and TPN/PPN based on assessed needs  - Assess need for intravenous fluids  - Provide specific nutrition/hydration education as appropriate  - Include patient/family/caregiver in decisions related to nutrition  Outcome: Progressing     Problem: Potential for Falls  Goal: Patient will remain free of falls  Description  INTERVENTIONS:  - Assess patient frequently for physical needs  -  Identify cognitive and physical deficits and behaviors that affect risk of falls    -  [place] fall precautions as indicated by assessment   - Educate patient/family on patient safety including physical limitations  - Instruct patient to call for assistance with activity based on assessment  - Modify environment to reduce risk of injury  - Consider OT/PT consult to assist with strengthening/mobility  Outcome: Progressing     Problem: NEUROSENSORY - ADULT  Goal: Achieves stable or improved neurological status  Description  INTERVENTIONS  - Monitor and report changes in neurological status  - Monitor vital signs such as temperature, blood pressure, glucose, and any other labs ordered   - Initiate measures to prevent increased intracranial pressure  - Monitor for seizure activity and implement precautions if appropriate      Outcome: Progressing     Problem: RESPIRATORY - ADULT  Goal: Achieves optimal ventilation and oxygenation  Description  INTERVENTIONS:  - Assess for changes in respiratory status  - Assess for changes in mentation and behavior  - Position to facilitate oxygenation and minimize respiratory effort  - Encourage broncho-pulmonary hygiene including cough, deep breathe, Incentive Spirometry  - Assess and instruct to report SOB or any respiratory difficulty  - Respiratory Therapy support as indicated   Outcome: Progressing     Problem: GASTROINTESTINAL - ADULT  Goal: Maintains adequate nutritional intake  Description  INTERVENTIONS:  - Monitor percentage of each meal consumed  - Identify factors contributing to decreased intake, treat as appropriate  - Assist with meals as needed  - Monitor I&O, weight, and lab values if indicated  - Obtain nutrition services referral as needed  Outcome: Progressing     Problem: GENITOURINARY - ADULT  Goal: Urinary catheter remains patent  Description  INTERVENTIONS:  - Assess patency of urinary catheter  - If patient has a chronic herrera, consider changing catheter if non-functioning  - Follow guidelines for intermittent irrigation of non-functioning urinary catheter  Outcome: Progressing     Problem: METABOLIC, FLUID AND ELECTROLYTES - ADULT  Goal: Electrolytes maintained within normal limits  Description  INTERVENTIONS:  - Monitor labs and assess patient for signs and symptoms of electrolyte imbalances  - Administer electrolyte replacement as ordered  - Monitor response to electrolyte replacements, including repeat lab results as appropriate  - Instruct patient on fluid and nutrition as appropriate  Outcome: Progressing  Goal: Fluid balance maintained  Description  INTERVENTIONS:  - Monitor labs   - Monitor I/O and WT  - Instruct patient on fluid and nutrition as appropriate  - Assess for signs & symptoms of volume excess or deficit  Outcome: Progressing  Goal: Glucose maintained within target range  Description  INTERVENTIONS:  - Monitor Blood Glucose as ordered  - Assess for signs and symptoms of hyperglycemia and hypoglycemia  - Administer ordered medications to maintain glucose within target range  - Assess nutritional intake and initiate nutrition service referral as needed  Outcome: Progressing     Problem: SKIN/TISSUE INTEGRITY - ADULT  Goal: Skin integrity remains intact  Description  INTERVENTIONS  - Identify patients at risk for skin breakdown  - Assess and monitor skin integrity  - Assess and monitor nutrition and hydration status  - Monitor labs (i e  albumin)  - Assess for incontinence   - Turn and reposition patient  - Assist with mobility/ambulation  - Relieve pressure over bony prominences  - Avoid friction and shearing  - Provide appropriate hygiene as needed including keeping skin clean and dry  - Evaluate need for skin moisturizer/barrier cream  - Collaborate with interdisciplinary team (i e  Nutrition, Rehabilitation, etc )   - Patient/family teaching  Outcome: Progressing  Goal: Oral mucous membranes remain intact  Description  INTERVENTIONS  - Assess oral mucosa and hygiene practices  - Implement preventative oral hygiene regimen  - Implement oral medicated treatments as ordered  - Initiate Nutrition services referral as needed  Outcome: Progressing     Problem: HEMATOLOGIC - ADULT  Goal: Maintains hematologic stability  Description  INTERVENTIONS  - Monitor labs      Outcome: Progressing     Problem: SAFETY,RESTRAINT: NV/NON-SELF DESTRUCTIVE BEHAVIOR  Goal: Remains free of harm/injury (restraint for non violent/non self-detsructive behavior)  Description  INTERVENTIONS:  - Instruct patient/family regarding restraint use   - Assess and monitor physiologic and psychological status   - Provide interventions and comfort measures to meet assessed patient needs   - Identify and implement measures to help patient regain control  - Assess readiness for release of restraint   Outcome: Progressing  Goal: Returns to optimal restraint-free functioning  Description  INTERVENTIONS:  - Assess the patient's behavior and symptoms that indicate continued need for restraint  - Identify and implement measures to help patient regain control  - Assess readiness for release of restraint   Outcome: Progressing     Problem: COPING  Goal: Pt/Family able to verbalize concerns and demonstrate effective coping strategies  Description  INTERVENTIONS:  - Assist patient/family to identify coping skills, available support systems and cultural and spiritual values  - Provide emotional support, including active listening and acknowledgement of concerns of patient and caregivers  - Reduce environmental stimuli, as able  - Provide patient education  - Assess for spiritual pain/suffering and initiate spiritual care, including notification of Pastoral Care or [place] based community as needed  - Assess effectiveness of coping strategies  Outcome: Progressing  Goal: Will report anxiety at manageable levels  Description  INTERVENTIONS:  - Administer medication as ordered  - Teach and encourage coping skills  - Provide emotional support  - Assess patient/family for anxiety and ability to cope  Outcome: Progressing     Problem: CONFUSION/THOUGHT DISTURBANCE  Goal: Thought disturbances (confusion, delirium, depression, dementia or psychosis) are managed to maintain or return to baseline mental status and functional level  Description  INTERVENTIONS:  - Assess for possible contributors to  thought disturbance, including but not limited to medications, infection, impaired vision or hearing, underlying metabolic abnormalities, dehydration, respiratory compromise,  psychiatric diagnoses and notify attending PHYSICAN/AP  - Monitor and intervene to maintain adequate nutrition, hydration, elimination, sleep and activity  - Decrease environmental stimuli, including noise as appropriate  - Provide frequent contacts to provide refocusing, direction and reassurance as needed  Approach patient calmly with eye contact and at their level    - [place] high risk fall precautions, aspiration precautions and other safety measures, as indicated  - If delirium suspected, notify physician/AP of change in condition and request immediate in-person evaluation  - Pursue consults as appropriate including Geriatric (campus dependent), OT for cognitive evaluation/activity planning, psychiatric, pastoral care, etc   Outcome: Progressing

## 2019-11-10 NOTE — ASSESSMENT & PLAN NOTE
· Since September she has had a progressive decline - she has had no appetite, not really eating/drinking with weakness  She was previously reportedly very independent prior to her admission at [facility] in September with UTI  · Geriatrics following, recommending outpatient follow up  · Speech following, status post VBS on 10/30, discussed with Swallow therapy, okay for pureed diet with nectar thick liquids  · Continue nystatin for thrush  · Barium swallow negative for esophageal obstruction  · Palliative Care has evaluated the patient and had a family discussion on 10/31/19  At that time, goals of care were still treatment oriented so palliative care has signed off  At this time, patient still has not progressed  We will continue EN for a few more days  If we see no improvement, I would recommend having another family meeting to discuss goals of care again  · Nutrition has made the recommendation to initiate tube feeding given patient's persistently poor PO intake  Keofed placed 11/6  Patient removed and it was reinserted  EN initiated with Jevity 1 2 kcal @ 60 ml/hr x16 hours/day   Improving

## 2019-11-10 NOTE — PROGRESS NOTES
Progress Note - Infectious Disease   [place] Tee [de-identified] y o  female MRN: [id number]  Unit/Bed#: -01 Encounter: [id number]      Impression/Recommendations:  1  FUO, with elevated ESR and JAMILAH  Patient had extensive infectious workup with negative findings and cultures  I suspect she has underlying vasculitis  Consider temporal arteritis  Rheumatology evaluation noted  Workup is in progress  Systemic corticosteroid was started  Temperature stays down on systemic corticosteroid  Continue to observe off antibiotic  Monitor temperature  Follow-up on rheumatology workup  Continue systemic corticosteroid per Rheumatology      2  Splenic infarct, 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  Clinically, this is not appear to be splenic abscess  Abdominal exam is benign  Monitor      3  Encephalopathy, most likely secondary to vasculitis, with hyponatremia contributing  Mental status is slowly improving  Monitor mental status      4  Hyponatremia  This has resolved      5  Poorly controlled DM, with elevated hemoglobin A1c      Discussed with patient      Antibiotics:  None     Subjective:  Patient was seen earlier  She is more awake and alert  But confused  Temperature stays down  No chills  No diarrhea  Objective:  Vitals:  Temp:  [96 6 °F (35 9 °C)-96 8 °F (36 °C)] 96 6 °F (35 9 °C)  HR:  [] 79  Resp:  [18-20] 20  BP: (117-131)/(60-61) 131/60  SpO2:  [96 %-98 %] 97 %  Temp (24hrs), Av 7 °F (35 9 °C), Min:96 6 °F (35 9 °C), Max:96 8 °F (36 °C)  Current: Temperature: (!) 96 6 °F (35 9 °C)    Physical Exam:     General: More awake, alert, cooperative, no distress  Less confusion  Neck:  Supple  No mass  No lymphadenopathy  Lungs: Expansion symmetric, no rales, no wheezing, respirations unlabored  Heart:  Regular rate and rhythm, S1 and S2 normal, no murmur  Abdomen: Soft, nondistended, non-tender, bowel sounds active all four quadrants,        no masses, no organomegaly     Extremities: No edema  No erythema/warmth  No ulcer  Nontender to palpation  Skin:  No rash  Neuro: Moves all extremities  Invasive Devices     Peripheral Intravenous Line            Peripheral IV 11/07/19 Distal;Dorsal (posterior); Right Forearm 2 days          Drain            Urethral Catheter Straight-tip 16 Fr  26 days    NG/OG/Enteral Tube Nasogastric 8 Fr Right nares 4 days                Labs studies:   I have personally reviewed pertinent labs  Results from last 7 days   Lab Units 11/10/19  0953 11/09/19  0632 11/08/19  0531   POTASSIUM mmol/L 4 3 4 5 4 4   CHLORIDE mmol/L 106 109* 109*   CO2 mmol/L 28 25 23   BUN mg/dL 20 17 17   CREATININE mg/dL 0 52* 0 49* 0 46*   EGFR ml/min/1 73sq m 91 92 94   CALCIUM mg/dL 8 6 8 0* 8 1*     Results from last 7 days   Lab Units 11/10/19  0953 11/09/19  1007 11/09/19  0649 11/08/19  0531   WBC Thousand/uL 7 97  --  8 44 6 25   HEMOGLOBIN g/dL 7 4* 7 2* 7 0* 7 5*   PLATELETS Thousands/uL 418*  --  381 205           Imaging Studies:   I have personally reviewed pertinent imaging study reports and images in PACS  EKG, Pathology, and Other Studies:   I have personally reviewed pertinent reports

## 2019-11-10 NOTE — ASSESSMENT & PLAN NOTE
Lab Results   Component Value Date    HGBA1C 6 4 (H) 10/15/2019     Recent Labs     11/09/19  2119 11/10/19  0611 11/10/19  1044 11/10/19  1614   POCGLU 277* 334* 310* 337*     · Takes metformin and glipizide at baseline, will plan to resume metformin at discharge and hold glipizide    · Continue with SSI  · Diabetic diet  · Monitor accuchecks, avoid hypoglycemia

## 2019-11-11 LAB
ANION GAP SERPL CALCULATED.3IONS-SCNC: 5 MMOL/L (ref 4–13)
BASOPHILS # BLD AUTO: 0.01 THOUSANDS/ΜL (ref 0–0.1)
BASOPHILS NFR BLD AUTO: 0 % (ref 0–1)
BUN SERPL-MCNC: 23 MG/DL (ref 5–25)
CALCIUM SERPL-MCNC: 8.6 MG/DL (ref 8.3–10.1)
CARDIOLIPIN IGA SER IA-ACNC: <9 APL U/ML (ref 0–11)
CARDIOLIPIN IGG SER IA-ACNC: <9 GPL U/ML (ref 0–14)
CARDIOLIPIN IGM SER IA-ACNC: 13 MPL U/ML (ref 0–12)
CENTROMERE B AB SER-ACNC: <0.2 AI (ref 0–0.9)
CHLORIDE SERPL-SCNC: 107 MMOL/L (ref 100–108)
CO2 SERPL-SCNC: 28 MMOL/L (ref 21–32)
CREAT SERPL-MCNC: 0.53 MG/DL (ref 0.6–1.3)
DSDNA AB SER-ACNC: 17 IU/ML (ref 0–9)
ENA JO1 AB SER-ACNC: <0.2 AI (ref 0–0.9)
ENA RNP AB SER-ACNC: <0.2 AI (ref 0–0.9)
ENA SM AB SER-ACNC: <0.2 AI (ref 0–0.9)
ENA SS-A AB SER-ACNC: >8 AI (ref 0–0.9)
ENA SS-B AB SER-ACNC: <0.2 AI (ref 0–0.9)
EOSINOPHIL # BLD AUTO: 0 THOUSAND/ΜL (ref 0–0.61)
EOSINOPHIL NFR BLD AUTO: 0 % (ref 0–6)
ERYTHROCYTE [DISTWIDTH] IN BLOOD BY AUTOMATED COUNT: 20.1 % (ref 11.6–15.1)
GFR SERPL CREATININE-BSD FRML MDRD: 90 ML/MIN/1.73SQ M
GLUCOSE SERPL-MCNC: 175 MG/DL (ref 65–140)
GLUCOSE SERPL-MCNC: 230 MG/DL (ref 65–140)
GLUCOSE SERPL-MCNC: 339 MG/DL (ref 65–140)
GLUCOSE SERPL-MCNC: 346 MG/DL (ref 65–140)
GLUCOSE SERPL-MCNC: 366 MG/DL (ref 65–140)
HCT VFR BLD AUTO: 26.1 % (ref 34.8–46.1)
HGB BLD-MCNC: 7.6 G/DL (ref 11.5–15.4)
IMM GRANULOCYTES # BLD AUTO: 0.1 THOUSAND/UL (ref 0–0.2)
IMM GRANULOCYTES NFR BLD AUTO: 1 % (ref 0–2)
LYMPHOCYTES # BLD AUTO: 1.06 THOUSANDS/ΜL (ref 0.6–4.47)
LYMPHOCYTES NFR BLD AUTO: 10 % (ref 14–44)
MAGNESIUM SERPL-MCNC: 1.7 MG/DL (ref 1.6–2.6)
MCH RBC QN AUTO: 22.9 PG (ref 26.8–34.3)
MCHC RBC AUTO-ENTMCNC: 29.1 G/DL (ref 31.4–37.4)
MCV RBC AUTO: 79 FL (ref 82–98)
MONOCYTES # BLD AUTO: 0.38 THOUSAND/ΜL (ref 0.17–1.22)
MONOCYTES NFR BLD AUTO: 4 % (ref 4–12)
NEUTROPHILS # BLD AUTO: 9.27 THOUSANDS/ΜL (ref 1.85–7.62)
NEUTS SEG NFR BLD AUTO: 85 % (ref 43–75)
NRBC BLD AUTO-RTO: 0 /100 WBCS
PHOSPHATE SERPL-MCNC: 2.4 MG/DL (ref 2.3–4.1)
PLATELET # BLD AUTO: 490 THOUSANDS/UL (ref 149–390)
PMV BLD AUTO: 10.2 FL (ref 8.9–12.7)
POTASSIUM SERPL-SCNC: 4.8 MMOL/L (ref 3.5–5.3)
RBC # BLD AUTO: 3.32 MILLION/UL (ref 3.81–5.12)
SODIUM SERPL-SCNC: 140 MMOL/L (ref 136–145)
WBC # BLD AUTO: 10.82 THOUSAND/UL (ref 4.31–10.16)

## 2019-11-11 PROCEDURE — 85025 COMPLETE CBC W/AUTO DIFF WBC: CPT | Performed by: INTERNAL MEDICINE

## 2019-11-11 PROCEDURE — 84100 ASSAY OF PHOSPHORUS: CPT | Performed by: INTERNAL MEDICINE

## 2019-11-11 PROCEDURE — 97530 THERAPEUTIC ACTIVITIES: CPT

## 2019-11-11 PROCEDURE — 83735 ASSAY OF MAGNESIUM: CPT | Performed by: INTERNAL MEDICINE

## 2019-11-11 PROCEDURE — 97535 SELF CARE MNGMENT TRAINING: CPT

## 2019-11-11 PROCEDURE — 80048 BASIC METABOLIC PNL TOTAL CA: CPT | Performed by: INTERNAL MEDICINE

## 2019-11-11 PROCEDURE — 99221 1ST HOSP IP/OBS SF/LOW 40: CPT | Performed by: INTERNAL MEDICINE

## 2019-11-11 PROCEDURE — 82948 REAGENT STRIP/BLOOD GLUCOSE: CPT

## 2019-11-11 PROCEDURE — 99232 SBSQ HOSP IP/OBS MODERATE 35: CPT | Performed by: INTERNAL MEDICINE

## 2019-11-11 RX ORDER — INSULIN GLARGINE 100 [IU]/ML
30 INJECTION, SOLUTION SUBCUTANEOUS
Status: DISCONTINUED | OUTPATIENT
Start: 2019-11-11 | End: 2019-11-11

## 2019-11-11 RX ORDER — MIRTAZAPINE 15 MG/1
15 TABLET, FILM COATED ORAL
Status: DISCONTINUED | OUTPATIENT
Start: 2019-11-11 | End: 2019-11-16 | Stop reason: HOSPADM

## 2019-11-11 RX ADMIN — FLUTICASONE PROPIONATE 1 SPRAY: 50 SPRAY, METERED NASAL at 17:32

## 2019-11-11 RX ADMIN — INSULIN LISPRO 6 UNITS: 100 INJECTION, SOLUTION INTRAVENOUS; SUBCUTANEOUS at 06:32

## 2019-11-11 RX ADMIN — INSULIN HUMAN 20 UNITS: 100 INJECTION, SUSPENSION SUBCUTANEOUS at 23:44

## 2019-11-11 RX ADMIN — NYSTATIN 500000 UNITS: 500000 SUSPENSION ORAL at 08:10

## 2019-11-11 RX ADMIN — INSULIN LISPRO 2 UNITS: 100 INJECTION, SOLUTION INTRAVENOUS; SUBCUTANEOUS at 21:28

## 2019-11-11 RX ADMIN — NYSTATIN 500000 UNITS: 500000 SUSPENSION ORAL at 12:05

## 2019-11-11 RX ADMIN — APIXABAN 2.5 MG: 2.5 TABLET, FILM COATED ORAL at 17:32

## 2019-11-11 RX ADMIN — METOPROLOL TARTRATE 50 MG: 50 TABLET, FILM COATED ORAL at 21:27

## 2019-11-11 RX ADMIN — METHYLPREDNISOLONE SODIUM SUCCINATE 20 MG: 40 INJECTION, POWDER, FOR SOLUTION INTRAMUSCULAR; INTRAVENOUS at 08:11

## 2019-11-11 RX ADMIN — INSULIN LISPRO 20 UNITS: 100 INJECTION, SOLUTION INTRAVENOUS; SUBCUTANEOUS at 12:05

## 2019-11-11 RX ADMIN — METOPROLOL TARTRATE 50 MG: 50 TABLET, FILM COATED ORAL at 08:10

## 2019-11-11 RX ADMIN — NYSTATIN 500000 UNITS: 500000 SUSPENSION ORAL at 17:32

## 2019-11-11 RX ADMIN — PANTOPRAZOLE SODIUM 40 MG: 40 TABLET, DELAYED RELEASE ORAL at 05:32

## 2019-11-11 RX ADMIN — FERROUS SULFATE TAB 325 MG (65 MG ELEMENTAL FE) 325 MG: 325 (65 FE) TAB at 08:10

## 2019-11-11 RX ADMIN — APIXABAN 2.5 MG: 2.5 TABLET, FILM COATED ORAL at 08:11

## 2019-11-11 RX ADMIN — NYSTATIN 500000 UNITS: 500000 SUSPENSION ORAL at 21:27

## 2019-11-11 RX ADMIN — MIRTAZAPINE 15 MG: 15 TABLET, FILM COATED ORAL at 21:28

## 2019-11-11 RX ADMIN — METHYLPREDNISOLONE SODIUM SUCCINATE 20 MG: 40 INJECTION, POWDER, FOR SOLUTION INTRAMUSCULAR; INTRAVENOUS at 21:27

## 2019-11-11 RX ADMIN — FLUTICASONE PROPIONATE 1 SPRAY: 50 SPRAY, METERED NASAL at 08:12

## 2019-11-11 RX ADMIN — ATORVASTATIN CALCIUM 40 MG: 40 TABLET, FILM COATED ORAL at 16:27

## 2019-11-11 NOTE — PLAN OF CARE
Problem: OCCUPATIONAL THERAPY ADULT  Goal: Performs self-care activities at highest level of function for planned discharge setting  See evaluation for individualized goals  Description  Treatment Interventions: ADL retraining, Functional transfer training, UE strengthening/ROM, Endurance training, Cognitive reorientation, Patient/family training, Compensatory technique education, Energy conservation, Activityengagement          See flowsheet documentation for full assessment, interventions and recommendations  Outcome: Progressing  Note:   Limitation: Decreased ADL status, Decreased Safe judgement during ADL, Decreased cognition, Decreased endurance, Decreased self-care trans, Decreased high-level ADLs  Prognosis: Fair  Assessment: Arrived to patient supine in bed with PCA and RN present  Patient agreeable to therapy session  Vitals taken during session as patient reports dizziness w/ positional changes  BP supine 134/68, seated /64, s/p 10 second stand 147/68  Patient showing improvements from last OT session, requiring max A x1 for supine to sit with cues for log roll technique and increased time as patient presents with delayed processing  Max A required for LB dressing to don socks  Completing grooming seated EOB for 5 mins with min A and increased time, cues for safety and sequencing  Patient then completed sit to stand x1 trial with max A x2 with RW  Cues for hand placement and pushing hips forward to stand, heavily relying on bed (pushing legs against bed)  Patient appearing anxious in standing requesting to sit and feeling dizzy  Patient required max A x2 for sit to supine bed mobility  From OT standpoint, recommendation at time of d/c would be short term rehab  Pt to benefit from continued OT while in the hospital to address deficits as defined above and maximize level of functional independence with occupational performance of ADLs and functional mobility   At end of session, patient remains in room with all needs within reach       OT Discharge Recommendation: Short Term Rehab  OT - OK to Discharge: Yes(When medically appropriate to rehab)

## 2019-11-11 NOTE — SOCIAL WORK
CM discussed pt in care coordination rounds  Per Dr Luciano Campuzano, discussion to be had with daughter regarding PEG tube,  Pt not stable for d/c

## 2019-11-11 NOTE — PROGRESS NOTES
Progress Note - Pam Oliveira 1940, [de-identified] y o  female MRN: [id number]    Unit/Bed#: -01 Encounter: [id number]    Primary Care Provider: Clovis Dickerson MD   Date and time admitted to hospital: 10/14/2019  4:20 PM        Acute CVA (cerebrovascular accident) [facility])  Assessment & Plan  · MRI brain on 11/3 shows acute to subacute CVA in left pre central gyrus in setting of rapid afib  · Neurology following and appreciate their input  · Continue statin and therapeutic AC    JAMILAH positive  Assessment & Plan  · With homogeneous pattern, with high titers  · Has elevated sed rate and CRP  · Concerns for vasculitis as a cause for encephalopathy  · Rheumatoid factor high positive  · ANCA panel negative  · Rheumatology considering vasculitis vs  Lupus of elderly  · Started on steroids    Internal carotid artery stenosis, bilateral  Assessment & Plan  · Noted to have bilateral internal carotid artery stenosis left 60-70% and right 50-60% with severe left ECA stenosis  · MRI brain shows small acute to subacute single focus of infarct in left precentral gyrus  Neurology recommends vascular surgery evaluation  Goals of care, counseling/discussion  Assessment & Plan  · Participated in a family meeting on 10/31/19 with palliative care  Outcome remained treatment oriented care  Had another discussion with patient's daughter yesterday and she continues to want to pursue treatment care at this time  · Patient is being worked up for rheumatological diseases  · Currently patient remains full code  Paroxysmal atrial fibrillation (HCC)  Assessment & Plan  · Afib with RVR intermittently  Rate appears better controlled with increase in metoprolol  · TSH normal this admission  · LENO showed EF 55% without any intracardiac thrombus  · Heparin drip stopped and started on Eliquis  Splenic infarct  Assessment & Plan  · Noted on CT scan done at [facility] 9/2019, new moderate sized splenic infarcts    · Likely etiology cardio embolic as pt was noted to have PAF with RVR on tele on 10/29  · CTA abdomen/pelvis:  No acute aortic injury or aneurysm  Patent celiac and splenic arteries  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  Evolving splenic infarct  Additional chronic/incidental findings as described  · LENO negative for PFO and obvious intracardiac thrombus, 55% EF  · General surgery has seen the patient  No surgical intervention is warranted  · Hematology has seen the patient  Recommends treatment with anticoagulation  · Started on Eliquis 2 5 mg BID 11/6/19  · Monitor hg closely    Severe protein-calorie malnutrition (HCC)  Assessment & Plan  Malnutrition Findings:   Malnutrition type: Chronic illness(Related to medical condition as evidenced by 10% weight loss over the past month and <75% energy intake needs met >1 month treated with ensure compact supplements)  Degree of Malnutrition: Other severe protein calorie malnutrition    BMI Findings: Body mass index is 23 67 kg/m²  Reported weight has fluctuated significantly throughout hospitalization, will work on obtaining more accurate weights  Nutrition consulted and recommends EN  Daughter wanted to proceed with keofed placement  Started Jevity 1 2 kcal @ 60ml/hr x16 hours per day  Tube feeding is going via NG tube in place  She is improving daily  GI consult placed for evaluation and placement of PEG tube  Nutrition consulted for tube feed recommendations  Urinary retention  Assessment & Plan  · Patient has chronic Herrera present on admission- herrera was replaced in ED 10/14   · Placed on recent admission (10/3/19) at [facility] where she developed urinary retention  · Patient will follow up outpatient with urology for voiding trial when patient's physical activity levels improve      Failure to thrive in adult  Assessment & Plan  · Since September she has had a progressive decline - she has had no appetite, not really eating/drinking with weakness  She was previously reportedly very independent prior to her admission at [facility] in September with UTI  · Geriatrics following, recommending outpatient follow up  · Speech following, status post VBS on 10/30, discussed with Swallow therapy, okay for pureed diet with nectar thick liquids  · Continue nystatin for thrush  · Barium swallow negative for esophageal obstruction  · Palliative Care has evaluated the patient and had a family discussion on 10/31/19  At that time, goals of care were still treatment oriented so palliative care has signed off  At this time, patient still has not progressed  We will continue EN for a few more days  If we see no improvement, I would recommend having another family meeting to discuss goals of care again  · Nutrition has made the recommendation to initiate tube feeding given patient's persistently poor PO intake  Keofed placed 11/6  Patient removed and it was reinserted  EN initiated with Jevity 1 2 kcal @ 60 ml/hr x16 hours/day  Improving       Essential hypertension  Assessment & Plan  · Atenolol switched to metoprolol given Afib with RVR  Rate has improved with increase in metoprolol to 50 mg BID  · Amlodipine and enalapril are on hold  Currently, no need to resume given controlled BPs  Type 2 diabetes mellitus with diabetic neuropathy, without long-term current use of insulin [facility])  Assessment & Plan  Lab Results   Component Value Date    HGBA1C 6 4 (H) 10/15/2019     Recent Labs     11/10/19  1614 11/10/19  2045 11/11/19  0630 11/11/19  1050   POCGLU 337* 359* 366* 339*     · Takes metformin and glipizide at baseline, will plan to resume metformin at discharge and hold glipizide  · Continue with SSI  · Diabetic diet, tube feeds   · Monitor accuchecks, avoid hypoglycemia    * Metabolic encephalopathy  Assessment & Plan  · POA  Etiology still unclear    Likely in setting of hyponatremia with component of delirium contributing given recurrent hospitalizations/underlying cognitive impairment  Patient's baseline as of September was driving, independent cooking/cleaningn/ambulating  · CNS imaging does not reveal obvious etiology  · MRI brain: Single focus of acute/subacute infarct in the left precentral gyrus with no hemorrhagic transformation  Mild chronic white matter microangiopathy  Bilateral mastoid effusions  MRA head is negative  Neurology has been consulted and evaluated the patient  Findings on MRI does not support level of encephalopathy  · No infectious source identified  · UA: Negative  · CXR: No evidence of pneumonia  · No wounds or cellulitis  · Ehrlichia/Babesia testing: negative  · Respiratory panel: Negative  · Flu/RSV: Negative  · Consider Vasculitis vs autoimmune encephalitis  Less likely CNS vasculitis per neurology although encephalitis remains in differential   Per family, patient noted to improve with steroids improved mental status  · Autoimmune work up in progress:  · CRP elevated: >90 0  · Anti-nuclear antibodies: Positive  · JAMILAH titer: 1:1280, homogenous pattern  · Quantitative RF elevated: 640  · ANCA: Negative  · S/p LP:  · Lyme: pending  · VDRL: Non-reactive  · Meningitis/Encephalitis Panel: Negative  · Protein: 37  · Culture/gram stain: No growth  · She has hx of suspected adrenal insufficiency, previously on steroids and then tapered off  She follows with Endocrinology as outpatient  · Cosyntropin stim test with adequate response in cortisol, hydrocortisone discontinued, she does not have adrenal insufficiency   · Vitamin B12 and folate normal   · Lyme titer negative  · RPR negative  · HIV negative  · Rheumatology consult:   R/O vasculitis as etiology, considering lupus of elderly, ruling out other autoimmune diseases including phospholipid antibody syndrome  Labs still pending  Donnell Fisher wants to start prednisone taper and keep her on 10 mg daily dose standing  She needs to follow with Rheumatology outpatient, please see his note from 2019  Further workup pending, will start Solu-Medrol 20 mg b i d  Was given  VTE Pharmacologic Prophylaxis:   Pharmacologic: Apixaban (Eliquis)  Mechanical VTE Prophylaxis in Place: Yes    Patient Centered Rounds: I have performed bedside rounds with nursing staff today  Discussions with Specialists or Other Care Team Provider: medicine    Education and Discussions with Family / Patient: patient     Time Spent for Care: 45 minutes  More than 50% of total time spent on counseling and coordination of care as described above  Current Length of Stay: 28 day(s)    Current Patient Status: Inpatient   Certification Statement: The patient will continue to require additional inpatient hospital stay due to need PEG tube placement, need rehab, PT/OT recommendations    Discharge Plan: will need PEG tube placement and possible discharge to rehab    Code Status: Level 1 - Full Code      Subjective:   Patient can recall dates, informed me today is "" day  Objective:     Vitals:   Temp (24hrs), Av 8 °F (36 °C), Min:96 8 °F (36 °C), Max:96 8 °F (36 °C)    Temp:  [96 8 °F (36 °C)] 96 8 °F (36 °C)  HR:  [79-87] 87  Resp:  [16-20] 18  BP: (123-147)/(57-68) 147/68  SpO2:  [95 %-97 %] 97 %  Body mass index is 23 67 kg/m²  Input and Output Summary (last 24 hours): Intake/Output Summary (Last 24 hours) at 2019 1421  Last data filed at 2019 0640  Gross per 24 hour   Intake 320 ml   Output 375 ml   Net -55 ml       Physical Exam:     Physical Exam   Constitutional: She is oriented to person, place, and time  She appears well-developed and well-nourished  HENT:   ANNIE fed tube in place  No signs of aspiration   Eyes: Pupils are equal, round, and reactive to light  Conjunctivae and EOM are normal  Right eye exhibits no discharge  Left eye exhibits no discharge  No scleral icterus     Neck: Normal range of motion  Neck supple  No JVD present  No tracheal deviation present  No thyromegaly present  Cardiovascular: Normal rate, regular rhythm, normal heart sounds and intact distal pulses  Exam reveals no friction rub  No murmur heard  Pulmonary/Chest: Effort normal and breath sounds normal  No stridor  No respiratory distress  She has no wheezes  She has no rales  She exhibits no tenderness  Abdominal: Soft  Bowel sounds are normal  She exhibits no distension and no mass  There is no tenderness  There is no rebound and no guarding  No hernia  Musculoskeletal: Normal range of motion  She exhibits no edema, tenderness or deformity  Lymphadenopathy:     She has no cervical adenopathy  Neurological: She is alert and oriented to person, place, and time  She displays normal reflexes  No cranial nerve deficit or sensory deficit  She exhibits normal muscle tone  Coordination normal    Skin: Skin is warm and dry  No rash noted  No erythema  No pallor  Psychiatric: She has a normal mood and affect  Nursing note and vitals reviewed  Additional Data:     Labs:    Results from last 7 days   Lab Units 11/11/19  0450   WBC Thousand/uL 10 82*   HEMOGLOBIN g/dL 7 6*   HEMATOCRIT % 26 1*   PLATELETS Thousands/uL 490*   NEUTROS PCT % 85*   LYMPHS PCT % 10*   MONOS PCT % 4   EOS PCT % 0     Results from last 7 days   Lab Units 11/11/19  0450   POTASSIUM mmol/L 4 8   CHLORIDE mmol/L 107   CO2 mmol/L 28   BUN mg/dL 23   CREATININE mg/dL 0 53*   CALCIUM mg/dL 8 6           * I Have Reviewed All Lab Data Listed Above  * Additional Pertinent Lab Tests Reviewed:  [facility]    Imaging:    Imaging Reports Reviewed Today Include:    Imaging Personally Reviewed by Myself Includes:      Recent Cultures (last 7 days):           Last 24 Hours Medication List:     Current Facility-Administered Medications:  acetaminophen 650 mg Oral Q6H PRN Cyril MAHAN PA-C   aluminum-magnesium hydroxide-simethicone 30 mL Oral Q6H PRN Rossana Ahumada MD   apixaban 2 5 mg Oral BID Alea Torre PA-C   atorvastatin 40 mg Oral Daily With Nathan Bustillo MD   bisacodyl 10 mg Rectal Daily PRN Jessica MAHAN PA-C   docusate sodium 100 mg Oral BID PRN Rossana Ahumada MD   ferrous sulfate 325 mg Oral Daily With Breakfast Yarelis Kurtz MD   fluticasone 1 spray Each Nare BID Yarelis Kurtz MD   insulin glargine 30 Units Subcutaneous HS Lyudmila Davies MD   insulin lispro 1-5 Units Subcutaneous HS Rossana Ahumada MD   insulin lispro 1-6 Units Subcutaneous TID With Meals Keila Fuentes MD   insulin lispro 20 Units Subcutaneous TID With Meals Lyudmila Davies MD   lidocaine (PF) 2 mL Infiltration Once Marilia Almonte PA-C   methylPREDNISolone sodium succinate 20 mg Intravenous Q12H [address] Heaven MAHAN PA-C   metoprolol 2 5 mg Intravenous Q6H PRN Keila Fuentes MD   metoprolol tartrate 50 mg Oral Q12H [address] Alea Torre PA-C   mirtazapine 15 mg Oral HS Keila Fuentes MD   nystatin 500,000 Units Swish & Swallow 4x Daily Heaven MAHAN PA-C   ondansetron 4 mg Intravenous Q6H PRN Rossana Ahumada MD   pantoprazole 40 mg Oral Early Morning Yarelis Kurtz MD        Today, Patient Was Seen By: Keila Fuentes MD    ** Please Note: Dictation voice to text software may have been used in the creation of this document   **

## 2019-11-11 NOTE — OCCUPATIONAL THERAPY NOTE
[address] Treatment Note     Patient Name: [place]  Today's Date: 11/11/2019  Problem List  Principal Problem:    Metabolic encephalopathy  Active Problems:    Type 2 diabetes mellitus with diabetic neuropathy, without long-term current use of insulin (HCC)    Essential hypertension    Failure to thrive in adult    Urinary retention    Severe protein-calorie malnutrition (HCC)    Microcytic anemia    Splenic infarct    Paroxysmal atrial fibrillation (HCC)    Goals of care, counseling/discussion    Internal carotid artery stenosis, bilateral    JAMILAH positive    Acute CVA (cerebrovascular accident) ([facility] [address] )          11/11/19 1052   Restrictions/Precautions   Weight Bearing Precautions Per Order No   Braces or Orthoses   (none)   Other Precautions Cognitive; Restraints;Telemetry; Fall Risk;Hard of hearing; Contact isolation  (nonviolent b/l hand restraints, prevalon boots)   Pain Assessment   Pain Assessment No/denies pain   Pain Score No Pain   ADL   Grooming Assistance 4  Minimal Assistance   Grooming Comments seated EOB   UB Dressing Assistance 3  Moderate Assistance   LB Dressing Assistance 2  Maximal [address]    (denied need - herrera)   Functional Standing Tolerance   Time 15 seconds   Activity max A x2 w/ RW - c/o dizziness   Comments vitals stable - see assessment   Bed Mobility   Rolling R 2  Maximal assistance   Additional items Assist x 1;Bedrails; Increased time required;Verbal cues;LE management   Rolling L 2  Maximal assistance   Additional items Assist x 2; Increased time required;Verbal cues;LE management   Supine to Sit 2  Maximal assistance   Additional items Assist x 1; Increased time required;Verbal cues;LE management   Sit to Supine 2  Maximal assistance   Additional items Assist x 2; Increased time required;Verbal cues;LE management   Transfers   Sit to Stand 2  Maximal assistance   Additional items Assist x 2; Increased time required;Verbal cues   Stand to Sit 2 Maximal assistance   Additional items Assist x 2; Increased time required;Verbal cues   Cognition   Overall Cognitive Status Impaired   Arousal/Participation Lethargic;Cooperative   Attention Difficulty attending to directions   Orientation Level Oriented to person;Oriented to place; Disoriented to time;Disoriented to situation   Memory Decreased recall of recent events;Decreased recall of precautions   Following Commands Follows one step commands with increased time or repetition   Comments delayed processing  cues for safety and sequencing   Activity Tolerance   Activity Tolerance Patient limited by fatigue   Medical Staff Made Aware yes   Assessment   Assessment Arrived to patient supine in bed with PCA and RN present  Patient agreeable to therapy session  Vitals taken during session as patient reports dizziness w/ positional changes  BP supine 134/68, seated /64, s/p 10 second stand 147/68  Patient showing improvements from last OT session, requiring max A x1 for supine to sit with cues for log roll technique and increased time as patient presents with delayed processing  Max A required for LB dressing to don socks  Completing grooming seated EOB for 5 mins with min A and increased time, cues for safety and sequencing  Patient then completed sit to stand x1 trial with max A x2 with RW  Cues for hand placement and pushing hips forward to stand, heavily relying on bed (pushing legs against bed)  Patient appearing anxious in standing requesting to sit and feeling dizzy  Patient required max A x2 for sit to supine bed mobility  From OT standpoint, recommendation at time of d/c would be short term rehab  Pt to benefit from continued OT while in the hospital to address deficits as defined above and maximize level of functional independence with occupational performance of ADLs and functional mobility  At end of session, patient remains in room with all needs within reach     Plan   Goal Expiration Date 11/14/19 OT Treatment Day 5   OT Frequency 3-5x/wk   Recommendation   OT Discharge Recommendation Short Term Rehab         Stoney Sullivan MS, OTR/L

## 2019-11-11 NOTE — ASSESSMENT & PLAN NOTE
Malnutrition Findings:   Malnutrition type: Chronic illness(Related to medical condition as evidenced by 10% weight loss over the past month and <75% energy intake needs met >1 month treated with ensure compact supplements)  Degree of Malnutrition: Other severe protein calorie malnutrition    BMI Findings: Body mass index is 23 67 kg/m²  Reported weight has fluctuated significantly throughout hospitalization, will work on obtaining more accurate weights  Nutrition consulted and recommends EN  Daughter wanted to proceed with keofed placement  Started Jevity 1 2 kcal @ 60ml/hr x16 hours per day  Tube feeding is going via NG tube in place  She is improving daily  GI consult placed for evaluation and placement of PEG tube  Nutrition consulted for tube feed recommendations

## 2019-11-11 NOTE — ASSESSMENT & PLAN NOTE
· Since September she has had a progressive decline - she has had no appetite, not really eating/drinking with weakness  She was previously reportedly very independent prior to her admission at [facility] in September with UTI  · Geriatrics following, recommending outpatient follow up  · Speech following, status post VBS on 10/30, discussed with Swallow therapy, okay for pureed diet with nectar thick liquids  · Continue nystatin for thrush  · Barium swallow negative for esophageal obstruction  · Palliative Care has evaluated the patient and had a family discussion on 10/31/19  At that time, goals of care were still treatment oriented so palliative care has signed off  At this time, patient still has not progressed  We will continue EN for a few more days  If we see no improvement, I would recommend having another family meeting to discuss goals of care again  · Nutrition has made the recommendation to initiate tube feeding given patient's persistently poor PO intake  Keofed placed 11/6  Patient removed and it was reinserted  EN initiated with Jevity 1 2 kcal @ 60 ml/hr x16 hours/day   Improving

## 2019-11-11 NOTE — CONSULTS
Consultation - 126 [facility]  Alfred Schlatter [de-identified] y o  female MRN: [id number]  Unit/Bed#: -01 Encounter: [id number]              Inpatient consult to gastroenterology     Performed by  Vicente Banks MD     Authorized by Shawn Raines MD              Reason for Consult / Principal Problem:       Malnutrition/failure to thrive      ASSESSMENT AND PLAN:      44-year-old with history of CVA, Afib on Eliquis, splenic infarct was been admitted for the last 28 days with fever of unknown origin, encephalopathy, both of which are improving or resolved, failure to thrive, malnutrition  GI consulted for evaluation of percutaneous feeding tube placement  1  Malnutrition   Likely due to underlying illness  Patient's encephalopathy and illness have improved  She does not want feeding tube  She says she has good appetite and would like to eat  Last speech eval was done when she had encephalopathy and was more ill  Recommend re-evaluation with speech, discontinuation of tube feeds and calorie count for 3 days  If patient unable to improve intake of calories then we can revisit discussion about placement of feeding tube with patient and family  2  Atrial fibrillation  Patient on Eliquis  Patient seen and staffed with attending, Dr Darío Hernadez MD  Gastroenterology Fellow  [address]  Date: November 11, 2019        ______________________________________________________________________    HPI:  44-year-old with history of CVA, Afib on Eliquis, splenic infarct was been admitted for the last 28 days with fever of unknown origin, encephalopathy, both of which are improving or resolved, failure to thrive, malnutrition  GI consulted for evaluation of percutaneous feeding tube placement  Patient's fever of unknown origin and encephalopathy have been worked up extensively  Rheumatology and infectious disease have seen the patient    There is suspicion for autoimmune disease  Patient has been started on steroid  He initially received IV Solu-Medrol but now switched to p o  Prednisone  Patient was put on pureed with nectar thick consistency diet after speech eval 11 days ago  There was no evidence of esophageal obstruction obstruction on imaging in the past  Darvin del rosario 11/6  Patient currently on tube feeds  Patient was awake and alert and able to answer all questions appropriately  She says that there is no nausea, vomiting, abdominal pain  She does not want feeding tube placed in her belly  She feels that her nutrition has improved and that she can improve her meal intake further  She also says that she has never been someone who takes a large volume of meal each time  REVIEW OF SYSTEMS:    CONSTITUTIONAL: Denies any fever, chills, rigors, and weight loss  HEENT: No earache or tinnitus  Denies hearing loss or visual disturbances  CARDIOVASCULAR: No chest pain or palpitations  RESPIRATORY: Denies any cough, hemoptysis, shortness of breath or dyspnea on exertion  GASTROINTESTINAL: As noted in the History of Present Illness  GENITOURINARY: No problems with urination  Denies any hematuria or dysuria  NEUROLOGIC: No dizziness or vertigo, denies headaches  MUSCULOSKELETAL: Denies any muscle or joint pain  SKIN: Denies skin rashes or itching  ENDOCRINE: Denies excessive thirst  Denies intolerance to heat or cold  PSYCHOSOCIAL: Denies depression or anxiety  Denies any recent memory loss         Historical Information   Past Medical History:   Diagnosis Date    [facility] hemorrhage ([facility] )     Cardiac disease     Carotid stenosis     Diabetes mellitus ([facility] )     H/O adrenal insufficiency     Hyperlipidemia     Hypertension     Hyponatremia     Urinary retention 09/2019    Rpap    UTI (urinary tract infection) 09/2019     Past Surgical History:   Procedure Laterality Date    CORONARY ANGIOPLASTY WITH STENT PLACEMENT Social History   Social History     Substance and Sexual Activity   Alcohol Use No     Social History     Substance and Sexual Activity   Drug Use No     Social History     Tobacco Use   Smoking Status Current Every Day Smoker    Packs/day: 0 20     No family history on file      Meds/Allergies     Medications Prior to Admission   Medication    amLODIPine (NORVASC) 10 mg tablet    atenolol (TENORMIN) 50 mg tablet    enalapril (VASOTEC) 20 mg tablet    glipiZIDE (GLUCOTROL) 10 mg tablet    metFORMIN (GLUCOPHAGE) 1000 MG tablet    omeprazole (PriLOSEC) 20 mg delayed release capsule    saxagliptin (ONGLYZA) 5 MG tablet    simvastatin (ZOCOR) 20 mg tablet    sodium chloride 1 g tablet     Current Facility-Administered Medications   Medication Dose Route Frequency    acetaminophen (TYLENOL) tablet 650 mg  650 mg Oral Q6H PRN    aluminum-magnesium hydroxide-simethicone (MYLANTA) 200-200-20 mg/5 mL oral suspension 30 mL  30 mL Oral Q6H PRN    apixaban (ELIQUIS) tablet 2 5 mg  2 5 mg Oral BID    atorvastatin (LIPITOR) tablet 40 mg  40 mg Oral Daily With Dinner    bisacodyl (DULCOLAX) rectal suppository 10 mg  10 mg Rectal Daily PRN    docusate sodium (COLACE) capsule 100 mg  100 mg Oral BID PRN    ferrous sulfate tablet 325 mg  325 mg Oral Daily With Breakfast    fluticasone (FLONASE) 50 mcg/act nasal spray 1 spray  1 spray Each Nare BID    insulin lispro (HumaLOG) 100 units/mL subcutaneous injection 1-5 Units  1-5 Units Subcutaneous HS    insulin lispro (HumaLOG) 100 units/mL subcutaneous injection 1-6 Units  1-6 Units Subcutaneous TID With Meals    insulin lispro (HumaLOG) 100 units/mL subcutaneous injection 6 Units  6 Units Subcutaneous TID With Meals    [START ON 11/12/2019] insulin NPH (HumuLIN N,NovoLIN N) 100 Units/mL subcutaneous injection 10 Units  10 Units Subcutaneous Once    insulin NPH (HumuLIN N,NovoLIN N) 100 Units/mL subcutaneous injection 20 Units  20 Units Subcutaneous Once    lidocaine (PF) (XYLOCAINE-MPF) 2 % injection 2 mL  2 mL Infiltration Once    methylPREDNISolone sodium succinate (Solu-MEDROL) injection 20 mg  20 mg Intravenous Q12H [address]    metoprolol (LOPRESSOR) injection 2 5 mg  2 5 mg Intravenous Q6H PRN    metoprolol tartrate (LOPRESSOR) tablet 50 mg  50 mg Oral Q12H [address]    mirtazapine (REMERON) tablet 15 mg  15 mg Oral HS    nystatin (MYCOSTATIN) oral suspension 500,000 Units  500,000 Units Swish & Swallow 4x Daily    ondansetron (ZOFRAN) injection 4 mg  4 mg Intravenous Q6H PRN    pantoprazole (PROTONIX) EC tablet 40 mg  40 mg Oral Early Morning       No Known Allergies        Objective     Blood pressure 148/68, pulse 84, temperature 98 3 °F (36 8 °C), resp  rate 16, height 5' 2" (1 575 m), weight 58 7 kg (129 lb 6 6 oz), SpO2 96 %, not currently breastfeeding  Body mass index is 23 67 kg/m²  Intake/Output Summary (Last 24 hours) at 11/11/2019 1631  Last data filed at 11/11/2019 0640  Gross per 24 hour   Intake 320 ml   Output 375 ml   Net -55 ml         PHYSICAL EXAM:      General Appearance:   Alert, cooperative, no distress   HEENT:   Normocephalic, atraumatic, anicteric      Neck:  Supple, symmetrical, trachea midline   Lungs:   Clear to auscultation bilaterally; no rales, rhonchi or wheezing; respirations unlabored    Heart[de-identified]   Regular rate and rhythm; no murmur, rub, or gallop  Abdomen:   Soft, non-tender, non-distended; normal bowel sounds; no masses, no organomegaly    Genitalia:   Deferred    Rectal:   Deferred    Extremities:  No cyanosis, clubbing or edema    Pulses:  2+ and symmetric all extremities    Skin:  No jaundice, rashes, or lesions    Lymph nodes:  No palpable cervical lymphadenopathy        Lab Results:   No results displayed because visit has over 200 results  Imaging Studies: I have personally reviewed pertinent imaging studies

## 2019-11-11 NOTE — ASSESSMENT & PLAN NOTE
· POA  Etiology still unclear  Likely in setting of hyponatremia with component of delirium contributing given recurrent hospitalizations/underlying cognitive impairment  Patient's baseline as of September was driving, independent cooking/cleaningn/ambulating  · CNS imaging does not reveal obvious etiology  · MRI brain: Single focus of acute/subacute infarct in the left precentral gyrus with no hemorrhagic transformation  Mild chronic white matter microangiopathy  Bilateral mastoid effusions  MRA head is negative  Neurology has been consulted and evaluated the patient  Findings on MRI does not support level of encephalopathy  · No infectious source identified  · UA: Negative  · CXR: No evidence of pneumonia  · No wounds or cellulitis  · Ehrlichia/Babesia testing: negative  · Respiratory panel: Negative  · Flu/RSV: Negative  · Consider Vasculitis vs autoimmune encephalitis  Less likely CNS vasculitis per neurology although encephalitis remains in differential   Per family, patient noted to improve with steroids improved mental status  · Autoimmune work up in progress:  · CRP elevated: >90 0  · Anti-nuclear antibodies: Positive  · JAMILAH titer: 1:1280, homogenous pattern  · Quantitative RF elevated: 640  · ANCA: Negative  · S/p LP:  · Lyme: pending  · VDRL: Non-reactive  · Meningitis/Encephalitis Panel: Negative  · Protein: 37  · Culture/gram stain: No growth  · She has hx of suspected adrenal insufficiency, previously on steroids and then tapered off  She follows with Endocrinology as outpatient  · Cosyntropin stim test with adequate response in cortisol, hydrocortisone discontinued, she does not have adrenal insufficiency   · Vitamin B12 and folate normal   · Lyme titer negative  · RPR negative  · HIV negative  · Rheumatology consult:   R/O vasculitis as etiology, considering lupus of elderly, ruling out other autoimmune diseases including phospholipid antibody syndrome  Labs still pending    Marcos Franco wants to start prednisone taper and keep her on 10 mg daily dose standing  She needs to follow with Rheumatology outpatient, please see his note from 11/11/2019  Further workup pending, will start Solu-Medrol 20 mg b i d  Was given

## 2019-11-11 NOTE — ASSESSMENT & PLAN NOTE
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

## 2019-11-11 NOTE — PROGRESS NOTES
Progress Note - Pam Oliveira [de-identified] y o  female MRN: [id number]    Unit/Bed#: -01 Encounter: [id number]      CC: diabetes f/u    Subjective:   [place] is a [de-identified]y o  year old female with type 2 diabetes  No hypoglycemia  Objective:     Vitals: Blood pressure 147/68, pulse 87, temperature (!) 96 8 °F (36 °C), resp  rate 18, height 5' 2" (1 575 m), weight 58 7 kg (129 lb 6 6 oz), SpO2 97 %, not currently breastfeeding  ,Body mass index is 23 67 kg/m²  Intake/Output Summary (Last 24 hours) at 11/11/2019 1156  Last data filed at 11/11/2019 0640  Gross per 24 hour   Intake 320 ml   Output 925 ml   Net -605 ml       Physical Exam:  General Appearance: awake, appears stated age and cooperative  Head: Normocephalic, without obvious abnormality, atraumatic  Extremities: moves all extremities  Skin: Skin color and temperature normal    Pulm: no labored breathing    Lab, Imaging and other studies: I have personally reviewed pertinent reports  POC Glucose (mg/dl)   Date Value   11/11/2019 339 (H)   11/11/2019 366 (H)   11/10/2019 359 (H)   11/10/2019 337 (H)   11/10/2019 310 (H)   11/10/2019 334 (H)   11/09/2019 277 (H)   11/09/2019 261 (H)   11/09/2019 285 (H)   11/09/2019 126       Assessment:  diabetes with hyperglycemia    Plan:    Diabetes mellitus with steroid induced hyperglycemia - Patient was started on methylprednisone 20 mg twice a day  Patient is currently on tube feeds Jevity 1 2, 60 cc/hour for 16 hours + pureed nectar thick diabetic dysphagia diet  Patient's blood sugars are running in 300s  Tube feeds will be stopped now and restarted at midnight  I will start 20 units of NPH at 11 30pm and 10 units of NPH at 11am  I will add humalog 6 units TID with meals  I will continue to monitor patient's blood sugars and make adjustments as needed  FUO - workup per ID and rheumatology  Patient started on systemic steroids  She is currently afebrile        Portions of the record may have been created with voice recognition software

## 2019-11-11 NOTE — PROGRESS NOTES
Progress Note - Infectious Disease   [place] Tee [de-identified] y o  female MRN: [id number]  Unit/Bed#: -01 Encounter: [id number]      Impression/Recommendations:  1  FUO, with elevated ESR and JAMILAH   Patient had extensive infectious workup with negative findings and cultures   I suspect she has underlying vasculitis   Consider temporal arteritis   Rheumatology evaluation noted   Workup is in progress  Temperature is down on systemic corticosteroid  Continue to observe off antibiotic  Monitor temperature  Follow-up on rheumatology workup  Continue systemic corticosteroid per Rheumatology      2  Splenic infarct, 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   Clinically, this is not appear to be splenic abscess   Abdominal exam is benign  Monitor      3  Encephalopathy, most likely secondary to vasculitis, with hyponatremia contributing   Mental status is slowly improving  Monitor mental status      4  Hyponatremia   This has resolved      5  Poorly controlled DM, with elevated hemoglobin A1c      Discussed with patient      Antibiotics:  None     Subjective:  Patient is comfortable  She is more awake and alert  Temperature stays down  No chills  No diarrhea  Objective:  Vitals:  Temp:  [96 8 °F (36 °C)] 96 8 °F (36 °C)  HR:  [79-87] 87  Resp:  [16-20] 18  BP: (123-147)/(57-68) 147/68  SpO2:  [95 %-97 %] 97 %  Temp (24hrs), Av 8 °F (36 °C), Min:96 8 °F (36 °C), Max:96 8 °F (36 °C)  Current: Temperature: (!) 96 8 °F (36 °C)    Physical Exam:     General: Awake, alert, cooperative, no distress  Mild confusion   Neck:  Supple  No mass  No lymphadenopathy  Lungs: Decreased breath sounds, no rales, no wheezing, respirations unlabored  Heart:  Regular rate and rhythm, S1 and S2 normal, no murmur  Abdomen: Soft, nondistended, non-tender, bowel sounds active all four quadrants,        no masses, no organomegaly  Extremities: No edema  No erythema/warmth  No ulcer  Nontender to palpation     Skin:  No rash    Neuro: Moves all extremities  Invasive Devices     Peripheral Intravenous Line            Peripheral IV 11/07/19 Distal;Dorsal (posterior); Right Forearm 3 days          Drain            Urethral Catheter Straight-tip 16 Fr  27 days    NG/OG/Enteral Tube Nasogastric 8 Fr Right nares 5 days                Labs studies:   I have personally reviewed pertinent labs  Results from last 7 days   Lab Units 11/11/19  0450 11/10/19  0953 11/09/19  0632   POTASSIUM mmol/L 4 8 4 3 4 5   CHLORIDE mmol/L 107 106 109*   CO2 mmol/L 28 28 25   BUN mg/dL 23 20 17   CREATININE mg/dL 0 53* 0 52* 0 49*   EGFR ml/min/1 73sq m 90 91 92   CALCIUM mg/dL 8 6 8 6 8 0*     Results from last 7 days   Lab Units 11/11/19  0450 11/10/19  0953 11/09/19  1007 11/09/19  0649   WBC Thousand/uL 10 82* 7 97  --  8 44   HEMOGLOBIN g/dL 7 6* 7 4* 7 2* 7 0*   PLATELETS Thousands/uL 490* 418*  --  381           Imaging Studies:   I have personally reviewed pertinent imaging study reports and images in PACS  EKG, Pathology, and Other Studies:   I have personally reviewed pertinent reports

## 2019-11-11 NOTE — ASSESSMENT & PLAN NOTE
Lab Results   Component Value Date    HGBA1C 6 4 (H) 10/15/2019     Recent Labs     11/10/19  1614 11/10/19  2045 11/11/19  0630 11/11/19  1050   POCGLU 337* 359* 366* 339*     · Takes metformin and glipizide at baseline, will plan to resume metformin at discharge and hold glipizide    · Continue with SSI  · Diabetic diet, tube feeds   · Monitor accuchecks, avoid hypoglycemia

## 2019-11-11 NOTE — PLAN OF CARE
Problem: Prexisting or High Potential for Compromised Skin Integrity  Goal: Skin integrity is maintained or improved  Description  INTERVENTIONS:  - Identify patients at risk for skin breakdown  - Assess and monitor skin integrity  - Assess and monitor nutrition and hydration status  - Monitor labs   - Assess for incontinence   - Turn and reposition patient  - Assist with mobility/ambulation  - Relieve pressure over bony prominences  - Avoid friction and shearing  - Provide appropriate hygiene as needed including keeping skin clean and dry  - Evaluate need for skin moisturizer/barrier cream  - Collaborate with interdisciplinary team   - Patient/family teaching  - Consider wound care consult   Outcome: Progressing     Problem: Nutrition/Hydration-ADULT  Goal: Nutrient/Hydration intake appropriate for improving, restoring or maintaining nutritional needs  Description  Monitor and assess patient's nutrition/hydration status for malnutrition  Collaborate with interdisciplinary team and initiate plan and interventions as ordered  Monitor patient's weight and dietary intake as ordered or per policy  Utilize nutrition screening tool and intervene as necessary  Determine patient's food preferences and provide high-protein, high-caloric foods as appropriate       INTERVENTIONS:  - Monitor oral intake, urinary output, labs, and treatment plans  - Assess nutrition and hydration status and recommend course of action  - Evaluate amount of meals eaten  - Assist patient with eating if necessary   - Allow adequate time for meals  - Recommend/ encourage appropriate diets, oral nutritional supplements, and vitamin/mineral supplements  - Order, calculate, and assess calorie counts as needed  - Recommend, monitor, and adjust tube feedings and TPN/PPN based on assessed needs  - Assess need for intravenous fluids  - Provide specific nutrition/hydration education as appropriate  - Include patient/family/caregiver in decisions related to nutrition  Outcome: Progressing     Problem: Potential for Falls  Goal: Patient will remain free of falls  Description  INTERVENTIONS:  - Assess patient frequently for physical needs  -  Identify cognitive and physical deficits and behaviors that affect risk of falls    -  [place] fall precautions as indicated by assessment   - Educate patient/family on patient safety including physical limitations  - Instruct patient to call for assistance with activity based on assessment  - Modify environment to reduce risk of injury  - Consider OT/PT consult to assist with strengthening/mobility  Outcome: Progressing     Problem: NEUROSENSORY - ADULT  Goal: Achieves stable or improved neurological status  Description  INTERVENTIONS  - Monitor and report changes in neurological status  - Monitor vital signs such as temperature, blood pressure, glucose, and any other labs ordered   - Initiate measures to prevent increased intracranial pressure  - Monitor for seizure activity and implement precautions if appropriate      Outcome: Progressing     Problem: RESPIRATORY - ADULT  Goal: Achieves optimal ventilation and oxygenation  Description  INTERVENTIONS:  - Assess for changes in respiratory status  - Assess for changes in mentation and behavior  - Position to facilitate oxygenation and minimize respiratory effort  - Encourage broncho-pulmonary hygiene including cough, deep breathe, Incentive Spirometry  - Assess and instruct to report SOB or any respiratory difficulty  - Respiratory Therapy support as indicated   Outcome: Progressing     Problem: GASTROINTESTINAL - ADULT  Goal: Maintains adequate nutritional intake  Description  INTERVENTIONS:  - Monitor percentage of each meal consumed  - Identify factors contributing to decreased intake, treat as appropriate  - Assist with meals as needed  - Monitor I&O, weight, and lab values if indicated  - Obtain nutrition services referral as needed  Outcome: Progressing     Problem: GENITOURINARY - ADULT  Goal: Urinary catheter remains patent  Description  INTERVENTIONS:  - Assess patency of urinary catheter  - If patient has a chronic herrera, consider changing catheter if non-functioning  - Follow guidelines for intermittent irrigation of non-functioning urinary catheter  Outcome: Progressing     Problem: METABOLIC, FLUID AND ELECTROLYTES - ADULT  Goal: Electrolytes maintained within normal limits  Description  INTERVENTIONS:  - Monitor labs and assess patient for signs and symptoms of electrolyte imbalances  - Administer electrolyte replacement as ordered  - Monitor response to electrolyte replacements, including repeat lab results as appropriate  - Instruct patient on fluid and nutrition as appropriate  Outcome: Progressing  Goal: Fluid balance maintained  Description  INTERVENTIONS:  - Monitor labs   - Monitor I/O and WT  - Instruct patient on fluid and nutrition as appropriate  - Assess for signs & symptoms of volume excess or deficit  Outcome: Progressing  Goal: Glucose maintained within target range  Description  INTERVENTIONS:  - Monitor Blood Glucose as ordered  - Assess for signs and symptoms of hyperglycemia and hypoglycemia  - Administer ordered medications to maintain glucose within target range  - Assess nutritional intake and initiate nutrition service referral as needed  Outcome: Progressing     Problem: SKIN/TISSUE INTEGRITY - ADULT  Goal: Skin integrity remains intact  Description  INTERVENTIONS  - Identify patients at risk for skin breakdown  - Assess and monitor skin integrity  - Assess and monitor nutrition and hydration status  - Monitor labs (i e  albumin)  - Assess for incontinence   - Turn and reposition patient  - Assist with mobility/ambulation  - Relieve pressure over bony prominences  - Avoid friction and shearing  - Provide appropriate hygiene as needed including keeping skin clean and dry  - Evaluate need for skin moisturizer/barrier cream  - Collaborate with interdisciplinary team (i e  Nutrition, Rehabilitation, etc )   - Patient/family teaching  Outcome: Progressing  Goal: Oral mucous membranes remain intact  Description  INTERVENTIONS  - Assess oral mucosa and hygiene practices  - Implement preventative oral hygiene regimen  - Implement oral medicated treatments as ordered  - Initiate Nutrition services referral as needed  Outcome: Progressing     Problem: HEMATOLOGIC - ADULT  Goal: Maintains hematologic stability  Description  INTERVENTIONS  - Monitor labs      Outcome: Progressing     Problem: SAFETY,RESTRAINT: NV/NON-SELF DESTRUCTIVE BEHAVIOR  Goal: Remains free of harm/injury (restraint for non violent/non self-detsructive behavior)  Description  INTERVENTIONS:  - Instruct patient/family regarding restraint use   - Assess and monitor physiologic and psychological status   - Provide interventions and comfort measures to meet assessed patient needs   - Identify and implement measures to help patient regain control  - Assess readiness for release of restraint   Outcome: Progressing  Goal: Returns to optimal restraint-free functioning  Description  INTERVENTIONS:  - Assess the patient's behavior and symptoms that indicate continued need for restraint  - Identify and implement measures to help patient regain control  - Assess readiness for release of restraint   Outcome: Progressing     Problem: COPING  Goal: Pt/Family able to verbalize concerns and demonstrate effective coping strategies  Description  INTERVENTIONS:  - Assist patient/family to identify coping skills, available support systems and cultural and spiritual values  - Provide emotional support, including active listening and acknowledgement of concerns of patient and caregivers  - Reduce environmental stimuli, as able  - Provide patient education  - Assess for spiritual pain/suffering and initiate spiritual care, including notification of Pastoral Care or [place] based community as needed  - Assess effectiveness of coping strategies  Outcome: Progressing  Goal: Will report anxiety at manageable levels  Description  INTERVENTIONS:  - Administer medication as ordered  - Teach and encourage coping skills  - Provide emotional support  - Assess patient/family for anxiety and ability to cope  Outcome: Progressing     Problem: CONFUSION/THOUGHT DISTURBANCE  Goal: Thought disturbances (confusion, delirium, depression, dementia or psychosis) are managed to maintain or return to baseline mental status and functional level  Description  INTERVENTIONS:  - Assess for possible contributors to  thought disturbance, including but not limited to medications, infection, impaired vision or hearing, underlying metabolic abnormalities, dehydration, respiratory compromise,  psychiatric diagnoses and notify attending PHYSICAN/AP  - Monitor and intervene to maintain adequate nutrition, hydration, elimination, sleep and activity  - Decrease environmental stimuli, including noise as appropriate  - Provide frequent contacts to provide refocusing, direction and reassurance as needed  Approach patient calmly with eye contact and at their level    - [place] high risk fall precautions, aspiration precautions and other safety measures, as indicated  - If delirium suspected, notify physician/AP of change in condition and request immediate in-person evaluation  - Pursue consults as appropriate including Geriatric (campus dependent), OT for cognitive evaluation/activity planning, psychiatric, pastoral care, etc   Outcome: Progressing     Problem: PAIN - ADULT  Goal: Verbalizes/displays adequate comfort level or baseline comfort level  Description  Interventions:  - Encourage patient to monitor pain and request assistance  - Assess pain using appropriate pain scale  - Administer analgesics based on type and severity of pain and evaluate response  - Implement non-pharmacological measures as appropriate and evaluate response  - Consider cultural and social influences on pain and pain management  - Notify physician/advanced practitioner if interventions unsuccessful or patient reports new pain  Outcome: Progressing     Problem: INFECTION - ADULT  Goal: Absence or prevention of progression during hospitalization  Description  INTERVENTIONS:  - Assess and monitor for signs and symptoms of infection  - Monitor lab/diagnostic results  - Monitor all insertion sites, i e  indwelling lines, tubes, and drains  - Monitor endotracheal if appropriate and nasal secretions for changes in amount and color  - [place] appropriate cooling/warming therapies per order  - Administer medications as ordered  - Instruct and encourage patient and family to use good hand hygiene technique  - Identify and instruct in appropriate isolation precautions for identified infection/condition  Outcome: Progressing     Problem: SAFETY ADULT  Goal: Patient will remain free of falls  Description  INTERVENTIONS:  - Assess patient frequently for physical needs  -  Identify cognitive and physical deficits and behaviors that affect risk of falls    -  [place] fall precautions as indicated by assessment   - Educate patient/family on patient safety including physical limitations  - Instruct patient to call for assistance with activity based on assessment  - Modify environment to reduce risk of injury  - Consider OT/PT consult to assist with strengthening/mobility  Outcome: Progressing  Goal: Maintain or return to baseline ADL function  Description  INTERVENTIONS:  -  Assess patient's ability to carry out ADLs; assess patient's baseline for ADL function and identify physical deficits which impact ability to perform ADLs (bathing, care of mouth/teeth, toileting, grooming, dressing, etc )  - Assess/evaluate cause of self-care deficits   - Assess range of motion  - Assess patient's mobility; develop plan if impaired  - Assess patient's need for assistive devices and provide as appropriate  - Encourage maximum independence but intervene and supervise when necessary  - Involve family in performance of ADLs  - Assess for home care needs following discharge   - Consider OT consult to assist with ADL evaluation and planning for discharge  - Provide patient education as appropriate  Outcome: Progressing  Goal: Maintain or return mobility status to optimal level  Description  INTERVENTIONS:  - Assess patient's baseline mobility status (ambulation, transfers, stairs, etc )    - Identify cognitive and physical deficits and behaviors that affect mobility  - Identify mobility aids required to assist with transfers and/or ambulation (gait belt, sit-to-stand, lift, walker, cane, etc )  - [place] fall precautions as indicated by assessment  - Record patient progress and toleration of activity level on Mobility SBAR; progress patient to next Phase/Stage  - Instruct patient to call for assistance with activity based on assessment  - Consider rehabilitation consult to assist with strengthening/weightbearing, etc   Outcome: Progressing     Problem: DISCHARGE PLANNING  Goal: Discharge to home or other facility with appropriate resources  Description  INTERVENTIONS:  - Identify barriers to discharge w/patient and caregiver  - Arrange for needed discharge resources and transportation as appropriate  - Identify discharge learning needs (meds, wound care, etc )  - Arrange for interpretive services to assist at discharge as needed  - Refer to Case Management Department for coordinating discharge planning if the patient needs post-hospital services based on physician/advanced practitioner order or complex needs related to functional status, cognitive ability, or social support system  Outcome: Progressing     Problem: Knowledge Deficit  Goal: Patient/family/caregiver demonstrates understanding of disease process, treatment plan, medications, and discharge instructions  Description  Complete learning assessment and assess knowledge base   Interventions:  - Provide teaching at level of understanding  - Provide teaching via preferred learning methods  Outcome: Progressing

## 2019-11-11 NOTE — PLAN OF CARE
Problem: Nutrition/Hydration-ADULT  Goal: Nutrient/Hydration intake appropriate for improving, restoring or maintaining nutritional needs  Description  Monitor and assess patient's nutrition/hydration status for malnutrition  Collaborate with interdisciplinary team and initiate plan and interventions as ordered  Monitor patient's weight and dietary intake as ordered or per policy  Utilize nutrition screening tool and intervene as necessary  Determine patient's food preferences and provide high-protein, high-caloric foods as appropriate       INTERVENTIONS:  - Monitor oral intake, urinary output, labs, and treatment plans  - Assess nutrition and hydration status and recommend course of action  - Evaluate amount of meals eaten  - Assist patient with eating if necessary   - Allow adequate time for meals  - Recommend/ encourage appropriate diets, oral nutritional supplements, and vitamin/mineral supplements  - Order, calculate, and assess calorie counts as needed  - Recommend, monitor, and adjust tube feedings and TPN/PPN based on assessed needs  - Assess need for intravenous fluids  - Provide specific nutrition/hydration education as appropriate  - Include patient/family/caregiver in decisions related to nutrition  Outcome: Progressing  Note:   Tube feeding continues to meet 75% estimated needs

## 2019-11-11 NOTE — PROGRESS NOTES
Rheumatologyn followup note:    Reviewed clinical course over the weekend post IV solumedrol 20 mg q 12 hours x 3 days, now more alert, able to eat, sit out of bed but not strong enough to stand OOB  Did ask to watch football games yesterday  She says she has been ill for a year, but denied arthralgias, rashes ? ??  Could not give any hx of autoimmune diseases in the family  PE: awake and alert with good eye contact  Skin dry  Fingernails dirty but could not see any splinters  No peripheral vasculitis changes  HEENT: mouth dry, NG tube in place  No swollen joints noted  Cor regular without rub  Weak UE and LE  Labs did show C4 low at 7, C3 normal   SSA antibody >8 0, SSB neg  IgG level hi at 1660  Older labs were JAMILAH 1:1280 (sp), high titer RF  Pending dsDNA, SARAH, cardiolipin, B2GP and lupus anticoag test     Diagnosis fits with SLE/or overlap connective tissue disease disorder/ Sjogrens with immune complex activation, low C4 with severe systemic sx, encephalopathy?, recent CVA and splenic infarct - might suggest anti-phospholipid syndrome  Some old hx of adrenal insufficiency, prior steroid rx as well  Suggest continue steroids, could switch her to po prednisone 40 mg daily for a week, then 30 mg daily week 2, then 20 mg daily for week 3, then baseline pred 10 mg daily  Needs PPI prophylaxis, followup with Rheumatology as OP ([phone] to schedule  Consider pt a candidate for DMARD therapy like hydroxychloroquine later  Prob needs rehab therapy  Will try to reach daughter to discuss case

## 2019-11-11 NOTE — ASSESSMENT & PLAN NOTE
· Patient has chronic Herrera present on admission- herrera was replaced in ED 10/14   · Placed on recent admission (10/3/19) at [facility] where she developed urinary retention  · Patient will follow up outpatient with urology for voiding trial when patient's physical activity levels improve

## 2019-11-12 PROBLEM — D72.829 LEUKOCYTOSIS: Status: ACTIVE | Noted: 2019-11-12

## 2019-11-12 PROBLEM — I25.10 CAD (CORONARY ARTERY DISEASE): Status: ACTIVE | Noted: 2019-11-12

## 2019-11-12 PROBLEM — I77.6 VASCULITIS (HCC): Status: ACTIVE | Noted: 2019-11-12

## 2019-11-12 PROBLEM — D63.8 ANEMIA OF CHRONIC DISEASE: Status: ACTIVE | Noted: 2019-11-12

## 2019-11-12 PROBLEM — D75.839 THROMBOCYTOSIS: Status: ACTIVE | Noted: 2019-11-12

## 2019-11-12 PROBLEM — I65.23 BILATERAL CAROTID ARTERY STENOSIS: Status: ACTIVE | Noted: 2019-11-12

## 2019-11-12 LAB
ALBUMIN SERPL ELPH-MCNC: 1.87 G/DL (ref 3.5–5)
ALBUMIN SERPL ELPH-MCNC: 31.7 % (ref 52–65)
ALPHA1 GLOB SERPL ELPH-MCNC: 0.49 G/DL (ref 0.1–0.4)
ALPHA1 GLOB SERPL ELPH-MCNC: 8.3 % (ref 2.5–5)
ALPHA2 GLOB SERPL ELPH-MCNC: 1.04 G/DL (ref 0.4–1.2)
ALPHA2 GLOB SERPL ELPH-MCNC: 17.7 % (ref 7–13)
ANION GAP SERPL CALCULATED.3IONS-SCNC: 5 MMOL/L (ref 4–13)
APTT SCREEN TO CONFIRM RATIO: 1.06 RATIO (ref 0–1.4)
B2 GLYCOPROT1 IGA SER-ACNC: <9 GPI IGA UNITS (ref 0–25)
B2 GLYCOPROT1 IGG SER-ACNC: <9 GPI IGG UNITS (ref 0–20)
B2 GLYCOPROT1 IGM SER-ACNC: <9 GPI IGM UNITS (ref 0–32)
BASOPHILS # BLD AUTO: 0.01 THOUSANDS/ΜL (ref 0–0.1)
BASOPHILS NFR BLD AUTO: 0 % (ref 0–1)
BETA GLOB ABNORMAL SERPL ELPH-MCNC: 0.33 G/DL (ref 0.4–0.8)
BETA1 GLOB SERPL ELPH-MCNC: 5.6 % (ref 5–13)
BETA2 GLOB SERPL ELPH-MCNC: 6.4 % (ref 2–8)
BETA2+GAMMA GLOB SERPL ELPH-MCNC: 0.38 G/DL (ref 0.2–0.5)
BUN SERPL-MCNC: 26 MG/DL (ref 5–25)
C3 SERPL-MCNC: 98.9 MG/DL (ref 90–180)
C4 SERPL-MCNC: 8 MG/DL (ref 10–40)
CALCIUM SERPL-MCNC: 8.8 MG/DL (ref 8.3–10.1)
CCP IGA+IGG SERPL IA-ACNC: 10 UNITS (ref 0–19)
CH50 SERPL-ACNC: 28 U/ML (ref 42–999999)
CHLORIDE SERPL-SCNC: 104 MMOL/L (ref 100–108)
CO2 SERPL-SCNC: 28 MMOL/L (ref 21–32)
CONFIRM APTT/NORMAL: 58.6 SEC (ref 0–55)
CREAT SERPL-MCNC: 0.58 MG/DL (ref 0.6–1.3)
CRP SERPL QL: 10.1 MG/L
DRVVT IMM 1:2 NP PPP: 45.6 SEC (ref 0–47)
EOSINOPHIL # BLD AUTO: 0 THOUSAND/ΜL (ref 0–0.61)
EOSINOPHIL NFR BLD AUTO: 0 % (ref 0–6)
ERYTHROCYTE [DISTWIDTH] IN BLOOD BY AUTOMATED COUNT: 20.7 % (ref 11.6–15.1)
ERYTHROCYTE [SEDIMENTATION RATE] IN BLOOD: 88 MM/HOUR (ref 0–20)
GAMMA GLOB ABNORMAL SERPL ELPH-MCNC: 1.79 G/DL (ref 0.5–1.6)
GAMMA GLOB SERPL ELPH-MCNC: 30.3 % (ref 12–22)
GFR SERPL CREATININE-BSD FRML MDRD: 87 ML/MIN/1.73SQ M
GLUCOSE SERPL-MCNC: 218 MG/DL (ref 65–140)
GLUCOSE SERPL-MCNC: 225 MG/DL (ref 65–140)
GLUCOSE SERPL-MCNC: 270 MG/DL (ref 65–140)
GLUCOSE SERPL-MCNC: 283 MG/DL (ref 65–140)
GLUCOSE SERPL-MCNC: 304 MG/DL (ref 65–140)
HCT VFR BLD AUTO: 31.4 % (ref 34.8–46.1)
HGB BLD-MCNC: 9 G/DL (ref 11.5–15.4)
HISTONE IGG SER IA-ACNC: 2.1 UNITS (ref 0–0.9)
IGG/ALB SER: 0.46 {RATIO} (ref 1.1–1.8)
IMM GRANULOCYTES # BLD AUTO: 0.14 THOUSAND/UL (ref 0–0.2)
IMM GRANULOCYTES NFR BLD AUTO: 1 % (ref 0–2)
INTERPRETATION UR IFE-IMP: NORMAL
LA PPP-IMP: ABNORMAL
LYMPHOCYTES # BLD AUTO: 1.13 THOUSANDS/ΜL (ref 0.6–4.47)
LYMPHOCYTES NFR BLD AUTO: 9 % (ref 14–44)
MCH RBC QN AUTO: 22.7 PG (ref 26.8–34.3)
MCHC RBC AUTO-ENTMCNC: 28.7 G/DL (ref 31.4–37.4)
MCV RBC AUTO: 79 FL (ref 82–98)
MONOCYTES # BLD AUTO: 0.58 THOUSAND/ΜL (ref 0.17–1.22)
MONOCYTES NFR BLD AUTO: 5 % (ref 4–12)
NEUTROPHILS # BLD AUTO: 10.99 THOUSANDS/ΜL (ref 1.85–7.62)
NEUTS SEG NFR BLD AUTO: 85 % (ref 43–75)
NRBC BLD AUTO-RTO: 0 /100 WBCS
PLATELET # BLD AUTO: 505 THOUSANDS/UL (ref 149–390)
PMV BLD AUTO: 10.2 FL (ref 8.9–12.7)
POTASSIUM SERPL-SCNC: 5 MMOL/L (ref 3.5–5.3)
PROT PATTERN SERPL ELPH-IMP: ABNORMAL
PROT SERPL-MCNC: 5.9 G/DL (ref 6.4–8.2)
RBC # BLD AUTO: 3.96 MILLION/UL (ref 3.81–5.12)
SCREEN APTT: 39.8 SEC (ref 0–51.9)
SCREEN DRVVT: 66.3 SEC (ref 0–47)
SODIUM SERPL-SCNC: 137 MMOL/L (ref 136–145)
THROMBIN TIME: 16.5 SEC (ref 0–23)
WBC # BLD AUTO: 12.85 THOUSAND/UL (ref 4.31–10.16)

## 2019-11-12 PROCEDURE — 86140 C-REACTIVE PROTEIN: CPT | Performed by: INTERNAL MEDICINE

## 2019-11-12 PROCEDURE — 82948 REAGENT STRIP/BLOOD GLUCOSE: CPT

## 2019-11-12 PROCEDURE — 86160 COMPLEMENT ANTIGEN: CPT | Performed by: INTERNAL MEDICINE

## 2019-11-12 PROCEDURE — 99232 SBSQ HOSP IP/OBS MODERATE 35: CPT | Performed by: INTERNAL MEDICINE

## 2019-11-12 PROCEDURE — 85652 RBC SED RATE AUTOMATED: CPT | Performed by: INTERNAL MEDICINE

## 2019-11-12 PROCEDURE — 92526 ORAL FUNCTION THERAPY: CPT

## 2019-11-12 PROCEDURE — 85025 COMPLETE CBC W/AUTO DIFF WBC: CPT | Performed by: INTERNAL MEDICINE

## 2019-11-12 PROCEDURE — 80048 BASIC METABOLIC PNL TOTAL CA: CPT | Performed by: INTERNAL MEDICINE

## 2019-11-12 RX ADMIN — FERROUS SULFATE TAB 325 MG (65 MG ELEMENTAL FE) 325 MG: 325 (65 FE) TAB at 07:45

## 2019-11-12 RX ADMIN — INSULIN LISPRO 2 UNITS: 100 INJECTION, SOLUTION INTRAVENOUS; SUBCUTANEOUS at 21:16

## 2019-11-12 RX ADMIN — NYSTATIN 500000 UNITS: 500000 SUSPENSION ORAL at 21:17

## 2019-11-12 RX ADMIN — METHYLPREDNISOLONE SODIUM SUCCINATE 20 MG: 40 INJECTION, POWDER, FOR SOLUTION INTRAMUSCULAR; INTRAVENOUS at 08:42

## 2019-11-12 RX ADMIN — DOCUSATE SODIUM 100 MG: 100 CAPSULE, LIQUID FILLED ORAL at 13:18

## 2019-11-12 RX ADMIN — FLUTICASONE PROPIONATE 1 SPRAY: 50 SPRAY, METERED NASAL at 08:01

## 2019-11-12 RX ADMIN — APIXABAN 2.5 MG: 2.5 TABLET, FILM COATED ORAL at 08:43

## 2019-11-12 RX ADMIN — NYSTATIN 500000 UNITS: 500000 SUSPENSION ORAL at 07:45

## 2019-11-12 RX ADMIN — INSULIN LISPRO 2 UNITS: 100 INJECTION, SOLUTION INTRAVENOUS; SUBCUTANEOUS at 16:21

## 2019-11-12 RX ADMIN — INSULIN HUMAN 10 UNITS: 100 INJECTION, SUSPENSION SUBCUTANEOUS at 12:54

## 2019-11-12 RX ADMIN — PANTOPRAZOLE SODIUM 40 MG: 40 TABLET, DELAYED RELEASE ORAL at 07:45

## 2019-11-12 RX ADMIN — ATORVASTATIN CALCIUM 40 MG: 40 TABLET, FILM COATED ORAL at 16:21

## 2019-11-12 RX ADMIN — NYSTATIN 500000 UNITS: 500000 SUSPENSION ORAL at 12:56

## 2019-11-12 RX ADMIN — APIXABAN 2.5 MG: 2.5 TABLET, FILM COATED ORAL at 17:33

## 2019-11-12 RX ADMIN — NYSTATIN 500000 UNITS: 500000 SUSPENSION ORAL at 17:33

## 2019-11-12 RX ADMIN — METHYLPREDNISOLONE SODIUM SUCCINATE 20 MG: 40 INJECTION, POWDER, FOR SOLUTION INTRAMUSCULAR; INTRAVENOUS at 21:16

## 2019-11-12 RX ADMIN — METOPROLOL TARTRATE 50 MG: 50 TABLET, FILM COATED ORAL at 21:17

## 2019-11-12 RX ADMIN — FLUTICASONE PROPIONATE 1 SPRAY: 50 SPRAY, METERED NASAL at 17:33

## 2019-11-12 RX ADMIN — MIRTAZAPINE 15 MG: 15 TABLET, FILM COATED ORAL at 21:17

## 2019-11-12 RX ADMIN — METOPROLOL TARTRATE 50 MG: 50 TABLET, FILM COATED ORAL at 08:43

## 2019-11-12 NOTE — ASSESSMENT & PLAN NOTE
- 50-69% ICA stenosis bilaterally on carotid dopplers  - MRI imaging earlier hospital course revealed evidence of a left precentral gyrus acute/subacute CVA without hemorrhagic transformation  - continue statin therapy with Lipitor  - PT/OT as tolerated

## 2019-11-12 NOTE — ASSESSMENT & PLAN NOTE
- home Tenormin switched to Lopressor in the setting of atrial fibrillation with previous RVR  - holding Norvasc/Enalapril due to waxing/waning BP fluctuations

## 2019-11-12 NOTE — ASSESSMENT & PLAN NOTE
- remains afebrile - Infectious Disease following and considering vasculitic etiology as possible culprit (may also consider splenic infarct) as extensive infectious workup earlier hospital course unremarkable  - Influenza/RSV screen previously negative - blood parasite smear negative - anti-Babesiosis IgM normal  - see plan for suspected vasculitis below

## 2019-11-12 NOTE — ASSESSMENT & PLAN NOTE
- rate controlled on Lopressor  - continue Eliquis for anticoagulation  - TSH normal last month  - echocardiogram revealing EF of 55%, mild MR, moderate TR, trace AR, but no evidence of PFO or right atrial thrombus

## 2019-11-12 NOTE — PROGRESS NOTES
Progress Note - Pam Oliveira [de-identified] y o  female MRN: [id number]    Unit/Bed#: -01 Encounter: [id number]      CC: diabetes f/u    Subjective:   Allie Win is a [de-identified]y o  year old female with type 2 diabetes  No hypoglycemia  Objective:     Vitals: Blood pressure 145/69, pulse 70, temperature 97 5 °F (36 4 °C), resp  rate 18, height 5' 2" (1 575 m), weight 58 4 kg (128 lb 11 2 oz), SpO2 97 %, not currently breastfeeding  ,Body mass index is 23 54 kg/m²  Intake/Output Summary (Last 24 hours) at 11/12/2019 1210  Last data filed at 11/12/2019 0855  Gross per 24 hour   Intake 510 ml   Output 550 ml   Net -40 ml       Physical Exam:  General Appearance: awake, appears stated age and cooperative  Head: Normocephalic, without obvious abnormality, atraumatic  Extremities: moves all extremities  Skin: Skin color and temperature normal    Pulm: no labored breathing    Lab, Imaging and other studies: I have personally reviewed pertinent reports  POC Glucose (mg/dl)   Date Value   11/12/2019 283 (H)   11/12/2019 304 (H)   11/11/2019 230 (H)   11/11/2019 175 (H)   11/11/2019 339 (H)   11/11/2019 366 (H)   11/10/2019 359 (H)   11/10/2019 337 (H)   11/10/2019 310 (H)   11/10/2019 334 (H)       Assessment:  diabetes with hyperglycemia    Plan:    Diabetes mellitus with steroid induced hyperglycemia - Patient was started on methylprednisone 20 mg twice a day  Patient will be started on prednisone 40 mg daily from tomorrow  Nutrition adjusted tube feeds: current regimen 10 hours, from 8pm-6am  I will give 25 units of NPH insulin at 7pm and 10 units of NPH insulin at 7am with breakfast +6 units of humalog with meals + insulin sliding scale coverage  I will continue to monitor patient's blood sugars and make adjustments as needed  FUO - workup per ID and rheumatology  Patient started on systemic steroids  She is currently afebrile        Portions of the record may have been created with voice recognition software

## 2019-11-12 NOTE — NUTRITION
11/12/19 0943   Assessment   Timepoint Nutrition Review  (HCT referral, adjust EN, calorie count)   PES Statement   Problem Continue previous diagnosis   Patient Nutrition Goals   Goal Status met;extended   Timeframe to complete goal by next f/u   Recommendations/Interventions   Summary MD requesting to evaluate need for continued EN at this time  Calorie count posted for 11/12-11/13  EN to be decreased to provide 50% of nutritional needs  RN and PCA assisting with meals  Per chart review patient refusing PEG placement  Interventions Diet: continued as ordered; Supplement continue  (Calorie count posted 11/12-11/13)   Nutrition Recommendations Tube Feeding Recommendation provided;Swallow evaluation  (Suggest adjust EN to provide 50% nutritional needs  Recommend: Jevity 1 2 kcal @ 60 ml/hr x10 hours (8pm-6am)  Suggest re-consult speech for possible diet advancement   Will follow up with calorie count results and recommendations  )   Nutrition Complexity Risk   Nutrition complexity level High risk   Nutrition review: 11/13/19  (Day 1 calorie count)

## 2019-11-12 NOTE — ASSESSMENT & PLAN NOTE
- etiology fully unclear although after extensive workup during lengthy hospital course thus far, suspect vasculitic etiology (see below) coupled with now resolved hyponatremia and malnutrition/failure to thrive   - evidence of an acute/subacute left precentral gyrus CVA with bilateral IC stenosis also incidentally found (see below)  - slow progressive improvement noted with steroid regimen - continue clinical monitoring

## 2019-11-12 NOTE — ASSESSMENT & PLAN NOTE
- progressive decline in appetite/oral intake over the last few months coupled with weakness/fatigue  - discussed with swallow/speech therapy today - diet advanced to mechanical soft with thin liquids  - Keofed remains in place but off tube feeds currently - can remove with evidence of persistent oral intake  - if continues decline, may require another family meeting to rediscuss goals of long-term care

## 2019-11-12 NOTE — ASSESSMENT & PLAN NOTE
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

## 2019-11-12 NOTE — ASSESSMENT & PLAN NOTE
- HbA1c of 6 4 last  - hold oral hypoglycemics - continue mealtime prandial insulin with additional SSI coverage per [employer]  - endocrinology following and titrating insulin requirements - anticipate fluctuation of blood sugars on chronic corticosteroids

## 2019-11-12 NOTE — PROGRESS NOTES
Progress Note - Infectious Disease   [place] Tee [de-identified] y o  female MRN: [id number]  Unit/Bed#: -01 Encounter: [id number]      Impression/Recommendations:  1  FUO, with elevated ESR and JAMILAH   Patient had extensive infectious workup with negative findings and cultures   I suspect she has underlying vasculitis   Consider temporal arteritis   Rheumatology evaluation noted   Workup is in progress  Temperature remains  down on systemic corticosteroid  Continue to observe off antibiotic  Monitor temperature  Rheumatology follow-up  Continue systemic corticosteroid per Rheumatology      2  Splenic infarct, 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   Clinically, this is not appear to be splenic abscess   Abdominal exam is benign  Monitor      3  Encephalopathy, most likely secondary to vasculitis, with hyponatremia contributing   Mental status is slowly improving  Monitor mental status      4  Hyponatremia   This has resolved      5  Poorly controlled DM, with elevated hemoglobin A1c      Discussed with patient      Antibiotics:  None     Subjective:  Patient is comfortable  She is awake and alert     Temperature stays down   No chills  No diarrhea  Objective:  Vitals:  Temp:  [97 5 °F (36 4 °C)-98 3 °F (36 8 °C)] 97 5 °F (36 4 °C)  HR:  [70-84] 70  Resp:  [16-18] 18  BP: (145-148)/(68-69) 145/69  SpO2:  [96 %-98 %] 97 %  Temp (24hrs), Av 8 °F (36 6 °C), Min:97 5 °F (36 4 °C), Max:98 3 °F (36 8 °C)  Current: Temperature: 97 5 °F (36 4 °C)    Physical Exam:     General: Awake, alert, cooperative, no distress  Neck:  Supple  No mass  No lymphadenopathy  Lungs: Decreased breath sounds, no rales, no wheezing, respirations unlabored  Heart:  Regular rate and rhythm, S1 and S2 normal, no murmur  Abdomen: Soft, nondistended, non-tender, bowel sounds active all four quadrants,        no masses, no organomegaly  Extremities: No edema  No erythema/warmth  No ulcer  Nontender to palpation     Skin:  No rash    Neuro: Moves all extremities  Invasive Devices     Peripheral Intravenous Line            Peripheral IV 11/11/19 Left Wrist less than 1 day          Drain            Urethral Catheter Straight-tip 16 Fr  28 days    NG/OG/Enteral Tube Nasogastric 8 Fr Right nares 6 days                Labs studies:   I have personally reviewed pertinent labs  Results from last 7 days   Lab Units 11/11/19  0450 11/10/19  0953 11/09/19  0632   POTASSIUM mmol/L 4 8 4 3 4 5   CHLORIDE mmol/L 107 106 109*   CO2 mmol/L 28 28 25   BUN mg/dL 23 20 17   CREATININE mg/dL 0 53* 0 52* 0 49*   EGFR ml/min/1 73sq m 90 91 92   CALCIUM mg/dL 8 6 8 6 8 0*     Results from last 7 days   Lab Units 11/12/19  0616 11/11/19 0450 11/10/19  0953   WBC Thousand/uL 12 85* 10 82* 7 97   HEMOGLOBIN g/dL 9 0* 7 6* 7 4*   PLATELETS Thousands/uL 505* 490* 418*           Imaging Studies:   I have personally reviewed pertinent imaging study reports and images in PACS  EKG, Pathology, and Other Studies:   I have personally reviewed pertinent reports

## 2019-11-12 NOTE — ASSESSMENT & PLAN NOTE
- suspected etiology of encephalopathy at this time  - initially elevated JAMILAH/ESR noted - further workup revealed a low C4 with a normal C3, elevated SSA Ab, and elevated IgG - other autoimmune workup unremarkable (ANCA studies, etc )  - per rheumatology, awaiting dsDNA, cardiolipin study, SARAH, beta-2 glycoprotein level, cryoglobulin, anti-histone Ab, anti-centromere Ab, and lupus anticoagulant study  - per rheumatology documentation, "SLE/or overlap connective tissue disease disorder/ Sjogrens with immune complex activation, low C4 with severe systemic sx, encephalopathy?, recent CVA and splenic infarct - might suggest anti-phospholipid syndrome    Some old hx of adrenal insufficiency, prior steroid rx as well "  - remains on an IV Solu-Medrol trial with plan to transition to long-term oral Prednisone tapering regimen (PPI prophylaxis on board) and possible consideration of DMARD therapy in the future

## 2019-11-12 NOTE — SPEECH THERAPY NOTE
Speech Language/Pathology    Speech/Language Pathology Progress Note    Patient Name: Artemio LEWIS Date: 11/12/2019       Subjective:  Pt awake and alert  Per dietician, pt may be appropriate for diet advancement  Objective:  Pt seen for fu dysphagia tx to assess for possible upgrade  Current diet: 1/ntl + tube feeding    Per chart review, pt refusing PEG  Currently on 3 day calorie count holding tube feedings  Pt states dentures are unavailable  "I can eat without them "  Pt observed drinking thin water x 6 oz and eating shaheen crackers  She demonstrated slow but effective mastication  Swallow transfer and initiation were timely  No overt s/s aspiration observed  Assessment:  Pt tolerated trials of advanced consistencies  Plan/Recommendations:  Upgrade to dysphagia level 2 with thin liquids  ST to f/u to assure tolerance

## 2019-11-12 NOTE — PROGRESS NOTES
[facility] Internal Medicine - Progress Note  Patient: [place] [de-identified] y o  female MRN: [id number]  Unit/Bed#: -01 Encounter: [id number]  Primary Care Provider: Farnaz Manuel MD  Date Of Visit: 11/12/19        Assessment & Plan:    * Acute metabolic encephalopathy  Assessment & Plan  - etiology fully unclear although after extensive workup during lengthy hospital course thus far, suspect vasculitic etiology (see below) coupled with now resolved hyponatremia and malnutrition/failure to thrive   - evidence of an acute/subacute left precentral gyrus CVA with bilateral IC stenosis also incidentally found (see below)  - slow progressive improvement noted with steroid regimen - continue clinical monitoring    Fever of unknown origin  Assessment & Plan  - remains afebrile - Infectious Disease following and considering vasculitic etiology as possible culprit (may also consider splenic infarct) as extensive infectious workup earlier hospital course unremarkable  - Influenza/RSV screen previously negative - blood parasite smear negative - anti-Babesiosis IgM normal  - see plan for suspected vasculitis below    Splenic infarcts  Assessment & Plan  - initially noted on CT imaging at [facility] in September 2019   - consider cardioembolic etiology in the setting of atrial fibrillation with intermittent RVR  - CTA of abdomen/pelvis negative for aortic injury/aneurysm with patency of celiac/splenic arteries noted, however, to revealed right external iliac artery occlusion with distal reconstitution of the inferior epigastric artery coupled with an evolving splenic infarct   - LENO negative for PFO or obvious right atrial thrombus (see plan for atrial fibrillation below)  - no surgical intervention per general surgery  - continue Eliquis for anticoagulation    Suspected vasculitis  Assessment & Plan  - suspected etiology of encephalopathy at this time  - initially elevated JAMILAH/ESR noted - further workup revealed a low C4 with a normal C3, elevated SSA Ab, and elevated IgG - other autoimmune workup unremarkable (ANCA studies, etc )  - per rheumatology, awaiting dsDNA, cardiolipin study, SARAH, beta-2 glycoprotein level, cryoglobulin, anti-histone Ab, anti-centromere Ab, and lupus anticoagulant study  - per rheumatology documentation, "SLE/or overlap connective tissue disease disorder/ Sjogrens with immune complex activation, low C4 with severe systemic sx, encephalopathy?, recent CVA and splenic infarct - might suggest anti-phospholipid syndrome    Some old hx of adrenal insufficiency, prior steroid rx as well "  - remains on an IV Solu-Medrol trial with plan to transition to long-term oral Prednisone tapering regimen (PPI prophylaxis on board) and possible consideration of DMARD therapy in the future    Diabetes mellitus type 2  Assessment & Plan  - HbA1c of 6 4 last  - hold oral hypoglycemics - continue mealtime prandial insulin with additional SSI coverage per Accu-Cheks  - endocrinology following and titrating insulin requirements - anticipate fluctuation of blood sugars on chronic corticosteroids    CAD (coronary artery disease)  Assessment & Plan  - s/p prior coronary stenting  - continue statin therapy    Bilateral carotid artery stenosis  Assessment & Plan  - 50-69% ICA stenosis bilaterally on carotid dopplers  - MRI imaging earlier hospital course revealed evidence of a left precentral gyrus acute/subacute CVA without hemorrhagic transformation  - continue statin therapy with Lipitor  - PT/OT as tolerated    Failure to thrive in adult - Severe protein calorie malnutrition  Assessment & Plan  - progressive decline in appetite/oral intake over the last few months coupled with weakness/fatigue  - discussed with swallow/speech therapy today - diet advanced to mechanical soft with thin liquids  - Keofed remains in place but off tube feeds currently - can remove with evidence of persistent oral intake  - if continues decline, may require another family meeting to rediscuss goals of long-term care    Leukocytosis  Assessment & Plan  - likely reactive due to ongoing medical issues  - monitor WBC count - currently afebrile    Thrombocytosis  Assessment & Plan  - likely reactive due to ongoing medical issues  - monitor platelet count    Anemia of chronic disease  Assessment & Plan  - H/H stable  - continue ferrous sulfate supplementation    Paroxysmal atrial fibrillation  Assessment & Plan  - rate controlled on Lopressor  - continue Eliquis for anticoagulation  - TSH normal last month  - echocardiogram revealing EF of 55%, mild MR, moderate TR, trace AR, but no evidence of PFO or right atrial thrombus    Essential hypertension  Assessment & Plan  - home Tenormin switched to Lopressor in the setting of atrial fibrillation with previous RVR  - holding Norvasc/Enalapril due to waxing/waning BP fluctuations      DVT Prophylaxis:  Eliquis      Patient Centered Rounds:  I have performed bedside rounds and discussed plan of care with nursing today  Discussions with Specialists or Other Care Team Provider:  see above assessments if applicable    Education and Discussions with Family / Patient:  Attempted to discussed with patient at bedside    Time Spent for Care:  32 minutes  More than 50% of total time spent on counseling and coordination of care as described above  Current Length of Stay: 29 day(s)    Current Patient Status: Inpatient   Certification Statement:  Patient will continue to require additional hospital stay due to assessments as noted above  Code Status: Level 1 - Full Code      Subjective:   Seen/examined earlier this afternoon  Patient sitting upright in a chair denying any new complaints this time  Generalized weakness/fatigue persists  Contacted by speech therapy later after my encounter who have cleared the patient for diet advancement to mechanical soft texture with thin liquids        Objective: Vitals:   Temp (24hrs), Av 8 °F (36 6 °C), Min:97 5 °F (36 4 °C), Max:98 4 °F (36 9 °C)    Temp:  [97 5 °F (36 4 °C)-98 4 °F (36 9 °C)] 98 4 °F (36 9 °C)  HR:  [70-85] 85  Resp:  [18] 18  BP: (127-145)/(61-69) 127/61  SpO2:  [97 %-98 %] 97 %  Body mass index is 23 54 kg/m²  Input and Output Summary (last 24 hours):        Intake/Output Summary (Last 24 hours) at 2019 1845  Last data filed at 2019 1301  Gross per 24 hour   Intake 855 ml   Output 1225 ml   Net -370 ml       Physical Exam:     GENERAL:  Weak/fatigued  HEAD:  Normocephalic - atraumatic - nasogastric tube in place  EYES: PERRL - EOMI   MOUTH:  Mucosa moist  NECK:  Supple - full range of motion  CARDIAC:  Rate-controlled - S1/S2 positive  PULMONARY:  Clear to auscultation bilaterally - nonlabored respirations  ABDOMEN:  Soft - nontender/nondistended - active bowel sounds  MUSCULOSKELETAL:  Motor strength/range of motion deconditioned  NEUROLOGIC:  Alert/awake today  SKIN:  Chronic wrinkles/blemishes   PSYCHIATRIC:  Mood/affect somewhat flat      Additional Data:     Labs & Recent Cultures:    Results from last 7 days   Lab Units 19  0616   WBC Thousand/uL 12 85*   HEMOGLOBIN g/dL 9 0*   HEMATOCRIT % 31 4*   PLATELETS Thousands/uL 505*   NEUTROS PCT % 85*   LYMPHS PCT % 9*   MONOS PCT % 5   EOS PCT % 0     Results from last 7 days   Lab Units 19  0616   POTASSIUM mmol/L 5 0   CHLORIDE mmol/L 104   CO2 mmol/L 28   BUN mg/dL 26*   CREATININE mg/dL 0 58*   CALCIUM mg/dL 8 8         Results from last 7 days   Lab Units 19  1552 19  1042 19  0646 19  2051 19  1614 19  1050 19  0630 11/10/19  2045 11/10/19  1614 11/10/19  1044 11/10/19  0611 199   POC GLUCOSE mg/dl 218* 283* 304* 230* 175* 339* 366* 359* 337* 310* 334* 277*             Last 24 Hours Medication List:     Current Facility-Administered Medications:  acetaminophen 650 mg Oral Q6H PRN Deann Garcia PA-C aluminum-magnesium hydroxide-simethicone 30 mL Oral Q6H PRN Artur Meeks MD   apixaban 2 5 mg Oral BID Eric Carrasquillo PA-C   atorvastatin 40 mg Oral Daily With Refugio Ordaz MD   bisacodyl 10 mg Rectal Daily PRN Robert MAHAN PA-C   docusate sodium 100 mg Oral BID PRN Artur Meeks MD   ferrous sulfate 325 mg Oral Daily With Breakfast Jermaine Phillips MD   fluticasone 1 spray Each Nare BID Jermaine Phillips MD   insulin lispro 1-5 Units Subcutaneous HS Artur Meeks MD   insulin lispro 1-6 Units Subcutaneous TID With Meals Baylee Gaming MD   insulin lispro 6 Units Subcutaneous TID With Meals MD Marilynn Jones ON 11/13/2019] insulin NPH 10 Units Subcutaneous Daily Demario Cordero MD   insulin NPH 25 Units Subcutaneous Daily Demario Cordero MD   lidocaine (PF) 2 mL Infiltration Once Antonio Clevelnad PA-C   methylPREDNISolone sodium succinate 20 mg Intravenous Q12H [address] Heaven MAHAN PA-C   metoprolol 2 5 mg Intravenous Q6H PRN Marisol Stokes MD   metoprolol tartrate 50 mg Oral Q12H [address] Eric Carrasquillo PA-C   mirtazapine 15 mg Oral HS Marisol Stokes MD   nystatin 500,000 Units Swish & Swallow 4x Daily Heaven MAHAN PA-C   ondansetron 4 mg Intravenous Q6H PRN Artur Meeks MD   pantoprazole 40 mg Oral Early Morning Jemraine Phillips MD            ** Please Note: This note is constructed using a voice recognition dictation system  An occasional wrong word/phrase or sound-a-like substitution may have been picked up by dictation device due to the inherent limitations of voice recognition software  Read the chart carefully and recognize, using reasonable context, where substitutions may have occurred  **

## 2019-11-13 LAB
ANION GAP SERPL CALCULATED.3IONS-SCNC: 6 MMOL/L (ref 4–13)
BASOPHILS # BLD AUTO: 0.01 THOUSANDS/ΜL (ref 0–0.1)
BASOPHILS NFR BLD AUTO: 0 % (ref 0–1)
BUN SERPL-MCNC: 31 MG/DL (ref 5–25)
CALCIUM SERPL-MCNC: 8.9 MG/DL (ref 8.3–10.1)
CHLORIDE SERPL-SCNC: 102 MMOL/L (ref 100–108)
CO2 SERPL-SCNC: 28 MMOL/L (ref 21–32)
CREAT SERPL-MCNC: 0.55 MG/DL (ref 0.6–1.3)
EOSINOPHIL # BLD AUTO: 0 THOUSAND/ΜL (ref 0–0.61)
EOSINOPHIL NFR BLD AUTO: 0 % (ref 0–6)
ERYTHROCYTE [DISTWIDTH] IN BLOOD BY AUTOMATED COUNT: 20.8 % (ref 11.6–15.1)
GFR SERPL CREATININE-BSD FRML MDRD: 89 ML/MIN/1.73SQ M
GLUCOSE SERPL-MCNC: 139 MG/DL (ref 65–140)
GLUCOSE SERPL-MCNC: 230 MG/DL (ref 65–140)
GLUCOSE SERPL-MCNC: 267 MG/DL (ref 65–140)
GLUCOSE SERPL-MCNC: 377 MG/DL (ref 65–140)
GLUCOSE SERPL-MCNC: 83 MG/DL (ref 65–140)
HCT VFR BLD AUTO: 29.5 % (ref 34.8–46.1)
HGB BLD-MCNC: 8.5 G/DL (ref 11.5–15.4)
IMM GRANULOCYTES # BLD AUTO: 0.23 THOUSAND/UL (ref 0–0.2)
IMM GRANULOCYTES NFR BLD AUTO: 2 % (ref 0–2)
LYMPHOCYTES # BLD AUTO: 1.14 THOUSANDS/ΜL (ref 0.6–4.47)
LYMPHOCYTES NFR BLD AUTO: 8 % (ref 14–44)
MAGNESIUM SERPL-MCNC: 1.8 MG/DL (ref 1.6–2.6)
MCH RBC QN AUTO: 22.7 PG (ref 26.8–34.3)
MCHC RBC AUTO-ENTMCNC: 28.8 G/DL (ref 31.4–37.4)
MCV RBC AUTO: 79 FL (ref 82–98)
MONOCYTES # BLD AUTO: 0.66 THOUSAND/ΜL (ref 0.17–1.22)
MONOCYTES NFR BLD AUTO: 5 % (ref 4–12)
NEUTROPHILS # BLD AUTO: 11.73 THOUSANDS/ΜL (ref 1.85–7.62)
NEUTS SEG NFR BLD AUTO: 85 % (ref 43–75)
NRBC BLD AUTO-RTO: 0 /100 WBCS
PHOSPHATE SERPL-MCNC: 2.3 MG/DL (ref 2.3–4.1)
PLATELET # BLD AUTO: 530 THOUSANDS/UL (ref 149–390)
PMV BLD AUTO: 10 FL (ref 8.9–12.7)
POTASSIUM SERPL-SCNC: 4.7 MMOL/L (ref 3.5–5.3)
RBC # BLD AUTO: 3.74 MILLION/UL (ref 3.81–5.12)
SODIUM SERPL-SCNC: 136 MMOL/L (ref 136–145)
WBC # BLD AUTO: 13.77 THOUSAND/UL (ref 4.31–10.16)

## 2019-11-13 PROCEDURE — G0515 COGNITIVE SKILLS DEVELOPMENT: HCPCS

## 2019-11-13 PROCEDURE — 85025 COMPLETE CBC W/AUTO DIFF WBC: CPT | Performed by: INTERNAL MEDICINE

## 2019-11-13 PROCEDURE — 97530 THERAPEUTIC ACTIVITIES: CPT

## 2019-11-13 PROCEDURE — 99232 SBSQ HOSP IP/OBS MODERATE 35: CPT | Performed by: INTERNAL MEDICINE

## 2019-11-13 PROCEDURE — 84100 ASSAY OF PHOSPHORUS: CPT | Performed by: INTERNAL MEDICINE

## 2019-11-13 PROCEDURE — 97535 SELF CARE MNGMENT TRAINING: CPT

## 2019-11-13 PROCEDURE — 83735 ASSAY OF MAGNESIUM: CPT | Performed by: INTERNAL MEDICINE

## 2019-11-13 PROCEDURE — 80048 BASIC METABOLIC PNL TOTAL CA: CPT | Performed by: INTERNAL MEDICINE

## 2019-11-13 PROCEDURE — 82948 REAGENT STRIP/BLOOD GLUCOSE: CPT

## 2019-11-13 RX ORDER — PREDNISONE 20 MG/1
40 TABLET ORAL DAILY
Status: DISCONTINUED | OUTPATIENT
Start: 2019-11-13 | End: 2019-11-16 | Stop reason: HOSPADM

## 2019-11-13 RX ORDER — PREDNISONE 10 MG/1
10 TABLET ORAL DAILY
Status: DISCONTINUED | OUTPATIENT
Start: 2019-12-04 | End: 2019-11-16 | Stop reason: HOSPADM

## 2019-11-13 RX ORDER — PREDNISONE 20 MG/1
20 TABLET ORAL DAILY
Status: DISCONTINUED | OUTPATIENT
Start: 2019-11-27 | End: 2019-11-16 | Stop reason: HOSPADM

## 2019-11-13 RX ADMIN — PANTOPRAZOLE SODIUM 40 MG: 40 TABLET, DELAYED RELEASE ORAL at 07:14

## 2019-11-13 RX ADMIN — INSULIN LISPRO 4 UNITS: 100 INJECTION, SOLUTION INTRAVENOUS; SUBCUTANEOUS at 22:25

## 2019-11-13 RX ADMIN — DOCUSATE SODIUM 100 MG: 100 CAPSULE, LIQUID FILLED ORAL at 07:14

## 2019-11-13 RX ADMIN — FLUTICASONE PROPIONATE 1 SPRAY: 50 SPRAY, METERED NASAL at 09:55

## 2019-11-13 RX ADMIN — APIXABAN 2.5 MG: 2.5 TABLET, FILM COATED ORAL at 09:55

## 2019-11-13 RX ADMIN — FERROUS SULFATE TAB 325 MG (65 MG ELEMENTAL FE) 325 MG: 325 (65 FE) TAB at 07:14

## 2019-11-13 RX ADMIN — METOPROLOL TARTRATE 50 MG: 50 TABLET, FILM COATED ORAL at 22:12

## 2019-11-13 RX ADMIN — FLUTICASONE PROPIONATE 1 SPRAY: 50 SPRAY, METERED NASAL at 17:20

## 2019-11-13 RX ADMIN — NYSTATIN 500000 UNITS: 500000 SUSPENSION ORAL at 22:12

## 2019-11-13 RX ADMIN — MIRTAZAPINE 15 MG: 15 TABLET, FILM COATED ORAL at 22:12

## 2019-11-13 RX ADMIN — INSULIN LISPRO 3 UNITS: 100 INJECTION, SOLUTION INTRAVENOUS; SUBCUTANEOUS at 07:14

## 2019-11-13 RX ADMIN — APIXABAN 2.5 MG: 2.5 TABLET, FILM COATED ORAL at 17:13

## 2019-11-13 RX ADMIN — PREDNISONE 40 MG: 20 TABLET ORAL at 11:33

## 2019-11-13 RX ADMIN — NYSTATIN 500000 UNITS: 500000 SUSPENSION ORAL at 17:13

## 2019-11-13 RX ADMIN — METOPROLOL TARTRATE 50 MG: 50 TABLET, FILM COATED ORAL at 09:55

## 2019-11-13 RX ADMIN — Medication 10 MG: at 10:13

## 2019-11-13 RX ADMIN — NYSTATIN 500000 UNITS: 500000 SUSPENSION ORAL at 07:14

## 2019-11-13 RX ADMIN — ATORVASTATIN CALCIUM 40 MG: 40 TABLET, FILM COATED ORAL at 17:13

## 2019-11-13 RX ADMIN — NYSTATIN 500000 UNITS: 500000 SUSPENSION ORAL at 14:40

## 2019-11-13 NOTE — PROGRESS NOTES
Progress Note - Infectious Disease   [place] Tee [de-identified] y o  female MRN: [id number]  Unit/Bed#: -01 Encounter: [id number]      Impression/Recommendations:  1  FUO, with elevated ESR and JAMILAH   Patient had extensive infectious workup with negative findings and cultures   I suspect she has underlying vasculitis   Consider temporal arteritis   Rheumatology evaluation noted   Workup is in progress  Temperature remains  down on systemic corticosteroid  Continue to observe off antibiotic  Monitor temperature  Rheumatology follow-up  Continue systemic corticosteroid per Rheumatology      2  Splenic infarct, 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   Clinically, this is not appear to be splenic abscess   Abdominal exam is benign  Monitor      3  Encephalopathy, most likely secondary to vasculitis, with hyponatremia contributing   Mental status is slowly improving  Monitor mental status      4  Hyponatremia   This has resolved      5  Poorly controlled DM, with elevated hemoglobin A1c      Discussed with patient      Antibiotics:  None     Subjective:  Patient is comfortable  She is awake and alert     She is tolerating dysphagia diet well  Temperature stays down   No chills  No diarrhea  Objective:  Vitals:  Temp:  [98 4 °F (36 9 °C)-98 9 °F (37 2 °C)] 98 9 °F (37 2 °C)  HR:  [77-85] 77  Resp:  [18] 18  BP: (127-138)/(58-68) 138/68  SpO2:  [97 %] 97 %  Temp (24hrs), Av 7 °F (37 1 °C), Min:98 4 °F (36 9 °C), Max:98 9 °F (37 2 °C)  Current: Temperature: 98 9 °F (37 2 °C)    Physical Exam:     General: Awake, alert, cooperative, no distress  Neck:  Supple  No mass  No lymphadenopathy  Lungs: Expansion symmetric, no rales, no wheezing, respirations unlabored  Heart:  Regular rate and rhythm, S1 and S2 normal, no murmur  Abdomen: Soft, nondistended, non-tender, bowel sounds active all four quadrants,        no masses, no organomegaly  Extremities: No edema  No erythema/warmth  No ulcer   Nontender to palpation  Skin:  No rash  Neuro: Moves all extremities  Invasive Devices     Peripheral Intravenous Line            Peripheral IV 11/11/19 Left Wrist 1 day          Drain            Urethral Catheter Straight-tip 16 Fr  29 days    NG/OG/Enteral Tube Nasogastric 8 Fr Right nares 7 days                Labs studies:   I have personally reviewed pertinent labs  Results from last 7 days   Lab Units 11/13/19 0514 11/12/19  0616 11/11/19  0450   POTASSIUM mmol/L 4 7 5 0 4 8   CHLORIDE mmol/L 102 104 107   CO2 mmol/L 28 28 28   BUN mg/dL 31* 26* 23   CREATININE mg/dL 0 55* 0 58* 0 53*   EGFR ml/min/1 73sq m 89 87 90   CALCIUM mg/dL 8 9 8 8 8 6     Results from last 7 days   Lab Units 11/13/19  0514 11/12/19  0616 11/11/19  0450   WBC Thousand/uL 13 77* 12 85* 10 82*   HEMOGLOBIN g/dL 8 5* 9 0* 7 6*   PLATELETS Thousands/uL 530* 505* 490*           Imaging Studies:   I have personally reviewed pertinent imaging study reports and images in PACS  EKG, Pathology, and Other Studies:   I have personally reviewed pertinent reports

## 2019-11-13 NOTE — ASSESSMENT & PLAN NOTE
- home Tenormin previously switched to Lopressor in the setting of atrial fibrillation with previous RVR  - holding Norvasc/Enalapril due to waxing/waning BP fluctuations

## 2019-11-13 NOTE — ASSESSMENT & PLAN NOTE
- HbA1c of 6 4 last  - holding oral hypoglycemics - continue mealtime prandial insulin with additional SSI coverage per Accu-Cheks  - endocrinology following and titrating insulin requirements - anticipate fluctuation of blood sugars on chronic corticosteroids

## 2019-11-13 NOTE — ASSESSMENT & PLAN NOTE
- likely reactive due to ongoing medical issues and prolonged corticosteroid regimen  - monitor WBC count - remaining afebrile

## 2019-11-13 NOTE — QUICK NOTE
Tube feeds have been stopped - will d/c NPH twice daily     Will continue correction insulin for now

## 2019-11-13 NOTE — ASSESSMENT & PLAN NOTE
- suspected etiology of encephalopathy at this time  - initially elevated JAMILAH/ESR noted - further workup revealed a low C4 with a normal C3, elevated SSA Ab, and elevated IgG - other autoimmune workup unremarkable (ANCA studies, etc )  - per rheumatology, pending dsDNA, cardiolipin study, SARAH, beta-2 glycoprotein level, cryoglobulin, anti-histone Ab, anti-centromere Ab, and lupus anticoagulant study  - per rheumatology documentation, "SLE/or overlap connective tissue disease disorder/ Sjogrens with immune complex activation, low C4 with severe systemic sx, encephalopathy?, recent CVA and splenic infarct - might suggest anti-phospholipid syndrome    Some old hx of adrenal insufficiency, prior steroid rx as well "  - IV Solu-Medrol now transitioned to long-term oral Prednisone tapering regimen (PPI prophylaxis on board) and possible consideration of DMARD therapy in the future

## 2019-11-13 NOTE — PHYSICAL THERAPY NOTE
PHYSICAL THERAPY Treatment NOTE    Patient Name: [place]  GLFBG'[id number] Date: 11/13/2019 11/13/19 0035   Pain Assessment   Pain Assessment No/denies pain   Pain Score No Pain   Restrictions/Precautions   Weight Bearing Precautions Per Order No   Other Precautions Contact/isolation;Cognitive; Bed Alarm; Restraints;Aspiration;Multiple lines;Telemetry; Fall Risk;Hard of hearing   General   Chart Reviewed Yes   Additional Pertinent History Pt  is an [de-identified] yo F who presents with Acute metabolic encephalopathy  Family/Caregiver Present No   Cognition   Overall Cognitive Status Impaired   Arousal/Participation Lethargic;Cooperative   Attention Difficulty attending to directions   Orientation Level Oriented to person;Oriented to place;Oriented to time;Oriented to situation   Memory Decreased recall of recent events   Following Commands Follows multistep commands with increased time or repetition   Comments Pt  was identified with full name and birthdate   Subjective   Subjective Pt  agreeable to PT session, Pt  reports visual hallucinations reporting, " I know they are not real"    Bed Mobility   Supine to Sit 3  Moderate assistance   Additional items Assist x 1; Increased time required;LE management;HOB elevated; Bedrails   Sit to Supine 3  Moderate assistance   Additional items Assist x 1; Increased time required;HOB elevated; Bedrails;LE management   Transfers   Sit to Stand 3  Moderate assistance   Additional items Assist x 2; Increased time required;Verbal cues  (for hand placement and sequencing)   Stand to Sit 3  Moderate assistance   Additional items Assist x 2;Impulsive;Verbal cues  (to reach back to control descent)   Other 3  Moderate assistance  (quickmove)   Additional items Assist x 2; Increased time required;Verbal cues  (for use of quickmove lift)   Additional Comments Pt   Performs sit<>stand transfers with ModAx2 with VCs for hand placement and sequencing, Pt  noted to require LE placement and sacral lift technique to attain full standing position  In static standing Pt  required VCs for posture, LE placement, weightshifting and attaining COG within ROSAS  Pt  required RW for UE support  4x20 seconds in static standing requiring impulsive return to sitting  Balance   Static Sitting Fair -   Dynamic Sitting Poor +   Static Standing   (Ax2)   Dynamic Standing   (Ax2)   Endurance Deficit   Endurance Deficit Yes   Endurance Deficit Description postural and gait degradation noted with fatigue   Activity Tolerance   Activity Tolerance Patient limited by fatigue   Nurse Made Aware Spoke to RN   Assessment   Prognosis Guarded   Problem List Decreased strength;Decreased range of motion;Decreased endurance; Impaired balance;Decreased coordination;Decreased mobility; Decreased cognition;Decreased safety awareness;Decreased skin integrity   Assessment Pt  is an [de-identified] yo F who presents with Acute metabolic encephalopathy  Pt  was identified with full name and birthdate  Pt  agreeable to PT session, Pt  reports visual hallucinations reporting, " I know they are not real"  Pt  Demonstrates increased functional independence and increased activity tolerance during today's session as noted by decreased assistance needed for task and ability to perform mutliple sit<>stands with prolonged static standing  Pt  Will benefit from continued skilled therapy to increase functional independence and aide patient in return to PLOF with decreased burden of care  Recommend from a nursing stand point for use of sit<>stand (quickmove) lift for assistance of OOB mobility at this time  Recommend Rehab upon discharge at this time  Updated goals listed below  Goals   Patient Goals to move better   [facility] Expiration Date 11/23/19   Short Term Goal #1 pt will:  Increase bilateral LE strength 1/2 grade to facilitate independent mobility, Perform all bed mobility tasks w/ supervision to decrease fall risk factors, Perform all transfers w/ modx1 to improve independence, Increase all balance 1/2 grade to decrease risk for falls and Tolerate standing 5 minutes w/ supervision to facilitate functional task performance   PT Treatment Day 9   Plan   Treatment/Interventions Functional transfer training;LE strengthening/ROM; Therapeutic exercise; Endurance training;Cognitive reorientation;Patient/family training;Equipment eval/education; Bed mobility;Gait training;Spoke to nursing;Spoke to case management; Family   Progress Slow progress, medical status limitations   PT Frequency 2-3x/wk   Recommendation   Recommendation Post acute IP rehab   Equipment Recommended Walker   PT - OK to Discharge Yes   Additional Comments to rehab when medically appropriate     Junious Mark PT, DPT 11/13/2019

## 2019-11-13 NOTE — ASSESSMENT & PLAN NOTE
- 50-69% ICA stenosis bilaterally on carotid dopplers  - MRI imaging earlier hospital course revealed evidence of a left precentral gyrus acute/subacute CVA without hemorrhagic transformation  - on statin therapy with Lipitor  - continue PT/OT as tolerated

## 2019-11-13 NOTE — PROGRESS NOTES
[facility] Internal Medicine - Progress Note  Patient: [place] [de-identified] y o  female MRN: [id number]  Unit/Bed#: -01 Encounter: [id number]  Primary Care Provider: Naveen Stoll MD  Date Of Visit: 11/13/19        Assessment & Plan:    * Acute metabolic encephalopathy  Assessment & Plan  - etiology fully unclear although after extensive workup during lengthy hospital course thus far, suspect vasculitic etiology (see below) coupled with now resolved hyponatremia and malnutrition/failure to thrive   - evidence of an acute/subacute left precentral gyrus CVA with bilateral IC stenosis also incidentally found (see below)  - slow progressive improvement noted with steroid regimen - continue clinical monitoring    Fever of unknown origin  Assessment & Plan  - remains afebrile - Infectious Disease following and considering vasculitic etiology as possible culprit (may also consider splenic infarct) as extensive infectious workup earlier hospital course unremarkable  - Influenza/RSV screen previously negative - blood parasite smear negative - anti-Babesiosis IgM normal  - see plan for suspected vasculitis below    Splenic infarcts  Assessment & Plan  - initially noted on CT imaging at [facility] in September 2019   - consider cardioembolic etiology in the setting of atrial fibrillation with intermittent RVR  - CTA of abdomen/pelvis negative for aortic injury/aneurysm with patency of celiac/splenic arteries noted, however, to revealed right external iliac artery occlusion with distal reconstitution of the inferior epigastric artery coupled with an evolving splenic infarct   - LENO negative for PFO or obvious right atrial thrombus (see plan for atrial fibrillation below)  - no surgical intervention per general surgery  - continue Eliquis for anticoagulation    Suspected vasculitis  Assessment & Plan  - suspected etiology of encephalopathy at this time  - initially elevated JAMILAH/ESR noted - further workup revealed a low C4 with a normal C3, elevated SSA Ab, and elevated IgG - other autoimmune workup unremarkable (ANCA studies, etc )  - per rheumatology, pending dsDNA, cardiolipin study, SARAH, beta-2 glycoprotein level, cryoglobulin, anti-histone Ab, anti-centromere Ab, and lupus anticoagulant study  - per rheumatology documentation, "SLE/or overlap connective tissue disease disorder/ Sjogrens with immune complex activation, low C4 with severe systemic sx, encephalopathy?, recent CVA and splenic infarct - might suggest anti-phospholipid syndrome    Some old hx of adrenal insufficiency, prior steroid rx as well "  - IV Solu-Medrol now transitioned to long-term oral Prednisone tapering regimen (PPI prophylaxis on board) and possible consideration of DMARD therapy in the future    Diabetes mellitus type 2  Assessment & Plan  - HbA1c of 6 4 last  - holding oral hypoglycemics - continue mealtime prandial insulin with additional SSI coverage per Accu-Cheks  - endocrinology following and titrating insulin requirements - anticipate fluctuation of blood sugars on chronic corticosteroids    CAD (coronary artery disease)  Assessment & Plan  - s/p prior coronary stenting  - continue statin therapy    Bilateral carotid artery stenosis  Assessment & Plan  - 50-69% ICA stenosis bilaterally on carotid dopplers  - MRI imaging earlier hospital course revealed evidence of a left precentral gyrus acute/subacute CVA without hemorrhagic transformation  - on statin therapy with Lipitor  - continue PT/OT as tolerated    Failure to thrive in adult - Severe protein calorie malnutrition  Assessment & Plan  - progressive decline in appetite/oral intake over the last few months coupled with weakness/fatigue  - discussed with swallow/speech therapy on 11/12 - has begun to tolerate mechanical soft diet with thin liquids  - Keofed remains in place but off tube feeds currently - can remove with evidence of persistent oral intake  - if declines again, may require another family meeting to rediscuss goals of long-term care    Leukocytosis  Assessment & Plan  - likely reactive due to ongoing medical issues and prolonged corticosteroid regimen  - monitor WBC count - remaining afebrile    Thrombocytosis  Assessment & Plan  - likely reactive due to ongoing medical issues  - monitor platelet count    Anemia of chronic disease  Assessment & Plan  - H/H stable  - continue ferrous sulfate supplementation    Paroxysmal atrial fibrillation  Assessment & Plan  - rate controlled on Lopressor  - continue Eliquis for anticoagulation  - TSH normal last month  - echocardiogram revealing EF of 55%, mild MR, moderate TR, trace AR, but no evidence of PFO or right atrial thrombus    Essential hypertension  Assessment & Plan  - home Tenormin previously switched to Lopressor in the setting of atrial fibrillation with previous RVR  - holding Norvasc/Enalapril due to waxing/waning BP fluctuations      DVT Prophylaxis:  Eliquis       Patient Centered Rounds:  I have performed bedside rounds and discussed plan of care with nursing today  Discussions with Specialists or Other Care Team Provider:  see above assessments if applicable    Education and Discussions with Family / Patient:  Discussed with patient who case breast a degree of understanding today    Time Spent for Care:  32 minutes  More than 50% of total time spent on counseling and coordination of care as described above  Current Length of Stay: 30 day(s)    Current Patient Status: Inpatient   Certification Statement:  Patient will continue to require additional hospital stay due to assessments as noted above  Code Status: Level 1 - Full Code      Subjective:   Seen/examined earlier in the day  Patient consuming a cup of pudding upon my encounter  She is somewhat verbal today and does acknowledge that she is [place]        Objective:     Vitals:   Temp (24hrs), Av 4 °F (36 9 °C), Min:97 8 °F (36 6 °C), Max:98 9 °F (37 2 °C)    Temp:  [97 8 °F (36 6 °C)-98 9 °F (37 2 °C)] 97 8 °F (36 6 °C)  HR:  [77-86] 86  Resp:  [18] 18  BP: (136-139)/(58-68) 139/67  SpO2:  [97 %-98 %] 98 %  Body mass index is 23 25 kg/m²  Input and Output Summary (last 24 hours):        Intake/Output Summary (Last 24 hours) at 11/13/2019 1509  Last data filed at 11/13/2019 1452  Gross per 24 hour   Intake 1830 ml   Output 975 ml   Net 855 ml       Physical Exam:     GENERAL:  Improving weakness/fatigue  HEAD:  Normocephalic - atraumatic - NG tube remains in place  EYES: PERRL - EOMI   MOUTH:  Mucosa moist  NECK:  Supple - full range of motion  CARDIAC:  Regular rate/rhythm - S1/S2 positive  PULMONARY:  Clear bilateral breath sounds - nonlabored respirations  ABDOMEN:  Soft - nontender/nondistended - active bowel sounds  MUSCULOSKELETAL:  Motor strength/range of motion deconditioned  NEUROLOGIC:  Alert/awake  SKIN:  Chronic wrinkles/blemishes   PSYCHIATRIC:  Mood/affect improved today      Additional Data:     Labs & Recent Cultures:    Results from last 7 days   Lab Units 11/13/19  0514   WBC Thousand/uL 13 77*   HEMOGLOBIN g/dL 8 5*   HEMATOCRIT % 29 5*   PLATELETS Thousands/uL 530*   NEUTROS PCT % 85*   LYMPHS PCT % 8*   MONOS PCT % 5   EOS PCT % 0     Results from last 7 days   Lab Units 11/13/19  0514   POTASSIUM mmol/L 4 7   CHLORIDE mmol/L 102   CO2 mmol/L 28   BUN mg/dL 31*   CREATININE mg/dL 0 55*   CALCIUM mg/dL 8 9         Results from last 7 days   Lab Units 11/13/19  1057 11/13/19  0558 11/12/19  2041 11/12/19  1552 11/12/19  1042 11/12/19  0646 11/11/19  2051 11/11/19  1614 11/11/19  1050 11/11/19  0630 11/10/19  2045 11/10/19  1614   POC GLUCOSE mg/dl 139 267* 225* 218* 283* 304* 230* 175* 339* 366* 359* 337*           Last 24 Hours Medication List:     Current Facility-Administered Medications:  acetaminophen 650 mg Oral Q6H PRN Heaven MAHAN PA-C   aluminum-magnesium hydroxide-simethicone 30 mL Oral Q6H PRN Malcolm Spivey, MD   apixaban 2 5 mg Oral BID Lolita Ragland PA-C   atorvastatin 40 mg Oral Daily With Marcello Fischer MD   bisacodyl 10 mg Rectal Daily PRN Abdoul MAHAN PA-C   docusate sodium 100 mg Oral BID PRN Rome Watts MD   ferrous sulfate 325 mg Oral Daily With Breakfast Melissa Griffiths MD   fluticasone 1 spray Each Nare BID Melissa Griffiths MD   insulin lispro 1-5 Units Subcutaneous HS Rome Watts MD   insulin lispro 1-6 Units Subcutaneous TID With Meals Enmanuel Hu MD   insulin lispro 6 Units Subcutaneous TID With Meals Fly Cosby MD   insulin NPH 10 Units Subcutaneous Daily Fly Cosby MD   insulin NPH 25 Units Subcutaneous Daily Fly Cosby MD   lidocaine (PF) 2 mL Infiltration Once Zina LUPE Auguste   metoprolol 2 5 mg Intravenous Q6H PRN Aj Campuzano MD   metoprolol tartrate 50 mg Oral Q12H [facility] Lolita Ragland PA-C   mirtazapine 15 mg Oral HS Aj Campuzano MD   nystatin 500,000 Units Swish & Swallow 4x Daily Heaven MHAAN PA-C   ondansetron 4 mg Intravenous Q6H PRN Rome Watts MD   pantoprazole 40 mg Oral Early Morning MD Hilary Cardona ON 12/4/2019] predniSONE 10 mg Oral Daily MD Hilary Epperson ON 11/27/2019] predniSONE 20 mg Oral Daily MD Hilary Epperson ON 11/20/2019] predniSONE 30 mg Oral Daily Enmanuel Hu MD   predniSONE 40 mg Oral Daily Enmanuel Hu MD            ** Please Note: This note is constructed using a voice recognition dictation system  An occasional wrong word/phrase or sound-a-like substitution may have been picked up by dictation device due to the inherent limitations of voice recognition software  Read the chart carefully and recognize, using reasonable context, where substitutions may have occurred  **

## 2019-11-13 NOTE — ASSESSMENT & PLAN NOTE
- initially noted on CT imaging at [facility] in September 2019   - consider cardioembolic etiology in the setting of atrial fibrillation with intermittent RVR  - CTA of abdomen/pelvis negative for aortic injury/aneurysm with patency of celiac/splenic arteries noted, however, to revealed right external iliac artery occlusion with distal reconstitution of the inferior epigastric artery coupled with an evolving splenic infarct   - LENO negative for PFO or obvious right atrial thrombus (see plan for atrial fibrillation below)  - no surgical intervention per general surgery  - continue Eliquis for anticoagulation

## 2019-11-13 NOTE — NUTRITION
11/13/19 1119   Assessment   Timepoint Nutrition Review  (Day 1 calorie count)   Adequacy of Intake   Nutrition Modality PO  (Level 2 dysphagia, thin liquid with honeyshake once daily and magic cup BID)   Intake Meals 50-75%;%   Intake Supplements 25-50%   Estimated Calorie Intake %   Estimated Protein Intake  %   Estimated Fluid Intake %   PES Statement   Problem Continue previous diagnosis   Patient Nutrition Goals   Goal Status met;extended   Timeframe to complete goal by next f/u   Recommendations/Interventions   Summary Calorie count day one results x3 meals: 1700 calories (100% needs) and 50 grams protein (83% needs)  Patient's appetite continues to improve  Supplements adjusted to ensure compact TID  Interventions Diet: continued as ordered; Supplement adjust  (Ensure compact TID ordered, honeyshake and magic cup discontinued (patient no longer requires NTL)  Calorie count to continue one more day  )   Nutrition Recommendations Continue diet as ordered; Other (specify)  (Will follow up with day 2 calorie count results   Predict patient will meet majority of nutritional needs via po intake and EN can be discontinued  )   Nutrition Complexity Risk   Nutrition complexity level High risk   Nutrition review: 11/14/19

## 2019-11-13 NOTE — PLAN OF CARE
Problem: PHYSICAL THERAPY ADULT  Goal: Performs mobility at highest level of function for planned discharge setting  See evaluation for individualized goals  Description  Treatment/Interventions: Functional transfer training, LE strengthening/ROM, Therapeutic exercise, Endurance training, Patient/family training, Equipment eval/education, Bed mobility, Gait training, Spoke to nursing  Equipment Recommended: Walker(current use of RW for mobility)       See flowsheet documentation for full assessment, interventions and recommendations  Outcome: Progressing  Note:   Prognosis: Guarded  Problem List: Decreased strength, Decreased range of motion, Decreased endurance, Impaired balance, Decreased coordination, Decreased mobility, Decreased cognition, Decreased safety awareness, Decreased skin integrity  Assessment: Pt  is an [de-identified] yo F who presents with Acute metabolic encephalopathy  Pt  was identified with full name and birthdate  Pt  agreeable to PT session, Pt  reports visual hallucinations reporting, " I know they are not real"  Pt  Demonstrates increased functional independence and increased activity tolerance during today's session as noted by decreased assistance needed for task and ability to perform mutliple sit<>stands with prolonged static standing  Pt  Will benefit from continued skilled therapy to increase functional independence and aide patient in return to OF with decreased burden of care  Recommend from a nursing stand point for use of sit<>stand (quickmove) lift for assistance of OOB mobility at this time  Recommend Rehab upon discharge at this time  Updated goals listed below  Recommendation: Post acute IP rehab     PT - OK to Discharge: Yes    See flowsheet documentation for full assessment

## 2019-11-13 NOTE — ASSESSMENT & PLAN NOTE
- progressive decline in appetite/oral intake over the last few months coupled with weakness/fatigue  - discussed with swallow/speech therapy on 11/12 - has begun to tolerate mechanical soft diet with thin liquids  - Keofed remains in place but off tube feeds currently - can remove with evidence of persistent oral intake  - if declines again, may require another family meeting to rediscuss goals of long-term care

## 2019-11-13 NOTE — PLAN OF CARE
Problem: OCCUPATIONAL THERAPY ADULT  Goal: Performs self-care activities at highest level of function for planned discharge setting  See evaluation for individualized goals  Description  Treatment Interventions: ADL retraining, Functional transfer training, UE strengthening/ROM, Endurance training, Cognitive reorientation, Patient/family training, Compensatory technique education, Energy conservation, Activityengagement          See flowsheet documentation for full assessment, interventions and recommendations  Outcome: Progressing  Note:   Limitation: Decreased ADL status, Decreased Safe judgement during ADL, Decreased cognition, Decreased endurance, Decreased self-care trans, Decreased high-level ADLs  Prognosis: Fair  Assessment: Arrived to patient supine in bed agreeable to therapy  Patient completed MOCA assessment and scored   Patient scored extra point (did not graduate from high school)  Deficits in the areas of executive functioning/visuospatial (0/5), delayed recall (1/5), attention (1/6) and language (0/3)  Patient with increased time required to respond and delayed responses as patient is hard of hearing and has overall decreased cognition, limiting her ability to safely participate in ADLs, IADLs and functional transfers/mobility  Patient completed bed mobility overall max A x1 this session w/ increased time and cueing for log roll techniques  Patient dizzy upon sitting EOB, VSS  Slightly SOB however recovered with cues for deep breathing  Completed UB dressing with min A  Pt is progressing towards meeting projected OT goals, however would benefit greatly for extended LOS to further refine motor control/ROM/strength, and increasing independence w/ ADLs, home + community activities to decrease burden of care  Recommend skilled OT until 19 (goals set to  tomorrow, 19, to progress towards projected OT goals    Plan and progress to date has been communicated to [address] for continuity of care delivery  Recommend continued frequency of 2-3x/week for remainder of rehab stay  From OT standpoint, recommendation at time of d/c would be short term rehab  Pt to benefit from continued OT while in the hospital to address deficits as defined above and maximize level of functional independence with occupational performance of ADLs and functional mobility  At end of session, patient remains in room with all needs within reach       OT Discharge Recommendation: Short Term Rehab  OT - OK to Discharge: Yes(to rehab when medically stable)

## 2019-11-13 NOTE — QUICK NOTE
Progress Note - Palliative & Supportive Care     Patient continues to show signs of improvement and her oral intake is improving daily  Daughter continues to wish for full cares and no symptoms to manage at this time  [facility], please call if condition changes or symptoms are uncontrolled  Thank you      Elan Foster, DO  Palliative and Supportive Care  [phone]

## 2019-11-13 NOTE — OCCUPATIONAL THERAPY NOTE
[address] Treatment Note     Patient Name: [place]  Today's Date: 11/13/2019  Problem List  Principal Problem:    Acute metabolic encephalopathy  Active Problems:    Diabetes mellitus type 2    Essential hypertension    Failure to thrive in adult - Severe protein calorie malnutrition    Urinary retention    Fever of unknown origin    Microcytic anemia    Splenic infarcts    Paroxysmal atrial fibrillation    Goals of care, counseling/discussion    Internal carotid artery stenosis, bilateral    JAMILAH positive    Acute CVA (cerebrovascular accident) ([address] )    Suspected vasculitis    CAD (coronary artery disease)    Bilateral carotid artery stenosis    Leukocytosis    Thrombocytosis    Anemia of chronic disease          11/13/19 0915   Restrictions/Precautions   Weight Bearing Precautions Per Order No   Other Precautions Contact/isolation;Cognitive; Bed Alarm; Restraints;Aspiration;Multiple lines;Telemetry; Fall Risk;Hard of hearing   Pain Assessment   Pain Assessment No/denies pain   Pain Score No Pain   ADL   Grooming Assistance 5  Supervision/Setup   UB Dressing Assistance 4  Minimal Assistance   UB Dressing Comments don hospital gown seated EOB   Toileting Assistance  2  Maximal Assistance  (herrera for bladder management)   Bed Mobility   Supine to Sit 2  Maximal assistance   Additional items Assist x 1;HOB elevated; Bedrails; Increased time required;Verbal cues;LE management   Sit to Supine 2  Maximal assistance   Additional items Assist x 1; Increased time required;Verbal cues;LE management   Cognition   Overall Cognitive Status Impaired   Arousal/Participation Lethargic;Cooperative   Attention Difficulty attending to directions   Orientation Level Oriented to person;Oriented to place; Disoriented to time;Disoriented to situation;Oriented to time   Memory Decreased recall of recent events;Decreased recall of precautions   Following Commands Follows one step commands with increased time or repetition Comments delayed processing and increased time to respond  hard of hearing  Cognition Assessment Tools MOCA   Score 12   Activity Tolerance   Activity Tolerance Patient limited by fatigue;Treatment limited secondary to medical complications (Comment)  (dizziness w/ positional changes - VSS)   Medical Staff Made Aware yes   Assessment   Assessment Arrived to patient supine in bed agreeable to therapy  Patient completed MOCA assessment and scored   Patient scored extra point (did not graduate from high school)  Deficits in the areas of executive functioning/visuospatial (0/5), delayed recall (1/5), attention (1/6) and language (0/3)  Patient with increased time required to respond and delayed responses as patient is hard of hearing and has overall decreased cognition, limiting her ability to safely participate in ADLs, IADLs and functional transfers/mobility  Patient completed bed mobility overall max A x1 this session w/ increased time and cueing for log roll techniques  Patient dizzy upon sitting EOB, VSS  Slightly SOB however recovered with cues for deep breathing  Completed UB dressing with min A  Pt is progressing towards meeting projected OT goals, however would benefit greatly for extended LOS to further refine motor control/ROM/strength, and increasing independence w/ ADLs, home + community activities to decrease burden of care  Recommend skilled OT until 19 (goals set to  tomorrow, 19, to progress towards projected OT goals  Plan and progress to date has been communicated to [facility] for continuity of care delivery  Recommend continued frequency of 2-3x/week for remainder of rehab stay  From OT standpoint, recommendation at time of d/c would be short term rehab  Pt to benefit from continued OT while in the hospital to address deficits as defined above and maximize level of functional independence with occupational performance of ADLs and functional mobility   At end of session, patient remains in room with all needs within reach     Plan   Goal Expiration Date 11/30/19   OT Treatment Day 6   OT Frequency 2-3x/wk   Recommendation   OT Discharge Recommendation Short Term Rehab   OT - OK to Discharge Yes  (to rehab when medically stable)       Peyton Renee MS, OTR/L

## 2019-11-14 LAB
ANION GAP SERPL CALCULATED.3IONS-SCNC: 4 MMOL/L (ref 4–13)
BASOPHILS # BLD AUTO: 0.02 THOUSANDS/ΜL (ref 0–0.1)
BASOPHILS NFR BLD AUTO: 0 % (ref 0–1)
BUN SERPL-MCNC: 35 MG/DL (ref 5–25)
CALCIUM SERPL-MCNC: 9.8 MG/DL (ref 8.3–10.1)
CHLORIDE SERPL-SCNC: 104 MMOL/L (ref 100–108)
CO2 SERPL-SCNC: 31 MMOL/L (ref 21–32)
CREAT SERPL-MCNC: 0.54 MG/DL (ref 0.6–1.3)
CRYOGLOB SER QL 1D COLD INC: POSITIVE
EOSINOPHIL # BLD AUTO: 0 THOUSAND/ΜL (ref 0–0.61)
EOSINOPHIL NFR BLD AUTO: 0 % (ref 0–6)
ERYTHROCYTE [DISTWIDTH] IN BLOOD BY AUTOMATED COUNT: 21.7 % (ref 11.6–15.1)
GFR SERPL CREATININE-BSD FRML MDRD: 89 ML/MIN/1.73SQ M
GLUCOSE SERPL-MCNC: 127 MG/DL (ref 65–140)
GLUCOSE SERPL-MCNC: 132 MG/DL (ref 65–140)
GLUCOSE SERPL-MCNC: 221 MG/DL (ref 65–140)
GLUCOSE SERPL-MCNC: 331 MG/DL (ref 65–140)
GLUCOSE SERPL-MCNC: 359 MG/DL (ref 65–140)
HCT VFR BLD AUTO: 30.8 % (ref 34.8–46.1)
HGB BLD-MCNC: 8.9 G/DL (ref 11.5–15.4)
IMM GRANULOCYTES # BLD AUTO: 0.2 THOUSAND/UL (ref 0–0.2)
IMM GRANULOCYTES NFR BLD AUTO: 1 % (ref 0–2)
LYMPHOCYTES # BLD AUTO: 2.39 THOUSANDS/ΜL (ref 0.6–4.47)
LYMPHOCYTES NFR BLD AUTO: 17 % (ref 14–44)
MAGNESIUM SERPL-MCNC: 1.9 MG/DL (ref 1.6–2.6)
MCH RBC QN AUTO: 23.2 PG (ref 26.8–34.3)
MCHC RBC AUTO-ENTMCNC: 28.9 G/DL (ref 31.4–37.4)
MCV RBC AUTO: 80 FL (ref 82–98)
MONOCYTES # BLD AUTO: 0.79 THOUSAND/ΜL (ref 0.17–1.22)
MONOCYTES NFR BLD AUTO: 6 % (ref 4–12)
NEUTROPHILS # BLD AUTO: 10.43 THOUSANDS/ΜL (ref 1.85–7.62)
NEUTS SEG NFR BLD AUTO: 76 % (ref 43–75)
NRBC BLD AUTO-RTO: 0 /100 WBCS
PHOSPHATE SERPL-MCNC: 3.6 MG/DL (ref 2.3–4.1)
PLATELET # BLD AUTO: 560 THOUSANDS/UL (ref 149–390)
PMV BLD AUTO: 9.9 FL (ref 8.9–12.7)
POTASSIUM SERPL-SCNC: 5 MMOL/L (ref 3.5–5.3)
RBC # BLD AUTO: 3.84 MILLION/UL (ref 3.81–5.12)
SODIUM SERPL-SCNC: 139 MMOL/L (ref 136–145)
WBC # BLD AUTO: 13.83 THOUSAND/UL (ref 4.31–10.16)

## 2019-11-14 PROCEDURE — 82948 REAGENT STRIP/BLOOD GLUCOSE: CPT

## 2019-11-14 PROCEDURE — 97530 THERAPEUTIC ACTIVITIES: CPT

## 2019-11-14 PROCEDURE — 99232 SBSQ HOSP IP/OBS MODERATE 35: CPT | Performed by: INTERNAL MEDICINE

## 2019-11-14 PROCEDURE — 85025 COMPLETE CBC W/AUTO DIFF WBC: CPT | Performed by: INTERNAL MEDICINE

## 2019-11-14 PROCEDURE — 84100 ASSAY OF PHOSPHORUS: CPT | Performed by: INTERNAL MEDICINE

## 2019-11-14 PROCEDURE — 80048 BASIC METABOLIC PNL TOTAL CA: CPT | Performed by: INTERNAL MEDICINE

## 2019-11-14 PROCEDURE — 83735 ASSAY OF MAGNESIUM: CPT | Performed by: INTERNAL MEDICINE

## 2019-11-14 PROCEDURE — 92526 ORAL FUNCTION THERAPY: CPT

## 2019-11-14 RX ORDER — AMLODIPINE BESYLATE 10 MG/1
10 TABLET ORAL DAILY
Status: DISCONTINUED | OUTPATIENT
Start: 2019-11-15 | End: 2019-11-16 | Stop reason: HOSPADM

## 2019-11-14 RX ADMIN — APIXABAN 2.5 MG: 2.5 TABLET, FILM COATED ORAL at 08:41

## 2019-11-14 RX ADMIN — NYSTATIN 500000 UNITS: 500000 SUSPENSION ORAL at 18:17

## 2019-11-14 RX ADMIN — FERROUS SULFATE TAB 325 MG (65 MG ELEMENTAL FE) 325 MG: 325 (65 FE) TAB at 06:46

## 2019-11-14 RX ADMIN — INSULIN LISPRO 6 UNITS: 100 INJECTION, SOLUTION INTRAVENOUS; SUBCUTANEOUS at 18:17

## 2019-11-14 RX ADMIN — METOPROLOL TARTRATE 50 MG: 50 TABLET, FILM COATED ORAL at 22:13

## 2019-11-14 RX ADMIN — PREDNISONE 40 MG: 20 TABLET ORAL at 08:41

## 2019-11-14 RX ADMIN — ATORVASTATIN CALCIUM 40 MG: 40 TABLET, FILM COATED ORAL at 16:39

## 2019-11-14 RX ADMIN — NYSTATIN 500000 UNITS: 500000 SUSPENSION ORAL at 08:46

## 2019-11-14 RX ADMIN — FLUTICASONE PROPIONATE 1 SPRAY: 50 SPRAY, METERED NASAL at 18:32

## 2019-11-14 RX ADMIN — INSULIN LISPRO 3 UNITS: 100 INJECTION, SOLUTION INTRAVENOUS; SUBCUTANEOUS at 22:25

## 2019-11-14 RX ADMIN — MIRTAZAPINE 15 MG: 15 TABLET, FILM COATED ORAL at 22:13

## 2019-11-14 RX ADMIN — NYSTATIN 500000 UNITS: 500000 SUSPENSION ORAL at 22:13

## 2019-11-14 RX ADMIN — PANTOPRAZOLE SODIUM 40 MG: 40 TABLET, DELAYED RELEASE ORAL at 06:46

## 2019-11-14 RX ADMIN — APIXABAN 2.5 MG: 2.5 TABLET, FILM COATED ORAL at 18:17

## 2019-11-14 RX ADMIN — INSULIN LISPRO 2 UNITS: 100 INJECTION, SOLUTION INTRAVENOUS; SUBCUTANEOUS at 11:28

## 2019-11-14 RX ADMIN — METOPROLOL TARTRATE 50 MG: 50 TABLET, FILM COATED ORAL at 08:41

## 2019-11-14 RX ADMIN — NYSTATIN 500000 UNITS: 500000 SUSPENSION ORAL at 14:02

## 2019-11-14 NOTE — ASSESSMENT & PLAN NOTE
- suspected etiology of encephalopathy at this time  - initially elevated JAMILAH/ESR noted - further workup revealed a low C4 with a normal C3, elevated SSA Ab, and elevated IgG - other autoimmune workup unremarkable (ANCA studies, etc )  - per rheumatology, pending dsDNA, cardiolipin study, SARAH, beta-2 glycoprotein level, cryoglobulin, anti-histone Ab, anti-centromere Ab, and lupus anticoagulant study  - per rheumatology documentation, "SLE/or overlap connective tissue disease disorder/ Sjogrens with immune complex activation, low C4 with severe systemic sx, encephalopathy?, recent CVA and splenic infarct - might suggest anti-phospholipid syndrome    Some old hx of adrenal insufficiency, prior steroid rx as well "  - IV Solu-Medrol has been transition to long-term oral Prednisone tapering regimen (PPI prophylaxis on board) and possible consideration of DMARD therapy in the future

## 2019-11-14 NOTE — PROGRESS NOTES
[facility] Internal Medicine - Progress Note  Patient: [place] [de-identified] y o  female MRN: [id number]  Unit/Bed#: -01 Encounter: [id number]  Primary Care Provider: Chelsey Gordon MD  Date Of Visit: 11/14/19        Assessment & Plan:    * Acute metabolic encephalopathy  Assessment & Plan  - etiology fully unclear although after extensive workup during lengthy hospital course thus far, suspect vasculitic etiology (see below) coupled with now resolved hyponatremia and malnutrition/failure to thrive   - evidence of an acute/subacute left precentral gyrus CVA with bilateral IC stenosis also incidentally found (see below)  - slow progressive improvement noted with steroid regimen - continue clinical monitoring    Fever of unknown origin  Assessment & Plan  - remains afebrile - Infectious Disease following and considering vasculitic etiology as possible culprit (may also consider splenic infarct) as extensive infectious workup earlier hospital course unremarkable  - Influenza/RSV screen previously negative - blood parasite smear negative - anti-Babesiosis IgM normal  - see plan for suspected vasculitis below    Splenic infarcts  Assessment & Plan  - initially noted on CT imaging at [facility] in September 2019   - consider cardioembolic etiology in the setting of atrial fibrillation with intermittent RVR  - CTA of abdomen/pelvis negative for aortic injury/aneurysm with patency of celiac/splenic arteries noted, however, to revealed right external iliac artery occlusion with distal reconstitution of the inferior epigastric artery coupled with an evolving splenic infarct   - LENO negative for PFO or obvious right atrial thrombus (see plan for atrial fibrillation below)  - no surgical intervention per general surgery  - continue Eliquis for anticoagulation    Suspected vasculitis  Assessment & Plan  - suspected etiology of encephalopathy at this time  - initially elevated AJMILAH/ESR noted - further workup revealed a low C4 with a normal C3, elevated SSA Ab, and elevated IgG - other autoimmune workup unremarkable (ANCA studies, etc )  - per rheumatology, pending dsDNA, cardiolipin study, SARAH, beta-2 glycoprotein level, cryoglobulin, anti-histone Ab, anti-centromere Ab, and lupus anticoagulant study  - per rheumatology documentation, "SLE/or overlap connective tissue disease disorder/ Sjogrens with immune complex activation, low C4 with severe systemic sx, encephalopathy?, recent CVA and splenic infarct - might suggest anti-phospholipid syndrome    Some old hx of adrenal insufficiency, prior steroid rx as well "  - IV Solu-Medrol has been transition to long-term oral Prednisone tapering regimen (PPI prophylaxis on board) and possible consideration of DMARD therapy in the future    Diabetes mellitus type 2  Assessment & Plan  - HbA1c of 6 4 last  - holding oral hypoglycemics - continue mealtime prandial insulin with additional SSI coverage per Accu-Cheks  - endocrinology following and titrating insulin requirements - anticipate fluctuation of blood sugars on chronic corticosteroids    CAD (coronary artery disease)  Assessment & Plan  - s/p prior coronary stenting  - continue statin therapy    Bilateral carotid artery stenosis  Assessment & Plan  - 50-69% ICA stenosis bilaterally on carotid dopplers  - MRI imaging earlier hospital course revealed evidence of a left precentral gyrus acute/subacute CVA without hemorrhagic transformation  - on statin therapy with Lipitor  - continue PT/OT as tolerated    Failure to thrive in adult - Severe protein calorie malnutrition  Assessment & Plan  - progressive decline in appetite/oral intake over the last few months coupled with weakness/fatigue  - discussed with swallow/speech therapy on 11/12 - continues to progressively tolerate mechanical soft diet with thin liquids  - Keofed remains in place but off tube feeds currently - can remove with evidence of persistent oral intake  - if declines again, may require another family meeting to rediscuss goals of long-term care    Leukocytosis  Assessment & Plan  - likely reactive due to ongoing medical issues and prolonged corticosteroid regimen  - monitor WBC count - remaining afebrile    Thrombocytosis  Assessment & Plan  - likely reactive due to ongoing medical issues  - monitor platelet count    Anemia of chronic disease  Assessment & Plan  - H/H stable  - continue ferrous sulfate supplementation    Paroxysmal atrial fibrillation  Assessment & Plan  - rate controlled on Lopressor  - continue Eliquis for anticoagulation  - TSH normal last month  - echocardiogram revealing EF of 55%, mild MR, moderate TR, trace AR, but no evidence of PFO or right atrial thrombus    Essential hypertension  Assessment & Plan  - home Tenormin previously switched to Lopressor in the setting of atrial fibrillation with previous RVR  - resume Norvasc - continue to hold Enalapril due to waxing/waning BP fluctuations      DVT Prophylaxis:  Eliquis       Patient Centered Rounds:  I have performed bedside rounds and discussed plan of care with nursing today  Discussions with Specialists or Other Care Team Provider:  see above assessments if applicable    Education and Discussions with Family / Patient:  Discussed with patient at bedside and daughter, Sahra Laird, over the phone  Time Spent for Care:  32 minutes  More than 50% of total time spent on counseling and coordination of care as described above  Current Length of Stay: 31 day(s)    Current Patient Status: Inpatient   Certification Statement:  Patient will continue to require additional hospital stay due to assessments as noted above  Code Status: Level 1 - Full Code      Subjective:   Seen/examined earlier today  Patient laying in bed fairly comfortably upon my encounter  Keofed tube remains in place, however, she is now consistently started to tolerate an oral diet    She has a relatively pleasant demeanor today  Objective:     Vitals:   Temp (24hrs), Av 7 °F (36 5 °C), Min:97 5 °F (36 4 °C), Max:98 °F (36 7 °C)    Temp:  [97 5 °F (36 4 °C)-98 °F (36 7 °C)] 98 °F (36 7 °C)  HR:  [72-86] 75  Resp:  [17-18] 17  BP: (132-139)/(58-67) 138/65  SpO2:  [96 %-98 %] 96 %  Body mass index is 23 43 kg/m²  Input and Output Summary (last 24 hours):        Intake/Output Summary (Last 24 hours) at 2019 1245  Last data filed at 2019 1223  Gross per 24 hour   Intake 1500 ml   Output 1250 ml   Net 250 ml       Physical Exam:     GENERAL:  Improved weakness/fatigue  HEAD:  Normocephalic - atraumatic - NG tube in place  EYES: PERRL - EOMI   MOUTH:  Mucosa moist  NECK:  Supple - full range of motion  CARDIAC:  Rate-controlled - S1/S2 positive  PULMONARY:  Clear to auscultation - nonlabored respirations  ABDOMEN:  Soft - nontender/nondistended - active bowel sounds  MUSCULOSKELETAL:  Motor strength/range of motion deconditioned  NEUROLOGIC:  Alert/oriented at baseline currently  SKIN:  Chronic wrinkles/blemishes   PSYCHIATRIC:  Mood/affect stable      Additional Data:     Labs & Recent Cultures:    Results from last 7 days   Lab Units 19  0651   WBC Thousand/uL 13 83*   HEMOGLOBIN g/dL 8 9*   HEMATOCRIT % 30 8*   PLATELETS Thousands/uL 560*   NEUTROS PCT % 76*   LYMPHS PCT % 17   MONOS PCT % 6   EOS PCT % 0     Results from last 7 days   Lab Units 19  0651   POTASSIUM mmol/L 5 0   CHLORIDE mmol/L 104   CO2 mmol/L 31   BUN mg/dL 35*   CREATININE mg/dL 0 54*   CALCIUM mg/dL 9 8         Results from last 7 days   Lab Units 19  1036 19  0623 19  2113 19  1559 19  1057 19  0558 19  2041 19  1552 19  1042 19  0646 19  1614   POC GLUCOSE mg/dl 221* 127 377* 83 139 267* 225* 218* 283* 304* 230* 175*                         Last 24 Hours Medication List:     Current Facility-Administered Medications:  acetaminophen 650 mg Oral Q6H PRN Heaven MAHAN PA-C   aluminum-magnesium hydroxide-simethicone 30 mL Oral Q6H PRN Malcolm Spivey MD   [START ON 11/15/2019] amLODIPine 10 mg Oral Daily Deep Box MD   apixaban 2 5 mg Oral BID Cindi Driscoll PA-C   atorvastatin 40 mg Oral Daily With Trung Oliveros MD   bisacodyl 10 mg Rectal Daily PRN Xavier MAHAN PA-C   docusate sodium 100 mg Oral BID PRN Malcolm Spivey MD   ferrous sulfate 325 mg Oral Daily With Breakfast Nicki Vegas MD   fluticasone 1 spray Each Nare BID Nicki Vegas MD   insulin lispro 1-5 Units Subcutaneous HS Malcolm Spivey MD   insulin lispro 1-6 Units Subcutaneous TID With Meals Deep Box MD   lidocaine (PF) 2 mL Infiltration Once Marci Calderon PA-C   metoprolol 2 5 mg Intravenous Q6H PRN Donny Vega MD   metoprolol tartrate 50 mg Oral Q12H [address] Cindi Driscoll PA-C   mirtazapine 15 mg Oral HS Donny Vega MD   nystatin 500,000 Units Swish & Swallow 4x Daily Heaevn MAHAN PA-C   ondansetron 4 mg Intravenous Q6H PRN Malcolm Spivey MD   pantoprazole 40 mg Oral Early Morning MD Emmanuelle Borja Blinks ON 12/4/2019] predniSONE 10 mg Oral Daily MD Emmanuelle Lemus Blinks ON 11/27/2019] predniSONE 20 mg Oral Daily MD Emmanuelle Lemus Blinks ON 11/20/2019] predniSONE 30 mg Oral Daily Deep Box MD   predniSONE 40 mg Oral Daily Deep Box MD            ** Please Note: This note is constructed using a voice recognition dictation system  An occasional wrong word/phrase or sound-a-like substitution may have been picked up by dictation device due to the inherent limitations of voice recognition software  Read the chart carefully and recognize, using reasonable context, where substitutions may have occurred  **

## 2019-11-14 NOTE — PHYSICAL THERAPY NOTE
PHYSICAL THERAPY Evaluation NOTE    Patient Name: Artemio PRO Date: 11/14/2019 11/14/19 1501   Pain Assessment   Pain Assessment No/denies pain   Restrictions/Precautions   Weight Bearing Precautions Per Order No   General   Chart Reviewed Yes   Additional Pertinent History Pt is an [de-identified] yo F reporting to therapy with acute metabolic encephalopathy  Family/Caregiver Present No   Cognition   Overall Cognitive Status Impaired   Arousal/Participation Alert; Cooperative;Lethargic   Attention Difficulty attending to directions   Orientation Level Oriented to time;Oriented to situation   Memory Decreased short term memory   Following Commands Follows multistep commands with increased time or repetition   Comments Pt was describing visual hallucinations  Subjective   Subjective Pt initially did not want to get up from bed but was then agreeable to therapy  Bed Mobility   Rolling L 3  Moderate assistance   Additional items Assist x 1;HOB elevated; Bedrails; Increased time required;Verbal cues;LE management  (VC and TC for hand placement )   Supine to Sit 3  Moderate assistance   Additional items Assist x 1;HOB elevated; Increased time required;Verbal cues;LE management  (VC to push up into seated position using bedrails )   Transfers   Sit to Stand 3  Moderate assistance   Additional items Increased time required;Verbal cues; Other;Assist x 2  (VC to stand up tall and to bring her hips forward)   Stand pivot 3  Moderate assistance   Additional items Assist x 2; Increased time required  (Mod of 1, Min of 2nd for AD management and safety)   Balance   Static Sitting Fair -   Dynamic Sitting Poor +   Static Standing Poor +   Dynamic Standing Poor   Ambulatory Zero   Endurance Deficit   Endurance Deficit Yes   Endurance Deficit Description   (Limited by strength and fatigue   Postural degradation with t)   Activity Tolerance Activity Tolerance Patient limited by fatigue   Nurse Made Aware Spoke to RN   Assessment   Prognosis Guarded   Problem List Decreased strength;Decreased endurance; Impaired balance;Decreased mobility; Impaired judgement   Assessment Pt is an [de-identified] yo F reporting to therapy with acute metabolic encephalopathy  She presents with decreased LE strength and balance as well as overall decreased cognition 2/2 diagnosis  Patient has shown progression with supine to sit, sit to stand and standing balance as seen with reduced VC and assistance  She was also able to transfer from bed to chair with Mod Ax2 instead of the sit to stand lift (quickmove)  Patient is progressing and would benefit from skilled PT addressing deficits to improve mobility and to decrease functional limitations  Goals   [facility] Expiration Date 11/23/19   PT Treatment Day 10   Plan   Treatment/Interventions ADL retraining;Functional transfer training;LE strengthening/ROM; Therapeutic exercise; Endurance training;Patient/family training;Equipment eval/education; Bed mobility;Gait training;Spoke to nursing   Progress Slow progress, cognitive deficits   PT Frequency 2-3x/wk   Recommendation   Recommendation Post acute IP rehab   Equipment Recommended Walker   PT - OK to Discharge Yes   Additional Comments To rehab when medically appropriate     Faina Newberry, PT, DPT 11/14/2019

## 2019-11-14 NOTE — ASSESSMENT & PLAN NOTE
- initially noted on CT imaging at [facility] in September 2019   - consider cardioembolic etiology in the setting of atrial fibrillation with intermittent RVR  - CTA of abdomen/pelvis negative for aortic injury/aneurysm with patency of celiac/splenic arteries noted, however, to revealed right external iliac artery occlusion with distal reconstitution of the inferior epigastric artery coupled with an evolving splenic infarct   - LENO negative for PFO or obvious right atrial thrombus (see plan for atrial fibrillation below)  - no surgical intervention per general surgery  - continue Eliquis for anticoagulation

## 2019-11-14 NOTE — PROGRESS NOTES
Progress Note - Infectious Disease   [place] Tee [de-identified] y o  female MRN: [id number]  Unit/Bed#: -01 Encounter: [id number]      Impression/Recommendations:  1  FUO, with elevated ESR and JAMILAH   Patient had extensive infectious workup with negative findings and cultures   I suspect she has underlying vasculitis   Consider temporal arteritis   Rheumatology evaluation noted   Workup is in progress   Temperature remains down on systemic corticosteroid  Continue to observe off antibiotic  Monitor temperature  Rheumatology follow-up  Continue systemic corticosteroid per Rheumatology      2  Splenic infarct, most likely secondary to vasculitis above   Clinically, this is not appear to be splenic abscess   Abdominal exam is benign  Monitor      3  Encephalopathy, most likely secondary to vasculitis, with hyponatremia contributing   Mental status is slowly improving  Monitor mental status      4  Hyponatremia   This has resolved      5  Poorly controlled DM, with elevated hemoglobin A1c      Discussed with patient      Antibiotics:  None     Subjective:  Patient is comfortable  She is awake and alert     She is tolerating dysphagia diet well, although appetite is still relatively poor  Temperature stays down   No chills  No diarrhea  Objective:  Vitals:  Temp:  [97 5 °F (36 4 °C)-98 °F (36 7 °C)] 98 °F (36 7 °C)  HR:  [72-86] 75  Resp:  [17-18] 17  BP: (132-139)/(58-67) 138/65  SpO2:  [96 %-98 %] 96 %  Temp (24hrs), Av 7 °F (36 5 °C), Min:97 5 °F (36 4 °C), Max:98 °F (36 7 °C)  Current: Temperature: 98 °F (36 7 °C)    Physical Exam:     General: Awake, alert, cooperative, no distress  Neck:  Supple  No mass  No lymphadenopathy  Lungs: Decreased breath sounds, no rales, no wheezing, respirations unlabored  Heart:  Regular rate and rhythm, S1 and S2 normal, no murmur     Abdomen: Soft, nondistended, non-tender, bowel sounds active all four quadrants,        no masses, no organomegaly  Extremities: Trace edema  No erythema/warmth  No ulcer  Nontender to palpation  Skin:  No rash  Neuro: Moves all extremities  Invasive Devices     Peripheral Intravenous Line            Peripheral IV 11/11/19 Left Wrist 2 days          Drain            Urethral Catheter Straight-tip 16 Fr  30 days    NG/OG/Enteral Tube Nasogastric 8 Fr Right nares 8 days                Labs studies:   I have personally reviewed pertinent labs  Results from last 7 days   Lab Units 11/14/19  0651 11/13/19  0514 11/12/19  0616   POTASSIUM mmol/L 5 0 4 7 5 0   CHLORIDE mmol/L 104 102 104   CO2 mmol/L 31 28 28   BUN mg/dL 35* 31* 26*   CREATININE mg/dL 0 54* 0 55* 0 58*   EGFR ml/min/1 73sq m 89 89 87   CALCIUM mg/dL 9 8 8 9 8 8     Results from last 7 days   Lab Units 11/14/19  0651 11/13/19  0514 11/12/19  0616   WBC Thousand/uL 13 83* 13 77* 12 85*   HEMOGLOBIN g/dL 8 9* 8 5* 9 0*   PLATELETS Thousands/uL 560* 530* 505*           Imaging Studies:   I have personally reviewed pertinent imaging study reports and images in PACS  EKG, Pathology, and Other Studies:   I have personally reviewed pertinent reports

## 2019-11-14 NOTE — PLAN OF CARE
Problem: PHYSICAL THERAPY ADULT  Goal: Performs mobility at highest level of function for planned discharge setting  See evaluation for individualized goals  Description  Treatment/Interventions: Functional transfer training, LE strengthening/ROM, Therapeutic exercise, Endurance training, Patient/family training, Equipment eval/education, Bed mobility, Gait training, Spoke to nursing  Equipment Recommended: Walker(current use of RW for mobility)       See flowsheet documentation for full assessment, interventions and recommendations  Outcome: Progressing  Note:   Prognosis: Guarded  Problem List: Decreased strength, Decreased endurance, Impaired balance, Decreased mobility, Impaired judgement  Assessment: Pt is an [de-identified] yo F reporting to therapy with acute metabolic encephalopathy  She presents with decreased LE strength and balance as well as overall decreased cognition 2/2 diagnosis  Patient has shown progression with supine to sit, sit to stand and standing balance as seen with reduced VC and assistance  She was also able to transfer from bed to chair with Mod Ax2 instead of the sit to stand lift (quickmove)  Patient is progressing and would benefit from skilled PT addressing deficits to improve mobility and to decrease functional limitations  Recommendation: Post acute IP rehab     PT - OK to Discharge: Yes    See flowsheet documentation for full assessment

## 2019-11-14 NOTE — SPEECH THERAPY NOTE
Speech Language/Pathology    Speech/Language Pathology Progress Note    Patient Name: [place]  Today's Date: 11/14/2019       Subjective:  Pt sitting up in bed feeding herself lunch upon my arrival     Objective:  Pt seen for f/u dysphagia at lunch  Current diet: dysphagia level 2 with thin liquids    Pt observed eating diced peaches, pasta, and drinking water and ensure  Slow but adequate mastication observed with at least mildly weak transfer  However, pt managed oral residue to clear independently  No overt s/s aspiration observed  Pt stated she was pleased with current diet  Assessment:  Tolerating safest, least restrictive diet at this time (as dentures unavailable)  Plan/Recommendations:  Continue diet as ordered  D/c speech therapy

## 2019-11-14 NOTE — ASSESSMENT & PLAN NOTE
- progressive decline in appetite/oral intake over the last few months coupled with weakness/fatigue  - discussed with swallow/speech therapy on 11/12 - continues to progressively tolerate mechanical soft diet with thin liquids  - Keofed remains in place but off tube feeds currently - can remove with evidence of persistent oral intake  - if declines again, may require another family meeting to rediscuss goals of long-term care

## 2019-11-14 NOTE — NUTRITION
11/14/19 1253   Recommendations/Interventions   Summary Calorie count day two results x3 meals: 1280 calories (85% needs) and 43 grams protein (72% needs)  Patient's appetite remains fair-good  Patient is consuming ensure compact supplements  Weight remains stable  Nursing skin care plan reviewed  Interventions Diet: continued as ordered; Supplement continue   Nutrition Recommendations Continue diet as ordered; Other (specify); Lab - consider order (specify)  (Suggest remove keofeed at this time secondary to adequate po intake with nutrition supplements   Monitor electrolytes and obtain weekly standing scale weight  )

## 2019-11-14 NOTE — ASSESSMENT & PLAN NOTE
- home Tenormin previously switched to Lopressor in the setting of atrial fibrillation with previous RVR  - resume Norvasc - continue to hold Enalapril due to waxing/waning BP fluctuations

## 2019-11-15 LAB
ANION GAP SERPL CALCULATED.3IONS-SCNC: 3 MMOL/L (ref 4–13)
ANION GAP SERPL CALCULATED.3IONS-SCNC: 5 MMOL/L (ref 4–13)
BASOPHILS # BLD AUTO: 0.01 THOUSANDS/ΜL (ref 0–0.1)
BASOPHILS # BLD AUTO: 0.02 THOUSANDS/ΜL (ref 0–0.1)
BASOPHILS NFR BLD AUTO: 0 % (ref 0–1)
BASOPHILS NFR BLD AUTO: 0 % (ref 0–1)
BUN SERPL-MCNC: 27 MG/DL (ref 5–25)
BUN SERPL-MCNC: 27 MG/DL (ref 5–25)
CALCIUM SERPL-MCNC: 8.8 MG/DL (ref 8.3–10.1)
CALCIUM SERPL-MCNC: 8.9 MG/DL (ref 8.3–10.1)
CHLORIDE SERPL-SCNC: 103 MMOL/L (ref 100–108)
CHLORIDE SERPL-SCNC: 104 MMOL/L (ref 100–108)
CO2 SERPL-SCNC: 28 MMOL/L (ref 21–32)
CO2 SERPL-SCNC: 31 MMOL/L (ref 21–32)
CREAT SERPL-MCNC: 0.49 MG/DL (ref 0.6–1.3)
CREAT SERPL-MCNC: 0.51 MG/DL (ref 0.6–1.3)
EOSINOPHIL # BLD AUTO: 0.01 THOUSAND/ΜL (ref 0–0.61)
EOSINOPHIL # BLD AUTO: 0.02 THOUSAND/ΜL (ref 0–0.61)
EOSINOPHIL NFR BLD AUTO: 0 % (ref 0–6)
EOSINOPHIL NFR BLD AUTO: 0 % (ref 0–6)
ERYTHROCYTE [DISTWIDTH] IN BLOOD BY AUTOMATED COUNT: 22.2 % (ref 11.6–15.1)
ERYTHROCYTE [DISTWIDTH] IN BLOOD BY AUTOMATED COUNT: 22.4 % (ref 11.6–15.1)
GFR SERPL CREATININE-BSD FRML MDRD: 91 ML/MIN/1.73SQ M
GFR SERPL CREATININE-BSD FRML MDRD: 92 ML/MIN/1.73SQ M
GLUCOSE SERPL-MCNC: 156 MG/DL (ref 65–140)
GLUCOSE SERPL-MCNC: 157 MG/DL (ref 65–140)
GLUCOSE SERPL-MCNC: 173 MG/DL (ref 65–140)
GLUCOSE SERPL-MCNC: 176 MG/DL (ref 65–140)
GLUCOSE SERPL-MCNC: 315 MG/DL (ref 65–140)
GLUCOSE SERPL-MCNC: 361 MG/DL (ref 65–140)
HCT VFR BLD AUTO: 30.6 % (ref 34.8–46.1)
HCT VFR BLD AUTO: 30.9 % (ref 34.8–46.1)
HGB BLD-MCNC: 8.8 G/DL (ref 11.5–15.4)
HGB BLD-MCNC: 8.9 G/DL (ref 11.5–15.4)
IMM GRANULOCYTES # BLD AUTO: 0.14 THOUSAND/UL (ref 0–0.2)
IMM GRANULOCYTES # BLD AUTO: 0.15 THOUSAND/UL (ref 0–0.2)
IMM GRANULOCYTES NFR BLD AUTO: 1 % (ref 0–2)
IMM GRANULOCYTES NFR BLD AUTO: 1 % (ref 0–2)
LYMPHOCYTES # BLD AUTO: 2.07 THOUSANDS/ΜL (ref 0.6–4.47)
LYMPHOCYTES # BLD AUTO: 2.17 THOUSANDS/ΜL (ref 0.6–4.47)
LYMPHOCYTES NFR BLD AUTO: 19 % (ref 14–44)
LYMPHOCYTES NFR BLD AUTO: 19 % (ref 14–44)
MAGNESIUM SERPL-MCNC: 1.9 MG/DL (ref 1.6–2.6)
MAGNESIUM SERPL-MCNC: 2.1 MG/DL (ref 1.6–2.6)
MCH RBC QN AUTO: 23 PG (ref 26.8–34.3)
MCH RBC QN AUTO: 23.3 PG (ref 26.8–34.3)
MCHC RBC AUTO-ENTMCNC: 28.8 G/DL (ref 31.4–37.4)
MCHC RBC AUTO-ENTMCNC: 28.8 G/DL (ref 31.4–37.4)
MCV RBC AUTO: 80 FL (ref 82–98)
MCV RBC AUTO: 81 FL (ref 82–98)
MONOCYTES # BLD AUTO: 0.76 THOUSAND/ΜL (ref 0.17–1.22)
MONOCYTES # BLD AUTO: 0.84 THOUSAND/ΜL (ref 0.17–1.22)
MONOCYTES NFR BLD AUTO: 7 % (ref 4–12)
MONOCYTES NFR BLD AUTO: 8 % (ref 4–12)
NEUTROPHILS # BLD AUTO: 7.59 THOUSANDS/ΜL (ref 1.85–7.62)
NEUTROPHILS # BLD AUTO: 8.06 THOUSANDS/ΜL (ref 1.85–7.62)
NEUTS SEG NFR BLD AUTO: 72 % (ref 43–75)
NEUTS SEG NFR BLD AUTO: 73 % (ref 43–75)
NRBC BLD AUTO-RTO: 0 /100 WBCS
NRBC BLD AUTO-RTO: 0 /100 WBCS
PHOSPHATE SERPL-MCNC: 3.7 MG/DL (ref 2.3–4.1)
PHOSPHATE SERPL-MCNC: 3.7 MG/DL (ref 2.3–4.1)
PLATELET # BLD AUTO: 472 THOUSANDS/UL (ref 149–390)
PLATELET # BLD AUTO: 507 THOUSANDS/UL (ref 149–390)
PMV BLD AUTO: 10 FL (ref 8.9–12.7)
PMV BLD AUTO: 9.6 FL (ref 8.9–12.7)
POTASSIUM SERPL-SCNC: 4.5 MMOL/L (ref 3.5–5.3)
POTASSIUM SERPL-SCNC: 4.8 MMOL/L (ref 3.5–5.3)
RBC # BLD AUTO: 3.82 MILLION/UL (ref 3.81–5.12)
RBC # BLD AUTO: 3.82 MILLION/UL (ref 3.81–5.12)
SODIUM SERPL-SCNC: 136 MMOL/L (ref 136–145)
SODIUM SERPL-SCNC: 138 MMOL/L (ref 136–145)
WBC # BLD AUTO: 10.68 THOUSAND/UL (ref 4.31–10.16)
WBC # BLD AUTO: 11.16 THOUSAND/UL (ref 4.31–10.16)

## 2019-11-15 PROCEDURE — 83735 ASSAY OF MAGNESIUM: CPT | Performed by: INTERNAL MEDICINE

## 2019-11-15 PROCEDURE — 82948 REAGENT STRIP/BLOOD GLUCOSE: CPT

## 2019-11-15 PROCEDURE — 80048 BASIC METABOLIC PNL TOTAL CA: CPT | Performed by: INTERNAL MEDICINE

## 2019-11-15 PROCEDURE — 97110 THERAPEUTIC EXERCISES: CPT

## 2019-11-15 PROCEDURE — 97535 SELF CARE MNGMENT TRAINING: CPT

## 2019-11-15 PROCEDURE — 97530 THERAPEUTIC ACTIVITIES: CPT

## 2019-11-15 PROCEDURE — 99232 SBSQ HOSP IP/OBS MODERATE 35: CPT | Performed by: INTERNAL MEDICINE

## 2019-11-15 PROCEDURE — 85025 COMPLETE CBC W/AUTO DIFF WBC: CPT | Performed by: INTERNAL MEDICINE

## 2019-11-15 PROCEDURE — 84100 ASSAY OF PHOSPHORUS: CPT | Performed by: INTERNAL MEDICINE

## 2019-11-15 RX ORDER — METOPROLOL TARTRATE 50 MG/1
50 TABLET, FILM COATED ORAL EVERY 12 HOURS SCHEDULED
Refills: 0
Start: 2019-11-15

## 2019-11-15 RX ORDER — PREDNISONE 10 MG/1
20 TABLET ORAL DAILY
Qty: 14 TABLET | Refills: 0 | Status: SHIPPED | OUTPATIENT
Start: 2019-11-27

## 2019-11-15 RX ORDER — PREDNISONE 10 MG/1
10 TABLET ORAL DAILY
Qty: 30 TABLET | Refills: 0 | Status: SHIPPED | OUTPATIENT
Start: 2019-12-04

## 2019-11-15 RX ORDER — FLUTICASONE PROPIONATE 50 MCG
1 SPRAY, SUSPENSION (ML) NASAL 2 TIMES DAILY
Refills: 0
Start: 2019-11-16

## 2019-11-15 RX ORDER — FERROUS SULFATE 325(65) MG
325 TABLET ORAL
Refills: 0
Start: 2019-11-16

## 2019-11-15 RX ORDER — PREDNISONE 10 MG/1
40 TABLET ORAL DAILY
Qty: 12 TABLET | Refills: 0 | Status: SHIPPED | OUTPATIENT
Start: 2019-11-17

## 2019-11-15 RX ORDER — PANTOPRAZOLE SODIUM 40 MG/1
40 TABLET, DELAYED RELEASE ORAL
Refills: 0
Start: 2019-11-16

## 2019-11-15 RX ORDER — ATORVASTATIN CALCIUM 40 MG/1
40 TABLET, FILM COATED ORAL
Refills: 0
Start: 2019-11-16

## 2019-11-15 RX ORDER — PREDNISONE 10 MG/1
30 TABLET ORAL DAILY
Qty: 21 TABLET | Refills: 0 | Status: SHIPPED | OUTPATIENT
Start: 2019-11-20

## 2019-11-15 RX ORDER — MIRTAZAPINE 15 MG/1
15 TABLET, FILM COATED ORAL
Refills: 0
Start: 2019-11-15

## 2019-11-15 RX ORDER — ACETAMINOPHEN 325 MG/1
650 TABLET ORAL EVERY 6 HOURS PRN
Qty: 30 TABLET | Refills: 0
Start: 2019-11-15

## 2019-11-15 RX ADMIN — NYSTATIN 500000 UNITS: 500000 SUSPENSION ORAL at 17:12

## 2019-11-15 RX ADMIN — INSULIN LISPRO 5 UNITS: 100 INJECTION, SOLUTION INTRAVENOUS; SUBCUTANEOUS at 17:24

## 2019-11-15 RX ADMIN — NYSTATIN 500000 UNITS: 500000 SUSPENSION ORAL at 21:59

## 2019-11-15 RX ADMIN — PANTOPRAZOLE SODIUM 40 MG: 40 TABLET, DELAYED RELEASE ORAL at 06:56

## 2019-11-15 RX ADMIN — NYSTATIN 500000 UNITS: 500000 SUSPENSION ORAL at 12:15

## 2019-11-15 RX ADMIN — METOPROLOL TARTRATE 50 MG: 50 TABLET, FILM COATED ORAL at 21:59

## 2019-11-15 RX ADMIN — NYSTATIN 500000 UNITS: 500000 SUSPENSION ORAL at 08:29

## 2019-11-15 RX ADMIN — FLUTICASONE PROPIONATE 1 SPRAY: 50 SPRAY, METERED NASAL at 08:29

## 2019-11-15 RX ADMIN — MIRTAZAPINE 15 MG: 15 TABLET, FILM COATED ORAL at 21:59

## 2019-11-15 RX ADMIN — FLUTICASONE PROPIONATE 1 SPRAY: 50 SPRAY, METERED NASAL at 17:24

## 2019-11-15 RX ADMIN — INSULIN LISPRO 1 UNITS: 100 INJECTION, SOLUTION INTRAVENOUS; SUBCUTANEOUS at 07:00

## 2019-11-15 RX ADMIN — INSULIN LISPRO 1 UNITS: 100 INJECTION, SOLUTION INTRAVENOUS; SUBCUTANEOUS at 12:18

## 2019-11-15 RX ADMIN — APIXABAN 2.5 MG: 2.5 TABLET, FILM COATED ORAL at 17:12

## 2019-11-15 RX ADMIN — PREDNISONE 40 MG: 20 TABLET ORAL at 08:29

## 2019-11-15 RX ADMIN — FERROUS SULFATE TAB 325 MG (65 MG ELEMENTAL FE) 325 MG: 325 (65 FE) TAB at 06:56

## 2019-11-15 RX ADMIN — AMLODIPINE BESYLATE 10 MG: 10 TABLET ORAL at 08:29

## 2019-11-15 RX ADMIN — INSULIN LISPRO 3 UNITS: 100 INJECTION, SOLUTION INTRAVENOUS; SUBCUTANEOUS at 22:08

## 2019-11-15 RX ADMIN — ATORVASTATIN CALCIUM 40 MG: 40 TABLET, FILM COATED ORAL at 17:11

## 2019-11-15 RX ADMIN — METOPROLOL TARTRATE 50 MG: 50 TABLET, FILM COATED ORAL at 08:29

## 2019-11-15 RX ADMIN — APIXABAN 2.5 MG: 2.5 TABLET, FILM COATED ORAL at 08:29

## 2019-11-15 NOTE — TRANSPORTATION MEDICAL NECESSITY
Section I - General Information    Name of Patient: [place]                 : 1940    Medicare #: [id number]  Transport Date: 2019 (PCS is valid for round trips on this date and for all repetitive trips in the 60-day range as noted below )  Origin: [address]                                                         Destination: [facility]  Is the pt's stay covered under Medicare Part A (PPS/DRG)   [x]     Closest appropriate facility? If no, why is transport to more distant facility required? Yes  If hospice pt, is this transport related to pt's terminal illness? NA       Section II - Medical Necessity Questionnaire  Ambulance transportation is medically necessary only if other means of transport are contraindicated or would be potentially harmful to the patient  To meet this requirement, the patient must either be "bed confined" or suffer from a condition such that transport by means other than ambulance is contraindicated by the patient's condition  The following questions must be answered by the medical professional signing below for this form to be valid:    1)  Describe the MEDICAL CONDITION (physical and/or mental) of this patient AT [address] [place] [address] that requires the patient to be transported in an ambulance and why transport by other means is contraindicated by the patient's condition: Acute metabolic encephalopathy    2) Is the patient "bed confined" as defined below? Yes  To be "be confined" the patient must satisfy all three of the following conditions: (1) unable to get up from bed without Assistance; AND (2) unable to ambulate; AND (3) unable to sit in a chair or wheelchair  3) Can this patient safely be transported by car or wheelchair van (i e , seated during transport without a medical attendant or monitoring)?    No    4) In addition to completing questions 1-3 above, please check any of the following conditions that apply*:   *Note: supporting documentation for any boxes checked must be maintained in the patient's medical records  If hosp-hosp transfer, describe services needed at 2nd facility not available at 1st facility? Medical attendant required   Special handling/isolation/infection control precautions required   Unable to tolerate seated position for time needed to transport       Section III - Signature of Physician or Healthcare Professional  I certify that the above information is true and correct based on my evaluation of this patient, and represent that the patient requires transport by ambulance and that other forms of transport are contraindicated  I understand that this information will be used by the Centers for Medicare and Medicaid Services (CMS) to support the determination of medical necessity for ambulance services, and I represent that I have personal knowledge of the patient's condition at time of transport  [x]  If this box is checked, I also certify that the patient is physically or mentally incapable of signing the ambulance service's claim and that the institution with which I am affiliated has furnished care, services, or assistance to the patient  My signature below is made on behalf of the patient pursuant to 42 CFR §424 36(b)(4)   In accordance with 42 CFR §424 37, the specific reason(s) that the patient is physically or mentally incapable of signing the claim form is as follows: Vasculitis      Signature of Physician* or Healthcare Professional______________________________________________________________  Signature Date 11/15/19 (For scheduled repetitive transports, this form is not valid for transports performed more than 60 days after this date)    Printed Name & Credentials of Physician or Healthcare Professional (MD, DO, RN, etc )___June Sarah Cantu RN, CM,  MS, BSN_____________________________  *Form must be signed by patient's attending physician for scheduled, repetitive transports   For non-repetitive, unscheduled ambulance transports, if unable to obtain the signature of the attending physician, any of the following may sign (choose appropriate option below)  [] Physician Assistant []  Clinical Nurse Specialist [x]  Registered Nurse  []  Nurse Practitioner  [] Discharge Planner

## 2019-11-15 NOTE — PROGRESS NOTES
Progress Note - Infectious Disease   [place] T[place] [de-identified] y o  female MRN: [id number]  Unit/Bed#: -01 Encounter: [id number]      Impression/Recommendations:  1  FUO, with elevated ESR and JAMILAH   Patient had extensive infectious workup with negative findings and cultures   I suspect she has underlying vasculitis   Consider temporal arteritis   Rheumatology evaluation noted  Temperature remains down on systemic corticosteroid  Continue to observe off antibiotic  Monitor temperature  Rheumatology follow-up  Continue systemic corticosteroid per Rheumatology      2  Splenic infarct, most likely secondary to vasculitis above   Clinically, this is not appear to be splenic abscess   Abdominal exam is benign  Monitor      3  Encephalopathy, most likely secondary to vasculitis, with hyponatremia contributing   Mental status is slowly improving  Monitor mental status      4  Hyponatremia   This has resolved      5  Poorly controlled DM, with elevated hemoglobin A1c      Discussed with patient  With no active infection, I will sign off  Thank you for the consultation  Please do not hesitate to reconsult us for any questions or issues      Antibiotics:  None     Subjective:  Patient is comfortable  She is awake and alert     She is tolerating dysphagia diet well  Temperature stays down   No chills  No diarrhea  Objective:  Vitals:  Temp:  [97 9 °F (36 6 °C)-98 4 °F (36 9 °C)] 97 9 °F (36 6 °C)  HR:  [79-81] 79  Resp:  [18-20] 18  BP: (117-137)/(49-61) 137/61  SpO2:  [96 %-99 %] 99 %  Temp (24hrs), Av 2 °F (36 8 °C), Min:97 9 °F (36 6 °C), Max:98 4 °F (36 9 °C)  Current: Temperature: 97 9 °F (36 6 °C)    Physical Exam:     General: Awake, alert, cooperative, no distress  Neck:  Supple  No mass  No lymphadenopathy  Lungs: Expansion symmetric, no rales, no wheezing, respirations unlabored  Heart:  Regular rate and rhythm, S1 and S2 normal, no murmur     Abdomen: Soft, nondistended, non-tender, bowel sounds active all four quadrants,        no masses, no organomegaly  Extremities: Stable mild leg edema  No erythema/warmth  No ulcer  Nontender to palpation  Skin:  No rash  Neuro: Moves all extremities  Invasive Devices     Peripheral Intravenous Line            Peripheral IV 11/11/19 Left Wrist 3 days          Drain            Urethral Catheter Straight-tip 16 Fr  31 days    NG/OG/Enteral Tube Nasogastric 8 Fr Right nares 9 days                Labs studies:   I have personally reviewed pertinent labs  Results from last 7 days   Lab Units 11/15/19  0629 11/15/19  0505 11/14/19  0651   POTASSIUM mmol/L 4 5 4 8 5 0   CHLORIDE mmol/L 104 103 104   CO2 mmol/L 31 28 31   BUN mg/dL 27* 27* 35*   CREATININE mg/dL 0 51* 0 49* 0 54*   EGFR ml/min/1 73sq m 91 92 89   CALCIUM mg/dL 8 9 8 8 9 8     Results from last 7 days   Lab Units 11/15/19  0629 11/15/19  0505 11/14/19  0651   WBC Thousand/uL 10 68* 11 16* 13 83*   HEMOGLOBIN g/dL 8 8* 8 9* 8 9*   PLATELETS Thousands/uL 507* 472* 560*           Imaging Studies:   I have personally reviewed pertinent imaging study reports and images in PACS  EKG, Pathology, and Other Studies:   I have personally reviewed pertinent reports

## 2019-11-15 NOTE — ASSESSMENT & PLAN NOTE
- progressive decline in appetite/oral intake over the last few months coupled with weakness/fatigue  - discussed with swallow/speech therapy on 11/12 - now consistently tolerating mechanical soft diet with thin liquids (Ensure nutritional supplementation on board) and Keofed tube removed

## 2019-11-15 NOTE — ASSESSMENT & PLAN NOTE
- initially noted on CT imaging at [facility] in September 2019   - consider cardioembolic etiology in the setting of atrial fibrillation with intermittent RVR  - CTA of abdomen/pelvis negative for aortic injury/aneurysm with patency of celiac/splenic arteries noted, however, to revealed right external iliac artery occlusion with distal reconstitution of the inferior epigastric artery coupled with an evolving splenic infarct   - LENO negative for PFO or obvious right atrial thrombus (see plan for atrial fibrillation below)  - no surgical intervention per general surgery  - continue Eliquis for anticoagulation

## 2019-11-15 NOTE — PLAN OF CARE
Problem: PHYSICAL THERAPY ADULT  Goal: Performs mobility at highest level of function for planned discharge setting  See evaluation for individualized goals  Description  Treatment/Interventions: Functional transfer training, LE strengthening/ROM, Therapeutic exercise, Endurance training, Patient/family training, Equipment eval/education, Bed mobility, Gait training, Spoke to nursing  Equipment Recommended: Walker(current use of RW for mobility)       See flowsheet documentation for full assessment, interventions and recommendations  Outcome: Progressing  Note:   Prognosis: Fair  Problem List: Decreased strength, Decreased range of motion, Decreased endurance, Impaired balance, Decreased mobility, Decreased coordination, Decreased cognition, Impaired judgement, Decreased safety awareness, Impaired hearing  Assessment: Pt  participated well in therapy session  sdecreased assist needed for all levels of mobility  Pt  needed max visual and verbal cues for stand pivot transfer to chair with Mod A x 1 and 2nd SBA for safety  Pt  able to take steps laterally  Pt  able to maintain sitting at EOB with max A x 1 which progressed to CS with cues to sit upright with UE support on bed  pt  also able to steps from EOB to chair laterally with RW and Mod A x 1 with max cues for LE sequencing and therapist negotiated RW  Pt  very fearful and anxious about falling  Reassured her safety while seated at EOB  Talked to attending therapist Kassie Alston abut session and patient's mobility progress  PT to see next visit to add ambulation goals  Performed LE TE's AAROm/AROM in sme reclined posiiton - SLR, HS, hip abduction, hip adduction, AP, Quad sets, SAQ x 20 reps  Pt  able perform forwrad reach with cues from retropulsion with cues  Noted retropulsion with initial sitting at EOB and standing and correct it with assist and cues   Will continue to follow during the stay to amximize functional mobility, endurance and balance Recommendation: Post acute IP rehab     PT - OK to Discharge: Yes    See flowsheet documentation for full assessment

## 2019-11-15 NOTE — OCCUPATIONAL THERAPY NOTE
Occupational Therapy Treatment Note      Pam Oliveira    11/15/2019    Principal Problem:    Acute metabolic encephalopathy  Active Problems:    Diabetes mellitus type 2    Essential hypertension    Failure to thrive in adult - Severe protein calorie malnutrition    Urinary retention    Fever of unknown origin    Microcytic anemia    Splenic infarcts    Paroxysmal atrial fibrillation    Goals of care, counseling/discussion    Internal carotid artery stenosis, bilateral    JAMILAH positive    Acute CVA (cerebrovascular accident) ([facility] )    Suspected vasculitis    CAD (coronary artery disease)    Bilateral carotid artery stenosis    Leukocytosis    Thrombocytosis    Anemia of chronic disease      Past Medical History:   Diagnosis Date    Adrenal hemorrhage ([facility] )     Cardiac disease     Carotid stenosis     Diabetes mellitus ([facility] )     H/O adrenal insufficiency     Hyperlipidemia     Hypertension     Hyponatremia     Urinary retention 09/2019    Rapp    UTI (urinary tract infection) 09/2019       Past Surgical History:   Procedure Laterality Date    CORONARY ANGIOPLASTY WITH STENT PLACEMENT          11/15/19 0955   Restrictions/Precautions   Weight Bearing Precautions Per Order No   Other Precautions Contact/isolation; Chair Alarm;Multiple lines;Telemetry; Fall Risk   General   Response to Previous Treatment Patient with no complaints from previous session   Lifestyle   Autonomy Has needed assist w self care/mobility for the past one month  was at [facility] rehab for rehab following 2 hospitalizations   Reciprocal Relationships supportive family   Pain Assessment   Pain Assessment No/denies pain   Pain Score No Pain   ADL   Eating Assistance 7  Independent   Eating Deficit Setup; Increased time to complete   Grooming Assistance 4  Minimal Assistance   Grooming Deficit Brushing hair   Grooming Comments able to wash face/hands and comb 50% of hair   Needs assist for completion   UB Bathing Comments max asssist needed for completion of hygiene/bathing and dressing  Functional Standing Tolerance   Time 45 seconds - 1 min   Activity max of 1 wRW   Bed Mobility   Rolling R 3  Moderate assistance   Additional items Assist x 1; Increased time required;Verbal cues   Rolling L 3  Moderate assistance   Additional items Assist x 1; Increased time required   Supine to Sit 3  Moderate assistance   Additional items Assist x 1; Increased time required   Transfers   Sit to Stand 3  Moderate assistance   Additional items Assist x 1; Increased time required   Stand to Sit 3  Moderate assistance   Additional items Assist x 1; Increased time required   Stand pivot 3  Moderate assistance   Additional items Assist x 2; Increased time required;Verbal cues   Cognition   Arousal/Participation Alert; Cooperative   Attention Attends with cues to redirect   Orientation Level Oriented X4   Memory Decreased recall of recent events   Following Commands Follows multistep commands with increased time or repetition   Activity Tolerance   Activity Tolerance Patient limited by fatigue   Assessment   Assessment pt seen for skilled OT treatment session w focus on self care and sitting balance/trunk control and SPT  Pt has a flat affect, decreased initiation, but is very cooperative  mod assist for bedmobility with increased time for completion  min assist and cues to maintain balance at EOB, able to hold self up for brief periods of time w cues to sit upright  Pt sat at EOB x 10 min with on/off assist  mod assist for sit to stand, assist of 2 for SPT to chair  Pt needs min assist for completion of grooming due to fatigue, assist for bathing and dressing due to gen  weakness and fatigue  OT will continue to follow while in acute care  Plan   Treatment Interventions ADL retraining;Functional transfer training;UE strengthening/ROM; Endurance training;Cognitive reorientation;Patient/family training;Equipment evaluation/education; Compensatory technique education; Energy conservation; Activityengagement   Goal Expiration Date 11/30/19   OT Treatment Day 7   OT Frequency 2-3x/wk   Recommendation   OT Discharge Recommendation Short Term Rehab   OT - OK to Discharge Yes

## 2019-11-15 NOTE — SOCIAL WORK
CM informed via tiger text by Greg Zabala pt will be tentatively d/c 48 hrs  Transportation will need to be arranged  Notified [facility] who will hold bed over weekend for pt  Ed Jeffsheri is calling daughter to let her know

## 2019-11-15 NOTE — ASSESSMENT & PLAN NOTE
- etiology fully unclear although after extensive workup during lengthy hospital course thus far, suspect vasculitic etiology (see below) coupled with now resolved hyponatremia and malnutrition/failure to thrive   - evidence of an acute/subacute left precentral gyrus CVA with bilateral IC stenosis also incidentally found (see below)  - returning to neurologic baseline with current steroid regimen - continue clinical monitoring  - plan for discharge to skilled rehab tomorrow

## 2019-11-15 NOTE — PLAN OF CARE
Problem: OCCUPATIONAL THERAPY ADULT  Goal: Performs self-care activities at highest level of function for planned discharge setting  See evaluation for individualized goals  Description  Treatment Interventions: ADL retraining, Functional transfer training, UE strengthening/ROM, Endurance training, Cognitive reorientation, Patient/family training, Compensatory technique education, Energy conservation, Activityengagement          See flowsheet documentation for full assessment, interventions and recommendations  Note:   Limitation: Decreased ADL status, Decreased Safe judgement during ADL, Decreased cognition, Decreased endurance, Decreased self-care trans, Decreased high-level ADLs  Prognosis: Fair  Assessment: pt seen for skilled OT treatment session w focus on self care and sitting balance/trunk control and SPT  Pt has a flat affect, decreased initiation, but is very cooperative  mod assist for bedmobility with increased time for completion  min assist and cues to maintain balance at EOB, able to hold self up for brief periods of time w cues to sit upright  Pt sat at EOB x 10 min with on/off assist  mod assist for sit to stand, assist of 2 for SPT to chair  Pt needs min assist for completion of grooming due to fatigue, assist for bathing and dressing due to gen  weakness and fatigue  OT will continue to follow while in acute care  OT Discharge Recommendation: Short Term Rehab  OT - OK to Discharge:  Yes

## 2019-11-15 NOTE — PROGRESS NOTES
Progress Note - Pam Oliveira [de-identified] y o  female MRN: [id number]    Unit/Bed#: -01 Encounter: [id number]      CC: diabetes f/u    Subjective:   Elan Wilson is a [de-identified]y o  year old female with type 2 diabetes  No hypoglycemia  Feels fine  Objective:     Vitals: Blood pressure 137/61, pulse 79, temperature 97 9 °F (36 6 °C), temperature source Oral, resp  rate 18, height 5' 2" (1 575 m), weight 58 8 kg (129 lb 9 6 oz), SpO2 99 %, not currently breastfeeding  ,Body mass index is 23 7 kg/m²  Intake/Output Summary (Last 24 hours) at 11/15/2019 1211  Last data filed at 11/15/2019 0546  Gross per 24 hour   Intake 660 ml   Output 1900 ml   Net -1240 ml       Physical Exam:  General Appearance: awake, appears stated age and cooperative  Head: Normocephalic, without obvious abnormality, atraumatic  Extremities: moves all extremities  Skin: Skin color and temperature normal    Pulm: no labored breathing    Lab, Imaging and other studies: I have personally reviewed pertinent reports  POC Glucose (mg/dl)   Date Value   11/15/2019 157 (H)   11/15/2019 176 (H)   11/14/2019 331 (H)   11/14/2019 359 (H)   11/14/2019 221 (H)   11/14/2019 127   11/13/2019 377 (H)   11/13/2019 83   11/13/2019 139   11/13/2019 267 (H)       Assessment:  diabetes with hyperglycemia    Plan:    Diabetes mellitus with steroid induced hyperglycemia -most recent A1c 6 4  Patient is currently on prednisone taper  She is off tube feeds  I will continue correctional insulin sliding scale with diabetic diet  Please call us if any questions  FUO - workup per ID and rheumatology  Patient started on systemic steroids  - remains afebrile    Portions of the record may have been created with voice recognition software

## 2019-11-15 NOTE — ASSESSMENT & PLAN NOTE
- HbA1c of 6 4 last month  - holding oral hypoglycemics - on SSI coverage per [employer]  - endocrinology following and titrating insulin requirements - anticipate fluctuation of blood sugars on chronic corticosteroids

## 2019-11-15 NOTE — ASSESSMENT & PLAN NOTE
- home Tenormin previously switched to Lopressor in the setting of atrial fibrillation with previous RVR  - continue Norvasc

## 2019-11-15 NOTE — PROGRESS NOTES
[facility] - Progress Note  Patient: [place] [de-identified] y o  female MRN: [id number]  Unit/Bed#: -01 Encounter: [id number]  Primary Care Provider: Praveen Gallegos MD  Date Of Visit: 11/15/19        Assessment & Plan:    * Acute metabolic encephalopathy  Assessment & Plan  - etiology fully unclear although after extensive workup during lengthy hospital course thus far, suspect vasculitic etiology (see below) coupled with now resolved hyponatremia and malnutrition/failure to thrive   - evidence of an acute/subacute left precentral gyrus CVA with bilateral IC stenosis also incidentally found (see below)  - returning to neurologic baseline with current steroid regimen - continue clinical monitoring  - plan for discharge to skilled rehab tomorrow    Fever of unknown origin  Assessment & Plan  - remains afebrile - Infectious Disease following and considering vasculitic etiology as possible culprit (may also consider splenic infarct) as extensive infectious workup earlier hospital course unremarkable  - Influenza/RSV screen previously negative - blood parasite smear negative - anti-Babesiosis IgM normal  - see plan for suspected vasculitis below    Splenic infarcts  Assessment & Plan  - initially noted on CT imaging at [facility] in September 2019   - consider cardioembolic etiology in the setting of atrial fibrillation with intermittent RVR  - CTA of abdomen/pelvis negative for aortic injury/aneurysm with patency of celiac/splenic arteries noted, however, to revealed right external iliac artery occlusion with distal reconstitution of the inferior epigastric artery coupled with an evolving splenic infarct   - LENO negative for PFO or obvious right atrial thrombus (see plan for atrial fibrillation below)  - no surgical intervention per general surgery  - continue Eliquis for anticoagulation    Suspected vasculitis  Assessment & Plan  - suspected etiology of encephalopathy at this time  - initially elevated JAMILAH/ESR noted - further workup revealed a low C4 with a normal C3, elevated dsDNA and anti-histone Ab, elevated SSA Ab, elevated IgG, and normal SARAH - other autoimmune workup unremarkable (ANCA studies, etc )  - per rheumatology, still pending cardiolipin study, beta-2 glycoprotein level, cryoglobulin, anti-centromere Ab, and lupus anticoagulant study  - per rheumatology documentation, "SLE/or overlap connective tissue disease disorder/ Sjogrens with immune complex activation, low C4 with severe systemic sx, encephalopathy?, recent CVA and splenic infarct - might suggest anti-phospholipid syndrome    Some old hx of adrenal insufficiency, prior steroid rx as well "  - IV Solu-Medrol has been transitioned to long-term oral Prednisone tapering regimen (PPI prophylaxis on board) and possible consideration of DMARD therapy in the future - Prednisone regimen on discharge consisting of completion of 40 mg daily for a full week (day 3 of 7 today), then 30 mg daily x 1 week, then 20 mg daily x 1 week, then 10 mg daily indefinitely (maintenance)    Diabetes mellitus type 2  Assessment & Plan  - HbA1c of 6 4 last month  - holding oral hypoglycemics - on SSI coverage per Accu-Cheks  - endocrinology following and titrating insulin requirements - anticipate fluctuation of blood sugars on chronic corticosteroids    CAD (coronary artery disease)  Assessment & Plan  - s/p prior coronary stenting  - continue statin therapy - continue Lopressor    Bilateral carotid artery stenosis  Assessment & Plan  - 50-69% ICA stenosis bilaterally on carotid dopplers  - MRI imaging earlier hospital course revealed evidence of a left precentral gyrus acute/subacute CVA without hemorrhagic transformation  - on statin therapy with Lipitor  - continue PT/OT as tolerated     Failure to thrive in adult - Severe protein calorie malnutrition  Assessment & Plan  - progressive decline in appetite/oral intake over the last few months coupled with weakness/fatigue  - discussed with swallow/speech therapy on 11/12 - now consistently tolerating mechanical soft diet with thin liquids (Ensure nutritional supplementation on board) and Keofed tube removed    Leukocytosis  Assessment & Plan  - likely reactive due to ongoing medical issues and prolonged corticosteroid regimen  - monitor WBC count - remaining afebrile    Thrombocytosis  Assessment & Plan  - likely reactive due to ongoing medical issues  - monitor platelet count    Anemia of chronic disease  Assessment & Plan  - H/H stable  - continue ferrous sulfate supplementation    Paroxysmal atrial fibrillation  Assessment & Plan  - rate controlled on Lopressor  - continue Eliquis for anticoagulation  - TSH normal last month  - echocardiogram revealing EF of 55%, mild MR, moderate TR, trace AR, but no evidence of PFO or right atrial thrombus    Essential hypertension  Assessment & Plan  - home Tenormin previously switched to Lopressor in the setting of atrial fibrillation with previous RVR  - continue Norvasc       DVT Prophylaxis:  Eliquis       Patient Centered Rounds:  I have performed bedside rounds and discussed plan of care with nursing today  Discussions with Specialists or Other Care Team Provider:  see above assessments if applicable    Education and Discussions with Family / Patient:  Discussed with patient at bedside  Discussed with daughter over the phone yesterday  Time Spent for Care:  32 minutes  More than 50% of total time spent on counseling and coordination of care as described above  Current Length of Stay: 32 day(s)    Current Patient Status: Inpatient   Certification Statement:  Patient will continue to require additional hospital stay due to assessments as noted above  Code Status: Level 1 - Full Code      Subjective:   Seen/examined earlier in the day  No new complaints at this time  Patient is alert/oriented at her baseline    Is consistently tolerating an oral diet now  Objective:     Vitals:   Temp (24hrs), Av 9 °F (36 6 °C), Min:97 9 °F (36 6 °C), Max:97 9 °F (36 6 °C)    Temp:  [97 9 °F (36 6 °C)] 97 9 °F (36 6 °C)  HR:  [79-81] 80  Resp:  [18] 18  BP: (121-137)/(49-61) 132/61  SpO2:  [96 %-99 %] 96 %  Body mass index is 23 7 kg/m²  Input and Output Summary (last 24 hours):        Intake/Output Summary (Last 24 hours) at 11/15/2019 1730  Last data filed at 11/15/2019 1445  Gross per 24 hour   Intake 480 ml   Output 1600 ml   Net -1120 ml       Physical Exam:     GENERAL:  Continually improving weakness/fatigue  HEAD:  Normocephalic - atraumatic  EYES: PERRL - EOMI   MOUTH:  Mucosa moist  NECK:  Supple - full range of motion  CARDIAC:  Regular rate/rhythm - S1/S2 positive  PULMONARY:  Clear breath sounds bilaterally - nonlabored respirations  ABDOMEN:  Soft - nontender/nondistended - active bowel sounds  MUSCULOSKELETAL:  Motor strength/range of motion quite deconditioned  NEUROLOGIC:  Alert/awake at baseline  SKIN:  Chronic wrinkles/blemishes   PSYCHIATRIC:  Mood/affect age-appropriate      Additional Data:     Labs & Recent Cultures:    Results from last 7 days   Lab Units 11/15/19  0629   WBC Thousand/uL 10 68*   HEMOGLOBIN g/dL 8 8*   HEMATOCRIT % 30 6*   PLATELETS Thousands/uL 507*   NEUTROS PCT % 72   LYMPHS PCT % 19   MONOS PCT % 8   EOS PCT % 0     Results from last 7 days   Lab Units 11/15/19  0629   POTASSIUM mmol/L 4 5   CHLORIDE mmol/L 104   CO2 mmol/L 31   BUN mg/dL 27*   CREATININE mg/dL 0 51*   CALCIUM mg/dL 8 9         Results from last 7 days   Lab Units 11/15/19  1605 11/15/19  1041 11/15/19  0550 19  2117 19  1604 19  1036 19  0623 19  2113 19  1559 19  1057 19  0558 19  2041   POC GLUCOSE mg/dl 315* 157* 176* 331* 359* 221* 127 377* 83 139 267* 225*           Last 24 Hours Medication List:     Current Facility-Administered Medications:  acetaminophen 650 mg Oral Q6H PRN Luis Good LUPE MAHAN   aluminum-magnesium hydroxide-simethicone 30 mL Oral Q6H PRN Haven Pena MD   amLODIPine 10 mg Oral Daily Georgette Yoo MD   apixaban 2 5 mg Oral BID Pooja Mason PA-C   atorvastatin 40 mg Oral Daily With Alfred Joseph MD   bisacodyl 10 mg Rectal Daily PRN Zita MAHAN PA-C   docusate sodium 100 mg Oral BID PRN Haven Pena MD   ferrous sulfate 325 mg Oral Daily With Breakfast Elvira Mayes MD   fluticasone 1 spray Each Nare BID Elvira Mayes MD   insulin lispro 1-5 Units Subcutaneous HS Haven Pena MD   insulin lispro 1-6 Units Subcutaneous TID With Meals Georgette Yoo MD   lidocaine (PF) 2 mL Infiltration Once Eulice LewistonLUPE perry   metoprolol 2 5 mg Intravenous Q6H PRN Jules Welsh MD   metoprolol tartrate 50 mg Oral Q12H [address] Pooja Mason PA-C   mirtazapine 15 mg Oral HS Jules Welsh MD   nystatin 500,000 Units Swish & Swallow 4x Daily Heaven MAHAN PA-C   ondansetron 4 mg Intravenous Q6H PRN Haven Pena MD   pantoprazole 40 mg Oral Early Morning MD Alaina Cristina ON 12/4/2019] predniSONE 10 mg Oral Daily MD Alaina Guerrier ON 11/27/2019] predniSONE 20 mg Oral Daily MD Alaina Guerrier ON 11/20/2019] predniSONE 30 mg Oral Daily Georgette Yoo MD   predniSONE 40 mg Oral Daily Georgette Yoo MD            ** Please Note: This note is constructed using a voice recognition dictation system  An occasional wrong word/phrase or sound-a-like substitution may have been picked up by dictation device due to the inherent limitations of voice recognition software  Read the chart carefully and recognize, using reasonable context, where substitutions may have occurred  **

## 2019-11-15 NOTE — SOCIAL WORK
CM informed by Dr Mey Malik that pt for tentative d/c 11/16 to [facility]  Confirmed same w/ daughter/Karishma Hernandez &  [facility]/admissions  Pickup time 10 Am 11/16/2019 via [address] on 11/16/2019  Spoke to RECCY  CMN completed, printed, signed, original placed with facesheet, stapled to packet  For transfer  Copy made and put in medical records bin  YVONNE Be informed  Dr Mey Malik informed via tiger text  Daughter made aware

## 2019-11-15 NOTE — PHYSICAL THERAPY NOTE
Physical Therapy Treatment Note     11/15/19 1011   Pain Assessment   Pain Assessment No/denies pain   Restrictions/Precautions   Weight Bearing Precautions Per Order No   Other Precautions Contact/isolation; Fall Risk;Telemetry;Hard of hearing   General   Chart Reviewed Yes   Family/Caregiver Present No   Subjective   Subjective Pt  in [place] upon entry  Agreeable to PT session  Reported no pain  Pt  noted to be slightly slow in her responses though all responses were appropriate  Bed Mobility   Rolling L 3  Moderate assistance   Additional items Assist x 1;Bedrails;LE management;Verbal cues   Supine to Sit 2  Maximal assistance   Additional items Assist x 1; Increased time required;LE management;Verbal cues; Bedrails  (HOB falt)   Additional Comments Pt  sat at EOB with Max A x 1 and progressed to CS  Transfers   Sit to Stand 3  Moderate assistance   Additional items Assist x 1; Increased time required;Verbal cues   Stand to Sit 4  Minimal assistance   Additional items Assist x 1;Verbal cues; Impulsive   Stand pivot 3  Moderate assistance   Additional items Assist x 1;Verbal cues  (2nd SBA)   Additional Comments Pt  able to take steps to transfer from bed to chair   Balance   Static Sitting Poor +   Activity Tolerance   Activity Tolerance Patient tolerated treatment well; Other (Comment)  (Anxiety)   Nurse Made Aware yes   Exercises   TKR Sitting;20 reps;AAROM;AROM; Bilateral   Assessment   Prognosis Fair   Problem List Decreased strength;Decreased range of motion;Decreased endurance; Impaired balance;Decreased mobility; Decreased coordination;Decreased cognition; Impaired judgement;Decreased safety awareness; Impaired hearing   Assessment Pt  participated well in therapy session  sdecreased assist needed for all levels of mobility  Pt  needed max visual and verbal cues for stand pivot transfer to chair with Mod A x 1 and 2nd SBA for safety  Pt  able to take steps laterally   Pt  able to maintain sitting at EOB with max A x 1 which progressed to CS with cues to sit upright with UE support on bed  pt  also able to steps from EOB to chair laterally with RW and Mod A x 1 with max cues for LE sequencing and therapist negotiated RW  Pt  very fearful and anxious about falling  Reassured her safety while seated at EOB  Talked to attending therapist Chica Goldstein abut session and patient's mobility progress  PT to see next visit to add ambulation goals  Performed LE TE's AAROm/AROM in sme reclined posiiton - SLR, HS, hip abduction, hip adduction, AP, Quad sets, SAQ x 20 reps  Pt  able perform forwrad reach with cues from retropulsion with cues  Noted retropulsion with initial sitting at EOB and standing and correct it with assist and cues  Will continue to follow during the stay to amximize functional mobility, endurance and balance   Goals   Patient Goals None reproted   STG Expiration Date 11/23/19   PT Treatment Day 11   Plan   Treatment/Interventions Functional transfer training;LE strengthening/ROM; Therapeutic exercise;Patient/family training;Cognitive reorientation;Equipment eval/education; Bed mobility;Gait training;Spoke to nursing;OT   Progress Progressing toward goals   PT Frequency 2-3x/wk   Recommendation   Recommendation Post acute IP rehab   Equipment Recommended Walker   PT - OK to Discharge Yes   Additional Comments PT to see KELSI Gandhi, PTA

## 2019-11-16 VITALS
RESPIRATION RATE: 18 BRPM | TEMPERATURE: 98 F | HEART RATE: 76 BPM | HEIGHT: 62 IN | BODY MASS INDEX: 22.48 KG/M2 | WEIGHT: 122.13 LBS | OXYGEN SATURATION: 98 % | SYSTOLIC BLOOD PRESSURE: 134 MMHG | DIASTOLIC BLOOD PRESSURE: 71 MMHG

## 2019-11-16 LAB
ANION GAP SERPL CALCULATED.3IONS-SCNC: 6 MMOL/L (ref 4–13)
BASOPHILS # BLD AUTO: 0.01 THOUSANDS/ΜL (ref 0–0.1)
BASOPHILS NFR BLD AUTO: 0 % (ref 0–1)
BUN SERPL-MCNC: 25 MG/DL (ref 5–25)
CALCIUM SERPL-MCNC: 8.6 MG/DL (ref 8.3–10.1)
CHLORIDE SERPL-SCNC: 103 MMOL/L (ref 100–108)
CO2 SERPL-SCNC: 29 MMOL/L (ref 21–32)
CREAT SERPL-MCNC: 0.54 MG/DL (ref 0.6–1.3)
EOSINOPHIL # BLD AUTO: 0.03 THOUSAND/ΜL (ref 0–0.61)
EOSINOPHIL NFR BLD AUTO: 0 % (ref 0–6)
ERYTHROCYTE [DISTWIDTH] IN BLOOD BY AUTOMATED COUNT: 22.4 % (ref 11.6–15.1)
GFR SERPL CREATININE-BSD FRML MDRD: 89 ML/MIN/1.73SQ M
GLUCOSE SERPL-MCNC: 149 MG/DL (ref 65–140)
GLUCOSE SERPL-MCNC: 173 MG/DL (ref 65–140)
HCT VFR BLD AUTO: 30.1 % (ref 34.8–46.1)
HGB BLD-MCNC: 8.7 G/DL (ref 11.5–15.4)
IMM GRANULOCYTES # BLD AUTO: 0.09 THOUSAND/UL (ref 0–0.2)
IMM GRANULOCYTES NFR BLD AUTO: 1 % (ref 0–2)
LYMPHOCYTES # BLD AUTO: 2.09 THOUSANDS/ΜL (ref 0.6–4.47)
LYMPHOCYTES NFR BLD AUTO: 20 % (ref 14–44)
MAGNESIUM SERPL-MCNC: 2 MG/DL (ref 1.6–2.6)
MCH RBC QN AUTO: 23.4 PG (ref 26.8–34.3)
MCHC RBC AUTO-ENTMCNC: 28.9 G/DL (ref 31.4–37.4)
MCV RBC AUTO: 81 FL (ref 82–98)
MONOCYTES # BLD AUTO: 0.74 THOUSAND/ΜL (ref 0.17–1.22)
MONOCYTES NFR BLD AUTO: 7 % (ref 4–12)
NEUTROPHILS # BLD AUTO: 7.3 THOUSANDS/ΜL (ref 1.85–7.62)
NEUTS SEG NFR BLD AUTO: 72 % (ref 43–75)
NRBC BLD AUTO-RTO: 0 /100 WBCS
PHOSPHATE SERPL-MCNC: 3.3 MG/DL (ref 2.3–4.1)
PLATELET # BLD AUTO: 498 THOUSANDS/UL (ref 149–390)
PMV BLD AUTO: 9.7 FL (ref 8.9–12.7)
POTASSIUM SERPL-SCNC: 4.7 MMOL/L (ref 3.5–5.3)
RBC # BLD AUTO: 3.72 MILLION/UL (ref 3.81–5.12)
SODIUM SERPL-SCNC: 138 MMOL/L (ref 136–145)
WBC # BLD AUTO: 10.26 THOUSAND/UL (ref 4.31–10.16)

## 2019-11-16 PROCEDURE — 85025 COMPLETE CBC W/AUTO DIFF WBC: CPT | Performed by: INTERNAL MEDICINE

## 2019-11-16 PROCEDURE — 83735 ASSAY OF MAGNESIUM: CPT | Performed by: INTERNAL MEDICINE

## 2019-11-16 PROCEDURE — 99239 HOSP IP/OBS DSCHRG MGMT >30: CPT | Performed by: INTERNAL MEDICINE

## 2019-11-16 PROCEDURE — 80048 BASIC METABOLIC PNL TOTAL CA: CPT | Performed by: INTERNAL MEDICINE

## 2019-11-16 PROCEDURE — 82948 REAGENT STRIP/BLOOD GLUCOSE: CPT

## 2019-11-16 PROCEDURE — 84100 ASSAY OF PHOSPHORUS: CPT | Performed by: INTERNAL MEDICINE

## 2019-11-16 RX ADMIN — NYSTATIN 500000 UNITS: 500000 SUSPENSION ORAL at 08:39

## 2019-11-16 RX ADMIN — PANTOPRAZOLE SODIUM 40 MG: 40 TABLET, DELAYED RELEASE ORAL at 05:26

## 2019-11-16 RX ADMIN — FERROUS SULFATE TAB 325 MG (65 MG ELEMENTAL FE) 325 MG: 325 (65 FE) TAB at 08:39

## 2019-11-16 RX ADMIN — APIXABAN 2.5 MG: 2.5 TABLET, FILM COATED ORAL at 08:38

## 2019-11-16 RX ADMIN — METOPROLOL TARTRATE 50 MG: 50 TABLET, FILM COATED ORAL at 08:38

## 2019-11-16 RX ADMIN — FLUTICASONE PROPIONATE 1 SPRAY: 50 SPRAY, METERED NASAL at 08:42

## 2019-11-16 RX ADMIN — PREDNISONE 40 MG: 20 TABLET ORAL at 08:38

## 2019-11-16 RX ADMIN — AMLODIPINE BESYLATE 10 MG: 10 TABLET ORAL at 08:38

## 2019-11-16 NOTE — PROGRESS NOTES
The Protonix pill that was given to the patient at 0530 (in applesauce)  was found on her bed sheets  It appeared she had swallowed the pill and applesauce when I gave it to her, however,  the patient either spit it out the pill or removed it from her mouth after I left the room

## 2019-11-16 NOTE — ASSESSMENT & PLAN NOTE
- 50-69% ICA stenosis bilaterally on carotid dopplers  - MRI imaging earlier hospital course revealed evidence of a left precentral gyrus acute/subacute CVA without hemorrhagic transformation  - on statin therapy with Lipitor  - continue PT/OT as tolerated at skilled rehab

## 2019-11-16 NOTE — ASSESSMENT & PLAN NOTE
- initially noted on CT imaging at [facility] in September 2019   - consider cardioembolic etiology in the setting of atrial fibrillation with intermittent RVR  - CTA of abdomen/pelvis negative for aortic injury/aneurysm with patency of celiac/splenic arteries noted, however, to revealed right external iliac artery occlusion with distal reconstitution of the inferior epigastric artery coupled with an evolving splenic infarct   - LENO negative for PFO or obvious right atrial thrombus (see plan for atrial fibrillation below)  - no surgical intervention per general surgery  - continue Eliquis for anticoagulation

## 2019-11-16 NOTE — ASSESSMENT & PLAN NOTE
- progressive decline in appetite/oral intake over the last few months coupled with weakness/fatigue  - discussed with swallow/speech therapy on 11/12 - now consistently tolerating mechanical soft diet with thin liquids (Ensure nutritional supplementation on board) and Keofed tube was removed

## 2019-11-16 NOTE — ASSESSMENT & PLAN NOTE
- HbA1c of 6 4 last month  - held oral hypoglycemics during inpatient course and was maintained on SSI coverage per Accu-Cheks  - endocrinology following and titrating insulin requirements - anticipate fluctuation of blood sugars on chronic corticosteroids

## 2019-11-16 NOTE — DISCHARGE SUMMARY
Discharge Summary - [facility]  Patient: Jack Delgado [de-identified] y o  female   MRN: [id number]  PCP: Newton Torre MD  Unit/Bed#: [facility] Encounter: [id number]        Admission Date:   Admission Orders (From admission, onward)     Ordered        10/14/19 2058  Inpatient Admission  Once                 Discharge Date: 11/16/19        Hospital Course/Assessments:    * Acute metabolic encephalopathy  Assessment & Plan  - etiology fully unclear although after extensive workup during lengthy hospital course thus far, suspect vasculitic etiology (see below) coupled with now resolved hyponatremia and malnutrition/failure to thrive   - evidence of an acute/subacute left precentral gyrus CVA with bilateral IC stenosis also incidentally found (see below)  - returning to neurologic baseline with current steroid regimen - continue clinical monitoring  - plan for discharge to skilled rehab today    Fever of unknown origin  Assessment & Plan  - remains afebrile - Infectious Disease following and considering vasculitic etiology as possible culprit (may also consider splenic infarct) as extensive infectious workup earlier hospital course unremarkable  - Influenza/RSV screen previously negative - blood parasite smear negative - anti-Babesiosis IgM normal  - see plan for suspected vasculitis below    Splenic infarcts  Assessment & Plan  - initially noted on CT imaging at [facility] in September 2019   - consider cardioembolic etiology in the setting of atrial fibrillation with intermittent RVR  - CTA of abdomen/pelvis negative for aortic injury/aneurysm with patency of celiac/splenic arteries noted, however, to revealed right external iliac artery occlusion with distal reconstitution of the inferior epigastric artery coupled with an evolving splenic infarct   - LENO negative for PFO or obvious right atrial thrombus (see plan for atrial fibrillation below)  - no surgical intervention per general surgery  - continue Eliquis for anticoagulation    Suspected vasculitis  Assessment & Plan  - suspected etiology of encephalopathy at this time  - initially elevated JAMILAH/ESR noted - further workup revealed a low C4 with a normal C3, elevated dsDNA and anti-histone Ab, elevated SSA Ab, elevated IgG, and normal SARAH - other autoimmune workup unremarkable (ANCA studies, etc )  - per rheumatology, still pending cardiolipin study, beta-2 glycoprotein level, cryoglobulin, anti-centromere Ab, and lupus anticoagulant study which can be followed in the outpatient setting  - per rheumatology documentation, "SLE/or overlap connective tissue disease disorder/ Sjogrens with immune complex activation, low C4 with severe systemic sx, encephalopathy?, recent CVA and splenic infarct - might suggest anti-phospholipid syndrome    Some old hx of adrenal insufficiency, prior steroid rx as well "  - IV Solu-Medrol has been transitioned to long-term oral Prednisone tapering regimen (PPI prophylaxis on board) and possible consideration of DMARD therapy in the future - Prednisone regimen on discharge consisting of completion of 40 mg daily for a full week (day 4 of 7 today), then 30 mg daily x 1 week, then 20 mg daily x 1 week, then 10 mg daily indefinitely (maintenance)    Diabetes mellitus type 2  Assessment & Plan  - HbA1c of 6 4 last month  - held oral hypoglycemics during inpatient course and was maintained on SSI coverage per Accu-Cheks  - endocrinology following and titrating insulin requirements - anticipate fluctuation of blood sugars on chronic corticosteroids    CAD (coronary artery disease)  Assessment & Plan  - s/p prior coronary stenting  - continue statin therapy - continue Lopressor    Bilateral carotid artery stenosis  Assessment & Plan  - 50-69% ICA stenosis bilaterally on carotid dopplers  - MRI imaging earlier hospital course revealed evidence of a left precentral gyrus acute/subacute CVA without hemorrhagic transformation  - on statin therapy with Lipitor  - continue PT/OT as tolerated at skilled rehab    Failure to thrive in adult - Severe protein calorie malnutrition  Assessment & Plan  - progressive decline in appetite/oral intake over the last few months coupled with weakness/fatigue  - discussed with swallow/speech therapy on 11/12 - now consistently tolerating mechanical soft diet with thin liquids (Ensure nutritional supplementation on board) and Keofed tube was removed    Leukocytosis  Assessment & Plan  - likely reactive due to ongoing medical issues and prolonged corticosteroid regimen    Thrombocytosis  Assessment & Plan  - likely reactive due to acute/chronic medical issues    Anemia of chronic disease  Assessment & Plan  - H/H stable  - continue ferrous sulfate supplementation    Paroxysmal atrial fibrillation  Assessment & Plan  - rate controlled on Lopressor  - continue Eliquis for anticoagulation  - TSH normal last month  - echocardiogram revealing EF of 55%, mild MR, moderate TR, trace AR, but no evidence of PFO or right atrial thrombus    Essential hypertension  Assessment & Plan  - home Tenormin previously switched to Lopressor in the setting of atrial fibrillation with previous RVR  - continue Norvasc       Reason for Admission:  Confusion/lethargy       Discharge Diagnoses:     Principal Problem:    Acute metabolic encephalopathy    Active Problems:    Fever of unknown origin    Splenic infarcts    Suspected vasculitis    Diabetes mellitus type 2    CAD (coronary artery disease)    Bilateral carotid artery stenosis    Failure to thrive in adult - Severe protein calorie malnutrition    Leukocytosis    Thrombocytosis    Anemia of chronic disease    Paroxysmal atrial fibrillation    Essential hypertension        Consultations During Hospital Stay:  · Rheumatology  · Infectious Disease  · Gastroenterology  · Vascular surgery  · Neurology  · Palliative care  · Cardiology  · Medical Oncology  · General surgery  · Endocrinology  · Geriatrics      Condition at Discharge: fair       Discharge Day Visit / Exam:     Vitals: Blood Pressure: 134/71 (11/16/19 0742)  Pulse: 76 (11/16/19 0742)  Temperature: 98 °F (36 7 °C) (11/16/19 0742)  Temp Source: Oral (11/15/19 0724)  Respirations: 18 (11/16/19 0742)  Height: 5' 2" (157 5 cm) (10/15/19 1339)  Weight - Scale: 55 4 kg (122 lb 2 oz) (11/16/19 0558)  SpO2: 98 % (11/16/19 0742)      Physical exam - I had a face-to-face encounter with the patient on day of discharge  Discussion with Patient and/or Family:  The patient has been advised to return to the ER immediately if any symptoms recur or worsen  Discharge instructions/Information to Patient and/or Family:  See after visit summary for information provided to patient and/or family  Provisions for Follow-Up Care:  See after visit summary for information related to follow-up care and any pertinent home health orders  Disposition:   Skilled rehab facility      Discharge Medications:  See after visit summary for reconciled discharge medications provided to patient and/or family  Discharge Statement:  I spent 38 minutes discharging the patient  This time was spent on the day of discharge  I had direct contact with the patient on the day of discharge  Greater than 50% of the total time was spent examining patient, answering all patient questions, arranging and discussing plan of care with patient as well as directly providing post-discharge instructions  Additional time then spent on discharge activities  ** Please Note: This note is constructed using a voice recognition dictation system  An occasional wrong word/phrase or sound-a-like substitution may have been picked up by dictation device due to the inherent limitations of voice recognition software  Read the chart carefully and recognize, using reasonable context, where substitutions may have occurred  **

## 2019-11-16 NOTE — ASSESSMENT & PLAN NOTE
- suspected etiology of encephalopathy at this time  - initially elevated JAMILAH/ESR noted - further workup revealed a low C4 with a normal C3, elevated dsDNA and anti-histone Ab, elevated SSA Ab, elevated IgG, and normal SARAH - other autoimmune workup unremarkable (ANCA studies, etc )  - per rheumatology, still pending cardiolipin study, beta-2 glycoprotein level, cryoglobulin, anti-centromere Ab, and lupus anticoagulant study which can be followed in the outpatient setting  - per rheumatology documentation, "SLE/or overlap connective tissue disease disorder/ Sjogrens with immune complex activation, low C4 with severe systemic sx, encephalopathy?, recent CVA and splenic infarct - might suggest anti-phospholipid syndrome    Some old hx of adrenal insufficiency, prior steroid rx as well "  - IV Solu-Medrol has been transitioned to long-term oral Prednisone tapering regimen (PPI prophylaxis on board) and possible consideration of DMARD therapy in the future - Prednisone regimen on discharge consisting of completion of 40 mg daily for a full week (day 4 of 7 today), then 30 mg daily x 1 week, then 20 mg daily x 1 week, then 10 mg daily indefinitely (maintenance)

## 2019-11-16 NOTE — PLAN OF CARE
Problem: Prexisting or High Potential for Compromised Skin Integrity  Goal: Skin integrity is maintained or improved  Description  INTERVENTIONS:  - Identify patients at risk for skin breakdown  - Assess and monitor skin integrity  - Assess and monitor nutrition and hydration status  - Monitor labs   - Assess for incontinence   - Turn and reposition patient  - Assist with mobility/ambulation  - Relieve pressure over bony prominences  - Avoid friction and shearing  - Provide appropriate hygiene as needed including keeping skin clean and dry  - Evaluate need for skin moisturizer/barrier cream  - Collaborate with interdisciplinary team   - Patient/family teaching  - Consider wound care consult   Outcome: Adequate for Discharge     Problem: Nutrition/Hydration-ADULT  Goal: Nutrient/Hydration intake appropriate for improving, restoring or maintaining nutritional needs  Description  Monitor and assess patient's nutrition/hydration status for malnutrition  Collaborate with interdisciplinary team and initiate plan and interventions as ordered  Monitor patient's weight and dietary intake as ordered or per policy  Utilize nutrition screening tool and intervene as necessary  Determine patient's food preferences and provide high-protein, high-caloric foods as appropriate       INTERVENTIONS:  - Monitor oral intake, urinary output, labs, and treatment plans  - Assess nutrition and hydration status and recommend course of action  - Evaluate amount of meals eaten  - Assist patient with eating if necessary   - Allow adequate time for meals  - Recommend/ encourage appropriate diets, oral nutritional supplements, and vitamin/mineral supplements  - Order, calculate, and assess calorie counts as needed  - Recommend, monitor, and adjust tube feedings and TPN/PPN based on assessed needs  - Assess need for intravenous fluids  - Provide specific nutrition/hydration education as appropriate  - Include patient/family/caregiver in decisions related to nutrition  Outcome: Adequate for Discharge     Problem: Potential for Falls  Goal: Patient will remain free of falls  Description  INTERVENTIONS:  - Assess patient frequently for physical needs  -  Identify cognitive and physical deficits and behaviors that affect risk of falls    -  [place] fall precautions as indicated by assessment   - Educate patient/family on patient safety including physical limitations  - Instruct patient to call for assistance with activity based on assessment  - Modify environment to reduce risk of injury  - Consider OT/PT consult to assist with strengthening/mobility  Outcome: Adequate for Discharge     Problem: NEUROSENSORY - ADULT  Goal: Achieves stable or improved neurological status  Description  INTERVENTIONS  - Monitor and report changes in neurological status  - Monitor vital signs such as temperature, blood pressure, glucose, and any other labs ordered   - Initiate measures to prevent increased intracranial pressure  - Monitor for seizure activity and implement precautions if appropriate      Outcome: Adequate for Discharge     Problem: RESPIRATORY - ADULT  Goal: Achieves optimal ventilation and oxygenation  Description  INTERVENTIONS:  - Assess for changes in respiratory status  - Assess for changes in mentation and behavior  - Position to facilitate oxygenation and minimize respiratory effort  - Encourage broncho-pulmonary hygiene including cough, deep breathe, Incentive Spirometry  - Assess and instruct to report SOB or any respiratory difficulty  - Respiratory Therapy support as indicated   Outcome: Adequate for Discharge     Problem: GASTROINTESTINAL - ADULT  Goal: Maintains adequate nutritional intake  Description  INTERVENTIONS:  - Monitor percentage of each meal consumed  - Identify factors contributing to decreased intake, treat as appropriate  - Assist with meals as needed  - Monitor I&O, weight, and lab values if indicated  - Obtain nutrition services referral as needed  Outcome: Adequate for Discharge     Problem: GENITOURINARY - ADULT  Goal: Urinary catheter remains patent  Description  INTERVENTIONS:  - Assess patency of urinary catheter  - If patient has a chronic herrera, consider changing catheter if non-functioning  - Follow guidelines for intermittent irrigation of non-functioning urinary catheter  Outcome: Adequate for Discharge     Problem: METABOLIC, FLUID AND ELECTROLYTES - ADULT  Goal: Electrolytes maintained within normal limits  Description  INTERVENTIONS:  - Monitor labs and assess patient for signs and symptoms of electrolyte imbalances  - Administer electrolyte replacement as ordered  - Monitor response to electrolyte replacements, including repeat lab results as appropriate  - Instruct patient on fluid and nutrition as appropriate  Outcome: Adequate for Discharge  Goal: Fluid balance maintained  Description  INTERVENTIONS:  - Monitor labs   - Monitor I/O and WT  - Instruct patient on fluid and nutrition as appropriate  - Assess for signs & symptoms of volume excess or deficit  Outcome: Adequate for Discharge  Goal: Glucose maintained within target range  Description  INTERVENTIONS:  - Monitor Blood Glucose as ordered  - Assess for signs and symptoms of hyperglycemia and hypoglycemia  - Administer ordered medications to maintain glucose within target range  - Assess nutritional intake and initiate nutrition service referral as needed  Outcome: Adequate for Discharge     Problem: SKIN/TISSUE INTEGRITY - ADULT  Goal: Skin integrity remains intact  Description  INTERVENTIONS  - Identify patients at risk for skin breakdown  - Assess and monitor skin integrity  - Assess and monitor nutrition and hydration status  - Monitor labs (i e  albumin)  - Assess for incontinence   - Turn and reposition patient  - Assist with mobility/ambulation  - Relieve pressure over bony prominences  - Avoid friction and shearing  - Provide appropriate hygiene as needed including keeping skin clean and dry  - Evaluate need for skin moisturizer/barrier cream  - Collaborate with interdisciplinary team (i e  Nutrition, Rehabilitation, etc )   - Patient/family teaching  Outcome: Adequate for Discharge  Goal: Oral mucous membranes remain intact  Description  INTERVENTIONS  - Assess oral mucosa and hygiene practices  - Implement preventative oral hygiene regimen  - Implement oral medicated treatments as ordered  - Initiate Nutrition services referral as needed  Outcome: Adequate for Discharge     Problem: HEMATOLOGIC - ADULT  Goal: Maintains hematologic stability  Description  INTERVENTIONS  - Monitor labs      Outcome: Adequate for Discharge     Problem: SAFETY,RESTRAINT: NV/NON-SELF DESTRUCTIVE BEHAVIOR  Goal: Remains free of harm/injury (restraint for non violent/non self-detsructive behavior)  Description  INTERVENTIONS:  - Instruct patient/family regarding restraint use   - Assess and monitor physiologic and psychological status   - Provide interventions and comfort measures to meet assessed patient needs   - Identify and implement measures to help patient regain control  - Assess readiness for release of restraint   Outcome: Adequate for Discharge  Goal: Returns to optimal restraint-free functioning  Description  INTERVENTIONS:  - Assess the patient's behavior and symptoms that indicate continued need for restraint  - Identify and implement measures to help patient regain control  - Assess readiness for release of restraint   Outcome: Adequate for Discharge     Problem: COPING  Goal: Pt/Family able to verbalize concerns and demonstrate effective coping strategies  Description  INTERVENTIONS:  - Assist patient/family to identify coping skills, available support systems and cultural and spiritual values  - Provide emotional support, including active listening and acknowledgement of concerns of patient and caregivers  - Reduce environmental stimuli, as able  - Provide patient education  - Assess for spiritual pain/suffering and initiate spiritual care, including notification of Pastoral Care or [place] based community as needed  - Assess effectiveness of coping strategies  Outcome: Adequate for Discharge  Goal: Will report anxiety at manageable levels  Description  INTERVENTIONS:  - Administer medication as ordered  - Teach and encourage coping skills  - Provide emotional support  - Assess patient/family for anxiety and ability to cope  Outcome: Adequate for Discharge     Problem: CONFUSION/THOUGHT DISTURBANCE  Goal: Thought disturbances (confusion, delirium, depression, dementia or psychosis) are managed to maintain or return to baseline mental status and functional level  Description  INTERVENTIONS:  - Assess for possible contributors to  thought disturbance, including but not limited to medications, infection, impaired vision or hearing, underlying metabolic abnormalities, dehydration, respiratory compromise,  psychiatric diagnoses and notify attending PHYSICAN/AP  - Monitor and intervene to maintain adequate nutrition, hydration, elimination, sleep and activity  - Decrease environmental stimuli, including noise as appropriate  - Provide frequent contacts to provide refocusing, direction and reassurance as needed  Approach patient calmly with eye contact and at their level    - [place] high risk fall precautions, aspiration precautions and other safety measures, as indicated  - If delirium suspected, notify physician/AP of change in condition and request immediate in-person evaluation  - Pursue consults as appropriate including Geriatric (campus dependent), OT for cognitive evaluation/activity planning, psychiatric, pastoral care, etc   Outcome: Adequate for Discharge     Problem: PAIN - ADULT  Goal: Verbalizes/displays adequate comfort level or baseline comfort level  Description  Interventions:  - Encourage patient to monitor pain and request assistance  - Assess pain using appropriate pain scale  - Administer analgesics based on type and severity of pain and evaluate response  - Implement non-pharmacological measures as appropriate and evaluate response  - Consider cultural and social influences on pain and pain management  - Notify physician/advanced practitioner if interventions unsuccessful or patient reports new pain  Outcome: Adequate for Discharge     Problem: INFECTION - ADULT  Goal: Absence or prevention of progression during hospitalization  Description  INTERVENTIONS:  - Assess and monitor for signs and symptoms of infection  - Monitor lab/diagnostic results  - Monitor all insertion sites, i e  indwelling lines, tubes, and drains  - Monitor endotracheal if appropriate and nasal secretions for changes in amount and color  - [place] appropriate cooling/warming therapies per order  - Administer medications as ordered  - Instruct and encourage patient and family to use good hand hygiene technique  - Identify and instruct in appropriate isolation precautions for identified infection/condition  Outcome: Adequate for Discharge     Problem: SAFETY ADULT  Goal: Patient will remain free of falls  Description  INTERVENTIONS:  - Assess patient frequently for physical needs  -  Identify cognitive and physical deficits and behaviors that affect risk of falls    -  [place] fall precautions as indicated by assessment   - Educate patient/family on patient safety including physical limitations  - Instruct patient to call for assistance with activity based on assessment  - Modify environment to reduce risk of injury  - Consider OT/PT consult to assist with strengthening/mobility  Outcome: Adequate for Discharge  Goal: Maintain or return to baseline ADL function  Description  INTERVENTIONS:  -  Assess patient's ability to carry out ADLs; assess patient's baseline for ADL function and identify physical deficits which impact ability to perform ADLs (bathing, care of mouth/teeth, toileting, grooming, dressing, etc )  - Assess/evaluate cause of self-care deficits   - Assess range of motion  - Assess patient's mobility; develop plan if impaired  - Assess patient's need for assistive devices and provide as appropriate  - Encourage maximum independence but intervene and supervise when necessary  - Involve family in performance of ADLs  - Assess for home care needs following discharge   - Consider OT consult to assist with ADL evaluation and planning for discharge  - Provide patient education as appropriate  Outcome: Adequate for Discharge  Goal: Maintain or return mobility status to optimal level  Description  INTERVENTIONS:  - Assess patient's baseline mobility status (ambulation, transfers, stairs, etc )    - Identify cognitive and physical deficits and behaviors that affect mobility  - Identify mobility aids required to assist with transfers and/or ambulation (gait belt, sit-to-stand, lift, walker, cane, etc )  - [place] fall precautions as indicated by assessment  - Record patient progress and toleration of activity level on Mobility SBAR; progress patient to next Phase/Stage  - Instruct patient to call for assistance with activity based on assessment  - Consider rehabilitation consult to assist with strengthening/weightbearing, etc   Outcome: Adequate for Discharge     Problem: DISCHARGE PLANNING  Goal: Discharge to home or other facility with appropriate resources  Description  INTERVENTIONS:  - Identify barriers to discharge w/patient and caregiver  - Arrange for needed discharge resources and transportation as appropriate  - Identify discharge learning needs (meds, wound care, etc )  - Arrange for interpretive services to assist at discharge as needed  - Refer to Case Management Department for coordinating discharge planning if the patient needs post-hospital services based on physician/advanced practitioner order or complex needs related to functional status, cognitive ability, or social support system  Outcome: Adequate for Discharge     Problem: Knowledge Deficit  Goal: Patient/family/caregiver demonstrates understanding of disease process, treatment plan, medications, and discharge instructions  Description  Complete learning assessment and assess knowledge base    Interventions:  - Provide teaching at level of understanding  - Provide teaching via preferred learning methods  Outcome: Adequate for Discharge

## 2019-11-16 NOTE — ASSESSMENT & PLAN NOTE
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

## 2019-11-19 LAB — MISCELLANEOUS LAB TEST RESULT: NORMAL

## 2019-11-21 LAB — ENA PM/SCL AB SER-ACNC: 3.6 EU/ML

## 2019-11-22 ENCOUNTER — TELEPHONE (OUTPATIENT)
Dept: NEUROLOGY | Facility: CLINIC | Age: 79
End: 2019-11-22

## 2019-11-22 NOTE — TELEPHONE ENCOUNTER
The purpose of this phone call is to assess patient's general wellbeing or for any assistance needed with follow-up care  Hospitalization 10/14-11/16/2019  -    According to chart, patient discharged to [facility]; [phone]  Called facility,  transferred the call to Malgorzata Morrison  Since discharge, patient has not experienced any new or worsening stroke symptoms  Continues to be confused and have bilateral weakness  Requires assistance for all ADLS  Mobilizes via wheelchair  No dysphagia  Last /68  No medication changes, no side effects or sings of bleeding  No PCP appt until discharged, no discharge date  Provided her info for appt 1/21 with Dr Lalo Sanchez for stroke hospital follow up  Patient progressing in therapy  RN Malgorzata Morrison does not have any questions or concerns at this time

## 2019-12-06 LAB — MISCELLANEOUS LAB TEST RESULT: NORMAL

## 2019-12-10 ENCOUNTER — TELEPHONE (OUTPATIENT)
Dept: NEUROLOGY | Facility: CLINIC | Age: 79
End: 2019-12-10

## 2019-12-10 NOTE — TELEPHONE ENCOUNTER
21/30 day post discharge follow up call  Hospitalization 10/14-11/16/19  Please see my telephone encounter 11/22 for 7 day follow up call  The purpose of this phone call is to assess patient's general wellbeing or for any assistance needed with follow-up care  -    For last follow up, had spoken with the staff from [facility] ([phone])  Called facility,  confirmed patient is still there  Transferred my call to nursing unit  YVONNE Titus Ahr answered, put me know hold, then spoke with YVONNE Valentino  States patient has not had any new or worsening stroke-like symptoms  Confirmed no change since last follow up  Continues to be confused, generally weak, and fully dependent on staff for mobilization in wheelchair as well as all her care  Last /80  States there was one medication change, patient recently completed prescription of vancomycin for C  Diff  No other medication changes  No side effects or signs of bleeding  Reminded her of stroke hospital follow up appointment scheduled 1/21 with Dr Lizzie Faith  States she will be sure Transport team has information  RN Vaughan does not have any questions or concerns at this time

## 2020-01-17 ENCOUNTER — TELEPHONE (OUTPATIENT)
Dept: NEUROLOGY | Facility: CLINIC | Age: 80
End: 2020-01-17

## 2020-02-12 ENCOUNTER — OFFICE VISIT (OUTPATIENT)
Dept: GASTROENTEROLOGY | Facility: CLINIC | Age: 80
End: 2020-02-12
Payer: MEDICARE

## 2020-02-12 VITALS
SYSTOLIC BLOOD PRESSURE: 132 MMHG | BODY MASS INDEX: 22.97 KG/M2 | TEMPERATURE: 96.6 F | HEART RATE: 78 BPM | DIASTOLIC BLOOD PRESSURE: 84 MMHG | HEIGHT: 62 IN | WEIGHT: 124.8 LBS

## 2020-02-12 DIAGNOSIS — E11.40 TYPE 2 DIABETES MELLITUS WITH DIABETIC NEUROPATHY, WITHOUT LONG-TERM CURRENT USE OF INSULIN (HCC): Primary | ICD-10-CM

## 2020-02-12 DIAGNOSIS — I48.0 PAROXYSMAL ATRIAL FIBRILLATION (HCC): ICD-10-CM

## 2020-02-12 DIAGNOSIS — E78.5 HYPERLIPIDEMIA, UNSPECIFIED HYPERLIPIDEMIA TYPE: Chronic | ICD-10-CM

## 2020-02-12 DIAGNOSIS — I10 ESSENTIAL HYPERTENSION: Chronic | ICD-10-CM

## 2020-02-12 DIAGNOSIS — I25.10 CORONARY ARTERY DISEASE INVOLVING NATIVE HEART WITHOUT ANGINA PECTORIS, UNSPECIFIED VESSEL OR LESION TYPE: ICD-10-CM

## 2020-02-12 PROCEDURE — 99214 OFFICE O/P EST MOD 30 MIN: CPT | Performed by: INTERNAL MEDICINE

## 2020-02-12 RX ORDER — ASCORBIC ACID 500 MG
500 TABLET ORAL DAILY
COMMUNITY

## 2020-02-12 RX ORDER — ERGOCALCIFEROL (VITAMIN D2) 1250 MCG
50000 CAPSULE ORAL
COMMUNITY

## 2020-02-12 NOTE — PROGRESS NOTES
[facility] - Outpatient Consultation  Lucy Bear [de-identified] y o  female MRN: [id number]  Encounter: [id number]          ASSESSMENT AND PLAN:    Ms Silvestre Thompson is an [de-identified]year old female who presents for consultation and evaluation regarding diarrhea and elevated fecal calprotectin  Diagnoses and orders for this visit:    1  Diarrhea  Patient has had diarrhea for three weeks that has resolved  Fecal calprotectin elevated at 501  CRP is normal  Patient was advised to reduce dairy for a few weeks unless tolerated  She can eat as tolerated to avoid weight loss  2  Colon cancer screening  The patient does not recall when her last colonoscopy was  She has a history of heart disease, heart attack in 1998, stroke, high blood pressure, atrial fibrillation, and diabetes  Risks and benefits of colonoscopy were discussed  Colonoscopy was not recommended at this time  3  Gastroesophageal reflux disease  Well controlled on omeprazole 20 mg and pantoprazole 40 mg  She will follow up as needed  ______________________________________________________________________    HPI:  Lucy Bear is a [de-identified] y o  female who presents with a 3 week history of diarrhea  and elevated fecal calprocectin  On 1/23/2020 her fecal calprotectin was 501 and CRP was normal  She denies any nausea or vomiting  Her diarrhea has since resolved  She does not recall when her last colonoscopy was  She denies any weight loss or hematochezia  Patient states she has lost weight and does not like to eat but has not seen a change in appettite  The patient lives in a nursing home and has resided there for about 4 months  REVIEW OF SYSTEMS:    CONSTITUTIONAL: Denies any fever, chills, rigors, and weight loss  HEENT: No earache or tinnitus  Denies hearing loss or visual disturbances  CARDIOVASCULAR: No chest pain or palpitations     RESPIRATORY: Denies any cough, hemoptysis, shortness of breath or dyspnea on exertion  GASTROINTESTINAL: As noted in the History of Present Illness  GENITOURINARY: No problems with urination  Denies any hematuria or dysuria  NEUROLOGIC: No dizziness or vertigo, denies headaches  MUSCULOSKELETAL: Denies any muscle or joint pain  SKIN: Denies skin rashes or itching  ENDOCRINE: Denies excessive thirst  Denies intolerance to heat or cold  PSYCHOSOCIAL: Denies depression or anxiety  Denies any recent memory loss  Historical Information   Past Medical History:   Diagnosis Date    Adrenal hemorrhage ([place] [id number] )     Cardiac disease     Carotid stenosis     Diabetes mellitus ([place] [address] )     H/O adrenal insufficiency     Hyperlipidemia     Hypertension     Hyponatremia     Urinary retention 09/2019    Rapp    UTI (urinary tract infection) 09/2019     Past Surgical History:   Procedure Laterality Date    CORONARY ANGIOPLASTY WITH STENT PLACEMENT       Social History   Social History     Substance and Sexual Activity   Alcohol Use No     Social History     Substance and Sexual Activity   Drug Use No     Social History     Tobacco Use   Smoking Status Current Every Day Smoker    Packs/day: 0 20     No family history on file      Meds/Allergies       Current Outpatient Medications:     acetaminophen (TYLENOL) 325 mg tablet    amLODIPine (NORVASC) 10 mg tablet    apixaban (ELIQUIS) 2 5 mg    atenolol (TENORMIN) 50 mg tablet    atorvastatin (LIPITOR) 40 mg tablet    ferrous sulfate 325 (65 Fe) mg tablet    fluticasone (FLONASE) 50 mcg/act nasal spray    insulin lispro (HumaLOG) 100 units/mL injection    metFORMIN (GLUCOPHAGE) 1000 MG tablet    metoprolol tartrate (LOPRESSOR) 50 mg tablet    mirtazapine (REMERON) 15 mg tablet    nystatin (MYCOSTATIN) 500,000 units/5 mL suspension    omeprazole (PriLOSEC) 20 mg delayed release capsule    pantoprazole (PROTONIX) 40 mg tablet    predniSONE 10 mg tablet    predniSONE 10 mg tablet    predniSONE 10 mg tablet    predniSONE 10 mg tablet    simvastatin (ZOCOR) 20 mg tablet    sodium chloride 1 g tablet    No Known Allergies        Objective     not currently breastfeeding  There is no height or weight on file to calculate BMI  PHYSICAL EXAM:      General Appearance:   Alert, cooperative, no distress   HEENT:   Normocephalic, atraumatic, anicteric      Neck:  Supple, symmetrical, trachea midline   Lungs:   Clear to auscultation bilaterally; no rales, rhonchi or wheezing; respirations unlabored    Heart[de-identified]   Regular rate and rhythm; no murmur, rub, or gallop  Abdomen:   Soft, non-tender, non-distended; normal bowel sounds; no masses, no organomegaly    Genitalia:   Deferred    Rectal:   Deferred    Extremities:  No cyanosis, clubbing or edema    Pulses:  2+ and symmetric    Skin:  No jaundice, rashes, or lesions    Lymph nodes:  No palpable cervical lymphadenopathy        Lab Results:   No visits with results within 1 Day(s) from this visit  Latest known visit with results is:   No results displayed because visit has over 200 results  Radiology Results:   No results found  Attestation:   By signing my name below, dougie Harkins that this documentation has been prepared under the direction and in the presence of Trev Amos DO  Electronically Signed: Agustin Edmond  2/12/2020      I, Trev Amos, personally performed the services described in this documentation  All medical record entries made by the scribmarilou were at my direction and in my presence  I have reviewed the chart and discharge instructions and agree that the record reflects my personal performance and is accurate and complete   Trev Amos DO  2/12/2020

## 2020-03-05 NOTE — ASSESSMENT & PLAN NOTE
- suspected etiology of encephalopathy at this time  - initially elevated JAMILAH/ESR noted - further workup revealed a low C4 with a normal C3, elevated dsDNA and anti-histone Ab, elevated SSA Ab, elevated IgG, and normal SARAH - other autoimmune workup unremarkable (ANCA studies, etc )  - per rheumatology, still pending cardiolipin study, beta-2 glycoprotein level, cryoglobulin, anti-centromere Ab, and lupus anticoagulant study  - per rheumatology documentation, "SLE/or overlap connective tissue disease disorder/ Sjogrens with immune complex activation, low C4 with severe systemic sx, encephalopathy?, recent CVA and splenic infarct - might suggest anti-phospholipid syndrome    Some old hx of adrenal insufficiency, prior steroid rx as well "  - IV Solu-Medrol has been transitioned to long-term oral Prednisone tapering regimen (PPI prophylaxis on board) and possible consideration of DMARD therapy in the future - Prednisone regimen on discharge consisting of completion of 40 mg daily for a full week (day 3 of 7 today), then 30 mg daily x 1 week, then 20 mg daily x 1 week, then 10 mg daily indefinitely (maintenance) Patient informed US or  is not , Patient still insisting on new referral.

## 2021-02-12 DIAGNOSIS — Z23 ENCOUNTER FOR IMMUNIZATION: ICD-10-CM

## 2023-12-13 ENCOUNTER — APPOINTMENT (RX ONLY)
Dept: URBAN - METROPOLITAN AREA CLINIC 26 | Facility: CLINIC | Age: 83
Setting detail: DERMATOLOGY
End: 2023-12-13

## 2023-12-13 PROBLEM — C44.1192 BASAL CELL CARCINOMA OF SKIN OF LEFT LOWER EYELID, INCLUDING CANTHUS: Status: ACTIVE | Noted: 2023-12-13

## 2023-12-13 PROCEDURE — 99204 OFFICE O/P NEW MOD 45 MIN: CPT

## 2023-12-13 PROCEDURE — ? CONSULTATION FOR MOHS SURGERY

## 2023-12-13 NOTE — PROCEDURE: MIPS QUALITY
Quality 431: Preventive Care And Screening: Unhealthy Alcohol Use - Screening: Patient not identified as an unhealthy alcohol user when screened for unhealthy alcohol use using a systematic screening method
Quality 110: Preventive Care And Screening: Influenza Immunization: Influenza Immunization Administered during Influenza season
Detail Level: Detailed
Quality 226: Preventive Care And Screening: Tobacco Use: Screening And Cessation Intervention: Patient screened for tobacco use and is an ex/non-smoker
Quality 47: Advance Care Plan: Advance Care Planning discussed and documented; advance care plan or surrogate decision maker documented in the medical record.
Quality 111:Pneumonia Vaccination Status For Older Adults: Patient received any pneumococcal conjugate or polysaccharide vaccine on or after their 60th birthday and before the end of the measurement period
Quality 130: Documentation Of Current Medications In The Medical Record: Current Medications Documented

## 2024-01-26 NOTE — PROCEDURE: CONSULTATION FOR MOHS SURGERY
Detail Level: Detailed
Body Location Override (Optional - Billing Will Still Be Based On Selected Body Map Location If Applicable): Left medial inferior eyelid, cheek, and nasal sidewall
Size Of Lesion: 3
X Size Of Lesion In Cm (Optional): 2.1
Other Plan: Patient will be referrerd to [facility] Mohs surgery for Mohs and oculoplastic reconstruction, with possible preoperative MRI to evaluate extent and depth of tumor.
Incorporate Mauc In Note: Yes
Location Indication Override (Is Already Calculated Based On Selected Body Location): Area H
None

## 2025-04-17 NOTE — SPEECH THERAPY NOTE
Detail Level: Zone Speech Language/Pathology  Speech-Language Pathology Bedside Swallow Evaluation        Patient Name: [place]    Today's Date: 10/27/2019     Problem List  Principal Problem:    Metabolic encephalopathy  Active Problems:    Hyponatremia    Type 2 diabetes mellitus with diabetic neuropathy, without long-term current use of insulin (HCC)    Essential hypertension    HLD (hyperlipidemia)    Failure to thrive in adult    Urinary retention    Severe protein-calorie malnutrition (HCC)    Bradycardia    Low grade fever    Microcytic anemia    Splenic infarct         Past Medical History  Past Medical History:   Diagnosis Date    Cardiac disease     Diabetes mellitus ([address] )     Hyperlipidemia     Hypertension        Past Surgical History  Past Surgical History:   Procedure Laterality Date    CORONARY ANGIOPLASTY WITH STENT PLACEMENT         Summary    Pt presents with mod-severe oral and suspected mod pharyngeal dysphagia  This is characterized by prolonged mastication and bolus formation, with eventual expectoration of the mech soft solids, oral holding of thin and NT liquids and even more so with pureed, but pt did eventually swallow these, suspected premature spill, pharyngeal swallow delay, and reduced hyolaryngeal elevation  Inconsistent s/s of aspiration were observed with thin liquids  No s/s of aspiration with NTL nor with pureed foods  Belching and esophageal gurgle was also observed; there may be an esophageal component to the dysphagia  Pt is appropriate for a downgraded diet, however, she may not eat/drink enough to maintain adequate nutrition/hydration  Palliative care consult is pending; depending on results of consult, may need to consider alternate means of nutrition/hydration  Consider VBS, also pending palliative care consult        Recommendations:   Diet: puree/level 1 diet and nectar thick liquids, cup or straw  Meds: crushed with puree or IV  Full assist  Aspiration precautions and compensatory swallowing strategies: upright posture, only feed when fully alert, slow rate of feeding, small bites/sips and stop feeding if pt is not tolerating, and make NPO  Other Recommendations/ considerations: ST to follow up for dysphagia tx 3-5x per week  Pending results of palliative care consult, pt may benefit from VBS  May need to consider alternate feeding, again, pending palliative care consult  Recommend consult with dietician  Consider GI consult, pending palliative consult results  Current Medical Status  Copied from admission:  [place] is a [de-identified] y o  female who has a previous history of hyponatremia and urinary tract infection  Likewise, the patient has a history of essential hypertension, hyperlipidemia, and diabetes mellitus type 2  According to the patient's daughter who is currently the historian, she used to be a very functional 39-year-old who drove herself going to places and doing things on her own with good level of activities of daily living  However approximately September, she had a bout of urinary tract infection with hyponatremia and since then it seems that her functioning has been very limited  She is supposed to be on sodium tablets 1 gram twice daily along with her usual medications for blood pressure and diabetes  This also included Ace inhibitor (enalapril)  Ever since the patient was sent home, it seems that she has been not good about eating proper amounts of food and sometimes for goes eating saying that she is not hungry  With that, it seems that the patient has been becoming more lethargic and recently has become more confused  The patient came to the emergency room and was seen to be more lethargic as well as confused  Noted that the patient was given a bolus of 500 milliliters of fluids with improvement of mental status but remains to be on mindful of surroundings although much more awake and alert    Noted is that the patient's urinalysis shows presence of bacteria  She does have a indwelling Rapp catheter  Patient also was noted to be hyponatremic 126  That said, the patient would be treated for urinary tract infection and therefore Rocephin was started  She is also to receive fluids with checks of metabolic profile tomorrow  Meanwhile the patient needs to have an assessment of ADLs in the form of cognitive occupational therapy and usual occupational therapy with case management evaluation  She also needs nutrition consult to monitor food intake and caloric needs  Currently, patient is resting well  Although she is awake  She is not mindful of surroundings however can have short responses and brief attention span  She is hard of hearing and therefore can't hear more so on the right ear  New order received and chart reviewed  Pt's nurse reports pt is holding food orally, pocketing pills, and appears to be gagging on food, and is generally not tolerating current diet  Pt is also on a fluid restriction  Pt was seen by ST earlier in this admission, but was discharged from [place] [id number] on a dysphagia 2 diet and thin liquids  Past medical history:   Please see H&P for details    Special Studies:  CXR-Small pleural effusion  No pneumothorax   CT Head-No acute intracranial abnormality  Microangiopathic changes  Chronic lacunar infarct left lentiform nucleus           Social/Education/Vocational Hx:  Pt lives in SNF/ECF    Swallow Information   Current Risks for Dysphagia & Aspiration: known history of dysphagia and AMS  Current Symptoms/Concerns: pocketing food and holding pills in mouth, spitting out food, weight loss, poor po intake  Current Diet: mechanically altered/level 2 diet and thin liquids   Baseline Diet: regular diet and thin liquids    Baseline Assessment   Behavior/Cognition: decreased attention, low alertness level and awake, but minimal interaction, mostly stares straight ahead  Speech/Language Status: able to follow commands inconsistently and limited verbal output  Patient Positioning: upright in bed     Swallow Mechanism Exam   Pt unable to complete oral mech exam, due to not following commands  Tongue is coated greenish/blackish; RN notified  Oral motor appears grossly intact  Pt is edentulous  Volitional cough is weak  Voice is somewhat breathy  Pt appeared SOB prior to and during assessment, but sats were in the 90s  Consistencies Assessed and Performance   Consistencies Administered: thin liquids, nectar thick, puree and mechanical soft solids    Oral Stage: Adequate bolus retrieval from spoon and draw from straw  Pt tends to hold the pureed bolus and liquid bolus in the mouth prior to swallow  Liquids were held in the mouth only briefly prior to initiating a swallow  With the second bite of pudding, pt appeared to gag and wretch, while still holding the bolus in the mouth, but did eventually swallow  With one bite of pasta with marinara sauce, pt masticated for a prolonged period of time, eventually spitting out all the pieces, which were not formed into a cohesive bolus  Pharyngeal Stage: Suspect possible premature spill into pharynx during oral holding  Hyolaryngeal elevation was observed and palpated; suspect this to be reduced, and also suspect delayed swallows  With thin liquids, inconsistent throat clear and cough was observed; no s/s of aspiration with NTL or pureed  Esophageal Concerns: belching and gurgling noise intermittently      Results Reviewed with: patient and RN   Dysphagia Goals: 1  Pt will tolerate pureed diet and NTL without s/s of aspiration across all meals and snacks  2  PT will participate in VBS if warranted  3  Pt will tolerate LRD without s/s of aspiration     Discharge recommendation: SNF    Speech Therapy Prognosis   Prognosis: guarded    Prognosis Considerations: medical status, cognitive status, progressive disease process and therapeutic potential